# Patient Record
Sex: MALE | Race: WHITE | NOT HISPANIC OR LATINO | URBAN - METROPOLITAN AREA
[De-identification: names, ages, dates, MRNs, and addresses within clinical notes are randomized per-mention and may not be internally consistent; named-entity substitution may affect disease eponyms.]

---

## 2018-01-18 ENCOUNTER — INPATIENT (INPATIENT)
Facility: HOSPITAL | Age: 73
LOS: 39 days | Discharge: HOME | DRG: 3 | End: 2018-02-27
Attending: SURGERY | Admitting: SURGERY
Payer: COMMERCIAL

## 2018-01-18 VITALS
DIASTOLIC BLOOD PRESSURE: 92 MMHG | OXYGEN SATURATION: 94 % | RESPIRATION RATE: 20 BRPM | SYSTOLIC BLOOD PRESSURE: 131 MMHG | HEIGHT: 70 IN | HEART RATE: 70 BPM | TEMPERATURE: 98 F | WEIGHT: 197.09 LBS

## 2018-01-18 LAB
ALBUMIN SERPL ELPH-MCNC: 3.6 G/DL — SIGNIFICANT CHANGE UP (ref 3.3–5)
ALP SERPL-CCNC: 112 U/L — SIGNIFICANT CHANGE UP (ref 40–120)
ALT FLD-CCNC: 1329 U/L — HIGH (ref 10–45)
ANION GAP SERPL CALC-SCNC: 19 MMOL/L — HIGH (ref 5–17)
APPEARANCE UR: CLEAR — SIGNIFICANT CHANGE UP
AST SERPL-CCNC: 441 U/L — HIGH (ref 10–40)
BACTERIA # UR AUTO: PRESENT /HPF
BASOPHILS NFR BLD AUTO: 0 % — SIGNIFICANT CHANGE UP (ref 0–2)
BILIRUB SERPL-MCNC: 2.9 MG/DL — HIGH (ref 0.2–1.2)
BILIRUB UR-MCNC: (no result)
BUN SERPL-MCNC: 85 MG/DL — HIGH (ref 7–23)
CALCIUM SERPL-MCNC: 8.8 MG/DL — SIGNIFICANT CHANGE UP (ref 8.4–10.5)
CHLORIDE SERPL-SCNC: 100 MMOL/L — SIGNIFICANT CHANGE UP (ref 96–108)
CO2 SERPL-SCNC: 17 MMOL/L — LOW (ref 22–31)
COLOR SPEC: YELLOW — SIGNIFICANT CHANGE UP
CREAT SERPL-MCNC: 1.48 MG/DL — HIGH (ref 0.5–1.3)
DIFF PNL FLD: (no result)
EOSINOPHIL NFR BLD AUTO: 0 % — SIGNIFICANT CHANGE UP (ref 0–6)
EPI CELLS # UR: (no result) /HPF (ref 0–5)
GAS PNL BLDV: SIGNIFICANT CHANGE UP
GLUCOSE SERPL-MCNC: 158 MG/DL — HIGH (ref 70–99)
GLUCOSE UR QL: NEGATIVE — SIGNIFICANT CHANGE UP
GRAN CASTS # UR COMP ASSIST: (no result) /LPF
HCT VFR BLD CALC: 39.7 % — SIGNIFICANT CHANGE UP (ref 39–50)
HGB BLD-MCNC: 13.8 G/DL — SIGNIFICANT CHANGE UP (ref 13–17)
HYALINE CASTS # UR AUTO: (no result) /LPF (ref 0–2)
HYPOCHROMIA BLD QL: SLIGHT — SIGNIFICANT CHANGE UP
KETONES UR-MCNC: NEGATIVE — SIGNIFICANT CHANGE UP
LACTATE SERPL-SCNC: 2.9 MMOL/L — HIGH (ref 0.5–2)
LACTATE SERPL-SCNC: 2.9 MMOL/L — HIGH (ref 0.5–2)
LEUKOCYTE ESTERASE UR-ACNC: NEGATIVE — SIGNIFICANT CHANGE UP
LG PLATELETS BLD QL AUTO: PRESENT — SIGNIFICANT CHANGE UP
LIDOCAIN IGE QN: 66 U/L — HIGH (ref 7–60)
LYMPHOCYTES # BLD AUTO: 9 % — LOW (ref 13–44)
MANUAL DIF COMMENT BLD-IMP: SIGNIFICANT CHANGE UP
MANUAL SMEAR VERIFICATION: SIGNIFICANT CHANGE UP
MCHC RBC-ENTMCNC: 31.7 PG — SIGNIFICANT CHANGE UP (ref 27–34)
MCHC RBC-ENTMCNC: 34.8 G/DL — SIGNIFICANT CHANGE UP (ref 32–36)
MCV RBC AUTO: 91.1 FL — SIGNIFICANT CHANGE UP (ref 80–100)
MICROCYTES BLD QL: SLIGHT — SIGNIFICANT CHANGE UP
MONOCYTES NFR BLD AUTO: 3 % — SIGNIFICANT CHANGE UP (ref 2–14)
NEUTROPHILS NFR BLD AUTO: 87 % — HIGH (ref 43–77)
NEUTS BAND # BLD: 1 % — SIGNIFICANT CHANGE UP
NITRITE UR-MCNC: NEGATIVE — SIGNIFICANT CHANGE UP
NRBC # BLD: 4 /100 WBCS — HIGH
PH UR: 6 — SIGNIFICANT CHANGE UP (ref 5–8)
PLAT MORPH BLD: (no result)
PLATELET # BLD AUTO: 99 K/UL — LOW (ref 150–400)
POLYCHROMASIA BLD QL SMEAR: SLIGHT — SIGNIFICANT CHANGE UP
POTASSIUM SERPL-MCNC: 4.2 MMOL/L — SIGNIFICANT CHANGE UP (ref 3.5–5.3)
POTASSIUM SERPL-SCNC: 4.2 MMOL/L — SIGNIFICANT CHANGE UP (ref 3.5–5.3)
PROT SERPL-MCNC: 6.6 G/DL — SIGNIFICANT CHANGE UP (ref 6–8.3)
PROT UR-MCNC: 30 MG/DL
RAPID RVP RESULT: SIGNIFICANT CHANGE UP
RBC # BLD: 4.36 M/UL — SIGNIFICANT CHANGE UP (ref 4.2–5.8)
RBC # FLD: 15.4 % — SIGNIFICANT CHANGE UP (ref 10.3–16.9)
RBC BLD AUTO: (no result)
RBC CASTS # UR COMP ASSIST: (no result) /HPF
SODIUM SERPL-SCNC: 136 MMOL/L — SIGNIFICANT CHANGE UP (ref 135–145)
SP GR SPEC: 1.02 — SIGNIFICANT CHANGE UP (ref 1–1.03)
UROBILINOGEN FLD QL: 4 E.U./DL
WBC # BLD: 17.9 K/UL — HIGH (ref 3.8–10.5)
WBC # FLD AUTO: 17.9 K/UL — HIGH (ref 3.8–10.5)
WBC UR QL: (no result) /HPF

## 2018-01-18 PROCEDURE — 71250 CT THORAX DX C-: CPT | Mod: 26

## 2018-01-18 PROCEDURE — 99291 CRITICAL CARE FIRST HOUR: CPT | Mod: 25

## 2018-01-18 PROCEDURE — 99292 CRITICAL CARE ADDL 30 MIN: CPT | Mod: 25

## 2018-01-18 PROCEDURE — 71045 X-RAY EXAM CHEST 1 VIEW: CPT | Mod: 26

## 2018-01-18 PROCEDURE — 74176 CT ABD & PELVIS W/O CONTRAST: CPT | Mod: 26

## 2018-01-18 PROCEDURE — 76705 ECHO EXAM OF ABDOMEN: CPT | Mod: 26

## 2018-01-18 RX ORDER — VANCOMYCIN HCL 1 G
VIAL (EA) INTRAVENOUS
Qty: 0 | Refills: 0 | Status: DISCONTINUED | OUTPATIENT
Start: 2018-01-18 | End: 2018-01-18

## 2018-01-18 RX ORDER — SODIUM CHLORIDE 9 MG/ML
1000 INJECTION INTRAMUSCULAR; INTRAVENOUS; SUBCUTANEOUS ONCE
Qty: 0 | Refills: 0 | Status: COMPLETED | OUTPATIENT
Start: 2018-01-18 | End: 2018-01-18

## 2018-01-18 RX ORDER — SODIUM CHLORIDE 9 MG/ML
3 INJECTION INTRAMUSCULAR; INTRAVENOUS; SUBCUTANEOUS ONCE
Qty: 0 | Refills: 0 | Status: COMPLETED | OUTPATIENT
Start: 2018-01-18 | End: 2018-01-18

## 2018-01-18 RX ORDER — VANCOMYCIN HCL 1 G
1500 VIAL (EA) INTRAVENOUS ONCE
Qty: 0 | Refills: 0 | Status: COMPLETED | OUTPATIENT
Start: 2018-01-18 | End: 2018-01-18

## 2018-01-18 RX ORDER — ONDANSETRON 8 MG/1
4 TABLET, FILM COATED ORAL EVERY 6 HOURS
Qty: 0 | Refills: 0 | Status: DISCONTINUED | OUTPATIENT
Start: 2018-01-18 | End: 2018-01-20

## 2018-01-18 RX ORDER — SODIUM CHLORIDE 9 MG/ML
1000 INJECTION, SOLUTION INTRAVENOUS
Qty: 0 | Refills: 0 | Status: DISCONTINUED | OUTPATIENT
Start: 2018-01-18 | End: 2018-01-19

## 2018-01-18 RX ORDER — CEFOXITIN 1 G/1
1 INJECTION, POWDER, FOR SOLUTION INTRAVENOUS ONCE
Qty: 0 | Refills: 0 | Status: DISCONTINUED | OUTPATIENT
Start: 2018-01-18 | End: 2018-01-18

## 2018-01-18 RX ORDER — HEPARIN SODIUM 5000 [USP'U]/ML
5000 INJECTION INTRAVENOUS; SUBCUTANEOUS EVERY 8 HOURS
Qty: 0 | Refills: 0 | Status: DISCONTINUED | OUTPATIENT
Start: 2018-01-18 | End: 2018-01-19

## 2018-01-18 RX ORDER — PIPERACILLIN AND TAZOBACTAM 4; .5 G/20ML; G/20ML
3.38 INJECTION, POWDER, LYOPHILIZED, FOR SOLUTION INTRAVENOUS ONCE
Qty: 0 | Refills: 0 | Status: COMPLETED | OUTPATIENT
Start: 2018-01-18 | End: 2018-01-18

## 2018-01-18 RX ORDER — PIPERACILLIN AND TAZOBACTAM 4; .5 G/20ML; G/20ML
3.38 INJECTION, POWDER, LYOPHILIZED, FOR SOLUTION INTRAVENOUS EVERY 6 HOURS
Qty: 0 | Refills: 0 | Status: DISCONTINUED | OUTPATIENT
Start: 2018-01-18 | End: 2018-01-20

## 2018-01-18 RX ADMIN — SODIUM CHLORIDE 2000 MILLILITER(S): 9 INJECTION INTRAMUSCULAR; INTRAVENOUS; SUBCUTANEOUS at 14:15

## 2018-01-18 RX ADMIN — Medication 300 MILLIGRAM(S): at 14:44

## 2018-01-18 RX ADMIN — SODIUM CHLORIDE 2000 MILLILITER(S): 9 INJECTION INTRAMUSCULAR; INTRAVENOUS; SUBCUTANEOUS at 13:11

## 2018-01-18 RX ADMIN — PIPERACILLIN AND TAZOBACTAM 200 GRAM(S): 4; .5 INJECTION, POWDER, LYOPHILIZED, FOR SOLUTION INTRAVENOUS at 13:11

## 2018-01-18 RX ADMIN — SODIUM CHLORIDE 3 MILLILITER(S): 9 INJECTION INTRAMUSCULAR; INTRAVENOUS; SUBCUTANEOUS at 13:11

## 2018-01-18 RX ADMIN — PIPERACILLIN AND TAZOBACTAM 200 GRAM(S): 4; .5 INJECTION, POWDER, LYOPHILIZED, FOR SOLUTION INTRAVENOUS at 20:59

## 2018-01-18 NOTE — H&P ADULT - HISTORY OF PRESENT ILLNESS
73 yo M with h/o severe mitral and aortic valve regurgitation presented to ED with 5 day h/o left lower abdominal pain, nausea and multiple episodes of vomiting as well as PO intolerance. Pt reports that the pain in his abdomen is illicited by movement. Went to [facility] two days ago and underwent CT scan which revealed acute cholecystitis as well as right lower lobe infectious pneumonia. Pt was told he would require surgery and left AMA because he was waiting too long. Denies f/c. Normal BMs and passing flatus. 71 yo M with h/o severe mitral and aortic valve regurgitation presented to ED with 5 day h/o left lower abdominal pain, nausea and multiple episodes of vomiting as well as PO intolerance. Pt reports that the pain in his abdomen is illicited by movement. Went to [facility] two days ago and underwent CT scan which revealed acute cholecystitis as well as right lower lobe infectious pneumonia. Pt was told he would require surgery and left AMA because he was waiting too long. Denies f/c. Last echo 1/4/17 with EF 60-65%. Normal BMs and passing flatus.

## 2018-01-18 NOTE — ED PROVIDER NOTE - OBJECTIVE STATEMENT
72 year old male with PMH of MR presenting with nausea/vomitting and abdominal pain x 5 days. [place] and having stomach pain in his lower left abdomen. He reports vomitting and having diarrhea. He denies blood in the stool or urine. He reports that he has not had an appetite for the last 5 days and he has not eaten anything so the vomitting and diarhea have stopped. He also complains of SOB. He denies fever, chills, chest pain. Yesterday he went to [facility] and was being admitted but they had no beds and he got angry and left AMA.

## 2018-01-18 NOTE — ED ADULT NURSE REASSESSMENT NOTE - NS ED NURSE REASSESS COMMENT FT1
ceddée indwelling catheter placed by ICU resident. tolerated well. PEnding admission/ IR. 17F coudée indwelling catheter placed by ICU resident. tolerated well. PEnding admission/ IR.

## 2018-01-18 NOTE — CONSULT NOTE ADULT - ASSESSMENT
72yr old M with PMHx significant for severe mitral and aortic valve regurgitation who presented to ED for evaluation of LLQ abdominal pain, with recent CT showing acute cholecystitis, cholelithiasis, and RLL pneumonia.   GI consulted for eval of cholangitis.    # Abnormal LFTs -  - given lack of CBD dilation patient probably does not have obstruction at this time and is draining his CBD intermittently  - presence of stones suggests that he probably does have some ongoing choledocholithiasis - but stones are able to pass apparently  - USS negative for cholecystitis - but likely better delineated on CT  - abnormal LFTs suggestive of intermittent choledocholithiasis  - SIRS (leukocytosis, tachypnea) could be multifactorial - RLL pneumonia, and cholecystitis  - continue broad spectrum abx  - will plan for an ERCP tomorrow am to evaluate ducts better  - NPO midnite      Discussed with attending Dr Ej BAR following

## 2018-01-18 NOTE — H&P ADULT - ASSESSMENT
73 yo M with h/o aortic and mitral regurg with acute cholecystitis  -admit to SICU  -zosyn  -IR for percutaneous cholecystostomy tube  -T bili 2.9, elevated LFTs, CBD normal on RUQ US, f/u GI consult  -f/u cardiology consult - new onset rapid a-fib, elevated BNP  -b/l patchy infiltrates on CXR  -seen and discussed with Dr Phillips

## 2018-01-18 NOTE — CONSULT NOTE ADULT - SUBJECTIVE AND OBJECTIVE BOX
HPI:  73 yo M with h/o severe mitral and aortic valve regurgitation presented to ED with 5 day h/o left lower abdominal pain, nausea and multiple episodes of vomiting as well as PO intolerance. Pt reports that the pain in his abdomen is illicited by movement. Went to [facility] two days ago and underwent CT scan which revealed acute cholecystitis as well as right lower lobe infectious pneumonia. Pt was told he would require surgery and left AMA because he was waiting too long. Denies f/c. Last echo 1/4/17 with EF 60-65%. Normal BMs and passing flatus. (18 Jan 2018 14:49)      PAST MEDICAL & SURGICAL HISTORY:  Mitral regurgitation  Aortic regurgitation  No significant past surgical history      REVIEW OF SYSTEMS      General:	    Skin/Breast:  	  Ophthalmologic:  	  ENMT:	    Respiratory and Thorax:  	  Cardiovascular:	    Gastrointestinal:	    Genitourinary:	    Musculoskeletal:	    Neurological:	    Psychiatric:	    Hematology/Lymphatics:	    Endocrine:	    Allergic/Immunologic:	    MEDICATIONS  (STANDING):    MEDICATIONS  (PRN):      Allergies    No Known Allergies    Intolerances        SOCIAL HISTORY:    FAMILY HISTORY:      Vital Signs Last 24 Hrs  T(C): 36.3 (18 Jan 2018 15:47), Max: 36.7 (18 Jan 2018 11:40)  T(F): 97.4 (18 Jan 2018 15:47), Max: 98.1 (18 Jan 2018 11:40)  HR: 112 (18 Jan 2018 15:47) (70 - 119)  BP: 133/97 (18 Jan 2018 15:47) (127/94 - 168/97)  BP(mean): --  RR: 32 (18 Jan 2018 15:47) (20 - 36)  SpO2: 99% (18 Jan 2018 15:47) (94% - 99%)    PHYSICAL EXAM:      Constitutional:    Eyes:    ENMT:    Neck:    Breasts:    Back:    Respiratory:    Cardiovascular:    Gastrointestinal:    Genitourinary:    Rectal:    Extremities:    Vascular:    Neurological:    Skin:    Lymph Nodes:    Musculoskeletal:    Psychiatric:        LABS:                        13.8   17.9  )-----------( 99       ( 18 Jan 2018 12:07 )             39.7     01-18    136  |  100  |  85<H>  ----------------------------<  158<H>  4.2   |  17<L>  |  1.48<H>    Ca    8.8      18 Jan 2018 12:07    TPro  6.6  /  Alb  3.6  /  TBili  2.9<H>  /  DBili  x   /  AST  441<H>  /  ALT  1329<H>  /  AlkPhos  112  01-18          RADIOLOGY & ADDITIONAL STUDIES: HPI:  72yr old M with PMHx significant for severe mitral and aortic valve regurgitation who presented to ED for evaluation of LLQ abdominal pain.  Per patient he has had LLQ abdominal discomfort, ongoing for close to 2weeks described as dull ache, 4/10, nil radiation, aggravated by movement, coughing, twisting of torso, bending, walking, no identified alleviating factors, associated with nausea and an episode of emesis 3days ago (after he drank some apple juice, no blood, no coffee grounds). He denies associated RUQ pain, diarrhea, fever, chills, sick contacts, recent travel, change in the color of his eyes or urine, chest pain, SOB, palpitations, diaphoresis.  He went to [facility] on tuesday for evaluation of the pain and was worked up with a CT which showed cholecystitis, RLL pneumonia - he was advised he will need surgery and left AMA after he felt they were taking too much time.  Patient came to [facility] after his pain was not abating and his partner convinced him to come to the ED.    PAST MEDICAL & SURGICAL HISTORY:  Mitral regurgitation  Aortic regurgitation  No significant past surgical history      REVIEW OF SYSTEMS  Apart from items noted in the HPI a 10point ROS was negative      MEDICATIONS  (STANDING):    MEDICATIONS  (PRN):      Allergies  No Known Allergies    Intolerances      SOCIAL HISTORY:  Denies toxic or illicit habits    FAMILY HISTORY:  Denies FHx of stomach/colon/pancreatic cancer    Vital Signs Last 24 Hrs  T(C): 36.3 (18 Jan 2018 15:47), Max: 36.7 (18 Jan 2018 11:40)  T(F): 97.4 (18 Jan 2018 15:47), Max: 98.1 (18 Jan 2018 11:40)  HR: 112 (18 Jan 2018 15:47) (70 - 119)  BP: 133/97 (18 Jan 2018 15:47) (127/94 - 168/97)  BP(mean): --  RR: 32 (18 Jan 2018 15:47) (20 - 36)  SpO2: 99% (18 Jan 2018 15:47) (94% - 99%)    PHYSICAL EXAM:  GEN - elderly M lying in bed in some mild respiratory distress, on BIPAP, anicteric, afebrile, not pale  Respiratory: good air entry  Cardiovascular: s1 s2 no M RRR  Gastrointestinal: full, soft, BS+, NT, NR, NG, no masses or organs palpated  Extremities: no edema  Neurological: AAOx3 non focal      LABS:                 13.8   17.9  )-----------( 99       ( 18 Jan 2018 12:07 )             39.7     136  |  100  |  85<H>  ----------------------------<  158<H>  4.2   |  17<L>  |  1.48<H>    Ca    8.8      18 Jan 2018 12:07    TPro  6.6  /  Alb  3.6  /  TBili  2.9<H>  /  DBili  x   /  AST  441<H>  /  ALT  1329<H>  /  AlkPhos  112  01-18          RADIOLOGY & ADDITIONAL STUDIES:  US Abdomen Limited (01.18.18 @ 15:35)   IMPRESSION:  Limited portable study. Gallbladder contracted. Likely filled with tiny stones. No evidence of acute cholecystitis. Recommend correlation with  CT. HPI:  72yr old M with PMHx significant for severe mitral and aortic valve regurgitation who presented to ED for evaluation of LLQ abdominal pain.  Per patient he has had LLQ abdominal discomfort, ongoing for close to 2weeks described as dull ache, 4/10, nil radiation, aggravated by movement, coughing, twisting of torso, bending, walking, no identified alleviating factors, associated with nausea and an episode of emesis 3days ago (after he drank some apple juice, no blood, no coffee grounds). He denies associated RUQ pain, diarrhea, fever, chills, sick contacts, recent travel, change in the color of his eyes or urine, chest pain, SOB, palpitations, diaphoresis.  He went to [facility] on tuesday for evaluation of the pain and was worked up with a CT which showed cholecystitis, RLL pneumonia - he was advised he will need surgery and left AMA after he felt they were taking too much time.  Patient came to [facility] after his pain was not abating and his partner convinced him to come to the ED.    PAST MEDICAL & SURGICAL HISTORY:  Mitral regurgitation  Aortic regurgitation  No significant past surgical history      REVIEW OF SYSTEMS  Apart from items noted in the HPI a 10point ROS was negative      MEDICATIONS  (STANDING):    MEDICATIONS  (PRN):      Allergies  No Known Allergies    Intolerances      SOCIAL HISTORY:  Denies toxic or illicit habits    FAMILY HISTORY:  Denies FHx of stomach/colon/pancreatic cancer    Vital Signs Last 24 Hrs  T(C): 36.3 (18 Jan 2018 15:47), Max: 36.7 (18 Jan 2018 11:40)  T(F): 97.4 (18 Jan 2018 15:47), Max: 98.1 (18 Jan 2018 11:40)  HR: 112 (18 Jan 2018 15:47) (70 - 119)  BP: 133/97 (18 Jan 2018 15:47) (127/94 - 168/97)  BP(mean): --  RR: 32 (18 Jan 2018 15:47) (20 - 36)  SpO2: 99% (18 Jan 2018 15:47) (94% - 99%)    PHYSICAL EXAM:  GEN - elderly M lying in bed in some mild respiratory distress, on BIPAP, anicteric, afebrile, not pale  Respiratory: good air entry  Cardiovascular: s1 s2 +M RRR  Gastrointestinal: full, soft, BS+, NT, NR, NG, no masses or organs palpated  Extremities: no edema  Neurological: AAOx3 non focal      LABS:                 13.8   17.9  )-----------( 99       ( 18 Jan 2018 12:07 )             39.7     136  |  100  |  85<H>  ----------------------------<  158<H>  4.2   |  17<L>  |  1.48<H>    Ca    8.8      18 Jan 2018 12:07    TPro  6.6  /  Alb  3.6  /  TBili  2.9<H>  /  DBili  x   /  AST  441<H>  /  ALT  1329<H>  /  AlkPhos  112  01-18          RADIOLOGY & ADDITIONAL STUDIES:  US Abdomen Limited (01.18.18 @ 15:35)   IMPRESSION:  Limited portable study. Gallbladder contracted. Likely filled with tiny stones. No evidence of acute cholecystitis. Recommend correlation with  CT. HPI:  72yr old M with PMHx significant for severe mitral and aortic valve regurgitation who presented to ED for evaluation of LLQ abdominal pain.  Per patient he has had LLQ abdominal discomfort, ongoing for close to 2weeks described as dull ache, 4/10, nil radiation, aggravated by movement, coughing, twisting of torso, bending, walking, no identified alleviating factors, associated with nausea and an episode of emesis 3days ago (after he drank some apple juice, no blood, no coffee grounds). He denies associated RUQ pain, diarrhea, fever, chills, sick contacts, recent travel, change in the color of his eyes or urine, chest pain, SOB, palpitations, diaphoresis.  He went to [facility] on tuesday for evaluation of the pain and was worked up with a CT which showed cholecystitis, RLL pneumonia - he was advised he will need surgery and left AMA after he felt they were taking too much time.  Patient came to [facility] after his pain was not abating and his partner convinced him to come to the ED.    PAST MEDICAL & SURGICAL HISTORY:  Mitral regurgitation  Aortic regurgitation  No significant past surgical history      REVIEW OF SYSTEMS  Apart from items noted in the HPI a 10point ROS was negative      MEDICATIONS  (STANDING):    MEDICATIONS  (PRN):      Allergies  No Known Allergies    Intolerances      SOCIAL HISTORY:  Denies toxic or illicit habits    FAMILY HISTORY:  Denies FHx of stomach/colon/pancreatic cancer    Vital Signs Last 24 Hrs  T(C): 36.3 (18 Jan 2018 15:47), Max: 36.7 (18 Jan 2018 11:40)  T(F): 97.4 (18 Jan 2018 15:47), Max: 98.1 (18 Jan 2018 11:40)  HR: 112 (18 Jan 2018 15:47) (70 - 119)  BP: 133/97 (18 Jan 2018 15:47) (127/94 - 168/97)  BP(mean): --  RR: 32 (18 Jan 2018 15:47) (20 - 36)  SpO2: 99% (18 Jan 2018 15:47) (94% - 99%)    PHYSICAL EXAM:  GEN - elderly M lying in bed in some mild respiratory distress, on BIPAP, anicteric, afebrile, not pale  Respiratory: good air entry  Cardiovascular: s1 s2 +M RRR  Gastrointestinal: full, soft, BS+, NT, NR, NG, no masses or organs palpated  Extremities: + pitting LE edema  Neurological: AAOx3 non focal      LABS:                 13.8   17.9  )-----------( 99       ( 18 Jan 2018 12:07 )             39.7     136  |  100  |  85<H>  ----------------------------<  158<H>  4.2   |  17<L>  |  1.48<H>    Ca    8.8      18 Jan 2018 12:07    TPro  6.6  /  Alb  3.6  /  TBili  2.9<H>  /  DBili  x   /  AST  441<H>  /  ALT  1329<H>  /  AlkPhos  112  01-18          RADIOLOGY & ADDITIONAL STUDIES:  US Abdomen Limited (01.18.18 @ 15:35)   IMPRESSION:  Limited portable study. Gallbladder contracted. Likely filled with tiny stones. No evidence of acute cholecystitis. Recommend correlation with  CT.

## 2018-01-18 NOTE — H&P ADULT - ATTENDING COMMENTS
Patient seen and examined with surgical team.  Abdominal (LLQ) pain started 7 days ago while on cruise. Diagnosed with cholecystitis based on CT scan 2 days ago at outside ED, signed out AMA.  Currently in new onset A-fib with RVR, desaturation.  CXR with bilateral infiltrates, no FA    Luekocytosis  LFTs more consistent with inflammation than cholangitis.  [facility] preliminary report with GB wall thickening to 1cm, stones, normal duct.    A/P: Worsening acute cholecystitis with compromise of cardiac (A-fib) and respiratory (CHF vs Pneumonia) systems, sepsis.  1. IV Vanco, Zosyn  2. Currently on BiPap, may need intubation if worsens.  3. percutaneous cholecystostomy  4. Cardiology evaluation for rate control. No anticoagulation at this time.  5. ICU monitoring.

## 2018-01-18 NOTE — ED PROVIDER NOTE - MEDICAL DECISION MAKING DETAILS
Case d/w Dr. Preston, 72 year old male with PMH of MR presenting with nausea/vomitting and abdominal pain x 5 days. [place] and having stomach pain in his lower left abdomen. Pt seen at [facility] where he was diagnosed with cholecystitis and chf however pt left ama.  Pt now here tachycardic, tachypneic, and in new onset afib.  Comprehensive labs sent, pt started on zosyn, bipap, surgery consutled, pt pending repeat ultrasound, attempted to get ct scan report from [facility] for further collateral information. Case d/w Dr. Preston, 72 year old male with PMH of MR presenting with nausea/vomitting and abdominal pain x 5 days. He [place] and having stomach pain in his lower left abdomen. Pt seen at [facility] where he was diagnosed with cholecystitis and chf however pt left ama.  Pt now here tachycardic, tachypneic, and in new onset afib.  Comprehensive labs sent, pt started on zosyn, bipap, surgery consutled, pt pending repeat ultrasound, attempted to get ct scan report from [facility] for further collateral information. GI consulted for possible cholangitis.

## 2018-01-18 NOTE — CONSULT NOTE ADULT - ASSESSMENT
71 yo M with Hx of aortic regurgitation and mitral regurgitation here with pulmonary edema likely due to congestive heart failure in the setting of severe valvular disease and new onset Afib with RVR,    #Pulmonary Edema 2/2 severe valvular disease and ?CHF exacerbation in the setting of new onset AFib with RVR  Patient with shortness of breath and tachypnea in the setting of severe valvular heart disease   Recommendations:  -Cardiology evaluation.  -Check echocardiogram.    #Dispo  -Patient does not have clinical evidence of PNA at this time. Leukocytosis more consistent with leukemoid reaction in the setting of cardiac disease.  -No indication for medical ICU 71 yo M with Hx of aortic regurgitation and mitral regurgitation here with pulmonary edema likely due to congestive heart failure in the setting of severe valvular disease and new onset Afib with RVR,    #Pulmonary Edema 2/2 severe valvular disease and ?CHF exacerbation in the setting of new onset AFib with RVR  Patient with shortness of breath and tachypnea in the setting of severe valvular heart disease and suspected CHF. Now also in new onset Afib with RVR, likely 2/2 acute  Recommendations:  -Cardiology evaluation.  -Check echocardiogram.    #Dispo  -Patient does not have clinical evidence of PNA at this time. Leukocytosis more consistent with leukemoid reaction in the setting of cardiac disease.  -No indication for medical ICU.

## 2018-01-18 NOTE — ED ADULT NURSE REASSESSMENT NOTE - NS ED NURSE REASSESS COMMENT FT1
Transfer of care from BRUCE Sales. PT noted tachynpic and c/o sob. No cp, no n/v, no abdominal pain. Spoke to JENNYFER MARSH for ?bipap and xray of chest. JENNYFER clay agrees.

## 2018-01-18 NOTE — ED ADULT TRIAGE NOTE - CHIEF COMPLAINT QUOTE
Patient c/o LUQ abdominal pain ,nausea and vomiting for 1 week . Was seen at the other hospital last tuesday and signed AMA .

## 2018-01-18 NOTE — ED ADULT NURSE NOTE - OBJECTIVE STATEMENT
received pt in room 5. A&Ox3, pt states "I think I need my gallbladder out" pt was seen at [facility] left ama yesterday. pt c.o L sided abd pain with n/v and limited po intake. denies fever/chills. denies urinary symptoms. awaiting md yen.

## 2018-01-18 NOTE — H&P ADULT - NSHPPHYSICALEXAM_GEN_ALL_CORE
Gen: AOx3, tachypneic    CV: irregularly irregular, tachycardic    Pulm: on BIPAP, some crackles at R lung base, tachypneic    Abdomen: soft, NTND minimal involuntary guarding in RUQ, no rebound    Ext: WWP, no edema

## 2018-01-18 NOTE — ED ADULT NURSE REASSESSMENT NOTE - NS ED NURSE REASSESS COMMENT FT1
US contacted, Delayed until pt work of breathing improved. MD Ree aware and agrees. Spoke in regards to 2nd L NS with concerns for heart failure with JENNYFER MARSH. States "I still want to fluid resuscitate"

## 2018-01-18 NOTE — CONSULT NOTE ADULT - SUBJECTIVE AND OBJECTIVE BOX
CHIEF COMPLAINT:  Patient is a 72y old  Male who presents with a chief complaint of acute cholecystitis (2018 14:49)    ED Provider Note reviewed in detail.    HPI:  TRISTEN BRADSHAW is a 72y Male with a Hx of aortic regurgitation and mitral regurgitation, who presents with shortness of breath and abdominal pain for 2 weeks.  PAST MEDICAL & SURGICAL HISTORY:  Mitral regurgitation  Aortic regurgitation  No significant past surgical history    FAMILY HISTORY:  no pertinent family history    SOCIAL HISTORY:  non-smoker  denies EtOH    REVIEW OF SYSTEMS:  10 system review of systems negative except as per HPI    Vital Signs Last 24 Hrs  T(C): 36.6 (2018 18:13), Max: 36.7 (2018 11:40)  T(F): 97.9 (2018 18:13), Max: 98.1 (2018 11:40)  HR: 113 (2018 18:13) (70 - 119)  BP: 131/100 (2018 18:13) (127/94 - 168/97)  BP(mean): --  RR: 34 (2018 18:13) (20 - 36)  SpO2: 98% (2018 18:13) (94% - 99%)    Height (cm): 177.8 ( @ 11:40)  Weight (kg): 89.4 ( @ 11:40)  BMI (kg/m2): 28.3 ( @ 11:40)      PHYSICAL EXAM:  Gen:       comfortable appearing, obese  Skin:       warm, dry, no rash  HEENT:  moist mucous membranes, oropharynx clear, no conjunctival pallor  Neck:     supple, no JVD, no thyromegaly or goiter  Cardiac: irregular tachycardia, 3/6 holosystolic murmur greatest in the aortic area  Pulm:     clear to auscultation bilaterally, no wheezes/crackles  Abd:      soft/nontender/nondistended, normoactive bowel sounds  Lymph:  no anterior/posterior/supraclavicular nodes appreciated  Vasc:      warm and well perfused, 2+ dorsalis pedis and radial pulses, no pedal edema  Ext:         normal range of motion, atraumatic  Neuro:   awake, alert and oriented x3, Cranial Nerves II-XII grossly intact, no focal deficits    MEDICATIONS  (STANDING):  heparin  Injectable 5000 Unit(s) SubCutaneous every 8 hours  lactated ringers. 1000 milliLiter(s) (120 mL/Hr) IV Continuous <Continuous>  piperacillin/tazobactam IVPB. 3.375 Gram(s) IV Intermittent every 6 hours    MEDICATIONS  (PRN):  ondansetron Injectable 4 milliGRAM(s) IV Push every 6 hours PRN Nausea      Allergies    No Known Allergies    Intolerances      LABS:                        13.8   17.9  )-----------( 99       ( 2018 12:07 )             39.7    Mean Cell Volume: 91.1 fL ( @ 12:07)        136  |  100  |  85<H>  ----------------------------<  158<H>  4.2   |  17<L>  |  1.48<H>    Ca    8.8      2018 12:07    TPro  6.6  /  Alb  3.6  /  TBili  2.9<H>  /  DBili  x   /  AST  441<H>  /  ALT  1329<H>  /  AlkPhos  112        Urinalysis Basic - ( 2018 18:49 )    Color: Yellow / Appearance: Clear / S.020 / pH: x  Gluc: x / Ketone: NEGATIVE  / Bili: Small / Urobili: 4.0 E.U./dL   Blood: x / Protein: 30 mg/dL / Nitrite: NEGATIVE   Leuk Esterase: NEGATIVE / RBC: 5-10 /HPF / WBC 5-10 /HPF   Sq Epi: x / Non Sq Epi: 5-10 /HPF / Bacteria: Present /HPF        MICROBIOLOGY: REVIEWED      CARDIOLOGY: REVIEWED      RADIOLOGY: REVIEWED CHIEF COMPLAINT:  Patient is a 72y old  Male who presents with a chief complaint of acute cholecystitis (2018 14:49)    ED Provider Note reviewed in detail.    HPI:  TRISTEN BRADSHAW is a 72y Male with a Hx of aortic regurgitation and mitral regurgitation, who presents with shortness of breath and abdominal pain for 2 weeks.    PAST MEDICAL & SURGICAL HISTORY:  Mitral regurgitation  Aortic regurgitation  No significant past surgical history    FAMILY HISTORY:  no pertinent family history    SOCIAL HISTORY:  non-smoker  denies EtOH    REVIEW OF SYSTEMS:  10 system review of systems negative except as per HPI    Vital Signs Last 24 Hrs  T(C): 36.6 (2018 18:13), Max: 36.7 (2018 11:40)  T(F): 97.9 (2018 18:13), Max: 98.1 (2018 11:40)  HR: 113 (2018 18:13) (70 - 119)  BP: 131/100 (2018 18:13) (127/94 - 168/97)  BP(mean): --  RR: 34 (2018 18:13) (20 - 36)  SpO2: 98% (2018 18:13) (94% - 99%)    Height (cm): 177.8 ( @ 11:40)  Weight (kg): 89.4 ( @ 11:40)  BMI (kg/m2): 28.3 ( @ 11:40)      PHYSICAL EXAM:  Gen:       comfortable appearing, obese  Skin:       warm, dry, no rash  HEENT:  moist mucous membranes, oropharynx clear, no conjunctival pallor  Neck:     supple, no JVD, no thyromegaly or goiter  Cardiac: irregular tachycardia, 3/6 holosystolic murmur greatest in the aortic area  Pulm:     clear to auscultation bilaterally, no wheezes/crackles  Abd:      soft/nontender/nondistended, normoactive bowel sounds  Lymph:  no anterior/posterior/supraclavicular nodes appreciated  Vasc:      warm and well perfused, 2+ dorsalis pedis and radial pulses, no pedal edema  Ext:         normal range of motion, atraumatic  Neuro:   awake, alert and oriented x3, Cranial Nerves II-XII grossly intact, no focal deficits    MEDICATIONS  (STANDING):  heparin  Injectable 5000 Unit(s) SubCutaneous every 8 hours  lactated ringers. 1000 milliLiter(s) (120 mL/Hr) IV Continuous <Continuous>  piperacillin/tazobactam IVPB. 3.375 Gram(s) IV Intermittent every 6 hours    MEDICATIONS  (PRN):  ondansetron Injectable 4 milliGRAM(s) IV Push every 6 hours PRN Nausea      Allergies    No Known Allergies    Intolerances      LABS:                        13.8   17.9  )-----------( 99       ( 2018 12:07 )             39.7    Mean Cell Volume: 91.1 fL ( @ 12:07)        136  |  100  |  85<H>  ----------------------------<  158<H>  4.2   |  17<L>  |  1.48<H>    Ca    8.8      2018 12:07    TPro  6.6  /  Alb  3.6  /  TBili  2.9<H>  /  DBili  x   /  AST  441<H>  /  ALT  1329<H>  /  AlkPhos  112        Urinalysis Basic - ( 2018 18:49 )    Color: Yellow / Appearance: Clear / S.020 / pH: x  Gluc: x / Ketone: NEGATIVE  / Bili: Small / Urobili: 4.0 E.U./dL   Blood: x / Protein: 30 mg/dL / Nitrite: NEGATIVE   Leuk Esterase: NEGATIVE / RBC: 5-10 /HPF / WBC 5-10 /HPF   Sq Epi: x / Non Sq Epi: 5-10 /HPF / Bacteria: Present /HPF    Lactate, Blood: 2.9 mmoL/L (18 @ 17:08)  Lactate, Blood: 2.9 mmoL/L (18 @ 13:44)      CARDIOLOGY: REVIEWED  < from: 12 Lead ECG (18 @ 12:57) >  Ventricular Rate 113 BPM    Atrial Rate 129 BPM    QRS Duration 102 ms    Q-T Interval 310 ms    QTC Calculation(Bezet) 425 ms    R Axis 55 degrees    T Axis 91 degrees    Diagnosis Line Atrial fibrillation with rapid ventricular response  Nonspecific ST and T wave abnormality , probably digitalis effect  Abnormal ECG    Confirmed by MD Von, Dea ([id number]) on 2018 2:50:43 PM    < end of copied text >      RADIOLOGY: REVIEWED  xray chest (my read): bilateral pulmonary edema CHIEF COMPLAINT:  Patient is a 72y old  Male who presents with a chief complaint of acute cholecystitis (2018 14:49)    ED Provider Note reviewed in detail.    HPI:  TRISTEN BRADSHAW is a 72y Male with a Hx of aortic regurgitation and mitral regurgitation, who presents with shortness of breath and abdominal pain for two weeks. He was seen at an outside hospital and was diagnosed with acute cholecystitis. It is unclear what treatment he received at that facility, but he was unhappy with his care there so he left AMA and came to [facility]. At present he denies any acute complaints. Abdomen is non-painful and he denies shortness of breath on NIPPV. He denies fever, chills, nausea/vomiting, diarrhea, dysuria, flank pain, suprapubic pain, sore throat, rhinorrhea, cough. He denies pain after eating. He was admitted to the surgical service for acute cholecystitis, but on their further review of the case it did not appear that this diagnosis was correct. MICU is now consulted for tachypnea and new onset Afib.    In the ED, he was given vancomycin, Zosyn and 2L NS.    PAST MEDICAL & SURGICAL HISTORY:  Mitral regurgitation  Aortic regurgitation  No significant past surgical history    FAMILY HISTORY:  no pertinent family history    SOCIAL HISTORY:  non-smoker  denies EtOH    REVIEW OF SYSTEMS:  10 system review of systems negative except as per HPI    Vital Signs Last 24 Hrs  T(C): 36.6 (2018 18:13), Max: 36.7 (2018 11:40)  T(F): 97.9 (2018 18:13), Max: 98.1 (2018 11:40)  HR: 113 (2018 18:13) (70 - 119)  BP: 131/100 (2018 18:13) (127/94 - 168/97)  BP(mean): --  RR: 34 (2018 18:13) (20 - 36)  SpO2: 98% (2018 18:13) (94% - 99%)    Height (cm): 177.8 ( @ 11:40)  Weight (kg): 89.4 ( @ 11:40)  BMI (kg/m2): 28.3 ( @ 11:40)      PHYSICAL EXAM:  Gen:       comfortable appearing, obese  Skin:       warm, dry, no rash  HEENT:  moist mucous membranes, oropharynx clear, no conjunctival pallor  Neck:     supple, no JVD, no thyromegaly or goiter  Cardiac: irregular tachycardia, 3/6 holosystolic murmur greatest in the aortic area  Pulm:     clear to auscultation bilaterally, no wheezes/crackles  Abd:      soft/nontender/nondistended, normoactive bowel sounds  Lymph:  no anterior/posterior/supraclavicular nodes appreciated  Vasc:      warm and well perfused, 2+ dorsalis pedis and radial pulses, no pedal edema  Ext:         normal range of motion, atraumatic  Neuro:   awake, alert and oriented x3, Cranial Nerves II-XII grossly intact, no focal deficits    MEDICATIONS  (STANDING):  heparin  Injectable 5000 Unit(s) SubCutaneous every 8 hours  lactated ringers. 1000 milliLiter(s) (120 mL/Hr) IV Continuous <Continuous>  piperacillin/tazobactam IVPB. 3.375 Gram(s) IV Intermittent every 6 hours    MEDICATIONS  (PRN):  ondansetron Injectable 4 milliGRAM(s) IV Push every 6 hours PRN Nausea      Allergies    No Known Allergies    Intolerances      LABS:                        13.8   17.9  )-----------( 99       ( 2018 12:07 )             39.7    Mean Cell Volume: 91.1 fL ( @ 12:07)        136  |  100  |  85<H>  ----------------------------<  158<H>  4.2   |  17<L>  |  1.48<H>    Ca    8.8      2018 12:07    TPro  6.6  /  Alb  3.6  /  TBili  2.9<H>  /  DBili  x   /  AST  441<H>  /  ALT  1329<H>  /  AlkPhos  112        Urinalysis Basic - ( 2018 18:49 )    Color: Yellow / Appearance: Clear / S.020 / pH: x  Gluc: x / Ketone: NEGATIVE  / Bili: Small / Urobili: 4.0 E.U./dL   Blood: x / Protein: 30 mg/dL / Nitrite: NEGATIVE   Leuk Esterase: NEGATIVE / RBC: 5-10 /HPF / WBC 5-10 /HPF   Sq Epi: x / Non Sq Epi: 5-10 /HPF / Bacteria: Present /HPF    Lactate, Blood: 2.9 mmoL/L (18 @ 17:08)  Lactate, Blood: 2.9 mmoL/L (18 @ 13:44)      CARDIOLOGY: REVIEWED  < from: 12 Lead ECG (18 @ 12:57) >  Ventricular Rate 113 BPM    Atrial Rate 129 BPM    QRS Duration 102 ms    Q-T Interval 310 ms    QTC Calculation(Bezet) 425 ms    R Axis 55 degrees    T Axis 91 degrees    Diagnosis Line Atrial fibrillation with rapid ventricular response  Nonspecific ST and T wave abnormality , probably digitalis effect  Abnormal ECG    Confirmed by MD Von, Dea ([id number]) on 2018 2:50:43 PM    < end of copied text >      RADIOLOGY: REVIEWED  xray chest (my read): bilateral pulmonary edema

## 2018-01-18 NOTE — ED PROVIDER NOTE - ATTENDING CONTRIBUTION TO CARE
Pt is a 71yo m, h/o severe mr, ar, who p/w left sided abd pain, n/v/d x 2 wks. Was evaluated at [facility] and dx'd w/ cholecystitis, left sided pna, and chf. Pt was unable w/ care and s/o ama to come to [facility] for further tx. No cp, palp. Afebrile. Tachycardic, mildly hypoxic on arrival. WNWD m in mild distress, weak appearing. + irreg irreg rhythm, + murmur. Lungs cta b/l. Abd soft, nt/nd, no guarding/ rebound. + small area of ecchymosis to left lower lateral abd. + mild lower ext edema b/l. Labs reviewed w/ findings of leukocytosis to 17, with elevated lactate, pre-renal bun/cr c/w dehydration, elevated lft's. BNP elev to 7k. Pt ordered for zosyn, ivf and surgical consult obtained. [facility] contacted and ct a/p results obtained. Echo results also discussed; pt with normal ef, ? valvular abnormality, ? vegatations on leaflets. Will add additional coverage w/ vanc. Pt also started on bipap with improvement of breathing. Pt to be admitted to [facility] for further w/u and management.

## 2018-01-19 DIAGNOSIS — R10.9 UNSPECIFIED ABDOMINAL PAIN: ICD-10-CM

## 2018-01-19 DIAGNOSIS — A41.9 SEPSIS, UNSPECIFIED ORGANISM: ICD-10-CM

## 2018-01-19 DIAGNOSIS — R74.0 NONSPECIFIC ELEVATION OF LEVELS OF TRANSAMINASE AND LACTIC ACID DEHYDROGENASE [LDH]: ICD-10-CM

## 2018-01-19 DIAGNOSIS — I35.1 NONRHEUMATIC AORTIC (VALVE) INSUFFICIENCY: ICD-10-CM

## 2018-01-19 DIAGNOSIS — N18.3 CHRONIC KIDNEY DISEASE, STAGE 3 (MODERATE): ICD-10-CM

## 2018-01-19 DIAGNOSIS — D69.6 THROMBOCYTOPENIA, UNSPECIFIED: ICD-10-CM

## 2018-01-19 DIAGNOSIS — I48.91 UNSPECIFIED ATRIAL FIBRILLATION: ICD-10-CM

## 2018-01-19 DIAGNOSIS — J15.9 UNSPECIFIED BACTERIAL PNEUMONIA: ICD-10-CM

## 2018-01-19 DIAGNOSIS — I50.9 HEART FAILURE, UNSPECIFIED: ICD-10-CM

## 2018-01-19 DIAGNOSIS — K75.9 INFLAMMATORY LIVER DISEASE, UNSPECIFIED: ICD-10-CM

## 2018-01-19 LAB
ALBUMIN SERPL ELPH-MCNC: 3.2 G/DL — LOW (ref 3.3–5)
ALP SERPL-CCNC: 106 U/L — SIGNIFICANT CHANGE UP (ref 40–120)
ALT FLD-CCNC: 1147 U/L — HIGH (ref 10–45)
ANION GAP SERPL CALC-SCNC: 17 MMOL/L — SIGNIFICANT CHANGE UP (ref 5–17)
APTT BLD: 35.3 SEC — SIGNIFICANT CHANGE UP (ref 27.5–37.4)
AST SERPL-CCNC: 325 U/L — HIGH (ref 10–40)
BILIRUB DIRECT SERPL-MCNC: 1.6 MG/DL — HIGH (ref 0–0.2)
BILIRUB INDIRECT FLD-MCNC: 1.5 MG/DL — HIGH (ref 0.2–1)
BILIRUB SERPL-MCNC: 3.1 MG/DL — HIGH (ref 0.2–1.2)
BUN SERPL-MCNC: 74 MG/DL — HIGH (ref 7–23)
CALCIUM SERPL-MCNC: 8 MG/DL — LOW (ref 8.4–10.5)
CHLORIDE SERPL-SCNC: 104 MMOL/L — SIGNIFICANT CHANGE UP (ref 96–108)
CO2 SERPL-SCNC: 20 MMOL/L — LOW (ref 22–31)
CREAT SERPL-MCNC: 1.39 MG/DL — HIGH (ref 0.5–1.3)
GLUCOSE BLDC GLUCOMTR-MCNC: 126 MG/DL — HIGH (ref 70–99)
GLUCOSE SERPL-MCNC: 158 MG/DL — HIGH (ref 70–99)
HBV SURFACE AG SER-ACNC: SIGNIFICANT CHANGE UP
HCT VFR BLD CALC: 38.1 % — LOW (ref 39–50)
HCV AB S/CO SERPL IA: 0.15 S/CO — SIGNIFICANT CHANGE UP
HCV AB SERPL-IMP: SIGNIFICANT CHANGE UP
HGB BLD-MCNC: 12.8 G/DL — LOW (ref 13–17)
INR BLD: 2.04 — HIGH (ref 0.88–1.16)
INR BLD: 2.1 — HIGH (ref 0.88–1.16)
LACTATE SERPL-SCNC: 2.5 MMOL/L — HIGH (ref 0.5–2)
LEGIONELLA AG UR QL: NEGATIVE — SIGNIFICANT CHANGE UP
LIDOCAIN IGE QN: 63 U/L — HIGH (ref 7–60)
MAGNESIUM SERPL-MCNC: 2.7 MG/DL — HIGH (ref 1.6–2.6)
MCHC RBC-ENTMCNC: 30.6 PG — SIGNIFICANT CHANGE UP (ref 27–34)
MCHC RBC-ENTMCNC: 33.6 G/DL — SIGNIFICANT CHANGE UP (ref 32–36)
MCV RBC AUTO: 91.1 FL — SIGNIFICANT CHANGE UP (ref 80–100)
PHOSPHATE SERPL-MCNC: 3.9 MG/DL — SIGNIFICANT CHANGE UP (ref 2.5–4.5)
PLATELET # BLD AUTO: 71 K/UL — LOW (ref 150–400)
POTASSIUM SERPL-MCNC: 4 MMOL/L — SIGNIFICANT CHANGE UP (ref 3.5–5.3)
POTASSIUM SERPL-SCNC: 4 MMOL/L — SIGNIFICANT CHANGE UP (ref 3.5–5.3)
PROT SERPL-MCNC: 6 G/DL — SIGNIFICANT CHANGE UP (ref 6–8.3)
PROTHROM AB SERPL-ACNC: 23 SEC — HIGH (ref 9.8–12.7)
PROTHROM AB SERPL-ACNC: 23.7 SEC — HIGH (ref 9.8–12.7)
RBC # BLD: 4.18 M/UL — LOW (ref 4.2–5.8)
RBC # FLD: 15.8 % — SIGNIFICANT CHANGE UP (ref 10.3–16.9)
SODIUM SERPL-SCNC: 141 MMOL/L — SIGNIFICANT CHANGE UP (ref 135–145)
WBC # BLD: 16.8 K/UL — HIGH (ref 3.8–10.5)
WBC # FLD AUTO: 16.8 K/UL — HIGH (ref 3.8–10.5)

## 2018-01-19 PROCEDURE — 78226 HEPATOBILIARY SYSTEM IMAGING: CPT | Mod: 26

## 2018-01-19 PROCEDURE — 99233 SBSQ HOSP IP/OBS HIGH 50: CPT

## 2018-01-19 PROCEDURE — 93306 TTE W/DOPPLER COMPLETE: CPT | Mod: 26

## 2018-01-19 RX ORDER — METOPROLOL TARTRATE 50 MG
12.5 TABLET ORAL DAILY
Qty: 0 | Refills: 0 | Status: DISCONTINUED | OUTPATIENT
Start: 2018-01-19 | End: 2018-01-20

## 2018-01-19 RX ORDER — VANCOMYCIN HCL 1 G
1000 VIAL (EA) INTRAVENOUS EVERY 24 HOURS
Qty: 0 | Refills: 0 | Status: DISCONTINUED | OUTPATIENT
Start: 2018-01-19 | End: 2018-01-20

## 2018-01-19 RX ORDER — HEPARIN SODIUM 5000 [USP'U]/ML
INJECTION INTRAVENOUS; SUBCUTANEOUS
Qty: 25000 | Refills: 0 | Status: DISCONTINUED | OUTPATIENT
Start: 2018-01-19 | End: 2018-01-19

## 2018-01-19 RX ORDER — METOPROLOL TARTRATE 50 MG
5 TABLET ORAL EVERY 6 HOURS
Qty: 0 | Refills: 0 | Status: DISCONTINUED | OUTPATIENT
Start: 2018-01-19 | End: 2018-01-19

## 2018-01-19 RX ORDER — FUROSEMIDE 40 MG
40 TABLET ORAL ONCE
Qty: 0 | Refills: 0 | Status: COMPLETED | OUTPATIENT
Start: 2018-01-19 | End: 2018-01-19

## 2018-01-19 RX ORDER — FUROSEMIDE 40 MG
40 TABLET ORAL
Qty: 0 | Refills: 0 | Status: DISCONTINUED | OUTPATIENT
Start: 2018-01-19 | End: 2018-01-20

## 2018-01-19 RX ORDER — INFLUENZA VIRUS VACCINE 15; 15; 15; 15 UG/.5ML; UG/.5ML; UG/.5ML; UG/.5ML
0.5 SUSPENSION INTRAMUSCULAR ONCE
Qty: 0 | Refills: 0 | Status: COMPLETED | OUTPATIENT
Start: 2018-01-19 | End: 2018-01-19

## 2018-01-19 RX ADMIN — Medication 12.5 MILLIGRAM(S): at 17:08

## 2018-01-19 RX ADMIN — Medication 250 MILLIGRAM(S): at 17:04

## 2018-01-19 RX ADMIN — PIPERACILLIN AND TAZOBACTAM 200 GRAM(S): 4; .5 INJECTION, POWDER, LYOPHILIZED, FOR SOLUTION INTRAVENOUS at 22:00

## 2018-01-19 RX ADMIN — Medication 40 MILLIGRAM(S): at 02:01

## 2018-01-19 RX ADMIN — Medication 40 MILLIGRAM(S): at 06:57

## 2018-01-19 RX ADMIN — SODIUM CHLORIDE 120 MILLILITER(S): 9 INJECTION, SOLUTION INTRAVENOUS at 03:07

## 2018-01-19 RX ADMIN — PIPERACILLIN AND TAZOBACTAM 200 GRAM(S): 4; .5 INJECTION, POWDER, LYOPHILIZED, FOR SOLUTION INTRAVENOUS at 17:58

## 2018-01-19 RX ADMIN — HEPARIN SODIUM 1600 UNIT(S)/HR: 5000 INJECTION INTRAVENOUS; SUBCUTANEOUS at 04:04

## 2018-01-19 RX ADMIN — Medication 5 MILLIGRAM(S): at 06:57

## 2018-01-19 RX ADMIN — Medication 40 MILLIGRAM(S): at 17:02

## 2018-01-19 RX ADMIN — PIPERACILLIN AND TAZOBACTAM 200 GRAM(S): 4; .5 INJECTION, POWDER, LYOPHILIZED, FOR SOLUTION INTRAVENOUS at 03:06

## 2018-01-19 NOTE — PROGRESS NOTE ADULT - ASSESSMENT
ASSESSMENT/PLAN 73y/o male pt c RLL pneumonia, RLL atelectases, no obvoious RADHA, elevated R hemidiaphragm, AFIB, possible cholangitis    1. O2 use 2LNC  2. Bronchodilators:  Atrovent/ albuterol q 4 – 6 hours as needed  3. Corticosteroids: off  4. ID/Antibiotics: must continue ABX for RLL pneumonia  5. Cardiac/HTN: Monitor, optimize HR  6. GI: Rx/ prophylaxis c PPI/H2B, close GI and surgery follow up for possible evolving cholangitis/duodenitis. Pt discussed c GI  7. Heme: Rx/VT prophylaxis c SQH/SCD/AC pt is anticoagulated,   8. Aspiration precautions  Discussed with managing team

## 2018-01-19 NOTE — PROGRESS NOTE ADULT - PROBLEM SELECTOR PLAN 3
Not currently rate controlled. Will likely improve when PNA improves and CHF improves after diuresis.   -Continue Toprol XL 12.5 mg PO Daily  -Patient previously on Heparin however discontinued as INR 2. Daily INR and LFTs  -CHADSVASC 2 Not currently rate controlled. Will likely improve when PNA improves and CHF improves after diuresis.   -Continue Toprol XL 12.5 mg PO Daily. Will not increase BB in setting of acute CHF exacerbation.   -Patient previously on Heparin however discontinued as INR 2. Daily INR and LFTs  -CHADSVASC 2

## 2018-01-19 NOTE — PROGRESS NOTE ADULT - ASSESSMENT
72M admitted w/ pulmonary edema likely due to CHF exacerbation, w/ new onset AFib w/ RVR.     -HIDA scan negative for acute or chronic cholecystitis. Previous radiological findings equivocal for cholecystitis may be related to hepatin congestion in setting of decompensated CHF. Regardless, no surgical intervention indicated at this time.  -General Surgery signing off. Please re-engage consult as needed.  -Discussed w/ chief resident & attending on call

## 2018-01-19 NOTE — PROGRESS NOTE ADULT - SUBJECTIVE AND OBJECTIVE BOX
Pt seen and examined at bedside  Feels a bit better  Denies any new c/o  Denies n/v/d, abdominal pain, fever, chills    MEDICATIONS:  MEDICATIONS  (STANDING):  furosemide   Injectable 40 milliGRAM(s) IV Push two times a day  metoprolol    tartrate Injectable 5 milliGRAM(s) IV Push every 6 hours  piperacillin/tazobactam IVPB. 3.375 Gram(s) IV Intermittent every 6 hours    MEDICATIONS  (PRN):  ondansetron Injectable 4 milliGRAM(s) IV Push every 6 hours PRN Nausea      Allergies  No Known Allergies    Intolerances      Vital Signs Last 24 Hrs  T(C): 36 (2018 08:30), Max: 36.8 (2018 23:22)  T(F): 96.8 (2018 08:30), Max: 98.2 (2018 23:22)  HR: 95 (2018 08:30) (70 - 142)  BP: 114/80 (2018 08:30) (114/80 - 168/97)  BP(mean): --  RR: 24 (2018 08:30) (20 - 36)  SpO2: 97% (2018 08:30) (93% - 99%)     @ 07:01  -  - @ 07:00  --------------------------------------------------------  IN: 0 mL / OUT: 1200 mL / NET: -1200 mL      PHYSICAL EXAM:  GEN - elderly M lying in bed in some mild respiratory distress, on NC, anicteric, afebrile, not pale  Gastrointestinal: full, soft, BS+, NT, NR, NG, no masses or organs palpated  Extremities: + pitting LE edema  Neurological: AAOx3 non focal      LABS:  CBC Full  -  ( 2018 05:31 )  WBC Count : 16.8 K/uL  Hemoglobin : 12.8 g/dL  Hematocrit : 38.1 %  Platelet Count - Automated : 71 K/uL  Mean Cell Volume : 91.1 fL  Mean Cell Hemoglobin : 30.6 pg  Mean Cell Hemoglobin Concentration : 33.6 g/dL    141  |  104  |  74<H>  ----------------------------<  158<H>  4.0   |  20<L>  |  1.39<H>    Ca    8.0<L>      2018 05:28  Phos  3.9       Mg     2.7         TPro  6.0  /  Alb  3.2<L>  /  TBili  3.1<H>  /  DBili  1.6<H>  /  AST  325<H>  /  ALT  x   /  AlkPhos  106      PT/INR - ( 2018 05:31 )   PT: 23.7 sec;   INR: 2.10       PTT - ( 2018 02:36 )  PTT:35.3 sec    Urinalysis Basic - ( 2018 18:49 )  Color: Yellow / Appearance: Clear / S.020 / pH: x  Gluc: x / Ketone: NEGATIVE  / Bili: Small / Urobili: 4.0 E.U./dL   Blood: x / Protein: 30 mg/dL / Nitrite: NEGATIVE   Leuk Esterase: NEGATIVE / RBC: 5-10 /HPF / WBC 5-10 /HPF   Sq Epi: x / Non Sq Epi: 5-10 /HPF / Bacteria: Present /HPF        RADIOLOGY & ADDITIONAL STUDIES (The following images were personally reviewed):  CT Abdomen and Pelvis No Cont (18 @ 18:46)   IMPRESSION:  Cholelithiasis. Findings possibly representing acute cholecystitis. Recommend further evaluation with HIDA.  Duodenitis.   4.1 cm aortic root aneurysm, 4.3 cm ascending thoracic aortic aneurysm.  Small right pleural effusion.Right lower lobe atelectasis/infiltrate suspicious for pneumonia.  Pulmonary hypertension.  Cardiomegaly.

## 2018-01-19 NOTE — PROGRESS NOTE ADULT - SUBJECTIVE AND OBJECTIVE BOX
Interventional Cardiology PA Adult Progress Note    Subjective Assessment: Patient seen and examined at bedside. Patient denies C/P, SOB, palpitations, dizziness, diaphoresis, NV.   	  MEDICATIONS:  furosemide   Injectable 40 milliGRAM(s) IV Push two times a day  metoprolol succinate ER 12.5 milliGRAM(s) Oral daily  piperacillin/tazobactam IVPB. 3.375 Gram(s) IV Intermittent every 6 hours  vancomycin  IVPB 1000 milliGRAM(s) IV Intermittent every 24 hours  ondansetron Injectable 4 milliGRAM(s) IV Push every 6 hours PRN    [PHYSICAL EXAM:  TELEMETRY:   T(C): 36.1 (01-19-18 @ 14:14), Max: 36.8 (01-18-18 @ 23:22)  HR: 122 (01-19-18 @ 15:48) (95 - 142)  BP: 129/85 (01-19-18 @ 15:48) (114/80 - 153/89)  RR: 24 (01-19-18 @ 15:48) (24 - 34)  SpO2: 94% (01-19-18 @ 15:48) (93% - 99%) 3-4 L NC   Wt(kg): --  I&O's Summary    18 Jan 2018 07:01  -  19 Jan 2018 07:00  --------------------------------------------------------  IN: 0 mL / OUT: 1200 mL / NET: -1200 mL        Oliveira: Yes  Central/PICC/Mid Line:   No                                      Appearance: Laying in bed. NAD   HEENT:   Normal oral mucosa, PERRL, EOMI	  Neck: Supple. No JVD.   Cardiovascular: + S1 S2. Irregular Irregular. 2-3/6 Systolic Murmur.   Respiratory: Bibasilar crackles. No rhonchi or wheezes.  Gastrointestinal:  BS x 4. Soft NT/ND 	  Skin: No rashes, No ecchymoses, No cyanosis  Extremities: Normal range of motion, No clubbing, cyanosis or edema BLE. No calf tenderness BLE   Neurologic: Non-focal  Psychiatry: A & O x 3, Mood & affect appropriate      	    ECG:  	  RADIOLOGY:   DIAGNOSTIC TESTING:  [ ] Echocardiogram:  [ ]  Catheterization:  [ ] Stress Test:    [ ] AMRIK  OTHER: 	    LABS:	 	  CARDIAC MARKERS:                                  12.8   16.8  )-----------( 71       ( 19 Jan 2018 05:31 )             38.1     01-19    141  |  104  |  74<H>  ----------------------------<  158<H>  4.0   |  20<L>  |  1.39<H>    Ca    8.0<L>      19 Jan 2018 05:28  Phos  3.9     01-19  Mg     2.7     01-19    TPro  6.0  /  Alb  3.2<L>  /  TBili  3.1<H>  /  DBili  1.6<H>  /  AST  325<H>  /  ALT  1147<H>  /  AlkPhos  106  01-19    proBNP:   Lipid Profile:   HgA1c:   TSH:   PT/INR - ( 19 Jan 2018 05:31 )   PT: 23.7 sec;   INR: 2.10          PTT - ( 19 Jan 2018 02:36 )  PTT:35.3 sec    ASSESSMENT/PLAN: 	        DVT ppx:  Dispo:

## 2018-01-19 NOTE — PROGRESS NOTE ADULT - SUBJECTIVE AND OBJECTIVE BOX
GENERAL SURGERY CONSULT PROGRESS NOTE    INTERVAL HPI:   72M w/ severe MVR/AVR who presented w/ presumed dx of acute cholecystitis & RLL PNA from [facility], however RUQ US showed contracted gallbladder filled w/ tiny stones but no evidence of acute cholecystitis. CT A/P findings of heterogeneous gallstone w/ surrounding fat stranding but no GBWT or PCF were equivocal. Admitted to Cardiology w/ pulmonary edema likely due to CHF in setting of severe valvular disease & new onset AFib w/ RVR. HIDA scan showed no evidence of acute or chronic cholecystitis.     SUBJECTIVE:   Pt seen & examined at bedside in AM. Acknowledges abdominal distension but denies any pain, nausea or vomiting. Breathing uncomfortably w/ PPV (possibly contributing to abd distension). Also reports thirst.     MEDICATIONS:  ---NEURO-  ondansetron Injectable 4 milliGRAM(s) IV Push every 6 hours PRN  ---CV-  furosemide   Injectable 40 milliGRAM(s) IV Push two times a day  metoprolol succinate ER 12.5 milliGRAM(s) Oral daily  ---ID-   piperacillin/tazobactam IVPB. 3.375 Gram(s) IV Intermittent every 6 hours    VOLUME STATUS:  I&O's Detail    2018 07:01  -  2018 07:00  --------------------------------------------------------  IN:  Total IN: 0 mL    OUT:    Voided: 1200 mL  Total OUT: 1200 mL    Total NET: -1200 mL    VITALS:  Vital Signs Last 24 Hrs  T(C): 36.1 (2018 14:14), Max: 36.8 (2018 23:22)  T(F): 97 (2018 14:14), Max: 98.2 (2018 23:22)  HR: 95 (2018 08:30) (95 - 142)  BP: 114/80 (2018 08:30) (114/80 - 153/89)  BP(mean): --  RR: 24 (2018 08:30) (24 - 34)  SpO2: 97% (2018 08:30) (93% - 99%)    PHYSICAL EXAM:  NEURO: A&Ox3, NAD, LEIVA.  CV: Rapid AFib.   PULM: Decreased breath sounds bilaterally; mildly increased WOB w/ PPV.   ABD: Softly distended, NT. No rebound or guarding. No Brooks sign.   EXTR: WWP. No peripheral edema.      LABS:                        12.8   16.8  )-----------( 71       ( 2018 05:31 )             38.1         141  |  104  |  74<H>  ----------------------------<  158<H>  4.0   |  20<L>  |  1.39<H>    Ca    8.0<L>      2018 05:28  Phos  3.9       Mg     2.7         TPro  6.0  /  Alb  3.2<L>  /  TBili  3.1<H>  /  DBili  1.6<H>  /  AST  325<H>  /  ALT  1147<H>  /  AlkPhos  106      PT/INR - ( 2018 05:31 )   PT: 23.7 sec;   INR: 2.10          PTT - ( 2018 02:36 )  PTT:35.3 sec  Urinalysis Basic - ( 2018 18:49 )    Color: Yellow / Appearance: Clear / S.020 / pH: x  Gluc: x / Ketone: NEGATIVE  / Bili: Small / Urobili: 4.0 E.U./dL   Blood: x / Protein: 30 mg/dL / Nitrite: NEGATIVE   Leuk Esterase: NEGATIVE / RBC: 5-10 /HPF / WBC 5-10 /HPF   Sq Epi: x / Non Sq Epi: 5-10 /HPF / Bacteria: Present /HPF      NM Hepatobiliary Scan w/wo Gall Bladder (18 @ 13:22) >  Findings: There is prompt uptake of radiopharmaceutical by the liver with   prompt excretion into the biliary tree, small bowel and gallbladder.   Delayed images demonstrate activity within the gallbladder, faint   activity in the liver, and progression of activity further into the small   bowel. Activity within the gallbladder is consistent with a patent cystic   duct.    Impression:  No evidence of acute or chronic cholecystitis.

## 2018-01-19 NOTE — PROGRESS NOTE ADULT - ASSESSMENT
72yr old M with PMHx significant for severe mitral and aortic valve regurgitation who presented to ED for evaluation of LLQ abdominal pain, with recent CT showing acute cholecystitis, cholelithiasis, and RLL pneumonia.   GI consulted for eval of cholangitis.    # Abnormal LFTs -  - ddx - congestive hepatopathy, intermittent choledocholithiasis,   - given lack of CBD dilation patient probably does not have obstruction at this time and is draining his CBD intermittently  - presence of stones suggests that he probably does have some ongoing choledocholithiasis - but stones are able to pass apparently  - USS negative for cholecystitis  - CT also equivocal for cholecystitis  - Surgical team considering HIDA scan (yield likely low in this setting)  - SIRS (leukocytosis, tachypnea) could be multifactorial - RLL pneumonia, cholecystitis, leukemoid reaction  - continue broad spectrum abx  - given ongoing CHF and IV diuresis will hold off on endoscopic interventions and manage conservatively  - trend LFTs, INR daily  - will re-evaluate patient after diuresis  - rest of care per cardiology and surgical team      Discussed with attending Dr Ej BAR following

## 2018-01-19 NOTE — PROGRESS NOTE ADULT - PROBLEM SELECTOR PLAN 1
Tachycardia, Tachypnea and Leukocytosis WBC 17.9 with left shift present on admission. Patient remains afebrile . Lactate 2.9 downtrending down to 2.5.  -Sepsis Possibly secondary to PNA.   -ID-Dr. Flores consulted-Recommended adding Vanco to Zosyn regimen. Pulm Dr. Wylie consulted   -Blood Cx no growth x 1 day. Follow-up official results. U/A Negative for LE and Nitrite. Follow-up Urine Cx.  RVP negative.  -Severe MR on Echo per Dr. Chow- will need AMRIK to R/O Endocarditis. Tachycardia, Tachypnea and Leukocytosis WBC 17.9 with left shift present on admission. Patient remains afebrile . Lactate 2.9 downtrending down to 2.5.  -Sepsis Possibly secondary to PNA.   -ID-Dr. Flores consulted-Recommended adding Vanco to Zosyn regimen. Pulm Dr. Wylie consulted   -Blood Cx no growth x 1 day. Follow-up official results. U/A Negative for LE and Nitrite. Follow-up Urine Cx.  RVP negative.  -Severe MR with flailed leafelts on Echo per Dr. Chow unable to definitively rule out vegetation:  will need AMRIK to R/O Endocarditis. Tachycardia, Tachypnea and Leukocytosis WBC 17.9 with left shift present on admission. Patient remains afebrile . Lactate 2.9 downtrending down to 2.5.  -Sepsis Possibly secondary to PNA.   -ID-Dr. Flores consulted-Recommended adding Vanco to ABX regimen. Pulm Dr. Wlyie consulted   -Blood Cx no growth x 1 day. Follow-up official results. U/A Negative for LE and Nitrite. Follow-up Urine Cx.  RVP negative.  -Severe MR with flailed leafelts on Echo per Dr. Chow unable to definitively rule out vegetation:  will need AMRIK to R/O Endocarditis.

## 2018-01-19 NOTE — PROGRESS NOTE ADULT - ATTENDING COMMENTS
Patient seen and examined this morning before HIDA scan. Looked more comfortable with breathing.  Denied abdominal pain.  Abdomen soft, ND NT    HIDA preliminary reading with prompt filling of gallbladder.    Imp: He does not have cholecystitis.  CT findings likely due to liver congestion from heart failure.  Will sign off.

## 2018-01-19 NOTE — CONSULT NOTE ADULT - SUBJECTIVE AND OBJECTIVE BOX
TRISTEN BRADSHAW      Patient is a 72y old  Male who presents with a chief complaint of acute cholecystitis (2018 14:49)      HPI:  71 yo M with h/o severe mitral and aortic valve regurgitation presented to ED with 5 day h/o left lower abdominal pain, nausea and multiple episodes of vomiting as well as PO intolerance. Pt reports that the pain in his abdomen is illicited by movement. Went to [facility] two days ago and underwent CT scan which revealed acute cholecystitis as well as right lower lobe infectious pneumonia. Pt was told he would require surgery and left AMA because he was waiting too long. Denies f/c. Last echo 17 with EF 60-65%. Normal BMs and passing flatus. (2018 14:49)      Addl  Medical issues:       HEALTH ISSUES - PROBLEM Dx:  Hepatitis: Hepatitis  Pneumonia, bacterial: Pneumonia, bacterial            MEDICATIONS  (STANDING):  furosemide   Injectable 40 milliGRAM(s) IV Push two times a day  metoprolol succinate ER 12.5 milliGRAM(s) Oral daily  piperacillin/tazobactam IVPB. 3.375 Gram(s) IV Intermittent every 6 hours  vancomycin  IVPB 1000 milliGRAM(s) IV Intermittent every 24 hours    MEDICATIONS  (PRN):  ondansetron Injectable 4 milliGRAM(s) IV Push every 6 hours PRN Nausea          PAST MEDICAL & SURGICAL HISTORY:  Mitral regurgitation  Aortic regurgitation  No significant past surgical history      REVIEW OF SYSTEMS:  [x] As per HPI          Reviewed   no change                            Changes noted  CONSTITUTIONAL: weakness  RESPIRATORY: cough  CARDIOVASCULAR: No chest pain, palpitations, dizziness, or leg swelling  GASTROINTESTINAL: abd pain   MUSCULOSKELETAL: No joint pain or swelling; No muscle, back, or extremity pain  Neuro:   Grossly  Negative  Psych        Awake  alert  [x] All others negative	  [ ] Unable to obtain      Vital Signs Last 24 Hrs  T(C): 36.1 (2018 14:14), Max: 36.8 (2018 23:22)  T(F): 97 (2018 14:14), Max: 98.2 (2018 23:22)  HR: 122 (2018 15:48) (95 - 142)  BP: 129/85 (2018 15:48) (114/80 - 153/89)  BP(mean): 103 (2018 15:48) (103 - 103)  RR: 24 (2018 15:48) (24 - 34)  SpO2: 94% (2018 15:48) (93% - 99%)    PHYSICAL EXAM:      Constitutional:awake/alert    Eyes:    ENMT:    Neck:supple    Breasts:    Back:    Respiratory:clear    Cardiovascular:s1s2    Gastrointestinal:soft BS present sl distension    Genitourinary:    Rectal:    Extremities:from    Vascular:    Neurological:neg    Skin:    Lymph Nodes:    Musculoskeletal: neg    Psychiatric:                              12.8   16.8  )-----------( 71       ( 2018 05:31 )             38.1         141  |  104  |  74<H>  ----------------------------<  158<H>  4.0   |  20<L>  |  1.39<H>    Ca    8.0<L>      2018 05:28  Phos  3.9       Mg     2.7         TPro  6.0  /  Alb  3.2<L>  /  TBili  3.1<H>  /  DBili  1.6<H>  /  AST  325<H>  /  ALT  1147<H>  /  AlkPhos  106          PT/INR - ( 2018 05:31 )   PT: 23.7 sec;   INR: 2.10          PTT - ( 2018 02:36 )  PTT:35.3 sec  CAPILLARY BLOOD GLUCOSE      POCT Blood Glucose.: 126 mg/dL (2018 11:06)    Urinalysis Basic - ( 2018 18:49 )    Color: Yellow / Appearance: Clear / S.020 / pH: x  Gluc: x / Ketone: NEGATIVE  / Bili: Small / Urobili: 4.0 E.U./dL   Blood: x / Protein: 30 mg/dL / Nitrite: NEGATIVE   Leuk Esterase: NEGATIVE / RBC: 5-10 /HPF / WBC 5-10 /HPF   Sq Epi: x / Non Sq Epi: 5-10 /HPF / Bacteria: Present /HPF      I&O's Summary    2018 07:01  -  2018 07:00  --------------------------------------------------------  IN: 0 mL / OUT: 1200 mL / NET: -1200 mL      < from: CT Chest No Cont (18 @ 18:46) >    IMPRESSION:  Cholelithiasis. Findings possibly representing acute cholecystitis.   Recommend further evaluation with HIDA.    Duodenitis.     4.1 cm aortic root aneurysm, 4.3 cm ascending thoracic aortic aneurysm.    Small right pleural effusion.Right lower lobe atelectasis/infiltrate   suspicious for pneumonia.    Pulmonary hypertension.    Cardiomegaly.    < end of copied text >    < from: 12 Lead ECG (18 @ 12:57) >    Diagnosis Line Atrial fibrillation with rapid ventricular response  Nonspecific ST and T wave abnormality , probably digitalis effect  Abnormal ECG      < end of copied text >      ASSESSMENT/PLAN/RECOMMENDATIONS

## 2018-01-19 NOTE — CONSULT NOTE ADULT - SUBJECTIVE AND OBJECTIVE BOX
HPI:  71 yo M with h/o severe mitral and aortic valve regurgitation presented to ED with 5 day h/o left lower abdominal pain, nausea and multiple episodes of vomiting as well as PO intolerance. Pt reports that the pain in his abdomen is illicited by movement. Went to [facility] two days ago and underwent CT scan which revealed acute cholecystitis as well as right lower lobe infectious pneumonia. Pt was told he would require surgery and left AMA because he was waiting too long. Denies f/c. Last echo 17 with EF 60-65%. Normal BMs and passing flatus. (2018 14:49)    patient with recent cruise trip to [facility] about tow weeks ago      PAST MEDICAL & SURGICAL HISTORY:  Mitral regurgitation  Aortic regurgitation  No significant past surgical history      REVIEW OF SYSTEMS:    Constitutional: No fever, weight loss or fatigue  Eyes: No eye pain, visual disturbances, or discharge  ENMT:  No difficulty hearing, tinnitus, vertigo; No sinus or throat pain  Neck: No pain or stiffness  Respiratory: No cough, wheezing, chills or hemoptysis  Cardiovascular: No chest pain, palpitations, shortness of breath, dizziness or leg swelling  Gastrointestinal: No abdominal or epigastric pain. No nausea, vomiting or hematemesis; No diarrhea or constipation. No melena or hematochezia.  Genitourinary: No dysuria, frequency, hematuria or incontinence  Neurological: No headaches, memory loss, loss of strength, numbness or tremors  Skin: No itching, burning, rashes or lesions   Lymph Nodes: No enlarged glands  Endocrine: No heat or cold intolerance; No hair loss  Musculoskeletal: No joint pain or swelling; No muscle, back or extremity pain  Heme/Lymph: No easy bruising or bleeding gums  Allergy and Immunologic: No hives or eczema    MEDICATIONS  (STANDING):  furosemide   Injectable 40 milliGRAM(s) IV Push two times a day  metoprolol succinate ER 12.5 milliGRAM(s) Oral daily  piperacillin/tazobactam IVPB. 3.375 Gram(s) IV Intermittent every 6 hours  vancomycin  IVPB 1000 milliGRAM(s) IV Intermittent every 24 hours    MEDICATIONS  (PRN):  ondansetron Injectable 4 milliGRAM(s) IV Push every 6 hours PRN Nausea      piperacillin/tazobactam IVPB. 3.375 Gram(s) IV Intermittent every 6 hours  vancomycin  IVPB 1000 milliGRAM(s) IV Intermittent every 24 hours      Allergies    No Known Allergies    Intolerances        SOCIAL HISTORY:    FAMILY HISTORY:      Vital Signs Last 24 Hrs  T(C): 36.1 (2018 14:14), Max: 36.8 (2018 23:22)  T(F): 97 (2018 14:14), Max: 98.2 (2018 23:22)  HR: 95 (2018 08:30) (95 - 142)  BP: 114/80 (2018 08:30) (114/80 - 153/89)  BP(mean): --  RR: 24 (2018 08:30) (24 - 34)  SpO2: 97% (2018 08:30) (93% - 99%)    PHYSICAL EXAM:    General: Well developed; well nourished; in no acute distress  Eyes: PERRL, EOM intact; conjunctiva and sclera clear  Head: Normocephalic; atraumatic  ENMT: No nasal discharge; airway clear  Neck: Supple; non tender; no masses  Respiratory: No wheezes, rales or rhonchi  Cardiovascular: Regular rate and rhythm. S1 and S2 Normal; No murmurs, gallops or rubs  Gastrointestinal: Soft non-tender non-distended; Normal bowel sounds; No hepatosplenomegaly  Genitourinary: No costovertebral angle tenderness  Extremities: Normal range of motion, No clubbing, cyanosis or edema  Vascular: Peripheral pulses palpable 2+ bilaterally  Neurological: Alert and oriented x3  Skin: Warm and dry. No acute rash  Lymph Nodes: No acute cervical adenopathy  Musculoskeletal: Normal gait, tone, without deformities    LABS:                        12.8   16.8  )-----------( 71       ( 2018 05:31 )             38.1         141  |  104  |  74<H>  ----------------------------<  158<H>  4.0   |  20<L>  |  1.39<H>    Ca    8.0<L>      2018 05:28  Phos  3.9       Mg     2.7         TPro  6.0  /  Alb  3.2<L>  /  TBili  3.1<H>  /  DBili  1.6<H>  /  AST  325<H>  /  ALT  1147<H>  /  AlkPhos  106      PT/INR - ( 2018 05:31 )   PT: 23.7 sec;   INR: 2.10          PTT - ( 2018 02:36 )  PTT:35.3 sec  Urinalysis Basic - ( 2018 18:49 )    Color: Yellow / Appearance: Clear / S.020 / pH: x  Gluc: x / Ketone: NEGATIVE  / Bili: Small / Urobili: 4.0 E.U./dL   Blood: x / Protein: 30 mg/dL / Nitrite: NEGATIVE   Leuk Esterase: NEGATIVE / RBC: 5-10 /HPF / WBC 5-10 /HPF   Sq Epi: x / Non Sq Epi: 5-10 /HPF / Bacteria: Present /HPF      Culture Results:   No growth at 12 hours ( @ 17:47)  Culture Results:   No growth at 12 hours ( @ 15:37)  Culture Results:   No growth at 12 hours ( @ 15:37)    RADIOLOGY & ADDITIONAL STUDIES:

## 2018-01-19 NOTE — PROGRESS NOTE ADULT - ASSESSMENT
73 yo M with PMHx severe MR and AR  recently diagnosed with Acute Cholecystitis as well as RLL PNA by CT scan at [facility] however signed out AMA who presented to [facility] ED 1/18 c/o lower abdominal pain , nausea, vomiting found to have new onset rapid Afib in the setting of sepsis as well as acute CHF exacerbation.

## 2018-01-19 NOTE — PROGRESS NOTE ADULT - PROBLEM SELECTOR PLAN 7
Cr 1.48 on admission now Cr 1.39. Continue to monitor urine output, electrolytes, BP and renal function. Cr 1.48 on admission now Cr 1.39. Continue to monitor urine output, electrolytes, BP and renal function.    DVT Prophylaxis: SCDs  Dispo: Continue diuresis and IV ABX with plan for AMRIK Monday

## 2018-01-19 NOTE — PROGRESS NOTE ADULT - PROBLEM SELECTOR PLAN 5
AST and ALT downtrending. Hepatitis Panel ordered follow-up results.  -May be secondary to Hepatic Congestion . Daily LFTs

## 2018-01-20 DIAGNOSIS — I34.0 NONRHEUMATIC MITRAL (VALVE) INSUFFICIENCY: ICD-10-CM

## 2018-01-20 DIAGNOSIS — D68.9 COAGULATION DEFECT, UNSPECIFIED: ICD-10-CM

## 2018-01-20 LAB
ALBUMIN SERPL ELPH-MCNC: 2.1 G/DL — LOW (ref 3.3–5)
ALBUMIN SERPL ELPH-MCNC: 2.5 G/DL — LOW (ref 3.3–5)
ALBUMIN SERPL ELPH-MCNC: 3.1 G/DL — LOW (ref 3.3–5)
ALBUMIN SERPL ELPH-MCNC: 3.4 G/DL — SIGNIFICANT CHANGE UP (ref 3.3–5)
ALP SERPL-CCNC: 108 U/L — SIGNIFICANT CHANGE UP (ref 40–120)
ALP SERPL-CCNC: 124 U/L — HIGH (ref 40–120)
ALP SERPL-CCNC: 72 U/L — SIGNIFICANT CHANGE UP (ref 40–120)
ALP SERPL-CCNC: 82 U/L — SIGNIFICANT CHANGE UP (ref 40–120)
ALT FLD-CCNC: 601 U/L — HIGH (ref 10–45)
ALT FLD-CCNC: 621 U/L — HIGH (ref 10–45)
ALT FLD-CCNC: 904 U/L — HIGH (ref 10–45)
ALT FLD-CCNC: 957 U/L — HIGH (ref 10–45)
AMYLASE P1 CFR SERPL: 58 U/L — SIGNIFICANT CHANGE UP (ref 25–125)
ANION GAP SERPL CALC-SCNC: 13 MMOL/L — SIGNIFICANT CHANGE UP (ref 5–17)
ANION GAP SERPL CALC-SCNC: 15 MMOL/L — SIGNIFICANT CHANGE UP (ref 5–17)
ANION GAP SERPL CALC-SCNC: 16 MMOL/L — SIGNIFICANT CHANGE UP (ref 5–17)
ANION GAP SERPL CALC-SCNC: 16 MMOL/L — SIGNIFICANT CHANGE UP (ref 5–17)
APTT BLD: 31 SEC — SIGNIFICANT CHANGE UP (ref 27.5–37.4)
APTT BLD: 33.5 SEC — SIGNIFICANT CHANGE UP (ref 27.5–37.4)
APTT BLD: 39.6 SEC — HIGH (ref 27.5–37.4)
AST SERPL-CCNC: 137 U/L — HIGH (ref 10–40)
AST SERPL-CCNC: 144 U/L — HIGH (ref 10–40)
AST SERPL-CCNC: 167 U/L — HIGH (ref 10–40)
AST SERPL-CCNC: 196 U/L — HIGH (ref 10–40)
BASE EXCESS BLDA CALC-SCNC: -3.3 MMOL/L — LOW (ref -2–3)
BASE EXCESS BLDA CALC-SCNC: -3.3 MMOL/L — LOW (ref -2–3)
BASE EXCESS BLDA CALC-SCNC: -5.4 MMOL/L — LOW (ref -2–3)
BASE EXCESS BLDA CALC-SCNC: -6 MMOL/L — LOW (ref -2–3)
BASOPHILS NFR BLD AUTO: 0.2 % — SIGNIFICANT CHANGE UP (ref 0–2)
BILIRUB DIRECT SERPL-MCNC: 1.4 MG/DL — HIGH (ref 0–0.2)
BILIRUB INDIRECT FLD-MCNC: 1.6 MG/DL — HIGH (ref 0.2–1)
BILIRUB SERPL-MCNC: 2.2 MG/DL — HIGH (ref 0.2–1.2)
BILIRUB SERPL-MCNC: 2.5 MG/DL — HIGH (ref 0.2–1.2)
BILIRUB SERPL-MCNC: 3 MG/DL — HIGH (ref 0.2–1.2)
BILIRUB SERPL-MCNC: 3 MG/DL — HIGH (ref 0.2–1.2)
BLD GP AB SCN SERPL QL: NEGATIVE — SIGNIFICANT CHANGE UP
BUN SERPL-MCNC: 54 MG/DL — HIGH (ref 7–23)
BUN SERPL-MCNC: 59 MG/DL — HIGH (ref 7–23)
BUN SERPL-MCNC: 60 MG/DL — HIGH (ref 7–23)
BUN SERPL-MCNC: 61 MG/DL — HIGH (ref 7–23)
CA-I BLDA-SCNC: 0.98 MMOL/L — LOW (ref 1.12–1.3)
CA-I BLDA-SCNC: 1.02 MMOL/L — LOW (ref 1.12–1.3)
CALCIUM SERPL-MCNC: 7.7 MG/DL — LOW (ref 8.4–10.5)
CALCIUM SERPL-MCNC: 7.7 MG/DL — LOW (ref 8.4–10.5)
CALCIUM SERPL-MCNC: 8.1 MG/DL — LOW (ref 8.4–10.5)
CALCIUM SERPL-MCNC: 8.4 MG/DL — SIGNIFICANT CHANGE UP (ref 8.4–10.5)
CHLORIDE SERPL-SCNC: 103 MMOL/L — SIGNIFICANT CHANGE UP (ref 96–108)
CHLORIDE SERPL-SCNC: 104 MMOL/L — SIGNIFICANT CHANGE UP (ref 96–108)
CHLORIDE SERPL-SCNC: 108 MMOL/L — SIGNIFICANT CHANGE UP (ref 96–108)
CHLORIDE SERPL-SCNC: 111 MMOL/L — HIGH (ref 96–108)
CK MB CFR SERPL CALC: 1.7 NG/ML — SIGNIFICANT CHANGE UP (ref 0–6.7)
CK MB CFR SERPL CALC: 2.3 NG/ML — SIGNIFICANT CHANGE UP (ref 0–6.7)
CK SERPL-CCNC: 58 U/L — SIGNIFICANT CHANGE UP (ref 30–200)
CK SERPL-CCNC: 73 U/L — SIGNIFICANT CHANGE UP (ref 30–200)
CO2 SERPL-SCNC: 19 MMOL/L — LOW (ref 22–31)
CO2 SERPL-SCNC: 20 MMOL/L — LOW (ref 22–31)
CO2 SERPL-SCNC: 23 MMOL/L — SIGNIFICANT CHANGE UP (ref 22–31)
CO2 SERPL-SCNC: 25 MMOL/L — SIGNIFICANT CHANGE UP (ref 22–31)
COHGB MFR BLDA: 0.4 % — SIGNIFICANT CHANGE UP
COHGB MFR BLDA: 0.5 % — SIGNIFICANT CHANGE UP
CREAT SERPL-MCNC: 1.31 MG/DL — HIGH (ref 0.5–1.3)
CREAT SERPL-MCNC: 1.34 MG/DL — HIGH (ref 0.5–1.3)
CREAT SERPL-MCNC: 1.42 MG/DL — HIGH (ref 0.5–1.3)
CREAT SERPL-MCNC: 1.97 MG/DL — HIGH (ref 0.5–1.3)
CULTURE RESULTS: NO GROWTH — SIGNIFICANT CHANGE UP
EOSINOPHIL NFR BLD AUTO: 0 % — SIGNIFICANT CHANGE UP (ref 0–6)
GAS PNL BLDA: SIGNIFICANT CHANGE UP
GLUCOSE BLDC GLUCOMTR-MCNC: 120 MG/DL — HIGH (ref 70–99)
GLUCOSE BLDC GLUCOMTR-MCNC: 123 MG/DL — HIGH (ref 70–99)
GLUCOSE BLDC GLUCOMTR-MCNC: 126 MG/DL — HIGH (ref 70–99)
GLUCOSE SERPL-MCNC: 125 MG/DL — HIGH (ref 70–99)
GLUCOSE SERPL-MCNC: 147 MG/DL — HIGH (ref 70–99)
GLUCOSE SERPL-MCNC: 148 MG/DL — HIGH (ref 70–99)
GLUCOSE SERPL-MCNC: 196 MG/DL — HIGH (ref 70–99)
HBV SURFACE AB SER-ACNC: REACTIVE — SIGNIFICANT CHANGE UP
HCO3 BLDA-SCNC: 19 MMOL/L — LOW (ref 21–28)
HCO3 BLDA-SCNC: 20 MMOL/L — LOW (ref 21–28)
HCO3 BLDA-SCNC: 21 MMOL/L — SIGNIFICANT CHANGE UP (ref 21–28)
HCO3 BLDA-SCNC: 21 MMOL/L — SIGNIFICANT CHANGE UP (ref 21–28)
HCT VFR BLD CALC: 41.2 % — SIGNIFICANT CHANGE UP (ref 39–50)
HCT VFR BLD CALC: 41.6 % — SIGNIFICANT CHANGE UP (ref 39–50)
HCT VFR BLD CALC: 43.8 % — SIGNIFICANT CHANGE UP (ref 39–50)
HGB BLD-MCNC: 13.5 G/DL — SIGNIFICANT CHANGE UP (ref 13–17)
HGB BLD-MCNC: 14.2 G/DL — SIGNIFICANT CHANGE UP (ref 13–17)
HGB BLD-MCNC: 14.8 G/DL — SIGNIFICANT CHANGE UP (ref 13–17)
HGB BLDA-MCNC: 13.4 G/DL — SIGNIFICANT CHANGE UP (ref 13–17)
HGB BLDA-MCNC: 13.8 G/DL — SIGNIFICANT CHANGE UP (ref 13–17)
INR BLD: 1.78 — HIGH (ref 0.88–1.16)
INR BLD: 1.84 — HIGH (ref 0.88–1.16)
INR BLD: 1.92 — HIGH (ref 0.88–1.16)
LACTATE SERPL-SCNC: 1.9 MMOL/L — SIGNIFICANT CHANGE UP (ref 0.5–2)
LACTATE SERPL-SCNC: 2.1 MMOL/L — HIGH (ref 0.5–2)
LACTATE SERPL-SCNC: 2.3 MMOL/L — HIGH (ref 0.5–2)
LACTATE SERPL-SCNC: 2.9 MMOL/L — HIGH (ref 0.5–2)
LACTATE SERPL-SCNC: 3.4 MMOL/L — HIGH (ref 0.5–2)
LDH SERPL L TO P-CCNC: 569 U/L — HIGH (ref 50–242)
LIDOCAIN IGE QN: 94 U/L — HIGH (ref 7–60)
LYMPHOCYTES # BLD AUTO: 8.8 % — LOW (ref 13–44)
MAGNESIUM SERPL-MCNC: 2 MG/DL — SIGNIFICANT CHANGE UP (ref 1.6–2.6)
MAGNESIUM SERPL-MCNC: 2.3 MG/DL — SIGNIFICANT CHANGE UP (ref 1.6–2.6)
MCHC RBC-ENTMCNC: 30.5 PG — SIGNIFICANT CHANGE UP (ref 27–34)
MCHC RBC-ENTMCNC: 30.9 PG — SIGNIFICANT CHANGE UP (ref 27–34)
MCHC RBC-ENTMCNC: 31.4 PG — SIGNIFICANT CHANGE UP (ref 27–34)
MCHC RBC-ENTMCNC: 32.8 G/DL — SIGNIFICANT CHANGE UP (ref 32–36)
MCHC RBC-ENTMCNC: 33.8 G/DL — SIGNIFICANT CHANGE UP (ref 32–36)
MCHC RBC-ENTMCNC: 34.1 G/DL — SIGNIFICANT CHANGE UP (ref 32–36)
MCV RBC AUTO: 91.4 FL — SIGNIFICANT CHANGE UP (ref 80–100)
MCV RBC AUTO: 92 FL — SIGNIFICANT CHANGE UP (ref 80–100)
MCV RBC AUTO: 93 FL — SIGNIFICANT CHANGE UP (ref 80–100)
METHGB MFR BLDA: 0.2 % — SIGNIFICANT CHANGE UP
METHGB MFR BLDA: 0.2 % — SIGNIFICANT CHANGE UP
MONOCYTES NFR BLD AUTO: 5.9 % — SIGNIFICANT CHANGE UP (ref 2–14)
NEUTROPHILS NFR BLD AUTO: 85.1 % — HIGH (ref 43–77)
O2 CT VFR BLDA CALC: SIGNIFICANT CHANGE UP (ref 15–23)
O2 CT VFR BLDA CALC: SIGNIFICANT CHANGE UP (ref 15–23)
OXYHGB MFR BLDA: 98 % — SIGNIFICANT CHANGE UP (ref 94–100)
OXYHGB MFR BLDA: 98 % — SIGNIFICANT CHANGE UP (ref 94–100)
PCO2 BLDA: 36 MMHG — SIGNIFICANT CHANGE UP (ref 35–48)
PCO2 BLDA: 37 MMHG — SIGNIFICANT CHANGE UP (ref 35–48)
PCO2 BLDA: 38 MMHG — SIGNIFICANT CHANGE UP (ref 35–48)
PCO2 BLDA: 39 MMHG — SIGNIFICANT CHANGE UP (ref 35–48)
PH BLDA: 7.32 — LOW (ref 7.35–7.45)
PH BLDA: 7.33 — LOW (ref 7.35–7.45)
PH BLDA: 7.38 — SIGNIFICANT CHANGE UP (ref 7.35–7.45)
PH BLDA: 7.39 — SIGNIFICANT CHANGE UP (ref 7.35–7.45)
PHOSPHATE SERPL-MCNC: 3.1 MG/DL — SIGNIFICANT CHANGE UP (ref 2.5–4.5)
PHOSPHATE SERPL-MCNC: 4.9 MG/DL — HIGH (ref 2.5–4.5)
PLATELET # BLD AUTO: 71 K/UL — LOW (ref 150–400)
PLATELET # BLD AUTO: 75 K/UL — LOW (ref 150–400)
PLATELET # BLD AUTO: 76 K/UL — LOW (ref 150–400)
PO2 BLDA: 131 MMHG — HIGH (ref 83–108)
PO2 BLDA: 171 MMHG — HIGH (ref 83–108)
PO2 BLDA: 82 MMHG — LOW (ref 83–108)
PO2 BLDA: 94 MMHG — SIGNIFICANT CHANGE UP (ref 83–108)
POTASSIUM BLDA-SCNC: 3 MMOL/L — LOW (ref 3.5–4.9)
POTASSIUM BLDA-SCNC: 3.5 MMOL/L — SIGNIFICANT CHANGE UP (ref 3.5–4.9)
POTASSIUM SERPL-MCNC: 3.1 MMOL/L — LOW (ref 3.5–5.3)
POTASSIUM SERPL-MCNC: 3.1 MMOL/L — LOW (ref 3.5–5.3)
POTASSIUM SERPL-MCNC: 4 MMOL/L — SIGNIFICANT CHANGE UP (ref 3.5–5.3)
POTASSIUM SERPL-MCNC: 4.5 MMOL/L — SIGNIFICANT CHANGE UP (ref 3.5–5.3)
POTASSIUM SERPL-SCNC: 3.1 MMOL/L — LOW (ref 3.5–5.3)
POTASSIUM SERPL-SCNC: 3.1 MMOL/L — LOW (ref 3.5–5.3)
POTASSIUM SERPL-SCNC: 4 MMOL/L — SIGNIFICANT CHANGE UP (ref 3.5–5.3)
POTASSIUM SERPL-SCNC: 4.5 MMOL/L — SIGNIFICANT CHANGE UP (ref 3.5–5.3)
PROT SERPL-MCNC: 4.6 G/DL — LOW (ref 6–8.3)
PROT SERPL-MCNC: 4.7 G/DL — LOW (ref 6–8.3)
PROT SERPL-MCNC: 6.1 G/DL — SIGNIFICANT CHANGE UP (ref 6–8.3)
PROT SERPL-MCNC: 6.3 G/DL — SIGNIFICANT CHANGE UP (ref 6–8.3)
PROTHROM AB SERPL-ACNC: 20 SEC — HIGH (ref 9.8–12.7)
PROTHROM AB SERPL-ACNC: 20.7 SEC — HIGH (ref 9.8–12.7)
PROTHROM AB SERPL-ACNC: 21.6 SEC — HIGH (ref 9.8–12.7)
RBC # BLD: 4.43 M/UL — SIGNIFICANT CHANGE UP (ref 4.2–5.8)
RBC # BLD: 4.52 M/UL — SIGNIFICANT CHANGE UP (ref 4.2–5.8)
RBC # BLD: 4.79 M/UL — SIGNIFICANT CHANGE UP (ref 4.2–5.8)
RBC # FLD: 16.6 % — SIGNIFICANT CHANGE UP (ref 10.3–16.9)
RBC # FLD: 16.6 % — SIGNIFICANT CHANGE UP (ref 10.3–16.9)
RBC # FLD: 16.8 % — SIGNIFICANT CHANGE UP (ref 10.3–16.9)
RH IG SCN BLD-IMP: POSITIVE — SIGNIFICANT CHANGE UP
SAO2 % BLDA: 96 % — SIGNIFICANT CHANGE UP (ref 95–100)
SAO2 % BLDA: 96 % — SIGNIFICANT CHANGE UP (ref 95–100)
SAO2 % BLDA: 98 % — SIGNIFICANT CHANGE UP (ref 95–100)
SAO2 % BLDA: 99 % — SIGNIFICANT CHANGE UP (ref 95–100)
SODIUM BLDA-SCNC: 143 MMOL/L — SIGNIFICANT CHANGE UP (ref 138–146)
SODIUM BLDA-SCNC: 144 MMOL/L — SIGNIFICANT CHANGE UP (ref 138–146)
SODIUM SERPL-SCNC: 141 MMOL/L — SIGNIFICANT CHANGE UP (ref 135–145)
SODIUM SERPL-SCNC: 142 MMOL/L — SIGNIFICANT CHANGE UP (ref 135–145)
SODIUM SERPL-SCNC: 145 MMOL/L — SIGNIFICANT CHANGE UP (ref 135–145)
SODIUM SERPL-SCNC: 145 MMOL/L — SIGNIFICANT CHANGE UP (ref 135–145)
SPECIMEN SOURCE: SIGNIFICANT CHANGE UP
TROPONIN T SERPL-MCNC: 0.04 NG/ML — HIGH (ref 0–0.01)
TROPONIN T SERPL-MCNC: 0.06 NG/ML — CRITICAL HIGH (ref 0–0.01)
VANCOMYCIN TROUGH SERPL-MCNC: 20 UG/ML — SIGNIFICANT CHANGE UP (ref 10–20)
WBC # BLD: 15.8 K/UL — HIGH (ref 3.8–10.5)
WBC # BLD: 22.1 K/UL — HIGH (ref 3.8–10.5)
WBC # BLD: 24.9 K/UL — HIGH (ref 3.8–10.5)
WBC # FLD AUTO: 15.8 K/UL — HIGH (ref 3.8–10.5)
WBC # FLD AUTO: 22.1 K/UL — HIGH (ref 3.8–10.5)
WBC # FLD AUTO: 24.9 K/UL — HIGH (ref 3.8–10.5)

## 2018-01-20 PROCEDURE — 99222 1ST HOSP IP/OBS MODERATE 55: CPT | Mod: GC

## 2018-01-20 PROCEDURE — 99232 SBSQ HOSP IP/OBS MODERATE 35: CPT | Mod: 57

## 2018-01-20 PROCEDURE — 74018 RADEX ABDOMEN 1 VIEW: CPT | Mod: 26,59

## 2018-01-20 PROCEDURE — 49905 OMENTAL FLAP INTRA-ABDOM: CPT

## 2018-01-20 PROCEDURE — 74174 CTA ABD&PLVS W/CONTRAST: CPT | Mod: 26

## 2018-01-20 PROCEDURE — 93306 TTE W/DOPPLER COMPLETE: CPT | Mod: 26

## 2018-01-20 PROCEDURE — 43840 GSTRRPHY SUTR DUOL/GSTR ULCR: CPT

## 2018-01-20 PROCEDURE — 71045 X-RAY EXAM CHEST 1 VIEW: CPT | Mod: 26

## 2018-01-20 PROCEDURE — 44300 OPEN BOWEL TO SKIN: CPT | Mod: GC

## 2018-01-20 RX ORDER — FENTANYL CITRATE 50 UG/ML
0.56 INJECTION INTRAVENOUS
Qty: 2500 | Refills: 0 | Status: DISCONTINUED | OUTPATIENT
Start: 2018-01-20 | End: 2018-01-20

## 2018-01-20 RX ORDER — SODIUM CHLORIDE 9 MG/ML
1000 INJECTION, SOLUTION INTRAVENOUS
Qty: 0 | Refills: 0 | Status: DISCONTINUED | OUTPATIENT
Start: 2018-01-20 | End: 2018-01-20

## 2018-01-20 RX ORDER — PROPOFOL 10 MG/ML
9.32 INJECTION, EMULSION INTRAVENOUS
Qty: 1000 | Refills: 0 | Status: DISCONTINUED | OUTPATIENT
Start: 2018-01-20 | End: 2018-01-20

## 2018-01-20 RX ORDER — HEPARIN SODIUM 5000 [USP'U]/ML
5000 INJECTION INTRAVENOUS; SUBCUTANEOUS EVERY 8 HOURS
Qty: 0 | Refills: 0 | Status: DISCONTINUED | OUTPATIENT
Start: 2018-01-20 | End: 2018-01-21

## 2018-01-20 RX ORDER — VANCOMYCIN HCL 1 G
1250 VIAL (EA) INTRAVENOUS EVERY 12 HOURS
Qty: 0 | Refills: 0 | Status: DISCONTINUED | OUTPATIENT
Start: 2018-01-20 | End: 2018-01-21

## 2018-01-20 RX ORDER — DEXTROSE 50 % IN WATER 50 %
1 SYRINGE (ML) INTRAVENOUS ONCE
Qty: 0 | Refills: 0 | Status: DISCONTINUED | OUTPATIENT
Start: 2018-01-20 | End: 2018-01-20

## 2018-01-20 RX ORDER — FUROSEMIDE 40 MG
40 TABLET ORAL ONCE
Qty: 0 | Refills: 0 | Status: COMPLETED | OUTPATIENT
Start: 2018-01-20 | End: 2018-01-20

## 2018-01-20 RX ORDER — PANTOPRAZOLE SODIUM 20 MG/1
8 TABLET, DELAYED RELEASE ORAL
Qty: 80 | Refills: 0 | Status: DISCONTINUED | OUTPATIENT
Start: 2018-01-20 | End: 2018-01-20

## 2018-01-20 RX ORDER — SODIUM CHLORIDE 9 MG/ML
1000 INJECTION INTRAMUSCULAR; INTRAVENOUS; SUBCUTANEOUS
Qty: 0 | Refills: 0 | Status: DISCONTINUED | OUTPATIENT
Start: 2018-01-20 | End: 2018-01-20

## 2018-01-20 RX ORDER — ALBUMIN HUMAN 25 %
250 VIAL (ML) INTRAVENOUS ONCE
Qty: 0 | Refills: 0 | Status: COMPLETED | OUTPATIENT
Start: 2018-01-20 | End: 2018-01-20

## 2018-01-20 RX ORDER — DEXTROSE 50 % IN WATER 50 %
12.5 SYRINGE (ML) INTRAVENOUS ONCE
Qty: 0 | Refills: 0 | Status: DISCONTINUED | OUTPATIENT
Start: 2018-01-20 | End: 2018-01-20

## 2018-01-20 RX ORDER — INSULIN LISPRO 100/ML
VIAL (ML) SUBCUTANEOUS
Qty: 0 | Refills: 0 | Status: DISCONTINUED | OUTPATIENT
Start: 2018-01-20 | End: 2018-01-27

## 2018-01-20 RX ORDER — SODIUM CHLORIDE 9 MG/ML
1000 INJECTION, SOLUTION INTRAVENOUS
Qty: 0 | Refills: 0 | Status: DISCONTINUED | OUTPATIENT
Start: 2018-01-20 | End: 2018-01-30

## 2018-01-20 RX ORDER — CHLORHEXIDINE GLUCONATE 213 G/1000ML
15 SOLUTION TOPICAL
Qty: 0 | Refills: 0 | Status: DISCONTINUED | OUTPATIENT
Start: 2018-01-20 | End: 2018-02-27

## 2018-01-20 RX ORDER — PIPERACILLIN AND TAZOBACTAM 4; .5 G/20ML; G/20ML
3.38 INJECTION, POWDER, LYOPHILIZED, FOR SOLUTION INTRAVENOUS EVERY 6 HOURS
Qty: 0 | Refills: 0 | Status: DISCONTINUED | OUTPATIENT
Start: 2018-01-20 | End: 2018-01-21

## 2018-01-20 RX ORDER — PIPERACILLIN AND TAZOBACTAM 4; .5 G/20ML; G/20ML
3.38 INJECTION, POWDER, LYOPHILIZED, FOR SOLUTION INTRAVENOUS ONCE
Qty: 0 | Refills: 0 | Status: DISCONTINUED | OUTPATIENT
Start: 2018-01-20 | End: 2018-01-20

## 2018-01-20 RX ORDER — VANCOMYCIN HCL 1 G
VIAL (EA) INTRAVENOUS
Qty: 0 | Refills: 0 | Status: DISCONTINUED | OUTPATIENT
Start: 2018-01-20 | End: 2018-01-20

## 2018-01-20 RX ORDER — METOPROLOL TARTRATE 50 MG
5 TABLET ORAL EVERY 6 HOURS
Qty: 0 | Refills: 0 | Status: DISCONTINUED | OUTPATIENT
Start: 2018-01-20 | End: 2018-01-20

## 2018-01-20 RX ORDER — FENTANYL CITRATE 50 UG/ML
0.11 INJECTION INTRAVENOUS
Qty: 2500 | Refills: 0 | Status: DISCONTINUED | OUTPATIENT
Start: 2018-01-20 | End: 2018-01-26

## 2018-01-20 RX ORDER — DEXTROSE 50 % IN WATER 50 %
25 SYRINGE (ML) INTRAVENOUS ONCE
Qty: 0 | Refills: 0 | Status: DISCONTINUED | OUTPATIENT
Start: 2018-01-20 | End: 2018-01-20

## 2018-01-20 RX ORDER — SODIUM CHLORIDE 9 MG/ML
1000 INJECTION, SOLUTION INTRAVENOUS
Qty: 0 | Refills: 0 | Status: DISCONTINUED | OUTPATIENT
Start: 2018-01-20 | End: 2018-01-21

## 2018-01-20 RX ORDER — PANTOPRAZOLE SODIUM 20 MG/1
8 TABLET, DELAYED RELEASE ORAL
Qty: 80 | Refills: 0 | Status: DISCONTINUED | OUTPATIENT
Start: 2018-01-20 | End: 2018-01-26

## 2018-01-20 RX ORDER — GLUCAGON INJECTION, SOLUTION 0.5 MG/.1ML
1 INJECTION, SOLUTION SUBCUTANEOUS ONCE
Qty: 0 | Refills: 0 | Status: DISCONTINUED | OUTPATIENT
Start: 2018-01-20 | End: 2018-01-20

## 2018-01-20 RX ORDER — SODIUM CHLORIDE 9 MG/ML
500 INJECTION INTRAMUSCULAR; INTRAVENOUS; SUBCUTANEOUS ONCE
Qty: 0 | Refills: 0 | Status: COMPLETED | OUTPATIENT
Start: 2018-01-20 | End: 2018-01-20

## 2018-01-20 RX ORDER — METOPROLOL TARTRATE 50 MG
2.5 TABLET ORAL EVERY 6 HOURS
Qty: 0 | Refills: 0 | Status: DISCONTINUED | OUTPATIENT
Start: 2018-01-20 | End: 2018-01-21

## 2018-01-20 RX ORDER — POTASSIUM CHLORIDE 20 MEQ
10 PACKET (EA) ORAL
Qty: 0 | Refills: 0 | Status: COMPLETED | OUTPATIENT
Start: 2018-01-20 | End: 2018-01-20

## 2018-01-20 RX ORDER — NOREPINEPHRINE BITARTRATE/D5W 8 MG/250ML
0.01 PLASTIC BAG, INJECTION (ML) INTRAVENOUS
Qty: 8 | Refills: 0 | Status: DISCONTINUED | OUTPATIENT
Start: 2018-01-20 | End: 2018-01-26

## 2018-01-20 RX ORDER — ACETAMINOPHEN 500 MG
1000 TABLET ORAL ONCE
Qty: 0 | Refills: 0 | Status: COMPLETED | OUTPATIENT
Start: 2018-01-20 | End: 2018-01-20

## 2018-01-20 RX ORDER — INSULIN LISPRO 100/ML
VIAL (ML) SUBCUTANEOUS
Qty: 0 | Refills: 0 | Status: DISCONTINUED | OUTPATIENT
Start: 2018-01-20 | End: 2018-01-20

## 2018-01-20 RX ORDER — SODIUM CHLORIDE 9 MG/ML
250 INJECTION INTRAMUSCULAR; INTRAVENOUS; SUBCUTANEOUS ONCE
Qty: 0 | Refills: 0 | Status: COMPLETED | OUTPATIENT
Start: 2018-01-20 | End: 2018-01-20

## 2018-01-20 RX ORDER — PANTOPRAZOLE SODIUM 20 MG/1
40 TABLET, DELAYED RELEASE ORAL EVERY 12 HOURS
Qty: 0 | Refills: 0 | Status: DISCONTINUED | OUTPATIENT
Start: 2018-01-20 | End: 2018-01-20

## 2018-01-20 RX ORDER — CHLORHEXIDINE GLUCONATE 213 G/1000ML
15 SOLUTION TOPICAL
Qty: 0 | Refills: 0 | Status: DISCONTINUED | OUTPATIENT
Start: 2018-01-20 | End: 2018-01-20

## 2018-01-20 RX ORDER — PROPOFOL 10 MG/ML
1.86 INJECTION, EMULSION INTRAVENOUS
Qty: 1000 | Refills: 0 | Status: DISCONTINUED | OUTPATIENT
Start: 2018-01-20 | End: 2018-01-26

## 2018-01-20 RX ORDER — SODIUM CHLORIDE 9 MG/ML
250 INJECTION, SOLUTION INTRAVENOUS ONCE
Qty: 0 | Refills: 0 | Status: COMPLETED | OUTPATIENT
Start: 2018-01-20 | End: 2018-01-20

## 2018-01-20 RX ADMIN — FENTANYL CITRATE 5 MICROGRAM(S)/KG/HR: 50 INJECTION INTRAVENOUS at 03:55

## 2018-01-20 RX ADMIN — HEPARIN SODIUM 5000 UNIT(S): 5000 INJECTION INTRAVENOUS; SUBCUTANEOUS at 13:41

## 2018-01-20 RX ADMIN — SODIUM CHLORIDE 2000 MILLILITER(S): 9 INJECTION INTRAMUSCULAR; INTRAVENOUS; SUBCUTANEOUS at 19:13

## 2018-01-20 RX ADMIN — Medication 166.67 MILLIGRAM(S): at 23:17

## 2018-01-20 RX ADMIN — Medication 100 MILLIEQUIVALENT(S): at 13:41

## 2018-01-20 RX ADMIN — Medication 400 MILLIGRAM(S): at 23:40

## 2018-01-20 RX ADMIN — Medication 166.67 MILLIGRAM(S): at 12:30

## 2018-01-20 RX ADMIN — SODIUM CHLORIDE 1000 MILLILITER(S): 9 INJECTION, SOLUTION INTRAVENOUS at 12:49

## 2018-01-20 RX ADMIN — Medication 40 MILLIGRAM(S): at 00:50

## 2018-01-20 RX ADMIN — Medication 2.5 MILLIGRAM(S): at 19:30

## 2018-01-20 RX ADMIN — HEPARIN SODIUM 5000 UNIT(S): 5000 INJECTION INTRAVENOUS; SUBCUTANEOUS at 23:17

## 2018-01-20 RX ADMIN — CHLORHEXIDINE GLUCONATE 15 MILLILITER(S): 213 SOLUTION TOPICAL at 19:29

## 2018-01-20 RX ADMIN — Medication 125 MILLILITER(S): at 23:24

## 2018-01-20 RX ADMIN — Medication 40 MILLIGRAM(S): at 00:57

## 2018-01-20 RX ADMIN — Medication 100 MILLIEQUIVALENT(S): at 03:53

## 2018-01-20 RX ADMIN — PIPERACILLIN AND TAZOBACTAM 200 GRAM(S): 4; .5 INJECTION, POWDER, LYOPHILIZED, FOR SOLUTION INTRAVENOUS at 17:08

## 2018-01-20 RX ADMIN — SODIUM CHLORIDE 500 MILLILITER(S): 9 INJECTION INTRAMUSCULAR; INTRAVENOUS; SUBCUTANEOUS at 20:41

## 2018-01-20 RX ADMIN — Medication 100 MILLIEQUIVALENT(S): at 03:34

## 2018-01-20 RX ADMIN — Medication 2.5 MILLIGRAM(S): at 23:18

## 2018-01-20 RX ADMIN — PIPERACILLIN AND TAZOBACTAM 200 GRAM(S): 4; .5 INJECTION, POWDER, LYOPHILIZED, FOR SOLUTION INTRAVENOUS at 23:18

## 2018-01-20 RX ADMIN — PROPOFOL 5 MICROGRAM(S)/KG/MIN: 10 INJECTION, EMULSION INTRAVENOUS at 03:53

## 2018-01-20 RX ADMIN — Medication 5 MILLIGRAM(S): at 13:40

## 2018-01-20 NOTE — PROVIDER CONTACT NOTE (CHANGE IN STATUS NOTIFICATION) - RECOMMENDATIONS
JENNYFER HOLLOWAY and JENNYFER Marquez were called immediately for intervention. They came right away.

## 2018-01-20 NOTE — PROGRESS NOTE ADULT - SUBJECTIVE AND OBJECTIVE BOX
Patient seen and examined at bedside. Patient continues to be intubated, on stable doses of pressors, fentanyl, propofol and continues on PPI gtt. NGT in place to LIWS. Feeding J-tube capped. GUCCI drains to bulb suction at all times. Tm 100.9, blood cx obtained.    T(C): 38.3 (01-20-18 @ 22:01), Max: 38.3 (01-20-18 @ 22:01)  HR: 104 (01-20-18 @ 23:00) (92 - 136)  BP: 100/77 (01-20-18 @ 23:00) (84/67 - 178/89)  RR: 16 (01-20-18 @ 23:00) (14 - 39)  SpO2: 97% (01-20-18 @ 23:00) (92% - 100%)  Wt(kg): --      01-19 @ 07:01  -  01-20 @ 07:00  --------------------------------------------------------  IN: 2460 mL / OUT: 3600 mL / NET: -1140 mL    01-20 @ 07:01  -  01-20 @ 23:58  --------------------------------------------------------  IN: 3113 mL / OUT: 770 mL / NET: 2343 mL      PE:  Gen: ETT in place, NGT to LIWS, on levo, prop/fent  Abd: Soft, midline dressing intact with some sanguinous staining. GUCCI #1 putting out 160ml bilious output, GUCCI #2 putting out 230ml serosanguinous output. Feeding J-tube capped.                13.5   24.9  )-----------( 71       ( 20 Jan 2018 08:43 )             41.2       01-20    145  |  111<H>  |  60<H>  ----------------------------<  148<H>  4.5   |  19<L>  |  1.97<H>    Ca    7.7<L>      20 Jan 2018 18:51  Phos  4.9     01-20  Mg     2.0     01-20    TPro  4.7<L>  /  Alb  2.5<L>  /  TBili  2.2<H>  /  DBili  x   /  AST  144<H>  /  ALT  621<H>  /  AlkPhos  72  01-20

## 2018-01-20 NOTE — CHART NOTE - NSCHARTNOTEFT_GEN_A_CORE
See prior transfer note. Patient with acute abdominal pathology now being transferred from the CCU to the surgical service. See prior transfer note. CTA abdomen/pelvis with pneumoperitoneum. Surgery team notified of new imaging findings. Patient with acute abdominal pathology now being transferred from the CCU to the surgical service.

## 2018-01-20 NOTE — PROVIDER CONTACT NOTE (CHANGE IN STATUS NOTIFICATION) - SITUATION
Pt. was hyperventilating with RR=38 BPM labored breathing and moaning saying My belly is soft! I cannot lie on my left side!" repeatedly.

## 2018-01-20 NOTE — PROGRESS NOTE ADULT - ASSESSMENT
ASSESSMENT/PLAN 73y/o male pt c Respiratory failure, perforated duodenal ulcer, POD I post repair, RLL pneumonia, RLL atelectases, no obvious RADHA, elevated R hemidiaphragm, AFIB, possible cholangitis, thrombocytopenia    1. O2 Continue CMV mode today do not attempt weaning at this time  2. Bronchodilators:  Atrovent/ albuterol q 4 – 6 hours as needed  3. Corticosteroids: off  4. ID/Antibiotics: must continue ABX for RLL pneumonia, ID ,Mark feedback  5. Cardiac/HTN: Monitor, optimize HR  6. GI: Rx/ prophylaxis c PPI/H2B, Pt discussed c GI  7. Heme: Rx/VT prophylaxis c SQH/SCD/AC pt is anticoagulated, follow plts closely  8. Aspiration precautions, NGT   Discussed with managing team

## 2018-01-20 NOTE — PROGRESS NOTE ADULT - PROBLEM SELECTOR PLAN 3
Not currently rate controlled. Will likely improve when PNA improves and CHF improves after diuresis.   -Continue Toprol XL 12.5 mg PO Daily. Will not increase BB in setting of acute CHF exacerbation.   -Patient previously on Heparin however discontinued as INR 2. Daily INR and LFTs  -CHADSVASC 2

## 2018-01-20 NOTE — PROGRESS NOTE ADULT - SUBJECTIVE AND OBJECTIVE BOX
Interventional Cardiology Yulia BURGER Transfer to CCU Note    Hospital Course:   71 yo M with h/o severe mitral and aortic valve regurgitation presented to ED with 5 day h/o left lower abdominal pain, nausea and multiple episodes of vomiting as well as PO intolerance. Pt reports that the pain in his abdomen is illicited by movement. Went to [facility] two days ago and underwent CT scan which revealed acute cholecystitis as well as right lower lobe infectious pneumonia. Pt was told he would require surgery and left AMA because he was waiting too long. Denies f/c. Normal BMs and passing flatus.  Pt initially was to be admitted to Gen Surg service but after review of CT scan at [facility] findings less c/w acute cholecystitis and more CHF related. Pt was thus admitted to [facility] on 1/19/18 for new onset rapid AF in the setting of sepsis and CHF exacerbation. WBC on admission noted to be 17.9 w/ left shift downtrended to 16.8 today. CXR c/w PNA and ID saw pt and pt was started on Vanc and Zosyn. Blood cx and UCx remained negative to date. Echo was performed on 1/19/18 revealing severe MR with flailed leaflets unable to definitively r/o endocarditis and pt recommended for AMRIK to better assess which was tentatively planned for Monday 1/22/18 unless emergently needed and Dr. Chow willing to perform in ICU setting on Saturday 1/20/18. Pt also underwent a HIDA scan to better assess for cholecystitis and that was negative and gen surg signed off.    At approx. 0030 called to patient’s bedside per nursing request for c/o tachypnea (RR>30) on 4LNC and pt stating his abdomen is “soft” and he can’t lie on it. Upon arrival O2 sat unable to be obtained respiratory called STAT and high flow placed temporarily until BIPAP placed. O2 sat on high flow noted to be 70% improved to mid 90s on BIPAP, but pt still tachypneic. BP 130s/90s, HR 120s. CCU fellow called to assess pt as pt known to him from admission. Abd exam noted to be diffusely tender and distended. Lasix 40mg IVP x 2 given, rectal temp 100.8F. Gen Surgery team called to assess pt and STAT abd Xray and CXR reviewed by them with no acute abdominal intervention at this time. STAT labs sent and labs revealed Lactate 3.4(from 2.5) with rest of labs still pending. Decision was made to move pt to CCU for closer monitoring.  	  MEDICATIONS:  metoprolol succinate ER 12.5 milliGRAM(s) Oral daily    piperacillin/tazobactam IVPB. 3.375 Gram(s) IV Intermittent every 6 hours  vancomycin  IVPB 1000 milliGRAM(s) IV Intermittent every 24 hours      ondansetron Injectable 4 milliGRAM(s) IV Push every 6 hours PRN        influenza   Vaccine 0.5 milliLiter(s) IntraMuscular once  sodium chloride 0.9%. 1000 milliLiter(s) IV Continuous <Continuous>        Appearance: Tachypneic	  HEENT:   Normal oral mucosa, PERRL, EOMI	  Neck: Supple  Cardiovascular: Irregularly irregular, +S1, +S2  Respiratory: decreased breath sounds with poor inspiratory effort	  Gastrointestinal:  Distended, diffusely tender  Skin: No rashes, No ecchymoses, No cyanosis  Extremities: +edema b/l LE  Vascular: Peripheral pulses palpable 2+ bilaterally  Neurologic: Non-focal  Psychiatry: A & O x 3, Mood & affect appropriate      	    ECG:  	  RADIOLOGY:   DIAGNOSTIC TESTING:  [ ] Echocardiogram:  [ ]  Catheterization:  [ ] Stress Test:    [ ] AMRIK  OTHER: 	    LABS:	 	  CARDIAC MARKERS:                                  12.8   16.8  )-----------( 71       ( 19 Jan 2018 05:31 )             38.1     01-19    141  |  104  |  74<H>  ----------------------------<  158<H>  4.0   |  20<L>  |  1.39<H>    Ca    8.0<L>      19 Jan 2018 05:28  Phos  3.9     01-19  Mg     2.7     01-19    TPro  6.0  /  Alb  3.2<L>  /  TBili  3.1<H>  /  DBili  1.6<H>  /  AST  325<H>  /  ALT  1147<H>  /  AlkPhos  106  01-19    proBNP:   Lipid Profile:   HgA1c:   TSH:   PT/INR - ( 20 Jan 2018 01:21 )   PT: 20.0 sec;   INR: 1.78          PTT - ( 19 Jan 2018 02:36 )  PTT:35.3 sec      Dispo: CCU for closer monitoring Interventional Cardiology Yulia BURGER Transfer to CCU Note    Hospital Course:   71 yo M with h/o severe mitral and aortic valve regurgitation presented to ED with 5 day h/o left lower abdominal pain, nausea and multiple episodes of vomiting as well as PO intolerance. Pt reports that the pain in his abdomen is illicited by movement. Went to [facility] two days ago and underwent CT scan which revealed acute cholecystitis as well as right lower lobe infectious pneumonia. Pt was told he would require surgery and left AMA because he was waiting too long. Denies f/c. Normal BMs and passing flatus.  Pt initially was to be admitted to Gen Surg service but after review of CT scan at [facility] findings less c/w acute cholecystitis and more CHF related. Pt was thus admitted to [facility] on 1/19/18 for new onset rapid AF in the setting of sepsis and CHF exacerbation. WBC on admission noted to be 17.9 w/ left shift downtrended to 16.8 today. CXR c/w PNA and ID saw pt and pt was started on Vanc and Zosyn. Blood cx and UCx remained negative to date. Echo was performed on 1/19/18 revealing severe MR with flailed leaflets unable to definitively r/o endocarditis and pt recommended for AMRIK to better assess which was tentatively planned for Monday 1/22/18 unless emergently needed and Dr. Chow willing to perform in ICU setting on Saturday 1/20/18. Pt also underwent a HIDA scan to better assess for cholecystitis and that was negative and gen surg signed off.    At approx. 0030 called to patient’s bedside per nursing request for c/o tachypnea (RR>30) on 4LNC and pt stating his abdomen is “soft” and he can’t lie on it. Upon arrival O2 sat unable to be obtained respiratory called STAT and high flow placed temporarily until BIPAP placed. O2 sat on high flow noted to be 70% improved to mid 90s on BIPAP, but pt still tachypneic. BP 130s/90s, HR 120s. CCU fellow called to assess pt as pt known to him from admission. Abd exam noted to be diffusely tender and distended. Lasix 40mg IVP x 2 given, rectal temp 100.8F. Gen Surgery team called to assess pt and STAT abd Xray and CXR reviewed by them with no acute abdominal intervention at this time. STAT labs sent and labs revealed Lactate 3.4(from 2.5) with rest of labs still pending. IVF ordered at 100cc/hr as per fellow. Decision was made to move pt to CCU for closer monitoring.  	  MEDICATIONS:  metoprolol succinate ER 12.5 milliGRAM(s) Oral daily    piperacillin/tazobactam IVPB. 3.375 Gram(s) IV Intermittent every 6 hours  vancomycin  IVPB 1000 milliGRAM(s) IV Intermittent every 24 hours      ondansetron Injectable 4 milliGRAM(s) IV Push every 6 hours PRN        influenza   Vaccine 0.5 milliLiter(s) IntraMuscular once  sodium chloride 0.9%. 1000 milliLiter(s) IV Continuous <Continuous>        Appearance: Tachypneic	  HEENT:   Normal oral mucosa, PERRL, EOMI	  Neck: Supple  Cardiovascular: Irregularly irregular, +S1, +S2  Respiratory: decreased breath sounds with poor inspiratory effort	  Gastrointestinal:  Distended, diffusely tender  Skin: No rashes, No ecchymoses, No cyanosis  Extremities: +edema b/l LE  Vascular: Peripheral pulses palpable 2+ bilaterally  Neurologic: Non-focal  Psychiatry: A & O x 3, Mood & affect appropriate      	    ECG:  	  RADIOLOGY:   DIAGNOSTIC TESTING:  [ ] Echocardiogram:  [ ]  Catheterization:  [ ] Stress Test:    [ ] AMRIK  OTHER: 	    LABS:	 	  CARDIAC MARKERS:                                  12.8   16.8  )-----------( 71       ( 19 Jan 2018 05:31 )             38.1     01-19    141  |  104  |  74<H>  ----------------------------<  158<H>  4.0   |  20<L>  |  1.39<H>    Ca    8.0<L>      19 Jan 2018 05:28  Phos  3.9     01-19  Mg     2.7     01-19    TPro  6.0  /  Alb  3.2<L>  /  TBili  3.1<H>  /  DBili  1.6<H>  /  AST  325<H>  /  ALT  1147<H>  /  AlkPhos  106  01-19    proBNP:   Lipid Profile:   HgA1c:   TSH:   PT/INR - ( 20 Jan 2018 01:21 )   PT: 20.0 sec;   INR: 1.78          PTT - ( 19 Jan 2018 02:36 )  PTT:35.3 sec      Dispo: CCU for closer monitoring

## 2018-01-20 NOTE — PROGRESS NOTE ADULT - PROBLEM SELECTOR PLAN 1
Tachycardia, Tachypnea and Leukocytosis WBC 17.9 with left shift present on admission. Patient remains afebrile . Lactate 2.9 downtrending down to 2.5.  -Sepsis Possibly secondary to PNA.   -ID-Dr. Flores consulted-Recommended adding Vanco to ABX regimen. Pulm Dr. Wylie consulted   -Blood Cx no growth x 1 day. Follow-up official results. U/A Negative for LE and Nitrite. Follow-up Urine Cx.  RVP negative.  -Severe MR with flailed leafelts on Echo per Dr. Chow unable to definitively rule out vegetation:  will need AMRIK to R/O Endocarditis.

## 2018-01-20 NOTE — PROGRESS NOTE ADULT - ATTENDING COMMENTS
Patient remains critically ill with multiple organ system issues. Prognosis is guarded. Continue supportive care per SICU.

## 2018-01-20 NOTE — PROGRESS NOTE ADULT - PROBLEM SELECTOR PLAN 7
Cr 1.48 on admission now Cr 1.39. Continue to monitor urine output, electrolytes, BP and renal function.    DVT Prophylaxis: SCDs  Dispo: Continue diuresis and IV ABX with plan for AMRIK Monday

## 2018-01-20 NOTE — DIETITIAN INITIAL EVALUATION ADULT. - NS AS NUTRI INTERV ENTERAL NUTRITION
Concentration/Composition/Once medically appropriate and jejunostomy tube cleared for use; Jevity 1.5 via J-tube at goal rate of 55ml/hr x 24hr + 2x prostat (1320ml TV, 2180kcal, 114g, 1003ml water).

## 2018-01-20 NOTE — PROGRESS NOTE ADULT - ASSESSMENT
71 yo M with Hx of aortic regurgitation and mitral regurgitation here with acute CHF exacerbation, and now with developing acute abdomen concerning for SBO vs ischemic bowel.    #Sepsis vs SIRS 2/2 acute abdominal process  Abdominal pain and distension in the setting of elevated lactic acid is concerning for ischemic bowel or SBO. There is also evidence of diverticulosis on CT Abd/pelvis obtained on admission, and this could have developed into diverticulitis, although ischemia or SBO are more likely in the current clinical setting. Patient has multiple risk factors for ischemic bowel including CHF, Afib and possible endocarditis. Abdominal xray is consistent with bowel obstruction. Chest xray does not demonstrate air below the diaphragm decreasing the likelihood of perforation at this time. Otherwise, leukocytosis, GAETANO, transaminitis and pulmonary edema are improving with diuresis for treatment of CHF. In the setting of an improving chest xray, the current respiratory distress is not consistent with pneumonia. It is likely 2/2 pain and abdominal distension.  Recommendations:  -CT Abd/Pelvis with IV contrast.  -Surgical consultation.  -C/w Abx.  -C/w NIPPV  -Would forgo pain control pending surgical evaluation as this may limit exam findings.    #Dispo  -Continue management in CCU per Cardiology.

## 2018-01-20 NOTE — PROGRESS NOTE ADULT - SUBJECTIVE AND OBJECTIVE BOX
Interventional, Pulmonary, Critical, Chest Special Procedures.    Pt was seen and fully examined by myself.     Time spent with patient in minutes:77    Patient is a 72y old  Male who presents with a chief complaint of acute cholecystitis (18 Jan 2018 14:49)Events of last night reviewed and analyzed c GI. The patient suffered unprovoked duodenal perforation, underwent Lap repair. OR records reviewed. The patient is now intubated sedated, on CMV mode.     HPI:  73 yo M with h/o severe mitral and aortic valve regurgitation presented to ED with 5 day h/o left lower abdominal pain, nausea and multiple episodes of vomiting as well as PO intolerance. Pt reports that the pain in his abdomen is illicited by movement. Went to [facility] two days ago and underwent CT scan which revealed acute cholecystitis as well as right lower lobe infectious pneumonia. Pt was told he would require surgery and left AMA because he was waiting too long. Denies f/c. Last echo 1/4/17 with EF 60-65%. Normal BMs and passing flatus. (18 Jan 2018 14:49)    REVIEW OF SYSTEMS: perforated duodenal ulcer updated  Constitutional: No fever, weight loss, chills or fatigue  Eyes: No eye pain, visual disturbances, or discharge  ENMT:  No difficulty hearing, tinnitus, vertigo; No sinus or throat pain. No epistaxis, dysphagia, dysphonia, hoarseness or odynophagia  Neck: No pain, stiffness or neck swelling.  No masses or deformities  Respiratory: No cough, wheezing, chills or hemoptysis  - COPD  - ILD   - PE   - ASTHMA     - PNEUMONIA  Cardiovascular: No chest pain, dysrhythmia, palpitations, dizziness or edema   - COPD     - CAD   - CHF   - HTN  Gastrointestinal: No abdominal or epigastric pain. No nausea, vomiting or hematemesis; No diarrhea or constipation. No melena or hematochezia. No dysphagia or Icterus.          Genitourinary: No dysuria, frequency, hematuria or incontinence   - CKD/GAETANO      - ESRD  Neurological: No headaches, memory loss, loss of strength, numbness or tremors      -DEMENTIA     - STROKE    - SEIZURE  Skin: No itching, burning, rashes or lesions   Lymph Nodes: No enlarged glands  Endocrine: No heat or cold intolerance; No hair loss       - DM     - THYROID DISORDER  Musculoskeletal: No joint pain or swelling; No muscle, back or extremity pain  Psychiatric: No depression, anxiety, mood swings or difficulty sleeping  Heme/Lymph: No easy bruising or bleeding gums         - ANEMIA      - CANCER   -COAGULOPATHY  Allergy and Immunologic: No hives or eczema    PAST MEDICAL & SURGICAL HISTORY:  Mitral regurgitation  Aortic regurgitation  No significant past surgical history    FAMILY HISTORY:    SOCIAL HISTORY:      - Tobacco     - ETOH    Allergies    No Known Allergies    Intolerances      Vital Signs Last 24 Hrs  T(C): 36.1 (20 Jan 2018 14:29), Max: 38.2 (20 Jan 2018 01:19)  T(F): 97 (20 Jan 2018 14:29), Max: 100.8 (20 Jan 2018 01:19)  HR: 92 (20 Jan 2018 14:00) (92 - 136)  BP: 84/67 (20 Jan 2018 14:00) (84/67 - 178/89)  BP(mean): 75 (20 Jan 2018 14:00) (63 - 124)  RR: 20 (20 Jan 2018 14:00) (14 - 39)  SpO2: 98% (20 Jan 2018 14:00) (94% - 100%)    01-19 @ 07:01 - 01-20 @ 07:00  --------------------------------------------------------  IN: 2460 mL / OUT: 3600 mL / NET: -1140 mL    01-20 @ 07:01 - 01-20 @ 16:35  --------------------------------------------------------  IN: 954 mL / OUT: 490 mL / NET: 464 mL      Mode: AC/ CMV (Assist Control/ Continuous Mandatory Ventilation)  RR (machine): 12  TV (machine): 500  FiO2: 60  PEEP: 5  ITime: 1  MAP: 9.2  PIP: 19    PHYSICAL EXAM:  Comfortable, on CMV sedated  Eyes: PERRL, EOM intact; conjunctiva and sclera clear  Head: Normocephalic;  No Trauma  ENMT: No nasal discharge, hoarseness, cough or hemoptysis.    Neck: Supple; non tender; no masses or deformities.    No JVD  Respiratory: - WHEEZING   + RLL RHONCHI  - RALES  - CRACKLES.  Diminished breath sounds  BILATERAL  RIGHT > LEFT baswe  Cardiovascular: Regular rate and rhythm. S1 and S2 Normal; + murmurs, gallops or rubs     - PPM/AICD  Gastrointestinal: distended; no  bowel sounds; No hepatosplenomegaly.     -PEG    +  GT   +ANGELES  Genitourinary: No costovertebral angle tenderness. No dysuria  Extremities: sedated decreased AROM, No clubbing, cyanosis or edema    Vascular: Peripheral pulses palpable 2+ bilaterally  Neurological: intubated sedated  Skin: Warm and dry. No obvious rash  Lymph Nodes: No acute cervical or supraclavicular adenopathy  Psychiatric: intubated    DEVICES:  - DENTURES   +IV R / L     + ETUBE   -TRACH   -CTUBE  R / L      LABS:  ABG - ( 20 Jan 2018 14:20 )  pH: 7.43  /  pCO2: 30    /  pO2: 71    / HCO3: 20    / Base Excess: -3.5  /  SaO2: 94                                      13.5   24.9  )-----------( 71       ( 20 Jan 2018 08:43 )             41.2     01-20    141  |  108  |  54<H>  ----------------------------<  147<H>  4.0   |  20<L>  |  1.42<H>    Ca    8.4      20 Jan 2018 08:43  Phos  4.9     01-20  Mg     2.0     01-20    TPro  4.6<L>  /  Alb  2.1<L>  /  TBili  2.5<H>  /  DBili  x   /  AST  137<H>  /  ALT  601<H>  /  AlkPhos  82  01-20    PT/INR - ( 20 Jan 2018 08:43 )   PT: 21.6 sec;   INR: 1.92          PTT - ( 20 Jan 2018 08:43 )  PTT:39.6 sec  < from: Xray Chest 1 View AP- PORTABLE-Urgent (01.20.18 @ 09:15) >  EXAM:  XR CHEST PORTABLE URGENT 1V                          PROCEDURE DATE:  01/20/2018                     INTERPRETATION:  Clinical History: SICU    Portable examination the chest demonstrates nasoenteric tube overlying   course of esophagus and left upper abdomen. Endotracheal tube in   satisfactory position. Right internal jugular line noted tip overlying   superior vena cava. No interval change lung pathology in comparison to   prior examination of the chest 1/20/2018.    Impression: No interval change lung pathology    < end of copied text >  RADIOLOGY & ADDITIONAL STUDIES (The following images were personally reviewed):

## 2018-01-20 NOTE — BRIEF OPERATIVE NOTE - PROCEDURE
<<-----Click on this checkbox to enter Procedure Exploratory laparotomy  01/20/2018    Active  MVISMER  Diagnostic laparoscopy  01/20/2018  with repair of perforated duodenal ulcer with kaylee patch, wide washout, placement of feefing J-tube.  Active  MVISMER

## 2018-01-20 NOTE — PROGRESS NOTE ADULT - ASSESSMENT
71 yo M with PMHx severe MR and AR  recently diagnosed with Acute Cholecystitis as well as RLL PNA by CT scan at [facility] however signed out AMA who presented to [facility] ED 1/18 c/o lower abdominal pain , nausea, vomiting found to have new onset rapid Afib in the setting of sepsis as well as acute CHF exacerbation.

## 2018-01-20 NOTE — PROVIDER CONTACT NOTE (CHANGE IN STATUS NOTIFICATION) - BACKGROUND
Dx: Cholecystitis, RLL pneumonia  Hx: MVRegurgitation and Aortic regurgitation., sleep apnea (BIPAP machine is at the bedside) Dx: Cholecystitis, RLL pneumonia  Hx: M Regurgitation and Aortic regurgitation., sleep apnea (BIPAP machine is at the bedside)

## 2018-01-20 NOTE — PROGRESS NOTE ADULT - ASSESSMENT
72yr old M with PMHx significant for severe mitral and aortic valve regurgitation who presented to ED for evaluation of LLQ abdominal pain, with recent CT showing acute cholecystitis, cholelithiasis, and RLL pneumonia.   GI consulted for eval of cholangitis.    # Duodenal perforation -   - from a duodenal ulcer of unclear etiology - no Hx of NSAID use, AC  - sp surgical repair with kaylee patch 1/20/18  - PPI IV BID    # Abnormal LFTs -  - ddx - congestive hepatopathy, intermittent choledocholithiasis  - given lack of CBD dilation patient probably does not have obstruction at this time and is draining his CBD intermittently  - presence of stones suggests that he probably does have some ongoing choledocholithiasis - but stones are able to pass apparently  - USS negative for cholecystitis  - CT also equivocal for cholecystitis  - HIDA negative for cholecystitis  - SIRS (leukocytosis, tachypnea) could be multifactorial - RLL pneumonia, cholecystitis, leukemoid reaction  - continue broad spectrum abx  - given ongoing CHF and IV diuresis will hold off on endoscopic interventions and manage conservatively  - LFTs improving      Discussed with attending Dr Ej BAR following

## 2018-01-20 NOTE — PROGRESS NOTE ADULT - SUBJECTIVE AND OBJECTIVE BOX
CCM RECONSULT NOTE:    CHIEF COMPLAINT:  Patient is a 72y old  Male who presents with a chief complaint of acute cholecystitis (2018 14:49)    Patient known to our service from consult yesterday night. We are reconsulted for sepsis in the setting of abdominal distension.    INTERVAL HX/HPI:  Patient was admitted to Cardiology for acute CHF exacerbation and was responding well to IV diuretics. Tonight he developed fever to 100.8F, abdominal pain/distension and subsequently became increasingly tachycardic and tachypneic. He was placed on NIPPV by the [facility] team. Chest and abdominal plain films were obtained. CCU fellow was called and the patient was moved to CCU for increased monitoring.    Patient was seen and examined at bedside. He is complaining of 10/10 generalized abdominal pain and bloating that is worse in the LLQ and radiates across the lower abdomen. He is unable to describe the character of the pain at this time. The pain is worse with movement although he does continue to move. He denies bowel movements and flatus. He denies nausea/vomiting. He denies shortness of breath and chest pain.    REVIEW OF SYSTEMS:  10 system ROS negative except as noted in HPI       OBJECTIVE:    Vital Signs Last 24 Hrs  T(C): 38.2 (2018 01:19), Max: 38.2 (2018 01:19)  T(F): 100.8 (2018 01:19), Max: 100.8 (2018 01:19)  HR: 128 (2018 01:45) (95 - 136)  BP: 145/106 (2018 01:45) (108/73 - 145/106)  BP(mean): 103 (2018 15:48) (103 - 103)  RR: 24 (2018 01:45) (18 - 39)  SpO2: 94% (2018 01:45) (93% - 99%)    Height (cm): 177.8 ( @ 11:40)  Weight (kg): 89.4 ( @ 11:40)  BMI (kg/m2): 28.3 ( @ 11:40)    CAPILLARY BLOOD GLUCOSE    POCT Blood Glucose.: 126 mg/dL (2018 11:06)      PHYSICAL EXAM:  Gen:       +distress, writhing in pain  Skin:       warm, dry, no rash  HEENT:  moist mucous membranes, oropharynx clear, nares clear, no septal deviation  Neck:     supple, +jugular venous distention, no thyromegaly or goiter  Cardiac:  irregular tachycardia, 3/6 holosystolic murmur  Pulm:     rhonchi throughout, using accessory muscles  Abd:       tense, distended, +rebound, decreased bowel sounds,   Vasc:      warm and well perfused, 2+ dorsalis pedis and radial pulses, no pedal edema  Ext:         normal range of motion, atraumatic  Neuro:   awake, alert and orient x3, Cranial Nerves II-XII grossly intact, no focal deficits    MEDICATIONS  (STANDING):  influenza   Vaccine 0.5 milliLiter(s) IntraMuscular once  metoprolol succinate ER 12.5 milliGRAM(s) Oral daily  piperacillin/tazobactam IVPB. 3.375 Gram(s) IV Intermittent every 6 hours  sodium chloride 0.9%. 1000 milliLiter(s) (100 mL/Hr) IV Continuous <Continuous>  vancomycin  IVPB 1000 milliGRAM(s) IV Intermittent every 24 hours    MEDICATIONS  (PRN):  ondansetron Injectable 4 milliGRAM(s) IV Push every 6 hours PRN Nausea      Allergies  No Known Allergies        LABS:                        14.2   15.8  )-----------( 75       ( 2018 01:21 )             41.6    Mean Cell Volume: 92.0 fL ( @ 01:21)        142  |  103  |  61<H>  ----------------------------<  196<H>  3.1<L>   |  23  |  1.34<H>    Ca    8.1<L>      2018 01:21  Phos  3.9       Mg     2.7         TPro  6.3  /  Alb  3.4  /  TBili  3.0<H>  /  DBili  x   /  AST  196<H>  /  ALT  957<H>  /  AlkPhos  124<H>      PT/INR - ( 2018 01:21 )   PT: 20.0 sec;   INR: 1.78     PTT - ( 2018 01:21 )  PTT:33.5 sec    Lactate, Blood: 3.4 mmoL/L (18 @ 01:19)  Lactate, Blood: 2.5 mmoL/L (18 @ 05:34)      Urinalysis Basic - ( 2018 18:49 )    Color: Yellow / Appearance: Clear / S.020 / pH: x  Gluc: x / Ketone: NEGATIVE  / Bili: Small / Urobili: 4.0 E.U./dL   Blood: x / Protein: 30 mg/dL / Nitrite: NEGATIVE   Leuk Esterase: NEGATIVE / RBC: 5-10 /HPF / WBC 5-10 /HPF   Sq Epi: x / Non Sq Epi: 5-10 /HPF / Bacteria: Present /HPF        MICROBIOLOGY: REVIEWED    CARDIOLOGY: REVIEWED  < from: 12 Lead ECG (18 @ 12:57) >  Atrial fibrillation with rapid ventricular response  Nonspecific ST and T wave abnormality , probably digitalis effect  Abnormal ECG    Confirmed by MD Von, Moccasin ([id number]) on 2018 2:50:43 PM    Telemetry monitor (my read): Afib with RVR    RADIOLOGY: REVIEWED  < from: Xray Abdomen 1 View Portable, IMMEDIATE (18 @ 00:59) >  ******PRELIMINARY REPORT******    ******PRELIMINARY REPORT******            EXAM:  XR ABDOMEN PORTABLE IMMED 1V                          PROCEDURE DATE:  2018               ******PRELIMINARY REPORT******    ******PRELIMINARY REPORT******        INTERPRETATION:  RESIDENT PRELIMINARY REPORT    XR ABDOMEN PORTABLE IMMED 1V DATED 2018 12:59 AM    INDICATION: 72 years-old Male with acute SOB abd pain.    PRIOR STUDIES: None    FINDINGS: A single supine AP image of the abdomen is submitted for   review. Evaluation of the bowel gas pattern demonstrates distended   air-filled loops of small bowel measuring up to 3.5 cm. No   pneumoperitoneum is identified. There is also seen within the colon. No   acute osseous abnormality.    IMPRESSION: Findings suggestive of small bowel obstruction. Recommend   supine and upright plain films of the abdomen as clinically indicated for   better assessment for small bowel obstruction.    The  findings above were discussed with the covering resident physician   on 2018 at 1:52 AM.    "Thank you for the opportunity to participate in the care of this   patient."  ******PRELIMINARY REPORT******    ******PRELIMINARY REPORT******          ASAPH VAUGHAN M.D., RADIOLOGY RESIDENT    < end of copied text >    < from: Xray Chest 1 View AP-PORTABLE IMMEDIATE (18 @ 00:59) >  ******PRELIMINARY REPORT******    ******PRELIMINARY REPORT******            EXAM:  XR CHEST PORTABLE IMMED 1V                          PROCEDURE DATE:  2018               ******PRELIMINARY REPORT******    ******PRELIMINARY REPORT******              INTERPRETATION:  RESIDENT PRELIMINARY REPORT    Portable AP Radiograph dated 2018 12:59 AM    CLINICAL INFORMATION: 72 years, Male, hyperventilation acute SOB    PRIOR STUDIES: Chest x-ray 2018 at 1:50 PM    FINDINGS:  Lines, Catheters and Support Devices: None.    Heart Size, Mediastinum and Hilar Contours: Heart size is stable. Stable   mediastinal and hilar contours.      Lungs: Redemonstration of patchy opacities within the lower lung zones   bilaterally. However, there has been interval improvement in right lower   lung zone opacification. Possible small right pleural effusion.. No   pneumothorax.     Bones/Soft Tissues: No acute abnormalities of the soft tissues and   osseous structures.     IMPRESSION:  1.  Redemonstration of patchy opacities within the lower lung zones   bilaterally. However, there has been interval improvement in right lower   lung zone opacification.  2.  Possible small right pleural effusion.    "Thank you for the opportunity to participate in the care of this   patient."  ******PRELIMINARY REPORT******    ******PRELIMINARY REPORT******          DAVID VAUGHAN M.D., RADIOLOGY RESIDENT    < end of copied text >

## 2018-01-20 NOTE — CHART NOTE - NSCHARTNOTEFT_GEN_A_CORE
INTERVAL HPI/OVERNIGHT EVENTS:  Patient seen and examined at the bedside.   Patient's respiratory status deteriorated and he was started on BiPAP. Complains of increased abd distention. Denies pain.    Tachypnic and tachycardic and in acute respiratory distress.   Abdomen distended and mildly tender, mostly in RUQ.     Repeat labs showed WBC 15. Lactate 3.4. K 3.1.   Xrays chest and abdomen reviewed. Severe pulmonary congestion. distended loops of small bowel. No air-fluid levels.     SUBJECTIVE:   Pain controlled. Denies/N/V      MEDICATIONS  (STANDING):  influenza   Vaccine 0.5 milliLiter(s) IntraMuscular once  metoprolol succinate ER 12.5 milliGRAM(s) Oral daily  piperacillin/tazobactam IVPB. 3.375 Gram(s) IV Intermittent every 6 hours  potassium chloride  10 mEq/100 mL IVPB 10 milliEquivalent(s) IV Intermittent every 1 hour  sodium chloride 0.9%. 1000 milliLiter(s) (100 mL/Hr) IV Continuous <Continuous>  vancomycin  IVPB 1000 milliGRAM(s) IV Intermittent every 24 hours    MEDICATIONS  (PRN):  ondansetron Injectable 4 milliGRAM(s) IV Push every 6 hours PRN Nausea      Vital Signs Last 24 Hrs  T(C): 38.2 (20 Jan 2018 01:19), Max: 38.2 (20 Jan 2018 01:19)  T(F): 100.8 (20 Jan 2018 01:19), Max: 100.8 (20 Jan 2018 01:19)  HR: 128 (20 Jan 2018 01:45) (95 - 136)  BP: 145/106 (20 Jan 2018 01:45) (108/73 - 145/106)  BP(mean): 103 (19 Jan 2018 15:48) (103 - 103)  RR: 24 (20 Jan 2018 01:45) (18 - 39)  SpO2: 94% (20 Jan 2018 01:45) (93% - 99%)    Physical Exam:    General: A&Ox3, in acute distress  CV: irregularly irregular RR,   Pulm: labored breathing on BIPAP  Abd: softly distended, diffusely mildly tender. no guarding, no rebound.         I&O's Detail    18 Jan 2018 07:01  -  19 Jan 2018 07:00  --------------------------------------------------------  IN:  Total IN: 0 mL    OUT:    Voided: 1200 mL  Total OUT: 1200 mL    Total NET: -1200 mL      19 Jan 2018 07:01  -  20 Jan 2018 02:59  --------------------------------------------------------  IN:    IV PiggyBack: 100 mL    Oral Fluid: 360 mL  Total IN: 460 mL    OUT:    Indwelling Catheter - Urethral: 2600 mL  Total OUT: 2600 mL    Total NET: -2140 mL          LABS:                        14.2   15.8  )-----------( 75       ( 20 Jan 2018 01:21 )             41.6     01-20    142  |  103  |  61<H>  ----------------------------<  196<H>  3.1<L>   |  23  |  1.34<H>    Ca    8.1<L>      20 Jan 2018 01:21  Phos  3.9     01-19  Mg     2.7     01-19    TPro  6.3  /  Alb  3.4  /  TBili  3.0<H>  /  DBili  x   /  AST  196<H>  /  ALT  957<H>  /  AlkPhos  124<H>  01-20    ASSESSMENT AND PLAN:   73 yo M with AR/MR, lactic acidosis and abd distention.     - Acute cholecystitis was ruled out by HIDA and US.   - New distention is likely to air ingestion while on BiPAP, however other etiologies can not be conclusively ruled out.   - Elevated lactate likely 2/2 low flow state, however acute mesenteric ischemia and ABIMAEL can not be ruled out.   - Please obtain CTA abd/ pelvis to assess for mesenteric ischemia.   - Discussed with attending on call.

## 2018-01-20 NOTE — CONSULT NOTE ADULT - CONSULT REASON
71 yo M with PMHx severe MR and AR  recently diagnosed with Acute Cholecystitis as well as RLL PNA by CT scan at [facility] however signed out AMA who presented to [facility] ED 1/18 found to have new onset rapid Afib in the setting of sepsis as well as acute CHF exacerbation on time of admission pt's abd pain had subsided and CT scan of abd negative for acute choley. Pt admitted to cardiology and placed on bipap. On HD 1 pt c/o acute abd pain, CT scan showing perforated Duodenum therefore pt taken emergently to OR.

## 2018-01-20 NOTE — PROGRESS NOTE ADULT - ATTENDING COMMENTS
Patient seen and examined on SICU rounds; Post op respiratory failure with pneumonia and hypoxia. Keep ventilated for now. Must keep close watch of coag's . Will folow:

## 2018-01-20 NOTE — PROGRESS NOTE ADULT - ASSESSMENT
73 yo M with PMHx severe MR and AR  recently diagnosed with Acute Cholecystitis as well as RLL PNA by CT scan at [facility] however signed out AMA who presented to [facility] ED 1/18 c/o lower abdominal pain , nausea, vomiting found to have new onset rapid Afib in the setting of sepsis as well as acute CHF exacerbation.

## 2018-01-20 NOTE — CONSULT NOTE ADULT - SUBJECTIVE AND OBJECTIVE BOX
HPI:  73 yo M with h/o severe mitral and aortic valve regurgitation presented to ED with 5 day h/o left lower abdominal pain, nausea and multiple episodes of vomiting as well as PO intolerance. Pt reports that the pain in his abdomen is illicited by movement. Went to [facility] two days ago and underwent CT scan which revealed acute cholecystitis as well as right lower lobe infectious pneumonia. Pt was told he would require surgery and left AMA. Pt then presented to [facility] ed with continued c/o abdominal pain and respiratory distress. Pt placed on Bipap in ED and given that ct and us negative for acute cbholey pt was transferred to Cardiology service. While on cardiology service pt had a rapid response called after HIDA scan and placed back on BIPAP and given lasix. At the time of rapid response it was noted that pt had worsening abd pain therefore surgery consult was recalled. CXR and CT of abd revealed free air therefore pt taken emergent to OR for ex lap.    PAST MEDICAL & SURGICAL HISTORY:  Mitral regurgitation  Aortic regurgitation  No significant past surgical history      ROS: See HPI    MEDICATIONS  (STANDING):  influenza   Vaccine 0.5 milliLiter(s) IntraMuscular once  piperacillin/tazobactam IVPB. 3.375 Gram(s) IV Intermittent once  potassium chloride  10 mEq/100 mL IVPB 10 milliEquivalent(s) IV Intermittent every 1 hour    MEDICATIONS  (PRN):      Allergies    No Known Allergies    Intolerances        SOCIAL HISTORY:  Smoke: Never Smoker  EtOH: occasional    FAMILY HISTORY:      Vital Signs Last 24 Hrs  T(C): 38.2 (2018 01:19), Max: 38.2 (2018 01:19)  T(F): 100.8 (2018 01:19), Max: 100.8 (2018 01:19)  HR: 93 (2018 04:15) (93 - 136)  BP: 160/103 (2018 03:45) (108/73 - 178/89)  BP(mean): 119 (2018 03:45) (103 - 124)  RR: 20 (2018 04:15) (18 - 39)  SpO2: 97% (2018 04:15) (93% - 99%)    PHYSICAL EXAM    Gen: NAD   Neuro: sedated intubated  CV: RRR reg s1s2 no M  Pulm: CTAB no w/r/r  Abd: + Tympanitic +Distended abd with grimacing to palpation throughout the abd  Ext: No c/c/e    LABS:                        14.8   22.1  )-----------( 76       ( 2018 04:14 )             43.8         145  |  104  |  59<H>  ----------------------------<  125<H>  3.1<L>   |  25  |  1.31<H>    Ca    7.7<L>      2018 04:14  Phos  3.1       Mg     2.3         TPro  6.1  /  Alb  3.1<L>  /  TBili  3.0<H>  /  DBili  1.4<H>  /  AST  167<H>  /  ALT  904<H>  /  AlkPhos  108      PT/INR - ( 2018 04:14 )   PT: 20.7 sec;   INR: 1.84          PTT - ( 2018 04:14 )  PTT:31.0 sec  Urinalysis Basic - ( 2018 18:49 )    Color: Yellow / Appearance: Clear / S.020 / pH: x  Gluc: x / Ketone: NEGATIVE  / Bili: Small / Urobili: 4.0 E.U./dL   Blood: x / Protein: 30 mg/dL / Nitrite: NEGATIVE   Leuk Esterase: NEGATIVE / RBC: 5-10 /HPF / WBC 5-10 /HPF   Sq Epi: x / Non Sq Epi: 5-10 /HPF / Bacteria: Present /HPF        RADIOLOGY & ADDITIONAL STUDIES:    Assessment and Plan:  [id number] yo M with PMHx severe MR and AR  recently diagnosed with Acute Cholecystitis as well as RLL PNA by CT scan at [facility] however signed out AMA who presented to [facility] ED  c/o lower abdominal pain, nausea, vomiting found to have new onset rapid Afib in the setting of sepsis as well as acute CHF exacerbation and abdominal pain subsided at time of consult. Pt admitted to cardiology and placed on bipap. On HD 1 pt c/o acute abd pain, CT scan showing perforated Duodenum therefore pt taken emergently to OR.     Neuro: propofol fentanyl  CV: HD stable Afib: Lopressor 5 q6, Echo showing ?vegetation and Severe MR. will need repeat echo in future to r/o vegetation( bld cx's negative)   Pulm: Intubated on cmv 40/590/12/5 chlorhex  GI: ?duodenal perforation :taken to OR for ex lap- NPO.  Protonix gtt. Elevated LFT's - hida negative f/u LFT's daily   : Oliveira  ID: Perforated duodenum?: Vanco ( -) Zosyn ( -) f/u Bld cx's  Endo: ISS  PPx: SCD hold SQH for OR  Lines: ASA Ching( -)   Wounds: None  PT/OT: Not ordered

## 2018-01-20 NOTE — CHART NOTE - NSCHARTNOTEFT_GEN_A_CORE
CCU NIGHT PGY-2 EVENT NOTE    (Please reference prior event note by [facility] cardiology PA)    S:   Patient was seen and examined in both 5uris during his acute status change as well as again in the CCU @ around 0200hrs. Complaining of pain in belly, denies N/V; says he does not want to be here.    O:  VS   T 100.8 rectal  /106  P 128  R 24-30  SpO2 94-95% on BiPAP w/ FiO2 40%    PE  Gen: elderly male in acute distress  ENT: bipap facemask in place; dry oropharnyx  Neck: no appreciable JVD  Cardiac: +S1/S2; tachycardic and irregularly irregular  Respiratory: tachypneic and shallow respirations on BiPAP w/ poor overall inspiratory effort; difficult to appreciate bibasilar BS, clear mid to upper lung fields  Gastrointestinal: diffuse tenderness in all 4 quadrants with guarding; unable to appreciate BS  Genitourinary: abarca in place draining clear yellow urine  Dermatologic: skin cool w/ no rash  Vascular: 2+ radial, 1+ DP/PT pulses; irregular in quality  Neurologic: awake and oriented; following commands    A/P:   71yo M recently dx w/ acute cholecystitis at OSH (but left AMA) presented with lower abd pain found in ED to have new onset rapid Afib suspected to be 2/2 sepsis and/or acute decompensated CHF; acute cholecystitis since ruled out on additional imaging; subsequently transferred to CCU for worsening sepsis, tachypnea and critical care management with concern 2/2 acute abdomen/peritonitis  - per discussion with radiology at time of exam, obtain additional imaging with CTA abd/pelvis mesenteric ischemia protocol to rule out ME, as well as possibility of SBO/perforated viscus  - pending results of additional imaging will reconsult genSX for additional recommendations  - continue abx vanc/zosyn  - IVF NS 100cc/hr  - continue BiPAP for respiratory support, consider intubation should high WOB persist/clinical status worsen  - trend lactate, CMP, CBC CCU NIGHT PGY-2 EVENT NOTE    (Please reference prior event note by [facility] cardiology PA)    S:   Patient was seen and examined in both [facility] during his acute status change as well as again in the CCU @ around 0200hrs. Complaining of pain in belly, denies N/V; says he does not want to be here.    O:  VS   T 100.8 rectal  /106  P 128  R 24-30  SpO2 94-95% on BiPAP w/ FiO2 40%    PE  Gen: elderly male in acute distress  ENT: bipap facemask in place; dry oropharnyx  Neck: no appreciable JVD  Cardiac: +S1/S2; tachycardic and irregularly irregular  Respiratory: tachypneic and shallow respirations on BiPAP w/ poor overall inspiratory effort; difficult to appreciate bibasilar BS, clear mid to upper lung fields  Gastrointestinal: diffuse tenderness in all 4 quadrants with guarding; unable to appreciate BS  Genitourinary: abarca in place draining clear yellow urine  Dermatologic: skin cool w/ no rash  Vascular: 2+ radial, 1+ DP/PT pulses; irregular in quality  Neurologic: awake and oriented; following commands    A/P:   73yo M recently dx w/ acute cholecystitis at OSH (but left AMA) presented with lower abd pain found in ED to have new onset rapid Afib suspected to be 2/2 sepsis and/or acute decompensated CHF and originally admitted to [facility] cardiac [facility]; acute cholecystitis since ruled out on additional imaging; subsequently transferred to CCU earlier this AM for worsening sepsis, tachypnea and critical care management with concern 2/2 acute abdomen/peritonitis  - per discussion with radiology at time of exam, obtain additional imaging with CTA abd/pelvis mesenteric ischemia protocol to rule out ME, as well as possibility of SBO/perforated viscus  - pending results of additional imaging will reconsult genSX for additional recommendations  - continue abx vanc/zosyn  - IVF NS 100cc/hr  - continue BiPAP for respiratory support, consider intubation should high WOB persist/clinical status worsen  - trend lactate, CMP, CBC

## 2018-01-20 NOTE — PROGRESS NOTE ADULT - SUBJECTIVE AND OBJECTIVE BOX
Hospital Course:   73 yo M with h/o severe mitral and aortic valve regurgitation presented to ED with 5 day h/o left lower abdominal pain, nausea and multiple episodes of vomiting as well as PO intolerance. Pt reports that the pain in his abdomen is illicited by movement. Went to [facility] two days ago and underwent CT scan which revealed acute cholecystitis as well as right lower lobe infectious pneumonia. Pt was told he would require surgery and left AMA because he was waiting too long. Denies f/c. Normal BMs and passing flatus.  Pt initially was to be admitted to Gen Surg service but after review of CT scan at [facility] findings less c/w acute cholecystitis and more CHF related. Pt was thus admitted to [facility] on 1/19/18 for new onset rapid AF in the setting of sepsis and CHF exacerbation. WBC on admission noted to be 17.9 w/ left shift downtrended to 16.8 today. CXR c/w PNA and ID saw pt and pt was started on Vanc and Zosyn. Blood cx and UCx remained negative to date. Echo was performed on 1/19/18 revealing severe MR with flailed leaflets unable to definitively r/o endocarditis and pt recommended for AMRIK to better assess which was tentatively planned for Monday 1/22/18 unless emergently needed and Dr. Chow willing to perform in ICU setting on Saturday 1/20/18. Pt also underwent a HIDA scan to better assess for cholecystitis and that was negative and gen surg signed off.    At approx. 0030 called to patient’s bedside per nursing request for c/o tachypnea (RR>30) on 4LNC and pt stating his abdomen is “soft” and he can’t lie on it. Upon arrival O2 sat unable to be obtained respiratory called STAT and high flow placed temporarily until BIPAP placed. O2 sat on high flow noted to be 70% improved to mid 90s on BIPAP, but pt still tachypneic. BP 130s/90s, HR 120s. CCU fellow called to assess pt as pt known to him from admission. Abd exam noted to be diffusely tender and distended. Lasix 40mg IVP x 2 given, rectal temp 100.8F. Gen Surgery team called to assess pt and STAT abd Xray and CXR reviewed by  CT done--to OR  	  MEDICATIONS:  metoprolol succinate ER 12.5 milliGRAM(s) Oral daily    piperacillin/tazobactam IVPB. 3.375 Gram(s) IV Intermittent every 6 hours  vancomycin  IVPB 1000 milliGRAM(s) IV Intermittent every 24 hours      ondansetron Injectable 4 milliGRAM(s) IV Push every 6 hours PRN        influenza   Vaccine 0.5 milliLiter(s) IntraMuscular once  sodium chloride 0.9%. 1000 milliLiter(s) IV Continuous <Continuous>        Appearance: Tachypneic	  HEENT:   Normal oral mucosa, PERRL, EOMI	  Neck: Supple  Cardiovascular: Irregularly irregular, +S1, +S2  Respiratory: decreased breath sounds with poor inspiratory effort	  Gastrointestinal:  Distended, diffusely tender  Skin: No rashes, No ecchymoses, No cyanosis  Extremities: +edema b/l LE  Vascular: Peripheral pulses palpable 2+ bilaterally  Neurologic: Non-focal  Psychiatry: A & O x 3, Mood & affect appropriate      	    ECG:  	  RADIOLOGY:   DIAGNOSTIC TESTING:  [ ] Echocardiogram:  [ ]  Catheterization:  [ ] Stress Test:    [ ] AMRIK  OTHER: 	    LABS:	 	  CARDIAC MARKERS:                                  12.8   16.8  )-----------( 71       ( 19 Jan 2018 05:31 )             38.1     01-19    141  |  104  |  74<H>  ----------------------------<  158<H>  4.0   |  20<L>  |  1.39<H>    Ca    8.0<L>      19 Jan 2018 05:28  Phos  3.9     01-19  Mg     2.7     01-19    TPro  6.0  /  Alb  3.2<L>  /  TBili  3.1<H>  /  DBili  1.6<H>  /  AST  325<H>  /  ALT  1147<H>  /  AlkPhos  106  01-19    proBNP:   Lipid Profile:   HgA1c:   TSH:   PT/INR - ( 20 Jan 2018 01:21 )   PT: 20.0 sec;   INR: 1.78          PTT - ( 19 Jan 2018 02:36 )  PTT:35.3 sec    < from: CT Angio Abdomen and Pelvis w/ IV Cont (01.20.18 @ 03:08) >    1. Large volume free nondependent pneumoperitoneum indicating perforated   viscus (in absence of  history of recent surgery). No focal inflammatory change, bowel wall   thickening, or collection of  fluid/gas to indicate potential site of perforation.  2. Acute cholecystitis. Cholelithiasis.  3. Multiple small cystic lesions of the body and tail of the pancreas,   measuring up to 1.6 cm in size,  indeterminate.  4. Small volume ascites in all 4 quadrants and the pelvis.    Thank you for allowing us to participate in the care of your patient.  Dictated and Authenticated by: Santi Haywood MD  01/20/2018 4:49 AM Eastern Time ([place] & [place])    The above report was submitted by the [facility] attending radiologist and   copied to PowerScribe by resident Dr. Wilson.    RESIDENT ADDENDUM:  1.  Large amount of pneumoperitoneum, centered predominantly within the   upper abdomen. This is likely secondary to perforated viscus involving   the stomach or duodenum.  2.  Marked fat stranding surrounding the duodenum, compatible with   duodenitis.  3.  Cholelithiasis with significant pericholecystic stranding/fluid.   Given a recent negative HIDA scan, this examination and the reactive   secondary to inflammatory process involving the duodenum.  4.  The mesenteric vessels are patent without evidence of embolus.  5.  No bowel pneumatosis is identified.    The  findings above were discussed wi    < end of copied text >

## 2018-01-20 NOTE — PROGRESS NOTE ADULT - ASSESSMENT
72M with multiple medical comorbidities s/p ex-lap, omental kaylee patch for perforated duodenal ulcer, and placement of feeding jejunostomy tube POD 0    Neuro: propofol gtt, fentanyl gtt  CV: HD stable, HR maintained in 90's and low 100's Afib: Lopressor 2.5 q6, Echo showing ?vegetation and Severe MR. will need repeat echo in future to r/o vegetation (bld cx's negative)   Pulm: Intubated on cmv 40/500/12/5 chlorhex  GI: NPO with NGT to LIWS. NGT to remain in place indefinitely; DO NOT REMOVE. Feeding J-tube in place. Protonix gtt. Elevated LFT's - hida negative f/u LFT's daily   : Oliveira, strict I/O's, monitor UO; albumin bolus going in now for low urine output for the past few hours.  ID: Perforated duodenum: Vanco (1/19-) Zosyn (1/19-) f/u Bld cx's  Endo: ISS  PPx: SCD, SQH  Lines: PIV, L brachial McDonald (1/20-), LIJ TLC  Wounds: Midline laparotomy, GUCCI x 2 in right abdomen, Feeding jejunostomy tube

## 2018-01-20 NOTE — PROGRESS NOTE ADULT - SUBJECTIVE AND OBJECTIVE BOX
Pt seen and examined at bedside  Moved to SICU yesterday after he developed spontaneous duodenal perforation sp surgery  Patient is intubated and sedated  No reports of bleeding, emesis, fever, chills    MEDICATIONS:  MEDICATIONS  (STANDING):  chlorhexidine 0.12% Liquid 15 milliLiter(s) Swish and Spit two times a day  dextrose 5%. 1000 milliLiter(s) (50 mL/Hr) IV Continuous <Continuous>  fentaNYL   Infusion 0.112 MICROgram(s)/kG/Hr (1 mL/Hr) IV Continuous <Continuous>  heparin  Injectable 5000 Unit(s) SubCutaneous every 8 hours  influenza   Vaccine 0.5 milliLiter(s) IntraMuscular once  insulin lispro (HumaLOG) corrective regimen sliding scale   SubCutaneous Before meals and at bedtime  lactated ringers. 1000 milliLiter(s) (100 mL/Hr) IV Continuous <Continuous>  metoprolol    tartrate Injectable 2.5 milliGRAM(s) IV Push every 6 hours  norepinephrine Infusion 0.01 MICROgram(s)/kG/Min (1.676 mL/Hr) IV Continuous <Continuous>  pantoprazole Infusion 8 mG/Hr (10 mL/Hr) IV Continuous <Continuous>  piperacillin/tazobactam IVPB. 3.375 Gram(s) IV Intermittent every 6 hours  propofol Infusion 1.864 MICROgram(s)/kG/Min (1 mL/Hr) IV Continuous <Continuous>  sodium chloride 0.9% Bolus 500 milliLiter(s) IV Bolus once  vancomycin  IVPB 1250 milliGRAM(s) IV Intermittent every 12 hours    MEDICATIONS  (PRN):    Allergies  No Known Allergies    Intolerances    Vital Signs Last 24 Hrs  T(C): 36.1 (2018 14:29), Max: 38.2 (2018 01:19)  T(F): 97 (2018 14:29), Max: 100.8 (2018 01:19)  HR: 92 (2018 14:00) (92 - 136)  BP: 84/67 (2018 14:00) (84/67 - 178/89)  BP(mean): 75 (2018 14:00) (63 - 124)  RR: 20 (2018 14:00) (14 - 39)  SpO2: 98% (2018 14:00) (94% - 100%)     @ 07:01  -   @ 07:00  --------------------------------------------------------  IN: 2460 mL / OUT: 3600 mL / NET: -1140 mL     @ 07:01  -   @ 16:28  --------------------------------------------------------  IN: 954 mL / OUT: 490 mL / NET: 464 mL      PHYSICAL EXAM:  General: intubated, sedated, not in any distress  HEENT: MMM, conjunctiva and sclera clear  Gastrointestinal: distended, soft, GUCCI drain R abdomen, J tube +, NT, NR, NG, no masses or organs palpated   Skin: Warm and dry. No obvious rash    LABS:  ABG - ( 2018 14:20 )  pH: 7.43  /  pCO2: 30    /  pO2: 71    / HCO3: 20    / Base Excess: -3.5  /  SaO2: 94        CBC Full  -  ( 2018 08:43 )  WBC Count : 24.9 K/uL  Hemoglobin : 13.5 g/dL  Hematocrit : 41.2 %  Platelet Count - Automated : 71 K/uL  Mean Cell Volume : 93.0 fL  Mean Cell Hemoglobin : 30.5 pg  Mean Cell Hemoglobin Concentration : 32.8 g/dL    141  |  108  |  54<H>  ----------------------------<  147<H>  4.0   |  20<L>  |  1.42<H>    Ca    8.4      2018 08:43  Phos  4.9       Mg     2.0         TPro  4.6<L>  /  Alb  2.1<L>  /  TBili  2.5<H>  /  DBili  x   /  AST  137<H>  /  ALT  601<H>  /  AlkPhos  82      PT/INR - ( 2018 08:43 )   PT: 21.6 sec;   INR: 1.92       PTT - ( 2018 08:43 )  PTT:39.6 sec    Urinalysis Basic - ( 2018 18:49 )  Color: Yellow / Appearance: Clear / S.020 / pH: x  Gluc: x / Ketone: NEGATIVE  / Bili: Small / Urobili: 4.0 E.U./dL   Blood: x / Protein: 30 mg/dL / Nitrite: NEGATIVE   Leuk Esterase: NEGATIVE / RBC: 5-10 /HPF / WBC 5-10 /HPF   Sq Epi: x / Non Sq Epi: 5-10 /HPF / Bacteria: Present /HPF        RADIOLOGY & ADDITIONAL STUDIES (The following images were personally reviewed):  NM Hepatobiliary Scan w/wo Gall Bladder (18 @ 13:22)  Impression:  No evidence of acute cholecystitis or cystic duct obstruction.      CT Angio Abdomen and Pelvis w/ IV Cont (18 @ 03:08)   IMPRESSION:  New pneumoperitoneum and small ascites. Findings are consistent with hollow viscus perforation. Given the duodenal wall thickening, peptic ulcer disease must be considered.  Patent abdominal aorta and mesenteric arteries.  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  Cholelithiasis. Gallbladder is relatively contracted. Pericholecystic fluid is likely a portion of ascites.  Cardiomegaly, small pleural effusions and atelectatic changes in the lung bases.  Nonspecific abdominal nodules.  Atrophic pancreas with probable cystic lesions as above. Selective follow-up with MRI must be considered.

## 2018-01-20 NOTE — PROGRESS NOTE ADULT - SUBJECTIVE AND OBJECTIVE BOX
INTERVAL HPI/OVERNIGHT EVENTS:  Interim reviewed and discussed with surgical house staff: Post op repair of duodenal perforation:  Patient also with Atrial Fib and pneumonia:  ECHO noted      MEDICATIONS  (STANDING):  chlorhexidine 0.12% Liquid 15 milliLiter(s) Swish and Spit two times a day  dextrose 5%. 1000 milliLiter(s) (50 mL/Hr) IV Continuous <Continuous>  fentaNYL   Infusion 0.112 MICROgram(s)/kG/Hr (1 mL/Hr) IV Continuous <Continuous>  heparin  Injectable 5000 Unit(s) SubCutaneous every 8 hours  influenza   Vaccine 0.5 milliLiter(s) IntraMuscular once  insulin lispro (HumaLOG) corrective regimen sliding scale   SubCutaneous Before meals and at bedtime  lactated ringers. 1000 milliLiter(s) (100 mL/Hr) IV Continuous <Continuous>  metoprolol    tartrate Injectable 5 milliGRAM(s) IV Push every 6 hours  norepinephrine Infusion 0.01 MICROgram(s)/kG/Min (1.676 mL/Hr) IV Continuous <Continuous>  pantoprazole Infusion 8 mG/Hr (10 mL/Hr) IV Continuous <Continuous>  piperacillin/tazobactam IVPB. 3.375 Gram(s) IV Intermittent every 6 hours  propofol Infusion 1.864 MICROgram(s)/kG/Min (1 mL/Hr) IV Continuous <Continuous>  sodium chloride 0.9% Bolus 500 milliLiter(s) IV Bolus once  vancomycin  IVPB 1250 milliGRAM(s) IV Intermittent every 12 hours    MEDICATIONS  (PRN):      Allergies    No Known Allergies    Intolerances        Vital Signs Last 24 Hrs  T(C): 36.1 (2018 14:29), Max: 38.2 (2018 01:19)  T(F): 97 (2018 14:29), Max: 100.8 (2018 01:19)  HR: 92 (2018 14:00) (92 - 136)  BP: 84/67 (2018 14:00) (84/67 - 178/89)  BP(mean): 75 (2018 14:00) (63 - 124)  RR: 20 (2018 14:00) (14 - 39)  SpO2: 98% (2018 14:00) (94% - 100%)          Constitutional:    Eyes: JADEN    ENMT: Negative    Neck: Supple    Back:  no tenderness     Respiratory:  decreased breath sounds:    Cardiovascular: S1 S2    Gastrointestinal: Soft sedated    Genitourinary:    Extremities: no edema    Vascular:    Neurological: sedated    Skin:    Lymph Nodes:             @ 07: @ 07:00  --------------------------------------------------------  IN: 2460 mL / OUT: 3600 mL / NET: -1140 mL     @ 07: @ 15:02  --------------------------------------------------------  IN: 954 mL / OUT: 180 mL / NET: 774 mL      LABS:                        13.5   24.9  )-----------( 71       ( 2018 08:43 )             41.2         141  |  108  |  54<H>  ----------------------------<  147<H>  4.0   |  20<L>  |  1.42<H>    Ca    8.4      2018 08:43  Phos  4.9       Mg     2.0         TPro  4.6<L>  /  Alb  2.1<L>  /  TBili  2.5<H>  /  DBili  x   /  AST  137<H>  /  ALT  601<H>  /  AlkPhos  82  20    PT/INR - ( 2018 08:43 )   PT: 21.6 sec;   INR: 1.92          PTT - ( 2018 08:43 )  PTT:39.6 sec  Urinalysis Basic - ( 2018 18:49 )    Color: Yellow / Appearance: Clear / S.020 / pH: x  Gluc: x / Ketone: NEGATIVE  / Bili: Small / Urobili: 4.0 E.U./dL   Blood: x / Protein: 30 mg/dL / Nitrite: NEGATIVE   Leuk Esterase: NEGATIVE / RBC: 5-10 /HPF / WBC 5-10 /HPF   Sq Epi: x / Non Sq Epi: 5-10 /HPF / Bacteria: Present /HPF      Mode: AC/ CMV (Assist Control/ Continuous Mandatory Ventilation), RR (machine): 12, TV (machine): 500, FiO2: 60, PEEP: 5, ITime: 1, MAP: 9.2, PIP: 19  RADIOLOGY & ADDITIONAL TESTS:

## 2018-01-20 NOTE — PROGRESS NOTE ADULT - SUBJECTIVE AND OBJECTIVE BOX
Surgery called to bedside by cardiology fellow with concern for acute abdominal distention and to rule out acute abdominal pathology.    - According to the team at bedside, pt had no complaints of abdominal pain earlier today, and then this evening was complaining that he was unable to lie on his abdomen due to discomfort.  Unclear if abdominal distention came prior to acute respiratory distress or abdominal distention worsened after re-initiating positive pressure ventilation.    - Pt currently reporting diffuse pain and SOB but no nausea.    - On exam, pt is on bipap and in respiratory distress with increased work of breathing taking long breaths with his mouth.  Abdomen is firmly distended with tympany. Pt is tender to palpation overlying all 4 quadrants of the abdomen, but also similarly tender overlying his ribs.  Difficult to assess for any peritonitic signs as pt appears to have nonfocal tenderness beyond the abdomen.    - U/S, CT, and HIDA not convincing for acute gall bladder pathology.  - Consistently elevated lactate since admission, elevated bilirubin and transaminitis in the setting of heart failure less consistent with acute biliary pathology and more concerning for cardiac etiology.  - With diffuse abdominal pain, unreliable exam, persistently elevated lactate, with cardiac risk of embolic event to bowel would follow up CXR/AXR with CTA of A/P to rule out an acute ischemic event.    - Discussed with Chief on Call and Dr. Metcalf.

## 2018-01-20 NOTE — DIETITIAN INITIAL EVALUATION ADULT. - OTHER INFO
71y/o M "found to have duodenal perforation while on Bipap and taken to OR. Pt intubated preop and a line placed. Underwent ex lap, kaylee patch, feeding jej, transferred to SICU intubated." Pt seen intubated and sedated. Propofol at 7ml/hr provides 185kcal from lipids. Once medically appropriate and jejunostomy cleared for use, consider EN initiation; see recs below.

## 2018-01-21 DIAGNOSIS — I48.91 UNSPECIFIED ATRIAL FIBRILLATION: ICD-10-CM

## 2018-01-21 LAB
ALBUMIN SERPL ELPH-MCNC: 2.1 G/DL — LOW (ref 3.3–5)
ALBUMIN SERPL ELPH-MCNC: 2.3 G/DL — LOW (ref 3.3–5)
ALBUMIN SERPL ELPH-MCNC: 2.4 G/DL — LOW (ref 3.3–5)
ALP SERPL-CCNC: 56 U/L — SIGNIFICANT CHANGE UP (ref 40–120)
ALP SERPL-CCNC: 61 U/L — SIGNIFICANT CHANGE UP (ref 40–120)
ALP SERPL-CCNC: 66 U/L — SIGNIFICANT CHANGE UP (ref 40–120)
ALT FLD-CCNC: 400 U/L — HIGH (ref 10–45)
ALT FLD-CCNC: 422 U/L — HIGH (ref 10–45)
ALT FLD-CCNC: 516 U/L — HIGH (ref 10–45)
ANION GAP SERPL CALC-SCNC: 12 MMOL/L — SIGNIFICANT CHANGE UP (ref 5–17)
ANION GAP SERPL CALC-SCNC: 16 MMOL/L — SIGNIFICANT CHANGE UP (ref 5–17)
ANION GAP SERPL CALC-SCNC: 16 MMOL/L — SIGNIFICANT CHANGE UP (ref 5–17)
APTT BLD: 40.6 SEC — HIGH (ref 27.5–37.4)
AST SERPL-CCNC: 121 U/L — HIGH (ref 10–40)
AST SERPL-CCNC: 76 U/L — HIGH (ref 10–40)
AST SERPL-CCNC: 82 U/L — HIGH (ref 10–40)
BASE EXCESS BLDA CALC-SCNC: -5 MMOL/L — LOW (ref -2–3)
BASE EXCESS BLDV CALC-SCNC: -3.8 MMOL/L — SIGNIFICANT CHANGE UP
BILIRUB SERPL-MCNC: 2.2 MG/DL — HIGH (ref 0.2–1.2)
BILIRUB SERPL-MCNC: 2.3 MG/DL — HIGH (ref 0.2–1.2)
BILIRUB SERPL-MCNC: 2.5 MG/DL — HIGH (ref 0.2–1.2)
BUN SERPL-MCNC: 65 MG/DL — HIGH (ref 7–23)
BUN SERPL-MCNC: 66 MG/DL — HIGH (ref 7–23)
BUN SERPL-MCNC: 71 MG/DL — HIGH (ref 7–23)
CALCIUM SERPL-MCNC: 7.3 MG/DL — LOW (ref 8.4–10.5)
CALCIUM SERPL-MCNC: 7.6 MG/DL — LOW (ref 8.4–10.5)
CALCIUM SERPL-MCNC: 7.6 MG/DL — LOW (ref 8.4–10.5)
CHLORIDE SERPL-SCNC: 109 MMOL/L — HIGH (ref 96–108)
CHLORIDE SERPL-SCNC: 110 MMOL/L — HIGH (ref 96–108)
CHLORIDE SERPL-SCNC: 111 MMOL/L — HIGH (ref 96–108)
CO2 SERPL-SCNC: 19 MMOL/L — LOW (ref 22–31)
CO2 SERPL-SCNC: 19 MMOL/L — LOW (ref 22–31)
CO2 SERPL-SCNC: 22 MMOL/L — SIGNIFICANT CHANGE UP (ref 22–31)
CREAT SERPL-MCNC: 2.43 MG/DL — HIGH (ref 0.5–1.3)
CREAT SERPL-MCNC: 2.49 MG/DL — HIGH (ref 0.5–1.3)
CREAT SERPL-MCNC: 2.5 MG/DL — HIGH (ref 0.5–1.3)
FIBRINOGEN PPP-MCNC: 247 MG/DL — LOW (ref 258–438)
GAS PNL BLDA: SIGNIFICANT CHANGE UP
GAS PNL BLDA: SIGNIFICANT CHANGE UP
GAS PNL BLDV: SIGNIFICANT CHANGE UP
GLUCOSE BLDC GLUCOMTR-MCNC: 101 MG/DL — HIGH (ref 70–99)
GLUCOSE BLDC GLUCOMTR-MCNC: 106 MG/DL — HIGH (ref 70–99)
GLUCOSE BLDC GLUCOMTR-MCNC: 110 MG/DL — HIGH (ref 70–99)
GLUCOSE BLDC GLUCOMTR-MCNC: 114 MG/DL — HIGH (ref 70–99)
GLUCOSE SERPL-MCNC: 135 MG/DL — HIGH (ref 70–99)
GLUCOSE SERPL-MCNC: 140 MG/DL — HIGH (ref 70–99)
GLUCOSE SERPL-MCNC: 142 MG/DL — HIGH (ref 70–99)
HAV IGG+IGM SER QL: SIGNIFICANT CHANGE UP
HCO3 BLDA-SCNC: 20 MMOL/L — LOW (ref 21–28)
HCO3 BLDV-SCNC: 23 MMOL/L — SIGNIFICANT CHANGE UP (ref 20–27)
HCT VFR BLD CALC: 37 % — LOW (ref 39–50)
HCT VFR BLD CALC: 38.8 % — LOW (ref 39–50)
HCT VFR BLD CALC: 39.3 % — SIGNIFICANT CHANGE UP (ref 39–50)
HGB BLD-MCNC: 12.2 G/DL — LOW (ref 13–17)
HGB BLD-MCNC: 12.7 G/DL — LOW (ref 13–17)
HGB BLD-MCNC: 12.8 G/DL — LOW (ref 13–17)
INR BLD: 1.65 — HIGH (ref 0.88–1.16)
LACTATE SERPL-SCNC: 1.6 MMOL/L — SIGNIFICANT CHANGE UP (ref 0.5–2)
LACTATE SERPL-SCNC: 1.9 MMOL/L — SIGNIFICANT CHANGE UP (ref 0.5–2)
LYMPHOCYTES # BLD AUTO: 3.8 % — LOW (ref 13–44)
MAGNESIUM SERPL-MCNC: 2 MG/DL — SIGNIFICANT CHANGE UP (ref 1.6–2.6)
MAGNESIUM SERPL-MCNC: 2.1 MG/DL — SIGNIFICANT CHANGE UP (ref 1.6–2.6)
MCHC RBC-ENTMCNC: 30.4 PG — SIGNIFICANT CHANGE UP (ref 27–34)
MCHC RBC-ENTMCNC: 30.8 PG — SIGNIFICANT CHANGE UP (ref 27–34)
MCHC RBC-ENTMCNC: 31 PG — SIGNIFICANT CHANGE UP (ref 27–34)
MCHC RBC-ENTMCNC: 32.3 G/DL — SIGNIFICANT CHANGE UP (ref 32–36)
MCHC RBC-ENTMCNC: 33 G/DL — SIGNIFICANT CHANGE UP (ref 32–36)
MCHC RBC-ENTMCNC: 33 G/DL — SIGNIFICANT CHANGE UP (ref 32–36)
MCV RBC AUTO: 93.5 FL — SIGNIFICANT CHANGE UP (ref 80–100)
MCV RBC AUTO: 93.9 FL — SIGNIFICANT CHANGE UP (ref 80–100)
MCV RBC AUTO: 94 FL — SIGNIFICANT CHANGE UP (ref 80–100)
MONOCYTES NFR BLD AUTO: 4.4 % — SIGNIFICANT CHANGE UP (ref 2–14)
NEUTROPHILS NFR BLD AUTO: 91.8 % — HIGH (ref 43–77)
PCO2 BLDA: 36 MMHG — SIGNIFICANT CHANGE UP (ref 35–48)
PCO2 BLDV: 46 MMHG — SIGNIFICANT CHANGE UP (ref 41–51)
PH BLDA: 7.35 — SIGNIFICANT CHANGE UP (ref 7.35–7.45)
PH BLDV: 7.31 — LOW (ref 7.32–7.43)
PHOSPHATE SERPL-MCNC: 5.2 MG/DL — HIGH (ref 2.5–4.5)
PHOSPHATE SERPL-MCNC: 5.2 MG/DL — HIGH (ref 2.5–4.5)
PLATELET # BLD AUTO: 43 K/UL — LOW (ref 150–400)
PLATELET # BLD AUTO: 48 K/UL — LOW (ref 150–400)
PLATELET # BLD AUTO: 52 K/UL — LOW (ref 150–400)
PO2 BLDA: 113 MMHG — HIGH (ref 83–108)
PO2 BLDV: 41 MMHG — SIGNIFICANT CHANGE UP
POTASSIUM SERPL-MCNC: 4.2 MMOL/L — SIGNIFICANT CHANGE UP (ref 3.5–5.3)
POTASSIUM SERPL-MCNC: 4.3 MMOL/L — SIGNIFICANT CHANGE UP (ref 3.5–5.3)
POTASSIUM SERPL-MCNC: 4.4 MMOL/L — SIGNIFICANT CHANGE UP (ref 3.5–5.3)
POTASSIUM SERPL-SCNC: 4.2 MMOL/L — SIGNIFICANT CHANGE UP (ref 3.5–5.3)
POTASSIUM SERPL-SCNC: 4.3 MMOL/L — SIGNIFICANT CHANGE UP (ref 3.5–5.3)
POTASSIUM SERPL-SCNC: 4.4 MMOL/L — SIGNIFICANT CHANGE UP (ref 3.5–5.3)
PROT SERPL-MCNC: 4.4 G/DL — LOW (ref 6–8.3)
PROT SERPL-MCNC: 4.8 G/DL — LOW (ref 6–8.3)
PROT SERPL-MCNC: 4.8 G/DL — LOW (ref 6–8.3)
PROTHROM AB SERPL-ACNC: 18.5 SEC — HIGH (ref 9.8–12.7)
RBC # BLD: 3.94 M/UL — LOW (ref 4.2–5.8)
RBC # BLD: 4.15 M/UL — LOW (ref 4.2–5.8)
RBC # BLD: 4.18 M/UL — LOW (ref 4.2–5.8)
RBC # FLD: 16.9 % — SIGNIFICANT CHANGE UP (ref 10.3–16.9)
RBC # FLD: 17.1 % — HIGH (ref 10.3–16.9)
RBC # FLD: 17.2 % — HIGH (ref 10.3–16.9)
SAO2 % BLDA: 98 % — SIGNIFICANT CHANGE UP (ref 95–100)
SAO2 % BLDV: 66 % — SIGNIFICANT CHANGE UP
SODIUM SERPL-SCNC: 144 MMOL/L — SIGNIFICANT CHANGE UP (ref 135–145)
SODIUM SERPL-SCNC: 145 MMOL/L — SIGNIFICANT CHANGE UP (ref 135–145)
SODIUM SERPL-SCNC: 145 MMOL/L — SIGNIFICANT CHANGE UP (ref 135–145)
VANCOMYCIN TROUGH SERPL-MCNC: 26 UG/ML — CRITICAL HIGH (ref 10–20)
WBC # BLD: 18.6 K/UL — HIGH (ref 3.8–10.5)
WBC # BLD: 19.6 K/UL — HIGH (ref 3.8–10.5)
WBC # BLD: 23.4 K/UL — HIGH (ref 3.8–10.5)
WBC # FLD AUTO: 18.6 K/UL — HIGH (ref 3.8–10.5)
WBC # FLD AUTO: 19.6 K/UL — HIGH (ref 3.8–10.5)
WBC # FLD AUTO: 23.4 K/UL — HIGH (ref 3.8–10.5)

## 2018-01-21 PROCEDURE — 71045 X-RAY EXAM CHEST 1 VIEW: CPT | Mod: 26

## 2018-01-21 PROCEDURE — 99233 SBSQ HOSP IP/OBS HIGH 50: CPT | Mod: GC

## 2018-01-21 RX ORDER — SODIUM CHLORIDE 9 MG/ML
500 INJECTION, SOLUTION INTRAVENOUS ONCE
Qty: 0 | Refills: 0 | Status: COMPLETED | OUTPATIENT
Start: 2018-01-21 | End: 2018-01-21

## 2018-01-21 RX ORDER — ACETAMINOPHEN 500 MG
1000 TABLET ORAL ONCE
Qty: 0 | Refills: 0 | Status: COMPLETED | OUTPATIENT
Start: 2018-01-21 | End: 2018-01-21

## 2018-01-21 RX ORDER — SODIUM CHLORIDE 9 MG/ML
500 INJECTION INTRAMUSCULAR; INTRAVENOUS; SUBCUTANEOUS ONCE
Qty: 0 | Refills: 0 | Status: COMPLETED | OUTPATIENT
Start: 2018-01-21 | End: 2018-01-21

## 2018-01-21 RX ORDER — PIPERACILLIN AND TAZOBACTAM 4; .5 G/20ML; G/20ML
2.25 INJECTION, POWDER, LYOPHILIZED, FOR SOLUTION INTRAVENOUS EVERY 6 HOURS
Qty: 0 | Refills: 0 | Status: DISCONTINUED | OUTPATIENT
Start: 2018-01-21 | End: 2018-01-22

## 2018-01-21 RX ADMIN — CHLORHEXIDINE GLUCONATE 15 MILLILITER(S): 213 SOLUTION TOPICAL at 05:40

## 2018-01-21 RX ADMIN — PANTOPRAZOLE SODIUM 10 MG/HR: 20 TABLET, DELAYED RELEASE ORAL at 06:41

## 2018-01-21 RX ADMIN — CHLORHEXIDINE GLUCONATE 15 MILLILITER(S): 213 SOLUTION TOPICAL at 17:49

## 2018-01-21 RX ADMIN — PIPERACILLIN AND TAZOBACTAM 200 GRAM(S): 4; .5 INJECTION, POWDER, LYOPHILIZED, FOR SOLUTION INTRAVENOUS at 00:54

## 2018-01-21 RX ADMIN — PIPERACILLIN AND TAZOBACTAM 200 GRAM(S): 4; .5 INJECTION, POWDER, LYOPHILIZED, FOR SOLUTION INTRAVENOUS at 12:57

## 2018-01-21 RX ADMIN — SODIUM CHLORIDE 3000 MILLILITER(S): 9 INJECTION INTRAMUSCULAR; INTRAVENOUS; SUBCUTANEOUS at 14:27

## 2018-01-21 RX ADMIN — PIPERACILLIN AND TAZOBACTAM 200 GRAM(S): 4; .5 INJECTION, POWDER, LYOPHILIZED, FOR SOLUTION INTRAVENOUS at 06:40

## 2018-01-21 RX ADMIN — HEPARIN SODIUM 5000 UNIT(S): 5000 INJECTION INTRAVENOUS; SUBCUTANEOUS at 14:56

## 2018-01-21 RX ADMIN — SODIUM CHLORIDE 500 MILLILITER(S): 9 INJECTION, SOLUTION INTRAVENOUS at 01:00

## 2018-01-21 RX ADMIN — Medication 400 MILLIGRAM(S): at 19:16

## 2018-01-21 RX ADMIN — Medication 2.5 MILLIGRAM(S): at 17:45

## 2018-01-21 RX ADMIN — PIPERACILLIN AND TAZOBACTAM 200 GRAM(S): 4; .5 INJECTION, POWDER, LYOPHILIZED, FOR SOLUTION INTRAVENOUS at 20:00

## 2018-01-21 RX ADMIN — HEPARIN SODIUM 5000 UNIT(S): 5000 INJECTION INTRAVENOUS; SUBCUTANEOUS at 05:40

## 2018-01-21 RX ADMIN — Medication 2.5 MILLIGRAM(S): at 12:54

## 2018-01-21 RX ADMIN — PROPOFOL 1 MICROGRAM(S)/KG/MIN: 10 INJECTION, EMULSION INTRAVENOUS at 20:39

## 2018-01-21 RX ADMIN — Medication 2.5 MILLIGRAM(S): at 05:40

## 2018-01-21 NOTE — PROGRESS NOTE ADULT - ATTENDING COMMENTS
Patient seen and examined with house-staff during bedside rounds.  Resident note read, including vitals, physical findings, laboratory data, and radiological reports.   Revisions included below.  Direct personal management at bed side and extensive interpretation of the data.  Plan was outlined and discussed in details with the housestaff.  Decision making of high complexity  NO wean.  Peak flow 19.  Wean off levo. Doppler of the right lower extremity.  Fluid bolus and follow lung compliance.  No need for vanco.  Discussed with partner

## 2018-01-21 NOTE — PROGRESS NOTE ADULT - SUBJECTIVE AND OBJECTIVE BOX
Critical Care    INTERVAL HPI/OVERNIGHT EVENTS:    Patient s/p perforated ulcer    ANTIBIOTICS    MEDICATIONS  (STANDING):  acetaminophen  IVPB 1000 milliGRAM(s) IV Intermittent once  chlorhexidine 0.12% Liquid 15 milliLiter(s) Swish and Spit two times a day  dextrose 5%. 1000 milliLiter(s) (50 mL/Hr) IV Continuous <Continuous>  fentaNYL   Infusion 0.112 MICROgram(s)/kG/Hr (1 mL/Hr) IV Continuous <Continuous>  heparin  Injectable 5000 Unit(s) SubCutaneous every 8 hours  influenza   Vaccine 0.5 milliLiter(s) IntraMuscular once  insulin lispro (HumaLOG) corrective regimen sliding scale   SubCutaneous Before meals and at bedtime  metoprolol    tartrate Injectable 2.5 milliGRAM(s) IV Push every 6 hours  norepinephrine Infusion 0.01 MICROgram(s)/kG/Min (1.676 mL/Hr) IV Continuous <Continuous>  pantoprazole Infusion 8 mG/Hr (10 mL/Hr) IV Continuous <Continuous>  piperacillin/tazobactam IVPB. 2.25 Gram(s) IV Intermittent every 6 hours  propofol Infusion 1.864 MICROgram(s)/kG/Min (1 mL/Hr) IV Continuous <Continuous>    MEDICATIONS  (PRN):      Allergies    No Known Allergies    Intolerances        REVIEW OF SYSTEMS:    patient intubated  unable to provide a review of systems    Vital Signs Last 24 Hrs  T(C): 38 (21 Jan 2018 09:14), Max: 39.3 (21 Jan 2018 02:00)  T(F): 100.4 (21 Jan 2018 09:14), Max: 102.7 (21 Jan 2018 02:00)  HR: 92 (21 Jan 2018 08:15) (88 - 118)  BP: 106/71 (21 Jan 2018 08:00) (84/67 - 115/72)  BP(mean): 82 (21 Jan 2018 08:00) (72 - 90)  RR: 16 (21 Jan 2018 08:00) (15 - 22)  SpO2: 96% (21 Jan 2018 08:15) (92% - 100%)    PHYSICAL EXAM:    intubated  Eyes: PERRL, EOM intact; conjunctiva and sclera clear  Head: Normocephalic; atraumatic  ENMT: No nasal discharge; airway clear  Neck: Supple; non tender; no masses  Respiratory: No wheezes, rales or rhonchi  Cardiovascular: Regular rate and rhythm. S1 and S2 Normal; No murmurs, gallops or rubs  Gastrointestinal: Soft non-tender non-distended; Normal bowel sounds; No hepatosplenomegaly  LABS:                        12.7   18.6  )-----------( 52       ( 21 Jan 2018 04:01 )             39.3     01-21    144  |  109<H>  |  65<H>  ----------------------------<  135<H>  4.4   |  19<L>  |  2.43<H>    Ca    7.6<L>      21 Jan 2018 04:01  Phos  5.2     01-21  Mg     2.1     01-21    TPro  4.8<L>  /  Alb  2.4<L>  /  TBili  2.5<H>  /  DBili  x   /  AST  121<H>  /  ALT  516<H>  /  AlkPhos  66  01-21    PT/INR - ( 20 Jan 2018 08:43 )   PT: 21.6 sec;   INR: 1.92          PTT - ( 20 Jan 2018 08:43 )  PTT:39.6 sec        MICROBIOLOGY:  Culture Results:   No growth (01-19 @ 08:57)  Culture Results:   No growth at 2 days. (01-18 @ 17:47)  Culture Results:   No growth at 2 days. (01-18 @ 15:37)  Culture Results:   No growth at 2 days. (01-18 @ 15:37)      RADIOLOGY & ADDITIONAL STUDIES:

## 2018-01-21 NOTE — PROGRESS NOTE ADULT - SUBJECTIVE AND OBJECTIVE BOX
Interventional, Pulmonary, Critical, Chest Special Procedures.    Pt was seen and fully examined by myself.     Time spent with patient in minutes:67    Patient is a 72y old  Male who presents with a chief complaint of acute cholecystitis (18 Jan 2018 14:49)The patient intubated, sedated, no response to verbal stimuli,   All circuite integrity verified and preserved. Less FiO2 requirement today    HPI:  73 yo M with h/o severe mitral and aortic valve regurgitation presented to ED with 5 day h/o left lower abdominal pain, nausea and multiple episodes of vomiting as well as PO intolerance. Pt reports that the pain in his abdomen is illicited by movement. Went to [facility] two days ago and underwent CT scan which revealed acute cholecystitis as well as right lower lobe infectious pneumonia. Pt was told he would require surgery and left AMA because he was waiting too long. Denies f/c. Last echo 1/4/17 with EF 60-65%. Normal BMs and passing flatus. (18 Jan 2018 14:49)    REVIEW OF SYSTEMS: updated  Constitutional: No fever, weight loss, chills or fatigue  Eyes: No eye pain, visual disturbances, or discharge  ENMT:  No difficulty hearing, tinnitus, vertigo; No sinus or throat pain. No epistaxis, dysphagia, dysphonia, hoarseness or odynophagia  Neck: No pain, stiffness or neck swelling.  No masses or deformities  Respiratory: No cough, wheezing, chills or hemoptysis  - COPD  - ILD   - PE   - ASTHMA     - PNEUMONIA  Cardiovascular: No chest pain, dysrhythmia, palpitations, dizziness or edema   - COPD     - CAD   - CHF   - HTN  Gastrointestinal: No abdominal or epigastric pain. No nausea, vomiting or hematemesis; No diarrhea or constipation. No melena or hematochezia. No dysphagia or Icterus.          Genitourinary: No dysuria, frequency, hematuria or incontinence   - CKD/GAETANO      - ESRD  Neurological: No headaches, memory loss, loss of strength, numbness or tremors      -DEMENTIA     - STROKE    - SEIZURE  Skin: No itching, burning, rashes or lesions   Lymph Nodes: No enlarged glands  Endocrine: No heat or cold intolerance; No hair loss       - DM     - THYROID DISORDER  Musculoskeletal: No joint pain or swelling; No muscle, back or extremity pain  Psychiatric: No depression, anxiety, mood swings or difficulty sleeping  Heme/Lymph: No easy bruising or bleeding gums         - ANEMIA      - CANCER   -COAGULOPATHY  Allergy and Immunologic: No hives or eczema    PAST MEDICAL & SURGICAL HISTORY:  Mitral regurgitation  Aortic regurgitation  No significant past surgical history    FAMILY HISTORY:    SOCIAL HISTORY:      - Tobacco     - ETOH    Allergies    No Known Allergies    Intolerances      Vital Signs Last 24 Hrs  T(C): 37.7 (21 Jan 2018 14:35), Max: 39.3 (21 Jan 2018 02:00)  T(F): 99.8 (21 Jan 2018 14:35), Max: 102.7 (21 Jan 2018 02:00)  HR: 94 (21 Jan 2018 14:00) (88 - 118)  BP: 98/71 (21 Jan 2018 14:00) (92/64 - 120/70)  BP(mean): 85 (21 Jan 2018 14:00) (72 - 90)  RR: 16 (21 Jan 2018 14:00) (15 - 22)  SpO2: 98% (21 Jan 2018 14:00) (92% - 98%)    01-20 @ 07:01 - 01-21 @ 07:00  --------------------------------------------------------  IN: 5007 mL / OUT: 1160 mL / NET: 3847 mL    01-21 @ 07:01 - 01-21 @ 14:37  --------------------------------------------------------  IN: 414 mL / OUT: 460 mL / NET: -46 mL      Mode: AC/ CMV (Assist Control/ Continuous Mandatory Ventilation)  RR (machine): 12  TV (machine): 500  FiO2: 40  PEEP: 5  ITime: 1  MAP: 7.5  PIP: 18    PHYSICAL EXAM:  Comfortable, on CMV sedated  Eyes: PERRL, EOM intact; conjunctiva and sclera clear  Head: Normocephalic;  No Trauma  ENMT: No nasal discharge, hoarseness, cough or hemoptysis.    Neck: Supple; non tender; no masses or deformities.    No JVD  Respiratory: - WHEEZING   + RLL RHONCHI  - RALES  - CRACKLES.  Diminished breath sounds  BILATERAL  RIGHT > LEFT baswe  Cardiovascular: Regular rate and rhythm. S1 and S2 Normal; + murmurs, gallops or rubs     - PPM/AICD  Gastrointestinal: distended; no  bowel sounds; No hepatosplenomegaly.     -PEG    +  GT   +ANGELES  Genitourinary: No costovertebral angle tenderness. No dysuria  Extremities: sedated decreased AROM, No clubbing, cyanosis or edema    Vascular: Peripheral pulses palpable 2+ bilaterally  Neurological: intubated sedated  Skin: Warm and dry. No obvious rash  Lymph Nodes: No acute cervical or supraclavicular adenopathy  Psychiatric: intubated    DEVICES:  - DENTURES   +IV R / L     - ETUBE   -TRACH   -CTUBE  R / L      LABS:  ABG - ( 21 Jan 2018 06:18 )  pH: 7.39  /  pCO2: 32    /  pO2: 138   / HCO3: 19    / Base Excess: -5.0  /  SaO2: 99                                      12.2   19.6  )-----------( 48       ( 21 Jan 2018 13:55 )             37.0     01-21    144  |  109<H>  |  65<H>  ----------------------------<  135<H>  4.4   |  19<L>  |  2.43<H>    Ca    7.6<L>      21 Jan 2018 04:01  Phos  5.2     01-21  Mg     2.1     01-21    TPro  4.8<L>  /  Alb  2.4<L>  /  TBili  2.5<H>  /  DBili  x   /  AST  121<H>  /  ALT  516<H>  /  AlkPhos  66  01-21    PT/INR - ( 20 Jan 2018 08:43 )   PT: 21.6 sec;   INR: 1.92          PTT - ( 20 Jan 2018 08:43 )  PTT:39.6 sec  < from: Xray Chest 1 View AP -PORTABLE-Routine (01.21.18 @ 02:25) >    EXAM:  XR CHEST PORTABLE  ROUTINE 1V                          PROCEDURE DATE:  01/21/2018                     INTERPRETATION:  Clinical History: Effusion    Portable examination the chest demonstrates cardiomegaly. Congestion   and/or infiltrates. Bilateral effusions. No interval change position   remaining support devices in comparison to prior examination of the chest   1/20/2018.    Impression: Congestion and/or infiltrates. Bilateral effusions    < end of copied text >  RADIOLOGY & ADDITIONAL STUDIES (The following images were personally reviewed):

## 2018-01-21 NOTE — PROGRESS NOTE ADULT - SUBJECTIVE AND OBJECTIVE BOX
INTERVAL HPI/OVERNIGHT EVENTS:    PRESSORS: [ ] YES [ ] NO      MEDICATIONS  (STANDING):  acetaminophen  IVPB 1000 milliGRAM(s) IV Intermittent once  chlorhexidine 0.12% Liquid 15 milliLiter(s) Swish and Spit two times a day  dextrose 5%. 1000 milliLiter(s) (50 mL/Hr) IV Continuous <Continuous>  fentaNYL   Infusion 0.112 MICROgram(s)/kG/Hr (1 mL/Hr) IV Continuous <Continuous>  heparin  Injectable 5000 Unit(s) SubCutaneous every 8 hours  influenza   Vaccine 0.5 milliLiter(s) IntraMuscular once  insulin lispro (HumaLOG) corrective regimen sliding scale   SubCutaneous Before meals and at bedtime  lactated ringers. 1000 milliLiter(s) (100 mL/Hr) IV Continuous <Continuous>  metoprolol    tartrate Injectable 2.5 milliGRAM(s) IV Push every 6 hours  norepinephrine Infusion 0.01 MICROgram(s)/kG/Min (1.676 mL/Hr) IV Continuous <Continuous>  pantoprazole Infusion 8 mG/Hr (10 mL/Hr) IV Continuous <Continuous>  piperacillin/tazobactam IVPB. 3.375 Gram(s) IV Intermittent every 6 hours  propofol Infusion 1.864 MICROgram(s)/kG/Min (1 mL/Hr) IV Continuous <Continuous>  vancomycin  IVPB 1250 milliGRAM(s) IV Intermittent every 12 hours    MEDICATIONS  (PRN):      Drug Dosing Weight  Height (cm): 177.8 (18 Jan 2018 11:40)  Weight (kg): 89.4 (18 Jan 2018 11:40)  BMI (kg/m2): 28.3 (18 Jan 2018 11:40)  BSA (m2): 2.07 (18 Jan 2018 11:40)      PAST MEDICAL & SURGICAL HISTORY:  Mitral regurgitation  Aortic regurgitation  No significant past surgical history      ICU Vital Signs Last 24 Hrs  T(C): 38.6 (21 Jan 2018 06:40), Max: 39.3 (21 Jan 2018 02:00)  T(F): 101.4 (21 Jan 2018 06:40), Max: 102.7 (21 Jan 2018 02:00)  HR: 92 (21 Jan 2018 06:36) (92 - 118)  BP: 102/68 (21 Jan 2018 06:00) (84/67 - 115/72)  BP(mean): 83 (21 Jan 2018 06:00) (72 - 90)  ABP: 108/60 (21 Jan 2018 06:00) (76/54 - 114/74)  ABP(mean): 90 (21 Jan 2018 06:00) (60 - 90)  RR: 16 (21 Jan 2018 06:00) (14 - 22)  SpO2: 97% (21 Jan 2018 06:36) (92% - 100%)      ABG - ( 21 Jan 2018 06:18 )  pH: 7.39  /  pCO2: 32    /  pO2: 138   / HCO3: 19    / Base Excess: -5.0  /  SaO2: 99                    20 Jan 2018 07:01  -  21 Jan 2018 07:00  --------------------------------------------------------  IN:    Albumin 5%  - 250 mL: 250 mL    fentaNYL  Infusion: 119 mL    IV PiggyBack: 900 mL    Lactated Ringers IV Bolus: 750 mL    lactated ringers.: 1750 mL    norepinephrine Infusion: 187 mL    pantoprazole Infusion: 190 mL    propofol Infusion: 138 mL    Sodium Chloride 0.9% IV Bolus: 500 mL  Total IN: 4784 mL    OUT:    Bulb: 350 mL    Bulb: 305 mL    Indwelling Catheter - Urethral: 485 mL  Total OUT: 1140 mL    Total NET: 3644 mL          Mode: AC/ CMV (Assist Control/ Continuous Mandatory Ventilation)  RR (machine): 12  TV (machine): 500  FiO2: 40  PEEP: 5  ITime: 1  MAP: 8  PIP: 17      PHYSICAL EXAM:      Constitutional:    Eyes:    ENMT:    Neck:    Breasts:    Back:    Respiratory:    Cardiovascular:    Gastrointestinal:    Genitourinary:    Rectal:    Extremities:    Vascular:    Neurological:    Skin:    Lymph Nodes:    Musculoskeletal:    Psychiatric:        LABS:  CBC Full  -  ( 21 Jan 2018 04:01 )  WBC Count : 18.6 K/uL  Hemoglobin : 12.7 g/dL  Hematocrit : 39.3 %  Platelet Count - Automated : 52 K/uL  Mean Cell Volume : 94.0 fL  Mean Cell Hemoglobin : 30.4 pg  Mean Cell Hemoglobin Concentration : 32.3 g/dL    01-21    144  |  109<H>  |  65<H>  ----------------------------<  135<H>  4.4   |  19<L>  |  2.43<H>    Ca    7.6<L>      21 Jan 2018 04:01  Phos  5.2     01-21  Mg     2.1     01-21    TPro  4.8<L>  /  Alb  2.4<L>  /  TBili  2.5<H>  /  DBili  x   /  AST  121<H>  /  ALT  516<H>  /  AlkPhos  66  01-21    PT/INR - ( 20 Jan 2018 08:43 )   PT: 21.6 sec;   INR: 1.92          PTT - ( 20 Jan 2018 08:43 )  PTT:39.6 sec INTERVAL HPI/OVERNIGHT EVENTS: O/N: Cr rising, UOP borderline however CVPs elevated. Gave 2 runs of 5% when CVP lower earlier in night and 500 LR at 1am. GUCCI 1 is bilious. NGT is still to LIWS. Lactate very slow to clear. Pressor requirements level. febrile to 102.9. Blood cx sent.   1/20 : Pt found to have duodenal perforation while on Bipap and taken to OR. Pt intubated preop and a line placed.  SICU consult note done and sent to [facility]. Underwent ex lap, kaylee patch, feeding jej, transferred to SICU intubated, in a fib, echo shows poor function, Pulm HTN. Given 1L bolus for SBP80s without response so levo started.     Pt seen and examined on AM rounds. Pt intubated and sedated.      MEDICATIONS  (STANDING):  acetaminophen  IVPB 1000 milliGRAM(s) IV Intermittent once  chlorhexidine 0.12% Liquid 15 milliLiter(s) Swish and Spit two times a day  dextrose 5%. 1000 milliLiter(s) (50 mL/Hr) IV Continuous <Continuous>  fentaNYL   Infusion 0.112 MICROgram(s)/kG/Hr (1 mL/Hr) IV Continuous <Continuous>  heparin  Injectable 5000 Unit(s) SubCutaneous every 8 hours  influenza   Vaccine 0.5 milliLiter(s) IntraMuscular once  insulin lispro (HumaLOG) corrective regimen sliding scale   SubCutaneous Before meals and at bedtime  lactated ringers. 1000 milliLiter(s) (100 mL/Hr) IV Continuous <Continuous>  metoprolol    tartrate Injectable 2.5 milliGRAM(s) IV Push every 6 hours  norepinephrine Infusion 0.01 MICROgram(s)/kG/Min (1.676 mL/Hr) IV Continuous <Continuous>  pantoprazole Infusion 8 mG/Hr (10 mL/Hr) IV Continuous <Continuous>  piperacillin/tazobactam IVPB. 3.375 Gram(s) IV Intermittent every 6 hours  propofol Infusion 1.864 MICROgram(s)/kG/Min (1 mL/Hr) IV Continuous <Continuous>  vancomycin  IVPB 1250 milliGRAM(s) IV Intermittent every 12 hours    MEDICATIONS  (PRN):      Drug Dosing Weight  Height (cm): 177.8 (18 Jan 2018 11:40)  Weight (kg): 89.4 (18 Jan 2018 11:40)  BMI (kg/m2): 28.3 (18 Jan 2018 11:40)  BSA (m2): 2.07 (18 Jan 2018 11:40)      PAST MEDICAL & SURGICAL HISTORY:  Mitral regurgitation  Aortic regurgitation  No significant past surgical history      ICU Vital Signs Last 24 Hrs  T(C): 38.6 (21 Jan 2018 06:40), Max: 39.3 (21 Jan 2018 02:00)  T(F): 101.4 (21 Jan 2018 06:40), Max: 102.7 (21 Jan 2018 02:00)  HR: 92 (21 Jan 2018 06:36) (92 - 118)  BP: 102/68 (21 Jan 2018 06:00) (84/67 - 115/72)  BP(mean): 83 (21 Jan 2018 06:00) (72 - 90)  ABP: 108/60 (21 Jan 2018 06:00) (76/54 - 114/74)  ABP(mean): 90 (21 Jan 2018 06:00) (60 - 90)  RR: 16 (21 Jan 2018 06:00) (14 - 22)  SpO2: 97% (21 Jan 2018 06:36) (92% - 100%)      ABG - ( 21 Jan 2018 06:18 )  pH: 7.39  /  pCO2: 32    /  pO2: 138   / HCO3: 19    / Base Excess: -5.0  /  SaO2: 99                    20 Jan 2018 07:01  -  21 Jan 2018 07:00  --------------------------------------------------------  IN:    Albumin 5%  - 250 mL: 250 mL    fentaNYL  Infusion: 119 mL    IV PiggyBack: 900 mL    Lactated Ringers IV Bolus: 750 mL    lactated ringers.: 1750 mL    norepinephrine Infusion: 187 mL    pantoprazole Infusion: 190 mL    propofol Infusion: 138 mL    Sodium Chloride 0.9% IV Bolus: 500 mL  Total IN: 4784 mL    OUT:    Bulb: 350 mL    Bulb: 305 mL    Indwelling Catheter - Urethral: 485 mL  Total OUT: 1140 mL    Total NET: 3644 mL          Mode: AC/ CMV (Assist Control/ Continuous Mandatory Ventilation)  RR (machine): 12  TV (machine): 500  FiO2: 40  PEEP: 5  ITime: 1  MAP: 8  PIP: 17      PHYSICAL EXAM:  Gen: ETT in place, resting in bed  Neuro: limited 2/2 sedation. LEIVA spontaneously  HEENT: no scleral icterus. MMM. No JVD. NGT draining thin brown fluid.   CV: Irregularly irregular, +holosystolic murmur heard best at apex   Pulm: CTA x 2  Abd: Soft, +grimace on palpation, midline dressing intact with some sanguinous strikethrough. GUCCI #1 with bilious output, GUCCI #2 SS. Feeding J-tube to gravity with bilious output  : abarca in place draining dark urine  Ext: LE cool to touch, +DP b/l. UE WWP      LABS:  CBC Full  -  ( 21 Jan 2018 04:01 )  WBC Count : 18.6 K/uL  Hemoglobin : 12.7 g/dL  Hematocrit : 39.3 %  Platelet Count - Automated : 52 K/uL  Mean Cell Volume : 94.0 fL  Mean Cell Hemoglobin : 30.4 pg  Mean Cell Hemoglobin Concentration : 32.3 g/dL    01-21    144  |  109<H>  |  65<H>  ----------------------------<  135<H>  4.4   |  19<L>  |  2.43<H>    Ca    7.6<L>      21 Jan 2018 04:01  Phos  5.2     01-21  Mg     2.1     01-21    TPro  4.8<L>  /  Alb  2.4<L>  /  TBili  2.5<H>  /  DBili  x   /  AST  121<H>  /  ALT  516<H>  /  AlkPhos  66  01-21    PT/INR - ( 20 Jan 2018 08:43 )   PT: 21.6 sec;   INR: 1.92          PTT - ( 20 Jan 2018 08:43 )  PTT:39.6 sec

## 2018-01-21 NOTE — PROGRESS NOTE ADULT - ASSESSMENT
ASSESSMENT/PLAN 73y/o male pt c Respiratory failure, perforated duodenal ulcer, POD I post repair, RLL pneumonia, RLL atelectases, no obvious RADHA, elevated R hemidiaphragm, AFIB, possible cholangitis, thrombocytopenia, possible endocarditis    1. O2 Continue CMV mode today do not attempt weaning at this time  2. Bronchodilators:  Atrovent/ albuterol q 4 – 6 hours as needed  3. Corticosteroids: off  4. ID/Antibiotics: must continue ABX for RLL pneumonia, ID ,Mark feedback  5. Cardiac/HTN: Monitor, optimize HR, cardiology close follow up  6. GI: Rx/ prophylaxis c PPI/H2B, Pt discussed c GI  7. Heme: Rx/VT prophylaxis c SQH/SCD/AC   worsening thrombosytopenia, send HIT panel, recommend hematology evaluation  8. Aspiration precautions, NGT   Discussed with managing team

## 2018-01-21 NOTE — PROGRESS NOTE ADULT - ASSESSMENT
[id number] yo M with PMHx severe MR and AR  recently diagnosed with Acute Cholecystitis as well as RLL PNA by CT scan at [facility] however signed out AMA who presented to [facility] ED 1/18 c/o lower abdominal pain, nausea, vomiting found to have new onset rapid Afib in the setting of sepsis as well as acute CHF exacerbation and abdominal pain 2/2 perforated duodenal ulcer. Pt now POD#1 s/p Ex-lap, kaylee patch repair and Jtube placement    Neuro: propofol, fentanyl  CV: HD stable Afib: Lopressor 2.5 q6, Echo showing ?vegetation and Severe MR. will need repeat echo in future to r/o vegetation (bld cx's negative)   Pulm: Intubated on cmv 40/500/12/5 chlorhex  GI: NPO with NGT to LIWS. Jtube to gravity. Protonix gtt. Elevated LFT's - will trend   : Oliveira  ID: Perforated duodenum: Vanco (1/19-) Zosyn (1/19-) f/u Bld cx's  Endo: ISS  PPx: SCD, SQH  Lines: PIV, L brachial Rew (1/20-), RIJ TLC (1/20--)  Wounds: Ex lap, JPx2

## 2018-01-21 NOTE — PROGRESS NOTE ADULT - SUBJECTIVE AND OBJECTIVE BOX
INTERVAL HPI/OVERNIGHT EVENTS: O/N: Cr rising, UOP borderline however CVPs elevated. Gave 2 runs of 5% when CVP lower earlier in night and 500 LR at 1am. GUCCI 1 is bilious. NGT is still to LIWS. Lactate very slow to clear. Pressor requirements level. febrile to 102.9. Blood cx sent.   1/20 : Pt found to have duodenal perforation while on Bipap and taken to OR. Pt intubated preop and a line placed.  . Underwent ex lap, kaylee patch, feeding jej, transferred to SICU intubated, in a fib, echo shows poor function, Pulm HTN. Given 1L bolus for SBP80s without response so levo started.     Pt seen and examined on AM rounds. Pt intubated and sedated.      MEDICATIONS  (STANDING):  acetaminophen  IVPB 1000 milliGRAM(s) IV Intermittent once  chlorhexidine 0.12% Liquid 15 milliLiter(s) Swish and Spit two times a day  dextrose 5%. 1000 milliLiter(s) (50 mL/Hr) IV Continuous <Continuous>  fentaNYL   Infusion 0.112 MICROgram(s)/kG/Hr (1 mL/Hr) IV Continuous <Continuous>  heparin  Injectable 5000 Unit(s) SubCutaneous every 8 hours  influenza   Vaccine 0.5 milliLiter(s) IntraMuscular once  insulin lispro (HumaLOG) corrective regimen sliding scale   SubCutaneous Before meals and at bedtime  lactated ringers. 1000 milliLiter(s) (100 mL/Hr) IV Continuous <Continuous>  metoprolol    tartrate Injectable 2.5 milliGRAM(s) IV Push every 6 hours  norepinephrine Infusion 0.01 MICROgram(s)/kG/Min (1.676 mL/Hr) IV Continuous <Continuous>  pantoprazole Infusion 8 mG/Hr (10 mL/Hr) IV Continuous <Continuous>  piperacillin/tazobactam IVPB. 3.375 Gram(s) IV Intermittent every 6 hours  propofol Infusion 1.864 MICROgram(s)/kG/Min (1 mL/Hr) IV Continuous <Continuous>  vancomycin  IVPB 1250 milliGRAM(s) IV Intermittent every 12 hours    MEDICATIONS  (PRN):      Drug Dosing Weight  Height (cm): 177.8 (18 Jan 2018 11:40)  Weight (kg): 89.4 (18 Jan 2018 11:40)  BMI (kg/m2): 28.3 (18 Jan 2018 11:40)  BSA (m2): 2.07 (18 Jan 2018 11:40)      PAST MEDICAL & SURGICAL HISTORY:  Mitral regurgitation  Aortic regurgitation  No significant past surgical history      ICU Vital Signs Last 24 Hrs  T(C): 38.6 (21 Jan 2018 06:40), Max: 39.3 (21 Jan 2018 02:00)  T(F): 101.4 (21 Jan 2018 06:40), Max: 102.7 (21 Jan 2018 02:00)  HR: 92 (21 Jan 2018 06:36) (92 - 118)  BP: 102/68 (21 Jan 2018 06:00) (84/67 - 115/72)  BP(mean): 83 (21 Jan 2018 06:00) (72 - 90)  ABP: 108/60 (21 Jan 2018 06:00) (76/54 - 114/74)  ABP(mean): 90 (21 Jan 2018 06:00) (60 - 90)  RR: 16 (21 Jan 2018 06:00) (14 - 22)  SpO2: 97% (21 Jan 2018 06:36) (92% - 100%)      ABG - ( 21 Jan 2018 06:18 )  pH: 7.39  /  pCO2: 32    /  pO2: 138   / HCO3: 19    / Base Excess: -5.0  /  SaO2: 99                    20 Jan 2018 07:01  -  21 Jan 2018 07:00  --------------------------------------------------------  IN:    Albumin 5%  - 250 mL: 250 mL    fentaNYL  Infusion: 119 mL    IV PiggyBack: 900 mL    Lactated Ringers IV Bolus: 750 mL    lactated ringers.: 1750 mL    norepinephrine Infusion: 187 mL    pantoprazole Infusion: 190 mL    propofol Infusion: 138 mL    Sodium Chloride 0.9% IV Bolus: 500 mL  Total IN: 4784 mL    OUT:    Bulb: 350 mL    Bulb: 305 mL    Indwelling Catheter - Urethral: 485 mL  Total OUT: 1140 mL    Total NET: 3644 mL          Mode: AC/ CMV (Assist Control/ Continuous Mandatory Ventilation)  RR (machine): 12  TV (machine): 500  FiO2: 40  PEEP: 5  ITime: 1  MAP: 8  PIP: 17      PHYSICAL EXAM:  Gen: ETT in place, resting in bed  Neuro: limited 2/2 sedation. LEIVA spontaneously  HEENT: no scleral icterus. MMM. No JVD. NGT draining thin brown fluid.   CV: Irregularly irregular, +holosystolic murmur heard best at apex   Pulm: CTA x 2  Abd: Soft, +grimace on palpation, midline dressing intact with some sanguinous strikethrough. GUCCI #1 with bilious output, GUCCI #2 SS. Feeding J-tube to gravity with bilious output  : abarca in place draining dark urine  Ext: LE cool to touch, +DP b/l. UE WWP      LABS:  CBC Full  -  ( 21 Jan 2018 04:01 )  WBC Count : 18.6 K/uL  Hemoglobin : 12.7 g/dL  Hematocrit : 39.3 %  Platelet Count - Automated : 52 K/uL  Mean Cell Volume : 94.0 fL  Mean Cell Hemoglobin : 30.4 pg  Mean Cell Hemoglobin Concentration : 32.3 g/dL    01-21    144  |  109<H>  |  65<H>  ----------------------------<  135<H>  4.4   |  19<L>  |  2.43<H>    Ca    7.6<L>      21 Jan 2018 04:01  Phos  5.2     01-21  Mg     2.1     01-21    TPro  4.8<L>  /  Alb  2.4<L>  /  TBili  2.5<H>  /  DBili  x   /  AST  121<H>  /  ALT  516<H>  /  AlkPhos  66  01-21    PT/INR - ( 20 Jan 2018 08:43 )   PT: 21.6 sec;   INR: 1.92          PTT - ( 20 Jan 2018 08:43 )  PTT:39.6 sec

## 2018-01-22 ENCOUNTER — RESULT REVIEW (OUTPATIENT)
Age: 73
End: 2018-01-22

## 2018-01-22 DIAGNOSIS — A41.9 SEPSIS, UNSPECIFIED ORGANISM: ICD-10-CM

## 2018-01-22 DIAGNOSIS — E87.2 ACIDOSIS: ICD-10-CM

## 2018-01-22 DIAGNOSIS — N17.9 ACUTE KIDNEY FAILURE, UNSPECIFIED: ICD-10-CM

## 2018-01-22 LAB
ALBUMIN SERPL ELPH-MCNC: 2.4 G/DL — LOW (ref 3.3–5)
ALBUMIN SERPL ELPH-MCNC: 2.5 G/DL — LOW (ref 3.3–5)
ALP SERPL-CCNC: 63 U/L — SIGNIFICANT CHANGE UP (ref 40–120)
ALP SERPL-CCNC: 69 U/L — SIGNIFICANT CHANGE UP (ref 40–120)
ALT FLD-CCNC: 214 U/L — HIGH (ref 10–45)
ALT FLD-CCNC: 363 U/L — HIGH (ref 10–45)
ANION GAP SERPL CALC-SCNC: 13 MMOL/L — SIGNIFICANT CHANGE UP (ref 5–17)
ANION GAP SERPL CALC-SCNC: 13 MMOL/L — SIGNIFICANT CHANGE UP (ref 5–17)
ANION GAP SERPL CALC-SCNC: 17 MMOL/L — SIGNIFICANT CHANGE UP (ref 5–17)
APPEARANCE UR: CLEAR — SIGNIFICANT CHANGE UP
APTT BLD: 103.3 SEC — HIGH (ref 27.5–37.4)
APTT BLD: 119.8 SEC — CRITICAL HIGH (ref 27.5–37.4)
APTT BLD: 41.3 SEC — HIGH (ref 27.5–37.4)
APTT BLD: 66.5 SEC — HIGH (ref 27.5–37.4)
AST SERPL-CCNC: 47 U/L — HIGH (ref 10–40)
AST SERPL-CCNC: 65 U/L — HIGH (ref 10–40)
BASE EXCESS BLDA CALC-SCNC: -3.1 MMOL/L — LOW (ref -2–3)
BASE EXCESS BLDA CALC-SCNC: -4.2 MMOL/L — LOW (ref -2–3)
BASE EXCESS BLDA CALC-SCNC: -4.7 MMOL/L — LOW (ref -2–3)
BASE EXCESS BLDA CALC-SCNC: -8.5 MMOL/L — LOW (ref -2–3)
BASE EXCESS BLDV CALC-SCNC: -3.3 MMOL/L — SIGNIFICANT CHANGE UP
BASE EXCESS BLDV CALC-SCNC: -5.2 MMOL/L — SIGNIFICANT CHANGE UP
BASE EXCESS BLDV CALC-SCNC: -6.3 MMOL/L — SIGNIFICANT CHANGE UP
BASOPHILS NFR BLD AUTO: 0.1 % — SIGNIFICANT CHANGE UP (ref 0–2)
BILIRUB SERPL-MCNC: 2.4 MG/DL — HIGH (ref 0.2–1.2)
BILIRUB SERPL-MCNC: 2.8 MG/DL — HIGH (ref 0.2–1.2)
BILIRUB UR-MCNC: (no result)
BUN SERPL-MCNC: 75 MG/DL — HIGH (ref 7–23)
BUN SERPL-MCNC: 76 MG/DL — HIGH (ref 7–23)
BUN SERPL-MCNC: 78 MG/DL — HIGH (ref 7–23)
CA-I BLDA-SCNC: 0.8 MMOL/L — LOW (ref 1.12–1.3)
CA-I BLDA-SCNC: 0.9 MMOL/L — LOW (ref 1.12–1.3)
CALCIUM SERPL-MCNC: 7.3 MG/DL — LOW (ref 8.4–10.5)
CALCIUM SERPL-MCNC: 7.5 MG/DL — LOW (ref 8.4–10.5)
CALCIUM SERPL-MCNC: 8.8 MG/DL — SIGNIFICANT CHANGE UP (ref 8.4–10.5)
CHLORIDE SERPL-SCNC: 106 MMOL/L — SIGNIFICANT CHANGE UP (ref 96–108)
CHLORIDE SERPL-SCNC: 107 MMOL/L — SIGNIFICANT CHANGE UP (ref 96–108)
CHLORIDE SERPL-SCNC: 107 MMOL/L — SIGNIFICANT CHANGE UP (ref 96–108)
CO2 SERPL-SCNC: 20 MMOL/L — LOW (ref 22–31)
CO2 SERPL-SCNC: 20 MMOL/L — LOW (ref 22–31)
CO2 SERPL-SCNC: 22 MMOL/L — SIGNIFICANT CHANGE UP (ref 22–31)
COHGB MFR BLDA: 0.3 % — SIGNIFICANT CHANGE UP
COHGB MFR BLDA: 0.7 % — SIGNIFICANT CHANGE UP
COLOR SPEC: YELLOW — SIGNIFICANT CHANGE UP
CREAT SERPL-MCNC: 2.56 MG/DL — HIGH (ref 0.5–1.3)
CREAT SERPL-MCNC: 2.69 MG/DL — HIGH (ref 0.5–1.3)
CREAT SERPL-MCNC: 2.7 MG/DL — HIGH (ref 0.5–1.3)
DIFF PNL FLD: NEGATIVE — SIGNIFICANT CHANGE UP
FIBRINOGEN PPP-MCNC: 107 MG/DL — LOW (ref 258–438)
FIBRINOGEN PPP-MCNC: 161 MG/DL — LOW (ref 258–438)
FIBRINOGEN PPP-MCNC: 312 MG/DL — SIGNIFICANT CHANGE UP (ref 258–438)
GAS PNL BLDA: SIGNIFICANT CHANGE UP
GAS PNL BLDV: SIGNIFICANT CHANGE UP
GLUCOSE BLDC GLUCOMTR-MCNC: 106 MG/DL — HIGH (ref 70–99)
GLUCOSE BLDC GLUCOMTR-MCNC: 112 MG/DL — HIGH (ref 70–99)
GLUCOSE BLDC GLUCOMTR-MCNC: 145 MG/DL — HIGH (ref 70–99)
GLUCOSE BLDC GLUCOMTR-MCNC: 165 MG/DL — HIGH (ref 70–99)
GLUCOSE BLDC GLUCOMTR-MCNC: 192 MG/DL — HIGH (ref 70–99)
GLUCOSE SERPL-MCNC: 133 MG/DL — HIGH (ref 70–99)
GLUCOSE SERPL-MCNC: 170 MG/DL — HIGH (ref 70–99)
GLUCOSE SERPL-MCNC: 199 MG/DL — HIGH (ref 70–99)
GLUCOSE UR QL: NEGATIVE — SIGNIFICANT CHANGE UP
GRAM STN FLD: SIGNIFICANT CHANGE UP
HCO3 BLDA-SCNC: 19 MMOL/L — LOW (ref 21–28)
HCO3 BLDA-SCNC: 21 MMOL/L — SIGNIFICANT CHANGE UP (ref 21–28)
HCO3 BLDA-SCNC: 21 MMOL/L — SIGNIFICANT CHANGE UP (ref 21–28)
HCO3 BLDA-SCNC: 22 MMOL/L — SIGNIFICANT CHANGE UP (ref 21–28)
HCO3 BLDV-SCNC: 21 MMOL/L — SIGNIFICANT CHANGE UP (ref 20–27)
HCO3 BLDV-SCNC: 22 MMOL/L — SIGNIFICANT CHANGE UP (ref 20–27)
HCO3 BLDV-SCNC: 24 MMOL/L — SIGNIFICANT CHANGE UP (ref 20–27)
HCT VFR BLD CALC: 35.1 % — LOW (ref 39–50)
HCT VFR BLD CALC: 35.5 % — LOW (ref 39–50)
HCT VFR BLD CALC: 36.7 % — LOW (ref 39–50)
HCT VFR BLD CALC: 39.5 % — SIGNIFICANT CHANGE UP (ref 39–50)
HGB BLD-MCNC: 11.5 G/DL — LOW (ref 13–17)
HGB BLD-MCNC: 11.6 G/DL — LOW (ref 13–17)
HGB BLD-MCNC: 12 G/DL — LOW (ref 13–17)
HGB BLD-MCNC: 12.9 G/DL — LOW (ref 13–17)
HGB BLDA-MCNC: 11 G/DL — LOW (ref 13–17)
HGB BLDA-MCNC: 11.9 G/DL — LOW (ref 13–17)
INR BLD: 1.52 — HIGH (ref 0.88–1.16)
INR BLD: 1.97 — HIGH (ref 0.88–1.16)
INR BLD: 2.95 — HIGH (ref 0.88–1.16)
KETONES UR-MCNC: NEGATIVE — SIGNIFICANT CHANGE UP
LACTATE SERPL-SCNC: 1.8 MMOL/L — SIGNIFICANT CHANGE UP (ref 0.5–2)
LACTATE SERPL-SCNC: 1.8 MMOL/L — SIGNIFICANT CHANGE UP (ref 0.5–2)
LACTATE SERPL-SCNC: 2.6 MMOL/L — HIGH (ref 0.5–2)
LEUKOCYTE ESTERASE UR-ACNC: NEGATIVE — SIGNIFICANT CHANGE UP
LYMPHOCYTES # BLD AUTO: 3.5 % — LOW (ref 13–44)
MAGNESIUM SERPL-MCNC: 2.2 MG/DL — SIGNIFICANT CHANGE UP (ref 1.6–2.6)
MAGNESIUM SERPL-MCNC: 2.3 MG/DL — SIGNIFICANT CHANGE UP (ref 1.6–2.6)
MCHC RBC-ENTMCNC: 30.6 PG — SIGNIFICANT CHANGE UP (ref 27–34)
MCHC RBC-ENTMCNC: 30.7 PG — SIGNIFICANT CHANGE UP (ref 27–34)
MCHC RBC-ENTMCNC: 30.7 PG — SIGNIFICANT CHANGE UP (ref 27–34)
MCHC RBC-ENTMCNC: 30.8 PG — SIGNIFICANT CHANGE UP (ref 27–34)
MCHC RBC-ENTMCNC: 32.7 G/DL — SIGNIFICANT CHANGE UP (ref 32–36)
MCHC RBC-ENTMCNC: 32.8 G/DL — SIGNIFICANT CHANGE UP (ref 32–36)
MCV RBC AUTO: 93.6 FL — SIGNIFICANT CHANGE UP (ref 80–100)
MCV RBC AUTO: 93.7 FL — SIGNIFICANT CHANGE UP (ref 80–100)
MCV RBC AUTO: 93.9 FL — SIGNIFICANT CHANGE UP (ref 80–100)
MCV RBC AUTO: 94.3 FL — SIGNIFICANT CHANGE UP (ref 80–100)
METHGB MFR BLDA: 0.2 % — SIGNIFICANT CHANGE UP
METHGB MFR BLDA: 0.2 % — SIGNIFICANT CHANGE UP
MONOCYTES NFR BLD AUTO: 5 % — SIGNIFICANT CHANGE UP (ref 2–14)
NEUTROPHILS NFR BLD AUTO: 91.4 % — HIGH (ref 43–77)
NITRITE UR-MCNC: NEGATIVE — SIGNIFICANT CHANGE UP
O2 CT VFR BLDA CALC: 16.6 ML/DL — SIGNIFICANT CHANGE UP (ref 15–23)
O2 CT VFR BLDA CALC: SIGNIFICANT CHANGE UP (ref 15–23)
OXYHGB MFR BLDA: 98 % — SIGNIFICANT CHANGE UP (ref 94–100)
OXYHGB MFR BLDA: 99 % — SIGNIFICANT CHANGE UP (ref 94–100)
PCO2 BLDA: 40 MMHG — SIGNIFICANT CHANGE UP (ref 35–48)
PCO2 BLDA: 41 MMHG — SIGNIFICANT CHANGE UP (ref 35–48)
PCO2 BLDA: 42 MMHG — SIGNIFICANT CHANGE UP (ref 35–48)
PCO2 BLDA: 45 MMHG — SIGNIFICANT CHANGE UP (ref 35–48)
PCO2 BLDV: 47 MMHG — SIGNIFICANT CHANGE UP (ref 41–51)
PCO2 BLDV: 48 MMHG — SIGNIFICANT CHANGE UP (ref 41–51)
PCO2 BLDV: 52 MMHG — HIGH (ref 41–51)
PF4 HEPARIN CMPLX AB SER-ACNC: SIGNIFICANT CHANGE UP
PF4 HEPARIN CMPLX AB SERPL QL IA: POSITIVE
PH BLDA: 7.24 — LOW (ref 7.35–7.45)
PH BLDA: 7.33 — LOW (ref 7.35–7.45)
PH BLDA: 7.34 — LOW (ref 7.35–7.45)
PH BLDA: 7.34 — LOW (ref 7.35–7.45)
PH BLDV: 7.26 — LOW (ref 7.32–7.43)
PH BLDV: 7.28 — LOW (ref 7.32–7.43)
PH BLDV: 7.28 — LOW (ref 7.32–7.43)
PH UR: 5 — SIGNIFICANT CHANGE UP (ref 5–8)
PHOSPHATE SERPL-MCNC: 6.3 MG/DL — HIGH (ref 2.5–4.5)
PHOSPHATE SERPL-MCNC: 7.1 MG/DL — HIGH (ref 2.5–4.5)
PLATELET # BLD AUTO: 43 K/UL — LOW (ref 150–400)
PLATELET # BLD AUTO: 48 K/UL — LOW (ref 150–400)
PLATELET # BLD AUTO: 53 K/UL — LOW (ref 150–400)
PLATELET # BLD AUTO: 77 K/UL — LOW (ref 150–400)
PO2 BLDA: 126 MMHG — HIGH (ref 83–108)
PO2 BLDA: 135 MMHG — HIGH (ref 83–108)
PO2 BLDA: 295 MMHG — HIGH (ref 83–108)
PO2 BLDA: 92 MMHG — SIGNIFICANT CHANGE UP (ref 83–108)
PO2 BLDV: 39 MMHG — SIGNIFICANT CHANGE UP
PO2 BLDV: 42 MMHG — SIGNIFICANT CHANGE UP
PO2 BLDV: 46 MMHG — SIGNIFICANT CHANGE UP
POTASSIUM BLDA-SCNC: 4.1 MMOL/L — SIGNIFICANT CHANGE UP (ref 3.5–4.9)
POTASSIUM BLDA-SCNC: 4.2 MMOL/L — SIGNIFICANT CHANGE UP (ref 3.5–4.9)
POTASSIUM SERPL-MCNC: 4.4 MMOL/L — SIGNIFICANT CHANGE UP (ref 3.5–5.3)
POTASSIUM SERPL-MCNC: 4.4 MMOL/L — SIGNIFICANT CHANGE UP (ref 3.5–5.3)
POTASSIUM SERPL-MCNC: 4.5 MMOL/L — SIGNIFICANT CHANGE UP (ref 3.5–5.3)
POTASSIUM SERPL-SCNC: 4.4 MMOL/L — SIGNIFICANT CHANGE UP (ref 3.5–5.3)
POTASSIUM SERPL-SCNC: 4.4 MMOL/L — SIGNIFICANT CHANGE UP (ref 3.5–5.3)
POTASSIUM SERPL-SCNC: 4.5 MMOL/L — SIGNIFICANT CHANGE UP (ref 3.5–5.3)
PROT SERPL-MCNC: 5.1 G/DL — LOW (ref 6–8.3)
PROT SERPL-MCNC: 5.1 G/DL — LOW (ref 6–8.3)
PROT UR-MCNC: (no result) MG/DL
PROTHROM AB SERPL-ACNC: 17 SEC — HIGH (ref 9.8–12.7)
PROTHROM AB SERPL-ACNC: 22.2 SEC — HIGH (ref 9.8–12.7)
PROTHROM AB SERPL-ACNC: 33.5 SEC — HIGH (ref 9.8–12.7)
RBC # BLD: 3.75 M/UL — LOW (ref 4.2–5.8)
RBC # BLD: 3.79 M/UL — LOW (ref 4.2–5.8)
RBC # BLD: 3.91 M/UL — LOW (ref 4.2–5.8)
RBC # BLD: 4.19 M/UL — LOW (ref 4.2–5.8)
RBC # FLD: 16.8 % — SIGNIFICANT CHANGE UP (ref 10.3–16.9)
RBC # FLD: 16.9 % — SIGNIFICANT CHANGE UP (ref 10.3–16.9)
RBC # FLD: 17.1 % — HIGH (ref 10.3–16.9)
RBC # FLD: 17.2 % — HIGH (ref 10.3–16.9)
SAO2 % BLDA: 100 % — SIGNIFICANT CHANGE UP (ref 95–100)
SAO2 % BLDA: 96 % — SIGNIFICANT CHANGE UP (ref 95–100)
SAO2 % BLDA: 98 % — SIGNIFICANT CHANGE UP (ref 95–100)
SAO2 % BLDA: 98 % — SIGNIFICANT CHANGE UP (ref 95–100)
SAO2 % BLDV: 66 % — SIGNIFICANT CHANGE UP
SAO2 % BLDV: 67 % — SIGNIFICANT CHANGE UP
SAO2 % BLDV: 73 % — SIGNIFICANT CHANGE UP
SODIUM BLDA-SCNC: 140 MMOL/L — SIGNIFICANT CHANGE UP (ref 138–146)
SODIUM BLDA-SCNC: 141 MMOL/L — SIGNIFICANT CHANGE UP (ref 138–146)
SODIUM SERPL-SCNC: 139 MMOL/L — SIGNIFICANT CHANGE UP (ref 135–145)
SODIUM SERPL-SCNC: 142 MMOL/L — SIGNIFICANT CHANGE UP (ref 135–145)
SODIUM SERPL-SCNC: 144 MMOL/L — SIGNIFICANT CHANGE UP (ref 135–145)
SP GR SPEC: 1.02 — SIGNIFICANT CHANGE UP (ref 1–1.03)
SPECIMEN SOURCE: SIGNIFICANT CHANGE UP
UROBILINOGEN FLD QL: 1 E.U./DL — SIGNIFICANT CHANGE UP
VANCOMYCIN TROUGH SERPL-MCNC: 21 UG/ML — HIGH (ref 10–20)
WBC # BLD: 19.6 K/UL — HIGH (ref 3.8–10.5)
WBC # BLD: 20 K/UL — HIGH (ref 3.8–10.5)
WBC # BLD: 22.6 K/UL — HIGH (ref 3.8–10.5)
WBC # BLD: 26.2 K/UL — HIGH (ref 3.8–10.5)
WBC # FLD AUTO: 19.6 K/UL — HIGH (ref 3.8–10.5)
WBC # FLD AUTO: 20 K/UL — HIGH (ref 3.8–10.5)
WBC # FLD AUTO: 22.6 K/UL — HIGH (ref 3.8–10.5)
WBC # FLD AUTO: 26.2 K/UL — HIGH (ref 3.8–10.5)

## 2018-01-22 PROCEDURE — 43840 GSTRRPHY SUTR DUOL/GSTR ULCR: CPT | Mod: 82,GC

## 2018-01-22 PROCEDURE — 43840 GSTRRPHY SUTR DUOL/GSTR ULCR: CPT | Mod: 78

## 2018-01-22 PROCEDURE — 99232 SBSQ HOSP IP/OBS MODERATE 35: CPT

## 2018-01-22 PROCEDURE — 49002 REOPENING OF ABDOMEN: CPT | Mod: 78

## 2018-01-22 PROCEDURE — 44300 OPEN BOWEL TO SKIN: CPT | Mod: 78,59

## 2018-01-22 PROCEDURE — 99291 CRITICAL CARE FIRST HOUR: CPT

## 2018-01-22 PROCEDURE — 71045 X-RAY EXAM CHEST 1 VIEW: CPT | Mod: 26,77

## 2018-01-22 PROCEDURE — 44300 OPEN BOWEL TO SKIN: CPT | Mod: 82,GC

## 2018-01-22 PROCEDURE — 49905 OMENTAL FLAP INTRA-ABDOM: CPT | Mod: 78

## 2018-01-22 PROCEDURE — 71045 X-RAY EXAM CHEST 1 VIEW: CPT | Mod: 26

## 2018-01-22 PROCEDURE — 49905 OMENTAL FLAP INTRA-ABDOM: CPT | Mod: 82,GC

## 2018-01-22 PROCEDURE — 36800 INSERTION OF CANNULA: CPT | Mod: GC

## 2018-01-22 PROCEDURE — 99223 1ST HOSP IP/OBS HIGH 75: CPT | Mod: GC

## 2018-01-22 PROCEDURE — 93970 EXTREMITY STUDY: CPT | Mod: 26

## 2018-01-22 PROCEDURE — 49002 REOPENING OF ABDOMEN: CPT | Mod: 82,59,GC

## 2018-01-22 PROCEDURE — 37191 INS ENDOVAS VENA CAVA FILTR: CPT | Mod: GC

## 2018-01-22 RX ORDER — AZITHROMYCIN 500 MG/1
TABLET, FILM COATED ORAL
Qty: 0 | Refills: 0 | Status: DISCONTINUED | OUTPATIENT
Start: 2018-01-22 | End: 2018-01-22

## 2018-01-22 RX ORDER — SODIUM CHLORIDE 9 MG/ML
1000 INJECTION INTRAMUSCULAR; INTRAVENOUS; SUBCUTANEOUS ONCE
Qty: 0 | Refills: 0 | Status: COMPLETED | OUTPATIENT
Start: 2018-01-22 | End: 2018-01-22

## 2018-01-22 RX ORDER — AZITHROMYCIN 500 MG/1
500 TABLET, FILM COATED ORAL ONCE
Qty: 0 | Refills: 0 | Status: COMPLETED | OUTPATIENT
Start: 2018-01-22 | End: 2018-01-22

## 2018-01-22 RX ORDER — ALBUMIN HUMAN 25 %
250 VIAL (ML) INTRAVENOUS EVERY 4 HOURS
Qty: 0 | Refills: 0 | Status: COMPLETED | OUTPATIENT
Start: 2018-01-22 | End: 2018-01-22

## 2018-01-22 RX ORDER — METOPROLOL TARTRATE 50 MG
5 TABLET ORAL ONCE
Qty: 0 | Refills: 0 | Status: COMPLETED | OUTPATIENT
Start: 2018-01-22 | End: 2018-01-22

## 2018-01-22 RX ORDER — DIGOXIN 250 MCG
0.5 TABLET ORAL ONCE
Qty: 0 | Refills: 0 | Status: DISCONTINUED | OUTPATIENT
Start: 2018-01-22 | End: 2018-01-22

## 2018-01-22 RX ORDER — LINEZOLID 600 MG/300ML
600 INJECTION, SOLUTION INTRAVENOUS EVERY 12 HOURS
Qty: 0 | Refills: 0 | Status: DISCONTINUED | OUTPATIENT
Start: 2018-01-22 | End: 2018-01-23

## 2018-01-22 RX ORDER — ACETAMINOPHEN 500 MG
1000 TABLET ORAL ONCE
Qty: 0 | Refills: 0 | Status: COMPLETED | OUTPATIENT
Start: 2018-01-22 | End: 2018-01-22

## 2018-01-22 RX ORDER — ARGATROBAN 50 MG/50ML
1 INJECTION, SOLUTION INTRAVENOUS
Qty: 50 | Refills: 0 | Status: DISCONTINUED | OUTPATIENT
Start: 2018-01-22 | End: 2018-01-22

## 2018-01-22 RX ORDER — FLUCONAZOLE 150 MG/1
TABLET ORAL
Qty: 0 | Refills: 0 | Status: DISCONTINUED | OUTPATIENT
Start: 2018-01-22 | End: 2018-01-22

## 2018-01-22 RX ORDER — METHYLENE BLUE 65 MG
10 TABLET ORAL ONCE
Qty: 0 | Refills: 0 | Status: DISCONTINUED | OUTPATIENT
Start: 2018-01-22 | End: 2018-01-22

## 2018-01-22 RX ORDER — FLUCONAZOLE 150 MG/1
400 TABLET ORAL ONCE
Qty: 0 | Refills: 0 | Status: COMPLETED | OUTPATIENT
Start: 2018-01-22 | End: 2018-01-22

## 2018-01-22 RX ORDER — SODIUM CHLORIDE 9 MG/ML
1000 INJECTION INTRAMUSCULAR; INTRAVENOUS; SUBCUTANEOUS
Qty: 0 | Refills: 0 | Status: DISCONTINUED | OUTPATIENT
Start: 2018-01-22 | End: 2018-01-24

## 2018-01-22 RX ORDER — MICAFUNGIN SODIUM 100 MG/1
INJECTION, POWDER, LYOPHILIZED, FOR SOLUTION INTRAVENOUS
Qty: 0 | Refills: 0 | Status: DISCONTINUED | OUTPATIENT
Start: 2018-01-22 | End: 2018-02-01

## 2018-01-22 RX ORDER — MICAFUNGIN SODIUM 100 MG/1
100 INJECTION, POWDER, LYOPHILIZED, FOR SOLUTION INTRAVENOUS ONCE
Qty: 0 | Refills: 0 | Status: COMPLETED | OUTPATIENT
Start: 2018-01-22 | End: 2018-01-22

## 2018-01-22 RX ORDER — MEROPENEM 1 G/30ML
500 INJECTION INTRAVENOUS EVERY 12 HOURS
Qty: 0 | Refills: 0 | Status: DISCONTINUED | OUTPATIENT
Start: 2018-01-22 | End: 2018-01-25

## 2018-01-22 RX ORDER — MICAFUNGIN SODIUM 100 MG/1
100 INJECTION, POWDER, LYOPHILIZED, FOR SOLUTION INTRAVENOUS EVERY 24 HOURS
Qty: 0 | Refills: 0 | Status: DISCONTINUED | OUTPATIENT
Start: 2018-01-23 | End: 2018-02-01

## 2018-01-22 RX ADMIN — MEROPENEM 100 MILLIGRAM(S): 1 INJECTION INTRAVENOUS at 16:38

## 2018-01-22 RX ADMIN — LINEZOLID 300 MILLIGRAM(S): 600 INJECTION, SOLUTION INTRAVENOUS at 14:50

## 2018-01-22 RX ADMIN — PIPERACILLIN AND TAZOBACTAM 200 GRAM(S): 4; .5 INJECTION, POWDER, LYOPHILIZED, FOR SOLUTION INTRAVENOUS at 08:00

## 2018-01-22 RX ADMIN — Medication 125 MILLILITER(S): at 04:13

## 2018-01-22 RX ADMIN — Medication 125 MILLILITER(S): at 08:00

## 2018-01-22 RX ADMIN — FLUCONAZOLE 100 MILLIGRAM(S): 150 TABLET ORAL at 09:52

## 2018-01-22 RX ADMIN — SODIUM CHLORIDE 2000 MILLILITER(S): 9 INJECTION INTRAMUSCULAR; INTRAVENOUS; SUBCUTANEOUS at 15:59

## 2018-01-22 RX ADMIN — PANTOPRAZOLE SODIUM 10 MG/HR: 20 TABLET, DELAYED RELEASE ORAL at 02:24

## 2018-01-22 RX ADMIN — Medication 5 MILLIGRAM(S): at 02:50

## 2018-01-22 RX ADMIN — Medication 400 MILLIGRAM(S): at 04:13

## 2018-01-22 RX ADMIN — MICAFUNGIN SODIUM 105 MILLIGRAM(S): 100 INJECTION, POWDER, LYOPHILIZED, FOR SOLUTION INTRAVENOUS at 14:48

## 2018-01-22 RX ADMIN — FENTANYL CITRATE 1 MICROGRAM(S)/KG/HR: 50 INJECTION INTRAVENOUS at 01:14

## 2018-01-22 RX ADMIN — CHLORHEXIDINE GLUCONATE 15 MILLILITER(S): 213 SOLUTION TOPICAL at 05:16

## 2018-01-22 RX ADMIN — PIPERACILLIN AND TAZOBACTAM 200 GRAM(S): 4; .5 INJECTION, POWDER, LYOPHILIZED, FOR SOLUTION INTRAVENOUS at 02:12

## 2018-01-22 RX ADMIN — AZITHROMYCIN 255 MILLIGRAM(S): 500 TABLET, FILM COATED ORAL at 11:47

## 2018-01-22 NOTE — PROGRESS NOTE ADULT - ATTENDING COMMENTS
Called by primary team to evaluate for IVC filter.    The patient is s/p patch for perf duodenal ulcer. 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 Persistent leak from ulcer, returning to OR for ex-lap. Duplex performed shows acute DVT in right popliteal vein.    When examined, patient intubated. Bilateral swollen legs. Has palpable DP bilateral.    Duplex reviewed - shows DVT.    Given acute DVT and risks for VTE, agree that placement of an IVC filter is appropriate. Family are agreeable.    Will proceed with IVC filter.    Discussed with Dr Metcalf, and we will perform this immediately prior to Gen Surg proceeding with Ex-Lap.

## 2018-01-22 NOTE — PROGRESS NOTE ADULT - SUBJECTIVE AND OBJECTIVE BOX
Chief Complaint/Reason for Consult: shock  INTERVAL HPI: weekend events noted, fellow eval overnight note reviewed, d/w SICU at bedside this AM  	  MEDICATIONS:  norepinephrine Infusion 0.01 MICROgram(s)/kG/Min IV Continuous <Continuous>    piperacillin/tazobactam IVPB. 2.25 Gram(s) IV Intermittent every 6 hours      fentaNYL   Infusion 0.112 MICROgram(s)/kG/Hr IV Continuous <Continuous>  propofol Infusion 1.864 MICROgram(s)/kG/Min IV Continuous <Continuous>    pantoprazole Infusion 8 mG/Hr IV Continuous <Continuous>    insulin lispro (HumaLOG) corrective regimen sliding scale   SubCutaneous Before meals and at bedtime    chlorhexidine 0.12% Liquid 15 milliLiter(s) Swish and Spit two times a day  dextrose 5%. 1000 milliLiter(s) IV Continuous <Continuous>  influenza   Vaccine 0.5 milliLiter(s) IntraMuscular once      REVIEW OF SYSTEMS:  [x] As per HPI  CONSTITUTIONAL: No fever, weight loss, or fatigue  RESPIRATORY: No cough, wheezing, chills or hemoptysis; No Shortness of Breath  CARDIOVASCULAR: No chest pain, palpitations, dizziness, or leg swelling  GASTROINTESTINAL: No abdominal or epigastric pain. No nausea, vomiting, or hematemesis; No diarrhea or constipation. No melena or hematochezia.  MUSCULOSKELETAL: No joint pain or swelling; No muscle, back, or extremity pain  [x] All others negative	  [ ] Unable to obtain    PHYSICAL EXAM:  T(C): 37.4 (01-22-18 @ 08:00), Max: 38.3 (01-21-18 @ 17:01)  HR: 117 (01-22-18 @ 08:00) (94 - 124)  BP: 87/68 (01-22-18 @ 08:00) (85/60 - 120/70)  RR: 14 (01-22-18 @ 08:00) (12 - 19)  SpO2: 99% (01-22-18 @ 08:00) (95% - 99%)  Wt(kg): --  I&O's Summary    21 Jan 2018 07:01  -  22 Jan 2018 07:00  --------------------------------------------------------  IN: 1110 mL / OUT: 1320 mL / NET: -210 mL    22 Jan 2018 07:01  -  22 Jan 2018 08:48  --------------------------------------------------------  IN: 20 mL / OUT: 10 mL / NET: 10 mL          Appearance: Normal	  HEENT:   Normal oral mucosa  Cardiovascular: Normal S1 S2, No JVD, No murmurs, No edema  Respiratory: Lungs clear to auscultation	  Gastrointestinal:  Soft, Non-tender, + BS	  Extremities: Normal range of motion, No clubbing, cyanosis or edema  Vascular: Peripheral pulses palpable 2+ bilaterally    TELEMETRY: 	    ECG:   	  RADIOLOGY:   CXR:  CT:  US:    CARDIAC TESTING:  Echocardiogram:  Catheterization:  Stress Test:      LABS:	 	    CARDIAC MARKERS:                                  12.9   26.2  )-----------( 48       ( 22 Jan 2018 04:50 )             39.5     01-22    139  |  106  |  75<H>  ----------------------------<  133<H>  4.5   |  20<L>  |  2.56<H>    Ca    7.5<L>      22 Jan 2018 04:50  Phos  6.3     01-22  Mg     2.3     01-22    TPro  5.1<L>  /  Alb  2.4<L>  /  TBili  2.4<H>  /  DBili  x   /  AST  65<H>  /  ALT  363<H>  /  AlkPhos  63  01-22    proBNP:   Lipid Profile:   HgA1c:   TSH:     ASSESSMENT/PLAN: 	    1. Shock - MVO2 <70 (improved) EF persevered MR severe, afib rvr, etiology septic not cardiogenic in nature.    2. Severe MR - recommend AMRIK, digoxin load (CrCl >30) and AC hep gtt.    3. Congestion - recommend Lasix IV PRN as tolerated off pressors and MAPs 70s.    4. Afib - hep gtt and dig load ep consult    5. Eventually would benefit from afterload reduction via hydralazine or nitro.

## 2018-01-22 NOTE — BRIEF OPERATIVE NOTE - PROCEDURE
<<-----Click on this checkbox to enter Procedure Exploratory laparotomy  01/22/2018  washout, primary closure of duodenal ulcer with overlying omental patch, retrograde duodenostomy, Jon Gastrostomy, revision of feeding jejunostomy, retention suture placement.  Active  [id number]

## 2018-01-22 NOTE — PROGRESS NOTE ADULT - ATTENDING COMMENTS
Patient seen and examined; Events well known to me and discussed at length with surgical staff and proxy; Back in OR and will require HD post op; more than likely.  Clot noted in Right Pop. To get IVC filter;  Will follow

## 2018-01-22 NOTE — PROGRESS NOTE ADULT - ASSESSMENT
ASSESSMENT/PLAN 71y/o male pt c Respiratory failure, perforated duodenal ulcer, POD I post repair, RLL pneumonia, RLL atelectases, no obvious RADHA, elevated R hemidiaphragm, AFIB, possible cholangitis, thrombocytopenia, possible endocarditis, new RLL mass like infiltrate vs external shadow. HIT    1. O2 Continue CMV mode today do not attempt weaning at this time  2. Bronchodilators:  Atrovent/ albuterol q 4 – 6 hours as needed  3. Corticosteroids: off  4. ID/Antibiotics: must continue ABX for RLL pneumonia, ID ,Mark feedback  5. Cardiac/HTN: Monitor, optimize HR, cardiology close follow up  6. GI: Rx/ prophylaxis c PPI/H2B, Pt discussed c GI  7. Heme: Rx/VT prophylaxis c SQH/SCD/AC    8. Aspiration precautions, NGT   9. Surgery mngmnt priority  10,Repeat CXR now  Discussed with managing team ASSESSMENT/PLAN 73y/o male pt c Respiratory failure, perforated duodenal ulcer, POD I post repair, RLL pneumonia, RLL atelectases, no obvious RADHA, elevated R hemidiaphragm, AFIB, possible cholangitis, thrombocytopenia, possible endocarditis, new RLL mass like infiltrate vs external shadow. 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 O2 Continue CMV mode today do not attempt weaning at this time  2. Bronchodilators:  Atrovent/ albuterol q 4 – 6 hours as needed  3. Corticosteroids: off  4. ID/Antibiotics: must continue ABX for RLL pneumonia, sepsis \, ID Mark Wolfe feedback pt discussed c him  5. Cardiac/HTN: Monitor, optimize HR, cardiology close follow up  6. GI: Rx/ prophylaxis c PPI/H2B, Pt discussed c GI  7. Heme: Rx/VT prophylaxis c SQH/SCD/AC    8. Aspiration precautions, NGT   9. Surgery mngmnt priority, pt planned for OR today  10,Repeat CXR now  Discussed with managing team, SICU, surgery, ID

## 2018-01-22 NOTE — CONSULT NOTE ADULT - SUBJECTIVE AND OBJECTIVE BOX
This is 72 year old male with PMH for severe mitral and aortic valve regurgitation, who presented in the ED with nausea, vomiting, diarrhea. During the hospital stay found to be in shock, Afib and on  - perforation of the duodenum - been in shock since than, intubated since than. Since  - decreasing of the urinary output with worsening of the renal function. For that reason renal service is called. Early in the afternoon the patient still intubated, on ventilaotr, sedated,   in shock on pressor support, minimal urine output according to the nurse 10 to 15 ml per hour according to the Nurse. During the hospital got CI angio and was on Vanco with intermittent   supra therapeutic levels. Nort known baselin renal function - looks to have CKD on the baseline     Patient is a 72y Male admitted for     PAST MEDICAL & SURGICAL HISTORY:  Mitral regurgitation  Aortic regurgitation  No significant past surgical history      MEDICATIONS  (STANDING):  chlorhexidine 0.12% Liquid 15 milliLiter(s) Swish and Spit two times a day  dextrose 5%. 1000 milliLiter(s) (50 mL/Hr) IV Continuous <Continuous>  digoxin  Injectable 0.5 milliGRAM(s) IV Push once  fentaNYL   Infusion 0.112 MICROgram(s)/kG/Hr (1 mL/Hr) IV Continuous <Continuous>  insulin lispro (HumaLOG) corrective regimen sliding scale   SubCutaneous Before meals and at bedtime  linezolid  IVPB 600 milliGRAM(s) IV Intermittent every 12 hours  meropenem IVPB 500 milliGRAM(s) IV Intermittent every 12 hours  methylene blue 0.5% Injectable 10 milliLiter(s) IV Push once  micafungin IVPB      norepinephrine Infusion 0.01 MICROgram(s)/kG/Min (1.676 mL/Hr) IV Continuous <Continuous>  pantoprazole Infusion 8 mG/Hr (10 mL/Hr) IV Continuous <Continuous>  propofol Infusion 1.864 MICROgram(s)/kG/Min (1 mL/Hr) IV Continuous <Continuous>  sodium chloride 0.9% Bolus 1000 milliLiter(s) IV Bolus once    MEDICATIONS  (PRN):      Allergies    No Known Allergies    Intolerances        SOCIAL HISTORY:    FAMILY HISTORY:      T(C): , Max: 38.3 (18 @ 17:01)  T(F): , Max: 100.9 (18 @ 17:01)  HR: 116 (18 @ 14:00)  BP: 116/79 (18 @ 14:00)  BP(mean): 101 (18 @ 14:00)  RR: 16 (18 @ 14:00)  SpO2: 97% (18 @ 14:00)  Wt(kg): --     @ 07: @ 07:00  --------------------------------------------------------  IN: 1110 mL / OUT: 1460 mL / NET: -350 mL     @ 07: @ 15:52  --------------------------------------------------------  IN: 442.9 mL / OUT: 130 mL / NET: 312.9 mL      Physical exam:   Intubated and sedated   On Ventilator   Normal air entry into the lungs, no wheezing, decreased breath sounds on the right base   tachycardic - normal s1/s2, no murmurs, rubs or gallops   Abdomen - soft, slightly distended, s/p laparotomy   Extremities: 3+ peripheral edema       LABS:                        12.0   22.6  )-----------( 53       ( 2018 13:32 )             36.7         139  |  106  |  75<H>  ----------------------------<  133<H>  4.5   |  20<L>  |  2.56<H>    Ca    7.5<L>      2018 04:50  Phos  6.3       Mg     2.3         TPro  5.1<L>  /  Alb  2.4<L>  /  TBili  2.4<H>  /  DBili  x   /  AST  65<H>  /  ALT  363<H>  /  AlkPhos  63        PT/INR - ( 2018 04:50 )   PT: 17.0 sec;   INR: 1.52          PTT - ( 2018 13:10 )  PTT:119.8 sec  Urinalysis Basic - ( 2018 06:55 )    Color: Yellow / Appearance: Clear / S.025 / pH: x  Gluc: x / Ketone: NEGATIVE  / Bili: Small / Urobili: 1.0 E.U./dL   Blood: x / Protein: Trace mg/dL / Nitrite: NEGATIVE   Leuk Esterase: NEGATIVE / RBC: < 5 /HPF / WBC 5-10 /HPF   Sq Epi: x / Non Sq Epi: x / Bacteria: Many /HPF            RADIOLOGY & ADDITIONAL STUDIES:      < from: Xray Chest 1 View AP- PORTABLE-Urgent (18 @ 08:44) >  COMPARISON: Chest radiograph 2018    FINDINGS:  The radiograph is degraded, as the patient was imaged lying on a board    The endotracheal and enteric feeding tubes are unchanged in position. The   right IJ central venous catheter tip also appears unchanged in position.   There is a right-sided chest tube drainage catheter projecting over the   lower right hemithorax.    There is a roughly 10 x 7 cm masslike density in the right lower   hemithorax, which is newly developed. A persistent left basilar density   obscures the left hemidiaphragm. Evaluation for pneumothorax is limited.   Cardiomediastinal silhouette is enlarged.      IMPRESSION:   1. Newly developed 10 x 7 cm masslike density in the RIGHT lower   hemithorax.    2. Persistent left basilar density.    3. Supporttubes and lines as described.    < end of copied text >

## 2018-01-22 NOTE — PROGRESS NOTE ADULT - SUBJECTIVE AND OBJECTIVE BOX
Patient is a 72y old  Male who presents with a chief complaint of acute cholecystitis (2018 14:49)      HPI:  71 yo M with h/o severe mitral and aortic valve regurgitation presented to ED with 5 day h/o left lower abdominal pain, nausea and multiple episodes of vomiting as well as PO intolerance. Pt reports that the pain in his abdomen is illicited by movement. Went to [facility] two days ago and underwent CT scan which revealed acute cholecystitis as well as right lower lobe infectious pneumonia. Patient s/p Expl lap with patch to perforation.      INTERVAL HPI/OVERNIGHT EVENTS:::Currently abd exam as per surgery--prob leak, not a surgical candidate.  Intubated on levophed.    HEALTH ISSUES - PROBLEM Dx:  Sepsis, due to unspecified organism: Sepsis, due to unspecified organism  Atrial fibrillation, unspecified type: Atrial fibrillation, unspecified type  Coagulopathy: Coagulopathy  Mitral regurgitation: Mitral regurgitation  Chronic kidney insufficiency, stage 3 (moderate): Chronic kidney insufficiency, stage 3 (moderate)  Thrombocytopenia: Thrombocytopenia  Transaminitis: Transaminitis  Acute CHF: Acute CHF  Afib: Afib  Sepsis: Sepsis  Abdominal pain, unspecified abdominal location: Abdominal pain, unspecified abdominal location  Aortic valve insufficiency, etiology of cardiac valve disease unspecified: Aortic valve insufficiency, etiology of cardiac valve disease unspecified  Hepatitis: Hepatitis  Pneumonia, bacterial: Pneumonia, bacterial          PAST MEDICAL & SURGICAL HISTORY:  Mitral regurgitation  Aortic regurgitation  No significant past surgical history          Consultant NOTE  REVIEWED  (_++   )    REVIEW OF SYSTEMS:  [x] As per HPI              Vital Signs Last 24 Hrs  T(C): 37.7 (2018 14:42), Max: 38.3 (2018 17:01)  T(F): 99.9 (2018 14:42), Max: 100.9 (2018 17:01)  HR: 116 (2018 14:00) (101 - 124)  BP: 116/79 (2018 14:00) (72/62 - 119/71)  BP(mean): 101 (2018 14:00) (43 - 103)  RR: 16 (2018 14:00) (12 - 19)  SpO2: 97% (2018 14:00) (95% - 100%)    Mode: AC/ CMV (Assist Control/ Continuous Mandatory Ventilation)  RR (machine): 12  TV (machine): 500  FiO2: 40  PEEP: 5  ITime: 0.9  MAP: 8  PIP: 19       @ 07: @ 07:00  --------------------------------------------------------  IN: 1110 mL / OUT: 1460 mL / NET: -350 mL     @ 07: @ 16:10  --------------------------------------------------------  IN: 442.9 mL / OUT: 130 mL / NET: 312.9 mL      PHYSICAL EXAMINATION:                                    (    )  NO CHANGE  Appearance: Normal	  HEENT:   Intubated , sedated	  Neck: Supple, + JVD/ - JVD; Carotid Bruit   Cardiovascular: Normal S1 S2, No JVD, tachy  Respiratory: bilat rhonchi	  Gastrointestinal:  Soft, Non-tender, + BS	  Skin: No rashes, No ecchymoses, No cyanosis  Extremities: unchanged  Vascular: Peripheral pulses palpable diminished  Neurologic:  Psychiatry:    chlorhexidine 0.12% Liquid 15 milliLiter(s) Swish and Spit two times a day  dextrose 5%. 1000 milliLiter(s) IV Continuous <Continuous>  digoxin  Injectable 0.5 milliGRAM(s) IV Push once  fentaNYL   Infusion 0.112 MICROgram(s)/kG/Hr IV Continuous <Continuous>  insulin lispro (HumaLOG) corrective regimen sliding scale   SubCutaneous Before meals and at bedtime  linezolid  IVPB 600 milliGRAM(s) IV Intermittent every 12 hours  meropenem IVPB 500 milliGRAM(s) IV Intermittent every 12 hours  methylene blue 0.5% Injectable 10 milliLiter(s) IV Push once  micafungin IVPB      norepinephrine Infusion 0.01 MICROgram(s)/kG/Min IV Continuous <Continuous>  pantoprazole Infusion 8 mG/Hr IV Continuous <Continuous>  propofol Infusion 1.864 MICROgram(s)/kG/Min IV Continuous <Continuous>  sodium chloride 0.9% Bolus 1000 milliLiter(s) IV Bolus once        PT/INR - ( 2018 04:50 )   PT: 17.0 sec;   INR: 1.52          PTT - ( 2018 13:10 )  PTT:119.8 sec    CARDIAC MARKERS ( 2018 23:05 )  x     / 0.06 ng/mL / x     / x     / x      CARDIAC MARKERS ( 2018 18:51 )  x     / 0.06 ng/mL / 58 U/L / x     / 1.7 ng/mL                            12.0   22.6  )-----------( 53       ( 2018 13:32 )             36.7         139  |  106  |  75<H>  ----------------------------<  133<H>  4.5   |  20<L>  |  2.56<H>    Ca    7.5<L>      2018 04:50  Phos  6.3       Mg     2.3         TPro  5.1<L>  /  Alb  2.4<L>  /  TBili  2.4<H>  /  DBili  x   /  AST  65<H>  /  ALT  363<H>  /  AlkPhos  63        CAPILLARY BLOOD GLUCOSE      POCT Blood Glucose.: 106 mg/dL (2018 12:15)  POCT Blood Glucose.: 112 mg/dL (2018 05:13)  POCT Blood Glucose.: 106 mg/dL (2018 21:59)  POCT Blood Glucose.: 101 mg/dL (2018 17:37)    Urinalysis Basic - ( 2018 06:55 )    Color: Yellow / Appearance: Clear / S.025 / pH: x  Gluc: x / Ketone: NEGATIVE  / Bili: Small / Urobili: 1.0 E.U./dL   Blood: x / Protein: Trace mg/dL / Nitrite: NEGATIVE   Leuk Esterase: NEGATIVE / RBC: < 5 /HPF / WBC 5-10 /HPF   Sq Epi: x / Non Sq Epi: x / Bacteria: Many /HPF

## 2018-01-22 NOTE — CONSULT NOTE ADULT - PROBLEM SELECTOR RECOMMENDATION 3
- also has respiratory one   - please consider Linezolid can make it worse   - check the Lactate level

## 2018-01-22 NOTE — PROGRESS NOTE ADULT - SUBJECTIVE AND OBJECTIVE BOX
Patient seen and examined at bedside. Levophed currently paused as per nursing. /64.  Bilateral LE cool to touch with bilateral biphasic DP/PT signals.   No significant changes in clinical status of LE overnight.     linezolid  IVPB 600  meropenem IVPB 500  micafungin IVPB   linezolid  IVPB 600  meropenem IVPB 500  micafungin IVPB   norepinephrine Infusion 0.01        Vital Signs Last 24 Hrs  T(C): 37.7 (22 Jan 2018 14:42), Max: 38.3 (21 Jan 2018 17:01)  T(F): 99.9 (22 Jan 2018 14:42), Max: 100.9 (21 Jan 2018 17:01)  HR: 118 (22 Jan 2018 16:00) (101 - 124)  BP: 92/69 (22 Jan 2018 16:00) (72/62 - 119/71)  BP(mean): 79 (22 Jan 2018 16:00) (43 - 103)  RR: 16 (22 Jan 2018 16:00) (12 - 19)  SpO2: 95% (22 Jan 2018 16:00) (95% - 100%)  I&O's Detail    21 Jan 2018 07:01  -  22 Jan 2018 07:00  --------------------------------------------------------  IN:    Albumin 5%  - 250 mL: 250 mL    fentaNYL  Infusion: 120 mL    IV PiggyBack: 200 mL    lactated ringers.: 100 mL    norepinephrine Infusion: 80 mL    pantoprazole Infusion: 240 mL    propofol Infusion: 120 mL  Total IN: 1110 mL    OUT:    Bulb: 160 mL    Bulb: 200 mL    Indwelling Catheter - Urethral: 760 mL    Nasoenteral Tube: 340 mL  Total OUT: 1460 mL    Total NET: -350 mL      22 Jan 2018 07:01  -  22 Jan 2018 16:27  --------------------------------------------------------  IN:    argatroban Infusion: 15.9 mL    fentaNYL  Infusion: 35 mL    IV PiggyBack: 200 mL    norepinephrine Infusion: 67 mL    pantoprazole Infusion: 70 mL    propofol Infusion: 55 mL  Total IN: 442.9 mL    OUT:    Indwelling Catheter - Urethral: 170 mL  Total OUT: 170 mL    Total NET: 272.9 mL          Physical Exam:  General: NAD, intubated and sedated.   Pulmonary: Nonlabored breathing, no respiratory distress  Cardiovascular: tachycardic, irregularly irregular   Abdominal: soft, NT/ND  B/l LE swelling worse on RLE, B/l cool feet with cyanotic toes b/l worse on right  Vascular: Biphasic- Triphasic DP/PT doppler signal b/l, triphasic pop b/l, palpable fem b/l        LABS:                        12.0   22.6  )-----------( 53       ( 22 Jan 2018 13:32 )             36.7     01-22    139  |  106  |  75<H>  ----------------------------<  133<H>  4.5   |  20<L>  |  2.56<H>    Ca    7.5<L>      22 Jan 2018 04:50  Phos  6.3     01-22  Mg     2.3     01-22    TPro  5.1<L>  /  Alb  2.4<L>  /  TBili  2.4<H>  /  DBili  x   /  AST  65<H>  /  ALT  363<H>  /  AlkPhos  63  01-22    PT/INR - ( 22 Jan 2018 04:50 )   PT: 17.0 sec;   INR: 1.52          PTT - ( 22 Jan 2018 13:10 )  PTT:119.8 sec    Radiology and Additional Studies:    Assessment and Plan:   73 y/o M with h/o severe mitral and aortic valve regurgitation now with septic shock from perforated duodenal ulcer s/p Ex-lap and omental kaylee patch, on levophed, new A. fib, pneumonia and CHF noted to have acute b/l limb ischemia, possibly from use of pressor or emboli    No Vascular surgery intervention at this time.  Please keep feet warm and continue to monitor.  Will follow

## 2018-01-22 NOTE — PROGRESS NOTE ADULT - SUBJECTIVE AND OBJECTIVE BOX
Procedure: IVC filter placement  Surgeon: Dr. Pierre    S: Pt seen at bedside, intubated and sedated.    O:  T(C): --  T(F): --  HR: --  BP: --  RR: --  SpO2: --  Wt(kg): --                        11.5   20.0  )-----------( 43       ( 22 Jan 2018 16:15 )             35.1     01-22    142  |  107  |  78<H>  ----------------------------<  199<H>  4.4   |  22  |  2.69<H>    Ca    7.3<L>      22 Jan 2018 16:15  Phos  6.3     01-22  Mg     2.3     01-22    TPro  5.1<L>  /  Alb  2.4<L>  /  TBili  2.4<H>  /  DBili  x   /  AST  65<H>  /  ALT  363<H>  /  AlkPhos  63  01-22      Gen: NAD, resting comfortably in bed  C/V: NSR  Pulm: Nonlabored breathing, no respiratory distress  Extrem: Right groin soft, dressing CDI, no signs of bleeding or hematoma, Foot cold b/l, improved b/l LE toes duskiness, triphasic DP/PT doppler signal b/l      A/P: 72yMale s/p above procedure  Continue to monitor right groin for any signs of bleeding or hematoma  Keep foot warm b/l  Rest of plan as per primary team

## 2018-01-22 NOTE — PROGRESS NOTE ADULT - SUBJECTIVE AND OBJECTIVE BOX
Surgery attending:    Events of the afternoon evening. Patient continued to deteriorate physiologically through the afternoon. In an effort to delineate patch repair integrity we placed methylene blue dye via his nasogastric tube. Within a few minutes blue dye could be seen in the abdominal drains confirming loss of integrity of the patch. Given his worsening state, after much discussion with SICU staff and patients health care proxy decision was made to return to operating room for exploration and damage control. We discussed risks in detail and potential outcomes. His health care proxy who was available wished to proceed. We answered all questions. Patients was brought the operating room and necrosis of the omentum was encountered, potentially secondary to pressor support and coagulopathy. Details of operation will be in the brief operative note.

## 2018-01-22 NOTE — PROGRESS NOTE ADULT - ASSESSMENT
[id number] yo M with PMHx severe MR and AR  recently diagnosed with Acute Cholecystitis as well as RLL PNA by CT scan at [facility] however signed out AMA who presented to [facility] ED 1/18 c/o lower abdominal pain, nausea, vomiting found to have new onset rapid Afib in the setting of sepsis as well as acute CHF exacerbation and abdominal pain subsided at time of consult. Pt admitted to cardiology and placed on bipap. On HD 1 pt c/o acute abd pain, CT scan showing perforated Duodenum therefore pt taken emergently to OR.     Neuro: propofol, fentanyl  CV: Pressors required to maintain MAP > 70. Afib: Echo EF 50%, ?vegetation and Severe MR. will need AMRIK when HD stable  Pulm: Intubated on cmv 40/500/12/5 chlorhex  GI: NPO with NGT to [facility]IR[facility]. Sanjayube. Protonix gtt.   : Oliveira, GAETANO,   ID: Perforated duodenum/PNA: linezolid (1/22--), micafungin (1/22--), meropenem (1/22--) //// discontinued Vanco (1/19-1/20) Zosyn (1/19-1/22), f/u Bld cx's  Endo: ISS  HEME: Heparin antibody positive, serotonin release assay pending. Argatroban gtt  PPx: SCD  Lines: PIV, L brachial [place] (1/20-), RIJ TLC  Wounds: Ex lap, JPx2, J-tube  PT/OT: Not ordered      *Methylene blue test positive, patient taken emergently to OR for washout.  * DVT in R. popliteal will also undergo filter placement during abdominal exploration

## 2018-01-22 NOTE — CONSULT NOTE ADULT - PROBLEM SELECTOR RECOMMENDATION 9
- most likely related to the ATN due to shock (other perticipating factors are IV contrast, Vanco), on pressors   - no urgent need of HD today   - oligo anuric   - volume status - could have third spacing - continue with Albumin infusion for now every 8 hours   - if the urine output is not improved - consider placement of line   - consider checking intra abdominal pressure - s/p surgery   - in and outs   - daily weight   - renal diet

## 2018-01-22 NOTE — PROGRESS NOTE ADULT - SUBJECTIVE AND OBJECTIVE BOX
Interventional, Pulmonary, Critical, Chest Special Procedures.    Pt was seen and fully examined by myself.     Time spent with patient in minutes:67    Patient is a 72y old  Male who presents with a chief complaint of acute cholecystitis (18 Jan 2018 14:49)Events of last 24 hrs reviewed. The patient discussed c ID    HPI:  71 yo M with h/o severe mitral and aortic valve regurgitation presented to ED with 5 day h/o left lower abdominal pain, nausea and multiple episodes of vomiting as well as PO intolerance. Pt reports that the pain in his abdomen is illicited by movement. Went to [facility] two days ago and underwent CT scan which revealed acute cholecystitis as well as right lower lobe infectious pneumonia. Pt was told he would require surgery and left AMA because he was waiting too long. Denies f/c. Last echo 1/4/17 with EF 60-65%. Normal BMs and passing flatus. (18 Jan 2018 14:49)    REVIEW OF SYSTEMS:  updated    PAST MEDICAL & SURGICAL HISTORY:  Mitral regurgitation  Aortic regurgitation  No significant past surgical history    FAMILY HISTORY:    SOCIAL HISTORY:      - Tobacco     - ETOH    Allergies    No Known Allergies    Intolerances      Vital Signs Last 24 Hrs  T(C): 37.7 (22 Jan 2018 14:42), Max: 38.3 (21 Jan 2018 17:01)  T(F): 99.9 (22 Jan 2018 14:42), Max: 100.9 (21 Jan 2018 17:01)  HR: 116 (22 Jan 2018 14:00) (101 - 124)  BP: 116/79 (22 Jan 2018 14:00) (72/62 - 119/71)  BP(mean): 101 (22 Jan 2018 14:00) (43 - 103)  RR: 16 (22 Jan 2018 14:00) (12 - 19)  SpO2: 97% (22 Jan 2018 14:00) (95% - 100%)    01-21 @ 07:01  -  01-22 @ 07:00  --------------------------------------------------------  IN: 1110 mL / OUT: 1460 mL / NET: -350 mL    01-22 @ 07:01 - 01-22 @ 15:15  --------------------------------------------------------  IN: 442.9 mL / OUT: 130 mL / NET: 312.9 mL      Mode: AC/ CMV (Assist Control/ Continuous Mandatory Ventilation)  RR (machine): 12  TV (machine): 500  FiO2: 40  PEEP: 5  ITime: 0.9  MAP: 8  PIP: 19    PHYSICAL EXAM:  Comfortable, on CMV sedated  Eyes: PERRL, EOM intact; conjunctiva and sclera clear  Head: Normocephalic;  No Trauma  ENMT: No nasal discharge, hoarseness, cough or hemoptysis.    Neck: Supple; non tender; no masses or deformities.    No JVD  Respiratory: - WHEEZING   + RLL RHONCHI  - RALES  - CRACKLES.  Diminished breath sounds  BILATERAL  RIGHT > LEFT baswe  Cardiovascular: Regular rate and rhythm. S1 and S2 Normal; + murmurs, gallops or rubs     - PPM/AICD  Gastrointestinal: distended; no  bowel sounds; No hepatosplenomegaly.     -PEG    +  GT   +ANGELES  Genitourinary: No costovertebral angle tenderness. No dysuria  Extremities: sedated decreased AROM, No clubbing, cyanosis or edema    Vascular: Peripheral pulses palpable 2+ bilaterally  Neurological: intubated sedated  Skin: Warm and dry. No obvious rash  Lymph Nodes: No acute cervical or supraclavicular adenopathy  Psychiatric: intubated    DEVICES:  - DENTURES   +IV R / L     - ETUBE   -TRACH   -CTUBE  R / L      LABS:  ABG - ( 22 Jan 2018 04:42 )  pH: 7.34  /  pCO2: 40    /  pO2: 92    / HCO3: 21    / Base Excess: -4.7  /  SaO2: 96                                      12.0   22.6  )-----------( 53       ( 22 Jan 2018 13:32 )             36.7     01-22    139  |  106  |  75<H>  ----------------------------<  133<H>  4.5   |  20<L>  |  2.56<H>    Ca    7.5<L>      22 Jan 2018 04:50  Phos  6.3     01-22  Mg     2.3     01-22    TPro  5.1<L>  /  Alb  2.4<L>  /  TBili  2.4<H>  /  DBili  x   /  AST  65<H>  /  ALT  363<H>  /  AlkPhos  63  01-22    PT/INR - ( 22 Jan 2018 04:50 )   PT: 17.0 sec;   INR: 1.52          PTT - ( 22 Jan 2018 13:10 )  PTT:119.8 sec  < from: Xray Chest 1 View AP- PORTABLE-Urgent (01.22.18 @ 08:44) >    EXAM:  XR CHEST PORTABLE URGENT 1V                          PROCEDURE DATE:  01/22/2018                     INTERPRETATION:  INDICATION: SICU     TECHNIQUE: Chest, single portable AP view on  1/22/2018 8:44 AM     COMPARISON: Chest radiograph 1/21/2018    FINDINGS:  The radiograph is degraded, as the patient was imaged lying on a board    The endotracheal and enteric feeding tubes are unchanged in position. The   right IJ central venous catheter tip also appears unchanged in position.   There is a right-sided chest tube drainage catheter projecting over the   lower right hemithorax.    There is a roughly 10 x 7 cm masslike density in the right lower   hemithorax, which is newly developed. A persistent left basilar density   obscures the left hemidiaphragm. Evaluation for pneumothorax is limited.   Cardiomediastinal silhouette is enlarged.      IMPRESSION:   1. Newly developed 10 x 7 cm masslike density in the RIGHT lower   hemithorax.    2. Persistent left basilar density.    3. Supporttubes and lines as described.    < end of copied text >  RADIOLOGY & ADDITIONAL STUDIES (The following images were personally reviewed): Interventional, Pulmonary, Critical, Chest Special Procedures.    Pt was seen and fully examined by myself.     Time spent with patient in minutes:97    Patient is a 72y old  Male who presents with a chief complaint of acute cholecystitis (18 Jan 2018 14:49)Events of last 24 hrs reviewed. The patient discussed c ID, surgery, SICU team. The plan is to take pt to OR. Methylene Blue confirmed leak.  All circuits integrity verified and preserved.       HPI:  71 yo M with h/o severe mitral and aortic valve regurgitation presented to ED with 5 day h/o left lower abdominal pain, nausea and multiple episodes of vomiting as well as PO intolerance. Pt reports that the pain in his abdomen is illicited by movement. Went to [facility] two days ago and underwent CT scan which revealed acute cholecystitis as well as right lower lobe infectious pneumonia. Pt was told he would require surgery and left AMA because he was waiting too long. Denies f/c. Last echo 1/4/17 with EF 60-65%. Normal BMs and passing flatus. (18 Jan 2018 14:49)The patient had acute duodenal perforation, surgical intervention done.     REVIEW OF SYSTEMS:  updated    PAST MEDICAL & SURGICAL HISTORY:  Mitral regurgitation  Aortic regurgitation  No significant past surgical history    FAMILY HISTORY:    SOCIAL HISTORY:      - Tobacco     - ETOH    Allergies    No Known Allergies    Intolerances      Vital Signs Last 24 Hrs  T(C): 37.7 (22 Jan 2018 14:42), Max: 38.3 (21 Jan 2018 17:01)  T(F): 99.9 (22 Jan 2018 14:42), Max: 100.9 (21 Jan 2018 17:01)  HR: 116 (22 Jan 2018 14:00) (101 - 124)  BP: 116/79 (22 Jan 2018 14:00) (72/62 - 119/71)  BP(mean): 101 (22 Jan 2018 14:00) (43 - 103)  RR: 16 (22 Jan 2018 14:00) (12 - 19)  SpO2: 97% (22 Jan 2018 14:00) (95% - 100%)    01-21 @ 07:01  -  01-22 @ 07:00  --------------------------------------------------------  IN: 1110 mL / OUT: 1460 mL / NET: -350 mL    01-22 @ 07:01  -  01-22 @ 15:15  --------------------------------------------------------  IN: 442.9 mL / OUT: 130 mL / NET: 312.9 mL      Mode: AC/ CMV (Assist Control/ Continuous Mandatory Ventilation)  RR (machine): 12  TV (machine): 500  FiO2: 40  PEEP: 5  ITime: 0.9  MAP: 8  PIP: 19    PHYSICAL EXAM:  Comfortable, on CMV sedated  Eyes: PERRL, EOM intact; conjunctiva and sclera clear  Head: Normocephalic;  No Trauma  ENMT: No nasal discharge, hoarseness, cough or hemoptysis.    Neck: Supple; non tender; no masses or deformities.    No JVD  Respiratory: - WHEEZING   + RLL RHONCHI  + RLL  RALES  + CRACKLES.  Diminished breath sounds  BILATERAL  RIGHT > LEFT baswe  Cardiovascular: Regular rate and rhythm. S1 and S2 Normal; + murmurs, gallops or rubs     - PPM/AICD  Gastrointestinal: distended; no  bowel sounds; No hepatosplenomegaly.     -PEG    +  GT   +ANGELES, abdominal drains in place  Genitourinary: No costovertebral angle tenderness. No dysuria  Extremities: sedated decreased AROM, No clubbing, cyanosis or edema    Vascular: Peripheral pulses palpable 2+ bilaterally  Neurological: intubated sedated  Skin: Warm and dry. No obvious rash  Lymph Nodes: No acute cervical or supraclavicular adenopathy  Psychiatric: intubated    DEVICES:  - DENTURES   +IV R / L     - ETUBE   -TRACH   -CTUBE  R / L      LABS:  ABG - ( 22 Jan 2018 04:42 )  pH: 7.34  /  pCO2: 40    /  pO2: 92    / HCO3: 21    / Base Excess: -4.7  /  SaO2: 96                                      12.0   22.6  )-----------( 53       ( 22 Jan 2018 13:32 )             36.7     01-22    139  |  106  |  75<H>  ----------------------------<  133<H>  4.5   |  20<L>  |  2.56<H>    Ca    7.5<L>      22 Jan 2018 04:50  Phos  6.3     01-22  Mg     2.3     01-22    TPro  5.1<L>  /  Alb  2.4<L>  /  TBili  2.4<H>  /  DBili  x   /  AST  65<H>  /  ALT  363<H>  /  AlkPhos  63  01-22    PT/INR - ( 22 Jan 2018 04:50 )   PT: 17.0 sec;   INR: 1.52          PTT - ( 22 Jan 2018 13:10 )  PTT:119.8 sec  < from: Xray Chest 1 View AP- PORTABLE-Urgent (01.22.18 @ 08:44) >    EXAM:  XR CHEST PORTABLE URGENT 1V                          PROCEDURE DATE:  01/22/2018                     INTERPRETATION:  INDICATION: SICU     TECHNIQUE: Chest, single portable AP view on  1/22/2018 8:44 AM     COMPARISON: Chest radiograph 1/21/2018    FINDINGS:  The radiograph is degraded, as the patient was imaged lying on a board    The endotracheal and enteric feeding tubes are unchanged in position. The   right IJ central venous catheter tip also appears unchanged in position.   There is a right-sided chest tube drainage catheter projecting over the   lower right hemithorax.    There is a roughly 10 x 7 cm masslike density in the right lower   hemithorax, which is newly developed. A persistent left basilar density   obscures the left hemidiaphragm. Evaluation for pneumothorax is limited.   Cardiomediastinal silhouette is enlarged.      IMPRESSION:   1. Newly developed 10 x 7 cm masslike density in the RIGHT lower   hemithorax.    2. Persistent left basilar density.    3. Supporttubes and lines as described.    < end of copied text >  RADIOLOGY & ADDITIONAL STUDIES (The following images were personally reviewed):

## 2018-01-22 NOTE — PROGRESS NOTE ADULT - PROBLEM SELECTOR PLAN 1
Stop Zosyn    Start Meropnem  Start linezolid  start Micafungin  Sputum cultures  Cultures from abdominal drains

## 2018-01-22 NOTE — PROGRESS NOTE ADULT - SUBJECTIVE AND OBJECTIVE BOX
Critical Care    INTERVAL HPI/OVERNIGHT EVENTS:    paatient with sepsis    ANTIBIOTICS    MEDICATIONS  (STANDING):  chlorhexidine 0.12% Liquid 15 milliLiter(s) Swish and Spit two times a day  dextrose 5%. 1000 milliLiter(s) (50 mL/Hr) IV Continuous <Continuous>  fentaNYL   Infusion 0.112 MICROgram(s)/kG/Hr (1 mL/Hr) IV Continuous <Continuous>  insulin lispro (HumaLOG) corrective regimen sliding scale   SubCutaneous Before meals and at bedtime  linezolid  IVPB 600 milliGRAM(s) IV Intermittent every 12 hours  meropenem IVPB 500 milliGRAM(s) IV Intermittent every 12 hours  methylene blue 0.5% Injectable 10 milliLiter(s) IV Push once  micafungin IVPB      norepinephrine Infusion 0.01 MICROgram(s)/kG/Min (1.676 mL/Hr) IV Continuous <Continuous>  pantoprazole Infusion 8 mG/Hr (10 mL/Hr) IV Continuous <Continuous>  propofol Infusion 1.864 MICROgram(s)/kG/Min (1 mL/Hr) IV Continuous <Continuous>  sodium chloride 0.9% Bolus 1000 milliLiter(s) IV Bolus once    MEDICATIONS  (PRN):      Allergies    No Known Allergies    Intolerances        REVIEW OF SYSTEMS:    patient unable to provide a review of systems    Vital Signs Last 24 Hrs  T(C): 37.7 (2018 14:42), Max: 38.3 (2018 17:01)  T(F): 99.9 (2018 14:42), Max: 100.9 (2018 17:01)  HR: 116 (2018 14:00) (101 - 124)  BP: 116/79 (2018 14:00) (72/62 - 119/71)  BP(mean): 101 (2018 14:00) (43 - 103)  RR: 16 (2018 14:00) (12 - 19)  SpO2: 97% (2018 14:00) (95% - 100%)    PHYSICAL EXAM:    General: intubated  Eyes: PERRL, EOM intact; conjunctiva and sclera clear  Head: Normocephalic; atraumatic  ENMT: No nasal discharge; airway clear  Neck: Supple; non tender; no masses  Respiratory: No wheezes, rales or rhonchi  Cardiovascular: Regular rate and rhythm. S1 and S2 Normal; No murmurs, gallops or rubs    Genitourinary: No costovertebral angle tenderness  Extremities: foot and toes cool and slightly purple    LABS:                        12.0   22.6  )-----------( 53       ( 2018 13:32 )             36.7         139  |  106  |  75<H>  ----------------------------<  133<H>  4.5   |  20<L>  |  2.56<H>    Ca    7.5<L>      2018 04:50  Phos  6.3       Mg     2.3         TPro  5.1<L>  /  Alb  2.4<L>  /  TBili  2.4<H>  /  DBili  x   /  AST  65<H>  /  ALT  363<H>  /  AlkPhos  63      PT/INR - ( 2018 04:50 )   PT: 17.0 sec;   INR: 1.52          PTT - ( 2018 13:10 )  PTT:119.8 sec  Urinalysis Basic - ( 2018 06:55 )    Color: Yellow / Appearance: Clear / S.025 / pH: x  Gluc: x / Ketone: NEGATIVE  / Bili: Small / Urobili: 1.0 E.U./dL   Blood: x / Protein: Trace mg/dL / Nitrite: NEGATIVE   Leuk Esterase: NEGATIVE / RBC: < 5 /HPF / WBC 5-10 /HPF   Sq Epi: x / Non Sq Epi: x / Bacteria: Many /HPF          MICROBIOLOGY:  Culture Results:   No growth at 1 day. ( @ 08:11)  Culture Results:   No growth at 1 day. ( @ 08:11)  Culture Results:   No growth ( @ 08:57)  Culture Results:   No growth at 3 days. ( @ 17:47)  Culture Results:   No growth at 3 days. ( @ 15:37)  Culture Results:   No growth at 3 days. ( @ 15:37)      RADIOLOGY & ADDITIONAL STUDIES:

## 2018-01-22 NOTE — CHART NOTE - NSCHARTNOTEFT_GEN_A_CORE
Cardiology Follow up   Called by Surgical ICU [facility] for evaluation of "Blue Toes" +/- cardiac etiology of shock  Pt seen and examined.  Vitals: Tmax 102 in last 24, Tcurrent 98.9 -110 BP /60s-70s RR 12-14 on vent  Currently on 2mcg of levo  Gen:  Sedated, intubated  C:  S1, S2, + systolic murmur, tachy  P: decreased bs b/l bases  Ab:  s/p xlap, soft, surgical sites covered  Extrem:  Cool b/l LE, pulses by doppler w/ vascular PA at bedside, bluish discoloration plantar aspect of R foot into toes> Left, but similar appearance    Bedside POC echo without significant pericardial effusion, EF grossly unchanged 50%, still evidence of severe MR.    Venous sat = 67%     Estimation of CO/CI with Reinaldo = Approx 4.76/2.27  SVR Cardiology Follow up   [facility]  Pt seen and examined.  Vitals: Tmax 102 in last 24, Tcurrent 98.9 -110 BP /60s-70s RR 12-14 on vent  Currently on 2mcg of levo  Gen:  Sedated, intubated  C:  S1, S2, + systolic murmur, tachy  P: decreased bs b/l bases  Ab:  s/p xlap, soft, surgical sites covered  Extrem:  Cool b/l LE, pulses by doppler w/ vascular PA at bedside, bluish discoloration plantar aspect of R foot into toes> Left, but similar appearance bilaterally    Bedside POC echo without significant pericardial effusion, EF grossly unchanged 50%, still evidence of severe MR.    Venous sat from TLC= 67%     Estimation of CO with Reinaldo without true mixed venous sat = Approx 4.76-5L/min  SVR =1160 on Levophed    Would recommend giving Lasix as pt with full body overload, Significant BLE edema and Pulmonary edema.  Pt also approx 3-4 Liters positive over last 48 hrs.  Recommend 40 IV lasix now and increasing if no response as pt with worsening renal function and might require higher doses.    CO within normal limits, CI mildly low based on estimation of CO with venous sat of TLC and Reinaldo.    Would repeat ABG, VBG, Lactate Q6-8 hrs, consider RHC at bedside if hemodynamics do not improve.  Continue tx of Sepsis of abdominal source.  Bedside poc echo without significant reduced EF or change from prior.  Less likely Cardiogenic shock without reduced EF and venous sat 67%  Would recommend repeat TTE and get AMRIK when hemodynamically stable for evaluation of Severe MR and thickened floppy Mitral Leaflet.  Follow up vascular surgery consult in setting of possible embolic event for etiology of LE discoloration in setting of Mitral valve disease and Afib.  Differential also to consider HIT in setting of drop in plts.    Recommend continue holding any Beta Blockers or Calcium Channel Blockers at this time.      Case discussed with Dr. Khan.  Dr. Khan to see patient tomorrow am. Cardiology Follow up   Called by Surgical ICU [facility] for evaluation of discoloration of B/L LE  Pt seen and examined.  Vitals: Tmax 102 in last 24, Tcurrent 98.9 -110 BP /60s-70s RR 12-14 on vent  Currently on 2mcg of levo  Gen:  Sedated, intubated  C:  S1, S2, + systolic murmur, tachy  P: decreased bs b/l bases  Ab:  s/p xlap, soft, surgical sites covered  Extrem:  Cool b/l LE, pulses by doppler w/ vascular PA at bedside, bluish discoloration plantar aspect of R foot into toes> Left, but similar appearance bilaterally    Bedside POC echo without significant pericardial effusion, EF grossly unchanged 50%, still evidence of severe MR.    Venous sat from TLC= 67%     Estimation of CO with Reinaldo without true mixed venous sat = Approx 4.76-5L/min  SVR =1160 on Levophed    Would recommend giving Lasix as pt with full body overload, Significant BLE edema and Pulmonary edema.  Pt also approx 3-4 Liters positive over last 48 hrs.  Recommend 40 IV lasix now and increasing if no response as pt with worsening renal function and might require higher doses.    CO within normal limits, CI mildly low based on estimation of CO with venous sat of TLC and Reinaldo.    Would repeat ABG, VBG, Lactate Q6-8 hrs, consider RHC at bedside if hemodynamics do not improve.  Continue tx of Sepsis of abdominal source.  Bedside poc echo without significant reduced EF or change from prior.  Less likely Cardiogenic shock without reduced EF and venous sat 67%  Would recommend repeat TTE and get AMRIK when hemodynamically stable for evaluation of Severe MR and thickened floppy Mitral Leaflet.  Follow up vascular surgery consult in setting of possible embolic event for etiology of LE discoloration in setting of Mitral valve disease and Afib.  Differential also to consider HIT in setting of drop in plts.    Anticoagulation per primary team.  Recommend continue holding any Beta Blockers or Calcium Channel Blockers at this time.      Case discussed with Dr. Khan.  Dr. Khan to see patient tomorrow am.

## 2018-01-22 NOTE — CONSULT NOTE ADULT - PROBLEM SELECTOR RECOMMENDATION 2
- s/p perforation of duodenum   - possible pneumonia on the right side   - continue with antibiotics - try to avoid nephrotoxic ones - Vanco (check trough)

## 2018-01-22 NOTE — PROGRESS NOTE ADULT - ASSESSMENT
patient appears septic.  Possibly from an intra abdominal source such as an leak. This is unlikely to respond to antibiotics alone without surgical intervention  Given deterioration will expand antibiotics coverage

## 2018-01-22 NOTE — PROGRESS NOTE ADULT - SUBJECTIVE AND OBJECTIVE BOX
Surgery attending:   Patient seen and examined with staff through weekend and this AM. Remains critically ill. POD #2 s/p Laparotomy in extremis for perforated duodenal ulcer with sepsis, gross contamination of abdominal contents and peritonitis, s/p abdominal washout, omental flap patch repair of anterior duodenal ulcer, and wide drainage. Thrombocytopenia suspicious for heparin induced issue and requested heparin stopped. Remains critically ill with multiorgan failure, pneumonia, severe valve disease, requiring intermittent pressor support. Abdominal drains draining serous fluid with drain 1 with bilious tinge. Potentially represents patch failure (secondary to omental thrombosis) vs staining of intra-abdominal content and fluid/residual fluid from massive leak found at laparotomy. Given patient condition revisional surgery involving resection, potential anastomosis in setting of clinical condition may be futile, and is confounded by ongoing pneumonia and cardiogenic contributors to his shock. Have discussed case in detail with critical care attendings and defer supportive measure to them. Discussed status in detail with his available health care proxy Gayle Mason whom has been helpful with his management. Prognosis is poor but will continue measures of treatment at this time. Proxy forms were obtained by his HCP indicating patient wishes for Do not resucitate should he have an arrest, SICU will attempt to honor this request.

## 2018-01-22 NOTE — CONSULT NOTE ADULT - PROBLEM SELECTOR PROBLEM 2
Aortic valve insufficiency, etiology of cardiac valve disease unspecified
Hepatitis
Sepsis, due to unspecified organism

## 2018-01-22 NOTE — PROGRESS NOTE ADULT - SUBJECTIVE AND OBJECTIVE BOX
24 hr events: : heparin antibody positive, argatroban started. Oliguric this morning with casts in UA consistent with ATN. ABx broadened to sera, linezolid, ann. leak confirmed with methylene blue. Transfused 2FFP, 1 plt and taken to OR for ex-lap and washout.  O/N: levophed down to 1mcg, L toes started turnin g blue as well. has AT and PT signals, vascular officially consulted. thrombocytopenic, stopped SQH, ordered HIT w/ AM labs (only available M-F), repeat labs LA normal, but WBC up. Cardiology did bedside echo. ordered albumin 5% x 2 (received first one at 4am)   : Cr 2.43, vanc discontinued and 500cc LR bolus given. Sputum Cx sent per ID recs. GUCCI#1 continues to drain bilious output, GUCCI #2 SS. Levo downtrending. Right foot cold with mottling but with triphasic DP signals.     SUBJECTIVE: Patient intubated. Sedation turned off to assess arousability         MEDICATIONS  (STANDING):  chlorhexidine 0.12% Liquid 15 milliLiter(s) Swish and Spit two times a day  dextrose 5%. 1000 milliLiter(s) (50 mL/Hr) IV Continuous <Continuous>  fentaNYL   Infusion 0.112 MICROgram(s)/kG/Hr (1 mL/Hr) IV Continuous <Continuous>  insulin lispro (HumaLOG) corrective regimen sliding scale   SubCutaneous Before meals and at bedtime  linezolid  IVPB 600 milliGRAM(s) IV Intermittent every 12 hours  meropenem IVPB 500 milliGRAM(s) IV Intermittent every 12 hours  micafungin IVPB      norepinephrine Infusion 0.01 MICROgram(s)/kG/Min (1.676 mL/Hr) IV Continuous <Continuous>  pantoprazole Infusion 8 mG/Hr (10 mL/Hr) IV Continuous <Continuous>  propofol Infusion 1.864 MICROgram(s)/kG/Min (1 mL/Hr) IV Continuous <Continuous>  sodium chloride 0.9% Bolus 1000 milliLiter(s) IV Bolus once    MEDICATIONS  (PRN):      Oliveira:	  [ ] None	[x] Daily Oliveira Order Placed	   Indication:	  [x] Strict I and O's    [ ] Obstruction     [ ] Incontinence + Stage 3 or 4 Decubitus  Central Line:  [ ] None	   [x]  Medication / TPN Administration     [ ] No Peripheral IV     ICU Vital Signs Last 24 Hrs  T(C): 37.7 (2018 14:42), Max: 38.1 (2018 04:00)  T(F): 99.9 (2018 14:42), Max: 100.6 (2018 04:00)  HR: 108 (2018 17:00) (101 - 124)  BP: 112/68 (2018 17:00) (72/62 - 116/79)  BP(mean): 86 (2018 17:00) (43 - 101)  ABP: 102/70 (2018 17:00) (74/52 - 118/66)  ABP(mean): 78 (2018 17:00) (58 - 82)  RR: 16 (2018 17:00) (12 - 19)  SpO2: 96% (2018 17:00) (95% - 100%)      Physical Exam:  General: NAD  HEENT: NC/AT, EOMI, PERRLA, neck supple w/o LAD  Pulmonary: Intubated, high FiO2 requirement. Overbreathing vent, no respiratory distress, bilateral basillar decreased lung sound.  Cardiovascular: Irregular irregular, with significant holosystolic murmur  Abdominal: soft, Tender to palpation. Ex lap incision, 2 JPs draining bilious vs. sanguinous.  Extremities: B/l edema, mottled and blue appearance of distal lower extremities.   Neuro: Sedated  Pulses: Biphasic DP    Lines/tubes/drains:  2 JPs in R abdomen.   J-tube  Ex-lap incision  Oliveira  Central line in R. neck  Vent settings:  Mode: AC/ CMV (Assist Control/ Continuous Mandatory Ventilation), RR (machine): 12, TV (machine): 500, FiO2: 40, PEEP: 5, ITime: 0.9, MAP: 8, PIP: 19    I&O's Summary    2018 07:01  -  2018 07:00  --------------------------------------------------------  IN: 1110 mL / OUT: 1460 mL / NET: -350 mL    2018 07:01  -  2018 17:50  --------------------------------------------------------  IN: 2492.9 mL / OUT: 705 mL / NET: 1787.9 mL        LABS:                        11.5   20.0  )-----------( 43       ( 2018 16:15 )             35.1         142  |  107  |  78<H>  ----------------------------<  199<H>  4.4   |  22  |  2.69<H>    Ca    7.3<L>      2018 16:15  Phos  6.3       Mg     2.3         TPro  5.1<L>  /  Alb  2.4<L>  /  TBili  2.4<H>  /  DBili  x   /  AST  65<H>  /  ALT  363<H>  /  AlkPhos  63      PT/INR - ( 2018 13:10 )   PT: 33.5 sec;   INR: 2.95          PTT - ( 2018 16:15 )  PTT:103.3 sec  Urinalysis Basic - ( 2018 06:55 )    Color: Yellow / Appearance: Clear / S.025 / pH: x  Gluc: x / Ketone: NEGATIVE  / Bili: Small / Urobili: 1.0 E.U./dL   Blood: x / Protein: Trace mg/dL / Nitrite: NEGATIVE   Leuk Esterase: NEGATIVE / RBC: < 5 /HPF / WBC 5-10 /HPF   Sq Epi: x / Non Sq Epi: x / Bacteria: Many /HPF      CAPILLARY BLOOD GLUCOSE      POCT Blood Glucose.: 106 mg/dL (2018 12:15)  POCT Blood Glucose.: 112 mg/dL (2018 05:13)  POCT Blood Glucose.: 106 mg/dL (2018 21:59)    LIVER FUNCTIONS - ( 2018 04:50 )  Alb: 2.4 g/dL / Pro: 5.1 g/dL / ALK PHOS: 63 U/L / ALT: 363 U/L / AST: 65 U/L / GGT: x             Cultures:Culture Results:   No growth at 1 day. ( @ 08:11)  Culture Results:   No growth at 1 day. ( @ 08:11)      Drips:    RADIOLOGY & ADDITIONAL STUDIES:

## 2018-01-22 NOTE — CONSULT NOTE ADULT - ASSESSMENT
This is 72 year old male with PMH stated above, now in shock - most likely septic and oligo anuric in these setting, also s/p CT angio, s/p treatment with Vanco - supra therapeutic levels,  s/p laparotomy for perforation of the duodenum. Making minimal urine, on pressors, on vantilator - still in sepsis (also with possible right sided pneumonia). Volume status looks on wet side, possible depletion in the intravascular space. We will recommended continue with Albumin for now and monitor the urine output - if the urine output is not improving - consider line placement and starting on 1/23 CVVHD if still unstable

## 2018-01-22 NOTE — CONSULT NOTE ADULT - SUBJECTIVE AND OBJECTIVE BOX
Vascular Attending:     HPI:  71 yo M with h/o severe mitral and aortic valve regurgitation presented to ED on 1/18/18 with 5 day h/o left lower abdominal pain, nausea and multiple episodes of vomiting as well as PO intolerance and found to have perforated duodenal ulcer on 1/20/18 after repeat CT scan and now s/p Ex-lap and omental kaylee patch now with concern for b/l lower extremity ischemia.  It was noted during the day yesterday that right foot was cold and the his right toes were dusky, later in the evening the toes on the left foot also started to look dusky and cold and so vascular surgery was consulted for further evaluation.   Pt has been diagnosed with new onset A. fib on this admission and is also being treated for pneumonia and CHF. He is currently intubated and sedated.    PAST MEDICAL & SURGICAL HISTORY:  Mitral regurgitation  Aortic regurgitation  No significant past surgical history    MEDICATIONS  (STANDING):  chlorhexidine 0.12% Liquid 15 milliLiter(s) Swish and Spit two times a day  dextrose 5%. 1000 milliLiter(s) (50 mL/Hr) IV Continuous <Continuous>  fentaNYL   Infusion 0.112 MICROgram(s)/kG/Hr (1 mL/Hr) IV Continuous <Continuous>  influenza   Vaccine 0.5 milliLiter(s) IntraMuscular once  insulin lispro (HumaLOG) corrective regimen sliding scale   SubCutaneous Before meals and at bedtime  norepinephrine Infusion 0.01 MICROgram(s)/kG/Min (1.676 mL/Hr) IV Continuous <Continuous>  pantoprazole Infusion 8 mG/Hr (10 mL/Hr) IV Continuous <Continuous>  piperacillin/tazobactam IVPB. 2.25 Gram(s) IV Intermittent every 6 hours  propofol Infusion 1.864 MICROgram(s)/kG/Min (1 mL/Hr) IV Continuous <Continuous>    MEDICATIONS  (PRN):  Allergies    No Known Allergies    Intolerances    SOCIAL HISTORY:    FAMILY HISTORY:    Vital Signs Last 24 Hrs  T(C): 37.2 (21 Jan 2018 23:00), Max: 39.3 (21 Jan 2018 02:00)  T(F): 98.9 (21 Jan 2018 23:00), Max: 102.7 (21 Jan 2018 02:00)  HR: 106 (21 Jan 2018 23:00) (88 - 118)  BP: 86/66 (21 Jan 2018 23:00) (85/60 - 120/70)  BP(mean): 73 (21 Jan 2018 23:00) (67 - 103)  RR: 12 (21 Jan 2018 23:00) (12 - 18)  SpO2: 98% (21 Jan 2018 23:00) (93% - 99%)    PHYSICAL EXAM:  Constitutional: Sleeping and intubated  Respiratory: No respiratory distress, unlabored breathing  Cardiovascular: tachycardia, irregular, irregular rate  Extremities: B/l LE swelling worse on RLE, B/l cold foot and mild mottling foot, dusky looking toes b/l worse on right  Vascular: Biphasic- Triphasic DP/PT doppler signal b/l, triphasic pop b/l, palpable fem b/l    LABS                        12.8   23.4  )-----------( 43       ( 21 Jan 2018 21:25 )             38.8     01-21    145  |  110<H>  |  71<H>  ----------------------------<  140<H>  4.3   |  19<L>  |  2.50<H>    Ca    7.6<L>      21 Jan 2018 21:26  Phos  5.2     01-21  Mg     2.0     01-21    TPro  4.8<L>  /  Alb  2.3<L>  /  TBili  2.3<H>  /  DBili  x   /  AST  76<H>  /  ALT  400<H>  /  AlkPhos  61  01-21    PT/INR - ( 21 Jan 2018 21:25 )   PT: 18.5 sec;   INR: 1.65     PTT - ( 21 Jan 2018 21:25 )  PTT:40.6 sec      RADIOLOGY & ADDITIONAL STUDIES    Assessment and Plan:  71 yo M with h/o severe mitral and aortic valve regurgitation now with septic shock from perforated duodenal ulcer s/p Ex-lap and omental kaylee patch, on levophed, new A. fib, pneumonia and CHF noted to have acute b/l limb ischemia Vascular Attending:     HPI:  73 yo M with h/o severe mitral and aortic valve regurgitation presented to ED on 1/18/18 with 5 day h/o left lower abdominal pain, nausea and multiple episodes of vomiting as well as PO intolerance and found to have perforated duodenal ulcer on 1/20/18 s/p Ex-lap and omental kaylee patch now with concern for b/l lower extremity ischemia.  It was noted during the day yesterday that right foot was cold with dusky toes; later in the evening the toes on the left foot also started to look dusky and cold and so vascular surgery was consulted for further evaluation.  Pt has been diagnosed with new onset A. fib on this admission and is also being treated for pneumonia and CHF. He is currently intubated and sedated.    PAST MEDICAL & SURGICAL HISTORY:  Mitral regurgitation  Aortic regurgitation  No significant past surgical history    MEDICATIONS  (STANDING):  chlorhexidine 0.12% Liquid 15 milliLiter(s) Swish and Spit two times a day  dextrose 5%. 1000 milliLiter(s) (50 mL/Hr) IV Continuous <Continuous>  fentaNYL   Infusion 0.112 MICROgram(s)/kG/Hr (1 mL/Hr) IV Continuous <Continuous>  influenza   Vaccine 0.5 milliLiter(s) IntraMuscular once  insulin lispro (HumaLOG) corrective regimen sliding scale   SubCutaneous Before meals and at bedtime  norepinephrine Infusion 0.01 MICROgram(s)/kG/Min (1.676 mL/Hr) IV Continuous <Continuous>  pantoprazole Infusion 8 mG/Hr (10 mL/Hr) IV Continuous <Continuous>  piperacillin/tazobactam IVPB. 2.25 Gram(s) IV Intermittent every 6 hours  propofol Infusion 1.864 MICROgram(s)/kG/Min (1 mL/Hr) IV Continuous <Continuous>    MEDICATIONS  (PRN):  Allergies    No Known Allergies    Intolerances    SOCIAL HISTORY:    FAMILY HISTORY:    Vital Signs Last 24 Hrs  T(C): 37.2 (21 Jan 2018 23:00), Max: 39.3 (21 Jan 2018 02:00)  T(F): 98.9 (21 Jan 2018 23:00), Max: 102.7 (21 Jan 2018 02:00)  HR: 106 (21 Jan 2018 23:00) (88 - 118)  BP: 86/66 (21 Jan 2018 23:00) (85/60 - 120/70)  BP(mean): 73 (21 Jan 2018 23:00) (67 - 103)  RR: 12 (21 Jan 2018 23:00) (12 - 18)  SpO2: 98% (21 Jan 2018 23:00) (93% - 99%)    PHYSICAL EXAM:  Constitutional: Sleeping and intubated  Respiratory: No respiratory distress, unlabored breathing  Cardiovascular: tachycardia, irregular, irregular rate  Extremities: B/l LE swelling worse on RLE, B/l cold foot and mild mottling foot, dusky looking toes b/l worse on right  Vascular: Biphasic- Triphasic DP/PT doppler signal b/l, triphasic pop b/l, palpable fem b/l    LABS                        12.8   23.4  )-----------( 43       ( 21 Jan 2018 21:25 )             38.8     01-21    145  |  110<H>  |  71<H>  ----------------------------<  140<H>  4.3   |  19<L>  |  2.50<H>    Ca    7.6<L>      21 Jan 2018 21:26  Phos  5.2     01-21  Mg     2.0     01-21    TPro  4.8<L>  /  Alb  2.3<L>  /  TBili  2.3<H>  /  DBili  x   /  AST  76<H>  /  ALT  400<H>  /  AlkPhos  61  01-21    PT/INR - ( 21 Jan 2018 21:25 )   PT: 18.5 sec;   INR: 1.65     PTT - ( 21 Jan 2018 21:25 )  PTT:40.6 sec      RADIOLOGY & ADDITIONAL STUDIES    Assessment and Plan:  73 y/o M with h/o severe mitral and aortic valve regurgitation now with septic shock from perforated duodenal ulcer s/p Ex-lap and omental kaylee patch, on levophed, new A. fib, pneumonia and CHF noted to have acute b/l limb ischemia, possibly from use of pressor or emboli    No Vascular surgery intervention needed at this time as Pt has good distal signals and femoral pulse  Please keep foot warm and continue to monitor  Obtain venous duplex US to r/o DVT  Will follow  Plan discussed with vascular surgery chief on-call Vascular Attending: Dr. Ryan    HPI:  73 yo M with h/o severe mitral and aortic valve regurgitation presented to ED on 1/18/18 with 5 day h/o left lower abdominal pain, nausea and multiple episodes of vomiting as well as PO intolerance and found to have perforated duodenal ulcer on 1/20/18 s/p Ex-lap and omental kaylee patch now with concern for b/l lower extremity ischemia.  It was noted during the day yesterday that right foot was cold with dusky toes; later in the evening the toes on the left foot also started to look dusky and cold and so vascular surgery was consulted for further evaluation.  Pt has been diagnosed with new onset A. fib on this admission and is also being treated for pneumonia and CHF. He is currently intubated and sedated.    PAST MEDICAL & SURGICAL HISTORY:  Mitral regurgitation  Aortic regurgitation  No significant past surgical history    MEDICATIONS  (STANDING):  chlorhexidine 0.12% Liquid 15 milliLiter(s) Swish and Spit two times a day  dextrose 5%. 1000 milliLiter(s) (50 mL/Hr) IV Continuous <Continuous>  fentaNYL   Infusion 0.112 MICROgram(s)/kG/Hr (1 mL/Hr) IV Continuous <Continuous>  influenza   Vaccine 0.5 milliLiter(s) IntraMuscular once  insulin lispro (HumaLOG) corrective regimen sliding scale   SubCutaneous Before meals and at bedtime  norepinephrine Infusion 0.01 MICROgram(s)/kG/Min (1.676 mL/Hr) IV Continuous <Continuous>  pantoprazole Infusion 8 mG/Hr (10 mL/Hr) IV Continuous <Continuous>  piperacillin/tazobactam IVPB. 2.25 Gram(s) IV Intermittent every 6 hours  propofol Infusion 1.864 MICROgram(s)/kG/Min (1 mL/Hr) IV Continuous <Continuous>    MEDICATIONS  (PRN):  Allergies    No Known Allergies    Intolerances    SOCIAL HISTORY:    FAMILY HISTORY:    Vital Signs Last 24 Hrs  T(C): 37.2 (21 Jan 2018 23:00), Max: 39.3 (21 Jan 2018 02:00)  T(F): 98.9 (21 Jan 2018 23:00), Max: 102.7 (21 Jan 2018 02:00)  HR: 106 (21 Jan 2018 23:00) (88 - 118)  BP: 86/66 (21 Jan 2018 23:00) (85/60 - 120/70)  BP(mean): 73 (21 Jan 2018 23:00) (67 - 103)  RR: 12 (21 Jan 2018 23:00) (12 - 18)  SpO2: 98% (21 Jan 2018 23:00) (93% - 99%)    PHYSICAL EXAM:  Constitutional: Sleeping and intubated  Respiratory: No respiratory distress, unlabored breathing  Cardiovascular: tachycardia, irregular, irregular rate  Extremities: B/l LE swelling worse on RLE, B/l cold foot and mild mottling foot, dusky looking toes b/l worse on right  Vascular: Biphasic- Triphasic DP/PT doppler signal b/l, triphasic pop b/l, palpable fem b/l    LABS                        12.8   23.4  )-----------( 43       ( 21 Jan 2018 21:25 )             38.8     01-21    145  |  110<H>  |  71<H>  ----------------------------<  140<H>  4.3   |  19<L>  |  2.50<H>    Ca    7.6<L>      21 Jan 2018 21:26  Phos  5.2     01-21  Mg     2.0     01-21    TPro  4.8<L>  /  Alb  2.3<L>  /  TBili  2.3<H>  /  DBili  x   /  AST  76<H>  /  ALT  400<H>  /  AlkPhos  61  01-21    PT/INR - ( 21 Jan 2018 21:25 )   PT: 18.5 sec;   INR: 1.65     PTT - ( 21 Jan 2018 21:25 )  PTT:40.6 sec      RADIOLOGY & ADDITIONAL STUDIES    Assessment and Plan:  73 y/o M with h/o severe mitral and aortic valve regurgitation now with septic shock from perforated duodenal ulcer s/p Ex-lap and omental kaylee patch, on levophed, new A. fib, pneumonia and CHF noted to have acute b/l limb ischemia, possibly from use of pressor or emboli    No Vascular surgery intervention needed at this time as Pt has good distal signals and femoral pulse  Please keep foot warm and continue to monitor  Obtain venous duplex US to r/o DVT  Will follow  Plan discussed with vascular surgery chief on-call

## 2018-01-23 DIAGNOSIS — B37.7 CANDIDAL SEPSIS: ICD-10-CM

## 2018-01-23 LAB
ALBUMIN SERPL ELPH-MCNC: 2.2 G/DL — LOW (ref 3.3–5)
ALP SERPL-CCNC: 62 U/L — SIGNIFICANT CHANGE UP (ref 40–120)
ALT FLD-CCNC: 206 U/L — HIGH (ref 10–45)
ANION GAP SERPL CALC-SCNC: 12 MMOL/L — SIGNIFICANT CHANGE UP (ref 5–17)
ANION GAP SERPL CALC-SCNC: 14 MMOL/L — SIGNIFICANT CHANGE UP (ref 5–17)
APTT BLD: 58.6 SEC — HIGH (ref 27.5–37.4)
AST SERPL-CCNC: 62 U/L — HIGH (ref 10–40)
BASE EXCESS BLDA CALC-SCNC: -2.3 MMOL/L — LOW (ref -2–3)
BASE EXCESS BLDV CALC-SCNC: -1.9 MMOL/L — SIGNIFICANT CHANGE UP
BILIRUB SERPL-MCNC: 2.1 MG/DL — HIGH (ref 0.2–1.2)
BLD GP AB SCN SERPL QL: NEGATIVE — SIGNIFICANT CHANGE UP
BUN SERPL-MCNC: 80 MG/DL — HIGH (ref 7–23)
BUN SERPL-MCNC: 82 MG/DL — HIGH (ref 7–23)
CALCIUM SERPL-MCNC: 7.4 MG/DL — LOW (ref 8.4–10.5)
CALCIUM SERPL-MCNC: 8 MG/DL — LOW (ref 8.4–10.5)
CHLORIDE SERPL-SCNC: 107 MMOL/L — SIGNIFICANT CHANGE UP (ref 96–108)
CHLORIDE SERPL-SCNC: 111 MMOL/L — HIGH (ref 96–108)
CO2 SERPL-SCNC: 21 MMOL/L — LOW (ref 22–31)
CO2 SERPL-SCNC: 23 MMOL/L — SIGNIFICANT CHANGE UP (ref 22–31)
CREAT SERPL-MCNC: 2.83 MG/DL — HIGH (ref 0.5–1.3)
CREAT SERPL-MCNC: 2.89 MG/DL — HIGH (ref 0.5–1.3)
CULTURE RESULTS: SIGNIFICANT CHANGE UP
FIBRINOGEN PPP-MCNC: 217 MG/DL — LOW (ref 258–438)
GAS PNL BLDA: SIGNIFICANT CHANGE UP
GAS PNL BLDV: SIGNIFICANT CHANGE UP
GLUCOSE BLDC GLUCOMTR-MCNC: 104 MG/DL — HIGH (ref 70–99)
GLUCOSE BLDC GLUCOMTR-MCNC: 122 MG/DL — HIGH (ref 70–99)
GLUCOSE BLDC GLUCOMTR-MCNC: 124 MG/DL — HIGH (ref 70–99)
GLUCOSE BLDC GLUCOMTR-MCNC: 155 MG/DL — HIGH (ref 70–99)
GLUCOSE SERPL-MCNC: 118 MG/DL — HIGH (ref 70–99)
GLUCOSE SERPL-MCNC: 180 MG/DL — HIGH (ref 70–99)
GRAM STN FLD: SIGNIFICANT CHANGE UP
HCO3 BLDA-SCNC: 23 MMOL/L — SIGNIFICANT CHANGE UP (ref 21–28)
HCO3 BLDV-SCNC: 25 MMOL/L — SIGNIFICANT CHANGE UP (ref 20–27)
HCT VFR BLD CALC: 33.2 % — LOW (ref 39–50)
HGB BLD-MCNC: 10.7 G/DL — LOW (ref 13–17)
INR BLD: 1.77 — HIGH (ref 0.88–1.16)
LACTATE SERPL-SCNC: 2.1 MMOL/L — HIGH (ref 0.5–2)
MAGNESIUM SERPL-MCNC: 2.2 MG/DL — SIGNIFICANT CHANGE UP (ref 1.6–2.6)
MCHC RBC-ENTMCNC: 30.1 PG — SIGNIFICANT CHANGE UP (ref 27–34)
MCHC RBC-ENTMCNC: 32.2 G/DL — SIGNIFICANT CHANGE UP (ref 32–36)
MCV RBC AUTO: 93.5 FL — SIGNIFICANT CHANGE UP (ref 80–100)
PCO2 BLDA: 42 MMHG — SIGNIFICANT CHANGE UP (ref 35–48)
PCO2 BLDV: 51 MMHG — SIGNIFICANT CHANGE UP (ref 41–51)
PH BLDA: 7.36 — SIGNIFICANT CHANGE UP (ref 7.35–7.45)
PH BLDV: 7.31 — LOW (ref 7.32–7.43)
PHOSPHATE SERPL-MCNC: 6.2 MG/DL — HIGH (ref 2.5–4.5)
PLATELET # BLD AUTO: 67 K/UL — LOW (ref 150–400)
PO2 BLDA: 112 MMHG — HIGH (ref 83–108)
PO2 BLDV: 44 MMHG — SIGNIFICANT CHANGE UP
POTASSIUM SERPL-MCNC: 4.4 MMOL/L — SIGNIFICANT CHANGE UP (ref 3.5–5.3)
POTASSIUM SERPL-MCNC: 4.6 MMOL/L — SIGNIFICANT CHANGE UP (ref 3.5–5.3)
POTASSIUM SERPL-SCNC: 4.4 MMOL/L — SIGNIFICANT CHANGE UP (ref 3.5–5.3)
POTASSIUM SERPL-SCNC: 4.6 MMOL/L — SIGNIFICANT CHANGE UP (ref 3.5–5.3)
PROT SERPL-MCNC: 4.7 G/DL — LOW (ref 6–8.3)
PROTHROM AB SERPL-ACNC: 19.9 SEC — HIGH (ref 9.8–12.7)
RBC # BLD: 3.55 M/UL — LOW (ref 4.2–5.8)
RBC # FLD: 17 % — HIGH (ref 10.3–16.9)
RH IG SCN BLD-IMP: POSITIVE — SIGNIFICANT CHANGE UP
SAO2 % BLDA: 98 % — SIGNIFICANT CHANGE UP (ref 95–100)
SAO2 % BLDV: 75 % — SIGNIFICANT CHANGE UP
SODIUM SERPL-SCNC: 142 MMOL/L — SIGNIFICANT CHANGE UP (ref 135–145)
SODIUM SERPL-SCNC: 146 MMOL/L — HIGH (ref 135–145)
SPECIMEN SOURCE: SIGNIFICANT CHANGE UP
SURGICAL PATHOLOGY STUDY: SIGNIFICANT CHANGE UP
WBC # BLD: 17.9 K/UL — HIGH (ref 3.8–10.5)
WBC # FLD AUTO: 17.9 K/UL — HIGH (ref 3.8–10.5)

## 2018-01-23 PROCEDURE — 99291 CRITICAL CARE FIRST HOUR: CPT

## 2018-01-23 PROCEDURE — 99233 SBSQ HOSP IP/OBS HIGH 50: CPT | Mod: GC

## 2018-01-23 PROCEDURE — 71045 X-RAY EXAM CHEST 1 VIEW: CPT | Mod: 26

## 2018-01-23 RX ORDER — FUROSEMIDE 40 MG
5 TABLET ORAL
Qty: 500 | Refills: 0 | Status: DISCONTINUED | OUTPATIENT
Start: 2018-01-23 | End: 2018-01-23

## 2018-01-23 RX ORDER — FUROSEMIDE 40 MG
100 TABLET ORAL ONCE
Qty: 0 | Refills: 0 | Status: DISCONTINUED | OUTPATIENT
Start: 2018-01-23 | End: 2018-01-23

## 2018-01-23 RX ORDER — ACETAMINOPHEN 500 MG
1000 TABLET ORAL ONCE
Qty: 0 | Refills: 0 | Status: COMPLETED | OUTPATIENT
Start: 2018-01-23 | End: 2018-01-23

## 2018-01-23 RX ADMIN — Medication 1.68 MICROGRAM(S)/KG/MIN: at 00:04

## 2018-01-23 RX ADMIN — CHLORHEXIDINE GLUCONATE 15 MILLILITER(S): 213 SOLUTION TOPICAL at 18:57

## 2018-01-23 RX ADMIN — LINEZOLID 300 MILLIGRAM(S): 600 INJECTION, SOLUTION INTRAVENOUS at 02:16

## 2018-01-23 RX ADMIN — SODIUM CHLORIDE 100 MILLILITER(S): 9 INJECTION INTRAMUSCULAR; INTRAVENOUS; SUBCUTANEOUS at 00:06

## 2018-01-23 RX ADMIN — MICAFUNGIN SODIUM 105 MILLIGRAM(S): 100 INJECTION, POWDER, LYOPHILIZED, FOR SOLUTION INTRAVENOUS at 14:39

## 2018-01-23 RX ADMIN — PANTOPRAZOLE SODIUM 10 MG/HR: 20 TABLET, DELAYED RELEASE ORAL at 00:03

## 2018-01-23 RX ADMIN — PANTOPRAZOLE SODIUM 10 MG/HR: 20 TABLET, DELAYED RELEASE ORAL at 06:35

## 2018-01-23 RX ADMIN — CHLORHEXIDINE GLUCONATE 15 MILLILITER(S): 213 SOLUTION TOPICAL at 06:33

## 2018-01-23 RX ADMIN — MEROPENEM 100 MILLIGRAM(S): 1 INJECTION INTRAVENOUS at 06:41

## 2018-01-23 RX ADMIN — Medication 400 MILLIGRAM(S): at 06:00

## 2018-01-23 RX ADMIN — Medication 2: at 06:32

## 2018-01-23 RX ADMIN — MEROPENEM 100 MILLIGRAM(S): 1 INJECTION INTRAVENOUS at 19:04

## 2018-01-23 RX ADMIN — PROPOFOL 1 MICROGRAM(S)/KG/MIN: 10 INJECTION, EMULSION INTRAVENOUS at 19:07

## 2018-01-23 RX ADMIN — SODIUM CHLORIDE 100 MILLILITER(S): 9 INJECTION INTRAMUSCULAR; INTRAVENOUS; SUBCUTANEOUS at 19:07

## 2018-01-23 RX ADMIN — PROPOFOL 1 MICROGRAM(S)/KG/MIN: 10 INJECTION, EMULSION INTRAVENOUS at 00:01

## 2018-01-23 NOTE — PROGRESS NOTE ADULT - SUBJECTIVE AND OBJECTIVE BOX
24 hr events: ON: Returned from OR after exlap, washout. Found to have bile and succus in abdomen, breakdown of necrotic omental patch. Duodenal injury repaired primarily, covered with new kaylee patch. Retrograde duodenostomy placed. Venting G tube placed. Feeding J tube revised. Abdominal JPs left in place. Retention sutures placed. L SC HD catheter placed. IVCF placed. Receievd 1FFP, 1PLT, R pop DVT s/p IVC filter.   : heparin antibody positive, argatroban started. Oliguric this morning with casts in UA consistent with ATN. ABx broadened to sera, linezolid, ann. leak confirmed with methylene blue. Transfused 2FFP, 1 plt. Occlusive DVT in R. Popliteal. Taken to OR for ex-lap and washout, and IVC filter placement.    SUBJECTIVE: Patient intubated and sedated.      MEDICATIONS  (STANDING):  chlorhexidine 0.12% Liquid 15 milliLiter(s) Swish and Spit two times a day  dextrose 5%. 1000 milliLiter(s) (50 mL/Hr) IV Continuous <Continuous>  fentaNYL   Infusion 0.112 MICROgram(s)/kG/Hr (1 mL/Hr) IV Continuous <Continuous>  insulin lispro (HumaLOG) corrective regimen sliding scale   SubCutaneous Before meals and at bedtime  linezolid  IVPB 600 milliGRAM(s) IV Intermittent every 12 hours  meropenem IVPB 500 milliGRAM(s) IV Intermittent every 12 hours  micafungin IVPB 100 milliGRAM(s) IV Intermittent every 24 hours  micafungin IVPB      norepinephrine Infusion 0.01 MICROgram(s)/kG/Min (1.676 mL/Hr) IV Continuous <Continuous>  pantoprazole Infusion 8 mG/Hr (10 mL/Hr) IV Continuous <Continuous>  propofol Infusion 1.864 MICROgram(s)/kG/Min (1 mL/Hr) IV Continuous <Continuous>  Pureflow. 5000 milliLiter(s) (2250 mL/Hr) CRRT <Continuous>  sodium chloride 0.9%. 1000 milliLiter(s) (100 mL/Hr) IV Continuous <Continuous>    MEDICATIONS  (PRN):      Oliveira:	  [ ] None	[x] Daily Oliveira Order Placed	   Indication:	  [x] Strict I and O's    [ ] Obstruction     [ ] Incontinence + Stage 3 or 4 Decubitus  Central Line:  [ ] None	   [x]  Medication / TPN Administration     [ ] No Peripheral IV     ICU Vital Signs Last 24 Hrs  T(C): 38.3 (2018 09:27), Max: 38.3 (2018 07:00)  T(F): 101 (2018 09:27), Max: 101 (2018 07:00)  HR: 118 (2018 09:15) (108 - 134)  BP: 93/63 (2018 06:00) (92/69 - 142/71)  BP(mean): 74 (2018 05:00) (72 - 101)  ABP: 92/62 (2018 09:00) (90/58 - 126/84)  ABP(mean): 72 (2018 09:00) (68 - 96)  RR: 16 (2018 09:00) (16 - 20)  SpO2: 99% (2018 09:15) (94% - 100%)      Physical Exam:  General: NAD  HEENT: NC/AT, EOMI, PERRLA, no oral lesions, neck supple w/o LAD  Pulmonary: Nonlabored breathing on AC-VC, not overbreathing vent, no respiratory distress, CTA-B  Cardiovascular: Irregular irregular. Holosystolic murmur  Abdominal: soft, Retrograde duodenostomy draining bile. Venting G tube in place Feeding J tube . Abdominal JPs draining serosanguinous. Retention sutures in place  Extremities: , Edematous, warm to touch  Neuro: Sedated  Pulses: biphasic distal pulses    Lines/tubes/drains:  R IJ  Left Subclavian  Oliveira  PIV    Vent settings:  Mode: AC/ CMV (Assist Control/ Continuous Mandatory Ventilation), RR (machine): 16, TV (machine): 500, FiO2: 40, PEEP: 7, ITime: 0.8, MAP: 10, PIP: 21    I&O's Summary    2018 07:01  -  2018 07:00  --------------------------------------------------------  IN: 3948.9 mL / OUT: 1860 mL / NET: 2088.9 mL    2018 07:01  -  2018 13:07  --------------------------------------------------------  IN: 264 mL / OUT: 45 mL / NET: 219 mL        LABS:                        10.7   17.9  )-----------( 67       ( 2018 05:12 )             33.2         142  |  107  |  80<H>  ----------------------------<  180<H>  4.6   |  21<L>  |  2.83<H>    Ca    8.0<L>      2018 05:12  Phos  6.2       Mg     2.2         TPro  4.7<L>  /  Alb  2.2<L>  /  TBili  2.1<H>  /  DBili  x   /  AST  62<H>  /  ALT  206<H>  /  AlkPhos  62      PT/INR - ( 2018 05:12 )   PT: 19.9 sec;   INR: 1.77          PTT - ( 2018 05:12 )  PTT:58.6 sec  Urinalysis Basic - ( 2018 06:55 )    Color: Yellow / Appearance: Clear / S.025 / pH: x  Gluc: x / Ketone: NEGATIVE  / Bili: Small / Urobili: 1.0 E.U./dL   Blood: x / Protein: Trace mg/dL / Nitrite: NEGATIVE   Leuk Esterase: NEGATIVE / RBC: < 5 /HPF / WBC 5-10 /HPF   Sq Epi: x / Non Sq Epi: x / Bacteria: Many /HPF      CAPILLARY BLOOD GLUCOSE      POCT Blood Glucose.: 122 mg/dL (2018 10:51)  POCT Blood Glucose.: 155 mg/dL (2018 06:00)  POCT Blood Glucose.: 145 mg/dL (2018 22:17)  POCT Blood Glucose.: 192 mg/dL (2018 18:16)  POCT Blood Glucose.: 165 mg/dL (2018 17:57)    LIVER FUNCTIONS - ( 2018 05:12 )  Alb: 2.2 g/dL / Pro: 4.7 g/dL / ALK PHOS: 62 U/L / ALT: 206 U/L / AST: 62 U/L / GGT: x             Cultures:Culture Results:   Few Yeast  Culture in progress ( @ 21:43)  Culture Results:   Few Yeast  Culture in progress ( @ 21:42)  Culture Results:   Moderate Yeast  Culture in progress ( @ 16:43)  Culture Results:   Moderate Yeast Pending further identification at [facility]  Laboratory  Antibiotic susceptibility testing is performed at request only by calling   at [phone]  Culture in progress ( @ 16:24)      Drips:    RADIOLOGY & ADDITIONAL STUDIES:

## 2018-01-23 NOTE — PROGRESS NOTE ADULT - ATTENDING COMMENTS
Agree with above.    Groin soft. No hematoma.    Palpable DP bilateral.    Continue management as per primary team. Would recommend removing IVC filter in the coming months when patient over acute episode and back on anticoagulation. He should follow with me in the office ([phone]) to discuss when appropriate to do so.

## 2018-01-23 NOTE — PROGRESS NOTE ADULT - ASSESSMENT
This is 72 year old male with PMH stated above, now in shock - most likely septic and oligo anuric in these setting, also s/p CT angio, s/p treatment with Vanco - supra therapeutic levels,  s/p laparotomy for perforation of the duodenum., revision done yesterday, still oliguric, we will start CVVHD today

## 2018-01-23 NOTE — PROGRESS NOTE ADULT - SUBJECTIVE AND OBJECTIVE BOX
24 hr events:   - Pt thrombocytopenic, heparin antibody positive - ? HIT, stopped SQH, started argatroban. Pt started bleeding BRB into GUCCI drains, argatroban stopped.   - GUCCI #1 continuous bilious drainage. Methylene blue test performed, suggested leak at duodenal shreyas patch site.   - RLE U/S showed R popliteal DVT.   - Pt went to OR for second exploratory laporotomy and washout d/t concern for shreyas patch failure, perforated viscus, pneumoperitoneum. IVC filter placement by vascular.  - Found to have bile and succus in abdomen, necrosis and breakdown of primary shreyas patch  - Primary closure of duodenal ulcer w/ overlying omental patch, retrograde duodenostomy, Jon gastrotomy, revision of feeding jejunostomy, retention suture placement     SUBJECTIVE:  72yM with PMH severe MR/AR presented after recent dx acute cholecystitis and RLL PNA at [facility] with lower abdominal pain, new onset rapid Afib, sepsis, acute CHF exacerbation, initially admitted to cardiology on bipap, then transferred to SICU with perforated duodenum s/p Shreyas patch and jejunostomy () c/b breakdown s/p RTOR, washout, new Shreyas patch, gastrostomy, duodenostomy, IVCF ()    MEDICATIONS  (STANDING):  chlorhexidine 0.12% Liquid 15 milliLiter(s) Swish and Spit two times a day  dextrose 5%. 1000 milliLiter(s) (50 mL/Hr) IV Continuous <Continuous>  fentaNYL   Infusion 0.112 MICROgram(s)/kG/Hr (1 mL/Hr) IV Continuous <Continuous>  insulin lispro (HumaLOG) corrective regimen sliding scale   SubCutaneous Before meals and at bedtime  linezolid  IVPB 600 milliGRAM(s) IV Intermittent every 12 hours  meropenem IVPB 500 milliGRAM(s) IV Intermittent every 12 hours  micafungin IVPB 100 milliGRAM(s) IV Intermittent every 24 hours  micafungin IVPB      norepinephrine Infusion 0.01 MICROgram(s)/kG/Min (1.676 mL/Hr) IV Continuous <Continuous>  pantoprazole Infusion 8 mG/Hr (10 mL/Hr) IV Continuous <Continuous>  propofol Infusion 1.864 MICROgram(s)/kG/Min (1 mL/Hr) IV Continuous <Continuous>  Pureflow. 5000 milliLiter(s) (2250 mL/Hr) CRRT <Continuous>  sodium chloride 0.9%. 1000 milliLiter(s) (100 mL/Hr) IV Continuous <Continuous>    MEDICATIONS  (PRN): None      Oliveira: in place  Central Line:  [ ] None	   [ ]  Medication / TPN Administration     [ ] No Peripheral IV     ICU Vital Signs Last 24 Hrs  T(C): 38.3 (2018 09:27), Max: 38.3 (2018 07:00)  T(F): 101 (2018 09:27), Max: 101 (2018 07:00)  HR: 118 (2018 09:15) (108 - 134)  BP: 93/63 (2018 06:00) (81/28 - 142/71)  BP(mean): 74 (2018 05:00) (43 - 101)  ABP: 92/62 (2018 09:00) (74/52 - 126/84)  ABP(mean): 72 (2018 09:00) (58 - 96)  RR: 16 (2018 09:00) (16 - 20)  SpO2: 99% (2018 09:15) (94% - 100%)      Physical Exam:  Neuro: sedated  General: NAD  HEENT: NC/AT, PERRLA, neck supple w/o LAD  Pulmonary: intubtaed, CTA-B  Cardiovascular: irregularly irregular, tachycardic, +S1, S2, 4/6 holosystolic murmur heard best at apex, 2/4 diastolic murmur heard best at left sternal border  Abdominal: soft, NT/ND, +BS, no organomegaly, mild ascites, + fluid wave test. Duodenostomy with bilious drainage, GUCCI drains with serosanguinous drainage. Gastrotomy and J tube in place.   Extremities: right groin soft, dressing in place, no erythema, hematoma, b/l feet cool to touch, toes dusky but improved from yesterday. 2+ DP pulses bilaterally     Lines/tubes/drains:   L subclavian dialysis catheter (_  L brachial Jeane (-),  RIJ TLC  PIV  Jon Gastrotomy tube  Jejunostomy feeding tube  Retrograde duodenostomy  GUCCI x 2  Oliveira catheter      Vent settings:  Mode: AC/ CMV (Assist Control/ Continuous Mandatory Ventilation), RR (machine): 16, TV (machine): 500, FiO2: 40, PEEP: 7, ITime: 0.8, MAP: 10, PIP: 21    I&O's Summary    2018 07:  -  2018 07:00  --------------------------------------------------------  IN: 3948.9 mL / OUT: 1860 mL / NET: 2088.9 mL    2018 07:  -  2018 12:32  --------------------------------------------------------  IN: 264 mL / OUT: 45 mL / NET: 219 mL        LABS:                        10.7   17.9  )-----------( 67       ( 2018 05:12 )             33.2         142  |  107  |  80<H>  ----------------------------<  180<H>  4.6   |  21<L>  |  2.83<H>    Ca    8.0<L>      2018 05:12  Phos  6.2       Mg     2.2         TPro  4.7<L>  /  Alb  2.2<L>  /  TBili  2.1<H>  /  DBili  x   /  AST  62<H>  /  ALT  206<H>  /  AlkPhos  62      PT/INR - ( 2018 05:12 )   PT: 19.9 sec;   INR: 1.77          PTT - ( 2018 05:12 )  PTT:58.6 sec  Urinalysis Basic - ( 2018 06:55 )    Color: Yellow / Appearance: Clear / S.025 / pH: x  Gluc: x / Ketone: NEGATIVE  / Bili: Small / Urobili: 1.0 E.U./dL   Blood: x / Protein: Trace mg/dL / Nitrite: NEGATIVE   Leuk Esterase: NEGATIVE / RBC: < 5 /HPF / WBC 5-10 /HPF   Sq Epi: x / Non Sq Epi: x / Bacteria: Many /HPF      CAPILLARY BLOOD GLUCOSE      POCT Blood Glucose.: 122 mg/dL (2018 10:51)  POCT Blood Glucose.: 155 mg/dL (2018 06:00)  POCT Blood Glucose.: 145 mg/dL (2018 22:17)  POCT Blood Glucose.: 192 mg/dL (2018 18:16)  POCT Blood Glucose.: 165 mg/dL (2018 17:57)    LIVER FUNCTIONS - ( 2018 05:12 )  Alb: 2.2 g/dL / Pro: 4.7 g/dL / ALK PHOS: 62 U/L / ALT: 206 U/L / AST: 62 U/L / GGT: x             Cultures:Culture Results:   Few Yeast  Culture in progress ( @ 21:43)  Culture Results:   Few Yeast  Culture in progress ( @ 21:42)  Culture Results:   Moderate Yeast  Culture in progress ( @ 16:43)  Culture Results:   Moderate Yeast Pending further identification at [facility]  Laboratory  Antibiotic susceptibility testing is performed at request only by calling   at [phone]  Culture in progress ( @ 16:24)      Drips:  Fentanyl 0.11 mcg/kg/hr  Norepinephrine 0.1 mcg/kg/hr  Pantoprazole   Propafol       RADIOLOGY & ADDITIONAL STUDIES: 24 hr events:   - Pt thrombocytopenic, heparin antibody positive - ? HIT, stopped SQH, started argatroban. Pt started bleeding BRB into GUCCI drains, argatroban stopped.   - GUCCI #1 continuous bilious drainage. Methylene blue test performed, suggested leak at duodenal shreyas patch site.   - RLE U/S showed R popliteal DVT.   - Pt went to OR for second exploratory laporotomy and washout d/t concern for shreyas patch failure, perforated viscus, pneumoperitoneum. IVC filter placement by vascular.  - Found to have bile and succus in abdomen, necrosis and breakdown of primary shreyas patch  - Primary closure of duodenal ulcer w/ overlying omental patch, retrograde duodenostomy, Jon gastrotomy, revision of feeding jejunostomy, retention suture placement     SUBJECTIVE:  72yM with PMH severe MR/AR presented after recent dx acute cholecystitis and RLL PNA at [facility] with lower abdominal pain, new onset rapid Afib, sepsis, acute CHF exacerbation, initially admitted to cardiology on bipap, then transferred to SICU with perforated duodenum s/p Shreyas patch and jejunostomy () c/b breakdown s/p RTOR, washout, new Shreyas patch, gastrostomy, duodenostomy, IVCF ()    MEDICATIONS  (STANDING):  chlorhexidine 0.12% Liquid 15 milliLiter(s) Swish and Spit two times a day  dextrose 5%. 1000 milliLiter(s) (50 mL/Hr) IV Continuous <Continuous>  fentaNYL   Infusion 0.112 MICROgram(s)/kG/Hr (1 mL/Hr) IV Continuous <Continuous>  insulin lispro (HumaLOG) corrective regimen sliding scale   SubCutaneous Before meals and at bedtime  linezolid  IVPB 600 milliGRAM(s) IV Intermittent every 12 hours  meropenem IVPB 500 milliGRAM(s) IV Intermittent every 12 hours  micafungin IVPB 100 milliGRAM(s) IV Intermittent every 24 hours  micafungin IVPB      norepinephrine Infusion 0.01 MICROgram(s)/kG/Min (1.676 mL/Hr) IV Continuous <Continuous>  pantoprazole Infusion 8 mG/Hr (10 mL/Hr) IV Continuous <Continuous>  propofol Infusion 1.864 MICROgram(s)/kG/Min (1 mL/Hr) IV Continuous <Continuous>  Pureflow. 5000 milliLiter(s) (2250 mL/Hr) CRRT <Continuous>  sodium chloride 0.9%. 1000 milliLiter(s) (100 mL/Hr) IV Continuous <Continuous>    MEDICATIONS  (PRN): None      ICU Vital Signs Last 24 Hrs  T(C): 38.3 (2018 09:27), Max: 38.3 (2018 07:00)  T(F): 101 (2018 09:27), Max: 101 (2018 07:00)  HR: 118 (2018 09:15) (108 - 134)  BP: 93/63 (2018 06:00) (81/28 - 142/71)  BP(mean): 74 (2018 05:00) (43 - 101)  ABP: 92/62 (2018 09:00) (74/52 - 126/84)  ABP(mean): 72 (2018 09:00) (58 - 96)  RR: 16 (2018 09:00) (16 - 20)  SpO2: 99% (2018 09:15) (94% - 100%)      Physical Exam:  Neuro: sedated. No withdrawal from painful stimuli.  General: NAD  HEENT: NC/AT, pupils 1mm, equal and reactive to light, neck supple w/o JVD  Pulmonary: intubated, CTA-B  Cardiovascular: irregularly irregular, tachycardic, +S1, S2, 4/6 holosystolic murmur heard best at apex, 2/4 diastolic murmur heard best at left sternal border  Abdominal: soft, NT/ND, +BS, no organomegaly, mild ascites, + fluid wave test. Duodenostomy with bilious drainage, GUCCI drains with serosanguinous drainage. Gastrotomy and J tube in place.   Extremities: right groin soft, dressing in place, no erythema, hematoma, b/l feet cool to touch, toes dusky but improved from yesterday. 2+ DP pulses bilaterally     Lines/tubes/drains:   L subclavian dialysis catheter (_  L brachial Jeane (-),  RIJ TLC  PIV  Jon Gastrotomy tube  Jejunostomy feeding tube  Retrograde duodenostomy  GUCCI x 2  Oliveira catheter      Vent settings:  Mode: AC/ CMV (Assist Control/ Continuous Mandatory Ventilation), RR (machine): 16, TV (machine): 500, FiO2: 40, PEEP: 7, ITime: 0.8, MAP: 10, PIP: 21    I&O's Summary    2018 07:  -  2018 07:00  --------------------------------------------------------  IN: 3948.9 mL / OUT: 1860 mL / NET: 2088.9 mL    2018 07:  -  2018 12:32  --------------------------------------------------------  IN: 264 mL / OUT: 45 mL / NET: 219 mL        LABS:                        10.7   17.9  )-----------( 67       ( 2018 05:12 )             33.2         142  |  107  |  80<H>  ----------------------------<  180<H>  4.6   |  21<L>  |  2.83<H>    Ca    8.0<L>      2018 05:12  Phos  6.2       Mg     2.2         TPro  4.7<L>  /  Alb  2.2<L>  /  TBili  2.1<H>  /  DBili  x   /  AST  62<H>  /  ALT  206<H>  /  AlkPhos  62      PT/INR - ( 2018 05:12 )   PT: 19.9 sec;   INR: 1.77          PTT - ( 2018 05:12 )  PTT:58.6 sec  Urinalysis Basic - ( 2018 06:55 )    Color: Yellow / Appearance: Clear / S.025 / pH: x  Gluc: x / Ketone: NEGATIVE  / Bili: Small / Urobili: 1.0 E.U./dL   Blood: x / Protein: Trace mg/dL / Nitrite: NEGATIVE   Leuk Esterase: NEGATIVE / RBC: < 5 /HPF / WBC 5-10 /HPF   Sq Epi: x / Non Sq Epi: x / Bacteria: Many /HPF      CAPILLARY BLOOD GLUCOSE      POCT Blood Glucose.: 122 mg/dL (2018 10:51)  POCT Blood Glucose.: 155 mg/dL (2018 06:00)  POCT Blood Glucose.: 145 mg/dL (2018 22:17)  POCT Blood Glucose.: 192 mg/dL (2018 18:16)  POCT Blood Glucose.: 165 mg/dL (2018 17:57)    LIVER FUNCTIONS - ( 2018 05:12 )  Alb: 2.2 g/dL / Pro: 4.7 g/dL / ALK PHOS: 62 U/L / ALT: 206 U/L / AST: 62 U/L / GGT: x             Cultures:Culture Results:   Few Yeast  Culture in progress ( @ 21:43)  Culture Results:   Few Yeast  Culture in progress ( @ 21:42)  Culture Results:   Moderate Yeast  Culture in progress ( @ 16:43)  Culture Results:   Moderate Yeast Pending further identification at [facility]  Laboratory  Antibiotic susceptibility testing is performed at request only by calling   at [phone]  Culture in progress ( @ 16:24)      Drips:  Fentanyl 0.11 mcg/kg/hr  Norepinephrine 0.1 mcg/kg/hr  Pantoprazole   Propafol       RADIOLOGY & ADDITIONAL STUDIES: None 24 hr events:   - Pt thrombocytopenic, heparin antibody positive - ? HIT, stopped SQH, started argatroban. Pt started bleeding BRB into GUCCI drains, argatroban stopped.   - GUCCI #1 continuous bilious drainage. Methylene blue test performed, suggested leak at duodenal shreyas patch site.   - RLE U/S showed R popliteal DVT.   - Pt went to OR for second exploratory laporotomy and washout d/t concern for shreyas patch failure, perforated viscus, pneumoperitoneum. IVC filter placement by vascular.  - Found to have bile and succus in abdomen, necrosis and breakdown of primary shreyas patch  - Primary closure of duodenal ulcer w/ overlying omental patch, retrograde duodenostomy, Jon gastrotomy, revision of feeding jejunostomy, retention suture placement     SUBJECTIVE:  72yM with PMH severe MR/AR presented after recent dx acute cholecystitis and RLL PNA at [facility] with lower abdominal pain, new onset rapid Afib, sepsis, acute CHF exacerbation, initially admitted to cardiology on bipap, then transferred to SICU with perforated duodenum s/p Shreyas patch and jejunostomy () c/b breakdown s/p RTOR, washout, new Shreyas patch, gastrostomy, duodenostomy, RLE U/S demonstrated R popliteal DVT, IVCF ()    MEDICATIONS  (STANDING):  chlorhexidine 0.12% Liquid 15 milliLiter(s) Swish and Spit two times a day  dextrose 5%. 1000 milliLiter(s) (50 mL/Hr) IV Continuous <Continuous>  fentaNYL   Infusion 0.112 MICROgram(s)/kG/Hr (1 mL/Hr) IV Continuous <Continuous>  insulin lispro (HumaLOG) corrective regimen sliding scale   SubCutaneous Before meals and at bedtime  linezolid  IVPB 600 milliGRAM(s) IV Intermittent every 12 hours  meropenem IVPB 500 milliGRAM(s) IV Intermittent every 12 hours  micafungin IVPB 100 milliGRAM(s) IV Intermittent every 24 hours  micafungin IVPB      norepinephrine Infusion 0.01 MICROgram(s)/kG/Min (1.676 mL/Hr) IV Continuous <Continuous>  pantoprazole Infusion 8 mG/Hr (10 mL/Hr) IV Continuous <Continuous>  propofol Infusion 1.864 MICROgram(s)/kG/Min (1 mL/Hr) IV Continuous <Continuous>  Pureflow. 5000 milliLiter(s) (2250 mL/Hr) CRRT <Continuous>  sodium chloride 0.9%. 1000 milliLiter(s) (100 mL/Hr) IV Continuous <Continuous>    MEDICATIONS  (PRN): None      ICU Vital Signs Last 24 Hrs  T(C): 38.3 (2018 09:27), Max: 38.3 (2018 07:00)  T(F): 101 (2018 09:27), Max: 101 (2018 07:00)  HR: 118 (2018 09:15) (108 - 134)  BP: 93/63 (2018 06:00) (81/28 - 142/71)  BP(mean): 74 (2018 05:00) (43 - 101)  ABP: 92/62 (2018 09:00) (74/52 - 126/84)  ABP(mean): 72 (2018 09:00) (58 - 96)  RR: 16 (2018 09:00) (16 - 20)  SpO2: 99% (2018 09:15) (94% - 100%)      Physical Exam:  Neuro: sedated. No withdrawal from painful stimuli.  General: NAD  HEENT: NC/AT, pupils 1mm, equal and reactive to light, NGT in place, neck supple w/o JVD  Pulmonary: intubated, CTA-B  Cardiovascular: irregularly irregular, tachycardic, +S1, S2, 4/6 holosystolic murmur heard best at apex, 2/4 diastolic murmur heard best at left sternal border  Abdominal: soft, mildly distended, tympanic, hypoactive BS, no organomegaly. Duodenostomy with bilious drainage, GUCCI drains with serosanguinous drainage. Gastrotomy and J tube in place.   Extremities: right groin soft, dressing in place, no erythema, no hematoma, b/l feet cool to touch, toes and dorsal surfaces of L/R foot dusky but improved from yesterday. 2+ DP pulses bilaterally     Lines/tubes/drains:   L subclavian dialysis catheter (_  L brachial Jeane (-),  RIJ TLC  PIV  Jon Gastrotomy tube  Jejunostomy feeding tube  Retrograde duodenostomy  GUCCI x 2  Oliveira catheter      Vent settings:  Mode: AC/ CMV (Assist Control/ Continuous Mandatory Ventilation), RR (machine): 16, TV (machine): 500, FiO2: 40, PEEP: 7, ITime: 0.8, MAP: 10, PIP: 21    I&O's Summary    2018 07:  -  2018 07:00  --------------------------------------------------------  IN: 3948.9 mL / OUT: 1860 mL / NET: 2088.9 mL    2018 07:  -  2018 12:32  --------------------------------------------------------  IN: 264 mL / OUT: 45 mL / NET: 219 mL        LABS:                        10.7   17.9  )-----------( 67       ( 2018 05:12 )             33.2         142  |  107  |  80<H>  ----------------------------<  180<H>  4.6   |  21<L>  |  2.83<H>    Ca    8.0<L>      2018 05:12  Phos  6.2       Mg     2.2         TPro  4.7<L>  /  Alb  2.2<L>  /  TBili  2.1<H>  /  DBili  x   /  AST  62<H>  /  ALT  206<H>  /  AlkPhos  62      PT/INR - ( 2018 05:12 )   PT: 19.9 sec;   INR: 1.77          PTT - ( 2018 05:12 )  PTT:58.6 sec  Urinalysis Basic - ( 2018 06:55 )    Color: Yellow / Appearance: Clear / S.025 / pH: x  Gluc: x / Ketone: NEGATIVE  / Bili: Small / Urobili: 1.0 E.U./dL   Blood: x / Protein: Trace mg/dL / Nitrite: NEGATIVE   Leuk Esterase: NEGATIVE / RBC: < 5 /HPF / WBC 5-10 /HPF   Sq Epi: x / Non Sq Epi: x / Bacteria: Many /HPF      CAPILLARY BLOOD GLUCOSE      POCT Blood Glucose.: 122 mg/dL (2018 10:51)  POCT Blood Glucose.: 155 mg/dL (2018 06:00)  POCT Blood Glucose.: 145 mg/dL (2018 22:17)  POCT Blood Glucose.: 192 mg/dL (2018 18:16)  POCT Blood Glucose.: 165 mg/dL (2018 17:57)    LIVER FUNCTIONS - ( 2018 05:12 )  Alb: 2.2 g/dL / Pro: 4.7 g/dL / ALK PHOS: 62 U/L / ALT: 206 U/L / AST: 62 U/L / GGT: x             Cultures:Culture Results:   Few Yeast  Culture in progress ( @ 21:43)  Culture Results:   Few Yeast  Culture in progress ( @ 21:42)  Culture Results:   Moderate Yeast  Culture in progress ( @ 16:43)  Culture Results:   Moderate Yeast Pending further identification at [facility]  Laboratory  Antibiotic susceptibility testing is performed at request only by calling   at [phone]  Culture in progress ( @ 16:24)      Drips:  Fentanyl 0.11 mcg/kg/hr  Norepinephrine 0.1 mcg/kg/hr  Pantoprazole   Propafol       RADIOLOGY & ADDITIONAL STUDIES: None

## 2018-01-23 NOTE — PROGRESS NOTE ADULT - ASSESSMENT
72y Male s/p ex lap/omental patch for duodenal perf, c/b GISSELL with Right pop DVT and bleeding s/p take back and placement of IVC filter placement    May remove groin dressing  Keep foot warm b/l  Rest of plan as per primary team  Will sign off, please call for questions or concerns

## 2018-01-23 NOTE — PROGRESS NOTE ADULT - PROBLEM SELECTOR PLAN 1
- most likely related to the ATN due to shock (other perticipating factors are IV contrast, Vanco), on pressors   - still oliguric - we will start CVVHD today   - electrolytes acceptable   - volume status - hypervolemic   - in and outs   - daily weight   - renal diet.  - please check the abdominal pressure

## 2018-01-23 NOTE — PROGRESS NOTE ADULT - SUBJECTIVE AND OBJECTIVE BOX
24 hr events: ON: Returned from OR after exlap, washout. Found to have bile and succus in abdomen, breakdown of necrotic omental patch. Duodenal injury repaired primarily, covered with new kaylee patch. Retrograde duodenostomy placed. Venting G tube placed. Feeding J tube revised. Abdominal JPs left in place. Retention sutures placed. L SC HD catheter placed. IVCF placed. Receievd 1FFP, 1PLT, R pop DVT s/p IVC filter.   : heparin antibody positive, argatroban started. Oliguric this morning with casts in UA consistent with ATN. ABx broadened to sera, linezolid, ann. leak confirmed with methylene blue. Transfused 2FFP, 1 plt. Occlusive DVT in R. Popliteal. Taken to OR for ex-lap and washout, and IVC filter placement.    SUBJECTIVE: Patient intubated and sedated.      MEDICATIONS  (STANDING):  chlorhexidine 0.12% Liquid 15 milliLiter(s) Swish and Spit two times a day  dextrose 5%. 1000 milliLiter(s) (50 mL/Hr) IV Continuous <Continuous>  fentaNYL   Infusion 0.112 MICROgram(s)/kG/Hr (1 mL/Hr) IV Continuous <Continuous>  insulin lispro (HumaLOG) corrective regimen sliding scale   SubCutaneous Before meals and at bedtime  linezolid  IVPB 600 milliGRAM(s) IV Intermittent every 12 hours  meropenem IVPB 500 milliGRAM(s) IV Intermittent every 12 hours  micafungin IVPB 100 milliGRAM(s) IV Intermittent every 24 hours  micafungin IVPB      norepinephrine Infusion 0.01 MICROgram(s)/kG/Min (1.676 mL/Hr) IV Continuous <Continuous>  pantoprazole Infusion 8 mG/Hr (10 mL/Hr) IV Continuous <Continuous>  propofol Infusion 1.864 MICROgram(s)/kG/Min (1 mL/Hr) IV Continuous <Continuous>  Pureflow. 5000 milliLiter(s) (2250 mL/Hr) CRRT <Continuous>  sodium chloride 0.9%. 1000 milliLiter(s) (100 mL/Hr) IV Continuous <Continuous>    MEDICATIONS  (PRN):      Oliveira:	  [ ] None	[x] Daily Oliveira Order Placed	   Indication:	  [x] Strict I and O's    [ ] Obstruction     [ ] Incontinence + Stage 3 or 4 Decubitus  Central Line:  [ ] None	   [x]  Medication / TPN Administration     [ ] No Peripheral IV     ICU Vital Signs Last 24 Hrs  T(C): 38.3 (2018 09:27), Max: 38.3 (2018 07:00)  T(F): 101 (2018 09:27), Max: 101 (2018 07:00)  HR: 118 (2018 09:15) (108 - 134)  BP: 93/63 (2018 06:00) (92/69 - 142/71)  BP(mean): 74 (2018 05:00) (72 - 101)  ABP: 92/62 (2018 09:00) (90/58 - 126/84)  ABP(mean): 72 (2018 09:00) (68 - 96)  RR: 16 (2018 09:00) (16 - 20)  SpO2: 99% (2018 09:15) (94% - 100%)      Physical Exam:  General: NAD  HEENT: NC/AT, EOMI, PERRLA, no oral lesions, neck supple w/o LAD  Pulmonary: Nonlabored breathing on AC-VC, not overbreathing vent, no respiratory distress, CTA-B  Cardiovascular: Irregular irregular. Holosystolic murmur  Abdominal: soft, Retrograde duodenostomy draining bile. Venting G tube in place Feeding J tube . Abdominal JPs draining serosanguinous. Retention sutures in place  Extremities: , Edematous, warm to touch  Neuro: Sedated  Pulses: biphasic distal pulses    Lines/tubes/drains:  R IJ  Left Subclavian  Oliveira  PIV    Vent settings:  Mode: AC/ CMV (Assist Control/ Continuous Mandatory Ventilation), RR (machine): 16, TV (machine): 500, FiO2: 40, PEEP: 7, ITime: 0.8, MAP: 10, PIP: 21    I&O's Summary    2018 07:01  -  2018 07:00  --------------------------------------------------------  IN: 3948.9 mL / OUT: 1860 mL / NET: 2088.9 mL    2018 07:01  -  2018 13:07  --------------------------------------------------------  IN: 264 mL / OUT: 45 mL / NET: 219 mL        LABS:                        10.7   17.9  )-----------( 67       ( 2018 05:12 )             33.2         142  |  107  |  80<H>  ----------------------------<  180<H>  4.6   |  21<L>  |  2.83<H>    Ca    8.0<L>      2018 05:12  Phos  6.2       Mg     2.2         TPro  4.7<L>  /  Alb  2.2<L>  /  TBili  2.1<H>  /  DBili  x   /  AST  62<H>  /  ALT  206<H>  /  AlkPhos  62      PT/INR - ( 2018 05:12 )   PT: 19.9 sec;   INR: 1.77          PTT - ( 2018 05:12 )  PTT:58.6 sec  Urinalysis Basic - ( 2018 06:55 )    Color: Yellow / Appearance: Clear / S.025 / pH: x  Gluc: x / Ketone: NEGATIVE  / Bili: Small / Urobili: 1.0 E.U./dL   Blood: x / Protein: Trace mg/dL / Nitrite: NEGATIVE   Leuk Esterase: NEGATIVE / RBC: < 5 /HPF / WBC 5-10 /HPF   Sq Epi: x / Non Sq Epi: x / Bacteria: Many /HPF      CAPILLARY BLOOD GLUCOSE      POCT Blood Glucose.: 122 mg/dL (2018 10:51)  POCT Blood Glucose.: 155 mg/dL (2018 06:00)  POCT Blood Glucose.: 145 mg/dL (2018 22:17)  POCT Blood Glucose.: 192 mg/dL (2018 18:16)  POCT Blood Glucose.: 165 mg/dL (2018 17:57)    LIVER FUNCTIONS - ( 2018 05:12 )  Alb: 2.2 g/dL / Pro: 4.7 g/dL / ALK PHOS: 62 U/L / ALT: 206 U/L / AST: 62 U/L / GGT: x             Cultures:Culture Results:   Few Yeast  Culture in progress ( @ 21:43)  Culture Results:   Few Yeast  Culture in progress ( @ 21:42)  Culture Results:   Moderate Yeast  Culture in progress ( @ 16:43)  Culture Results:   Moderate Yeast Pending further identification at [facility]  Laboratory  Antibiotic susceptibility testing is performed at request only by calling   at [phone]  Culture in progress ( @ 16:24)      Drips:    RADIOLOGY & ADDITIONAL STUDIES:

## 2018-01-23 NOTE — PROGRESS NOTE ADULT - ASSESSMENT
72yM with PMH severe MR/AR presented after recent dx acute cholecystitis and RLL PNA at [facility] with lower abdominal pain, new onset rapid Afib, sepsis, acute CHF exacerbation, initially admitted to cardiology on bipap, then transferred to SICU with perforated duodenum s/p Shreyas patch and jejunostomy (1/20) c/b breakdown s/p RTOR, washout, new Shreyas patch, gastrostomy, duodenostomy, IVCF (1/22)    Neuro: propofol, fentanyl  CV: Levophed. Afib: Echo EF 50%, ?vegetation and Severe MR. will need AMRIK when HD stable  Pulm: Intubated on cmv 40/500/12/5 chlorhex  GI: NPO with NGT to LIWS. Jtube to gravity. retrograde D-tube to LIWS, G-tube to LIWS Protonix gtt.   : Oliveira, GAETANO. HD to start 1/23  ID: Perforated duodenum/PNA: linezolid (1/22--), micafungin (1/22--), meropenem (1/22--) //// discontinued Vanco (1/19-1/20) Zosyn (1/19-1/22), f/u Bld cx's  Endo: ISS  HEME: Heparin antibody positive, serotonin release assay pending. IVCF (1/22) //1FFP, 1PLT (1/22)  PPx: SCD, R pop DVT, s/p IVC filter 1/22  Lines: PIV, L brachial [place] (1/20-), RIJ TLC, LSC HD cath (1/22-)  Wounds: Ex lap, JPx2 to bulb suction, J-tube to gravity, D-tube to LIWS, G-tube to LIWS 72yM with PMH severe MR/AR presented after recent dx acute cholecystitis and RLL PNA at [facility] with lower abdominal pain, new onset rapid Afib, sepsis, acute CHF exacerbation, initially admitted to cardiology on bipap, then transferred to SICU with perforated duodenum s/p Shreyas patch and jejunostomy (1/20) c/b breakdown s/p RTOR, washout, new Shreyas patch, gastrostomy, duodenostomy, IVCF (1/22)    Neuro: propofol, fentanyl  CV: Levophed. Afib: Echo EF 50%, ?vegetation and Severe MR. will need AMRIK when HD stable  Pulm: Intubated on cmv 40/500/12/5   GI: NPO with NGT to LIWS. Jtube to gravity. retrograde D-tube to LIWS, G-tube to LIWS Protonix gtt.   : Oliveira, GAETANO. HD to start 1/23  ID: Perforated duodenum/PNA: linezolid (1/22--), micafungin (1/22--), meropenem (1/22--) //// discontinued Vanco (1/19-1/20) Zosyn (1/19-1/22), f/u Bld cx's  Endo: ISS  HEME: Heparin antibody positive, serotonin release assay pending. IVCF (1/22) //1FFP, 1PLT (1/22)  PPx: SCD, R pop DVT, s/p IVC filter 1/22  Lines: PIV, L brachial [place] (1/20-), RIJ TLC, LSC HD cath (1/22-)  Wounds: Ex lap, JPx2 to bulb suction, J-tube to gravity, D-tube to LIWS, G-tube to LIWS 72yM with PMH severe MR/AR presented after recent dx acute cholecystitis and RLL PNA at [facility] with lower abdominal pain, new onset rapid Afib, sepsis, acute CHF exacerbation, initially admitted to cardiology on bipap, then transferred to SICU with perforated duodenum s/p Shreyas patch and jejunostomy (1/20) c/b breakdown s/p RTOR, washout, new Shreyas patch, gastrostomy, duodenostomy, RLE U/S demonstrated R popliteal DVT, IVCF (1/22)    Neuro: propofol, fentanyl  CV: Levophed. Afib: Echo EF 50%, ?vegetation and Severe MR. will need AMRIK when HD stable  Pulm: Intubated on cmv 40/500/12/5   GI: NPO with NGT to LIWS. Jtube to gravity. retrograde D-tube to LIWS, G-tube to LIWS Protonix gtt.   : Tee, GAETANO. HD to start 1/23  ID: Perforated duodenum/PNA: linezolid (1/22--), micafungin (1/22--), meropenem (1/22--) //// discontinued Vanco (1/19-1/20) Zosyn (1/19-1/22), f/u Bld cx's  Endo: ISS  HEME: Heparin antibody positive, serotonin release assay pending. IVCF (1/22) //1FFP, 1PLT (1/22)  PPx: SCD, R pop DVT, s/p IVC filter 1/22  Lines: PIV, L brachial [place] (1/20-), RIJ TLC, LSC HD cath (1/22-)  Wounds: Ex lap, JPx2 to bulb suction, J-tube to gravity, D-tube to LIWS, G-tube to LIWS

## 2018-01-23 NOTE — PROGRESS NOTE ADULT - SUBJECTIVE AND OBJECTIVE BOX
Critical Care    INTERVAL HPI/OVERNIGHT EVENTS:    ANTIBIOTICS    MEDICATIONS  (STANDING):  chlorhexidine 0.12% Liquid 15 milliLiter(s) Swish and Spit two times a day  dextrose 5%. 1000 milliLiter(s) (50 mL/Hr) IV Continuous <Continuous>  fentaNYL   Infusion 0.112 MICROgram(s)/kG/Hr (1 mL/Hr) IV Continuous <Continuous>  insulin lispro (HumaLOG) corrective regimen sliding scale   SubCutaneous Before meals and at bedtime  meropenem IVPB 500 milliGRAM(s) IV Intermittent every 12 hours  micafungin IVPB 100 milliGRAM(s) IV Intermittent every 24 hours  micafungin IVPB      norepinephrine Infusion 0.01 MICROgram(s)/kG/Min (1.676 mL/Hr) IV Continuous <Continuous>  pantoprazole Infusion 8 mG/Hr (10 mL/Hr) IV Continuous <Continuous>  propofol Infusion 1.864 MICROgram(s)/kG/Min (1 mL/Hr) IV Continuous <Continuous>  Pureflow. 5000 milliLiter(s) (2250 mL/Hr) CRRT <Continuous>  sodium chloride 0.9%. 1000 milliLiter(s) (100 mL/Hr) IV Continuous <Continuous>    MEDICATIONS  (PRN):      Allergies    No Known Allergies    Intolerances        REVIEW OF SYSTEMS:    patient unable to give a review of systems    Vital Signs Last 24 Hrs  T(C): 37.7 (2018 14:22), Max: 38.3 (2018 07:00)  T(F): 99.8 (2018 14:22), Max: 101 (2018 07:00)  HR: 105 (2018 15:00) (102 - 134)  BP: 93/63 (2018 06:00) (92/69 - 142/71)  BP(mean): 74 (2018 05:00) (72 - 95)  RR: 16 (2018 09:00) (16 - 20)  SpO2: 98% (2018 15:00) (94% - 100%)    PHYSICAL EXAM:      Head: Normocephalic; atraumatic  ENMT: No nasal discharge; airway clear  Neck: Supple; non tender; no masses  Respiratory: No wheezes, rales or rhonchi  Cardiovascular: Regular rate and rhythm. S1 and S2 Normal; No murmurs, gallops or rubs  Gastrointestinal: Soft non-tender non-distended; Normal bowel sounds; No hepatosplenomegaly  Genitourinary: No costovertebral angle tenderness  Extremities: Normal range of motion, No clubbing, cyanosis or edema    LABS:                        10.7   17.9  )-----------( 67       ( 2018 05:12 )             33.2         146<H>  |  111<H>  |  82<H>  ----------------------------<  118<H>  4.4   |  23  |  2.89<H>    Ca    7.4<L>      2018 13:33  Phos  6.2       Mg     2.2         TPro  4.7<L>  /  Alb  2.2<L>  /  TBili  2.1<H>  /  DBili  x   /  AST  62<H>  /  ALT  206<H>  /  AlkPhos  62      PT/INR - ( 2018 05:12 )   PT: 19.9 sec;   INR: 1.77          PTT - ( 2018 05:12 )  PTT:58.6 sec  Urinalysis Basic - ( 2018 06:55 )    Color: Yellow / Appearance: Clear / S.025 / pH: x  Gluc: x / Ketone: NEGATIVE  / Bili: Small / Urobili: 1.0 E.U./dL   Blood: x / Protein: Trace mg/dL / Nitrite: NEGATIVE   Leuk Esterase: NEGATIVE / RBC: < 5 /HPF / WBC 5-10 /HPF   Sq Epi: x / Non Sq Epi: x / Bacteria: Many /HPF          MICROBIOLOGY:  Culture Results:   Testing in progress ( @ 00:44)  Culture Results:   Testing in progress ( @ 00:44)  Culture Results:   Few Yeast  Culture in progress ( @ 21:43)  Culture Results:   Few Yeast  Culture in progress ( @ 21:42)  Culture Results:   Moderate Yeast  Culture in progress ( @ 16:43)  Culture Results:   Moderate Yeast Pending further identification at [facility]  Laboratory  Antibiotic susceptibility testing is performed at request only by calling   at [phone]  Culture in progress ( @ 16:24)  Culture Results:   No growth at 2 days. ( @ 08:11)  Culture Results:   No growth at 2 days. ( @ 08:11)  Culture Results:   No growth ( @ 08:57)  Culture Results:   No growth at 4 days. ( @ 17:47)  Culture Results:   No growth at 4 days. ( @ 15:37)  Culture Results:   No growth at 4 days. ( @ 15:37)      RADIOLOGY & ADDITIONAL STUDIES:

## 2018-01-23 NOTE — PROGRESS NOTE ADULT - SUBJECTIVE AND OBJECTIVE BOX
Subjective and objective   The patient still intubated, had revision of the surgery yesterday, still oliguric, HD catheter palced overnight. we will start CVVHD     Renal service for oliguric Frieda - due to ATN, we will start him on CVVHD           Patient seen and examined at bedside.     chlorhexidine 0.12% Liquid 15 milliLiter(s) two times a day  dextrose 5%. 1000 milliLiter(s) <Continuous>  fentaNYL   Infusion 0.112 MICROgram(s)/kG/Hr <Continuous>  insulin lispro (HumaLOG) corrective regimen sliding scale   Before meals and at bedtime  linezolid  IVPB 600 milliGRAM(s) every 12 hours  meropenem IVPB 500 milliGRAM(s) every 12 hours  micafungin IVPB 100 milliGRAM(s) every 24 hours  micafungin IVPB     norepinephrine Infusion 0.01 MICROgram(s)/kG/Min <Continuous>  pantoprazole Infusion 8 mG/Hr <Continuous>  propofol Infusion 1.864 MICROgram(s)/kG/Min <Continuous>  Pureflow. 5000 milliLiter(s) <Continuous>  sodium chloride 0.9%. 1000 milliLiter(s) <Continuous>      Allergies    No Known Allergies    Intolerances        T(C): , Max: 38.3 (18 @ 07:00)  T(F): , Max: 101 (18 @ 07:00)  HR: 110 (18 @ 13:00)  BP: 93/63 (18 @ 06:00)  BP(mean): 74 (18 @ 05:00)  RR: 16 (18 @ 09:00)  SpO2: 98% (18 @ 13:00)  Wt(kg): --     @ 07:01  -   @ 07:00  --------------------------------------------------------  IN:    argatroban Infusion: 15.9 mL    fentaNYL  Infusion: 147 mL    FFP (Fresh Frozen Plasma): 221 mL    IV PiggyBack: 700 mL    norepinephrine Infusion: 177 mL    pantoprazole Infusion: 210 mL    Platelets - Single Donor: 340 mL    propofol Infusion: 138 mL    Sodium Chloride 0.9% IV Bolus: 1000 mL    sodium chloride 0.9%.: 1000 mL  Total IN: 3948.9 mL    OUT:    Bulb: 765 mL    Bulb: 380 mL    Drain: 250 mL    Indwelling Catheter - Urethral: 445 mL    Nasoenteral Tube: 20 mL  Total OUT: 1860 mL    Total NET: 2088.9 mL      Physical exam:   Intubated and sedated   On Ventilator   Normal air entry into the lungs, no wheezing, decreased breath sounds on the right base   tachycardic - normal s1/s2, no murmurs, rubs or gallops   Abdomen - soft, slightly distended, s/p laparotomy   Extremities: 3+ peripheral edema      @ 07:01  -   @ 13:36  --------------------------------------------------------  IN:    fentaNYL  Infusion: 16 mL    norepinephrine Infusion: 18 mL    pantoprazole Infusion: 20 mL    propofol Infusion: 10 mL    sodium chloride 0.9%.: 200 mL  Total IN: 264 mL    OUT:    Indwelling Catheter - Urethral: 45 mL  Total OUT: 45 mL    Total NET: 219 mL              LABS:                        10.7   17.9  )-----------( 67       ( 2018 05:12 )             33.2         142  |  107  |  80<H>  ----------------------------<  180<H>  4.6   |  21<L>  |  2.83<H>    Ca    8.0<L>      2018 05:12  Phos  6.2       Mg     2.2         TPro  4.7<L>  /  Alb  2.2<L>  /  TBili  2.1<H>  /  DBili  x   /  AST  62<H>  /  ALT  206<H>  /  AlkPhos  62        PT/INR - ( 2018 05:12 )   PT: 19.9 sec;   INR: 1.77          PTT - ( 2018 05:12 )  PTT:58.6 sec  Urinalysis Basic - ( 2018 06:55 )    Color: Yellow / Appearance: Clear / S.025 / pH: x  Gluc: x / Ketone: NEGATIVE  / Bili: Small / Urobili: 1.0 E.U./dL   Blood: x / Protein: Trace mg/dL / Nitrite: NEGATIVE   Leuk Esterase: NEGATIVE / RBC: < 5 /HPF / WBC 5-10 /HPF   Sq Epi: x / Non Sq Epi: x / Bacteria: Many /HPF            RADIOLOGY & ADDITIONAL STUDIES:

## 2018-01-23 NOTE — PROGRESS NOTE ADULT - ASSESSMENT
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     1. O2 Continue CMV mode today do not attempt weaning at this time  2. Bronchodilators:  Atrovent/ albuterol q 4 – 6 hours as needed  3. Corticosteroids: off  4. ID/Antibiotics: must continue ABX for RLL pneumonia, sepsis \, ID ,Mark feedback pt discussed c him  5. Cardiac/HTN: Monitor, optimize HR, cardiology close follow up  6. GI: Rx/ prophylaxis c PPI/H2B, Pt discussed c GI  7. Heme: Rx/VT prophylaxis c SQH/SCD/AC    8. Aspiration precautions, NGT   9. Surgery mngmnt priority,  10.Renal feedback    Discussed with managing team, SICU, surgery, ID

## 2018-01-23 NOTE — PROGRESS NOTE ADULT - SUBJECTIVE AND OBJECTIVE BOX
Attending Surgery:    Patient seen and examined. Now POD #1 s/p reopening of laparotomy, abdominal washout, triple drainage of duodenum, resection of infarcted omental flap, ulcer closure, omental butress via flap, gastrostomy, retrograde duodenostomy via jejunostomy, and distal feeding jejunostomy. IVC filter also placed by vascular surgery. Patient condition improved through night. Recommend continue aggressive supportive care per ICU team.    From surgical standpoint:  OR cultures pending for ID (Dr. Flores)  Gastrostomy (Green tube) - To Low suction  Retrograde duodenostomy via jejunum  -To Low Suction  Feeding jejunostomy - Gravity - would not start feeds yet given contamination and bowel dilation/edema in OR.  Bulb suction drains x 2 - monitor output for quality of effluent.  Rentention sutures and Penrose drain at midline wound.   Please be CAUTIOUS with drains and lines, inadvertent removal, tugging, pulling by patient and or dislodgement during hygiene or transport might be detrimental.

## 2018-01-23 NOTE — PROGRESS NOTE ADULT - SUBJECTIVE AND OBJECTIVE BOX
Seen and examined at bedside. Overnight events noted. Intubated, sedated, critically ill.      Vital Signs Last 24 Hrs  T(C): 38.3 (2018 07:00), Max: 38.3 (2018 07:00)  T(F): 101 (2018 07:00), Max: 101 (2018 07:00)  HR: 129 (2018 08:00) (108 - 134)  BP: 93/63 (2018 06:00) (72/62 - 142/71)  BP(mean): 74 (2018 05:00) (43 - 101)  RR: 17 (2018 08:00) (16 - 20)  SpO2: 99% (2018 08:00) (94% - 100%)    Gen: Ill appearing, intubated/sedated.  C/V: Ireg/ireg  Pulm: Nonlabored breathing, no respiratory distress  Extrem: Right groin soft, dressing CDI, no signs of bleeding or hematoma, Foot cold b/l, improved b/l LE toes duskiness, triphasic DP/PT doppler signal b/l      CAPILLARY BLOOD GLUCOSE  POCT Blood Glucose.: 155 mg/dL (2018 06:00)  POCT Blood Glucose.: 145 mg/dL (2018 22:17)  POCT Blood Glucose.: 192 mg/dL (2018 18:16)  POCT Blood Glucose.: 165 mg/dL (2018 17:57)  POCT Blood Glucose.: 106 mg/dL (2018 12:15)       @ : @ 07:00  --------------------------------------------------------  IN: 3948.9 mL / OUT: 1860 mL / NET: 2088.9 mL     @ 07:  -   @ 09:18  --------------------------------------------------------  IN: 132 mL / OUT: 20 mL / NET: 112 mL      Mode: AC/ CMV (Assist Control/ Continuous Mandatory Ventilation)  RR (machine): 16  TV (machine): 500  FiO2: 40  PEEP: 7  ITime: 0.8  MAP: 10  PIP: 21    chlorhexidine 0.12% Liquid 15 milliLiter(s) Swish and Spit two times a day  dextrose 5%. 1000 milliLiter(s) IV Continuous <Continuous>  fentaNYL   Infusion 0.112 MICROgram(s)/kG/Hr IV Continuous <Continuous>  insulin lispro (HumaLOG) corrective regimen sliding scale   SubCutaneous Before meals and at bedtime  linezolid  IVPB 600 milliGRAM(s) IV Intermittent every 12 hours  meropenem IVPB 500 milliGRAM(s) IV Intermittent every 12 hours  micafungin IVPB 100 milliGRAM(s) IV Intermittent every 24 hours  micafungin IVPB      norepinephrine Infusion 0.01 MICROgram(s)/kG/Min IV Continuous <Continuous>  pantoprazole Infusion 8 mG/Hr IV Continuous <Continuous>  propofol Infusion 1.864 MICROgram(s)/kG/Min IV Continuous <Continuous>  sodium chloride 0.9%. 1000 milliLiter(s) IV Continuous <Continuous>    LABS:  ABG - ( 2018 05:04 )  pH: 7.36  /  pCO2: 42    /  pO2: 112   / HCO3: 23    / Base Excess: -2.3  /  SaO2: 98                  CBC Full  -  ( 2018 05:12 )  WBC Count : 17.9 K/uL  Hemoglobin : 10.7 g/dL  Hematocrit : 33.2 %  Platelet Count - Automated : 67 K/uL  Mean Cell Volume : 93.5 fL  Mean Cell Hemoglobin : 30.1 pg  Mean Cell Hemoglobin Concentration : 32.2 g/dL  Auto Neutrophil # : x  Auto Lymphocyte # : x  Auto Monocyte # : x  Auto Eosinophil # : x  Auto Basophil # : x  Auto Neutrophil % : x  Auto Lymphocyte % : x  Auto Monocyte % : x  Auto Eosinophil % : x  Auto Basophil % : x        142  |  107  |  80<H>  ----------------------------<  180<H>  4.6   |  21<L>  |  2.83<H>    Ca    8.0<L>      2018 05:12  Phos  6.2       Mg     2.2         TPro  4.7<L>  /  Alb  2.2<L>  /  TBili  2.1<H>  /  DBili  x   /  AST  62<H>  /  ALT  206<H>  /  AlkPhos  62      PT/INR - ( 2018 05:12 )   PT: 19.9 sec;   INR: 1.77          PTT - ( 2018 05:12 )  PTT:58.6 sec  Urinalysis Basic - ( 2018 06:55 )    Color: Yellow / Appearance: Clear / S.025 / pH: x  Gluc: x / Ketone: NEGATIVE  / Bili: Small / Urobili: 1.0 E.U./dL   Blood: x / Protein: Trace mg/dL / Nitrite: NEGATIVE   Leuk Esterase: NEGATIVE / RBC: < 5 /HPF / WBC 5-10 /HPF   Sq Epi: x / Non Sq Epi: x / Bacteria: Many /HPF

## 2018-01-23 NOTE — PROGRESS NOTE ADULT - SUBJECTIVE AND OBJECTIVE BOX
Interventional, Pulmonary, Critical, Chest Special Procedures.    Pt was seen and fully examined by myself.     Time spent with patient in minutes:77    Patient is a 72y old  Male who presents with a chief complaint of acute cholecystitis (18 Jan 2018 14:49)Events of last 24hrs reviewed, surgical intervention and periop events reviewed c SICU team., The patient is intubated, sedated, appears comfortable and in synchrony c CMV mode.  All circuits integrity verified and preserved.    HPI:  73 yo M with h/o severe mitral and aortic valve regurgitation presented to ED with 5 day h/o left lower abdominal pain, nausea and multiple episodes of vomiting as well as PO intolerance. Pt reports that the pain in his abdomen is illicited by movement. Went to [facility] two days ago and underwent CT scan which revealed acute cholecystitis as well as right lower lobe infectious pneumonia. Pt was told he would require surgery and left AMA because he was waiting too long. Denies f/c. Last echo 1/4/17 with EF 60-65%. Normal BMs and passing flatus. (18 Jan 2018 14:49)    REVIEW OF SYSTEMS:  as above , new IVCF  PAST MEDICAL & SURGICAL HISTORY:  Mitral regurgitation  Aortic regurgitation  No significant past surgical history    FAMILY HISTORY:    SOCIAL HISTORY:      - Tobacco     - ETOH    Allergies    No Known Allergies    Intolerances      Vital Signs Last 24 Hrs  T(C): 37.7 (23 Jan 2018 14:22), Max: 38.3 (23 Jan 2018 07:00)  T(F): 99.8 (23 Jan 2018 14:22), Max: 101 (23 Jan 2018 07:00)  HR: 96 (23 Jan 2018 16:00) (96 - 134)  BP: 93/63 (23 Jan 2018 06:00) (93/63 - 142/71)  BP(mean): 74 (23 Jan 2018 05:00) (72 - 95)  RR: 16 (23 Jan 2018 09:00) (16 - 20)  SpO2: 100% (23 Jan 2018 16:00) (94% - 100%)    01-22 @ 07:01  -  01-23 @ 07:00  --------------------------------------------------------  IN: 3948.9 mL / OUT: 1860 mL / NET: 2088.9 mL    01-23 @ 07:01  -  01-23 @ 16:28  --------------------------------------------------------  IN: 1216 mL / OUT: 698 mL / NET: 518 mL      Mode: AC/ CMV (Assist Control/ Continuous Mandatory Ventilation)  RR (machine): 16  TV (machine): 500  FiO2: 40  PEEP: 7  ITime: 0.8  MAP: 10  PIP: 22    PHYSICAL EXAM:  Comfortable, on CMV sedated  Eyes: PERRL, EOM intact; conjunctiva and sclera clear  Head: Normocephalic;  No Trauma  ENMT: No nasal discharge, hoarseness, cough or hemoptysis.    Neck: Supple; non tender; no masses or deformities.    No JVD  Respiratory: - WHEEZING   + RLL RHONCHI  + RLL  RALES  + CRACKLES.  Diminished breath sounds  BILATERAL  RIGHT > LEFT baswe  Cardiovascular: Regular rate and rhythm. S1 and S2 Normal; + murmurs, gallops or rubs     - PPM/AICD  Gastrointestinal: distended; no  bowel sounds; No hepatosplenomegaly.     -PEG    +  NGT   +ANGELES, abdominal drains in place  Genitourinary: No costovertebral angle tenderness. No dysuria  Extremities: sedated decreased AROM, No clubbing, cyanosis or edema    Vascular: Peripheral pulses palpable 2+ bilaterally  Neurological: intubated sedated  DEVICES:  - DENTURES   +IV R / L     7.5 ETUBE   -TRACH   -CTUBE  R / L      LABS:  ABG - ( 23 Jan 2018 05:04 )  pH: 7.36  /  pCO2: 42    /  pO2: 112   / HCO3: 23    / Base Excess: -2.3  /  SaO2: 98                                      10.7   17.9  )-----------( 67       ( 23 Jan 2018 05:12 )             33.2     01-23    146<H>  |  111<H>  |  82<H>  ----------------------------<  118<H>  4.4   |  23  |  2.89<H>    Ca    7.4<L>      23 Jan 2018 13:33  Phos  6.2     01-23  Mg     2.2     01-23    TPro  4.7<L>  /  Alb  2.2<L>  /  TBili  2.1<H>  /  DBili  x   /  AST  62<H>  /  ALT  206<H>  /  AlkPhos  62  01-23    PT/INR - ( 23 Jan 2018 05:12 )   PT: 19.9 sec;   INR: 1.77          PTT - ( 23 Jan 2018 05:12 )  PTT:58.6 sec  < from: Xray Chest 1 View AP -PORTABLE-Routine (01.23.18 @ 06:46) >    EXAM:  XR CHEST PORTABLE  ROUTINE 1V                          PROCEDURE DATE:  01/23/2018                     INTERPRETATION:  Portable Chest X-Ray dated 1/23/2018 6:46 AM    Indication: SICU    An AP portable view of the chest is compared to 1/22/2018. Tip of   endotracheal tube is 5 cm above the caprice. No other significant interval   changes. Bibasilar infiltrates, small left pleural effusion, right   inferobasal pleural chest tube, NG tube with tip below the diaphragm but   not included on this film, right central venous catheter with tip in SVC.   Mild pulmonary venous congestion.    IMPRESSION:  No significant interval change.    < end of copied text >  RADIOLOGY & ADDITIONAL STUDIES (The following images were personally reviewed):

## 2018-01-23 NOTE — PROGRESS NOTE ADULT - ASSESSMENT
72yM with PMH severe MR/AR presented after recent dx acute cholecystitis and RLL PNA at [facility] with lower abdominal pain, new onset rapid Afib, sepsis, acute CHF exacerbation, initially admitted to cardiology on bipap, then transferred to SICU with perforated duodenum s/p Shreyas patch and jejunostomy (1/20) c/b breakdown s/p RTOR, washout, new Shreyas patch, gastrostomy, duodenostomy, IVCF (1/22)    Neuro: propofol, fentanyl  CV: Levophed. Afib: Echo EF 50%, ?vegetation and Severe MR. will need AMRIK when HD stable  Pulm: Intubated on cmv 40/500/12/5 chlorhex  GI: NPO with NGT to LIWS. Jtube to gravity. retrograde D-tube to LIWS, G-tube to LIWS Protonix gtt.   : Oliveira, GAETANO. HD to start 1/23  ID: Perforated duodenum/PNA: linezolid (1/22--), micafungin (1/22--), meropenem (1/22--) //// discontinued Vanco (1/19-1/20) Zosyn (1/19-1/22), f/u Bld cx's  Endo: ISS  HEME: Heparin antibody positive, serotonin release assay pending. IVCF (1/22) //2FFP, 1PLT (1/22)  PPx: SCD, R pop DVT, s/p IVC filter 1/22  Lines: PIV, L brachial [place] (1/20-), RIJ TLC, LSC HD cath (1/22-)  Wounds: Ex lap, JPx2 to bulb suction, J-tube to gravity, D-tube to LIWS, G-tube to LIWS  PT/OT: Not ordered

## 2018-01-23 NOTE — PROGRESS NOTE ADULT - ASSESSMENT
72yM with PMH severe MR/AR presented after recent dx acute cholecystitis and RLL PNA at [facility] with lower abdominal pain, new onset rapid Afib, sepsis, acute CHF exacerbation, initially admitted to cardiology on bipap, then transferred to SICU with perforated duodenum s/p Shreyas patch and jejunostomy (1/20) c/b breakdown s/p RTOR, washout, new Shreyas patch, gastrostomy, duodenostomy, IVCF (1/22)    Neuro: propofol, fentanyl  CV: Levophed. Afib: Echo EF 50%, ?vegetation and Severe MR. will need AMRIK when HD stable  Pulm: Intubated on cmv 40/500/12/5 chlorhex  GI: NPO with NGT to LIWS. Jtube to gravity. retrograde D-tube to LIWS, G-tube to LIWS Protonix gtt.   : Oliveira, GAETANO. HD to start 1/23  ID: Perforated duodenum/PNA: linezolid (1/22--), micafungin (1/22--), meropenem (1/22--) //// discontinued Vanco (1/19-1/20) Zosyn (1/19-1/22), f/u Bld cx's  Endo: ISS  HEME: Heparin antibody positive, serotonin release assay pending. IVCF (1/22) //2FFP, 1PLT (1/22)  PPx: SCD, R pop DVT, s/p IVC filter 1/22  Lines: PIV, L brachial [place] (1/20-), RIJ TLC, LSC HD cath (1/22-)  Wounds: Ex lap, JPx2 to bulb suction, J-tube to gravity, D-tube to LIWS, G-tube to LIWS  PT/OT: Not ordered

## 2018-01-24 DIAGNOSIS — B37.7 CANDIDAL SEPSIS: ICD-10-CM

## 2018-01-24 LAB
ANION GAP SERPL CALC-SCNC: 12 MMOL/L — SIGNIFICANT CHANGE UP (ref 5–17)
ANION GAP SERPL CALC-SCNC: 12 MMOL/L — SIGNIFICANT CHANGE UP (ref 5–17)
APTT BLD: 47.9 SEC — HIGH (ref 27.5–37.4)
APTT BLD: 65.8 SEC — HIGH (ref 27.5–37.4)
APTT BLD: 72.8 SEC — HIGH (ref 27.5–37.4)
APTT BLD: 82.9 SEC — HIGH (ref 27.5–37.4)
APTT BLD: 83.6 SEC — HIGH (ref 27.5–37.4)
BASE EXCESS BLDA CALC-SCNC: -2.9 MMOL/L — LOW (ref -2–3)
BASE EXCESS BLDV CALC-SCNC: -2.8 MMOL/L — SIGNIFICANT CHANGE UP
BUN SERPL-MCNC: 72 MG/DL — HIGH (ref 7–23)
BUN SERPL-MCNC: 75 MG/DL — HIGH (ref 7–23)
CALCIUM SERPL-MCNC: 7.6 MG/DL — LOW (ref 8.4–10.5)
CALCIUM SERPL-MCNC: 7.7 MG/DL — LOW (ref 8.4–10.5)
CHLORIDE SERPL-SCNC: 108 MMOL/L — SIGNIFICANT CHANGE UP (ref 96–108)
CHLORIDE SERPL-SCNC: 111 MMOL/L — HIGH (ref 96–108)
CO2 SERPL-SCNC: 22 MMOL/L — SIGNIFICANT CHANGE UP (ref 22–31)
CO2 SERPL-SCNC: 22 MMOL/L — SIGNIFICANT CHANGE UP (ref 22–31)
CREAT SERPL-MCNC: 2.16 MG/DL — HIGH (ref 0.5–1.3)
CREAT SERPL-MCNC: 2.21 MG/DL — HIGH (ref 0.5–1.3)
CULTURE RESULTS: SIGNIFICANT CHANGE UP
FIBRINOGEN PPP-MCNC: 275 MG/DL — SIGNIFICANT CHANGE UP (ref 258–438)
GAS PNL BLDA: SIGNIFICANT CHANGE UP
GAS PNL BLDV: SIGNIFICANT CHANGE UP
GLUCOSE BLDC GLUCOMTR-MCNC: 127 MG/DL — HIGH (ref 70–99)
GLUCOSE BLDC GLUCOMTR-MCNC: 135 MG/DL — HIGH (ref 70–99)
GLUCOSE BLDC GLUCOMTR-MCNC: 93 MG/DL — SIGNIFICANT CHANGE UP (ref 70–99)
GLUCOSE BLDC GLUCOMTR-MCNC: 95 MG/DL — SIGNIFICANT CHANGE UP (ref 70–99)
GLUCOSE BLDC GLUCOMTR-MCNC: 99 MG/DL — SIGNIFICANT CHANGE UP (ref 70–99)
GLUCOSE SERPL-MCNC: 114 MG/DL — HIGH (ref 70–99)
GLUCOSE SERPL-MCNC: 122 MG/DL — HIGH (ref 70–99)
HCO3 BLDA-SCNC: 22 MMOL/L — SIGNIFICANT CHANGE UP (ref 21–28)
HCO3 BLDV-SCNC: 23 MMOL/L — SIGNIFICANT CHANGE UP (ref 20–27)
HCT VFR BLD CALC: 31.4 % — LOW (ref 39–50)
HCT VFR BLD CALC: 33.3 % — LOW (ref 39–50)
HGB BLD-MCNC: 10.2 G/DL — LOW (ref 13–17)
HGB BLD-MCNC: 10.8 G/DL — LOW (ref 13–17)
INR BLD: 1.43 — HIGH (ref 0.88–1.16)
INR BLD: 1.78 — HIGH (ref 0.88–1.16)
LACTATE SERPL-SCNC: 1.6 MMOL/L — SIGNIFICANT CHANGE UP (ref 0.5–2)
MAGNESIUM SERPL-MCNC: 2.3 MG/DL — SIGNIFICANT CHANGE UP (ref 1.6–2.6)
MCHC RBC-ENTMCNC: 30.2 PG — SIGNIFICANT CHANGE UP (ref 27–34)
MCHC RBC-ENTMCNC: 30.4 PG — SIGNIFICANT CHANGE UP (ref 27–34)
MCHC RBC-ENTMCNC: 32.4 G/DL — SIGNIFICANT CHANGE UP (ref 32–36)
MCHC RBC-ENTMCNC: 32.5 G/DL — SIGNIFICANT CHANGE UP (ref 32–36)
MCV RBC AUTO: 93 FL — SIGNIFICANT CHANGE UP (ref 80–100)
MCV RBC AUTO: 93.7 FL — SIGNIFICANT CHANGE UP (ref 80–100)
PCO2 BLDA: 38 MMHG — SIGNIFICANT CHANGE UP (ref 35–48)
PCO2 BLDV: 43 MMHG — SIGNIFICANT CHANGE UP (ref 41–51)
PH BLDA: 7.38 — SIGNIFICANT CHANGE UP (ref 7.35–7.45)
PH BLDV: 7.34 — SIGNIFICANT CHANGE UP (ref 7.32–7.43)
PHOSPHATE SERPL-MCNC: 4.3 MG/DL — SIGNIFICANT CHANGE UP (ref 2.5–4.5)
PLATELET # BLD AUTO: 42 K/UL — LOW (ref 150–400)
PLATELET # BLD AUTO: 47 K/UL — LOW (ref 150–400)
PO2 BLDA: 127 MMHG — HIGH (ref 83–108)
PO2 BLDV: 48 MMHG — SIGNIFICANT CHANGE UP
POTASSIUM SERPL-MCNC: 4 MMOL/L — SIGNIFICANT CHANGE UP (ref 3.5–5.3)
POTASSIUM SERPL-MCNC: 4 MMOL/L — SIGNIFICANT CHANGE UP (ref 3.5–5.3)
POTASSIUM SERPL-SCNC: 4 MMOL/L — SIGNIFICANT CHANGE UP (ref 3.5–5.3)
POTASSIUM SERPL-SCNC: 4 MMOL/L — SIGNIFICANT CHANGE UP (ref 3.5–5.3)
PROTHROM AB SERPL-ACNC: 16 SEC — HIGH (ref 9.8–12.7)
PROTHROM AB SERPL-ACNC: 20 SEC — HIGH (ref 9.8–12.7)
RBC # BLD: 3.35 M/UL — LOW (ref 4.2–5.8)
RBC # BLD: 3.58 M/UL — LOW (ref 4.2–5.8)
RBC # FLD: 17 % — HIGH (ref 10.3–16.9)
RBC # FLD: 17.2 % — HIGH (ref 10.3–16.9)
SAO2 % BLDA: 98 % — SIGNIFICANT CHANGE UP (ref 95–100)
SAO2 % BLDV: 82 % — SIGNIFICANT CHANGE UP
SODIUM SERPL-SCNC: 142 MMOL/L — SIGNIFICANT CHANGE UP (ref 135–145)
SODIUM SERPL-SCNC: 145 MMOL/L — SIGNIFICANT CHANGE UP (ref 135–145)
SPECIMEN SOURCE: SIGNIFICANT CHANGE UP
SRA INTERP SER-IMP: SIGNIFICANT CHANGE UP
WBC # BLD: 16.8 K/UL — HIGH (ref 3.8–10.5)
WBC # BLD: 16.8 K/UL — HIGH (ref 3.8–10.5)
WBC # FLD AUTO: 16.8 K/UL — HIGH (ref 3.8–10.5)
WBC # FLD AUTO: 16.8 K/UL — HIGH (ref 3.8–10.5)

## 2018-01-24 PROCEDURE — 99233 SBSQ HOSP IP/OBS HIGH 50: CPT | Mod: GC

## 2018-01-24 PROCEDURE — 99291 CRITICAL CARE FIRST HOUR: CPT

## 2018-01-24 PROCEDURE — 71045 X-RAY EXAM CHEST 1 VIEW: CPT | Mod: 26

## 2018-01-24 RX ORDER — FUROSEMIDE 40 MG
40 TABLET ORAL ONCE
Qty: 0 | Refills: 0 | Status: COMPLETED | OUTPATIENT
Start: 2018-01-24 | End: 2018-01-24

## 2018-01-24 RX ORDER — ARGATROBAN 50 MG/50ML
0.2 INJECTION, SOLUTION INTRAVENOUS
Qty: 50 | Refills: 0 | Status: DISCONTINUED | OUTPATIENT
Start: 2018-01-24 | End: 2018-01-25

## 2018-01-24 RX ADMIN — PANTOPRAZOLE SODIUM 10 MG/HR: 20 TABLET, DELAYED RELEASE ORAL at 16:56

## 2018-01-24 RX ADMIN — ARGATROBAN 2.68 MICROGRAM(S)/KG/MIN: 50 INJECTION, SOLUTION INTRAVENOUS at 10:24

## 2018-01-24 RX ADMIN — CHLORHEXIDINE GLUCONATE 15 MILLILITER(S): 213 SOLUTION TOPICAL at 17:02

## 2018-01-24 RX ADMIN — Medication 40 MILLIGRAM(S): at 09:44

## 2018-01-24 RX ADMIN — MEROPENEM 100 MILLIGRAM(S): 1 INJECTION INTRAVENOUS at 07:10

## 2018-01-24 RX ADMIN — MICAFUNGIN SODIUM 105 MILLIGRAM(S): 100 INJECTION, POWDER, LYOPHILIZED, FOR SOLUTION INTRAVENOUS at 14:59

## 2018-01-24 RX ADMIN — ARGATROBAN 1.61 MICROGRAM(S)/KG/MIN: 50 INJECTION, SOLUTION INTRAVENOUS at 16:57

## 2018-01-24 RX ADMIN — CHLORHEXIDINE GLUCONATE 15 MILLILITER(S): 213 SOLUTION TOPICAL at 07:12

## 2018-01-24 RX ADMIN — PROPOFOL 1 MICROGRAM(S)/KG/MIN: 10 INJECTION, EMULSION INTRAVENOUS at 16:56

## 2018-01-24 RX ADMIN — MEROPENEM 100 MILLIGRAM(S): 1 INJECTION INTRAVENOUS at 16:57

## 2018-01-24 NOTE — PROGRESS NOTE ADULT - SUBJECTIVE AND OBJECTIVE BOX
Subjective and objective   The patient still intubated,still oliguric, HD catheter palced overnight. On 1/23 was started on CVVHd - overnight clotted and stopped. More urine output today     Renal service for oliguric Frieda - due to ATN, we will start him on CVVHD           Patient seen and examined at bedside.     argatroban Infusion 0.5 MICROgram(s)/kG/Min <Continuous>  chlorhexidine 0.12% Liquid 15 milliLiter(s) two times a day  dextrose 5%. 1000 milliLiter(s) <Continuous>  fentaNYL   Infusion 0.112 MICROgram(s)/kG/Hr <Continuous>  insulin lispro (HumaLOG) corrective regimen sliding scale   Before meals and at bedtime  meropenem IVPB 500 milliGRAM(s) every 12 hours  micafungin IVPB 100 milliGRAM(s) every 24 hours  micafungin IVPB     norepinephrine Infusion 0.01 MICROgram(s)/kG/Min <Continuous>  pantoprazole Infusion 8 mG/Hr <Continuous>  propofol Infusion 1.864 MICROgram(s)/kG/Min <Continuous>  Pureflow. 5000 milliLiter(s) <Continuous>      Allergies    No Known Allergies    Intolerances        T(C): , Max: 37.7 (01-23-18 @ 14:22)  T(F): , Max: 99.8 (01-23-18 @ 14:22)  HR: 110 (01-24-18 @ 10:00)  BP: 83/67 (01-24-18 @ 05:00)  BP(mean): 72 (01-24-18 @ 05:00)  RR: 18 (01-24-18 @ 10:00)  SpO2: 99% (01-24-18 @ 10:00)  Wt(kg): --    01-23 @ 07:01  -  01-24 @ 07:00  --------------------------------------------------------  IN:    fentaNYL  Infusion: 164 mL    IV PiggyBack: 100 mL    norepinephrine Infusion: 198 mL    pantoprazole Infusion: 240 mL    propofol Infusion: 120 mL    sodium chloride 0.9%: 2100 mL  Total IN: 2922 mL    OUT:    Bulb: 535 mL    Bulb: 260 mL    Drain: 350 mL    Indwelling Catheter - Urethral: 780 mL    Other: 1 mL  Total OUT: 1926 mL    Total NET: 996 mL      01-24 @ 07:01  -  01-24 @ 11:16  --------------------------------------------------------  IN:    fentaNYL  Infusion: 6 mL    norepinephrine Infusion: 12 mL    pantoprazole Infusion: 20 mL    propofol Infusion: 10 mL  Total IN: 48 mL    OUT:    Indwelling Catheter - Urethral: 85 mL  Total OUT: 85 mL    Total NET: -37 mL      Physical exam:   Intubated and sedated   On Ventilator   Normal air entry into the lungs, no wheezing, decreased breath sounds on the right base   tachycardic - normal s1/s2, no murmurs, rubs or gallops   Abdomen - soft, slightly distended, s/p laparotomy   Extremities: 3+ peripheral edema   HAs a catheter on the left of his chest             LABS:                        10.2   16.8  )-----------( 42       ( 24 Jan 2018 06:13 )             31.4     01-24    142  |  108  |  72<H>  ----------------------------<  114<H>  4.0   |  22  |  2.21<H>    Ca    7.6<L>      24 Jan 2018 06:13  Phos  4.3     01-24  Mg     2.3     01-24    TPro  4.7<L>  /  Alb  2.2<L>  /  TBili  2.1<H>  /  DBili  x   /  AST  62<H>  /  ALT  206<H>  /  AlkPhos  62  01-23      PT/INR - ( 24 Jan 2018 09:34 )   PT: 16.0 sec;   INR: 1.43          PTT - ( 24 Jan 2018 09:34 )  PTT:47.9 sec          RADIOLOGY & ADDITIONAL STUDIES:

## 2018-01-24 NOTE — PROGRESS NOTE ADULT - ASSESSMENT
72yM with PMH severe MR/AR presented after recent dx acute cholecystitis and RLL PNA at [facility] with lower abdominal pain, new onset rapid Afib, sepsis, acute CHF exacerbation, initially admitted to cardiology on bipap, then transferred to SICU with perforated duodenum s/p Shreyas patch and jejunostomy (1/20) c/b breakdown s/p RTOR, washout, new Shreyas patch, gastrostomy, duodenostomy, IVCF (1/22)    Neuro: propofol, fentanyl  CV: Levophed. Afib: Echo EF 50%, ?vegetation and Severe MR. will need AMRIK when HD stable  Pulm: Intubated on cmv 40/500/12/5 chlorhex  GI: NPO with NGT to LIWS. Jtube TF @ 10 cc. retrograde D-tube to LIWS, G-tube to LIWS Protonix gtt.   : GAETANO Oliveira. 40 of Lasix this AM will evaluate UOP throughout day  ID: Perforated duodenum/PNA surgical swab with Candida: micafungin (1/22--), meropenem (1/22--) //// discontinued Vanco (1/19-1/20) Zosyn (1/19-1/22), f/u Bld cx's, linezolid (1/22-23)  Endo: ISS  HEME: Heparin antibody positive, serotonin release assay pending. IVCF (1/22) //1FFP, 1PLT (1/22). Argatroban @ 0.5  PPx: SCD, R pop DVT, s/p IVC filter 1/22  Lines: PIV, L brachial Jeane (1/20-), RIJ TLC, LSC HD cath (1/22-)  Wounds: Ex lap, JPx2 to bulb suction, J-tube to gravity, D-tube to LIWS, G-tube to LIWS  PT/OT: Not ordered

## 2018-01-24 NOTE — PROGRESS NOTE ADULT - SUBJECTIVE AND OBJECTIVE BOX
Critical Care    INTERVAL HPI/OVERNIGHT EVENTS:    ANTIBIOTICS    MEDICATIONS  (STANDING):  argatroban Infusion 0.5 MICROgram(s)/kG/Min (2.682 mL/Hr) IV Continuous <Continuous>  chlorhexidine 0.12% Liquid 15 milliLiter(s) Swish and Spit two times a day  dextrose 5%. 1000 milliLiter(s) (50 mL/Hr) IV Continuous <Continuous>  fentaNYL   Infusion 0.112 MICROgram(s)/kG/Hr (1 mL/Hr) IV Continuous <Continuous>  insulin lispro (HumaLOG) corrective regimen sliding scale   SubCutaneous Before meals and at bedtime  meropenem IVPB 500 milliGRAM(s) IV Intermittent every 12 hours  micafungin IVPB 100 milliGRAM(s) IV Intermittent every 24 hours  micafungin IVPB      norepinephrine Infusion 0.01 MICROgram(s)/kG/Min (1.676 mL/Hr) IV Continuous <Continuous>  pantoprazole Infusion 8 mG/Hr (10 mL/Hr) IV Continuous <Continuous>  propofol Infusion 1.864 MICROgram(s)/kG/Min (1 mL/Hr) IV Continuous <Continuous>  Pureflow. 5000 milliLiter(s) (2250 mL/Hr) CRRT <Continuous>    MEDICATIONS  (PRN):      Allergies    No Known Allergies    Intolerances        REVIEW OF SYSTEMS:    patient  unable to communicate  he is currently intubated    Vital Signs Last 24 Hrs  T(C): 37.2 (24 Jan 2018 09:09), Max: 37.7 (23 Jan 2018 14:22)  T(F): 99 (24 Jan 2018 09:09), Max: 99.8 (23 Jan 2018 14:22)  HR: 112 (24 Jan 2018 12:00) (88 - 116)  BP: 83/67 (24 Jan 2018 05:00) (83/67 - 83/67)  BP(mean): 72 (24 Jan 2018 05:00) (72 - 72)  RR: 18 (24 Jan 2018 12:00) (14 - 18)  SpO2: 99% (24 Jan 2018 12:22) (94% - 100%)    PHYSICAL EXAM:    General: in no acute distress  Eyes: PERRL, EOM intact; conjunctiva and sclera clear  Head: Normocephalic; atraumatic  ENMT: No nasal discharge; airway clear  Neck: Supple; non tender; no masses  Respiratory: No wheezes, rales or rhonchi  Cardiovascular: Regular rate and rhythm. S1 and S2 Normal; No murmurs, gallops or rubs  Gastrointestinal: Soft non-tender non-distended; Normal bowel sounds; No hepatosplenomegaly  Genitourinary: No costovertebral angle tenderness      LABS:                        10.8   16.8  )-----------( 47       ( 24 Jan 2018 12:10 )             33.3     01-24    145  |  111<H>  |  75<H>  ----------------------------<  122<H>  4.0   |  22  |  2.16<H>    Ca    7.7<L>      24 Jan 2018 12:10  Phos  4.3     01-24  Mg     2.3     01-24    TPro  4.7<L>  /  Alb  2.2<L>  /  TBili  2.1<H>  /  DBili  x   /  AST  62<H>  /  ALT  206<H>  /  AlkPhos  62  01-23    PT/INR - ( 24 Jan 2018 12:10 )   PT: 20.0 sec;   INR: 1.78          PTT - ( 24 Jan 2018 12:10 )  PTT:65.8 sec        MICROBIOLOGY:  Culture Results:   Candida albicans (01-23 @ 21:26)  Culture Results:   Testing in progress (01-23 @ 00:44)  Culture Results:   Testing in progress (01-23 @ 00:44)  Culture Results:   Few Yeast  Culture in progress (01-22 @ 21:43)  Culture Results:   Few Yeast  Culture in progress (01-22 @ 21:42)  Culture Results:   Moderate Yeast See previous culture for id and sensitivity (01-22 @ 16:43)  Culture Results:   Moderate Yeast Pending further identification and antibiotic  susceptibility testing at  [facility] (01-22 @ 16:24)  Culture Results:   No growth at 3 days. (01-21 @ 08:11)  Culture Results:   No growth at 3 days. (01-21 @ 08:11)  Culture Results:   No growth (01-19 @ 08:57)  Culture Results:   No growth at 5 days. (01-18 @ 17:47)  Culture Results:   No growth at 5 days. (01-18 @ 15:37)  Culture Results:   No growth at 5 days. (01-18 @ 15:37)      RADIOLOGY & ADDITIONAL STUDIES:

## 2018-01-24 NOTE — PROGRESS NOTE ADULT - ATTENDING COMMENTS
acute oliguric renal failure- started on CVVHD last night but system clotted after several hours - and then RRT held.  This AM- BP a bit better and urine output about 40 ml/hr/  Is going o be restarted on argatroban today  will follow trend in urine putput B/Cr and electrolytes- restart CVVHD later today if needs clearance or not able to generate negative fluid balance  also consider aquapheresis to assist fluid removal

## 2018-01-24 NOTE — PROGRESS NOTE ADULT - SUBJECTIVE AND OBJECTIVE BOX
Interventional, Pulmonary, Critical, Chest Special Procedures.    Pt was seen and fully examined by myself.     Time spent with patient in minutes:    Patient is a 72y old  Male who presents with a chief complaint of acute cholecystitis (18 Jan 2018 14:49)Events of last 24hrs reviewed and discussed c SICU   The patient is intubated, sedated, appears comfortable and in synchrony c CMV mode.  All circuits integrity verified and preserved.  HPI:  73 yo M with h/o severe mitral and aortic valve regurgitation presented to ED with 5 day h/o left lower abdominal pain, nausea and multiple episodes of vomiting as well as PO intolerance. Pt reports that the pain in his abdomen is illicited by movement. Went to [facility] days ago and underwent CT scan which revealed acute cholecystitis as well as right lower lobe infectious pneumonia. Pt was told he would require surgery and left AMA because he was waiting too long. Denies f/c. Last echo 1/4/17 with EF 60-65%. Normal BMs and passing flatus. (18 Jan 2018 14:49)    REVIEW OF SYSTEMS: updated  Constitutional: No fever, weight loss, chills or fatigue  Eyes: No eye pain, visual disturbances, or discharge  ENMT:  No difficulty hearing, tinnitus, vertigo; No sinus or throat pain. No epistaxis, dysphagia, dysphonia, hoarseness or odynophagia  Neck: No pain, stiffness or neck swelling.  No masses or deformities  Respiratory: No cough, wheezing, chills or hemoptysis  - COPD  - ILD   - PE   - ASTHMA     - PNEUMONIA  Cardiovascular: No chest pain, dysrhythmia, palpitations, dizziness or edema   - COPD     - CAD   - CHF   - HTN  Gastrointestinal: No abdominal or epigastric pain. No nausea, vomiting or hematemesis; No diarrhea or constipation. No melena or hematochezia. No dysphagia or Icterus.          Genitourinary: No dysuria, frequency, hematuria or incontinence   - CKD/GAETANO      - ESRD  Neurological: No headaches, memory loss, loss of strength, numbness or tremors      -DEMENTIA     - STROKE    - SEIZURE  Skin: No itching, burning, rashes or lesions   Lymph Nodes: No enlarged glands  Endocrine: No heat or cold intolerance; No hair loss       - DM     - THYROID DISORDER  Musculoskeletal: No joint pain or swelling; No muscle, back or extremity pain  Psychiatric: No depression, anxiety, mood swings or difficulty sleeping  Heme/Lymph: No easy bruising or bleeding gums         - ANEMIA      - CANCER   -COAGULOPATHY  Allergy and Immunologic: No hives or eczema    PAST MEDICAL & SURGICAL HISTORY:  Mitral regurgitation  Aortic regurgitation  No significant past surgical history    FAMILY HISTORY:    SOCIAL HISTORY:      - Tobacco     - ETOH    Allergies    No Known Allergies    Intolerances      Vital Signs Last 24 Hrs  T(C): 37.2 (24 Jan 2018 09:09), Max: 37.6 (24 Jan 2018 02:06)  T(F): 99 (24 Jan 2018 09:09), Max: 99.6 (24 Jan 2018 02:06)  HR: 112 (24 Jan 2018 14:00) (88 - 116)  BP: 83/67 (24 Jan 2018 05:00) (83/67 - 83/67)  BP(mean): 72 (24 Jan 2018 05:00) (72 - 72)  RR: 18 (24 Jan 2018 12:00) (14 - 18)  SpO2: 100% (24 Jan 2018 14:00) (94% - 100%)    01-23 @ 07:01 - 01-24 @ 07:00  --------------------------------------------------------  IN: 2922 mL / OUT: 1926 mL / NET: 996 mL    01-24 @ 07:01 - 01-24 @ 14:40  --------------------------------------------------------  IN: 196.1 mL / OUT: 1035 mL / NET: -838.9 mL      Mode: AC/ CMV (Assist Control/ Continuous Mandatory Ventilation)  RR (machine): 16  TV (machine): 500  FiO2: 40  PEEP: 7  ITime: 0.8  MAP: 11  PIP: 24    PHYSICAL EXAM:  Comfortable, on CMV sedated  Eyes: PERRL, EOM intact; conjunctiva and sclera clear  Head: Normocephalic;  No Trauma  ENMT: No nasal discharge, hoarseness, cough or hemoptysis.    Neck: Supple; non tender; no masses or deformities.    No JVD  Respiratory: - WHEEZING   + RLL RHONCHI  + RLL  RALES  + CRACKLES.  Diminished breath sounds  BILATERAL  RIGHT > LEFT baswe  Cardiovascular: Regular rate and rhythm. S1 and S2 Normal; + murmurs, gallops or rubs     - PPM/AICD  Gastrointestinal: distended; no  bowel sounds; No hepatosplenomegaly.     -PEG    +  NGT   +ANGELES, abdominal drains in place  Genitourinary: No costovertebral angle tenderness. No dysuria  Extremities: sedated decreased AROM, No clubbing, cyanosis or edema    Vascular: Peripheral pulses palpable 2+ bilaterally  Neurological: intubated sedated  DEVICES:  - DENTURES   +IV R / L     7.5 ETUBE   -TRACH   -CTUBE  R / L      LABS:  ABG - ( 24 Jan 2018 06:17 )  pH: 7.38  /  pCO2: 38    /  pO2: 127   / HCO3: 22    / Base Excess: -2.9  /  SaO2: 98                                      10.8   16.8  )-----------( 47       ( 24 Jan 2018 12:10 )             33.3     01-24    145  |  111<H>  |  75<H>  ----------------------------<  122<H>  4.0   |  22  |  2.16<H>    Ca    7.7<L>      24 Jan 2018 12:10  Phos  4.3     01-24  Mg     2.3     01-24    TPro  4.7<L>  /  Alb  2.2<L>  /  TBili  2.1<H>  /  DBili  x   /  AST  62<H>  /  ALT  206<H>  /  AlkPhos  62  01-23    PT/INR - ( 24 Jan 2018 12:10 )   PT: 20.0 sec;   INR: 1.78          PTT - ( 24 Jan 2018 14:03 )  PTT:83.6 sec  < from: Xray Chest 1 View AP- PORTABLE-Urgent (01.24.18 @ 08:59) >    EXAM:  XR CHEST PORTABLE URGENT 1V                          PROCEDURE DATE:  01/24/2018                     INTERPRETATION:  Portable Chest X-Ray dated 1/24/2018 8:59 AM    Indication: AM SICU Intubated    An AP portable view of the chest is compared to 1/23/2018. Moderate   pulmonary venous congestion, slightly increased. Small-to-moderate right   pleural effusion, increased. Left lower lobe atelectasis/consolidation   and small left pleural effusion unchanged. Support devices appear   unchanged. Tip of endotracheal tube 6.1 cm above the caprice and it may be   pushed in by 1 to 2 cm. Right basal chest tube unchanged.    IMPRESSION:  Moderate pulmonary venous congestion, slightly increased.   Small-to-moderate right pleural effusion, increased.    < end of copied text >  RADIOLOGY & ADDITIONAL STUDIES (The following images were personally reviewed):

## 2018-01-24 NOTE — PROGRESS NOTE ADULT - PROBLEM SELECTOR PLAN 1
- most likely related to the ATN due to shock (other perticipating factors are IV contrast, Vanco), on pressors   -  urine output slightly better 700 ml   - we will continue with CVVHD today  - electrolytes acceptable   - volume status - hypervolemic   - in and outs   - daily weight   - renal diet.  - please check the abdominal pressure - most likely related to the ATN due to shock (other precipitating factors are IV contrast, Vanco), on pressors   -  urine output slightly better 700 ml   - we will continue with CVVHD today  - electrolytes acceptable   - volume status - hypervolemic   - in and outs   - daily weight   - renal diet.  - please check the abdominal pressure

## 2018-01-24 NOTE — PROGRESS NOTE ADULT - ASSESSMENT
patient growing Candida albicans from OR cultures  I have asked the lab to do Sensitivity testing    High level of decision making   Complex medical problems

## 2018-01-24 NOTE — PROGRESS NOTE ADULT - SUBJECTIVE AND OBJECTIVE BOX
OVERNIGHT EVENTS:  - UO 40-70/ hr  Multiorgan failure on CAVH  sedated/on pressors        MEDICATIONS  (STANDING):  argatroban Infusion 0.5 MICROgram(s)/kG/Min (2.682 mL/Hr) IV Continuous <Continuous>  chlorhexidine 0.12% Liquid 15 milliLiter(s) Swish and Spit two times a day  dextrose 5%. 1000 milliLiter(s) (50 mL/Hr) IV Continuous <Continuous>  fentaNYL   Infusion 0.112 MICROgram(s)/kG/Hr (1 mL/Hr) IV Continuous <Continuous>  insulin lispro (HumaLOG) corrective regimen sliding scale   SubCutaneous Before meals and at bedtime  meropenem IVPB 500 milliGRAM(s) IV Intermittent every 12 hours  micafungin IVPB 100 milliGRAM(s) IV Intermittent every 24 hours  micafungin IVPB      norepinephrine Infusion 0.01 MICROgram(s)/kG/Min (1.676 mL/Hr) IV Continuous <Continuous>  pantoprazole Infusion 8 mG/Hr (10 mL/Hr) IV Continuous <Continuous>  propofol Infusion 1.864 MICROgram(s)/kG/Min (1 mL/Hr) IV Continuous <Continuous>  Pureflow. 5000 milliLiter(s) (2250 mL/Hr) CRRT <Continuous>      Drug Dosing Weight  Height (cm): 177.8 (18 Jan 2018 11:40)  Weight (kg): 89.4 (18 Jan 2018 11:40)  BMI (kg/m2): 28.3 (18 Jan 2018 11:40)  BSA (m2): 2.07 (18 Jan 2018 11:40)        PAST MEDICAL & SURGICAL HISTORY:  Mitral regurgitation  Aortic regurgitation  No significant past surgical history  	    ICU Vital Signs Last 24 Hrs  T(C): 37.2 (24 Jan 2018 09:09), Max: 37.7 (23 Jan 2018 14:22)  T(F): 99 (24 Jan 2018 09:09), Max: 99.8 (23 Jan 2018 14:22)  HR: 112 (24 Jan 2018 11:00) (88 - 116)  BP: 83/67 (24 Jan 2018 05:00) (83/67 - 83/67)  BP(mean): 72 (24 Jan 2018 05:00) (72 - 72)  ABP: 124/66 (24 Jan 2018 11:00) (98/62 - 124/66)  ABP(mean): 84 (24 Jan 2018 11:00) (70 - 108)  RR: 17 (24 Jan 2018 11:00) (14 - 18)  SpO2: 99% (24 Jan 2018 11:00) (94% - 100%)      ABG - ( 24 Jan 2018 06:17 )  pH: 7.38  /  pCO2: 38    /  pO2: 127   / HCO3: 22    / Base Excess: -2.9  /  SaO2: 98                  I&O's Detail    23 Jan 2018 07:01  -  24 Jan 2018 07:00  --------------------------------------------------------  IN:    fentaNYL  Infusion: 164 mL    IV PiggyBack: 100 mL    norepinephrine Infusion: 198 mL    pantoprazole Infusion: 240 mL    propofol Infusion: 120 mL    sodium chloride 0.9%: 2100 mL  Total IN: 2922 mL    OUT:    Bulb: 535 mL    Bulb: 260 mL    Drain: 350 mL    Indwelling Catheter - Urethral: 780 mL    Other: 1 mL  Total OUT: 1926 mL    Total NET: 996 mL      24 Jan 2018 07:01  -  24 Jan 2018 12:08  --------------------------------------------------------  IN:    fentaNYL  Infusion: 22 mL    norepinephrine Infusion: 24 mL    pantoprazole Infusion: 40 mL    propofol Infusion: 20 mL    Suplena: 20 mL  Total IN: 126 mL    OUT:    Bulb: 75 mL    Bulb: 40 mL    Indwelling Catheter - Urethral: 460 mL  Total OUT: 575 mL    Total NET: -449 mL          Mode: AC/ CMV (Assist Control/ Continuous Mandatory Ventilation)  RR (machine): 16  TV (machine): 500  FiO2: 40  PEEP: 7  ITime: 0.8  MAP: 10  PIP: 21    Physical Exam:  Neuro: sedated, opens eyes to voice  General: NAD  HEENT: NC/AT, pupils 1mm, equal and reactive to light, NGT in place draining to LIWS, neck supple w/o JVD  Pulmonary: intubated, CTA-B good air movement to bases b/l  Cardiovascular: irregularly irregular, tachycardic, 4/6 holosystolic murmur heard best at apex, 2/4 diastolic murmur heard best at left sternal border  Abdominal: soft, mildly distended, tympanic, hypoactive BS, no organomegaly. Duodenostomy with bilious drainage, GUCCI drains with serosanguinous drainage. Gastrotomy and J tube in place.   Extremities: anasarca, right groin soft, dressing in place, no erythema, no hematoma, b/l feet warm to touch, toes and dorsal surfaces of L/R foot dusky but improved from yesterday. 2+ DP pulses bilaterally     LABS:  CBC Full  -  ( 24 Jan 2018 06:13 )  WBC Count : 16.8 K/uL  Hemoglobin : 10.2 g/dL  Hematocrit : 31.4 %  Platelet Count - Automated : 42 K/uL  Mean Cell Volume : 93.7 fL  Mean Cell Hemoglobin : 30.4 pg  Mean Cell Hemoglobin Concentration : 32.5 g/dL    01-24    142  |  108  |  72<H>  ----------------------------<  114<H>  4.0   |  22  |  2.21<H>    Ca    7.6<L>      24 Jan 2018 06:13  Phos  4.3     01-24  Mg     2.3     01-24    TPro  4.7<L>  /  Alb  2.2<L>  /  TBili  2.1<H>  /  DBili  x   /  AST  62<H>  /  ALT  206<H>  /  AlkPhos  62  01-23    PT/INR - ( 24 Jan 2018 09:34 )   PT: 16.0 sec;   INR: 1.43          PTT - ( 24 Jan 2018 09:34 )  PTT:47.9 sec

## 2018-01-24 NOTE — PROGRESS NOTE ADULT - ASSESSMENT
This is 72 year old male with PMH stated above, now in shock - most likely septic and oligo anuric in these setting, also s/p CT angio, s/p treatment with Vanco - supra therapeutic levels,  s/p laparotomy for perforation of the duodenum., revision done on 1/22. Today to procede with CVVHD - is supposed to be started on anticoagulation with Agatraban, more urine output - 700 ml in 24 hours

## 2018-01-24 NOTE — PROGRESS NOTE ADULT - SUBJECTIVE AND OBJECTIVE BOX
OVERNIGHT EVENTS:  - UO 40-70/ hr  - CVVH machine clotting, called renal and stopped  - thrombocytopenic from 67 to 42 @ 6AM   - hypothermic to 35.8 @ 19:00, resolved with heating blanket  - persistently tachycardic (114)      MEDICATIONS  (STANDING):  argatroban Infusion 0.5 MICROgram(s)/kG/Min (2.682 mL/Hr) IV Continuous <Continuous>  chlorhexidine 0.12% Liquid 15 milliLiter(s) Swish and Spit two times a day  dextrose 5%. 1000 milliLiter(s) (50 mL/Hr) IV Continuous <Continuous>  fentaNYL   Infusion 0.112 MICROgram(s)/kG/Hr (1 mL/Hr) IV Continuous <Continuous>  insulin lispro (HumaLOG) corrective regimen sliding scale   SubCutaneous Before meals and at bedtime  meropenem IVPB 500 milliGRAM(s) IV Intermittent every 12 hours  micafungin IVPB 100 milliGRAM(s) IV Intermittent every 24 hours  micafungin IVPB      norepinephrine Infusion 0.01 MICROgram(s)/kG/Min (1.676 mL/Hr) IV Continuous <Continuous>  pantoprazole Infusion 8 mG/Hr (10 mL/Hr) IV Continuous <Continuous>  propofol Infusion 1.864 MICROgram(s)/kG/Min (1 mL/Hr) IV Continuous <Continuous>  Pureflow. 5000 milliLiter(s) (2250 mL/Hr) CRRT <Continuous>      Drug Dosing Weight  Height (cm): 177.8 (18 Jan 2018 11:40)  Weight (kg): 89.4 (18 Jan 2018 11:40)  BMI (kg/m2): 28.3 (18 Jan 2018 11:40)  BSA (m2): 2.07 (18 Jan 2018 11:40)        PAST MEDICAL & SURGICAL HISTORY:  Mitral regurgitation  Aortic regurgitation  No significant past surgical history  	    ICU Vital Signs Last 24 Hrs  T(C): 37.2 (24 Jan 2018 09:09), Max: 37.7 (23 Jan 2018 14:22)  T(F): 99 (24 Jan 2018 09:09), Max: 99.8 (23 Jan 2018 14:22)  HR: 112 (24 Jan 2018 11:00) (88 - 116)  BP: 83/67 (24 Jan 2018 05:00) (83/67 - 83/67)  BP(mean): 72 (24 Jan 2018 05:00) (72 - 72)  ABP: 124/66 (24 Jan 2018 11:00) (98/62 - 124/66)  ABP(mean): 84 (24 Jan 2018 11:00) (70 - 108)  RR: 17 (24 Jan 2018 11:00) (14 - 18)  SpO2: 99% (24 Jan 2018 11:00) (94% - 100%)      ABG - ( 24 Jan 2018 06:17 )  pH: 7.38  /  pCO2: 38    /  pO2: 127   / HCO3: 22    / Base Excess: -2.9  /  SaO2: 98                  I&O's Detail    23 Jan 2018 07:01  -  24 Jan 2018 07:00  --------------------------------------------------------  IN:    fentaNYL  Infusion: 164 mL    IV PiggyBack: 100 mL    norepinephrine Infusion: 198 mL    pantoprazole Infusion: 240 mL    propofol Infusion: 120 mL    sodium chloride 0.9%: 2100 mL  Total IN: 2922 mL    OUT:    Bulb: 535 mL    Bulb: 260 mL    Drain: 350 mL    Indwelling Catheter - Urethral: 780 mL    Other: 1 mL  Total OUT: 1926 mL    Total NET: 996 mL      24 Jan 2018 07:01  -  24 Jan 2018 12:08  --------------------------------------------------------  IN:    fentaNYL  Infusion: 22 mL    norepinephrine Infusion: 24 mL    pantoprazole Infusion: 40 mL    propofol Infusion: 20 mL    Suplena: 20 mL  Total IN: 126 mL    OUT:    Bulb: 75 mL    Bulb: 40 mL    Indwelling Catheter - Urethral: 460 mL  Total OUT: 575 mL    Total NET: -449 mL          Mode: AC/ CMV (Assist Control/ Continuous Mandatory Ventilation)  RR (machine): 16  TV (machine): 500  FiO2: 40  PEEP: 7  ITime: 0.8  MAP: 10  PIP: 21    Physical Exam:  Neuro: sedated, opens eyes to voice  General: NAD  HEENT: NC/AT, pupils 1mm, equal and reactive to light, NGT in place draining to LIWS, neck supple w/o JVD  Pulmonary: intubated, CTA-B good air movement to bases b/l  Cardiovascular: irregularly irregular, tachycardic, 4/6 holosystolic murmur heard best at apex, 2/4 diastolic murmur heard best at left sternal border  Abdominal: soft, mildly distended, tympanic, hypoactive BS, no organomegaly. Duodenostomy with bilious drainage, GUCCI drains with serosanguinous drainage. Gastrotomy and J tube in place.   Extremities: anasarca, right groin soft, dressing in place, no erythema, no hematoma, b/l feet warm to touch, toes and dorsal surfaces of L/R foot dusky but improved from yesterday. 2+ DP pulses bilaterally     LABS:  CBC Full  -  ( 24 Jan 2018 06:13 )  WBC Count : 16.8 K/uL  Hemoglobin : 10.2 g/dL  Hematocrit : 31.4 %  Platelet Count - Automated : 42 K/uL  Mean Cell Volume : 93.7 fL  Mean Cell Hemoglobin : 30.4 pg  Mean Cell Hemoglobin Concentration : 32.5 g/dL    01-24    142  |  108  |  72<H>  ----------------------------<  114<H>  4.0   |  22  |  2.21<H>    Ca    7.6<L>      24 Jan 2018 06:13  Phos  4.3     01-24  Mg     2.3     01-24    TPro  4.7<L>  /  Alb  2.2<L>  /  TBili  2.1<H>  /  DBili  x   /  AST  62<H>  /  ALT  206<H>  /  AlkPhos  62  01-23    PT/INR - ( 24 Jan 2018 09:34 )   PT: 16.0 sec;   INR: 1.43          PTT - ( 24 Jan 2018 09:34 )  PTT:47.9 sec

## 2018-01-24 NOTE — PROGRESS NOTE ADULT - ATTENDING COMMENTS
Patient seen and examined with house-staff during bedside rounds  Resident note read, including vitals, physical findings, laboratory data, and radiological reports.   Revisions included below.  Case discussed with House staff  Direct personal management at bedside  and extensive interpretation of data. Decision making of high complexity.    Responded very well to Lasix. If urine drops down tonight more Lasix. Possible discontinue sedation in the morning

## 2018-01-24 NOTE — PROGRESS NOTE ADULT - SUBJECTIVE AND OBJECTIVE BOX
Surgery Attending:     Patient seen and examined at bedside. Has made physiologic improvement overnight although remains critically ill. Requires pressor support. Intubated. CVVH fluid management was not completed secondary to blood clotting the line per SICU staff. That said, patient is making more urine now on his own.     Abdomen is softly distended. Dressings removed. Midline wound is clean and intact. The penrose drain is in place under the staples. The retention sutures are in place, however there is some pressure on the buttress material and skin -- no breakdown yet but potentially inevitable trade off for protection. Mepilex was placed under the pressure areas. Tube examined. Right sided intraperitoneal drains are intact. The gastrostomy, duodenostomy and feeding jejunostomy sites are clean.    Impression:    Patient remains critically ill with multiorgan system failure.  Respiratory failure - remains intubated  Acute Kidney Injury - urine output improving. Defer to Dr. Kahn and SICU regarding CVVH.   Intrabdominal catastrophe secondary to perforated duodenal ulcer - now with wide, multifocal surgical drainage and diversion. Continue tube management as stated prior.                                                                                                      -continue antibiotics per ID/SICU                                                                                                      - continue PPI therapy.                                                                                                       - begin trophic tube feeding via feeding jejunostomy  Defer supportive care to SICU

## 2018-01-24 NOTE — PROGRESS NOTE ADULT - ASSESSMENT
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     1. O2 Continue CMV mode today do not attempt weaning at this time, waking trials  2. Bronchodilators:  Atrovent/ albuterol q 4 – 6 hours as needed  3. Corticosteroids: off  4. ID/Antibiotics: must continue ABX for RLL pneumonia, sepsis \, ID Mark Wolfe feedback pt discussed c him  5. Cardiac/HTN: Monitor, optimize HR, cardiology close follow up  6. GI: Rx/ prophylaxis c PPI/H2B, Pt discussed c GI  7. Heme: Rx/VT prophylaxis c SQH/SCD/AC    8. Aspiration precautions, NGT   9. Surgery mngmnt priority,  10.Renal feedback    Discussed with managing team, SICU, surgery, ID

## 2018-01-25 LAB
ANION GAP SERPL CALC-SCNC: 11 MMOL/L — SIGNIFICANT CHANGE UP (ref 5–17)
APTT BLD: 132.9 SEC — CRITICAL HIGH (ref 27.5–37.4)
APTT BLD: 70.6 SEC — HIGH (ref 27.5–37.4)
APTT BLD: 74.9 SEC — HIGH (ref 27.5–37.4)
APTT BLD: 80.2 SEC — HIGH (ref 27.5–37.4)
APTT BLD: >200 SEC — CRITICAL HIGH (ref 27.5–37.4)
BASE EXCESS BLDA CALC-SCNC: -1.8 MMOL/L — SIGNIFICANT CHANGE UP (ref -2–3)
BUN SERPL-MCNC: 79 MG/DL — HIGH (ref 7–23)
CALCIUM SERPL-MCNC: 7.4 MG/DL — LOW (ref 8.4–10.5)
CHLORIDE SERPL-SCNC: 110 MMOL/L — HIGH (ref 96–108)
CO2 SERPL-SCNC: 24 MMOL/L — SIGNIFICANT CHANGE UP (ref 22–31)
CREAT SERPL-MCNC: 1.98 MG/DL — HIGH (ref 0.5–1.3)
GAS PNL BLDA: SIGNIFICANT CHANGE UP
GLUCOSE BLDC GLUCOMTR-MCNC: 129 MG/DL — HIGH (ref 70–99)
GLUCOSE BLDC GLUCOMTR-MCNC: 138 MG/DL — HIGH (ref 70–99)
GLUCOSE BLDC GLUCOMTR-MCNC: 144 MG/DL — HIGH (ref 70–99)
GLUCOSE BLDC GLUCOMTR-MCNC: 154 MG/DL — HIGH (ref 70–99)
GLUCOSE SERPL-MCNC: 147 MG/DL — HIGH (ref 70–99)
HCO3 BLDA-SCNC: 23 MMOL/L — SIGNIFICANT CHANGE UP (ref 21–28)
HCT VFR BLD CALC: 30.6 % — LOW (ref 39–50)
HCT VFR BLD CALC: 30.6 % — LOW (ref 39–50)
HCT VFR BLD CALC: 32.3 % — LOW (ref 39–50)
HGB BLD-MCNC: 10.6 G/DL — LOW (ref 13–17)
HGB BLD-MCNC: 9.9 G/DL — LOW (ref 13–17)
HGB BLD-MCNC: 9.9 G/DL — LOW (ref 13–17)
LACTATE SERPL-SCNC: 1.5 MMOL/L — SIGNIFICANT CHANGE UP (ref 0.5–2)
MAGNESIUM SERPL-MCNC: 2.5 MG/DL — SIGNIFICANT CHANGE UP (ref 1.6–2.6)
MCHC RBC-ENTMCNC: 30.3 PG — SIGNIFICANT CHANGE UP (ref 27–34)
MCHC RBC-ENTMCNC: 30.3 PG — SIGNIFICANT CHANGE UP (ref 27–34)
MCHC RBC-ENTMCNC: 30.5 PG — SIGNIFICANT CHANGE UP (ref 27–34)
MCHC RBC-ENTMCNC: 32.4 G/DL — SIGNIFICANT CHANGE UP (ref 32–36)
MCHC RBC-ENTMCNC: 32.4 G/DL — SIGNIFICANT CHANGE UP (ref 32–36)
MCHC RBC-ENTMCNC: 32.8 G/DL — SIGNIFICANT CHANGE UP (ref 32–36)
MCV RBC AUTO: 93.1 FL — SIGNIFICANT CHANGE UP (ref 80–100)
MCV RBC AUTO: 93.6 FL — SIGNIFICANT CHANGE UP (ref 80–100)
MCV RBC AUTO: 93.6 FL — SIGNIFICANT CHANGE UP (ref 80–100)
PCO2 BLDA: 38 MMHG — SIGNIFICANT CHANGE UP (ref 35–48)
PH BLDA: 7.4 — SIGNIFICANT CHANGE UP (ref 7.35–7.45)
PHOSPHATE SERPL-MCNC: 3.7 MG/DL — SIGNIFICANT CHANGE UP (ref 2.5–4.5)
PLATELET # BLD AUTO: 51 K/UL — LOW (ref 150–400)
PLATELET # BLD AUTO: 55 K/UL — LOW (ref 150–400)
PLATELET # BLD AUTO: 57 K/UL — LOW (ref 150–400)
PO2 BLDA: 140 MMHG — HIGH (ref 83–108)
POTASSIUM SERPL-MCNC: 4.1 MMOL/L — SIGNIFICANT CHANGE UP (ref 3.5–5.3)
POTASSIUM SERPL-SCNC: 4.1 MMOL/L — SIGNIFICANT CHANGE UP (ref 3.5–5.3)
RBC # BLD: 3.27 M/UL — LOW (ref 4.2–5.8)
RBC # BLD: 3.27 M/UL — LOW (ref 4.2–5.8)
RBC # BLD: 3.47 M/UL — LOW (ref 4.2–5.8)
RBC # FLD: 16.8 % — SIGNIFICANT CHANGE UP (ref 10.3–16.9)
RBC # FLD: 17.1 % — HIGH (ref 10.3–16.9)
RBC # FLD: 17.2 % — HIGH (ref 10.3–16.9)
SAO2 % BLDA: 99 % — SIGNIFICANT CHANGE UP (ref 95–100)
SODIUM SERPL-SCNC: 145 MMOL/L — SIGNIFICANT CHANGE UP (ref 135–145)
WBC # BLD: 13.6 K/UL — HIGH (ref 3.8–10.5)
WBC # BLD: 14.4 K/UL — HIGH (ref 3.8–10.5)
WBC # BLD: 15.5 K/UL — HIGH (ref 3.8–10.5)
WBC # FLD AUTO: 13.6 K/UL — HIGH (ref 3.8–10.5)
WBC # FLD AUTO: 14.4 K/UL — HIGH (ref 3.8–10.5)
WBC # FLD AUTO: 15.5 K/UL — HIGH (ref 3.8–10.5)

## 2018-01-25 PROCEDURE — 99291 CRITICAL CARE FIRST HOUR: CPT

## 2018-01-25 PROCEDURE — 99232 SBSQ HOSP IP/OBS MODERATE 35: CPT | Mod: GC

## 2018-01-25 PROCEDURE — 99222 1ST HOSP IP/OBS MODERATE 55: CPT

## 2018-01-25 PROCEDURE — 99232 SBSQ HOSP IP/OBS MODERATE 35: CPT

## 2018-01-25 PROCEDURE — 71045 X-RAY EXAM CHEST 1 VIEW: CPT | Mod: 26

## 2018-01-25 RX ORDER — HEPARIN SODIUM 5000 [USP'U]/ML
900 INJECTION INTRAVENOUS; SUBCUTANEOUS
Qty: 25000 | Refills: 0 | Status: DISCONTINUED | OUTPATIENT
Start: 2018-01-25 | End: 2018-01-26

## 2018-01-25 RX ORDER — DIGOXIN 250 MCG
0.25 TABLET ORAL EVERY 6 HOURS
Qty: 0 | Refills: 0 | Status: COMPLETED | OUTPATIENT
Start: 2018-01-25 | End: 2018-01-25

## 2018-01-25 RX ORDER — MEROPENEM 1 G/30ML
500 INJECTION INTRAVENOUS ONCE
Qty: 0 | Refills: 0 | Status: COMPLETED | OUTPATIENT
Start: 2018-01-25 | End: 2018-01-25

## 2018-01-25 RX ORDER — DIGOXIN 250 MCG
0.12 TABLET ORAL DAILY
Qty: 0 | Refills: 0 | Status: DISCONTINUED | OUTPATIENT
Start: 2018-01-26 | End: 2018-01-29

## 2018-01-25 RX ORDER — DIGOXIN 250 MCG
0.5 TABLET ORAL ONCE
Qty: 0 | Refills: 0 | Status: COMPLETED | OUTPATIENT
Start: 2018-01-25 | End: 2018-01-25

## 2018-01-25 RX ORDER — FUROSEMIDE 40 MG
40 TABLET ORAL ONCE
Qty: 0 | Refills: 0 | Status: COMPLETED | OUTPATIENT
Start: 2018-01-25 | End: 2018-01-25

## 2018-01-25 RX ORDER — MEROPENEM 1 G/30ML
500 INJECTION INTRAVENOUS EVERY 12 HOURS
Qty: 0 | Refills: 0 | Status: DISCONTINUED | OUTPATIENT
Start: 2018-01-25 | End: 2018-01-25

## 2018-01-25 RX ORDER — HEPARIN SODIUM 5000 [USP'U]/ML
1100 INJECTION INTRAVENOUS; SUBCUTANEOUS
Qty: 25000 | Refills: 0 | Status: DISCONTINUED | OUTPATIENT
Start: 2018-01-25 | End: 2018-01-25

## 2018-01-25 RX ORDER — HEPARIN SODIUM 5000 [USP'U]/ML
1400 INJECTION INTRAVENOUS; SUBCUTANEOUS
Qty: 25000 | Refills: 0 | Status: DISCONTINUED | OUTPATIENT
Start: 2018-01-25 | End: 2018-01-25

## 2018-01-25 RX ORDER — MEROPENEM 1 G/30ML
1000 INJECTION INTRAVENOUS EVERY 12 HOURS
Qty: 0 | Refills: 0 | Status: DISCONTINUED | OUTPATIENT
Start: 2018-01-25 | End: 2018-01-26

## 2018-01-25 RX ADMIN — MEROPENEM 500 MILLIGRAM(S): 1 INJECTION INTRAVENOUS at 12:58

## 2018-01-25 RX ADMIN — PANTOPRAZOLE SODIUM 10 MG/HR: 20 TABLET, DELAYED RELEASE ORAL at 09:22

## 2018-01-25 RX ADMIN — HEPARIN SODIUM 1100 UNIT(S)/HR: 5000 INJECTION INTRAVENOUS; SUBCUTANEOUS at 18:09

## 2018-01-25 RX ADMIN — CHLORHEXIDINE GLUCONATE 15 MILLILITER(S): 213 SOLUTION TOPICAL at 19:08

## 2018-01-25 RX ADMIN — Medication 0.5 MILLIGRAM(S): at 09:21

## 2018-01-25 RX ADMIN — Medication 40 MILLIGRAM(S): at 09:22

## 2018-01-25 RX ADMIN — Medication 0.25 MILLIGRAM(S): at 21:05

## 2018-01-25 RX ADMIN — Medication 0.25 MILLIGRAM(S): at 14:58

## 2018-01-25 RX ADMIN — PANTOPRAZOLE SODIUM 10 MG/HR: 20 TABLET, DELAYED RELEASE ORAL at 21:06

## 2018-01-25 RX ADMIN — MEROPENEM 100 MILLIGRAM(S): 1 INJECTION INTRAVENOUS at 06:35

## 2018-01-25 RX ADMIN — HEPARIN SODIUM 14 UNIT(S)/HR: 5000 INJECTION INTRAVENOUS; SUBCUTANEOUS at 11:26

## 2018-01-25 RX ADMIN — CHLORHEXIDINE GLUCONATE 15 MILLILITER(S): 213 SOLUTION TOPICAL at 06:35

## 2018-01-25 RX ADMIN — MICAFUNGIN SODIUM 105 MILLIGRAM(S): 100 INJECTION, POWDER, LYOPHILIZED, FOR SOLUTION INTRAVENOUS at 14:58

## 2018-01-25 RX ADMIN — MEROPENEM 100 MILLIGRAM(S): 1 INJECTION INTRAVENOUS at 21:06

## 2018-01-25 NOTE — PROGRESS NOTE ADULT - ASSESSMENT
72yM with PMH severe MR/AR presented after recent dx acute cholecystitis and RLL PNA at [facility] with lower abdominal pain, new onset rapid Afib, sepsis, acute CHF exacerbation, initially admitted to cardiology on bipap, then transferred to SICU with perforated duodenum s/p Shreyas patch and jejunostomy (1/20) c/b breakdown s/p RTOR, washout, new Shreyas patch, gastrostomy, duodenostomy, IVCF (1/22)    Neuro: fentanyl  CV:  Afib: Echo EF 50%, ?vegetation and Severe MR. will need AMRIK when HD stable  Pulm: Intubated on cmv 40/500/12/5 chlorhex  GI: NPO with NGT to LIWS. Jtube TF @ 20 cc. retrograde D-tube to LIWS, G-tube to LIWS Protonix gtt.   : Oliveira, GAETANO  ID: Perforated duodenum/PNA, surgical swab grew Candida: micafungin (1/22--), meropenem (1/22--) //// discontinued Vanco (1/19-1/20) Zosyn (1/19-1/22), f/u Bld cx's, linezolid (1/22-23)  Endo: ISS  HEME:DVT: Heparin-Ab serotonin release assay NEGATIVE. IVCF (1/22) //1FFP, 1PLT (1/22). Heparin gtt until therapeutic  PPx: SCD, R pop DVT, s/p IVC filter 1/22  Lines: PIV, L brachial Wiley (1/20-), RIJ TLC, LSC HD cath (1/22-)  Wounds: Ex lap, JPx2 to bulb suction, J-tube to gravity, D-tube to LIWS, G-tube to LIWS  PT/OT: Not ordered

## 2018-01-25 NOTE — PROGRESS NOTE ADULT - SUBJECTIVE AND OBJECTIVE BOX
Chief Complaint/Reason for Consult: periop cv mgmt  INTERVAL HPI: off pressors on cpap tele with afib rvr clinically fluid overloaded  	  MEDICATIONS:  digoxin  Injectable 0.5 milliGRAM(s) IV Push once  digoxin  Injectable 0.25 milliGRAM(s) IV Push every 6 hours  norepinephrine Infusion 0.01 MICROgram(s)/kG/Min IV Continuous <Continuous>    meropenem IVPB 500 milliGRAM(s) IV Intermittent every 12 hours  micafungin IVPB 100 milliGRAM(s) IV Intermittent every 24 hours  micafungin IVPB          fentaNYL   Infusion 0.112 MICROgram(s)/kG/Hr IV Continuous <Continuous>  propofol Infusion 1.864 MICROgram(s)/kG/Min IV Continuous <Continuous>    pantoprazole Infusion 8 mG/Hr IV Continuous <Continuous>    insulin lispro (HumaLOG) corrective regimen sliding scale   SubCutaneous Before meals and at bedtime    argatroban Infusion 0.2 MICROgram(s)/kG/Min IV Continuous <Continuous>  chlorhexidine 0.12% Liquid 15 milliLiter(s) Swish and Spit two times a day  dextrose 5%. 1000 milliLiter(s) IV Continuous <Continuous>      REVIEW OF SYSTEMS:  [x] As per HPI  CONSTITUTIONAL: No fever, weight loss, or fatigue  RESPIRATORY: No cough, wheezing, chills or hemoptysis; No Shortness of Breath  CARDIOVASCULAR: No chest pain, palpitations, dizziness, or leg swelling  GASTROINTESTINAL: No abdominal or epigastric pain. No nausea, vomiting, or hematemesis; No diarrhea or constipation. No melena or hematochezia.  MUSCULOSKELETAL: No joint pain or swelling; No muscle, back, or extremity pain  [x] All others negative	  [ ] Unable to obtain    PHYSICAL EXAM:  T(C): 37.8 (01-25-18 @ 05:21), Max: 37.8 (01-25-18 @ 05:21)  HR: 122 (01-25-18 @ 07:00) (104 - 122)  BP: 82/59 (01-24-18 @ 19:00) (82/59 - 82/59)  RR: 18 (01-25-18 @ 07:00) (15 - 18)  SpO2: 99% (01-25-18 @ 07:00) (97% - 100%)  Wt(kg): --  I&O's Summary    24 Jan 2018 07:01  -  25 Jan 2018 07:00  --------------------------------------------------------  IN: 643 mL / OUT: 2720 mL / NET: -2077 mL    25 Jan 2018 07:01  -  25 Jan 2018 08:52  --------------------------------------------------------  IN: 0 mL / OUT: 45 mL / NET: -45 mL          Appearance: Normal	  HEENT:   Normal oral mucosa  Cardiovascular: Normal S1 S2, No JVD, No murmurs, No edema  Respiratory: Lungs clear to auscultation	  Gastrointestinal:  Soft, Non-tender, + BS	  Extremities: Normal range of motion, No clubbing, cyanosis or edema  Vascular: Peripheral pulses palpable 2+ bilaterally    TELEMETRY: 	    ECG:   	  RADIOLOGY:   CXR:  CT:  US:    CARDIAC TESTING:  Echocardiogram:  Catheterization:  Stress Test:      LABS:	 	    CARDIAC MARKERS:                                  9.9    14.4  )-----------( 51       ( 25 Jan 2018 06:00 )             30.6     01-25    145  |  110<H>  |  79<H>  ----------------------------<  147<H>  4.1   |  24  |  1.98<H>    Ca    7.4<L>      25 Jan 2018 06:00  Phos  3.7     01-25  Mg     2.5     01-25      proBNP:   Lipid Profile:   HgA1c:   TSH:     ASSESSMENT/PLAN: 	    # MR - preserved EF, off pressors, would do AMRIK prior to extubation to 1. r/o endocarditis 2. define MR 3. evaluate PAMELA thrombus for need for possiblevardioversion chemical/dccv  overloaded on exam would continue IV Lasix diuresis as toelrated, responded well yesturday to lasix, ge net negatvie prior to extubation    #Afib - RVR this am while on cpap - please dig load given CrCl and consult EP.  if HD signoficant will need DCCV  continue argatroban for AC - heme consult needed    d/w sicu team

## 2018-01-25 NOTE — PROGRESS NOTE ADULT - ASSESSMENT
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 PODII    1. O2 Continue CMV mode today do not attempt weaning at this time, waking trials  2. Bronchodilators:  Atrovent/ albuterol q 4 – 6 hours as needed  3. Corticosteroids: off  4. ID/Antibiotics: must continue ABX for RLL pneumonia, sepsis \, ID Mark Wolfe feedback pt discussed c him  5. Cardiac/HTN: Monitor, optimize HR, cardiology close follow up, AMRIK planned, digoxin started  6. GI: Rx/ prophylaxis c PPI/H2B, Pt discussed c GI  7. Heme: Rx/VT prophylaxis c SQH/SCD/AC IV heparin   8. Aspiration precautions, NGT   9. Surgery mngmnt priority,  10.Renal feedback    Discussed with managing team, SICU, surgery, ID

## 2018-01-25 NOTE — CONSULT NOTE ADULT - SUBJECTIVE AND OBJECTIVE BOX
HPI:  73 yo M with h/o severe mitral and aortic valve regurgitation presented to ED with 5 day h/o left lower abdominal pain, nausea and multiple episodes of vomiting as well as PO intolerance. Pt reports that the pain in his abdomen is illicited by movement. Went to [facility] two days ago and underwent CT scan which revealed acute cholecystitis as well as right lower lobe infectious pneumonia. Pt was told he would require surgery and left AMA because he was waiting too long.     On 1/20/18 s/p Ex-lap and omental kaylee patch now with concern for b/l lower extremity ischemia. It was noted during the day yesterday that right foot was cold with dusky toes; later in the evening the toes on the left foot also started to look dusky and cold and so vascular surgery was consulted for further evaluation. Pt has been diagnosed with new onset Afib on this admission and is also being treated for pneumonia and CHF. He is currently intubated and sedated.  On 1/23 was started on CVVHd and on 1/24 had a good response to diuretic stimulation.       Last echo 1/4/17 with EF 60-65%.   Repeat Echo on      EP consulted for new onset Afib with RVR, SICU is requesting DCCV on Friday. Bedside AMRIK has already been ordered for tomorrow. Unable to discuss history and ROS with patient is critically ill; currently intubated, sedated, and on pressors.        PAST MEDICAL & SURGICAL HISTORY:  Mitral regurgitation  Aortic regurgitation  No significant past surgical history          Social History:  Smoking  Drugs  ETOH    pertinent home medications:    Inpatient Medications:   chlorhexidine 0.12% Liquid 15 milliLiter(s) Swish and Spit two times a day  dextrose 5%. 1000 milliLiter(s) IV Continuous <Continuous>  digoxin  Injectable 0.25 milliGRAM(s) IV Push every 6 hours  fentaNYL   Infusion 0.112 MICROgram(s)/kG/Hr IV Continuous <Continuous>  heparin  Infusion 1400 Unit(s)/Hr IV Continuous <Continuous>  insulin lispro (HumaLOG) corrective regimen sliding scale   SubCutaneous Before meals and at bedtime  meropenem  IVPB 1000 milliGRAM(s) IV Intermittent every 12 hours  micafungin IVPB 100 milliGRAM(s) IV Intermittent every 24 hours  micafungin IVPB      norepinephrine Infusion 0.01 MICROgram(s)/kG/Min IV Continuous <Continuous>  pantoprazole Infusion 8 mG/Hr IV Continuous <Continuous>  propofol Infusion 1.864 MICROgram(s)/kG/Min IV Continuous <Continuous>      No Known Allergies      ROS: Unable to complete as patient is intubated and sedated.     PHYSICAL:  T(C): 38.2 (01-25-18 @ 10:47), Max: 38.2 (01-25-18 @ 10:47)  HR: 100 (01-25-18 @ 13:00) (100 - 122)  BP: 82/59 (01-24-18 @ 19:00) (82/59 - 82/59)  RR: 16 (01-25-18 @ 12:00) (15 - 18)  SpO2: 99% (01-25-18 @ 13:00) (98% - 100%)      Appearance: NAD  Cardiovascular: Tachy, Irregular S1 S2, No JVD, No murmurs  Respiratory: Vented, Lungs clear to auscultation bilaterally.  No wheeze, rhonchi, rales  Gastrointestinal:  Soft, NT/ ND, +BS	  Neurologic: A&O x3, No deficit noted  Extremities: No edema, pulses intact      LABS:                        10.6   15.5  )-----------( 57       ( 25 Jan 2018 10:57 )             32.3     01-25    145  |  110<H>  |  79<H>  ----------------------------<  147<H>  4.1   |  24  |  1.98<H>    Ca    7.4<L>      25 Jan 2018 06:00  Phos  3.7     01-25  Mg     2.5     01-25      PT/INR - ( 24 Jan 2018 12:10 )   PT: 20.0 sec;   INR: 1.78     PTT - ( 25 Jan 2018 10:57 )  PTT:70.6 sec    EKG: None in electronic medical record    Telemetry: Afib with RVR, -120bpm     ECHO:      Prior EP procedures: None         Assessment/ Plan: HPI: 71 yo M with h/o severe mitral and aortic valve regurgitation presented to ED with 5 day h/o left lower abdominal pain, nausea and multiple episodes of vomiting as well as PO intolerance. Pt reports that the pain in his abdomen is illicited by movement. Went to [facility] two days ago and underwent CT scan which revealed acute cholecystitis as well as right lower lobe infectious pneumonia. Pt was told he would require surgery and left AMA because he was waiting too long.     On 1/20/18 s/p Ex-lap and omental kaylee patch now with concern for b/l lower extremity ischemia. It was noted during the day yesterday that right foot was cold with dusky toes; later in the evening the toes on the left foot also started to look dusky and cold and so vascular surgery was consulted for further evaluation. Pt has been diagnosed with new onset Afib on this admission and is also being treated for pneumonia and CHF. He is currently intubated and sedated. On 1/23 was started on CVVHd and on 1/24 had a good response to diuretic stimulation. Weaned off Levophed, maintaining pressors.       Echocardiogram 1/4/17 with EF 60-65%.   Echocardiogram 1/19/18 with EF 55-60%.  Echocardiogram 1/20/18 with EF 50%.       EP consulted for new onset Afib with RVR, SICU is requesting DCCV on Friday. Bedside AMRIK has already been ordered for tomorrow. Unable to discuss history and ROS with patient is critically ill; currently intubated, sedated, and on pressors.        PAST MEDICAL & SURGICAL HISTORY:  Mitral regurgitation  Aortic regurgitation  No significant past surgical history      Social History:  Smoking  Drugs  ETOH    pertinent home medications:    Inpatient Medications:   chlorhexidine 0.12% Liquid 15 milliLiter(s) Swish and Spit two times a day  dextrose 5%. 1000 milliLiter(s) IV Continuous <Continuous>  digoxin  Injectable 0.25 milliGRAM(s) IV Push every 6 hours  fentaNYL   Infusion 0.112 MICROgram(s)/kG/Hr IV Continuous <Continuous>  heparin  Infusion 1400 Unit(s)/Hr IV Continuous <Continuous>  insulin lispro (HumaLOG) corrective regimen sliding scale   SubCutaneous Before meals and at bedtime  meropenem  IVPB 1000 milliGRAM(s) IV Intermittent every 12 hours  micafungin IVPB 100 milliGRAM(s) IV Intermittent every 24 hours  micafungin IVPB      norepinephrine Infusion 0.01 MICROgram(s)/kG/Min IV Continuous <Continuous>  pantoprazole Infusion 8 mG/Hr IV Continuous <Continuous>  propofol Infusion 1.864 MICROgram(s)/kG/Min IV Continuous <Continuous>      No Known Allergies      ROS: Unable to complete as patient is intubated and sedated.     PHYSICAL:  T(C): 38.2 (01-25-18 @ 10:47), Max: 38.2 (01-25-18 @ 10:47)  HR: 100 (01-25-18 @ 13:00) (100 - 122)  BP: 82/59 (01-24-18 @ 19:00) (82/59 - 82/59)  RR: 16 (01-25-18 @ 12:00) (15 - 18)  SpO2: 99% (01-25-18 @ 13:00) (98% - 100%)      Appearance: NAD  Cardiovascular: Tachy, Irregular S1 S2, No JVD, No murmurs  Respiratory: Vented, dimished breath sounds, +rales  No wheezing   Gastrointestinal:  Soft, NT/ ND, No bowel sounds 	  Neurologic: unable to test   Extremities: No edema, pulses intact      LABS:                        10.6   15.5  )-----------( 57       ( 25 Jan 2018 10:57 )             32.3     01-25    145  |  110<H>  |  79<H>  ----------------------------<  147<H>  4.1   |  24  |  1.98<H>    Ca    7.4<L>      25 Jan 2018 06:00  Phos  3.7     01-25  Mg     2.5     01-25      PT/INR - ( 24 Jan 2018 12:10 )   PT: 20.0 sec;   INR: 1.78     PTT - ( 25 Jan 2018 10:57 )  PTT:70.6 sec    EKG: None in electronic medical record    Telemetry: Afib with RVR, -120bpm     ECHO:    Patient was tachycardic during the study.   - There is moderate concentric left ventricular hypertrophy. The left ventricular ejection fraction is borderline normal, 50%.   - The right ventricle is normal in size. The right ventricular systolic function is moderately reduced.  - The tricuspid annular plane systolic excursion (TAPSE) is 13 mm (normal 17mm or higher).    - The left atrium is severely dilated.   - Left atrial volume index is 60 cc/m2 (normal <34cc/m2)  - Right atrial size is normal.  - There is mild aortic valve thickening. There is mild to moderate aortic regurgitation.   - There appears to be bileaflet valve prolapse and the anterior leaflet of the mitral valve is severely thickened and likely flail. However, cannot conclusively rule   out concomitant vegetation. Suggest AMRIK to better visualize the mitral valve leaflets, if clinically indicated.    - There is severe mitral regurgitation.  - Structurally normal tricuspid valve. There is moderate tricuspid regurgitation.  - There is severe pulmonary hypertension. The pulmonary artery systolic pressure is estimated to be 63 mmHg.   - Structurally normal pulmonic valve. There is trace pulmonic regurgitation.   - Mild aortic root dilatation. The aortic root measures 4.3 cm at sinuses (normal less than 4 cm for men, less than 3.6 cm for women).   - The IVC is dilated (>2.1 cm) with an abnormal inspiratory collapse (<50%) consistent with elevated right atrial pressure.  - There is no pericardial effusion. Compared to the echo performed on 1/19/2018, LVH is better appreciated on today's echo.  -  The RV function appears moderately reduced.   .      Prior EP procedures: None       Assessment/ Plan: HPI: 71 yo M with h/o severe mitral and aortic valve regurgitation presented to ED with 5 day h/o left lower abdominal pain, nausea and multiple episodes of vomiting as well as PO intolerance. Pt reports that the pain in his abdomen is illicited by movement. Went to [facility] two days ago and underwent CT scan which revealed acute cholecystitis as well as right lower lobe infectious pneumonia. Pt was told he would require surgery and left AMA because he was waiting too long.     On 1/20/18 s/p Ex-lap and omental kaylee patch now with concern for b/l lower extremity ischemia. It was noted during the day yesterday that right foot was cold with dusky toes; later in the evening the toes on the left foot also started to look dusky and cold and so vascular surgery was consulted for further evaluation. Pt has been diagnosed with new onset Afib on this admission and is also being treated for pneumonia and CHF. He is currently intubated and sedated. On 1/23 was started on CVVHd and on 1/24 had a good response to diuretic stimulation.     Echocardiogram 1/4/17 with EF 60-65%.   Echocardiogram 1/19/18 with EF 55-60%.  Echocardiogram 1/20/18 with EF 50%.     EP consulted for new onset Afib with RVR, SICU is requesting DCCV on Friday. Bedside AMRIK has already been ordered for tomorrow. Unable to discuss history and ROS with patient is critically ill; currently intubated, sedated.      PAST MEDICAL & SURGICAL HISTORY:  Mitral regurgitation  Aortic regurgitation  No significant past surgical history      Social History: Patient is   Smoking  Drugs  ETOH    pertinent home medications:    Inpatient Medications:   chlorhexidine 0.12% Liquid 15 milliLiter(s) Swish and Spit two times a day  dextrose 5%. 1000 milliLiter(s) IV Continuous <Continuous>  digoxin  Injectable 0.25 milliGRAM(s) IV Push every 6 hours  fentaNYL   Infusion 0.112 MICROgram(s)/kG/Hr IV Continuous <Continuous>  heparin  Infusion 1400 Unit(s)/Hr IV Continuous <Continuous>  insulin lispro (HumaLOG) corrective regimen sliding scale   SubCutaneous Before meals and at bedtime  meropenem  IVPB 1000 milliGRAM(s) IV Intermittent every 12 hours  micafungin IVPB 100 milliGRAM(s) IV Intermittent every 24 hours  micafungin IVPB      norepinephrine Infusion 0.01 MICROgram(s)/kG/Min IV Continuous <Continuous>  pantoprazole Infusion 8 mG/Hr IV Continuous <Continuous>  propofol Infusion 1.864 MICROgram(s)/kG/Min IV Continuous <Continuous>      No Known Allergies      ROS: Unable to complete as patient is intubated and sedated.     PHYSICAL:  T(C): 38.2 (01-25-18 @ 10:47), Max: 38.2 (01-25-18 @ 10:47)  HR: 100 (01-25-18 @ 13:00) (100 - 122)  BP: 82/59 (01-24-18 @ 19:00) (82/59 - 82/59)  RR: 16 (01-25-18 @ 12:00) (15 - 18)  SpO2: 99% (01-25-18 @ 13:00) (98% - 100%)      Appearance: NAD  Cardiovascular: Tachy, Irregular S1 S2, No JVD, No murmurs  Respiratory: Vented, diminished breath sounds, +rales  No wheezing   Gastrointestinal:  Soft, NT/ ND, No bowel sounds 	  Neurologic: unable to test   Extremities: No edema, pulses intact      LABS:                        10.6   15.5  )-----------( 57       ( 25 Jan 2018 10:57 )             32.3     01-25    145  |  110<H>  |  79<H>  ----------------------------<  147<H>  4.1   |  24  |  1.98<H>    Ca    7.4<L>      25 Jan 2018 06:00  Phos  3.7     01-25  Mg     2.5     01-25      PT/INR - ( 24 Jan 2018 12:10 )   PT: 20.0 sec;   INR: 1.78     PTT - ( 25 Jan 2018 10:57 )  PTT:70.6 sec    EKG: None in electronic medical record    Telemetry: Afib with RVR, -120bpm     ECHO:    Patient was tachycardic during the study.   - There is moderate concentric left ventricular hypertrophy. The left ventricular ejection fraction is borderline normal, 50%.   - The right ventricle is normal in size. The right ventricular systolic function is moderately reduced.  - The tricuspid annular plane systolic excursion (TAPSE) is 13 mm (normal 17mm or higher).    - The left atrium is severely dilated.   - Left atrial volume index is 60 cc/m2 (normal <34cc/m2)  - Right atrial size is normal.  - There is mild aortic valve thickening. There is mild to moderate aortic regurgitation.   - There appears to be bileaflet valve prolapse and the anterior leaflet of the mitral valve is severely thickened and likely flail. However, cannot conclusively rule   out concomitant vegetation. Suggest AMRIK to better visualize the mitral valve leaflets, if clinically indicated.    - There is severe mitral regurgitation.  - Structurally normal tricuspid valve. There is moderate tricuspid regurgitation.  - There is severe pulmonary hypertension. The pulmonary artery systolic pressure is estimated to be 63 mmHg.   - Structurally normal pulmonic valve. There is trace pulmonic regurgitation.   - Mild aortic root dilatation. The aortic root measures 4.3 cm at sinuses (normal less than 4 cm for men, less than 3.6 cm for women).   - The IVC is dilated (>2.1 cm) with an abnormal inspiratory collapse (<50%) consistent with elevated right atrial pressure.  - There is no pericardial effusion. Compared to the echo performed on 1/19/2018, LVH is better appreciated on today's echo.  -  The RV function appears moderately reduced.   .      Prior EP procedures: None       Assessment/ Plan: HPI: 71yo M with PMH severe MR and AR  recently diagnosed with Acute Cholecystitis as well as RLL PNA by CT scan at [facility] however signed out AMA who presented to [facility] ED on 1/18 found to have new onset rapid Afib in the setting of sepsis as well as acute CHF exacerbation on time of admission patient's abd pain had subsided and CT scan of abd negative for acute choley. Pt admitted to cardiology and placed on Bipap. On HD. Patient had c/o acute abd pain, CT scan showing perforated Duodenum therefore pt taken emergently to OR.On 1/20/18 s/p Ex-lap and omental kaylee patch with subsequent concern for b/l lower extremity ischemia. It was noted during the day yesterday that right foot was cold with dusky toes; later in the evening the toes on the left foot also started to look dusky and cold and so vascular surgery was consulted for further evaluation. Pt has been diagnosed with new onset Afib on this admission and is also being treated for pneumonia and CHF. He is currently intubated and sedated. On 1/23 was started on CVVHd and on 1/24 had a good response to diuretic stimulation.     Echocardiogram 1/4/17 with EF 60-65%  Echocardiogram 1/19/18 with EF 55-60%  Echocardiogram 1/20/18 with EF 50%     EP consulted for new onset Afib with RVR, SICU is requesting DCCV on Friday. Bedside AMRIK has already been ordered for tomorrow. Unable to discuss history and ROS with patient is critically ill; currently intubated, sedated.      PAST MEDICAL & SURGICAL HISTORY:  Mitral regurgitation  Aortic regurgitation  No significant past surgical history      Social History: Patient is   Smoking  Drugs  ETOH    pertinent home medications:    Inpatient Medications:   chlorhexidine 0.12% Liquid 15 milliLiter(s) Swish and Spit two times a day  dextrose 5%. 1000 milliLiter(s) IV Continuous <Continuous>  digoxin  Injectable 0.25 milliGRAM(s) IV Push every 6 hours  fentaNYL   Infusion 0.112 MICROgram(s)/kG/Hr IV Continuous <Continuous>  heparin  Infusion 1400 Unit(s)/Hr IV Continuous <Continuous>  insulin lispro (HumaLOG) corrective regimen sliding scale   SubCutaneous Before meals and at bedtime  meropenem  IVPB 1000 milliGRAM(s) IV Intermittent every 12 hours  micafungin IVPB 100 milliGRAM(s) IV Intermittent every 24 hours  micafungin IVPB      norepinephrine Infusion 0.01 MICROgram(s)/kG/Min IV Continuous <Continuous>  pantoprazole Infusion 8 mG/Hr IV Continuous <Continuous>  propofol Infusion 1.864 MICROgram(s)/kG/Min IV Continuous <Continuous>      No Known Allergies      ROS: Unable to complete as patient is intubated and sedated.     PHYSICAL:  T(C): 38.2 (01-25-18 @ 10:47), Max: 38.2 (01-25-18 @ 10:47)  HR: 100 (01-25-18 @ 13:00) (100 - 122)  BP: 82/59 (01-24-18 @ 19:00) (82/59 - 82/59)  RR: 16 (01-25-18 @ 12:00) (15 - 18)  SpO2: 99% (01-25-18 @ 13:00) (98% - 100%)      Appearance: NAD  Cardiovascular: Tachy, Irregular S1 S2, No JVD, No murmurs  Respiratory: Vented, diminished breath sounds, +rales  No wheezing   Gastrointestinal:  Soft, NT/ ND, No bowel sounds 	  Neurologic: unable to test   Extremities: No edema, pulses intact      LABS:                        10.6   15.5  )-----------( 57       ( 25 Jan 2018 10:57 )             32.3     01-25    145  |  110<H>  |  79<H>  ----------------------------<  147<H>  4.1   |  24  |  1.98<H>    Ca    7.4<L>      25 Jan 2018 06:00  Phos  3.7     01-25  Mg     2.5     01-25      PT/INR - ( 24 Jan 2018 12:10 )   PT: 20.0 sec;   INR: 1.78     PTT - ( 25 Jan 2018 10:57 )  PTT:70.6 sec    EKG: None in electronic medical record    Telemetry: Afib with RVR, -120bpm     ECHO:    Patient was tachycardic during the study.   - There is moderate concentric left ventricular hypertrophy. The left ventricular ejection fraction is borderline normal, 50%.   - The right ventricle is normal in size. The right ventricular systolic function is moderately reduced.  - The tricuspid annular plane systolic excursion (TAPSE) is 13 mm (normal 17mm or higher).    - The left atrium is severely dilated.   - Left atrial volume index is 60 cc/m2 (normal <34cc/m2)  - Right atrial size is normal.  - There is mild aortic valve thickening. There is mild to moderate aortic regurgitation.   - There appears to be bileaflet valve prolapse and the anterior leaflet of the mitral valve is severely thickened and likely flail. However, cannot conclusively rule   out concomitant vegetation. Suggest AMRIK to better visualize the mitral valve leaflets, if clinically indicated.    - There is severe mitral regurgitation.  - Structurally normal tricuspid valve. There is moderate tricuspid regurgitation.  - There is severe pulmonary hypertension. The pulmonary artery systolic pressure is estimated to be 63 mmHg.   - Structurally normal pulmonic valve. There is trace pulmonic regurgitation.   - Mild aortic root dilatation. The aortic root measures 4.3 cm at sinuses (normal less than 4 cm for men, less than 3.6 cm for women).   - The IVC is dilated (>2.1 cm) with an abnormal inspiratory collapse (<50%) consistent with elevated right atrial pressure.  - There is no pericardial effusion. Compared to the echo performed on 1/19/2018, LVH is better appreciated on today's echo.  -  The RV function appears moderately reduced.   .      Prior EP procedures: None       Assessment/ Plan: HPI: 73yo M with PMH severe MR and AR  recently diagnosed with Acute Cholecystitis as well as RLL PNA by CT scan at [facility] however signed out AMA who presented to [facility] ED on 1/18 found to have new onset rapid Afib in the setting of sepsis as well as acute CHF exacerbation on time of admission patient's abd pain had subsided and CT scan of abd negative for acute choley. Pt admitted to cardiology and placed on Bipap. On HD. Patient had c/o acute abd pain, CT scan showing perforated Duodenum therefore pt taken emergently to OR.On 1/20/18 s/p Ex-lap and omental kaylee patch with subsequent concern for b/l lower extremity ischemia. It was noted during the day yesterday that right foot was cold with dusky toes; later in the evening the toes on the left foot also started to look dusky and cold and so vascular surgery was consulted for further evaluation. Pt has been diagnosed with new onset Afib on this admission and is also being treated for pneumonia and CHF. He is currently intubated and sedated. On 1/23 was started on CVVHd and on 1/24 had a good response to diuretic stimulation.     Echocardiogram 1/4/17 with EF 60-65%  Echocardiogram 1/19/18 with EF 55-60%  Echocardiogram 1/20/18 with EF 50%     EP consulted for new onset Afib with RVR, SICU is requesting DCCV on Friday. Bedside AMRIK has already been ordered for tomorrow. Unable to discuss history and ROS with patient is critically ill; currently intubated, sedated.      PAST MEDICAL & SURGICAL HISTORY:  Mitral regurgitation  Aortic regurgitation  No significant past surgical history      Social History: Patient is   Smoking  Drugs  ETOH    pertinent home medications:    Inpatient Medications:   chlorhexidine 0.12% Liquid 15 milliLiter(s) Swish and Spit two times a day  dextrose 5%. 1000 milliLiter(s) IV Continuous <Continuous>  digoxin  Injectable 0.25 milliGRAM(s) IV Push every 6 hours  fentaNYL   Infusion 0.112 MICROgram(s)/kG/Hr IV Continuous <Continuous>  heparin  Infusion 1400 Unit(s)/Hr IV Continuous <Continuous>  insulin lispro (HumaLOG) corrective regimen sliding scale   SubCutaneous Before meals and at bedtime  meropenem  IVPB 1000 milliGRAM(s) IV Intermittent every 12 hours  micafungin IVPB 100 milliGRAM(s) IV Intermittent every 24 hours  micafungin IVPB      norepinephrine Infusion 0.01 MICROgram(s)/kG/Min IV Continuous <Continuous>  pantoprazole Infusion 8 mG/Hr IV Continuous <Continuous>  propofol Infusion 1.864 MICROgram(s)/kG/Min IV Continuous <Continuous>      No Known Allergies      ROS: Unable to complete as patient is intubated and sedated.     PHYSICAL:  T(C): 38.2 (01-25-18 @ 10:47), Max: 38.2 (01-25-18 @ 10:47)  HR: 100 (01-25-18 @ 13:00) (100 - 122)  BP: 82/59 (01-24-18 @ 19:00) (82/59 - 82/59)  RR: 16 (01-25-18 @ 12:00) (15 - 18)  SpO2: 99% (01-25-18 @ 13:00) (98% - 100%)      Appearance: NAD  Cardiovascular: Tachy, Irregular S1 S2, No JVD, No murmurs  Respiratory: Vented, diminished breath sounds, +rales  No wheezing   Gastrointestinal:  Soft, NT/ ND, No bowel sounds 	  Neurologic: intubated sedated   Extremities: No edema, pulses intact      LABS:                        10.6   15.5  )-----------( 57       ( 25 Jan 2018 10:57 )             32.3     01-25    145  |  110<H>  |  79<H>  ----------------------------<  147<H>  4.1   |  24  |  1.98<H>    Ca    7.4<L>      25 Jan 2018 06:00  Phos  3.7     01-25  Mg     2.5     01-25      PT/INR - ( 24 Jan 2018 12:10 )   PT: 20.0 sec;   INR: 1.78     PTT - ( 25 Jan 2018 10:57 )  PTT:70.6 sec    EKG: None in electronic medical record    Telemetry: Afib with RVR, -120bpm     ECHO:    Patient was tachycardic during the study.   - There is moderate concentric left ventricular hypertrophy. The left ventricular ejection fraction is borderline normal, 50%.   - The right ventricle is normal in size. The right ventricular systolic function is moderately reduced.  - The tricuspid annular plane systolic excursion (TAPSE) is 13 mm (normal 17mm or higher).    - The left atrium is severely dilated.   - Left atrial volume index is 60 cc/m2 (normal <34cc/m2)  - Right atrial size is normal.  - There is mild aortic valve thickening. There is mild to moderate aortic regurgitation.   - There appears to be bileaflet valve prolapse and the anterior leaflet of the mitral valve is severely thickened and likely flail. However, cannot conclusively rule   out concomitant vegetation. Suggest AMRIK to better visualize the mitral valve leaflets, if clinically indicated.    - There is severe mitral regurgitation.  - Structurally normal tricuspid valve. There is moderate tricuspid regurgitation.  - There is severe pulmonary hypertension. The pulmonary artery systolic pressure is estimated to be 63 mmHg.   - Structurally normal pulmonic valve. There is trace pulmonic regurgitation.   - Mild aortic root dilatation. The aortic root measures 4.3 cm at sinuses (normal less than 4 cm for men, less than 3.6 cm for women).   - The IVC is dilated (>2.1 cm) with an abnormal inspiratory collapse (<50%) consistent with elevated right atrial pressure.  - There is no pericardial effusion. Compared to the echo performed on 1/19/2018, LVH is better appreciated on today's echo.  -  The RV function appears moderately reduced.   .      Prior EP procedures: None       Assessment/ Plan: HPI: 71yo M with PMH severe MR and AR  recently diagnosed with Acute Cholecystitis as well as RLL PNA by CT scan at [facility] however signed out AMA who presented to [facility] ED on 1/18 found to have new onset rapid Afib in the setting of sepsis as well as acute CHF exacerbation on time of admission patient's abd pain had subsided and CT scan of abd negative for acute choley. Pt admitted to cardiology and placed on Bipap. On HD. Patient had c/o acute abd pain, CT scan showing perforated Duodenum therefore pt taken emergently to OR.On 1/20/18 s/p Ex-lap and omental kaylee patch with subsequent concern for b/l lower extremity ischemia. It was noted during the day yesterday that right foot was cold with dusky toes; later in the evening the toes on the left foot also started to look dusky and cold and so vascular surgery was consulted for further evaluation. Pt has been diagnosed with new onset Afib on this admission and is also being treated for pneumonia and CHF. He is currently intubated and sedated. On 1/23 was started on CVVHd and on 1/24 had a good response to diuretic stimulation.     Echocardiogram 1/4/17 with EF 60-65%  Echocardiogram 1/19/18 with EF 55-60%  Echocardiogram 1/20/18 with EF 50%     EP consulted for new onset Afib with RVR, SICU is requesting DCCV on Friday. Bedside AMRIK has already been ordered for tomorrow (to investigate possibel vegetation on echo, and r/o thrombus prior to DCCV). Unable to discuss history and ROS with patient is critically ill; currently intubated, sedated.      PAST MEDICAL & SURGICAL HISTORY:  Mitral regurgitation  Aortic regurgitation  No significant past surgical history      Social History: Patient is   Smoking  Drugs  ETOH    pertinent home medications:    Inpatient Medications:   chlorhexidine 0.12% Liquid 15 milliLiter(s) Swish and Spit two times a day  dextrose 5%. 1000 milliLiter(s) IV Continuous <Continuous>  digoxin  Injectable 0.25 milliGRAM(s) IV Push every 6 hours  fentaNYL   Infusion 0.112 MICROgram(s)/kG/Hr IV Continuous <Continuous>  heparin  Infusion 1400 Unit(s)/Hr IV Continuous <Continuous>  insulin lispro (HumaLOG) corrective regimen sliding scale   SubCutaneous Before meals and at bedtime  meropenem  IVPB 1000 milliGRAM(s) IV Intermittent every 12 hours  micafungin IVPB 100 milliGRAM(s) IV Intermittent every 24 hours  micafungin IVPB      norepinephrine Infusion 0.01 MICROgram(s)/kG/Min IV Continuous <Continuous>  pantoprazole Infusion 8 mG/Hr IV Continuous <Continuous>  propofol Infusion 1.864 MICROgram(s)/kG/Min IV Continuous <Continuous>      No Known Allergies      ROS: Unable to complete as patient is intubated and sedated.     PHYSICAL:  T(C): 38.2 (01-25-18 @ 10:47), Max: 38.2 (01-25-18 @ 10:47)  HR: 100 (01-25-18 @ 13:00) (100 - 122)  BP: 82/59 (01-24-18 @ 19:00) (82/59 - 82/59)  RR: 16 (01-25-18 @ 12:00) (15 - 18)  SpO2: 99% (01-25-18 @ 13:00) (98% - 100%)      Appearance: NAD  Cardiovascular: Tachy, Irregular S1 S2, No JVD, No murmurs  Respiratory: Vented, diminished breath sounds, +rales  No wheezing   Gastrointestinal:  Soft, NT/ ND, No bowel sounds 	  Neurologic: intubated sedated   Extremities: No edema, pulses intact      LABS:                        10.6   15.5  )-----------( 57       ( 25 Jan 2018 10:57 )             32.3     01-25    145  |  110<H>  |  79<H>  ----------------------------<  147<H>  4.1   |  24  |  1.98<H>    Ca    7.4<L>      25 Jan 2018 06:00  Phos  3.7     01-25  Mg     2.5     01-25      PT/INR - ( 24 Jan 2018 12:10 )   PT: 20.0 sec;   INR: 1.78     PTT - ( 25 Jan 2018 10:57 )  PTT:70.6 sec    EKG: None in electronic medical record    Telemetry: Afib with RVR, -120bpm     ECHO:    Patient was tachycardic during the study.   - There is moderate concentric left ventricular hypertrophy. The left ventricular ejection fraction is borderline normal, 50%.   - The right ventricle is normal in size. The right ventricular systolic function is moderately reduced.  - The tricuspid annular plane systolic excursion (TAPSE) is 13 mm (normal 17mm or higher).    - The left atrium is severely dilated.   - Left atrial volume index is 60 cc/m2 (normal <34cc/m2)  - Right atrial size is normal.  - There is mild aortic valve thickening. There is mild to moderate aortic regurgitation.   - There appears to be bileaflet valve prolapse and the anterior leaflet of the mitral valve is severely thickened and likely flail. However, cannot conclusively rule   out concomitant vegetation. Suggest AMRIK to better visualize the mitral valve leaflets, if clinically indicated.    - There is severe mitral regurgitation.  - Structurally normal tricuspid valve. There is moderate tricuspid regurgitation.  - There is severe pulmonary hypertension. The pulmonary artery systolic pressure is estimated to be 63 mmHg.   - Structurally normal pulmonic valve. There is trace pulmonic regurgitation.   - Mild aortic root dilatation. The aortic root measures 4.3 cm at sinuses (normal less than 4 cm for men, less than 3.6 cm for women).   - The IVC is dilated (>2.1 cm) with an abnormal inspiratory collapse (<50%) consistent with elevated right atrial pressure.  - There is no pericardial effusion. Compared to the echo performed on 1/19/2018, LVH is better appreciated on today's echo.  -  The RV function appears moderately reduced.   .      Prior EP procedures: None       Assessment/ Plan:

## 2018-01-25 NOTE — PROGRESS NOTE ADULT - ATTENDING COMMENTS
ATN now non oliguric with good recovery of urine volume  creat acceptable to 1.98  electrolytes Okay  no indication for RRT tody  needs additional lasix  Discussed with Crs. Isidro and Dixon

## 2018-01-25 NOTE — PROGRESS NOTE ADULT - SUBJECTIVE AND OBJECTIVE BOX
Subjective and objective   The patient still intubated,still oliguric, HD catheter palced overnight. On 1/23 was started on CVVHd and on 1/24 had a good response to diuretic stimulation.      Renal service for oliguric Frieda - due to ATN, now with good response to the diuretic therapy           Patient seen and examined at bedside.     chlorhexidine 0.12% Liquid 15 milliLiter(s) two times a day  dextrose 5%. 1000 milliLiter(s) <Continuous>  digoxin  Injectable 0.25 milliGRAM(s) every 6 hours  fentaNYL   Infusion 0.112 MICROgram(s)/kG/Hr <Continuous>  heparin  Infusion 1400 Unit(s)/Hr <Continuous>  insulin lispro (HumaLOG) corrective regimen sliding scale   Before meals and at bedtime  meropenem Injectable 500 milliGRAM(s) every 12 hours  micafungin IVPB 100 milliGRAM(s) every 24 hours  micafungin IVPB     norepinephrine Infusion 0.01 MICROgram(s)/kG/Min <Continuous>  pantoprazole Infusion 8 mG/Hr <Continuous>  propofol Infusion 1.864 MICROgram(s)/kG/Min <Continuous>      Allergies    No Known Allergies    Intolerances        T(C): , Max: 38.1 (01-25-18 @ 09:00)  T(F): , Max: 100.6 (01-25-18 @ 09:00)  HR: 118 (01-25-18 @ 10:00)  BP: 82/59 (01-24-18 @ 19:00)  BP(mean): 72 (01-24-18 @ 19:00)  RR: 18 (01-25-18 @ 07:00)  SpO2: 99% (01-25-18 @ 10:00)  Wt(kg): --    01-24 @ 07:01  -  01-25 @ 07:00  --------------------------------------------------------  IN:    argatroban Infusion: 10.1 mL    argatroban Infusion: 9.6 mL    argatroban Infusion: 14.3 mL    fentaNYL  Infusion: 137 mL    IV PiggyBack: 50 mL    norepinephrine Infusion: 42 mL    pantoprazole Infusion: 240 mL    propofol Infusion: 110 mL    Suplena: 30 mL  Total IN: 643 mL    OUT:    Bulb: 160 mL    Bulb: 260 mL    Drain: 450 mL    Indwelling Catheter - Urethral: 1850 mL  Total OUT: 2720 mL    Total NET: -2077 mL      01-25 @ 07:01  -  01-25 @ 10:20  --------------------------------------------------------  IN:    pantoprazole Infusion: 30 mL  Total IN: 30 mL    OUT:    Indwelling Catheter - Urethral: 225 mL  Total OUT: 225 mL    Total NET: -195 mL        Physical exam:   Intubated and sedated   On Ventilator   Normal air entry into the lungs, no wheezing, decreased breath sounds on the right base   tachycardic - normal s1/s2, no murmurs, rubs or gallops   Abdomen - soft, slightly distended, s/p laparotomy   Extremities: 3+ peripheral edema   HAs a catheter on the left of his chest           LABS:                        9.9    14.4  )-----------( 51       ( 25 Jan 2018 06:00 )             30.6     01-25    145  |  110<H>  |  79<H>  ----------------------------<  147<H>  4.1   |  24  |  1.98<H>    Ca    7.4<L>      25 Jan 2018 06:00  Phos  3.7     01-25  Mg     2.5     01-25        PT/INR - ( 24 Jan 2018 12:10 )   PT: 20.0 sec;   INR: 1.78          PTT - ( 25 Jan 2018 02:54 )  PTT:74.9 sec          RADIOLOGY & ADDITIONAL STUDIES:

## 2018-01-25 NOTE — CHART NOTE - NSCHARTNOTEFT_GEN_A_CORE
Admitting Diagnosis:   72yM with PMH severe MR/AR presented after recent dx acute cholecystitis and RLL PNA at [facility] with lower abdominal pain, new onset rapid Afib, sepsis, acute CHF exacerbation, initially admitted to cardiology on bipap, then transferred to SICU with perforated duodenum s/p Shreyas patch and jejunostomy (1/20) c/b breakdown s/p RTOR, washout, new Shreyas patch, gastrostomy, duodenostomy, IVCF (1/22)        PAST MEDICAL & SURGICAL HISTORY:  Mitral regurgitation  Aortic regurgitation  No significant past surgical history      Current Nutrition Order: -npo after midnight   -NPO with Tube Feed: Suplena with Carb Steady (SUPLENA) (Jejunostomy) @20ml/hr x 24hrs: 480ml TV         PO Intake: Good (%) [   ]  Fair (50-75%) [   ] Poor (<25%) [   ]-N/A    GI Issues: soft, NT/ND, +BS exam    Pain: unable to assess-vented    Skin Integrity: intact    Labs:   01-25    145  |  110<H>  |  79<H>  ----------------------------<  147<H>  4.1   |  24  |  1.98<H>    Ca    7.4<L>      25 Jan 2018 06:00  Phos  3.7     01-25  Mg     2.5     01-25      CAPILLARY BLOOD GLUCOSE      POCT Blood Glucose.: 138 mg/dL (25 Jan 2018 16:02)  POCT Blood Glucose.: 129 mg/dL (25 Jan 2018 11:46)  POCT Blood Glucose.: 144 mg/dL (25 Jan 2018 06:18)  POCT Blood Glucose.: 135 mg/dL (24 Jan 2018 21:30)  POCT Blood Glucose.: 127 mg/dL (24 Jan 2018 16:58)      Medications:  MEDICATIONS  (STANDING):  chlorhexidine 0.12% Liquid 15 milliLiter(s) Swish and Spit two times a day  dextrose 5%. 1000 milliLiter(s) (50 mL/Hr) IV Continuous <Continuous>  digoxin  Injectable 0.25 milliGRAM(s) IV Push every 6 hours  fentaNYL   Infusion 0.112 MICROgram(s)/kG/Hr (1 mL/Hr) IV Continuous <Continuous>  heparin  Infusion 1400 Unit(s)/Hr (14 mL/Hr) IV Continuous <Continuous>  insulin lispro (HumaLOG) corrective regimen sliding scale   SubCutaneous Before meals and at bedtime  meropenem  IVPB 1000 milliGRAM(s) IV Intermittent every 12 hours  micafungin IVPB 100 milliGRAM(s) IV Intermittent every 24 hours  micafungin IVPB      norepinephrine Infusion 0.01 MICROgram(s)/kG/Min (1.676 mL/Hr) IV Continuous <Continuous>  pantoprazole Infusion 8 mG/Hr (10 mL/Hr) IV Continuous <Continuous>  propofol Infusion 1.864 MICROgram(s)/kG/Min (1 mL/Hr) IV Continuous <Continuous>    MEDICATIONS  (PRN):      Weight: 1/18: 89.4kg  Daily     Daily     Weight Change: no update on wt to assess     Estimated energy needs: IBW: 75.5kg, 118% of IBW;  Utilized IBW to calculate needs 2/2 vent. Fluids per team 2/2 CHF  Kcal: 25-30kcal/IBW: 4367-1517  Protein: 1.4-1.6g/IBW: 105.7-120.8    Subjective: duodenal perforation while on Bipap and taken to OR. Pt intubated preop and a line placed. Underwent ex lap, shreyas patch, feeding VIA Jejunum. Pt on suplena @20ml/he x 24hrs: 480ml TV provides 864kcal (46%est. kcal needs) and 21.6g protein (20% est. protein needs) Renal consulted per GAETANO; CVVHD Received on 1/23, 1/24; Crea and gfr improved; no planned CVVDH at this time; recommend Jevity 1.5 via J-tube at goal rate of 55ml/hr x 24hr + 2x prostat 1320ml TV, 2180kcal (28.9kcal/IBW) 114g (1.5g/IBW), 1003ml water. Skin w/ surgical incision w/ anasarca.     Previous Nutrition Diagnosis: Inadequate energy intake RT current EN order  AEB pt meeting 46%est. kcal needs 20% est. protein needs    Active [  X ]  Resolved [   ]    If resolved, new PES:     Goal: pt to meet % EER from EN    Recommendations: -Jevity 1.5 via J-tube at goal rate of 55ml/hr x 24hr + 2x prostat (1320ml TV, 2180kcal (28.9kcal/IBW) 114g (1.5g/IBW), 1003ml water  - monitor s&s of toletrance  -replete lytes and fluid prn  -update wt weekly    Education: n/a-pt intubated    Risk Level: High [  X ] Moderate [   ] Low [   ]

## 2018-01-25 NOTE — PROGRESS NOTE ADULT - SUBJECTIVE AND OBJECTIVE BOX
Critical Care    INTERVAL HPI/OVERNIGHT EVENTS:    ANTIBIOTICS    MEDICATIONS  (STANDING):  chlorhexidine 0.12% Liquid 15 milliLiter(s) Swish and Spit two times a day  dextrose 5%. 1000 milliLiter(s) (50 mL/Hr) IV Continuous <Continuous>  digoxin  Injectable 0.25 milliGRAM(s) IV Push every 6 hours  fentaNYL   Infusion 0.112 MICROgram(s)/kG/Hr (1 mL/Hr) IV Continuous <Continuous>  heparin  Infusion 1400 Unit(s)/Hr (14 mL/Hr) IV Continuous <Continuous>  insulin lispro (HumaLOG) corrective regimen sliding scale   SubCutaneous Before meals and at bedtime  meropenem  IVPB 1000 milliGRAM(s) IV Intermittent every 12 hours  micafungin IVPB 100 milliGRAM(s) IV Intermittent every 24 hours  micafungin IVPB      norepinephrine Infusion 0.01 MICROgram(s)/kG/Min (1.676 mL/Hr) IV Continuous <Continuous>  pantoprazole Infusion 8 mG/Hr (10 mL/Hr) IV Continuous <Continuous>  propofol Infusion 1.864 MICROgram(s)/kG/Min (1 mL/Hr) IV Continuous <Continuous>    MEDICATIONS  (PRN):      Allergies    No Known Allergies    Intolerances        REVIEW OF SYSTEMS:    patient unable to provide a ROS  Vital Signs Last 24 Hrs  T(C): 36.8 (25 Jan 2018 14:52), Max: 38.2 (25 Jan 2018 10:47)  T(F): 98.2 (25 Jan 2018 14:52), Max: 100.7 (25 Jan 2018 10:47)  HR: 90 (25 Jan 2018 16:00) (90 - 122)  BP: 82/59 (24 Jan 2018 19:00) (82/59 - 82/59)  BP(mean): 72 (24 Jan 2018 19:00) (72 - 72)  RR: 18 (25 Jan 2018 16:00) (15 - 18)  SpO2: 100% (25 Jan 2018 16:00) (98% - 100%)    PHYSICAL EXAM:      Eyes: PERRL, EOM intact; conjunctiva and sclera clear  Head: Normocephalic; atraumatic  ENMT: No nasal discharge; airway clear  Neck: Supple; non tender; no masses  Respiratory: No wheezes, rales or rhonchi  Cardiovascular: Regular rate and rhythm. S1 and S2 Normal; No murmurs, gallops or rubs  Gastrointestinal: Soft non-tender non-distended; Normal bowel sounds; No hepatosplenomegaly  Genitourinary: No costovertebral angle tenderness  LABS:                        10.6   15.5  )-----------( 57       ( 25 Jan 2018 10:57 )             32.3     01-25    145  |  110<H>  |  79<H>  ----------------------------<  147<H>  4.1   |  24  |  1.98<H>    Ca    7.4<L>      25 Jan 2018 06:00  Phos  3.7     01-25  Mg     2.5     01-25      PT/INR - ( 24 Jan 2018 12:10 )   PT: 20.0 sec;   INR: 1.78          PTT - ( 25 Jan 2018 10:57 )  PTT:70.6 sec        MICROBIOLOGY:  Culture Results:   Candida albicans (01-23 @ 21:26)  Culture Results:   Few Candida albicans (01-23 @ 00:44)  Culture Results:   Few Candida albicans (01-23 @ 00:44)  Culture Results:   Few Yeast See previous culture for sensitivity (01-22 @ 21:43)  Culture Results:   Few Yeast See previous culture for sensitivity (01-22 @ 21:42)  Culture Results:   Moderate Yeast See previous culture for id and sensitivity (01-22 @ 16:43)  Culture Results:   Moderate Yeast Pending further identification and antibiotic  susceptibility testing at  [facility] (01-22 @ 16:24)  Culture Results:   No growth at 4 days. (01-21 @ 08:11)  Culture Results:   No growth at 4 days. (01-21 @ 08:11)  Culture Results:   No growth (01-19 @ 08:57)  Culture Results:   No growth at 5 days. (01-18 @ 17:47)      RADIOLOGY & ADDITIONAL STUDIES:

## 2018-01-25 NOTE — PROGRESS NOTE ADULT - SUBJECTIVE AND OBJECTIVE BOX
24 hr events: 1/25: off sedation somnolent, on CPAP tachy into 130s, Transitioned Argatroban -> Heparin gtt. Dig loaded, AMRIK ordered. L flank hematoma found on changing sheets, repeat cbc/ptt ordered, stable. Monitor  ON: PTT 82- Decreased gtt to 0.2/hr repeat ptt@11:30-80 repeat again @2am:74- no changes f/u am lab. Off levo @7pm. D/c protonix drip in am ? UO dec to 35 however bp dec to 98/50- therefore not given lasix pt was 1/5 L negative at [facility].  1/24: Starting TF @ 10 cc through J tube. Starting Argatroban gtt @ 0.5 mcg/kg/min. Lasix 40 x 1 with appropriate urine response. Decreased argatroban after PTT > 80     SUBJECTIVE: Patient somnolent without Propofol, put back on AC-VC      MEDICATIONS  (STANDING):  chlorhexidine 0.12% Liquid 15 milliLiter(s) Swish and Spit two times a day  dextrose 5%. 1000 milliLiter(s) (50 mL/Hr) IV Continuous <Continuous>  digoxin  Injectable 0.25 milliGRAM(s) IV Push every 6 hours  fentaNYL   Infusion 0.112 MICROgram(s)/kG/Hr (1 mL/Hr) IV Continuous <Continuous>  heparin  Infusion 1400 Unit(s)/Hr (14 mL/Hr) IV Continuous <Continuous>  insulin lispro (HumaLOG) corrective regimen sliding scale   SubCutaneous Before meals and at bedtime  meropenem  IVPB 1000 milliGRAM(s) IV Intermittent every 12 hours  micafungin IVPB 100 milliGRAM(s) IV Intermittent every 24 hours  micafungin IVPB      norepinephrine Infusion 0.01 MICROgram(s)/kG/Min (1.676 mL/Hr) IV Continuous <Continuous>  pantoprazole Infusion 8 mG/Hr (10 mL/Hr) IV Continuous <Continuous>  propofol Infusion 1.864 MICROgram(s)/kG/Min (1 mL/Hr) IV Continuous <Continuous>    MEDICATIONS  (PRN):      Oliveira:	  [ ] None	[x] Daily Oliveira Order Placed	   Indication:	  [x] Strict I and O's    [ ] Obstruction     [ ] Incontinence + Stage 3 or 4 Decubitus  Central Line:  [ ] None	   [x]  Medication / TPN Administration     [ ] No Peripheral IV     ICU Vital Signs Last 24 Hrs  T(C): 36.8 (25 Jan 2018 14:52), Max: 38.2 (25 Jan 2018 10:47)  T(F): 98.2 (25 Jan 2018 14:52), Max: 100.7 (25 Jan 2018 10:47)  HR: 100 (25 Jan 2018 15:00) (100 - 122)  BP: 82/59 (24 Jan 2018 19:00) (82/59 - 82/59)  BP(mean): 72 (24 Jan 2018 19:00) (72 - 72)  ABP: 126/64 (25 Jan 2018 15:00) (92/58 - 142/72)  ABP(mean): 82 (25 Jan 2018 15:00) (68 - 94)  RR: 17 (25 Jan 2018 15:00) (15 - 18)  SpO2: 99% (25 Jan 2018 15:00) (98% - 100%)      Physical Exam:  General: NAD  HEENT: NC/AT, EOMI, PERRLA, normal hearing, no oral lesions, neck supple w/o LAD  Pulmonary: Uncomfortable with CPAP trial.   Cardiovascular: Afib, with holosystolic murmur  Abdominal: soft, NT/ND, +BS, no organomegaly. Drains with adequate output.   Extremities: Significant Edema, bluish discoloration on right toes.   Neuro: A/O x3, CNs II-XII grossly intact, normal motor/sensation, no focal deficits  Pulses: palpable distal pulses    Vent settings:  Mode: AC/ CMV (Assist Control/ Continuous Mandatory Ventilation), RR (machine): 16, TV (machine): 500, FiO2: 40, PEEP: 7, ITime: 0.8, MAP: 11, PIP: 24    I&O's Summary    24 Jan 2018 07:01  -  25 Jan 2018 07:00  --------------------------------------------------------  IN: 643 mL / OUT: 2720 mL / NET: -2077 mL    25 Jan 2018 07:01  -  25 Jan 2018 15:38  --------------------------------------------------------  IN: 150 mL / OUT: 1790 mL / NET: -1640 mL        LABS:                        10.6   15.5  )-----------( 57       ( 25 Jan 2018 10:57 )             32.3     01-25    145  |  110<H>  |  79<H>  ----------------------------<  147<H>  4.1   |  24  |  1.98<H>    Ca    7.4<L>      25 Jan 2018 06:00  Phos  3.7     01-25  Mg     2.5     01-25      PT/INR - ( 24 Jan 2018 12:10 )   PT: 20.0 sec;   INR: 1.78          PTT - ( 25 Jan 2018 10:57 )  PTT:70.6 sec    CAPILLARY BLOOD GLUCOSE      POCT Blood Glucose.: 129 mg/dL (25 Jan 2018 11:46)  POCT Blood Glucose.: 144 mg/dL (25 Jan 2018 06:18)  POCT Blood Glucose.: 135 mg/dL (24 Jan 2018 21:30)  POCT Blood Glucose.: 127 mg/dL (24 Jan 2018 16:58)        Cultures:Culture Results:   Candida albicans (01-23 @ 21:26)      Drips:    RADIOLOGY & ADDITIONAL STUDIES:

## 2018-01-25 NOTE — CONSULT NOTE ADULT - ASSESSMENT
71yo Male with PMH with septic shocks and oligo/ aneuric, s/p treatment with Vanco - supra therapeutic levels, s/p laparotomy for perforation of the duodenum, revision done on 1/22. On 1/23 was on a short period of time on CVVHD and was stopped because of clotting issues, on 1/24 good response to diuretics. Remains intubated and sedated. Off pressors with good blood pressures. EP consulted for Afib with RVR requesting DCCV.     - Plan is to stop tube feeds a midnight, AMRIK in SICU on Friday (while intubated). DCCV thereafter. Plan is to extubated patient post AMRIK/ DCCV. AMIO not started as concern for conversion to NSR prior to AMRIK. Patient had two doses of Digoxin 0.25mg so far. Will consider Amio post AMRIK/ DCCV given ongoing GAETANO (renal following) patient currently getting CVVH. Patient is currently hemodynamically stable, no longer requiring pressors, HR is currently 100-120's. 73yo Male with PMH with septic shocks and oligo/ aneuric, s/p treatment with Vanco - supra therapeutic levels, s/p laparotomy for perforation of the duodenum, revision done on 1/22. On 1/23 was on a short period of time on CVVHD and was stopped because of clotting issues, on 1/24 good response to diuretics. Remains intubated and sedated. Off pressors with good blood pressures. EP consulted for Afib with RVR requesting DCCV.     - Plan is to stop tube feeds a midnight, AMRIK in SICU on Friday 1/26 (while intubated). DCCV thereafter. Plan is to extubated patient post AMRIK/ DCCV. AMIO not started as concern for conversion to NSR prior to AMRIK. Patient had two doses of Digoxin 0.25mg so far. Will consider Amio post AMRIK/ DCCV given ongoing GAETANO (renal following) patient currently getting CVVH. Patient is currently hemodynamically stable, no longer requiring pressors, HR is currently 100-120's.   - Patient was initially started on Heparin, temporarily d/c'd and started on Argatroban due to concern for GISSELL, however, GISSELL panel was negative Heparin now restarted.   - ** Note that patient will need to be on uninterrupted anticoagulation for at least 30 days post Cardioversion. Suggest continuing Heparin, or once able to tolerate oral anticoagulation pills, would recommend a DOAC such as Eliquis 5mg BID (reduced Eliquis dosing is not indicated for this patient despite elevated Cr in the setting of GAETANO) or Xarelto 20mg daily (must be taken with food) when able to tolerate meds orally. 71yo Male with PMH with septic shocks and oligo/ aneuric, s/p treatment with Vanco - supra therapeutic levels, s/p laparotomy for perforation of the duodenum, revision done on 1/22. On 1/23 was on a short period of time on CVVHD and was stopped because of clotting issues, on 1/24 good response to diuretics. Remains intubated and sedated. Off pressors with good blood pressures. EP consulted for Afib with RVR for AMRIK/ DCCV.    - Plan is to stop tube feeds a midnight, AMRIK in SICU on Friday 1/26 (while intubated). DCCV thereafter. Plan is to extubated patient post AMRIK/ DCCV. AMIO not started as concern for conversion to NSR prior to AMRIK. Patient had two doses of Digoxin 0.25mg so far. Will consider Amio post AMRIK/ DCCV given ongoing GAETANO (renal following) patient currently getting CVVH. Patient is currently hemodynamically stable, no longer requiring pressors, HR is currently 100-120's.   - Patient was initially started on Heparin, temporarily d/c'd and started on Argatroban due to concern for GISSELL, however, GISSELL panel was negative and now Heparin restarted.   - ** Note that patient will need to be on uninterrupted anticoagulation for at least 30 days post Cardioversion. Suggest continuing Heparin, or once able to tolerate oral anticoagulation pills, would recommend a DOAC such as Eliquis 5mg BID (reduced Eliquis dosing is not indicated for this patient despite elevated Cr in the setting of GAETANO) or Xarelto 20mg daily (must be taken with food) when able to tolerate meds orally.     - Discussed AMRIK and DCCV procedure with patient's  (healthcare proxy). We discussed the purpose, risk, benefits, and alternative to cardioversion. The patient's  verbalized understanding. He asked appropriate questions, all questions were answered. Informed consent was obtained via the healthcare proxy. Signed consent was placed in the patient's chart. 73yo Male with PMH with septic shocks and oligo/ aneuric, s/p treatment with Vanco - supra therapeutic levels, s/p laparotomy for perforation of the duodenum, revision done on 1/22. On 1/23 was on a short period of time on CVVHD and was stopped because of clotting issues, on 1/24 good response to diuretics. Remains intubated and sedated. Off pressors with good blood pressures. EP consulted for Afib with RVR for AMRIK/ DCCV.    - Plan is to stop tube feeds a midnight, AMRIK in SICU on Friday 1/26 (while intubated). DCCV thereafter. Plan is to extubated patient post AMRIK/ DCCV. AMIO not started as concern for conversion to NSR prior to AMRIK. Patient had two doses of Digoxin 0.25mg so far. Will consider Amio post AMRIK/ DCCV given ongoing GAETANO (renal following) patient currently getting CVVH. Patient is currently hemodynamically stable, no longer requiring pressors, HR is currently 100-120's.   - Patient was initially started on Heparin, temporarily d/c'd and started on Argatroban due to concern for GISSELL, however, GISSELL panel was negative and now Heparin restarted.   - ** Note that patient will need to be on uninterrupted anticoagulation for at least 30 days post Cardioversion. Suggest continuing Heparin, or once able to tolerate oral anticoagulation pills, would recommend a DOAC such as Eliquis 5mg BID (reduced Eliquis dosing is not indicated for this patient despite elevated Cr in the setting of GAETANO) or Xarelto 20mg daily (must be taken with food) when able to tolerate meds orally.     - Discussed AMRIK and DCCV procedure with patient's  (healthcare proxy). We discussed the purpose, risks, benefits, and alternatives to cardioversion. The patient's  verbalized understanding. He asked appropriate questions, all questions were answered. He took notes on our discussion. Informed consent for the cardioversion was obtained via the healthcare proxy. Signed consent was placed in the patient's chart.

## 2018-01-25 NOTE — PROGRESS NOTE ADULT - SUBJECTIVE AND OBJECTIVE BOX
Interventional, Pulmonary, Critical, Chest Special Procedures.    Pt was seen and fully examined by myself.     Time spent with patient in minutes:67      Patient is a 72y old  Male who presents with a chief complaint of acute cholecystitis (18 Jan 2018 14:49)Events of last 24hrs reviewed and discussed c SICU   The patient is intubated, sedated, appears comfortable and in synchrony c CMV mode.  All circuits integrity verified and preserved.    HPI:  73 yo M with h/o severe mitral and aortic valve regurgitation presented to ED with 5 day h/o left lower abdominal pain, nausea and multiple episodes of vomiting as well as PO intolerance. Pt reports that the pain in his abdomen is illicited by movement. Went to [facility] two days ago and underwent CT scan which revealed acute cholecystitis as well as right lower lobe infectious pneumonia. Pt was told he would require surgery and left AMA because he was waiting too long. Denies f/c. Last echo 1/4/17 with EF 60-65%. Normal BMs and passing flatus. (18 Jan 2018 14:49)    REVIEW OF SYSTEMS:  as above  PAST MEDICAL & SURGICAL HISTORY:  Mitral regurgitation  Aortic regurgitation  No significant past surgical history    FAMILY HISTORY:    SOCIAL HISTORY:      - Tobacco     - ETOH    Allergies    No Known Allergies    Intolerances      Vital Signs Last 24 Hrs  T(C): 38.2 (25 Jan 2018 10:47), Max: 38.2 (25 Jan 2018 10:47)  T(F): 100.7 (25 Jan 2018 10:47), Max: 100.7 (25 Jan 2018 10:47)  HR: 118 (25 Jan 2018 14:09) (100 - 122)  BP: 82/59 (24 Jan 2018 19:00) (82/59 - 82/59)  BP(mean): 72 (24 Jan 2018 19:00) (72 - 72)  RR: 18 (25 Jan 2018 14:00) (15 - 18)  SpO2: 99% (25 Jan 2018 14:09) (98% - 100%)    01-24 @ 07:01  -  01-25 @ 07:00  --------------------------------------------------------  IN: 643 mL / OUT: 2720 mL / NET: -2077 mL    01-25 @ 07:01  -  01-25 @ 14:33  --------------------------------------------------------  IN: 126 mL / OUT: 1615 mL / NET: -1489 mL      Mode: AC/ CMV (Assist Control/ Continuous Mandatory Ventilation)  RR (machine): 16  TV (machine): 500  FiO2: 40  PEEP: 7  ITime: 0.8  MAP: 11  PIP: 24    PHYSICAL EXAM:  Comfortable, on CMV sedated  Eyes: PERRL, EOM intact; conjunctiva and sclera clear  Head: Normocephalic;  No Trauma  ENMT: No nasal discharge, hoarseness, cough or hemoptysis.    Neck: Supple; non tender; no masses or deformities.    No JVD  Respiratory: - WHEEZING   + RLL RHONCHI  + RLL  RALES  + CRACKLES.  Diminished breath sounds  BILATERAL  RIGHT > LEFT baswe  Cardiovascular: Regular rate and rhythm. S1 and S2 Normal; + murmurs, gallops or rubs     - PPM/AICD  Gastrointestinal: distended; no  bowel sounds; No hepatosplenomegaly.     -PEG    +  NGT   +ANGELES, abdominal drains in place  Genitourinary: No costovertebral angle tenderness. No dysuria  Extremities: sedated decreased AROM, No clubbing, less cyanosis or edema, better toes perfusion today   Vascular: Peripheral pulses palpable 2+ bilaterally  Neurological: intubated sedated    DEVICES:  - DENTURES   +IV R / L     - ETUBE   -TRACH   -CTUBE  R / L      LABS:  ABG - ( 25 Jan 2018 05:58 )  pH: 7.40  /  pCO2: 38    /  pO2: 140   / HCO3: 23    / Base Excess: -1.8  /  SaO2: 99                                      10.6   15.5  )-----------( 57       ( 25 Jan 2018 10:57 )             32.3     01-25    145  |  110<H>  |  79<H>  ----------------------------<  147<H>  4.1   |  24  |  1.98<H>    Ca    7.4<L>      25 Jan 2018 06:00  Phos  3.7     01-25  Mg     2.5     01-25      PT/INR - ( 24 Jan 2018 12:10 )   PT: 20.0 sec;   INR: 1.78          PTT - ( 25 Jan 2018 10:57 )  PTT:70.6 sec  < from: Xray Chest 1 View AP -PORTABLE-Routine (01.25.18 @ 01:56) >  EXAM:  XR CHEST PORTABLE ROUTINE 1V                          PROCEDURE DATE:  01/25/2018                     INTERPRETATION:  Portable Chest X-Ray dated 1/25/2018 1:56 AM    Indication: AM SICU Intubated    An AP portable view of the chest is compared to 1/24/2018 at 8:53 AM. No   significant interval change in the pulmonary pathology and the supportive   devices as previously reported.    IMPRESSION:  No significant interval change.    < end of copied text >  RADIOLOGY & ADDITIONAL STUDIES (The following images were personally reviewed):

## 2018-01-25 NOTE — PROGRESS NOTE ADULT - PROBLEM SELECTOR PLAN 1
- most likely related to the ATN due to shock (other precipitating factors are IV contrast, Vanco), on pressors   -  urine output - 1.8 liters good reponse to the diuertics  - no need for CVVHD for today   - continue with diuretics 40 mg ivp given in the morning, if the BP is stable cab receive more lasix in the afternoon   - electrolytes acceptable   - volume status - hypervolemic - continue with diuretics   - in and outs   - daily weight   - renal diet.  - please check the abdominal pressure

## 2018-01-25 NOTE — PROGRESS NOTE ADULT - SUBJECTIVE AND OBJECTIVE BOX
Surgery Attending:  The patient is now postoperative day 2 status post abdominal washout and placement of internal duodenal drains as well as resection and revision of omental patch. Fortunately the patient's heparin induced antibiotic serotonin assay is negative. It is likely the flap failed secondary to pressor support and disseminated intravascular coagulation caused by overwhelming septic shock. Today we discussed restarting heparin for anticoagulation rather than argatroban. This seems to be reasonable    The patient seen and examined continues to improve although he does remain critically ill with multiorgan system failure. Fortunately he no longer requires Levophed for his blood pressure.     From a surgical standpoint he remained stable. The right-sided periduodenal and hepatic drains remaining serous sanguinous in quality. The duodenostomy drain continues to drain bilious material as expected. The nasogastric tube output has been minimal. The feeding jejunostomy tube appears to be working and the patient is tolerating trophic tube feeds. Appreciate consultant notes. Defer supportive intensive care to surgical intensive care unit Dr. Felix.    IMPRESSION:  The patient remains critically ill with multiorgan system failure however is progressing clinically. I recommend continuing trophic feeds at this point in time and not increasing the volume until the patient has reliable bowel function. The reason for this is that it would be in his best interest to avoid intestinal distention and or reflux of tube feed contents into the duodenum near the tenuous ulceration and surgical process. Recommend continuing Proton Pump Inhibitor Therapy. Recommend continued meticulous care of drains and caution when awaking the patient from sedation so as to not inadvertently removed drains during sedation holidays and/or hygiene. Defer antibiotic therapy to Dr. Flores and infectious disease. Defer supportive care to surgical intensive care unit.

## 2018-01-25 NOTE — PROGRESS NOTE ADULT - ASSESSMENT
This is 72 year old male with PMH stated above, now in shock - most likely septic and oligo anuric in these setting, also s/p CT angio, s/p treatment with Vanco - supra therapeutic levels,  s/p laparotomy for perforation of the duodenum., revision done on 1/22. On 1/23 was ofr a short period of time on CVVHD and was stopped because of clotting issues, on 1/24 good response to diuretics. TOday we will continue with diuretic therapy

## 2018-01-26 LAB
ANION GAP SERPL CALC-SCNC: 9 MMOL/L — SIGNIFICANT CHANGE UP (ref 5–17)
APTT BLD: 117.4 SEC — HIGH (ref 27.5–37.4)
APTT BLD: 56.2 SEC — HIGH (ref 27.5–37.4)
APTT BLD: 60.8 SEC — HIGH (ref 27.5–37.4)
APTT BLD: 72.5 SEC — HIGH (ref 27.5–37.4)
BASE EXCESS BLDA CALC-SCNC: 3 MMOL/L — SIGNIFICANT CHANGE UP (ref -2–3)
BUN SERPL-MCNC: 62 MG/DL — HIGH (ref 7–23)
C PNEUM IGM TITR SER: SIGNIFICANT CHANGE UP TITER
CALCIUM SERPL-MCNC: 7.6 MG/DL — LOW (ref 8.4–10.5)
CHLAMYDIA IGG SER-ACNC: (no result) TITER
CHLAMYDIA PNEUMONIAE IGA: (no result) TITER
CHLORIDE SERPL-SCNC: 114 MMOL/L — HIGH (ref 96–108)
CO2 SERPL-SCNC: 26 MMOL/L — SIGNIFICANT CHANGE UP (ref 22–31)
CREAT SERPL-MCNC: 1.35 MG/DL — HIGH (ref 0.5–1.3)
CULTURE RESULTS: SIGNIFICANT CHANGE UP
GAS PNL BLDA: SIGNIFICANT CHANGE UP
GLUCOSE BLDC GLUCOMTR-MCNC: 123 MG/DL — HIGH (ref 70–99)
GLUCOSE BLDC GLUCOMTR-MCNC: 123 MG/DL — HIGH (ref 70–99)
GLUCOSE BLDC GLUCOMTR-MCNC: 128 MG/DL — HIGH (ref 70–99)
GLUCOSE BLDC GLUCOMTR-MCNC: 152 MG/DL — HIGH (ref 70–99)
GLUCOSE SERPL-MCNC: 146 MG/DL — HIGH (ref 70–99)
HCO3 BLDA-SCNC: 27 MMOL/L — SIGNIFICANT CHANGE UP (ref 21–28)
HCT VFR BLD CALC: 30.2 % — LOW (ref 39–50)
HCT VFR BLD CALC: 30.9 % — LOW (ref 39–50)
HGB BLD-MCNC: 10 G/DL — LOW (ref 13–17)
HGB BLD-MCNC: 9.7 G/DL — LOW (ref 13–17)
LACTATE SERPL-SCNC: 1.2 MMOL/L — SIGNIFICANT CHANGE UP (ref 0.5–2)
MAGNESIUM SERPL-MCNC: 2.6 MG/DL — SIGNIFICANT CHANGE UP (ref 1.6–2.6)
MCHC RBC-ENTMCNC: 29.9 PG — SIGNIFICANT CHANGE UP (ref 27–34)
MCHC RBC-ENTMCNC: 30.4 PG — SIGNIFICANT CHANGE UP (ref 27–34)
MCHC RBC-ENTMCNC: 32.1 G/DL — SIGNIFICANT CHANGE UP (ref 32–36)
MCHC RBC-ENTMCNC: 32.4 G/DL — SIGNIFICANT CHANGE UP (ref 32–36)
MCV RBC AUTO: 93.2 FL — SIGNIFICANT CHANGE UP (ref 80–100)
MCV RBC AUTO: 93.9 FL — SIGNIFICANT CHANGE UP (ref 80–100)
PCO2 BLDA: 40 MMHG — SIGNIFICANT CHANGE UP (ref 35–48)
PH BLDA: 7.45 — SIGNIFICANT CHANGE UP (ref 7.35–7.45)
PHOSPHATE SERPL-MCNC: 2.4 MG/DL — LOW (ref 2.5–4.5)
PLATELET # BLD AUTO: 56 K/UL — LOW (ref 150–400)
PLATELET # BLD AUTO: 62 K/UL — LOW (ref 150–400)
PO2 BLDA: 109 MMHG — HIGH (ref 83–108)
POTASSIUM SERPL-MCNC: 3.8 MMOL/L — SIGNIFICANT CHANGE UP (ref 3.5–5.3)
POTASSIUM SERPL-SCNC: 3.8 MMOL/L — SIGNIFICANT CHANGE UP (ref 3.5–5.3)
RBC # BLD: 3.24 M/UL — LOW (ref 4.2–5.8)
RBC # BLD: 3.29 M/UL — LOW (ref 4.2–5.8)
RBC # FLD: 16.8 % — SIGNIFICANT CHANGE UP (ref 10.3–16.9)
RBC # FLD: 17.2 % — HIGH (ref 10.3–16.9)
SAO2 % BLDA: 98 % — SIGNIFICANT CHANGE UP (ref 95–100)
SODIUM SERPL-SCNC: 149 MMOL/L — HIGH (ref 135–145)
SPECIMEN SOURCE: SIGNIFICANT CHANGE UP
SPECIMEN SOURCE: SIGNIFICANT CHANGE UP
WBC # BLD: 13.5 K/UL — HIGH (ref 3.8–10.5)
WBC # BLD: 13.8 K/UL — HIGH (ref 3.8–10.5)
WBC # FLD AUTO: 13.5 K/UL — HIGH (ref 3.8–10.5)
WBC # FLD AUTO: 13.8 K/UL — HIGH (ref 3.8–10.5)

## 2018-01-26 PROCEDURE — 71045 X-RAY EXAM CHEST 1 VIEW: CPT | Mod: 26

## 2018-01-26 PROCEDURE — 99291 CRITICAL CARE FIRST HOUR: CPT

## 2018-01-26 PROCEDURE — 99232 SBSQ HOSP IP/OBS MODERATE 35: CPT | Mod: GC

## 2018-01-26 RX ORDER — PANTOPRAZOLE SODIUM 20 MG/1
40 TABLET, DELAYED RELEASE ORAL EVERY 12 HOURS
Qty: 0 | Refills: 0 | Status: DISCONTINUED | OUTPATIENT
Start: 2018-01-26 | End: 2018-02-27

## 2018-01-26 RX ORDER — FENTANYL CITRATE 50 UG/ML
0.11 INJECTION INTRAVENOUS
Qty: 2500 | Refills: 0 | Status: DISCONTINUED | OUTPATIENT
Start: 2018-01-26 | End: 2018-01-27

## 2018-01-26 RX ORDER — FUROSEMIDE 40 MG
40 TABLET ORAL ONCE
Qty: 0 | Refills: 0 | Status: COMPLETED | OUTPATIENT
Start: 2018-01-26 | End: 2018-01-26

## 2018-01-26 RX ORDER — POTASSIUM CHLORIDE 20 MEQ
40 PACKET (EA) ORAL ONCE
Qty: 0 | Refills: 0 | Status: COMPLETED | OUTPATIENT
Start: 2018-01-26 | End: 2018-01-26

## 2018-01-26 RX ORDER — POTASSIUM PHOSPHATE, MONOBASIC POTASSIUM PHOSPHATE, DIBASIC 236; 224 MG/ML; MG/ML
15 INJECTION, SOLUTION INTRAVENOUS ONCE
Qty: 0 | Refills: 0 | Status: COMPLETED | OUTPATIENT
Start: 2018-01-26 | End: 2018-01-26

## 2018-01-26 RX ORDER — PIPERACILLIN AND TAZOBACTAM 4; .5 G/20ML; G/20ML
3.38 INJECTION, POWDER, LYOPHILIZED, FOR SOLUTION INTRAVENOUS EVERY 6 HOURS
Qty: 0 | Refills: 0 | Status: DISCONTINUED | OUTPATIENT
Start: 2018-01-26 | End: 2018-01-27

## 2018-01-26 RX ORDER — IPRATROPIUM/ALBUTEROL SULFATE 18-103MCG
3 AEROSOL WITH ADAPTER (GRAM) INHALATION EVERY 6 HOURS
Qty: 0 | Refills: 0 | Status: DISCONTINUED | OUTPATIENT
Start: 2018-01-26 | End: 2018-02-27

## 2018-01-26 RX ORDER — HEPARIN SODIUM 5000 [USP'U]/ML
800 INJECTION INTRAVENOUS; SUBCUTANEOUS
Qty: 25000 | Refills: 0 | Status: DISCONTINUED | OUTPATIENT
Start: 2018-01-26 | End: 2018-02-01

## 2018-01-26 RX ADMIN — Medication 40 MILLIEQUIVALENT(S): at 09:44

## 2018-01-26 RX ADMIN — CHLORHEXIDINE GLUCONATE 15 MILLILITER(S): 213 SOLUTION TOPICAL at 06:00

## 2018-01-26 RX ADMIN — Medication 40 MILLIEQUIVALENT(S): at 16:10

## 2018-01-26 RX ADMIN — CHLORHEXIDINE GLUCONATE 15 MILLILITER(S): 213 SOLUTION TOPICAL at 17:10

## 2018-01-26 RX ADMIN — Medication 40 MILLIGRAM(S): at 09:44

## 2018-01-26 RX ADMIN — HEPARIN SODIUM 7 UNIT(S)/HR: 5000 INJECTION INTRAVENOUS; SUBCUTANEOUS at 10:00

## 2018-01-26 RX ADMIN — Medication 0.12 MILLIGRAM(S): at 12:00

## 2018-01-26 RX ADMIN — Medication 2: at 22:53

## 2018-01-26 RX ADMIN — PANTOPRAZOLE SODIUM 10 MG/HR: 20 TABLET, DELAYED RELEASE ORAL at 09:02

## 2018-01-26 RX ADMIN — MICAFUNGIN SODIUM 105 MILLIGRAM(S): 100 INJECTION, POWDER, LYOPHILIZED, FOR SOLUTION INTRAVENOUS at 15:09

## 2018-01-26 RX ADMIN — POTASSIUM PHOSPHATE, MONOBASIC POTASSIUM PHOSPHATE, DIBASIC 62.5 MILLIMOLE(S): 236; 224 INJECTION, SOLUTION INTRAVENOUS at 08:49

## 2018-01-26 RX ADMIN — Medication 3 MILLILITER(S): at 21:10

## 2018-01-26 RX ADMIN — Medication 3 MILLILITER(S): at 16:10

## 2018-01-26 RX ADMIN — PANTOPRAZOLE SODIUM 40 MILLIGRAM(S): 20 TABLET, DELAYED RELEASE ORAL at 17:10

## 2018-01-26 RX ADMIN — MEROPENEM 100 MILLIGRAM(S): 1 INJECTION INTRAVENOUS at 10:00

## 2018-01-26 RX ADMIN — PIPERACILLIN AND TAZOBACTAM 200 GRAM(S): 4; .5 INJECTION, POWDER, LYOPHILIZED, FOR SOLUTION INTRAVENOUS at 22:53

## 2018-01-26 RX ADMIN — Medication 3 MILLILITER(S): at 10:39

## 2018-01-26 NOTE — PROGRESS NOTE ADULT - SUBJECTIVE AND OBJECTIVE BOX
24 hr events: 1/26: lasix 40, metolazone 5, switched to bid protonix, AMRIK  ON: HGB 9.9 ( Stable). ptt 132- dec hep gtt to 9 and repeat @2am: 117 decreased hep gtt to 7 repeat @ 8am   1/25: off sedation somnolent, on CPAP tachy into 130s, Transitioned Argatroban -> Heparin gtt. TF to 20cc. Dig loaded, AMRIK ordered. L flank hematoma, repeat cbc/ptt ordered, stable. PTT >200 on 1400u/hour heparin, drip held 1 hour and decreased to 1100 units/hour       SUBJECTIVE: Patient arousable, can follow simple commands for exam. Off sedation. Tried on CPAP -- able to inspire adequate Tidal volumes, however exhaustion with effort.     MEDICATIONS  (STANDING):  ALBUTerol/ipratropium for Nebulization 3 milliLiter(s) Nebulizer every 6 hours  chlorhexidine 0.12% Liquid 15 milliLiter(s) Swish and Spit two times a day  dextrose 5%. 1000 milliLiter(s) (50 mL/Hr) IV Continuous <Continuous>  digoxin  Injectable 0.125 milliGRAM(s) IV Push daily  fentaNYL   Infusion 0.112 MICROgram(s)/kG/Hr (1 mL/Hr) IV Continuous <Continuous>  heparin  Infusion 900 Unit(s)/Hr (7 mL/Hr) IV Continuous <Continuous>  insulin lispro (HumaLOG) corrective regimen sliding scale   SubCutaneous Before meals and at bedtime  meropenem  IVPB 1000 milliGRAM(s) IV Intermittent every 12 hours  micafungin IVPB 100 milliGRAM(s) IV Intermittent every 24 hours  micafungin IVPB      pantoprazole  Injectable 40 milliGRAM(s) IV Push every 12 hours    MEDICATIONS  (PRN):      Oliveira:	  [ ] None	[x] Daily Oliveira Order Placed	   Indication:	  [x] Strict I and O's    [ ] Obstruction     [ ] Incontinence + Stage 3 or 4 Decubitus  Central Line:  [ ] None	   [x]  Medication / TPN Administration     [ ] No Peripheral IV     ICU Vital Signs Last 24 Hrs  T(C): 37.6 (26 Jan 2018 14:00), Max: 37.6 (26 Jan 2018 14:00)  T(F): 99.6 (26 Jan 2018 14:00), Max: 99.6 (26 Jan 2018 14:00)  HR: 106 (26 Jan 2018 14:00) (78 - 106)  BP: --  BP(mean): --  ABP: 120/58 (26 Jan 2018 14:00) (108/50 - 140/64)  ABP(mean): 78 (26 Jan 2018 14:00) (64 - 84)  RR: 20 (26 Jan 2018 14:00) (14 - 25)  SpO2: 97% (26 Jan 2018 14:00) (95% - 100%)      Physical Exam:  General: NAD  HEENT: NC/AT, EOMI, PERRLA, normal hearing, no oral lesions, neck supple w/o LAD  Pulmonary: On CPAP in AM. AC-VC for afternoon  Cardiovascular: Afib, normal rate. Holosystolic murmur  Abdominal: soft, NT/ND, +BS, no organomegaly. staple line with serosanguinous drainage. All drains with adequate drainage. J-tube clamped, no feeds.  Extremities: WWP, 5/5 strength x 4, Edematous.   Neuro: A/O x3, CNs II-XII grossly intact, normal motor/sensation, no focal deficits  Pulses: palpable distal pulses    Vent settings:  Mode: CPAP with PS, FiO2: 40, PEEP: 5, PS: 5, MAP: 6.3, PIP: 11    I&O's Summary    25 Jan 2018 07:01  -  26 Jan 2018 07:00  --------------------------------------------------------  IN: 899 mL / OUT: 4100 mL / NET: -3201 mL    26 Jan 2018 07:01  -  26 Jan 2018 14:35  --------------------------------------------------------  IN: 455 mL / OUT: 1140 mL / NET: -685 mL        LABS:                        10.0   13.8  )-----------( 56       ( 26 Jan 2018 05:58 )             30.9     01-26    149<H>  |  114<H>  |  62<H>  ----------------------------<  146<H>  3.8   |  26  |  1.35<H>    Ca    7.6<L>      26 Jan 2018 05:58  Phos  2.4     01-26  Mg     2.6     01-26      PTT - ( 26 Jan 2018 07:13 )  PTT:72.5 sec    CAPILLARY BLOOD GLUCOSE      POCT Blood Glucose.: 123 mg/dL (26 Jan 2018 10:45)  POCT Blood Glucose.: 128 mg/dL (26 Jan 2018 06:32)  POCT Blood Glucose.: 154 mg/dL (25 Jan 2018 21:47)  POCT Blood Glucose.: 138 mg/dL (25 Jan 2018 16:02)        Cultures:Culture Results:   Candida albicans isolated (01-24 @ 16:33)      RADIOLOGY & ADDITIONAL STUDIES:

## 2018-01-26 NOTE — PROGRESS NOTE ADULT - ASSESSMENT
This is 72 year old male with PMH stated above, now in shock - most likely septic and oligo anuric in these setting, also s/p CT angio, s/p treatment with Vanco - supra therapeutic levels,  s/p laparotomy for perforation of the duodenum., revision done on 1/22. On 1/23 was ofr a short period of time on CVVHD and was stopped because of clotting issues. Now with improvment of the renal function - with good urine output 3 liters out

## 2018-01-26 NOTE — PROGRESS NOTE ADULT - ASSESSMENT
72yM with PMH severe MR/AR presented after recent dx acute cholecystitis and RLL PNA at [facility] with lower abdominal pain, new onset rapid Afib, sepsis, acute CHF exacerbation, initially admitted to cardiology on bipap, then transferred to SICU with perforated duodenum s/p Shreyas patch and jejunostomy (1/20) c/b breakdown s/p RTOR, washout, new Shreyas patch, gastrostomy, duodenostomy, IVCF (1/22)    Neuro: fentanyl gtts,   CV: Afib: Echo EF 50%, ?vegetation and Severe MR.   Pulm: Intubated on cmv 40/500/12/5 chlorhex  GI: NPO with NGT to LIWS. Jtube TF jevity @ 20 cc. retrograde D-tube to LIWS, G-tube to LIWS Protonix bid.   : Oliveira, GAETANO  ID: Perforated duodenum/PNA, surgical swab grew Candida: micafungin (1/22--), meropenem (1/22--) //// discontinued Vanco (1/19-1/20) Zosyn (1/19-1/22), f/u Bld cx's, linezolid (1/22-23)  Endo: ISS  HEME:DVT: Heparin gtt PTT goal 60-80. HIT +, serotonin release assay NEGATIVE. IVCF (1/22) //1FFP, 1PLT (1/22).   PPx: SCD, R pop DVT, s/p IVC filter 1/22  Lines: PIV, L brachial [place] (1/20-), RIJ TLC, LSC HD cath (1/22-)  Wounds: Ex lap, JPx2 to bulb suction, J-tube to gravity, D-tube to LIWS, G-tube to LIWS  PT/OT: Not ordered

## 2018-01-26 NOTE — PROGRESS NOTE ADULT - ATTENDING COMMENTS
non oliguric ATN  do not anticipate that he will need any more renal replacement therapy  Discussed case in detail with patient's partner  Keep negative balance

## 2018-01-26 NOTE — PROGRESS NOTE ADULT - SUBJECTIVE AND OBJECTIVE BOX
Interventional, Pulmonary, Critical, Chest Special Procedures.    Pt was seen and fully examined by myself.     Time spent with patient in minutes:67    Patient is a 72y old  Male who presents with a chief complaint of acute cholecystitis (18 Jan 2018 14:49)Events of last 24hrs reviewed. The patient appears comfortable and in synchrony c CMV mode.   All circuits integrity verified and preserved. PIP is  17,     HPI:  71 yo M with h/o severe mitral and aortic valve regurgitation presented to ED with 5 day h/o left lower abdominal pain, nausea and multiple episodes of vomiting as well as PO intolerance. Pt reports that the pain in his abdomen is illicited by movement. Went to [facility] two days ago and underwent CT scan which revealed acute cholecystitis as well as right lower lobe infectious pneumonia. Pt was told he would require surgery and left AMA because he was waiting too long. Denies f/c. Last echo 1/4/17 with EF 60-65%. Normal BMs and passing flatus. (18 Jan 2018 14:49)    REVIEW OF SYSTEMS: updated  Constitutional: No fever, weight loss, chills or fatigue  Eyes: No eye pain, visual disturbances, or discharge  ENMT:  No difficulty hearing, tinnitus, vertigo; No sinus or throat pain. No epistaxis, dysphagia, dysphonia, hoarseness or odynophagia  Neck: No pain, stiffness or neck swelling.  No masses or deformities  Respiratory: No cough, wheezing, chills or hemoptysis  - COPD  - ILD   - PE   - ASTHMA     - PNEUMONIA  Cardiovascular: No chest pain, dysrhythmia, palpitations, dizziness or edema   - COPD     - CAD   - CHF   - HTN  Gastrointestinal: No abdominal or epigastric pain. No nausea, vomiting or hematemesis; No diarrhea or constipation. No melena or hematochezia. No dysphagia or Icterus.          Genitourinary: No dysuria, frequency, hematuria or incontinence   - CKD/GAETANO      - ESRD  Neurological: No headaches, memory loss, loss of strength, numbness or tremors      -DEMENTIA     - STROKE    - SEIZURE  Skin: No itching, burning, rashes or lesions   Lymph Nodes: No enlarged glands  Endocrine: No heat or cold intolerance; No hair loss       - DM     - THYROID DISORDER  Musculoskeletal: No joint pain or swelling; No muscle, back or extremity pain  Psychiatric: No depression, anxiety, mood swings or difficulty sleeping  Heme/Lymph: No easy bruising or bleeding gums         - ANEMIA      - CANCER   -COAGULOPATHY  Allergy and Immunologic: No hives or eczema    PAST MEDICAL & SURGICAL HISTORY:  Mitral regurgitation  Aortic regurgitation  No significant past surgical history    FAMILY HISTORY:    SOCIAL HISTORY:      - Tobacco     - ETOH    Allergies    No Known Allergies    Intolerances      Vital Signs Last 24 Hrs  T(C): 37.6 (26 Jan 2018 14:00), Max: 37.6 (26 Jan 2018 14:00)  T(F): 99.6 (26 Jan 2018 14:00), Max: 99.6 (26 Jan 2018 14:00)  HR: 94 (26 Jan 2018 15:00) (78 - 106)  BP: --  BP(mean): --  RR: 18 (26 Jan 2018 15:00) (14 - 25)  SpO2: 97% (26 Jan 2018 15:00) (95% - 100%)    01-25 @ 07:01  -  01-26 @ 07:00  --------------------------------------------------------  IN: 899 mL / OUT: 4100 mL / NET: -3201 mL    01-26 @ 07:01 - 01-26 @ 15:24  --------------------------------------------------------  IN: 565 mL / OUT: 1365 mL / NET: -800 mL      Mode: CPAP with PS  FiO2: 40  PEEP: 5  PS: 5  MAP: 6.3  PIP: 11    PHYSICAL EXAM:  Comfortable, on CMV sedated  Eyes: PERRL, EOM intact; conjunctiva and sclera clear  Head: Normocephalic;  No Trauma  ENMT: No nasal discharge, hoarseness, cough or hemoptysis.    Neck: Supple; non tender; no masses or deformities.    No JVD  Respiratory: - WHEEZING   + RLL RHONCHI  + RLL  RALES  + CRACKLES.  Diminished breath sounds  BILATERAL  RIGHT > LEFT baswe  Cardiovascular: Regular rate and rhythm. S1 and S2 Normal; + murmurs, gallops or rubs     - PPM/AICD  Gastrointestinal: distended; no  bowel sounds; No hepatosplenomegaly.     -PEG    +  NGT   +ANGELES, abdominal drains in place  Genitourinary: No costovertebral angle tenderness. No dysuria  Extremities: sedated decreased AROM, No clubbing, less cyanosis or edema, better toes perfusion today   Vascular: Peripheral pulses palpable 2+ bilaterally  Neurological: intubated sedated    DEVICES:  - DENTURES   +IV R / L     7.5 ETUBE   -TRACH   -CTUBE  R / L      LABS:  ABG - ( 26 Jan 2018 06:00 )  pH: 7.45  /  pCO2: 40    /  pO2: 109   / HCO3: 27    / Base Excess: 3.0   /  SaO2: 98                                      10.0   13.8  )-----------( 56       ( 26 Jan 2018 05:58 )             30.9     01-26    149<H>  |  114<H>  |  62<H>  ----------------------------<  146<H>  3.8   |  26  |  1.35<H>    Ca    7.6<L>      26 Jan 2018 05:58  Phos  2.4     01-26  Mg     2.6     01-26      PTT - ( 26 Jan 2018 07:13 )  PTT:72.5 sec  < from: Xray Chest 1 View AP -PORTABLE-Routine (01.26.18 @ 05:38) >    EXAM:  XR CHEST PORTABLE ROUTINE 1V                          PROCEDURE DATE:  01/26/2018                     INTERPRETATION:  Portable Chest X-Ray dated 1/26/2018 5:38 AM    Indication: AM SICU Intubated    An AP portable view of the chest is compared to 1 3518. Tip of   endotracheal tube is 6.2 cm above the caprice and it should be pushed in x   2 cm. Right perihilar infiltrates. Left lower lobe   consolidation/atelectasis. Small bilateral pleural effusions. Right basal   chest tube. NG tube with its tip in the stomach. Mild pulmonary venous   congestion. Prominent left hilum. Left central venous catheter with the   tip in the left brachycephalic vein.    IMPRESSION:  No significant interval change    < end of copied text >  RADIOLOGY & ADDITIONAL STUDIES (The following images were personally reviewed):

## 2018-01-26 NOTE — PROGRESS NOTE ADULT - ASSESSMENT
72yM with PMH severe MR/AR presented after recent dx acute cholecystitis and RLL PNA at [facility] with lower abdominal pain, new onset rapid Afib, sepsis, acute CHF exacerbation, initially admitted to cardiology on bipap, then transferred to SICU with perforated duodenum s/p Shreyas patch and jejunostomy (1/20) c/b breakdown s/p RTOR, washout, new Shreyas patch, gastrostomy, duodenostomy, IVCF (1/22)    Neuro: fentanyl gtts,   CV: Afib: Echo EF 50%, ?vegetation and Severe MR.   Pulm: Intubated on cmv 40/500/12/5 chlorhex  GI: NPO with NGT to LIWS. Jtube TF jevity @ 20 cc. retrograde D-tube to LIWS, G-tube to LIWS Protonix bid.   : Oliveira, GAETANO  ID: Perforated duodenum/PNA, surgical swab grew Candida: micafungin (1/22--), meropenem (1/22--) //// discontinued Vanco (1/19-1/20) Zosyn (1/19-1/22), f/u Bld cx's, linezolid (1/22-23)  Endo: ISS  HEME:DVT: Heparin gtt PTT goal 60-80. HIT +, serotonin release assay NEGATIVE. IVCF (1/22) //1FFP, 1PLT (1/22).   PPx: SCD, R pop DVT, s/p IVC filter 1/22  Lines: PIV, L brachial [place] (1/20-), RIJ TLC, LSC HD cath (1/22-)  Wounds: Ex lap, JPx2 to bulb suction, J-tube to gravity, D-tube to LIWS, G-tube to LIWS  PT/OT: Not ordered

## 2018-01-26 NOTE — PROGRESS NOTE ADULT - ASSESSMENT
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    1. O2 Continue CMV mode today do not attempt weaning at this time, waking trials  2. Bronchodilators:  Atrovent/ albuterol q 4 – 6 hours as needed  3. Corticosteroids: off  4. ID/Antibiotics: must continue ABX for RLL pneumonia, sepsis \, ID Mark Wolfe feedback pt discussed c him  5. Cardiac/HTN: Monitor, optimize HR, cardiology close follow up, AMRIK planned for today, digoxin started  6. GI: Rx/ prophylaxis c PPI/H2B, Pt discussed c GI  7. Heme: Rx/VT prophylaxis c SQH/SCD/AC IV heparin   8. Aspiration precautions, NGT   9. Surgery mngmnt priority,  10.Renal feedback    Discussed with managing team, SICU, surgery, ID

## 2018-01-26 NOTE — PROGRESS NOTE ADULT - SUBJECTIVE AND OBJECTIVE BOX
24 hr events: 1/26: lasix 40, metolazone 5, switched to bid protonix, AMRIK  ON: HGB 9.9 ( Stable). ptt 132- dec hep gtt to 9 and repeat @2am: 117 decreased hep gtt to 7 repeat @ 8am   1/25: off sedation somnolent, on CPAP tachy into 130s, Transitioned Argatroban -> Heparin gtt. TF to 20cc. Dig loaded, AMRIK ordered. L flank hematoma found on changing sheets, repeat cbc/ptt ordered, stable. PTT >200 on 1400u/hour heparin, drip held 1 hour and decreased to 1100 units/hour       SUBJECTIVE: Patient arousable, can follow simple commands for exam. Off sedation. Tried on CPAP -- able to inspire adequate Tidal volumes, however exhaustion with effort.     MEDICATIONS  (STANDING):  ALBUTerol/ipratropium for Nebulization 3 milliLiter(s) Nebulizer every 6 hours  chlorhexidine 0.12% Liquid 15 milliLiter(s) Swish and Spit two times a day  dextrose 5%. 1000 milliLiter(s) (50 mL/Hr) IV Continuous <Continuous>  digoxin  Injectable 0.125 milliGRAM(s) IV Push daily  fentaNYL   Infusion 0.112 MICROgram(s)/kG/Hr (1 mL/Hr) IV Continuous <Continuous>  heparin  Infusion 900 Unit(s)/Hr (7 mL/Hr) IV Continuous <Continuous>  insulin lispro (HumaLOG) corrective regimen sliding scale   SubCutaneous Before meals and at bedtime  meropenem  IVPB 1000 milliGRAM(s) IV Intermittent every 12 hours  micafungin IVPB 100 milliGRAM(s) IV Intermittent every 24 hours  micafungin IVPB      pantoprazole  Injectable 40 milliGRAM(s) IV Push every 12 hours    MEDICATIONS  (PRN):      Oliveira:	  [ ] None	[x] Daily Oliveira Order Placed	   Indication:	  [x] Strict I and O's    [ ] Obstruction     [ ] Incontinence + Stage 3 or 4 Decubitus  Central Line:  [ ] None	   [x]  Medication / TPN Administration     [ ] No Peripheral IV     ICU Vital Signs Last 24 Hrs  T(C): 37.6 (26 Jan 2018 14:00), Max: 37.6 (26 Jan 2018 14:00)  T(F): 99.6 (26 Jan 2018 14:00), Max: 99.6 (26 Jan 2018 14:00)  HR: 106 (26 Jan 2018 14:00) (78 - 106)  BP: --  BP(mean): --  ABP: 120/58 (26 Jan 2018 14:00) (108/50 - 140/64)  ABP(mean): 78 (26 Jan 2018 14:00) (64 - 84)  RR: 20 (26 Jan 2018 14:00) (14 - 25)  SpO2: 97% (26 Jan 2018 14:00) (95% - 100%)      Physical Exam:  General: NAD  HEENT: NC/AT, EOMI, PERRLA, normal hearing, no oral lesions, neck supple w/o LAD  Pulmonary: On CPAP in AM. AC-VC for afternoon  Cardiovascular: Afib, normal rate. Holosystolic murmur  Abdominal: soft, NT/ND, +BS, no organomegaly. staple line with serosanguinous drainage. All drains with adequate drainage. J-tube clamped, no feeds.  Extremities: WWP, 5/5 strength x 4, Edematous.   Neuro: A/O x3, CNs II-XII grossly intact, normal motor/sensation, no focal deficits  Pulses: palpable distal pulses    Vent settings:  Mode: CPAP with PS, FiO2: 40, PEEP: 5, PS: 5, MAP: 6.3, PIP: 11    I&O's Summary    25 Jan 2018 07:01  -  26 Jan 2018 07:00  --------------------------------------------------------  IN: 899 mL / OUT: 4100 mL / NET: -3201 mL    26 Jan 2018 07:01  -  26 Jan 2018 14:35  --------------------------------------------------------  IN: 455 mL / OUT: 1140 mL / NET: -685 mL        LABS:                        10.0   13.8  )-----------( 56       ( 26 Jan 2018 05:58 )             30.9     01-26    149<H>  |  114<H>  |  62<H>  ----------------------------<  146<H>  3.8   |  26  |  1.35<H>    Ca    7.6<L>      26 Jan 2018 05:58  Phos  2.4     01-26  Mg     2.6     01-26      PTT - ( 26 Jan 2018 07:13 )  PTT:72.5 sec    CAPILLARY BLOOD GLUCOSE      POCT Blood Glucose.: 123 mg/dL (26 Jan 2018 10:45)  POCT Blood Glucose.: 128 mg/dL (26 Jan 2018 06:32)  POCT Blood Glucose.: 154 mg/dL (25 Jan 2018 21:47)  POCT Blood Glucose.: 138 mg/dL (25 Jan 2018 16:02)        Cultures:Culture Results:   Candida albicans isolated (01-24 @ 16:33)      RADIOLOGY & ADDITIONAL STUDIES:

## 2018-01-26 NOTE — PROGRESS NOTE ADULT - SUBJECTIVE AND OBJECTIVE BOX
Subjective and objective   The patient still intubated, good urinary output after stimulation of the diuresis     Renal service for oliguric Frieda - due to ATN, now with good response to the diuretic therapy  and significant improvement of the renal function                   Patient seen and examined at bedside.     ALBUTerol/ipratropium for Nebulization 3 milliLiter(s) every 6 hours  chlorhexidine 0.12% Liquid 15 milliLiter(s) two times a day  dextrose 5%. 1000 milliLiter(s) <Continuous>  digoxin  Injectable 0.125 milliGRAM(s) daily  fentaNYL   Infusion 0.112 MICROgram(s)/kG/Hr <Continuous>  heparin  Infusion 900 Unit(s)/Hr <Continuous>  insulin lispro (HumaLOG) corrective regimen sliding scale   Before meals and at bedtime  meropenem  IVPB 1000 milliGRAM(s) every 12 hours  micafungin IVPB 100 milliGRAM(s) every 24 hours  micafungin IVPB     pantoprazole  Injectable 40 milliGRAM(s) every 12 hours      Allergies    No Known Allergies    Intolerances        T(C): , Max: 37.4 (01-26-18 @ 06:07)  T(F): , Max: 99.3 (01-26-18 @ 06:07)  HR: 98 (01-26-18 @ 13:00)  BP: --  BP(mean): --  RR: 17 (01-26-18 @ 13:00)  SpO2: 98% (01-26-18 @ 13:00)  Wt(kg): --    01-25 @ 07:01 - 01-26 @ 07:00  --------------------------------------------------------  IN:    fentaNYL  Infusion: 72 mL    heparin  Infusion.: 42 mL    heparin Infusion: 91 mL    heparin Infusion: 84 mL    IV PiggyBack: 50 mL    pantoprazole Infusion: 240 mL    Suplena: 320 mL  Total IN: 899 mL    OUT:    Bulb: 185 mL    Bulb: 200 mL    Drain: 450 mL    Indwelling Catheter - Urethral: 3065 mL    Nasoenteral Tube: 200 mL  Total OUT: 4100 mL    Total NET: -3201 mL      01-26 @ 07:01 - 01-26 @ 13:31  --------------------------------------------------------  IN:    fentaNYL  Infusion: 18 mL    heparin Infusion: 42 mL    IV PiggyBack: 50 mL    pantoprazole Infusion: 20 mL    Solution: 252 mL  Total IN: 382 mL    OUT:    Indwelling Catheter - Urethral: 940 mL  Total OUT: 940 mL    Total NET: -558 mL        Physical exam:   Intubated and sedated   On Ventilator   Normal air entry into the lungs, no wheezing, decreased breath sounds on the right base   tachycardic - normal s1/s2, no murmurs, rubs or gallops   Abdomen - soft, slightly distended, s/p laparotomy   Extremities: 3+ peripheral edema   HAs a catheter on the left of his chest           LABS:                        10.0   13.8  )-----------( 56       ( 26 Jan 2018 05:58 )             30.9     01-26    149<H>  |  114<H>  |  62<H>  ----------------------------<  146<H>  3.8   |  26  |  1.35<H>    Ca    7.6<L>      26 Jan 2018 05:58  Phos  2.4     01-26  Mg     2.6     01-26        PTT - ( 26 Jan 2018 07:13 )  PTT:72.5 sec          RADIOLOGY & ADDITIONAL STUDIES: Subjective and objective   The patient still intubated, good urinary output after stimulation of the diuresis     Renal service for oliguric Frieda - due to ATN, now with good response to the diuretic therapy  and significant improvement of the renal function                   Patient seen and examined at bedside.     ALBUTerol/ipratropium for Nebulization 3 milliLiter(s) every 6 hours  chlorhexidine 0.12% Liquid 15 milliLiter(s) two times a day  dextrose 5%. 1000 milliLiter(s) <Continuous>  digoxin  Injectable 0.125 milliGRAM(s) daily  fentaNYL   Infusion 0.112 MICROgram(s)/kG/Hr <Continuous>  heparin  Infusion 900 Unit(s)/Hr <Continuous>  insulin lispro (HumaLOG) corrective regimen sliding scale   Before meals and at bedtime  meropenem  IVPB 1000 milliGRAM(s) every 12 hours  micafungin IVPB 100 milliGRAM(s) every 24 hours  micafungin IVPB     pantoprazole  Injectable 40 milliGRAM(s) every 12 hours      Allergies    No Known Allergies    Intolerances        T(C): , Max: 37.4 (01-26-18 @ 06:07)  T(F): , Max: 99.3 (01-26-18 @ 06:07)  HR: 98 (01-26-18 @ 13:00)  BP: --  BP(mean): --  RR: 17 (01-26-18 @ 13:00)  SpO2: 98% (01-26-18 @ 13:00)  Wt(kg): --    01-25 @ 07:01 - 01-26 @ 07:00  --------------------------------------------------------  IN:    fentaNYL  Infusion: 72 mL    heparin  Infusion.: 42 mL    heparin Infusion: 91 mL    heparin Infusion: 84 mL    IV PiggyBack: 50 mL    pantoprazole Infusion: 240 mL    Suplena: 320 mL  Total IN: 899 mL    OUT:    Bulb: 185 mL    Bulb: 200 mL    Drain: 450 mL    Indwelling Catheter - Urethral: 3065 mL    Nasoenteral Tube: 200 mL  Total OUT: 4100 mL    Total NET: -3201 mL      01-26 @ 07:01 - 01-26 @ 13:31  --------------------------------------------------------  IN:    fentaNYL  Infusion: 18 mL    heparin Infusion: 42 mL    IV PiggyBack: 50 mL    pantoprazole Infusion: 20 mL    Solution: 252 mL  Total IN: 382 mL    OUT:    Indwelling Catheter - Urethral: 940 mL  Total OUT: 940 mL    Total NET: -558 mL        Physical exam:   Intubated and sedated   On Ventilator   Normal air entry into the lungs, no wheezing, decreased breath sounds on the right base   tachycardic - normal s1/s2, no murmurs, rubs or gallops   Abdomen - soft, slightly distended, s/p laparotomy   Extremities: 3+ peripheral edema   HAs a catheter on the left of his chest           LABS:                        10.0   13.8  )-----------( 56       ( 26 Jan 2018 05:58 )             30.9     01-26    149<H>  |  114<H>  |  62<H>  ----------------------------<  146<H>  3.8   |  26  |  1.35<H>    Ca    7.6<L>      26 Jan 2018 05:58  Phos  2.4     01-26  Mg     2.6     01-26        PTT - ( 26 Jan 2018 07:13 )  PTT:72.5 sec

## 2018-01-26 NOTE — PROGRESS NOTE ADULT - PROBLEM SELECTOR PLAN 1
- most likely related to the ATN due to shock (other precipitating factors are IV contrast, Vanco), on pressors   -  urine output - 3 liters after stimulation with diuretics   - renal function significantly better   - continue with diuretics 40 mg ivp   - electrolytes acceptable   - volume status - hypervolemic - continue with diuretics   - in and outs   - daily weight   - renal diet.

## 2018-01-27 LAB
ALBUMIN SERPL ELPH-MCNC: 2 G/DL — LOW (ref 3.3–5)
ALP SERPL-CCNC: 81 U/L — SIGNIFICANT CHANGE UP (ref 40–120)
ALT FLD-CCNC: 82 U/L — HIGH (ref 10–45)
ANION GAP SERPL CALC-SCNC: 7 MMOL/L — SIGNIFICANT CHANGE UP (ref 5–17)
ANION GAP SERPL CALC-SCNC: 9 MMOL/L — SIGNIFICANT CHANGE UP (ref 5–17)
APPEARANCE UR: CLEAR — SIGNIFICANT CHANGE UP
APTT BLD: 53.9 SEC — HIGH (ref 27.5–37.4)
APTT BLD: 58.7 SEC — HIGH (ref 27.5–37.4)
AST SERPL-CCNC: 67 U/L — HIGH (ref 10–40)
BILIRUB DIRECT SERPL-MCNC: 7.1 MG/DL — HIGH (ref 0–0.2)
BILIRUB INDIRECT FLD-MCNC: 1.9 MG/DL — HIGH (ref 0.2–1)
BILIRUB SERPL-MCNC: 9 MG/DL — HIGH (ref 0.2–1.2)
BILIRUB UR-MCNC: (no result)
BUN SERPL-MCNC: 52 MG/DL — HIGH (ref 7–23)
BUN SERPL-MCNC: 52 MG/DL — HIGH (ref 7–23)
CALCIUM SERPL-MCNC: 7.5 MG/DL — LOW (ref 8.4–10.5)
CALCIUM SERPL-MCNC: 7.8 MG/DL — LOW (ref 8.4–10.5)
CHLORIDE SERPL-SCNC: 116 MMOL/L — HIGH (ref 96–108)
CHLORIDE SERPL-SCNC: 117 MMOL/L — HIGH (ref 96–108)
CO2 SERPL-SCNC: 28 MMOL/L — SIGNIFICANT CHANGE UP (ref 22–31)
CO2 SERPL-SCNC: 31 MMOL/L — SIGNIFICANT CHANGE UP (ref 22–31)
COLOR SPEC: YELLOW — SIGNIFICANT CHANGE UP
CREAT SERPL-MCNC: 1.35 MG/DL — HIGH (ref 0.5–1.3)
CREAT SERPL-MCNC: 1.39 MG/DL — HIGH (ref 0.5–1.3)
DIFF PNL FLD: (no result)
DIGOXIN SERPL-MCNC: 1 NG/ML — SIGNIFICANT CHANGE UP (ref 0.8–2)
EXTRA BLUE TOP TUBE: SIGNIFICANT CHANGE UP
GLUCOSE BLDC GLUCOMTR-MCNC: 146 MG/DL — HIGH (ref 70–99)
GLUCOSE BLDC GLUCOMTR-MCNC: 148 MG/DL — HIGH (ref 70–99)
GLUCOSE BLDC GLUCOMTR-MCNC: 150 MG/DL — HIGH (ref 70–99)
GLUCOSE BLDC GLUCOMTR-MCNC: 168 MG/DL — HIGH (ref 70–99)
GLUCOSE SERPL-MCNC: 174 MG/DL — HIGH (ref 70–99)
GLUCOSE SERPL-MCNC: 179 MG/DL — HIGH (ref 70–99)
GLUCOSE UR QL: NEGATIVE — SIGNIFICANT CHANGE UP
HCT VFR BLD CALC: 32.5 % — LOW (ref 39–50)
HGB BLD-MCNC: 10.3 G/DL — LOW (ref 13–17)
INR BLD: 1.4 — HIGH (ref 0.88–1.16)
KETONES UR-MCNC: NEGATIVE — SIGNIFICANT CHANGE UP
LEUKOCYTE ESTERASE UR-ACNC: NEGATIVE — SIGNIFICANT CHANGE UP
MAGNESIUM SERPL-MCNC: 2.7 MG/DL — HIGH (ref 1.6–2.6)
MCHC RBC-ENTMCNC: 29.9 PG — SIGNIFICANT CHANGE UP (ref 27–34)
MCHC RBC-ENTMCNC: 31.7 G/DL — LOW (ref 32–36)
MCV RBC AUTO: 94.5 FL — SIGNIFICANT CHANGE UP (ref 80–100)
NITRITE UR-MCNC: NEGATIVE — SIGNIFICANT CHANGE UP
PH UR: 5 — SIGNIFICANT CHANGE UP (ref 5–8)
PHOSPHATE SERPL-MCNC: 2.6 MG/DL — SIGNIFICANT CHANGE UP (ref 2.5–4.5)
PLATELET # BLD AUTO: 68 K/UL — LOW (ref 150–400)
POTASSIUM SERPL-MCNC: 3.6 MMOL/L — SIGNIFICANT CHANGE UP (ref 3.5–5.3)
POTASSIUM SERPL-MCNC: 3.7 MMOL/L — SIGNIFICANT CHANGE UP (ref 3.5–5.3)
POTASSIUM SERPL-SCNC: 3.6 MMOL/L — SIGNIFICANT CHANGE UP (ref 3.5–5.3)
POTASSIUM SERPL-SCNC: 3.7 MMOL/L — SIGNIFICANT CHANGE UP (ref 3.5–5.3)
PROT SERPL-MCNC: 4.9 G/DL — LOW (ref 6–8.3)
PROT UR-MCNC: (no result) MG/DL
PROTHROM AB SERPL-ACNC: 15.6 SEC — HIGH (ref 9.8–12.7)
RBC # BLD: 3.44 M/UL — LOW (ref 4.2–5.8)
RBC # FLD: 18.3 % — HIGH (ref 10.3–16.9)
SODIUM SERPL-SCNC: 153 MMOL/L — HIGH (ref 135–145)
SODIUM SERPL-SCNC: 155 MMOL/L — HIGH (ref 135–145)
SP GR SPEC: 1.02 — SIGNIFICANT CHANGE UP (ref 1–1.03)
UROBILINOGEN FLD QL: 0.2 E.U./DL — SIGNIFICANT CHANGE UP
WBC # BLD: 16 K/UL — HIGH (ref 3.8–10.5)
WBC # FLD AUTO: 16 K/UL — HIGH (ref 3.8–10.5)

## 2018-01-27 PROCEDURE — 71045 X-RAY EXAM CHEST 1 VIEW: CPT | Mod: 26

## 2018-01-27 PROCEDURE — 99291 CRITICAL CARE FIRST HOUR: CPT

## 2018-01-27 PROCEDURE — 99233 SBSQ HOSP IP/OBS HIGH 50: CPT

## 2018-01-27 PROCEDURE — 99232 SBSQ HOSP IP/OBS MODERATE 35: CPT

## 2018-01-27 RX ORDER — HYDROMORPHONE HYDROCHLORIDE 2 MG/ML
0.5 INJECTION INTRAMUSCULAR; INTRAVENOUS; SUBCUTANEOUS
Qty: 0 | Refills: 0 | Status: DISCONTINUED | OUTPATIENT
Start: 2018-01-27 | End: 2018-01-31

## 2018-01-27 RX ORDER — POTASSIUM CHLORIDE 20 MEQ
20 PACKET (EA) ORAL ONCE
Qty: 0 | Refills: 0 | Status: COMPLETED | OUTPATIENT
Start: 2018-01-27 | End: 2018-01-27

## 2018-01-27 RX ORDER — ACETAMINOPHEN 500 MG
650 TABLET ORAL EVERY 6 HOURS
Qty: 0 | Refills: 0 | Status: DISCONTINUED | OUTPATIENT
Start: 2018-01-27 | End: 2018-02-27

## 2018-01-27 RX ORDER — SODIUM CHLORIDE 9 MG/ML
1000 INJECTION INTRAMUSCULAR; INTRAVENOUS; SUBCUTANEOUS
Qty: 0 | Refills: 0 | Status: DISCONTINUED | OUTPATIENT
Start: 2018-01-27 | End: 2018-01-28

## 2018-01-27 RX ORDER — PIPERACILLIN AND TAZOBACTAM 4; .5 G/20ML; G/20ML
3.38 INJECTION, POWDER, LYOPHILIZED, FOR SOLUTION INTRAVENOUS EVERY 6 HOURS
Qty: 0 | Refills: 0 | Status: DISCONTINUED | OUTPATIENT
Start: 2018-01-28 | End: 2018-01-29

## 2018-01-27 RX ORDER — ALBUMIN HUMAN 25 %
50 VIAL (ML) INTRAVENOUS EVERY 8 HOURS
Qty: 0 | Refills: 0 | Status: DISCONTINUED | OUTPATIENT
Start: 2018-01-27 | End: 2018-02-02

## 2018-01-27 RX ORDER — INSULIN LISPRO 100/ML
VIAL (ML) SUBCUTANEOUS EVERY 6 HOURS
Qty: 0 | Refills: 0 | Status: DISCONTINUED | OUTPATIENT
Start: 2018-01-27 | End: 2018-02-27

## 2018-01-27 RX ORDER — PIPERACILLIN AND TAZOBACTAM 4; .5 G/20ML; G/20ML
3.38 INJECTION, POWDER, LYOPHILIZED, FOR SOLUTION INTRAVENOUS EVERY 6 HOURS
Qty: 0 | Refills: 0 | Status: DISCONTINUED | OUTPATIENT
Start: 2018-01-27 | End: 2018-01-27

## 2018-01-27 RX ADMIN — Medication 0.12 MILLIGRAM(S): at 12:00

## 2018-01-27 RX ADMIN — CHLORHEXIDINE GLUCONATE 15 MILLILITER(S): 213 SOLUTION TOPICAL at 17:03

## 2018-01-27 RX ADMIN — HEPARIN SODIUM 8 UNIT(S)/HR: 5000 INJECTION INTRAVENOUS; SUBCUTANEOUS at 22:23

## 2018-01-27 RX ADMIN — PANTOPRAZOLE SODIUM 40 MILLIGRAM(S): 20 TABLET, DELAYED RELEASE ORAL at 17:02

## 2018-01-27 RX ADMIN — PIPERACILLIN AND TAZOBACTAM 200 GRAM(S): 4; .5 INJECTION, POWDER, LYOPHILIZED, FOR SOLUTION INTRAVENOUS at 10:01

## 2018-01-27 RX ADMIN — SODIUM CHLORIDE 50 MILLILITER(S): 9 INJECTION INTRAMUSCULAR; INTRAVENOUS; SUBCUTANEOUS at 22:54

## 2018-01-27 RX ADMIN — Medication 650 MILLIGRAM(S): at 12:24

## 2018-01-27 RX ADMIN — CHLORHEXIDINE GLUCONATE 15 MILLILITER(S): 213 SOLUTION TOPICAL at 05:46

## 2018-01-27 RX ADMIN — Medication 3 MILLILITER(S): at 05:58

## 2018-01-27 RX ADMIN — Medication 50 MILLILITER(S): at 14:35

## 2018-01-27 RX ADMIN — PIPERACILLIN AND TAZOBACTAM 200 GRAM(S): 4; .5 INJECTION, POWDER, LYOPHILIZED, FOR SOLUTION INTRAVENOUS at 05:46

## 2018-01-27 RX ADMIN — Medication 50 MILLILITER(S): at 22:14

## 2018-01-27 RX ADMIN — Medication 3 MILLILITER(S): at 10:54

## 2018-01-27 RX ADMIN — Medication 50 MILLILITER(S): at 10:00

## 2018-01-27 RX ADMIN — PANTOPRAZOLE SODIUM 40 MILLIGRAM(S): 20 TABLET, DELAYED RELEASE ORAL at 05:46

## 2018-01-27 RX ADMIN — Medication 3 MILLILITER(S): at 16:43

## 2018-01-27 RX ADMIN — Medication 20 MILLIEQUIVALENT(S): at 09:18

## 2018-01-27 RX ADMIN — PIPERACILLIN AND TAZOBACTAM 200 GRAM(S): 4; .5 INJECTION, POWDER, LYOPHILIZED, FOR SOLUTION INTRAVENOUS at 16:12

## 2018-01-27 RX ADMIN — HYDROMORPHONE HYDROCHLORIDE 0.5 MILLIGRAM(S): 2 INJECTION INTRAMUSCULAR; INTRAVENOUS; SUBCUTANEOUS at 23:30

## 2018-01-27 RX ADMIN — Medication 3 MILLILITER(S): at 22:23

## 2018-01-27 RX ADMIN — Medication 650 MILLIGRAM(S): at 18:30

## 2018-01-27 RX ADMIN — HYDROMORPHONE HYDROCHLORIDE 0.5 MILLIGRAM(S): 2 INJECTION INTRAMUSCULAR; INTRAVENOUS; SUBCUTANEOUS at 22:54

## 2018-01-27 RX ADMIN — Medication 2: at 12:00

## 2018-01-27 RX ADMIN — PIPERACILLIN AND TAZOBACTAM 200 GRAM(S): 4; .5 INJECTION, POWDER, LYOPHILIZED, FOR SOLUTION INTRAVENOUS at 22:14

## 2018-01-27 RX ADMIN — MICAFUNGIN SODIUM 105 MILLIGRAM(S): 100 INJECTION, POWDER, LYOPHILIZED, FOR SOLUTION INTRAVENOUS at 14:45

## 2018-01-27 NOTE — PROGRESS NOTE ADULT - ATTENDING COMMENTS
Patient examined, fellow's hx and PE reviewed and confirmed. I find GAETANO improved, acidosis. A/P reviewed and confirmed. Follow SCr. Follow bicarb. See full note.

## 2018-01-27 NOTE — PROGRESS NOTE ADULT - SUBJECTIVE AND OBJECTIVE BOX
Covering for Dr. Khan.    INTERVAL HISTORY: Pt with fevers noted overnight. Antibiotics broadened by SICU team.   	  MEDICATIONS:  digoxin  Injectable 0.125 milliGRAM(s) IV Push daily  heparin gtt  metolazone 5 milliGRAM(s) Oral two times a day    micafungin IVPB 100 milliGRAM(s) IV Intermittent every 24 hours  micafungin IVPB      piperacillin/tazobactam IVPB. 3.375 Gram(s) IV Intermittent every 6 hours    ALBUTerol/ipratropium for Nebulization 3 milliLiter(s) Nebulizer every 6 hours    HYDROmorphone  Injectable 0.5 milliGRAM(s) IV Push every 3 hours PRN    pantoprazole  Injectable 40 milliGRAM(s) IV Push every 12 hours    insulin lispro (HumaLOG) corrective regimen sliding scale   SubCutaneous every 6 hours    albumin human 25% IVPB 50 milliLiter(s) IV Intermittent every 8 hours  chlorhexidine 0.12% Liquid 15 milliLiter(s) Swish and Spit two times a day  dextrose 5%. 1000 milliLiter(s) IV Continuous <Continuous>      PHYSICAL EXAM:  T(C): 38.1 (01-27-18 @ 09:55), Max: 38.1 (01-27-18 @ 05:28)  HR: 88 (01-27-18 @ 12:00) (81 - 107)  BP: --  RR: 24 (01-27-18 @ 12:00) (17 - 24)  SpO2: 97% (01-27-18 @ 12:00) (95% - 99%)  Wt(kg): --  I&O's Summary    26 Jan 2018 07:01  -  27 Jan 2018 07:00  --------------------------------------------------------  IN: 1319 mL / OUT: 3560 mL / NET: -2241 mL    27 Jan 2018 07:01  -  27 Jan 2018 12:10  --------------------------------------------------------  IN: 393 mL / OUT: 490 mL / NET: -97 mL      Appearance: NAD  Cardiovascular: Tachy, Irregular S1 S2, No JVD, No murmurs  Respiratory: Vented, diminished breath sounds, +rales  No wheezing   Gastrointestinal:  Soft, NT/ ND, No bowel sounds 	  Neurologic: intubated sedated   Extremities: No edema, pulses intact     TELEMETRY: AF, hr  bpm, average 80-90, occassional PVCs.	    LABS:	 	                 10.3   16.0  )-----------( 68       ( 27 Jan 2018 06:42 )             32.5     01-27    153<H>  |  116<H>  |  52<H>  ----------------------------<  179<H>  3.7   |  28  |  1.35<H>    Ca    7.8<L>      27 Jan 2018 06:42  Phos  2.6     01-27  Mg     2.7     01-27    TPro  4.9<L>  /  Alb  2.0<L>  /  TBili  9.0<H>  /  DBili  7.1<H>  /  AST  67<H>  /  ALT  82<H>  /  AlkPhos  81  01-27    ASSESSMENT/PLAN: 	  71yo Male with PMH with septic shocks and oligo/ aneuric, s/p treatment with Vanco - supra therapeutic levels, s/p laparotomy for perforation of the duodenum, revision done on 1/22. On 1/23 was on a short period of time on CVVHD and was stopped because of clotting issues, on 1/24 good response to diuretics. Remains intubated and sedated. Off pressors with good blood pressures with course c/b Afib with RVR, volume overload and most recently now with recurrent fevers.    #AFIB  - His episodes of AFIB are likely driven by patients current septic state and structural valve disease with TTE notable for torrential mitral regurgitation and a severe enlarged left atrium.  All these factors make it unlikely for him to sustain SR; however, EP will like to attempt DCCV post AMRIK once PAMELA clot is ruled out.  Would also consider antiarrhythmic post AMRIK. Patient will need to be maintained on uninterrupted anticoagulation for at least one month post DCCV. At this time, patient's heart rates are relatively controlled with use of digoxin. Level therapeutic at 1, would continue at current dose. Cont heparin gtt given elevated CHADSVASc Score. Patient will require NGT removal and to be NPO past midnight prior to AMRIK.  Depending on clinical status, can consider Monday vs. Tuesday.     #severe MR  - TTE demonstrating bileaflet mitral valve prolapse with e/o severe MR.  Cannot fully exclude vegetation. In light of fevers, f/u BCx but await AMRIK to r/o associated vegetation. Consider ID input. Would consider w/u for mitral clip after he recovers from his acute illness and infectious issues.   - Cont diuresis to maintain a net negative balance of 1 L/24 hours. He is tolerating current dose of Metolazone at this time.     - D/w patient's partner. Covering for Dr. Khan.    INTERVAL HISTORY: Pt with fevers noted overnight. Antibiotics broadened by SICU team.   	  MEDICATIONS:  digoxin  Injectable 0.125 milliGRAM(s) IV Push daily  heparin gtt  metolazone 5 milliGRAM(s) Oral two times a day    micafungin IVPB 100 milliGRAM(s) IV Intermittent every 24 hours  micafungin IVPB      piperacillin/tazobactam IVPB. 3.375 Gram(s) IV Intermittent every 6 hours    ALBUTerol/ipratropium for Nebulization 3 milliLiter(s) Nebulizer every 6 hours    HYDROmorphone  Injectable 0.5 milliGRAM(s) IV Push every 3 hours PRN    pantoprazole  Injectable 40 milliGRAM(s) IV Push every 12 hours    insulin lispro (HumaLOG) corrective regimen sliding scale   SubCutaneous every 6 hours    albumin human 25% IVPB 50 milliLiter(s) IV Intermittent every 8 hours  chlorhexidine 0.12% Liquid 15 milliLiter(s) Swish and Spit two times a day  dextrose 5%. 1000 milliLiter(s) IV Continuous <Continuous>      PHYSICAL EXAM:  T(C): 38.1 (01-27-18 @ 09:55), Max: 38.1 (01-27-18 @ 05:28)  HR: 88 (01-27-18 @ 12:00) (81 - 107)  BP: --  RR: 24 (01-27-18 @ 12:00) (17 - 24)  SpO2: 97% (01-27-18 @ 12:00) (95% - 99%)  Wt(kg): --  I&O's Summary    26 Jan 2018 07:01  -  27 Jan 2018 07:00  --------------------------------------------------------  IN: 1319 mL / OUT: 3560 mL / NET: -2241 mL    27 Jan 2018 07:01  -  27 Jan 2018 12:10  --------------------------------------------------------  IN: 393 mL / OUT: 490 mL / NET: -97 mL      Appearance: NAD  Cardiovascular: Tachy, Irregular S1 S2, No JVD, No murmurs  Respiratory: Vented, diminished breath sounds, +rales  No wheezing   Gastrointestinal:  Soft, NT/ ND, No bowel sounds 	  Neurologic: intubated sedated   Extremities: No edema, pulses intact     TELEMETRY: AF, hr  bpm, average 80-90, occassional PVCs.	    LABS:	 	                 10.3   16.0  )-----------( 68       ( 27 Jan 2018 06:42 )             32.5     01-27    153<H>  |  116<H>  |  52<H>  ----------------------------<  179<H>  3.7   |  28  |  1.35<H>    Ca    7.8<L>      27 Jan 2018 06:42  Phos  2.6     01-27  Mg     2.7     01-27    TPro  4.9<L>  /  Alb  2.0<L>  /  TBili  9.0<H>  /  DBili  7.1<H>  /  AST  67<H>  /  ALT  82<H>  /  AlkPhos  81  01-27    ASSESSMENT/PLAN: 	  73yo Male with PMH with septic shocks and oligo/ aneuric, s/p treatment with Vanco - supra therapeutic levels, s/p laparotomy for perforation of the duodenum, revision done on 1/22. On 1/23 was on a short period of time on CVVHD and was stopped because of clotting issues, on 1/24 good response to diuretics. Remains intubated and sedated. Off pressors with good blood pressures with course c/b Afib with RVR, volume overload and most recently now with recurrent fevers.    #AFIB  - His episodes of AFIB are likely driven by patients current septic state and structural valve disease with TTE notable for torrential mitral regurgitation and a severe enlarged left atrium.  All these factors make it unlikely for him to sustain SR; however, EP will like to attempt DCCV post AMRIK once PAMELA clot is ruled out.  Would also consider antiarrhythmic post AMRIK. Patient will need to be maintained on uninterrupted anticoagulation for at least one month post DCCV. At this time, patient's heart rates are relatively controlled with use of digoxin. Level therapeutic at 1, would continue at current dose. Cont heparin gtt given elevated CHADSVASc Score. Patient will require NGT removal and to be NPO past midnight prior to AMRIK.  Depending on clinical status, can consider Monday vs. Tuesday.     #severe MR  - TTE demonstrating bileaflet mitral valve prolapse with e/o severe MR.  Cannot fully exclude vegetation. In light of fevers, f/u BCx but await AMRIK to r/o associated vegetation. Consider ID input. Would consider w/u for mitral clip after he recovers from his acute illness and infectious issues.   - Cont diuresis to maintain a net negative balance of 1 L/24 hours. He is tolerating current dose of Metolazone at this time.     -D/w patient's partner at bedside.

## 2018-01-27 NOTE — PROGRESS NOTE ADULT - SUBJECTIVE AND OBJECTIVE BOX
[facility] for Dr Wylie    Pt was seen and fully examined by myself.   All diagnostic data reviewed  Mx: reviewed; no dilaudid    Time spent with patient in minutes: 45    Patient is a 72y old  Male who presents with a chief complaint of acute cholecystitis (18 Jan 2018 14:49)Events of last 24hrs reviewed. The patient is deeply sedated and comfortable and in synchrony c CMV mode.   All circuits integrity verified and preserved.     HPI:  71 yo M with h/o severe mitral and aortic valve regurgitation presented to ED with 5 day h/o left lower abdominal pain, nausea and multiple episodes of vomiting as well as PO intolerance. Pt reports that the pain in his abdomen is illicited by movement. Went to [facility] two days ago and underwent CT scan which revealed acute cholecystitis as well as right lower lobe infectious pneumonia. Pt was told he would require surgery and left AMA because he was waiting too long. Denies f/c. Last echo 1/4/17 with EF 60-65%. Normal BMs and passing flatus. (18 Jan 2018 14:49)    REVIEW OF SYSTEMS:   intubated    PAST MEDICAL & SURGICAL HISTORY:  Mitral regurgitation  Aortic regurgitation  No significant past surgical history    FAMILY HISTORY:    SOCIAL HISTORY:      - Tobacco     - ETOH    Allergies    No Known Allergies    Intolerances    Vital Signs Last 24 Hrs  T(C): 37.6 (26 Jan 2018 14:00), Max: 37.6 (26 Jan 2018 14:00)  T(F): 99.6 (26 Jan 2018 14:00), Max: 99.6 (26 Jan 2018 14:00)  HR: 94 (26 Jan 2018 15:00) (78 - 106)  BP: --  BP(mean): --  RR: 18 (26 Jan 2018 15:00) (14 - 25)  SpO2: 97% (26 Jan 2018 15:00) (95% - 100%)    01-25 @ 07:01 - 01-26 @ 07:00  --------------------------------------------------------  IN: 899 mL / OUT: 4100 mL / NET: -3201 mL    01-26 @ 07:01 - 01-26 @ 15:24  --------------------------------------------------------  IN: 565 mL / OUT: 1365 mL / NET: -800 mL    Mode: CPAP with PS  FiO2: 40  PEEP: 5  PS: 5  MAP: 6.3  PIP: 11    PS started at 18, down titrated to 12    PHYSICAL EXAM:  Sedated, arousable  Eyes: PERRL, CR +; conjunctiva and sclera clear  Head: Normocephalic;  No Trauma  ENMT: No nasal discharge, hoarseness, cough or hemoptysis.   ALLY 24 cm  Neck: Supple; non tender; no masses or deformities.    No JVD  Respiratory: - WHEEZING   + RLL RHONCHI  + RLL  RALES  + CRACKLES.  Diminished breath sounds  BILATERAL  RIGHT > LEFT base  Cardiovascular: Regular rate and rhythm. S1 and S2 Normal; + murmurs, gallops or rubs     - PPM/AICD  Gastrointestinal: distended; no  bowel sounds; No hepatosplenomegaly.     -PEG    +  NGT   +ANGELES,  D tube 4 abdominal drains in place  Genitourinary: No costovertebral angle tenderness. No dysuria ANGELES  Extremities: sedated decreased AROM, No clubbing, less cyanosis or edema, better toes perfusion today   Vascular: Peripheral pulses palpable 2+ bilaterally  Neurological: intubated sedated    DEVICES:  - DENTURES   +IV R / L     7.5 ETUBE   -TRACH   -CTUBE  R / L; TLC    LABS:  ABG - ( 26 Jan 2018 06:00 )  pH: 7.45  /  pCO2: 40    /  pO2: 109   / HCO3: 27    / Base Excess: 3.0   /  SaO2: 98                            10.0   13.8  )-----------( 56       ( 26 Jan 2018 05:58 )             30.9     01-26    149<H>  |  114<H>  |  62<H>  ----------------------------<  146<H>  3.8   |  26  |  1.35<H>    Ca    7.6<L>      26 Jan 2018 05:58  Phos  2.4     01-26  Mg     2.6     01-26      PTT - ( 26 Jan 2018 07:13 )  PTT:72.5 sec    CXR today: no change   < from: Xray Chest 1 View AP -PORTABLE-Routine (01.26.18 @ 05:38) >    EXAM:  XR CHEST PORTABLE ROUTINE 1V                          PROCEDURE DATE:  01/26/2018       INTERPRETATION:  Portable Chest X-Ray dated 1/26/2018 5:38 AM    Indication: AM SICU Intubated    An AP portable view of the chest is compared to 1 3518. Tip of   endotracheal tube is 6.2 cm above the caprice and it should be pushed in x   2 cm. Right perihilar infiltrates. Left lower lobe   consolidation/atelectasis. Small bilateral pleural effusions. Right basal   chest tube. NG tube with its tip in the stomach. Mild pulmonary venous   congestion. Prominent left hilum. Left central venous catheter with the   tip in the left brachycephalic vein.    IMPRESSION:  No significant interval change    < end of copied text >  RADIOLOGY & ADDITIONAL STUDIES (The following images were personally reviewed):

## 2018-01-27 NOTE — PROGRESS NOTE ADULT - ASSESSMENT
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    1. O2: PS titration STARTED MYSELF  2. Bronchodilators:  Atrovent/ albuterol q 4 – 6 hours as needed  3. Corticosteroids: off  4. ID/Antibiotics: must continue ABX for RLL pneumonia, sepsis, ID ,Mark feedback  5. Cardiac/HTN: Monitor, optimize HR, cardiology close follow up  6. GI: Rx/ prophylaxis c PPI/H2B, Pt discussed c GI  7. Heme: Rx/VT prophylaxis c SQH/SCD/AC IV heparin   8. Aspiration precautions, NGT   9. Surgery mngmnt priority  10.Renal feedback    Discussed with managing team, CCRN, SICU, surgery, ID

## 2018-01-27 NOTE — PROGRESS NOTE ADULT - SUBJECTIVE AND OBJECTIVE BOX
INTERVAL HPI/OVERNIGHT EVENTS:   SURGERY ATTENDING FOR DR. TENORIO        SUBJECTIVE:  Flatus: [ ] YES [X ] NO             Bowel Movement: [ ] YES [X ] NO  Pain (0-10):      0      Pain Control Adequate: [X ] YES [ ] NO  Nausea: [ ] YES [X ] NO            Vomiting: [ ] YES [X ] NO  Diarrhea: [ ] YES [X ] NO         Constipation: [ ] YES [X ] NO     Chest Pain: [ ] YES [X ] NO    SOB:  [ ] YES [X ] NO    MEDICATIONS  (STANDING):  albumin human 25% IVPB 50 milliLiter(s) IV Intermittent every 8 hours  ALBUTerol/ipratropium for Nebulization 3 milliLiter(s) Nebulizer every 6 hours  chlorhexidine 0.12% Liquid 15 milliLiter(s) Swish and Spit two times a day  dextrose 5%. 1000 milliLiter(s) (50 mL/Hr) IV Continuous <Continuous>  digoxin  Injectable 0.125 milliGRAM(s) IV Push daily  heparin  Infusion 800 Unit(s)/Hr (8 mL/Hr) IV Continuous <Continuous>  insulin lispro (HumaLOG) corrective regimen sliding scale   SubCutaneous every 6 hours  metolazone 5 milliGRAM(s) Oral two times a day  micafungin IVPB 100 milliGRAM(s) IV Intermittent every 24 hours  micafungin IVPB      pantoprazole  Injectable 40 milliGRAM(s) IV Push every 12 hours  piperacillin/tazobactam IVPB. 3.375 Gram(s) IV Intermittent every 6 hours    MEDICATIONS  (PRN):  acetaminophen    Suspension 650 milliGRAM(s) Enteral Tube every 6 hours PRN For Temp greater than 38 C (100.4 F)  HYDROmorphone  Injectable 0.5 milliGRAM(s) IV Push every 3 hours PRN Severe Pain (7 - 10)      Vital Signs Last 24 Hrs  T(C): 38.1 (2018 09:55), Max: 38.1 (2018 05:28)  T(F): 100.6 (2018 09:55), Max: 100.6 (2018 05:28)  HR: 90 (2018 14:00) (81 - 107)  BP: --  BP(mean): --  RR: 22 (2018 14:00) (17 - 24)  SpO2: 96% (2018 14:00) (95% - 99%)    PHYSICAL EXAM:      Constitutional:    Eyes:    ENMT:    Neck:    Breasts:    Back:    Respiratory:    Cardiovascular:    Gastrointestinal:    Genitourinary:    Rectal:    Extremities:    Vascular:    Neurological:    Skin:    Lymph Nodes:    Musculoskeletal:    Psychiatric:        I&O's Detail    2018 07:01  -  2018 07:00  --------------------------------------------------------  IN:    Enteral Tube Flush: 100 mL    fentaNYL  Infusion: 21 mL    fentaNYL  Infusion: 39 mL    heparin Infusion: 98 mL    heparin Infusion: 56 mL    IV PiggyBack: 350 mL    Jevity: 320 mL    pantoprazole Infusion: 20 mL    Solution: 315 mL  Total IN: 1319 mL    OUT:    Bulb: 95 mL    Bulb: 65 mL    Drain: 125 mL    Drain: 300 mL    Indwelling Catheter - Urethral: 2975 mL  Total OUT: 3560 mL    Total NET: -2241 mL      2018 07:  -  2018 14:40  --------------------------------------------------------  IN:    Albumin 5%  - 250 mL: 100 mL    Enteral Tube Flush: 300 mL    fentaNYL  Infusion: 3 mL    heparin Infusion: 56 mL    IV PiggyBack: 100 mL    Jevity: 140 mL  Total IN: 699 mL    OUT:    Indwelling Catheter - Urethral: 640 mL  Total OUT: 640 mL    Total NET: 59 mL          LABS:                        10.3   16.0  )-----------( 68       ( 2018 06:42 )             32.5         153<H>  |  116<H>  |  52<H>  ----------------------------<  179<H>  3.7   |  28  |  1.35<H>    Ca    7.8<L>      2018 06:42  Phos  2.6       Mg     2.7         TPro  4.9<L>  /  Alb  2.0<L>  /  TBili  9.0<H>  /  DBili  7.1<H>  /  AST  67<H>  /  ALT  82<H>  /  AlkPhos  81      PT/INR - ( 2018 06:42 )   PT: 15.6 sec;   INR: 1.40          PTT - ( 2018 11:57 )  PTT:58.7 sec  Urinalysis Basic - ( 2018 09:59 )    Color: Yellow / Appearance: Clear / S.020 / pH: x  Gluc: x / Ketone: NEGATIVE  / Bili: Moderate / Urobili: 0.2 E.U./dL   Blood: x / Protein: Trace mg/dL / Nitrite: NEGATIVE   Leuk Esterase: NEGATIVE / RBC: 5-10 /HPF / WBC < 5 /HPF   Sq Epi: x / Non Sq Epi: 5-10 /HPF / Bacteria: Many /HPF        RADIOLOGY & ADDITIONAL STUDIES:

## 2018-01-27 NOTE — PROGRESS NOTE ADULT - PROBLEM SELECTOR PLAN 1
- most likely related to the ATN due to shock (other precipitating factors are IV contrast, Vanco), on pressors   -  urine output - 3.5 liters  - renal function better   - electrolytes acceptable - sodium on the higher side - most likely intravascular depleted   - consider albumin with the diuretic today - albumin 5% - 50 ml three times a day   - volume status - hypervolemic - most likely third spacing  - in and outs   - daily weight   - renal diet.  - removed the catheter of his left chest

## 2018-01-27 NOTE — PROGRESS NOTE ADULT - ASSESSMENT
This is 72 year old male with PMH stated above, now in shock - most likely septic and oligo anuric in these setting, also s/p CT angio, s/p treatment with Vanco - supra therapeutic levels,  s/p laparotomy for perforation of the duodenum., revision done on 1/22. On 1/23 was ofr a short period of time on CVVHD and was stopped because of clotting issues. Now with improvment of the renal function - 2.5 liters negative with 3.5 liters diuresis

## 2018-01-27 NOTE — PROGRESS NOTE ADULT - SUBJECTIVE AND OBJECTIVE BOX
Overnight Events: Patient seen and examined at bedside.  Hemodynamically stable, intubated on AC mode, Febrile to 100.6, blood cultures sent. Will continue Zosyn and Micafungin. plan to remove HD catheter today, will remove central line if continues to spike.  Continue on heparin gtt for Afib. Off sedation, fentanyl switched to dilaudid prn.  Am sodium 153, started on free water repletions via Jtube. Lasix dc'ed, will continue metolazone BID. Will continue TF@20/hr via Jtube.    Allergies    No Known Allergies    Intolerances      Vital Signs Last 24 Hrs  T(C): 38.1 (2018 14:44), Max: 38.1 (2018 05:28)  T(F): 100.5 (2018 14:44), Max: 100.6 (2018 05:28)  HR: 86 (2018 15:00) (81 - 107)  BP: --  BP(mean): --  RR: 21 (2018 15:00) (17 - 24)  SpO2: 98% (2018 15:00) (95% - 99%)     @ 07: @ 07:00  --------------------------------------------------------  IN: 1319 mL / OUT: 3560 mL / NET: -2241 mL     @ 07: @ 15:42  --------------------------------------------------------  IN: 819 mL / OUT: 765 mL / NET: 54 mL       @ 07: @ 07:00  --------------------------------------------------------  IN: 1319 mL / OUT: 3560 mL / NET: -2241 mL     @ 07: @ 15:42  --------------------------------------------------------  IN: 819 mL / OUT: 765 mL / NET: 54 mL          LABS:  ABG - ( 2018 06:00 )  pH: 7.45  /  pCO2: 40    /  pO2: 109   / HCO3: 27    / Base Excess: 3.0   /  SaO2: 98                  CBC Full  -  ( 2018 06:42 )  WBC Count : 16.0 K/uL  Hemoglobin : 10.3 g/dL  Hematocrit : 32.5 %  Platelet Count - Automated : 68 K/uL  Mean Cell Volume : 94.5 fL  Mean Cell Hemoglobin : 29.9 pg  Mean Cell Hemoglobin Concentration : 31.7 g/dL  Auto Neutrophil # : x  Auto Lymphocyte # : x  Auto Monocyte # : x  Auto Eosinophil # : x  Auto Basophil # : x  Auto Neutrophil % : x  Auto Lymphocyte % : x  Auto Monocyte % : x  Auto Eosinophil % : x  Auto Basophil % : x        153<H>  |  116<H>  |  52<H>  ----------------------------<  179<H>  3.7   |  28  |  1.35<H>    Ca    7.8<L>      2018 06:42  Phos  2.6       Mg     2.7         TPro  4.9<L>  /  Alb  2.0<L>  /  TBili  9.0<H>  /  DBili  7.1<H>  /  AST  67<H>  /  ALT  82<H>  /  AlkPhos  81      PT/INR - ( 2018 06:42 )   PT: 15.6 sec;   INR: 1.40          PTT - ( 2018 11:57 )  PTT:58.7 sec      Urinalysis Basic - ( 2018 09:59 )    Color: Yellow / Appearance: Clear / S.020 / pH: x  Gluc: x / Ketone: NEGATIVE  / Bili: Moderate / Urobili: 0.2 E.U./dL   Blood: x / Protein: Trace mg/dL / Nitrite: NEGATIVE   Leuk Esterase: NEGATIVE / RBC: 5-10 /HPF / WBC < 5 /HPF   Sq Epi: x / Non Sq Epi: 5-10 /HPF / Bacteria: Many /HPF      RADIOLOGY & ADDITIONAL STUDIES (The following images were personally reviewed):      Physical Exam:   Intubated, not responding to commands, off sedation   CV: Afib, normotensive  Pulm: intubated on AC mode  Abd: Soft, mildly distended, no rebound or guarding, incision c/d/i, G-D-and - Jtube in place, 2 x GUCCI drain with serosang output  Ext: WWP    A/p:   72yM with PMH severe MR/AR presented after recent dx acute cholecystitis and RLL PNA at [facility] with lower abdominal pain, new onset rapid Afib, sepsis, acute CHF exacerbation, initially admitted to cardiology on bipap, then transferred to SICU with perforated duodenum s/p Shreyas patch and jejunostomy () c/b breakdown s/p RTOR, washout, new Shreyas patch, gastrostomy, duodenostomy, IVCF ()    Neuro: dilaudid PRN  CV: Afib: Echo EF 50%, ?vegetation and Severe MR. metolazone 5bid,   Pulm: Intubated on cmv 40/500/12/5 chlorhex  GI: NPO with NGT to LIWS. Jtube TF jevity @ 20 cc. retrograde D-tube to LIWS, G-tube to LIWS Protonix bid. FW 200q4h,   : Oliveira, GAETANO  ID: Perforated duodenum/PNA, surgical swab grew Candida: micafungin (--), meropenem (--) //// discontinued Vanco (-) Zosyn (-), f/u Bld cx's, linezolid (-)  Endo: ISS  HEME:DVT: Heparin gtt PTT, thrombocytopenic, HIT +, serotonin release assay NEGATIVE. IVCF () //1FFP, 1PLT ().   PPx: SCD, R pop DVT, s/p IVC filter   Lines: PIV, L brachial Jeane (-), MICHAEL TLC, [facility] HD cath (-)  Wounds: Ex lap, JPx2 to bulb suction, J-tube to gravity, D-tube to LIWS, G-tube to LIWS  PT/OT: Not ordered

## 2018-01-27 NOTE — PROGRESS NOTE ADULT - SUBJECTIVE AND OBJECTIVE BOX
Subjective and objective   The patient still intubated, good urinary output after stimulation of the diuresis     Renal service for oliguric Frieda - due to ATN, now with good response to the diuretic therapy  and significant improvement of the renal function             Patient seen and examined at bedside.     acetaminophen    Suspension 650 milliGRAM(s) every 6 hours PRN  albumin human 25% IVPB 50 milliLiter(s) every 8 hours  ALBUTerol/ipratropium for Nebulization 3 milliLiter(s) every 6 hours  chlorhexidine 0.12% Liquid 15 milliLiter(s) two times a day  dextrose 5%. 1000 milliLiter(s) <Continuous>  digoxin  Injectable 0.125 milliGRAM(s) daily  heparin  Infusion 800 Unit(s)/Hr <Continuous>  HYDROmorphone  Injectable 0.5 milliGRAM(s) every 3 hours PRN  insulin lispro (HumaLOG) corrective regimen sliding scale   every 6 hours  metolazone 5 milliGRAM(s) two times a day  micafungin IVPB 100 milliGRAM(s) every 24 hours  micafungin IVPB     pantoprazole  Injectable 40 milliGRAM(s) every 12 hours  piperacillin/tazobactam IVPB. 3.375 Gram(s) every 6 hours      Allergies    No Known Allergies    Intolerances  Physical exam:   Intubated and sedated   On Ventilator   Normal air entry into the lungs, no wheezing, decreased breath sounds on the right base   tachycardic - normal s1/s2, no murmurs, rubs or gallops   Abdomen - soft, slightly distended, s/p laparotomy   Extremities: 3+ peripheral edema   HAs a catheter on the left of his chest       T(C): , Max: 38.1 (18 @ 05:28)  T(F): , Max: 100.6 (18 @ 05:28)  HR: 90 (18 @ 14:00)  BP: --  BP(mean): --  RR: 22 (18 @ 14:00)  SpO2: 96% (18 @ 14:00)  Wt(kg): --     @ 07:01  -   @ 07:00  --------------------------------------------------------  IN:    Enteral Tube Flush: 100 mL    fentaNYL  Infusion: 21 mL    fentaNYL  Infusion: 39 mL    heparin Infusion: 98 mL    heparin Infusion: 56 mL    IV PiggyBack: 350 mL    Jevity: 320 mL    pantoprazole Infusion: 20 mL    Solution: 315 mL  Total IN: 1319 mL    OUT:    Bulb: 95 mL    Bulb: 65 mL    Drain: 125 mL    Drain: 300 mL    Indwelling Catheter - Urethral: 2975 mL  Total OUT: 3560 mL    Total NET: -2241 mL       @ 07: @ 14:42  --------------------------------------------------------  IN:    Albumin 5%  - 250 mL: 100 mL    Enteral Tube Flush: 300 mL    fentaNYL  Infusion: 3 mL    heparin Infusion: 56 mL    IV PiggyBack: 100 mL    Jevity: 140 mL  Total IN: 699 mL    OUT:    Indwelling Catheter - Urethral: 640 mL  Total OUT: 640 mL    Total NET: 59 mL              LABS:                        10.3   16.0  )-----------( 68       ( 2018 06:42 )             32.5         153<H>  |  116<H>  |  52<H>  ----------------------------<  179<H>  3.7   |  28  |  1.35<H>    Ca    7.8<L>      2018 06:42  Phos  2.6       Mg     2.7         TPro  4.9<L>  /  Alb  2.0<L>  /  TBili  9.0<H>  /  DBili  7.1<H>  /  AST  67<H>  /  ALT  82<H>  /  AlkPhos  81        PT/INR - ( 2018 06:42 )   PT: 15.6 sec;   INR: 1.40          PTT - ( 2018 11:57 )  PTT:58.7 sec  Urinalysis Basic - ( 2018 09:59 )    Color: Yellow / Appearance: Clear / S.020 / pH: x  Gluc: x / Ketone: NEGATIVE  / Bili: Moderate / Urobili: 0.2 E.U./dL   Blood: x / Protein: Trace mg/dL / Nitrite: NEGATIVE   Leuk Esterase: NEGATIVE / RBC: 5-10 /HPF / WBC < 5 /HPF   Sq Epi: x / Non Sq Epi: 5-10 /HPF / Bacteria: Many /HPF            RADIOLOGY & ADDITIONAL STUDIES:

## 2018-01-27 NOTE — PROGRESS NOTE ADULT - ATTENDING COMMENTS
In SICU ,intubated, sedated, hemodynamically stable ,off pressors ,abdomen soft, drains in place, tolerating tube feeds via jejunostomy at 20 cc/h, WBC up to 16 000, no fever, central line needs to be change, cultures, continue tube feeds at 20 cc /h , continue IV ABX, management as per SICU team

## 2018-01-27 NOTE — PROGRESS NOTE ADULT - ATTENDING COMMENTS
Pt remains edematous and requiring free water replacement. continue metolazone and follow Na. continue abx and will repeat blood cx with temp 100.6. D/C triple lumen catheter and if required then replace if temp increases. no need for Hd and catheter to be removed. CPAP trial. Continue abx per ID.

## 2018-01-27 NOTE — PROGRESS NOTE ADULT - SUBJECTIVE AND OBJECTIVE BOX
Overnight Events: Patient seen and examined at bedside.  Hemodynamically stable, intubated on AC mode, Febrile to 100.6, blood cultures sent. Will continue Zosyn and Micafungin. plan to remove HD catheter today, will remove central line if continues to spike.  Continue on heparin gtt for Afib. Off sedation, fentanyl switched to dilaudid prn.  Am sodium 153, started on free water repletions via Jtube. Lasix dc'ed, will continue metolazone BID. Will continue TF@20/hr via Jtube.    Allergies    No Known Allergies    Intolerances      Vital Signs Last 24 Hrs  T(C): 38.1 (2018 14:44), Max: 38.1 (2018 05:28)  T(F): 100.5 (2018 14:44), Max: 100.6 (2018 05:28)  HR: 86 (2018 15:00) (81 - 107)  BP: --  BP(mean): --  RR: 21 (2018 15:00) (17 - 24)  SpO2: 98% (2018 15:00) (95% - 99%)     @ 07: @ 07:00  --------------------------------------------------------  IN: 1319 mL / OUT: 3560 mL / NET: -2241 mL     @ 07: @ 15:42  --------------------------------------------------------  IN: 819 mL / OUT: 765 mL / NET: 54 mL       @ 07: @ 07:00  --------------------------------------------------------  IN: 1319 mL / OUT: 3560 mL / NET: -2241 mL     @ 07: @ 15:42  --------------------------------------------------------  IN: 819 mL / OUT: 765 mL / NET: 54 mL          LABS:  ABG - ( 2018 06:00 )  pH: 7.45  /  pCO2: 40    /  pO2: 109   / HCO3: 27    / Base Excess: 3.0   /  SaO2: 98                  CBC Full  -  ( 2018 06:42 )  WBC Count : 16.0 K/uL  Hemoglobin : 10.3 g/dL  Hematocrit : 32.5 %  Platelet Count - Automated : 68 K/uL  Mean Cell Volume : 94.5 fL  Mean Cell Hemoglobin : 29.9 pg  Mean Cell Hemoglobin Concentration : 31.7 g/dL  Auto Neutrophil # : x  Auto Lymphocyte # : x  Auto Monocyte # : x  Auto Eosinophil # : x  Auto Basophil # : x  Auto Neutrophil % : x  Auto Lymphocyte % : x  Auto Monocyte % : x  Auto Eosinophil % : x  Auto Basophil % : x        153<H>  |  116<H>  |  52<H>  ----------------------------<  179<H>  3.7   |  28  |  1.35<H>    Ca    7.8<L>      2018 06:42  Phos  2.6       Mg     2.7         TPro  4.9<L>  /  Alb  2.0<L>  /  TBili  9.0<H>  /  DBili  7.1<H>  /  AST  67<H>  /  ALT  82<H>  /  AlkPhos  81      PT/INR - ( 2018 06:42 )   PT: 15.6 sec;   INR: 1.40          PTT - ( 2018 11:57 )  PTT:58.7 sec      Urinalysis Basic - ( 2018 09:59 )    Color: Yellow / Appearance: Clear / S.020 / pH: x  Gluc: x / Ketone: NEGATIVE  / Bili: Moderate / Urobili: 0.2 E.U./dL   Blood: x / Protein: Trace mg/dL / Nitrite: NEGATIVE   Leuk Esterase: NEGATIVE / RBC: 5-10 /HPF / WBC < 5 /HPF   Sq Epi: x / Non Sq Epi: 5-10 /HPF / Bacteria: Many /HPF      RADIOLOGY & ADDITIONAL STUDIES (The following images were personally reviewed):      Physical Exam:   Intubated, not responding to commands, off sedation   CV: Afib, normotensive  Pulm: intubated on AC mode  Abd: Soft, mildly distended, no rebound or guarding, incision c/d/i, G-D-and - Jtube in place, 2 x GUCCI drain with serosang output  Ext: WWP    A/p:   72yM with PMH severe MR/AR presented after recent dx acute cholecystitis and RLL PNA at [facility] with lower abdominal pain, new onset rapid Afib, sepsis, acute CHF exacerbation, initially admitted to cardiology on bipap, then transferred to SICU with perforated duodenum s/p Shreyas patch and jejunostomy () c/b breakdown s/p RTOR, washout, new Shreyas patch, gastrostomy, duodenostomy, IVCF ()    Neuro: dilaudid PRN  CV: Afib: Echo EF 50%, ?vegetation and Severe MR. metolazone 5bid,   Pulm: Intubated on cmv 40/500/12/5 chlorhex  GI: NPO with NGT to LIWS. Jtube TF jevity @ 20 cc. retrograde D-tube to LIWS, G-tube to LIWS Protonix bid. FW 200q4h,   : Oliveira, GAETANO  ID: Perforated duodenum/PNA, surgical swab grew Candida: micafungin (--), meropenem (--) //// discontinued Vanco (-) Zosyn (-), f/u Bld cx's, linezolid (-)  Endo: ISS  HEME:DVT: Heparin gtt PTT, thrombocytopenic, HIT +, serotonin release assay NEGATIVE. IVCF () //1FFP, 1PLT ().   PPx: SCD, R pop DVT, s/p IVC filter

## 2018-01-28 LAB
ANION GAP SERPL CALC-SCNC: 8 MMOL/L — SIGNIFICANT CHANGE UP (ref 5–17)
APTT BLD: 73.5 SEC — HIGH (ref 27.5–37.4)
BUN SERPL-MCNC: 39 MG/DL — HIGH (ref 7–23)
BUN SERPL-MCNC: 45 MG/DL — HIGH (ref 7–23)
BUN SERPL-MCNC: 46 MG/DL — HIGH (ref 7–23)
CALCIUM SERPL-MCNC: 7.4 MG/DL — LOW (ref 8.4–10.5)
CALCIUM SERPL-MCNC: 7.5 MG/DL — LOW (ref 8.4–10.5)
CALCIUM SERPL-MCNC: 7.5 MG/DL — LOW (ref 8.4–10.5)
CHLORIDE SERPL-SCNC: 114 MMOL/L — HIGH (ref 96–108)
CHLORIDE SERPL-SCNC: 114 MMOL/L — HIGH (ref 96–108)
CHLORIDE SERPL-SCNC: 118 MMOL/L — HIGH (ref 96–108)
CO2 SERPL-SCNC: 29 MMOL/L — SIGNIFICANT CHANGE UP (ref 22–31)
CO2 SERPL-SCNC: 31 MMOL/L — SIGNIFICANT CHANGE UP (ref 22–31)
CO2 SERPL-SCNC: 31 MMOL/L — SIGNIFICANT CHANGE UP (ref 22–31)
CREAT SERPL-MCNC: 1.17 MG/DL — SIGNIFICANT CHANGE UP (ref 0.5–1.3)
CREAT SERPL-MCNC: 1.28 MG/DL — SIGNIFICANT CHANGE UP (ref 0.5–1.3)
CREAT SERPL-MCNC: 1.34 MG/DL — HIGH (ref 0.5–1.3)
GLUCOSE BLDC GLUCOMTR-MCNC: 154 MG/DL — HIGH (ref 70–99)
GLUCOSE BLDC GLUCOMTR-MCNC: 175 MG/DL — HIGH (ref 70–99)
GLUCOSE BLDC GLUCOMTR-MCNC: 182 MG/DL — HIGH (ref 70–99)
GLUCOSE BLDC GLUCOMTR-MCNC: 200 MG/DL — HIGH (ref 70–99)
GLUCOSE SERPL-MCNC: 175 MG/DL — HIGH (ref 70–99)
GLUCOSE SERPL-MCNC: 194 MG/DL — HIGH (ref 70–99)
GLUCOSE SERPL-MCNC: 199 MG/DL — HIGH (ref 70–99)
GRAM STN FLD: SIGNIFICANT CHANGE UP
HCT VFR BLD CALC: 29.9 % — LOW (ref 39–50)
HGB BLD-MCNC: 9.4 G/DL — LOW (ref 13–17)
INR BLD: 1.4 — HIGH (ref 0.88–1.16)
MAGNESIUM SERPL-MCNC: 2.8 MG/DL — HIGH (ref 1.6–2.6)
MCHC RBC-ENTMCNC: 30 PG — SIGNIFICANT CHANGE UP (ref 27–34)
MCHC RBC-ENTMCNC: 31.4 G/DL — LOW (ref 32–36)
MCV RBC AUTO: 95.5 FL — SIGNIFICANT CHANGE UP (ref 80–100)
PHOSPHATE SERPL-MCNC: 2.5 MG/DL — SIGNIFICANT CHANGE UP (ref 2.5–4.5)
PLATELET # BLD AUTO: 56 K/UL — LOW (ref 150–400)
POTASSIUM SERPL-MCNC: 3.2 MMOL/L — LOW (ref 3.5–5.3)
POTASSIUM SERPL-MCNC: 3.4 MMOL/L — LOW (ref 3.5–5.3)
POTASSIUM SERPL-MCNC: 4 MMOL/L — SIGNIFICANT CHANGE UP (ref 3.5–5.3)
POTASSIUM SERPL-SCNC: 3.2 MMOL/L — LOW (ref 3.5–5.3)
POTASSIUM SERPL-SCNC: 3.4 MMOL/L — LOW (ref 3.5–5.3)
POTASSIUM SERPL-SCNC: 4 MMOL/L — SIGNIFICANT CHANGE UP (ref 3.5–5.3)
PROTHROM AB SERPL-ACNC: 15.6 SEC — HIGH (ref 9.8–12.7)
RBC # BLD: 3.13 M/UL — LOW (ref 4.2–5.8)
RBC # FLD: 19.4 % — HIGH (ref 10.3–16.9)
SODIUM SERPL-SCNC: 151 MMOL/L — HIGH (ref 135–145)
SODIUM SERPL-SCNC: 153 MMOL/L — HIGH (ref 135–145)
SODIUM SERPL-SCNC: 157 MMOL/L — HIGH (ref 135–145)
SPECIMEN SOURCE: SIGNIFICANT CHANGE UP
WBC # BLD: 13.2 K/UL — HIGH (ref 3.8–10.5)
WBC # FLD AUTO: 13.2 K/UL — HIGH (ref 3.8–10.5)

## 2018-01-28 PROCEDURE — 99232 SBSQ HOSP IP/OBS MODERATE 35: CPT

## 2018-01-28 PROCEDURE — 71045 X-RAY EXAM CHEST 1 VIEW: CPT | Mod: 26

## 2018-01-28 PROCEDURE — 99233 SBSQ HOSP IP/OBS HIGH 50: CPT

## 2018-01-28 PROCEDURE — 99291 CRITICAL CARE FIRST HOUR: CPT

## 2018-01-28 RX ORDER — SODIUM CHLORIDE 9 MG/ML
1000 INJECTION, SOLUTION INTRAVENOUS
Qty: 0 | Refills: 0 | Status: DISCONTINUED | OUTPATIENT
Start: 2018-01-28 | End: 2018-01-29

## 2018-01-28 RX ORDER — POTASSIUM CHLORIDE 20 MEQ
40 PACKET (EA) ORAL EVERY 4 HOURS
Qty: 0 | Refills: 0 | Status: COMPLETED | OUTPATIENT
Start: 2018-01-28 | End: 2018-01-28

## 2018-01-28 RX ADMIN — Medication 2: at 17:38

## 2018-01-28 RX ADMIN — Medication 3 MILLILITER(S): at 21:14

## 2018-01-28 RX ADMIN — Medication 2: at 06:48

## 2018-01-28 RX ADMIN — HYDROMORPHONE HYDROCHLORIDE 0.5 MILLIGRAM(S): 2 INJECTION INTRAMUSCULAR; INTRAVENOUS; SUBCUTANEOUS at 11:18

## 2018-01-28 RX ADMIN — HYDROMORPHONE HYDROCHLORIDE 0.5 MILLIGRAM(S): 2 INJECTION INTRAMUSCULAR; INTRAVENOUS; SUBCUTANEOUS at 12:13

## 2018-01-28 RX ADMIN — Medication 0.12 MILLIGRAM(S): at 11:18

## 2018-01-28 RX ADMIN — Medication 50 MILLILITER(S): at 22:50

## 2018-01-28 RX ADMIN — Medication 3 MILLILITER(S): at 03:44

## 2018-01-28 RX ADMIN — PIPERACILLIN AND TAZOBACTAM 25 GRAM(S): 4; .5 INJECTION, POWDER, LYOPHILIZED, FOR SOLUTION INTRAVENOUS at 05:00

## 2018-01-28 RX ADMIN — PIPERACILLIN AND TAZOBACTAM 200 GRAM(S): 4; .5 INJECTION, POWDER, LYOPHILIZED, FOR SOLUTION INTRAVENOUS at 22:09

## 2018-01-28 RX ADMIN — Medication 10 MILLIGRAM(S): at 11:18

## 2018-01-28 RX ADMIN — CHLORHEXIDINE GLUCONATE 15 MILLILITER(S): 213 SOLUTION TOPICAL at 18:12

## 2018-01-28 RX ADMIN — CHLORHEXIDINE GLUCONATE 15 MILLILITER(S): 213 SOLUTION TOPICAL at 06:48

## 2018-01-28 RX ADMIN — Medication 3 MILLILITER(S): at 17:36

## 2018-01-28 RX ADMIN — HYDROMORPHONE HYDROCHLORIDE 0.5 MILLIGRAM(S): 2 INJECTION INTRAMUSCULAR; INTRAVENOUS; SUBCUTANEOUS at 21:30

## 2018-01-28 RX ADMIN — PIPERACILLIN AND TAZOBACTAM 200 GRAM(S): 4; .5 INJECTION, POWDER, LYOPHILIZED, FOR SOLUTION INTRAVENOUS at 09:22

## 2018-01-28 RX ADMIN — PANTOPRAZOLE SODIUM 40 MILLIGRAM(S): 20 TABLET, DELAYED RELEASE ORAL at 18:12

## 2018-01-28 RX ADMIN — HYDROMORPHONE HYDROCHLORIDE 0.5 MILLIGRAM(S): 2 INJECTION INTRAMUSCULAR; INTRAVENOUS; SUBCUTANEOUS at 20:53

## 2018-01-28 RX ADMIN — Medication 40 MILLIEQUIVALENT(S): at 13:00

## 2018-01-28 RX ADMIN — Medication 40 MILLIEQUIVALENT(S): at 09:18

## 2018-01-28 RX ADMIN — Medication 2: at 23:24

## 2018-01-28 RX ADMIN — PANTOPRAZOLE SODIUM 40 MILLIGRAM(S): 20 TABLET, DELAYED RELEASE ORAL at 06:48

## 2018-01-28 RX ADMIN — Medication 2: at 12:42

## 2018-01-28 RX ADMIN — PIPERACILLIN AND TAZOBACTAM 200 GRAM(S): 4; .5 INJECTION, POWDER, LYOPHILIZED, FOR SOLUTION INTRAVENOUS at 16:23

## 2018-01-28 RX ADMIN — Medication 3 MILLILITER(S): at 10:16

## 2018-01-28 RX ADMIN — Medication 50 MILLILITER(S): at 14:28

## 2018-01-28 RX ADMIN — MICAFUNGIN SODIUM 105 MILLIGRAM(S): 100 INJECTION, POWDER, LYOPHILIZED, FOR SOLUTION INTRAVENOUS at 14:03

## 2018-01-28 RX ADMIN — Medication 50 MILLILITER(S): at 06:47

## 2018-01-28 NOTE — PROGRESS NOTE ADULT - SUBJECTIVE AND OBJECTIVE BOX
[facility] for Dr Wylie    Pt was seen and fully examined by myself.   All new diagnostic data reviewed  platelets decreasing  leucocytosis improving    Mx: reviewed; no dilaudid    Time spent with patient in minutes: > 35    Patient is a 72y old  Male who presents with a chief complaint of acute cholecystitis (18 Jan 2018 14:49)Events of last 24hrs reviewed. The patient is less deeply sedated and comfortable and in synchrony c PS mode.   All circuits integrity verified and preserved.     HPI:  71 yo M with h/o severe mitral and aortic valve regurgitation presented to ED with 5 day h/o left lower abdominal pain, nausea and multiple episodes of vomiting as well as PO intolerance. Pt reports that the pain in his abdomen is illicited by movement. Went to [facility] two days ago and underwent CT scan which revealed acute cholecystitis as well as right lower lobe infectious pneumonia. Pt was told he would require surgery and left AMA because he was waiting too long. Denies f/c. Last echo 1/4/17 with EF 60-65%. Normal BMs and passing flatus. (18 Jan 2018 14:49)    REVIEW OF SYSTEMS:   intubated    PAST MEDICAL & SURGICAL HISTORY:  Mitral regurgitation  Aortic regurgitation  No significant past surgical history    FAMILY HISTORY:    SOCIAL HISTORY:      - Tobacco     - ETOH    Allergies    No Known Allergies    Intolerances    Vital Signs Last 24 Hrs  T(C): 37.6 (26 Jan 2018 14:00), Max: 37.6 (26 Jan 2018 14:00)  T(F): 99.6 (26 Jan 2018 14:00), Max: 99.6 (26 Jan 2018 14:00)  HR: 94 (26 Jan 2018 15:00) (78 - 106)  BP: --  BP(mean): --  RR: 18 (26 Jan 2018 15:00) (14 - 25)  SpO2: 97% (26 Jan 2018 15:00) (95% - 100%)    01-25 @ 07:01 - 01-26 @ 07:00  --------------------------------------------------------  IN: 899 mL / OUT: 4100 mL / NET: -3201 mL    01-26 @ 07:01  -  01-26 @ 15:24  --------------------------------------------------------  IN: 565 mL / OUT: 1365 mL / NET: -800 mL    Mode: CPAP with PS  FiO2: 40  PEEP: 5  PS: 5  MAP: 6.3    PS continue at 10 with tidal 0.5 and RR 19-24    PHYSICAL EXAM:  Awakening, not able to lift his arms but trying to  Eyes: PERRL, CR +; conjunctiva and sclera clear  Head: Normocephalic;  No Trauma  TEMPORAL MUSCLE ATROPHTY  ENMT: No nasal discharge, hoarseness, cough or hemoptysis.   ALLY 24 cm  Neck: Supple; non tender; no masses or deformities.    No JVD  Respiratory: - WHEEZING   + RLL RHONCHI  + RLL  RALES  + CRACKLES.  Diminished breath sounds  BILATERAL  RIGHT > LEFT base  Cardiovascular: irregular rate and rhythm. S1 and S2 Normal; + murmurs, gallops or rubs     - PPM/AICD  Gastrointestinal: distended; no  bowel sounds; No hepatosplenomegaly.     -PEG    +  NGT   +ANGELES,  D tube 4 abdominal drains in place  Genitourinary: No costovertebral angle tenderness. No dysuria ANGELES  Extremities: sedated decreased AROM, No clubbing; cyanosis and edema ALL 4 EXTREMITIES , better toe perfusion today, BLISTERS   Vascular: Peripheral pulses palpable 2+ bilaterally  Neurological: intubated; sedation on hold, slowly awakening    DEVICES:  - DENTURES   +IV R / L     7.5 ETUBE   -TRACH   -CTUBE  R / L; TLC    KANGAROO AT 20 mL/h    LABS: all new ones reviewed  ABG - ( 26 Jan 2018 06:00 )  pH: 7.45  /  pCO2: 40    /  pO2: 109   / HCO3: 27    / Base Excess: 3.0   /  SaO2: 98                            10.0   13.8  )-----------( 56       ( 26 Jan 2018 05:58 )             30.9     01-26    149<H>  |  114<H>  |  62<H>  ----------------------------<  146<H>  3.8   |  26  |  1.35<H>    Ca    7.6<L>      26 Jan 2018 05:58  Phos  2.4     01-26  Mg     2.6     01-26      PTT - ( 26 Jan 2018 07:13 )  PTT:72.5 sec     CXR today: no change; right perihilar infiltrate    INTERPRETATION:  Clinical History: Evaluate lungs    Portable examination the chest demonstrates no interval change lung   pathology in comparison to prior examination of the chest 1/27/2018. No   interval change position remaining support devices.    Impression: No interval change lung pathology    RADIOLOGY & ADDITIONAL STUDIES (The following images were personally reviewed):    Ventricular Rate 113 BPM    Atrial Rate 129 BPM    QRS Duration 102 ms    Q-T Interval 310 ms    QTC Calculation(Bezet) 425 ms    R Axis 55 degrees    T Axis 91 degrees    Diagnosis Line Atrial fibrillation with rapid ventricular response  Nonspecific ST and T wave abnormality , probably digitalis effect  Abnormal ECG

## 2018-01-28 NOTE — PROGRESS NOTE ADULT - SUBJECTIVE AND OBJECTIVE BOX
S: No new issues/events overnight, attempted CPAP  this morning, but became slightly more tachypneic, comfortable on CPAP 10/5.   awake alert, no resp distress    O: ICU Vital Signs Last 24 Hrs  T(F): 99.6 (18 @ 09:35), Max: 100.9 (18 @ 17:00)  HR: 76 (18 @ 12:00) (70 - 90)  BP: 122/57 (18 @ 12:00) (103/53 - 135/63)  BP(mean): 78 (18 @ 12:00) (65 - 86)  ABP: 104/50 (18 @ 19:00)  RR: 24 (18 @ 12:00) (14 - 25)  SpO2: 99% (18 @ 12:00) (96% - 100%)    PHYSICAL EXAM:     Neurological: awake alert, moves all extremety(ies),   Cardiovascular: RRR  Respiratory: CTA  on vent  Gastrointestinal: soft, midline incision with dsg c/d/i, GUCCI right sided x 2 serous drainage. G-tube and retrograde D-tube to LIWS, J-tube getting TF@20/hr.   Extremities: warm, has dependent edema.   Vascular: right 3rd, 4th toes cyanotic for past wk. not getting worse. otherwise BLE warm with palp DP. various blisters on toes b/l     LABS:        153<H>  |  114<H>  |  45<H>  ----------------------------<  194<H>  3.2<L>   |  31  |  1.28    Ca    7.5<L>      2018 05:50  Phos  2.5       Mg     2.8         TPro  4.9<L>  /  Alb  2.0<L>  /  TBili  9.0<H>  /  DBili  7.1<H>  /  AST  67<H>  /  ALT  82<H>  /  AlkPhos  81    LIVER FUNCTIONS - ( 2018 06:42 )  Alb: 2.0 g/dL / Pro: 4.9 g/dL / ALK PHOS: 81 U/L / ALT: 82 U/L / AST: 67 U/L / GGT: x                               9.4    13.2  )-----------( 56       ( 2018 05:50 )             29.9   PT/INR - ( 2018 05:50 )   PT: 15.6 sec;   INR: 1.40          PTT - ( 2018 05:50 )  PTT:73.5 sec  Urinalysis Basic - ( 2018 09:59 )    Color: Yellow / Appearance: Clear / S.020 / pH: x  Gluc: x / Ketone: NEGATIVE  / Bili: Moderate / Urobili: 0.2 E.U./dL   Blood: x / Protein: Trace mg/dL / Nitrite: NEGATIVE   Leuk Esterase: NEGATIVE / RBC: 5-10 /HPF / WBC < 5 /HPF   Sq Epi: x / Non Sq Epi: 5-10 /HPF / Bacteria: Many /HPF    CAPILLARY BLOOD GLUCOSE      POCT Blood Glucose.: 200 mg/dL (2018 10:25)  POCT Blood Glucose.: 182 mg/dL (2018 06:18)  POCT Blood Glucose.: 146 mg/dL (2018 22:01)  POCT Blood Glucose.: 150 mg/dL (2018 16:04)    MEDICATIONS  (STANDING):  albumin human 25% IVPB 50 milliLiter(s) IV Intermittent every 8 hours  ALBUTerol/ipratropium for Nebulization 3 milliLiter(s) Nebulizer every 6 hours  chlorhexidine 0.12% Liquid 15 milliLiter(s) Swish and Spit two times a day  digoxin  Injectable 0.125 milliGRAM(s) IV Push daily  heparin  Infusion 800 Unit(s)/Hr (8 mL/Hr) IV Continuous <Continuous>  insulin lispro (HumaLOG) corrective regimen sliding scale   SubCutaneous every 6 hours  metolazone 10 milliGRAM(s) Oral two times a day  micafungin IVPB 100 milliGRAM(s) IV Intermittent every 24 hours  micafungin IVPB      pantoprazole  Injectable 40 milliGRAM(s) IV Push every 12 hours  piperacillin/tazobactam IVPB. 3.375 Gram(s) IV Intermittent every 6 hours  potassium chloride   Powder 40 milliEquivalent(s) Enteral Tube every 4 hours    MEDICATIONS  (PRN):  acetaminophen    Suspension 650 milliGRAM(s) Enteral Tube every 6 hours PRN For Temp greater than 38 C (100.4 F)  HYDROmorphone  Injectable 0.5 milliGRAM(s) IV Push every 3 hours PRN Severe Pain (7 - 10)      Oliveira:	  [ ] None	[x ] Daily Oliveira Order Placed	   Indication:	  [ x] Strict I and O's    [ ] Obstruction     [ ] Incontinence + Stage 3 or 4 Decubitus  Central Line:  [ x] None	   [ ]  Medication / TPN Administration     [ ] No Peripheral IV

## 2018-01-28 NOTE — PROGRESS NOTE ADULT - ATTENDING COMMENTS
JENNYFER Benavidez has acted as my scribe. Pt remains stable and tolerating gentle diuresis with Metolazone. Failed CPAP trial this AM and to continue diuresis. Lines out and temps down. cont abx per ID. Continue to advance feeds per surgery and replace free water. continue anticoag . Platelet stable and no bleeding.

## 2018-01-28 NOTE — PROGRESS NOTE ADULT - PROBLEM SELECTOR PLAN 1
- most likely related to the ATN due to shock (other precipitating factors are IV contrast, Vanco), on pressors   -  urine output - 2.5   - renal function better   - electrolytes acceptable - sodium on the higher side - most likely intravascular depleted - consider albumin 5% - 50 ml three times a day   - volume status - hypervolemic - most likely third spacing  - in and outs   - daily weight   - renal diet.  - removed the catheter of his left chest  - hypernatremia - primary team is giving Metolazone

## 2018-01-28 NOTE — PROGRESS NOTE ADULT - SUBJECTIVE AND OBJECTIVE BOX
Critical Care    INTERVAL HPI/OVERNIGHT EVENTS:    ANTIBIOTICS    MEDICATIONS  (STANDING):  albumin human 25% IVPB 50 milliLiter(s) IV Intermittent every 8 hours  ALBUTerol/ipratropium for Nebulization 3 milliLiter(s) Nebulizer every 6 hours  bisacodyl Suppository 10 milliGRAM(s) Rectal once  chlorhexidine 0.12% Liquid 15 milliLiter(s) Swish and Spit two times a day  dextrose 5%. 1000 milliLiter(s) (50 mL/Hr) IV Continuous <Continuous>  dextrose 5%. 1000 milliLiter(s) (100 mL/Hr) IV Continuous <Continuous>  digoxin  Injectable 0.125 milliGRAM(s) IV Push daily  heparin  Infusion 800 Unit(s)/Hr (8 mL/Hr) IV Continuous <Continuous>  insulin lispro (HumaLOG) corrective regimen sliding scale   SubCutaneous every 6 hours  metolazone 10 milliGRAM(s) Oral two times a day  micafungin IVPB 100 milliGRAM(s) IV Intermittent every 24 hours  micafungin IVPB      pantoprazole  Injectable 40 milliGRAM(s) IV Push every 12 hours  piperacillin/tazobactam IVPB. 3.375 Gram(s) IV Intermittent every 6 hours  potassium chloride   Powder 40 milliEquivalent(s) Enteral Tube every 4 hours    MEDICATIONS  (PRN):  acetaminophen    Suspension 650 milliGRAM(s) Enteral Tube every 6 hours PRN For Temp greater than 38 C (100.4 F)  HYDROmorphone  Injectable 0.5 milliGRAM(s) IV Push every 3 hours PRN Severe Pain (7 - 10)      Allergies    No Known Allergies    Intolerances        REVIEW OF SYSTEMS:    patient unable to provide a review of systems  he is intubatedd    Vital Signs Last 24 Hrs  T(C): 37.6 (2018 09:35), Max: 38.3 (2018 17:00)  T(F): 99.6 (2018 09:35), Max: 100.9 (2018 17:00)  HR: 80 (2018 10:18) (70 - 99)  BP: 117/59 (2018 10:00) (103/53 - 135/63)  BP(mean): 74 (2018 10:00) (65 - 86)  RR: 21 (2018 10:18) (14 - 24)  SpO2: 99% (2018 10:18) (96% - 100%)    PHYSICAL EXAM:      Eyes: PERRL, EOM intact; conjunctiva and sclera clear  Head: Normocephalic; atraumatic  ENMT: No nasal discharge; airway clear  Neck: Supple; non tender; no masses  Respiratory: No wheezes, rales or rhonchi  Cardiovascular: Regular rate and rhythm. S1 and S2 Normal; No murmurs, gallops or rubs  Gastrointestinal: Soft non-tender non-distended; Normal bowel sounds; No hepatosplenomegaly  Genitourinary: No costovertebral angle tenderness  Extremities: gangrene of toes of right foot    LABS:                        9.4    13.2  )-----------( 56       ( 2018 05:50 )             29.9         153<H>  |  114<H>  |  45<H>  ----------------------------<  194<H>  3.2<L>   |  31  |  1.28    Ca    7.5<L>      2018 05:50  Phos  2.5       Mg     2.8         TPro  4.9<L>  /  Alb  2.0<L>  /  TBili  9.0<H>  /  DBili  7.1<H>  /  AST  67<H>  /  ALT  82<H>  /  AlkPhos  81      PT/INR - ( 2018 05:50 )   PT: 15.6 sec;   INR: 1.40          PTT - ( 2018 05:50 )  PTT:73.5 sec  Urinalysis Basic - ( 2018 09:59 )    Color: Yellow / Appearance: Clear / S.020 / pH: x  Gluc: x / Ketone: NEGATIVE  / Bili: Moderate / Urobili: 0.2 E.U./dL   Blood: x / Protein: Trace mg/dL / Nitrite: NEGATIVE   Leuk Esterase: NEGATIVE / RBC: 5-10 /HPF / WBC < 5 /HPF   Sq Epi: x / Non Sq Epi: 5-10 /HPF / Bacteria: Many /HPF          MICROBIOLOGY:  Culture Results:   No growth at 12 hours ( @ 13:39)  Culture Results:   No growth at 12 hours ( @ 13:38)  Culture Results:   Numerous Gram Negative Rods  Identification and susceptibility to follow. ( @ 10:09)  Culture Results:   Candida albicans isolated ( @ 16:33)  Culture Results:   Candida albicans  Susceptibility to follow. ( @ 21:26)  Culture Results:   Few Candida albicans ( @ 00:44)  Culture Results:   Few Candida albicans ( @ 00:44)  Culture Results:   Few Yeast See previous culture for sensitivity ( @ 21:43)  Culture Results:   Few Yeast See previous culture for sensitivity ( @ 21:42)  Culture Results:   Moderate Yeast See previous culture for id and sensitivity ( @ 16:43)  Culture Results:   Moderate Yeast Pending further identification and antibiotic  susceptibility testing at  [facility] ( @ 16:24)      RADIOLOGY & ADDITIONAL STUDIES:

## 2018-01-28 NOTE — PROGRESS NOTE ADULT - ASSESSMENT
ASSESSMENT/PLAN 71y/o male pt c Respiratory failure, perforated duodenal ulcer, POD post repair, RLL pneumonia, RLL atelectases, no obvious RADHA, elevated R hemidiaphragm, AFIB, worsening thrombocytopenia, possible endocarditis, s/p reop.    1. O2: PS titration, today at 10/5, continue at PS 10 as he is not able to protect his airways yet.  2. Bronchodilators:  Atrovent/ albuterol q 4 – 6 hours as needed  3. Corticosteroids: off  4. ID/Antibiotics: must continue ABX (P/T) for right perihilar pneumonia with gram negative bacteria, sepsis; sera for peritoneal candida  5. Cardiac/HTN: Monitor, optimize HR; on heparin drip for AFIB  6. GI: Rx/ prophylaxis c PPI/H2B  7. Heme: Rx/VT prophylaxis IV heparin; platelets noted   8. Aspiration precautions, NGT   9. Surgery mngmnt priority  10. Edema: diuretic titration    Discussed with managing team, CCRN, SICU, surgery, ID

## 2018-01-28 NOTE — PROGRESS NOTE ADULT - SUBJECTIVE AND OBJECTIVE BOX
EPS Progress Note    S:  Patient remains intubated     T(C): 37.6 (01-28-18 @ 09:35), Max: 38.3 (01-27-18 @ 17:00)  HR: 78 (01-28-18 @ 11:00) (70 - 99)  BP: 131/60 (01-28-18 @ 11:00) (103/53 - 135/63)  RR: 25 (01-28-18 @ 11:00) (14 - 25)  SpO2: 99% (01-28-18 @ 11:00) (96% - 100%)  Wt(kg): --     Telemetry:  Afib with controlled VR          General:  No acute distress      Chest:  Chest is clear to auscultation bilaterally without wheezes, crackles, or rhonchi  Cardiac:  Irregular rate and rhythm.  No murmur, rubs, or gallops heard.  Abdomen:  Soft without rebound or guarding.  Bowel sounds are presnt in all 4 quadrants.  No hepatosplenomegaly  Extremities:  No lower extremity edema is present.  No cyanosis or clubbing       MEDICATIONS  (STANDING):  albumin human 25% IVPB 50 milliLiter(s) IV Intermittent every 8 hours  ALBUTerol/ipratropium for Nebulization 3 milliLiter(s) Nebulizer every 6 hours  chlorhexidine 0.12% Liquid 15 milliLiter(s) Swish and Spit two times a day  dextrose 5%. 1000 milliLiter(s) (50 mL/Hr) IV Continuous <Continuous>  dextrose 5%. 1000 milliLiter(s) (100 mL/Hr) IV Continuous <Continuous>  digoxin  Injectable 0.125 milliGRAM(s) IV Push daily  heparin  Infusion 800 Unit(s)/Hr (8 mL/Hr) IV Continuous <Continuous>  insulin lispro (HumaLOG) corrective regimen sliding scale   SubCutaneous every 6 hours  metolazone 10 milliGRAM(s) Oral two times a day  micafungin IVPB 100 milliGRAM(s) IV Intermittent every 24 hours  micafungin IVPB      pantoprazole  Injectable 40 milliGRAM(s) IV Push every 12 hours  piperacillin/tazobactam IVPB. 3.375 Gram(s) IV Intermittent every 6 hours  potassium chloride   Powder 40 milliEquivalent(s) Enteral Tube every 4 hours    MEDICATIONS  (PRN):  acetaminophen    Suspension 650 milliGRAM(s) Enteral Tube every 6 hours PRN For Temp greater than 38 C (100.4 F)  HYDROmorphone  Injectable 0.5 milliGRAM(s) IV Push every 3 hours PRN Severe Pain (7 - 10)                                                         9.4    13.2  )-----------( 56       ( 28 Jan 2018 05:50 )             29.9     01-28    153<H>  |  114<H>  |  45<H>  ----------------------------<  194<H>  3.2<L>   |  31  |  1.28    Ca    7.5<L>      28 Jan 2018 05:50  Phos  2.5     01-28  Mg     2.8     01-28    TPro  4.9<L>  /  Alb  2.0<L>  /  TBili  9.0<H>  /  DBili  7.1<H>  /  AST  67<H>  /  ALT  82<H>  /  AlkPhos  81  01-27    PT/INR - ( 28 Jan 2018 05:50 )   PT: 15.6 sec;   INR: 1.40          PTT - ( 28 Jan 2018 05:50 )  PTT:73.5 sec    Assessment/Plan:    73yo Male with PMH with septic shocks and oligo/ aneuric, s/p treatment with Vanco - supra therapeutic levels, s/p laparotomy for perforation of the duodenum, revision done on 1/22. On 1/23 was on a short period of time on CVVHD and was stopped because of clotting issues, on 1/24 good response to diuretics. Remains intubated and sedated. Off pressors with good blood pressures. EP consulted for Afib with RVR for AMRIK/ DCCV.  Cardioversion postponed due to inability to perform AMRIK.  Will attempt again this week.

## 2018-01-28 NOTE — PROGRESS NOTE ADULT - ATTENDING COMMENTS
In SICU, intubated, sedated, hemodynamically stable, no pressors, tolerating tube feeds at 20 cc/h, intraabdominal drains serosanguinous, duodenostomy tube bilious 200cc in 24 h, gastrostomy tube no drainage, central line removed, wounds clean, dry, intact.  Continue SICU care with IV ABX, DVT profilaxis, Tube feeds at 20 cc/h.

## 2018-01-28 NOTE — PROGRESS NOTE ADULT - ASSESSMENT
This is 72 year old male with PMH stated above, now in shock - most likely septic and oligo anuric in these setting, also s/p CT angio, s/p treatment with Vanco - supra therapeutic levels,  s/p laparotomy for perforation of the duodenum., revision done on 1/22. On 1/23 was ofr a short period of time on CVVHD and was stopped because of clotting issues. Now with improvment of the renal function - 2.5 liters off, catheter removed from his chest, renal function better

## 2018-01-28 NOTE — PROGRESS NOTE ADULT - ATTENDING COMMENTS
Patient examined, fellow's hx and PE reviewed and confirmed. I find GAETANO, acidosis. A/P reviewed and confirmed. Follow SCr. Follow bicarb. See full note.

## 2018-01-28 NOTE — PROGRESS NOTE ADULT - ATTENDING COMMENTS
Supportive rx  Multi organ failure  Vent dependent  on anti fungal--rosas  pain controlled  prog is guarded

## 2018-01-28 NOTE — PROGRESS NOTE ADULT - SUBJECTIVE AND OBJECTIVE BOX
INTERVAL HPI/OVERNIGHT EVENTS:   SURGERY ATTENDING FOR DR. TENORIO        SUBJECTIVE:  Flatus: [ ] YES [X ] NO             Bowel Movement: [ ] YES [X ] NO  Pain (0-10):      0      Pain Control Adequate: [X ] YES [ ] NO  Nausea: [ ] YES [X ] NO            Vomiting: [ ] YES [X ] NO  Diarrhea: [ ] YES [X ] NO         Constipation: [ ] YES [ X] NO     Chest Pain: [ ] YES [X ] NO    SOB:  [ ] YES [X ] NO    MEDICATIONS  (STANDING):  albumin human 25% IVPB 50 milliLiter(s) IV Intermittent every 8 hours  ALBUTerol/ipratropium for Nebulization 3 milliLiter(s) Nebulizer every 6 hours  chlorhexidine 0.12% Liquid 15 milliLiter(s) Swish and Spit two times a day  dextrose 5%. 1000 milliLiter(s) (50 mL/Hr) IV Continuous <Continuous>  dextrose 5%. 1000 milliLiter(s) (100 mL/Hr) IV Continuous <Continuous>  digoxin  Injectable 0.125 milliGRAM(s) IV Push daily  heparin  Infusion 800 Unit(s)/Hr (8 mL/Hr) IV Continuous <Continuous>  insulin lispro (HumaLOG) corrective regimen sliding scale   SubCutaneous every 6 hours  metolazone 10 milliGRAM(s) Oral two times a day  micafungin IVPB 100 milliGRAM(s) IV Intermittent every 24 hours  micafungin IVPB      pantoprazole  Injectable 40 milliGRAM(s) IV Push every 12 hours  piperacillin/tazobactam IVPB. 3.375 Gram(s) IV Intermittent every 6 hours    MEDICATIONS  (PRN):  acetaminophen    Suspension 650 milliGRAM(s) Enteral Tube every 6 hours PRN For Temp greater than 38 C (100.4 F)  HYDROmorphone  Injectable 0.5 milliGRAM(s) IV Push every 3 hours PRN Severe Pain (7 - 10)      Vital Signs Last 24 Hrs  T(C): 37.3 (2018 14:57), Max: 38.3 (2018 17:00)  T(F): 99.1 (2018 14:57), Max: 100.9 (2018 17:00)  HR: 74 (2018 15:00) (70 - 90)  BP: 132/62 (2018 15:00) (103/53 - 137/59)  BP(mean): 79 (2018 15:00) (65 - 88)  RR: 19 (2018 15:00) (14 - 25)  SpO2: 98% (2018 15:00) (97% - 100%)    PHYSICAL EXAM:      Constitutional:    Eyes:    ENMT:    Neck:    Breasts:    Back:    Respiratory:    Cardiovascular:    Gastrointestinal:    Genitourinary:    Rectal:    Extremities:    Vascular:    Neurological:    Skin:    Lymph Nodes:    Musculoskeletal:    Psychiatric:        I&O's Detail    2018 07:01  -  2018 07:00  --------------------------------------------------------  IN:    Albumin 5%  - 250 mL: 100 mL    dextrose 5%.: 200 mL    Enteral Tube Flush: 900 mL    fentaNYL  Infusion: 3 mL    heparin Infusion: 144 mL    IV PiggyBack: 600 mL    Jevity: 480 mL    sodium chloride 0.225%: 350 mL  Total IN: 2777 mL    OUT:    Bulb: 160 mL    Bulb: 165 mL    Drain: 150 mL    Indwelling Catheter - Urethral: 2070 mL  Total OUT: 2545 mL    Total NET: 232 mL      2018 07:01  -  2018 15:05  --------------------------------------------------------  IN:    dextrose 5%.: 600 mL    heparin Infusion: 40 mL    Jevity: 140 mL  Total IN: 780 mL    OUT:    Bulb: 25 mL    Bulb: 35 mL    Indwelling Catheter - Urethral: 500 mL  Total OUT: 560 mL    Total NET: 220 mL          LABS:                        9.4    13.2  )-----------( 56       ( 2018 05:50 )             29.9         153<H>  |  114<H>  |  45<H>  ----------------------------<  194<H>  3.2<L>   |  31  |  1.28    Ca    7.5<L>      2018 05:50  Phos  2.5       Mg     2.8         TPro  4.9<L>  /  Alb  2.0<L>  /  TBili  9.0<H>  /  DBili  7.1<H>  /  AST  67<H>  /  ALT  82<H>  /  AlkPhos  81      PT/INR - ( 2018 05:50 )   PT: 15.6 sec;   INR: 1.40          PTT - ( 2018 05:50 )  PTT:73.5 sec  Urinalysis Basic - ( 2018 09:59 )    Color: Yellow / Appearance: Clear / S.020 / pH: x  Gluc: x / Ketone: NEGATIVE  / Bili: Moderate / Urobili: 0.2 E.U./dL   Blood: x / Protein: Trace mg/dL / Nitrite: NEGATIVE   Leuk Esterase: NEGATIVE / RBC: 5-10 /HPF / WBC < 5 /HPF   Sq Epi: x / Non Sq Epi: 5-10 /HPF / Bacteria: Many /HPF        RADIOLOGY & ADDITIONAL STUDIES:

## 2018-01-28 NOTE — PROGRESS NOTE ADULT - ASSESSMENT
72yM with PMH severe MR/AR presented after recent dx acute cholecystitis and RLL PNA at [facility] with lower abdominal pain, new onset rapid Afib, sepsis, acute CHF exacerbation, initially admitted to cardiology on bipap, then transferred to SICU with perforated duodenum s/p Shreyas patch and jejunostomy (1/20) c/b breakdown of shreyas patch s/p RTOR, washout, new Shreyas patch, gastrostomy, duodenostomy, IVCF (1/22)    Neuro: off fent gtts, no distress, awake alert, on dilaudid PRN  CV: Afib: Echo EF 50%, ?vegetation and Severe MR, bld cx had been negative - pending AMRIK to further eval and for possible cardioversion, continue digoxin, - Dig level was ok yesterday, will repeat levels in a few days. still overloaded s/p lasix diuresis, but currently hypernatremic/FW depleted, started metolazone yesterday, will repeat Na this afternoon and possible lasix if Na improves. currently on D5W for hypernatremia - might need to change to 1/4NS if fingerstick high.     Pulm: Intubated, comfortable on CPAP 10/5, but slightly more tachypneic on CPAP 7/5. chlorhex  GI/FEN: NPO with NGT to LIWS. [employer] TF jevity @ 20 cc. retrograde D-tube to LIWS, G-tube to LIWS Protonix bid. currently on D5W for hypernatremia - might need to change to 1/4NS if fingerstick high. started on FW 200q4h yesterday - will continue.    : Oliveira for Strict I&O while being diuresed, GAETANO improved.   ID: Perforated duodenum/PNA, surgical swab grew Candida: micafungin (1/22--) - f/u final Yeast identification before consider changing to diflucan. cont meropenem (1/22--), T100.9 yesterday, sputum cx/bld cx sent. ID appreciated.   Endo: ISS  HEME:DVT Heparin gtt PTT, thrombocytopenic, HIT +, serotonin release assay NEGATIVE. IVCF (1/22) //1FFP, 1PLT (1/22).   PPx: SCD, R pop DVT, s/p IVC filter 1/22  Lines: PIV, L brachial Jeane (1/20-), RIJ TLC, LSC HD cath (1/22-)  Wounds: Ex lap, JPx2 to bulb suction, J-tube to gravity, D-tube to LIWS, G-tube to LIWS  PT/OT: Not ordered 72yM with PMH severe MR/AR presented after recent dx acute cholecystitis and RLL PNA at [facility] with lower abdominal pain, new onset rapid Afib, sepsis, acute CHF exacerbation, initially admitted to cardiology on bipap, then transferred to SICU with perforated duodenum s/p Shreyas patch and jejunostomy (1/20) c/b breakdown of shreyas patch s/p RTOR, washout, new Shreyas patch, gastrostomy, duodenostomy, IVCF (1/22)    Neuro: off fent gtts, no distress, awake alert, on dilaudid PRN  CV: Afib: Echo EF 50%, ?vegetation and Severe MR, bld cx had been negative - pending AMRIK to further eval and for possible cardioversion, continue digoxin, - Dig level was ok yesterday, will repeat levels in a few days. still overloaded s/p lasix diuresis, but currently hypernatremic/FW depleted, started metolazone yesterday, will repeat Na this afternoon and possible lasix if Na improves. currently on D5W for hypernatremia - might need to change to 1/4NS if fingerstick high.     Pulm: Intubated, comfortable on CPAP 10/5, but slightly more tachypneic on CPAP 7/5. chlorhex  GI/FEN: NPO with NGT to LIWS. [employer] TF jevity @ 20 cc. retrograde D-tube to LIWS, G-tube to LIWS Protonix bid. currently on D5W for hypernatremia - might need to change to 1/4NS if fingerstick high. started on FW 200q4h yesterday - will continue.    : Oliveira for Strict I&O while being diuresed, GAETANO improved.   ID: Perforated duodenum/PNA, surgical swab grew Candida: micafungin (1/22--) - f/u final Yeast identification before consider changing to diflucan. cont meropenem (1/22--), T100.9 yesterday, UA negative. sputum cx/bld cx sent. ID appreciated.   Endo: ISS  HEME: thrombocytopenic, HIT +, serotonin release assay NEGATIVE. R pop DVT, s/p IVC filter. on hep gtts for afib, ptt therapeutic.   PPx: SCD, on hep gtts (afib),   Lines: PIV, L brachial Dexter (1/20-1/27IJ TLC, LSC HD cath (1/22-1/27)  Wounds: Ex lap, JPx2 to bulb suction, J-tube to gravity, D-tube to LIWS, G-tube to LIWS  PT consult requested for early mobilization. 72yM with PMH severe MR/AR presented after recent dx acute cholecystitis and RLL PNA at [facility] with lower abdominal pain, new onset rapid Afib, sepsis, acute CHF exacerbation, initially admitted to cardiology on bipap, then transferred to SICU with perforated duodenum s/p Shreyas patch and jejunostomy (1/20) c/b breakdown of shreyas patch s/p RTOR, washout, new Shreyas patch, gastrostomy, duodenostomy, IVCF (1/22)    Neuro: off fent gtts, no distress, awake alert, on dilaudid PRN  CV: Afib: Echo EF 50%, ?vegetation and Severe MR, bld cx had been negative - pending AMRIK to further eval and for possible cardioversion, continue digoxin, - Dig level was ok yesterday, will repeat levels in a few days. still overloaded s/p lasix diuresis, but currently hypernatremic/FW depleted, started metolazone yesterday, will repeat Na this afternoon and possible lasix if Na improves. currently on D5W for hypernatremia - might need to change to 1/4NS if fingerstick high.     Pulm: Intubated, comfortable on CPAP 10/5, but slightly more tachypneic on CPAP 7/5. chlorhex  GI/FEN: NPO with NGT to LIWS. [employer] TF jevity @ 20 cc. retrograde D-tube to LIWS, G-tube to LIWS Protonix bid. currently on D5W for hypernatremia - might need to change to 1/4NS if fingerstick high. started on FW 200q4h yesterday - will continue.    : Oliveira for Strict I&O while being diuresed, GAETANO improved.   ID: Perforated duodenum/PNA, surgical swab grew Candida: micafungin (1/22--) - f/u final Yeast identification before consider changing to diflucan. cont meropenem (1/22--), T100.9 yesterday, UA negative. sputum cx/bld cx sent. ID appreciated.   Endo: ISS  HEME: thrombocytopenia of unclear etiology, (?from resolving sepsis??), HIT +, but serotonin release assay NEGATIVE. R pop DVT, s/p IVC filter. on hep gtts for afib, ptt therapeutic.   PPx: SCD, on hep gtts (afib),   Lines: PIV, L brachial Jeane (1/20-1/27IJ TLC, LSC HD cath (1/22-1/27)  Wounds: Ex lap, JPx2 to bulb suction, J-tube to gravity, D-tube to LIWS, G-tube to LIWS  PT consult requested for early mobilization.

## 2018-01-28 NOTE — PROGRESS NOTE ADULT - SUBJECTIVE AND OBJECTIVE BOX
Subjective and objective   The patient still intubated, good urinary output after stimulation of the diuresis. Yesterday - primary team gave metolazone 5 mg     Renal service for oliguric Frieda - due to ATN, now with good response to the diuretic therapy  and significant improvement of the renal function             Patient seen and examined at bedside.     acetaminophen    Suspension 650 milliGRAM(s) every 6 hours PRN  albumin human 25% IVPB 50 milliLiter(s) every 8 hours  ALBUTerol/ipratropium for Nebulization 3 milliLiter(s) every 6 hours  chlorhexidine 0.12% Liquid 15 milliLiter(s) two times a day  dextrose 5%. 1000 milliLiter(s) <Continuous>  dextrose 5%. 1000 milliLiter(s) <Continuous>  digoxin  Injectable 0.125 milliGRAM(s) daily  heparin  Infusion 800 Unit(s)/Hr <Continuous>  HYDROmorphone  Injectable 0.5 milliGRAM(s) every 3 hours PRN  insulin lispro (HumaLOG) corrective regimen sliding scale   every 6 hours  metolazone 10 milliGRAM(s) two times a day  micafungin IVPB 100 milliGRAM(s) every 24 hours  micafungin IVPB     pantoprazole  Injectable 40 milliGRAM(s) every 12 hours  piperacillin/tazobactam IVPB. 3.375 Gram(s) every 6 hours  potassium chloride   Powder 40 milliEquivalent(s) every 4 hours      Allergies    No Known Allergies    Intolerances        T(C): , Max: 38.3 (18 @ 17:00)  T(F): , Max: 100.9 (18 @ 17:00)  HR: 78 (18 @ 11:00)  BP: 131/60 (18 @ 11:00)  BP(mean): 80 (18 @ 11:00)  RR: 25 (18 @ 11:00)  SpO2: 99% (18 @ 11:00)  Wt(kg): --     @ 07:  -   07:00  --------------------------------------------------------  IN:    Albumin 5%  - 250 mL: 100 mL    dextrose 5%.: 200 mL    Enteral Tube Flush: 900 mL    fentaNYL  Infusion: 3 mL    heparin Infusion: 144 mL    IV PiggyBack: 600 mL    Jevity: 480 mL    sodium chloride 0.225%: 350 mL  Total IN: 2777 mL    OUT:    Bulb: 160 mL    Bulb: 165 mL    Drain: 150 mL    Indwelling Catheter - Urethral: 2070 mL  Total OUT: 2545 mL    Total NET: 232 mL       @ 07:  -   @ 12:10  --------------------------------------------------------  IN:    dextrose 5%.: 400 mL    heparin Infusion: 32 mL    Jevity: 80 mL  Total IN: 512 mL    OUT:    Bulb: 25 mL    Bulb: 35 mL    Indwelling Catheter - Urethral: 200 mL  Total OUT: 260 mL    Total NET: 252 mL        Physical exam:   Intubated and sedated   On Ventilator   Normal air entry into the lungs, no wheezing, decreased breath sounds on the right base   tachycardic - normal s1/s2, no murmurs, rubs or gallops   Abdomen - soft, slightly distended, s/p laparotomy   Extremities: 3+ peripheral edema   Perm cath removed         LABS:                        9.4    13.2  )-----------( 56       ( 2018 05:50 )             29.9         153<H>  |  114<H>  |  45<H>  ----------------------------<  194<H>  3.2<L>   |  31  |  1.28    Ca    7.5<L>      2018 05:50  Phos  2.5       Mg     2.8         TPro  4.9<L>  /  Alb  2.0<L>  /  TBili  9.0<H>  /  DBili  7.1<H>  /  AST  67<H>  /  ALT  82<H>  /  AlkPhos  81        PT/INR - ( 2018 05:50 )   PT: 15.6 sec;   INR: 1.40          PTT - ( 2018 05:50 )  PTT:73.5 sec  Urinalysis Basic - ( 2018 09:59 )    Color: Yellow / Appearance: Clear / S.020 / pH: x  Gluc: x / Ketone: NEGATIVE  / Bili: Moderate / Urobili: 0.2 E.U./dL   Blood: x / Protein: Trace mg/dL / Nitrite: NEGATIVE   Leuk Esterase: NEGATIVE / RBC: 5-10 /HPF / WBC < 5 /HPF   Sq Epi: x / Non Sq Epi: 5-10 /HPF / Bacteria: Many /HPF            RADIOLOGY & ADDITIONAL STUDIES:

## 2018-01-28 NOTE — PROGRESS NOTE ADULT - ASSESSMENT
Low platelets possibly medication related.  Awaits results of sputum cultures to clarify if antibiotics need to be changed

## 2018-01-28 NOTE — PROGRESS NOTE ADULT - SUBJECTIVE AND OBJECTIVE BOX
Covering for Dr. Khan.    INTERVAL HISTORY: Pt remains intubated in SICU. Fever curve improving. Rates controlled.   	  MEDICATIONS:  digoxin  Injectable 0.125 milliGRAM(s) IV Push daily  heparin  Infusion 800 Unit(s)/Hr IV Continuous <Continuous>  metolazone 10 milliGRAM(s) Oral two times a day    micafungin IVPB 100 milliGRAM(s) IV Intermittent every 24 hours  micafungin IVPB      piperacillin/tazobactam IVPB. 3.375 Gram(s) IV Intermittent every 6 hours    ALBUTerol/ipratropium for Nebulization 3 milliLiter(s) Nebulizer every 6 hours    acetaminophen    Suspension 650 milliGRAM(s) Enteral Tube every 6 hours PRN  HYDROmorphone  Injectable 0.5 milliGRAM(s) IV Push every 3 hours PRN    bisacodyl Suppository 10 milliGRAM(s) Rectal once  pantoprazole  Injectable 40 milliGRAM(s) IV Push every 12 hours    insulin lispro (HumaLOG) corrective regimen sliding scale   SubCutaneous every 6 hours    albumin human 25% IVPB 50 milliLiter(s) IV Intermittent every 8 hours  chlorhexidine 0.12% Liquid 15 milliLiter(s) Swish and Spit two times a day  dextrose 5%. 1000 milliLiter(s) IV Continuous <Continuous>  dextrose 5%. 1000 milliLiter(s) IV Continuous <Continuous>  potassium chloride   Powder 40 milliEquivalent(s) Enteral Tube every 4 hours      PHYSICAL EXAM:  T(C): 37.6 (01-28-18 @ 09:35), Max: 38.3 (01-27-18 @ 17:00)  HR: 76 (01-28-18 @ 09:00) (70 - 99)  BP: 116/59 (01-28-18 @ 09:00) (103/53 - 135/63)  RR: 22 (01-28-18 @ 09:00) (14 - 24)  SpO2: 97% (01-28-18 @ 09:00) (96% - 99%)  Wt(kg): --  I&O's Summary    27 Jan 2018 07:01  -  28 Jan 2018 07:00  --------------------------------------------------------  IN: 2777 mL / OUT: 2545 mL / NET: 232 mL    28 Jan 2018 07:01  -  28 Jan 2018 09:53  --------------------------------------------------------  IN: 236 mL / OUT: 260 mL / NET: -24 mL      Appearance: NAD, intubated  Cardiovascular: Irregular S1 S2, No JVD, No murmurs  Respiratory: Vented, diminished breath sounds, otherwise clear anteriorly, No wheezing   Gastrointestinal:  Soft, NT/ ND, No bowel sounds 	  Neurologic: intubated sedated   Extremities: No edema, pulses intact    TELEMETRY: AF with controlled heart rates 60-80 bpm, occassional PVCs.   	    LABS:	 	               9.4    13.2  )-----------( 56       ( 28 Jan 2018 05:50 )             29.9     01-28    153<H>  |  114<H>  |  45<H>  ----------------------------<  194<H>  3.2<L>   |  31  |  1.28    Ca    7.5<L>      28 Jan 2018 05:50  Phos  2.5     01-28  Mg     2.8     01-28    TPro  4.9<L>  /  Alb  2.0<L>  /  TBili  9.0<H>  /  DBili  7.1<H>  /  AST  67<H>  /  ALT  82<H>  /  AlkPhos  81  01-27    ASSESSMENT/PLAN: 	  73yo Male with PMH with septic shocks and oligo/ aneuric, s/p treatment with Vanco - supra therapeutic levels, s/p laparotomy for perforation of the duodenum, revision done on 1/22 c/b Afib with RVR, volume overload and most recently now with recurrent fevers.    #AFIB  - The patient's evidence of persistent AFIB is likely driven by patients current septic state and structural valve disease with TTE notable for torrential mitral regurgitation and a severe enlarged left atrium.  All these factors make it unlikely for him to sustain SR; however, EP will like to attempt DCCV post AMRIK once PAMELA clot is ruled out.  Would also consider antiarrhythmic post AMRIK. Patient will need to be maintained on uninterrupted anticoagulation for at least one month post DCCV. At this time, patient's heart rates are well controlled with use of digoxin. Level therapeutic at 1, would continue at current dose. Cont heparin gtt given elevated CHADSVASc Score. Patient will require NGT removal and to be NPO past midnight prior to AMRIK.  Depending on clinical status, can consider Monday vs. Tuesday.     #severe MR  - TTE demonstrating bileaflet mitral valve prolapse with e/o severe MR.  Cannot fully exclude vegetation. In light of continued fevers (although curve improving), f/u BCx data. Await AMRIK to r/o associated vegetation. Consider ID input on this matter. In future, would consider w/u for mitral clip after he recovers from his acute illness and infectious issues.   - Cont diuresis to maintain a net negative balance of 1 L/24 hours. He is tolerating current dose of Metolazone at this time. Ensure K>4, Mg>2, PO4 wnl. Covering for Dr. Khan.    INTERVAL HISTORY: Pt remains intubated in SICU. Fever curve improving. Rates controlled.   	  MEDICATIONS:  digoxin  Injectable 0.125 milliGRAM(s) IV Push daily  heparin  Infusion 800 Unit(s)/Hr IV Continuous <Continuous>  metolazone 10 milliGRAM(s) Oral two times a day    micafungin IVPB 100 milliGRAM(s) IV Intermittent every 24 hours  micafungin IVPB      piperacillin/tazobactam IVPB. 3.375 Gram(s) IV Intermittent every 6 hours    ALBUTerol/ipratropium for Nebulization 3 milliLiter(s) Nebulizer every 6 hours    acetaminophen    Suspension 650 milliGRAM(s) Enteral Tube every 6 hours PRN  HYDROmorphone  Injectable 0.5 milliGRAM(s) IV Push every 3 hours PRN    bisacodyl Suppository 10 milliGRAM(s) Rectal once  pantoprazole  Injectable 40 milliGRAM(s) IV Push every 12 hours    insulin lispro (HumaLOG) corrective regimen sliding scale   SubCutaneous every 6 hours    albumin human 25% IVPB 50 milliLiter(s) IV Intermittent every 8 hours  chlorhexidine 0.12% Liquid 15 milliLiter(s) Swish and Spit two times a day  dextrose 5%. 1000 milliLiter(s) IV Continuous <Continuous>  dextrose 5%. 1000 milliLiter(s) IV Continuous <Continuous>  potassium chloride   Powder 40 milliEquivalent(s) Enteral Tube every 4 hours      PHYSICAL EXAM:  T(C): 37.6 (01-28-18 @ 09:35), Max: 38.3 (01-27-18 @ 17:00)  HR: 76 (01-28-18 @ 09:00) (70 - 99)  BP: 116/59 (01-28-18 @ 09:00) (103/53 - 135/63)  RR: 22 (01-28-18 @ 09:00) (14 - 24)  SpO2: 97% (01-28-18 @ 09:00) (96% - 99%)  Wt(kg): --  I&O's Summary    27 Jan 2018 07:01  -  28 Jan 2018 07:00  --------------------------------------------------------  IN: 2777 mL / OUT: 2545 mL / NET: 232 mL    28 Jan 2018 07:01  -  28 Jan 2018 09:53  --------------------------------------------------------  IN: 236 mL / OUT: 260 mL / NET: -24 mL      Appearance: NAD, intubated  Cardiovascular: Irregular S1 S2, No JVD, No murmurs  Respiratory: Vented, diminished breath sounds, otherwise clear anteriorly, No wheezing   Gastrointestinal:  Soft, NT/ ND, No bowel sounds 	  Neurologic: intubated sedated   Extremities: No edema, pulses intact    TELEMETRY: AF with controlled heart rates 60-80 bpm, occassional PVCs.   	    LABS:	 	               9.4    13.2  )-----------( 56       ( 28 Jan 2018 05:50 )             29.9     01-28    153<H>  |  114<H>  |  45<H>  ----------------------------<  194<H>  3.2<L>   |  31  |  1.28    Ca    7.5<L>      28 Jan 2018 05:50  Phos  2.5     01-28  Mg     2.8     01-28    TPro  4.9<L>  /  Alb  2.0<L>  /  TBili  9.0<H>  /  DBili  7.1<H>  /  AST  67<H>  /  ALT  82<H>  /  AlkPhos  81  01-27    ASSESSMENT/PLAN: 	  73yo Male with PMH with septic shocks and oligo/ aneuric, s/p treatment with Vanco - supra therapeutic levels, s/p laparotomy for perforation of the duodenum, revision done on 1/22 c/b Afib with RVR, volume overload and most recently now with recurrent fevers.    #AFIB  - The patient's evidence of persistent AFIB is likely driven by patients current septic state and structural valve disease with TTE notable for torrential mitral regurgitation and a severe enlarged left atrium.  All these factors make it unlikely for him to sustain SR; however, EP will like to attempt DCCV post AMRIK once PAMELA clot is ruled out.  Would also consider antiarrhythmic post AMRIK. Patient will need to be maintained on uninterrupted anticoagulation for at least one month post DCCV. At this time, patient's heart rates are well controlled with use of digoxin. Level therapeutic at 1, would continue at current dose. Cont heparin gtt given elevated CHADSVASc Score. Patient will require NGT removal and to be NPO past midnight prior to AMRIK.  Depending on clinical status, can consider Monday vs. Tuesday.     #severe MR  - TTE demonstrating bileaflet mitral valve prolapse with e/o severe MR.  Cannot fully exclude vegetation. In light of continued fevers (although curve improving), f/u BCx data. Await AMRIK to r/o associated vegetation. Consider ID input on this matter. In future, would consider w/u for mitral clip after he recovers from his acute illness and infectious issues.   - Cont diuresis to maintain a net negative balance of 1 L/24 hours. He is tolerating current dose of Metolazone at this time. Ensure K>4, Mg>2, PO4 wnl.      D/w Sic[facility] team.

## 2018-01-29 DIAGNOSIS — R60.1 GENERALIZED EDEMA: ICD-10-CM

## 2018-01-29 LAB
-  CEFTAZIDIME: SIGNIFICANT CHANGE UP
-  LEVOFLOXACIN: SIGNIFICANT CHANGE UP
-  TRIMETHOPRIM/SULFAMETHOXAZOLE: SIGNIFICANT CHANGE UP
ALBUMIN SERPL ELPH-MCNC: 2 G/DL — LOW (ref 3.3–5)
ALP SERPL-CCNC: 109 U/L — SIGNIFICANT CHANGE UP (ref 40–120)
ALT FLD-CCNC: 70 U/L — HIGH (ref 10–45)
ANION GAP SERPL CALC-SCNC: 9 MMOL/L — SIGNIFICANT CHANGE UP (ref 5–17)
APTT BLD: 45.3 SEC — HIGH (ref 27.5–37.4)
APTT BLD: 50.3 SEC — HIGH (ref 27.5–37.4)
APTT BLD: 61.9 SEC — HIGH (ref 27.5–37.4)
AST SERPL-CCNC: 60 U/L — HIGH (ref 10–40)
BILIRUB DIRECT SERPL-MCNC: 8 MG/DL — HIGH (ref 0–0.2)
BILIRUB INDIRECT FLD-MCNC: 2.7 MG/DL — HIGH (ref 0.2–1)
BILIRUB SERPL-MCNC: 10.7 MG/DL — HIGH (ref 0.2–1.2)
BUN SERPL-MCNC: 35 MG/DL — HIGH (ref 7–23)
CALCIUM SERPL-MCNC: 7.4 MG/DL — LOW (ref 8.4–10.5)
CHLORIDE SERPL-SCNC: 108 MMOL/L — SIGNIFICANT CHANGE UP (ref 96–108)
CO2 SERPL-SCNC: 29 MMOL/L — SIGNIFICANT CHANGE UP (ref 22–31)
CREAT SERPL-MCNC: 1.11 MG/DL — SIGNIFICANT CHANGE UP (ref 0.5–1.3)
CULTURE RESULTS: SIGNIFICANT CHANGE UP
GLUCOSE BLDC GLUCOMTR-MCNC: 112 MG/DL — HIGH (ref 70–99)
GLUCOSE BLDC GLUCOMTR-MCNC: 142 MG/DL — HIGH (ref 70–99)
GLUCOSE BLDC GLUCOMTR-MCNC: 203 MG/DL — HIGH (ref 70–99)
GLUCOSE BLDC GLUCOMTR-MCNC: 92 MG/DL — SIGNIFICANT CHANGE UP (ref 70–99)
GLUCOSE SERPL-MCNC: 212 MG/DL — HIGH (ref 70–99)
HCT VFR BLD CALC: 30.7 % — LOW (ref 39–50)
HGB BLD-MCNC: 10.1 G/DL — LOW (ref 13–17)
INR BLD: 1.3 — HIGH (ref 0.88–1.16)
MAGNESIUM SERPL-MCNC: 2.6 MG/DL — SIGNIFICANT CHANGE UP (ref 1.6–2.6)
MCHC RBC-ENTMCNC: 30.6 PG — SIGNIFICANT CHANGE UP (ref 27–34)
MCHC RBC-ENTMCNC: 32.9 G/DL — SIGNIFICANT CHANGE UP (ref 32–36)
MCV RBC AUTO: 93 FL — SIGNIFICANT CHANGE UP (ref 80–100)
METHOD TYPE: SIGNIFICANT CHANGE UP
ORGANISM # SPEC MICROSCOPIC CNT: SIGNIFICANT CHANGE UP
ORGANISM # SPEC MICROSCOPIC CNT: SIGNIFICANT CHANGE UP
PHOSPHATE SERPL-MCNC: 2.2 MG/DL — LOW (ref 2.5–4.5)
PLATELET # BLD AUTO: 57 K/UL — LOW (ref 150–400)
POTASSIUM SERPL-MCNC: 3.2 MMOL/L — LOW (ref 3.5–5.3)
POTASSIUM SERPL-SCNC: 3.2 MMOL/L — LOW (ref 3.5–5.3)
PROT SERPL-MCNC: 4.6 G/DL — LOW (ref 6–8.3)
PROTHROM AB SERPL-ACNC: 14.5 SEC — HIGH (ref 9.8–12.7)
RBC # BLD: 3.3 M/UL — LOW (ref 4.2–5.8)
RBC # FLD: 19.7 % — HIGH (ref 10.3–16.9)
SODIUM SERPL-SCNC: 146 MMOL/L — HIGH (ref 135–145)
SPECIMEN SOURCE: SIGNIFICANT CHANGE UP
SURGICAL PATHOLOGY STUDY: SIGNIFICANT CHANGE UP
WBC # BLD: 13.7 K/UL — HIGH (ref 3.8–10.5)
WBC # FLD AUTO: 13.7 K/UL — HIGH (ref 3.8–10.5)

## 2018-01-29 PROCEDURE — 76937 US GUIDE VASCULAR ACCESS: CPT | Mod: 26

## 2018-01-29 PROCEDURE — 93312 ECHO TRANSESOPHAGEAL: CPT | Mod: 26

## 2018-01-29 PROCEDURE — 71045 X-RAY EXAM CHEST 1 VIEW: CPT | Mod: 26

## 2018-01-29 PROCEDURE — 99232 SBSQ HOSP IP/OBS MODERATE 35: CPT | Mod: GC

## 2018-01-29 PROCEDURE — 99232 SBSQ HOSP IP/OBS MODERATE 35: CPT

## 2018-01-29 PROCEDURE — 36569 INSJ PICC 5 YR+ W/O IMAGING: CPT

## 2018-01-29 PROCEDURE — 99291 CRITICAL CARE FIRST HOUR: CPT

## 2018-01-29 PROCEDURE — 71045 X-RAY EXAM CHEST 1 VIEW: CPT | Mod: 26,77

## 2018-01-29 PROCEDURE — 93320 DOPPLER ECHO COMPLETE: CPT | Mod: 26

## 2018-01-29 PROCEDURE — 93325 DOPPLER ECHO COLOR FLOW MAPG: CPT | Mod: 26

## 2018-01-29 RX ORDER — POTASSIUM CHLORIDE 20 MEQ
40 PACKET (EA) ORAL
Qty: 0 | Refills: 0 | Status: COMPLETED | OUTPATIENT
Start: 2018-01-29 | End: 2018-01-29

## 2018-01-29 RX ORDER — FENTANYL CITRATE 50 UG/ML
25 INJECTION INTRAVENOUS ONCE
Qty: 0 | Refills: 0 | Status: DISCONTINUED | OUTPATIENT
Start: 2018-01-29 | End: 2018-01-29

## 2018-01-29 RX ORDER — CEFEPIME 1 G/1
2000 INJECTION, POWDER, FOR SOLUTION INTRAMUSCULAR; INTRAVENOUS EVERY 8 HOURS
Qty: 0 | Refills: 0 | Status: DISCONTINUED | OUTPATIENT
Start: 2018-01-29 | End: 2018-01-29

## 2018-01-29 RX ORDER — POTASSIUM PHOSPHATE, MONOBASIC POTASSIUM PHOSPHATE, DIBASIC 236; 224 MG/ML; MG/ML
15 INJECTION, SOLUTION INTRAVENOUS ONCE
Qty: 0 | Refills: 0 | Status: DISCONTINUED | OUTPATIENT
Start: 2018-01-29 | End: 2018-01-29

## 2018-01-29 RX ORDER — MIDAZOLAM HYDROCHLORIDE 1 MG/ML
2 INJECTION, SOLUTION INTRAMUSCULAR; INTRAVENOUS ONCE
Qty: 0 | Refills: 0 | Status: DISCONTINUED | OUTPATIENT
Start: 2018-01-29 | End: 2018-01-29

## 2018-01-29 RX ORDER — MIDAZOLAM HYDROCHLORIDE 1 MG/ML
1 INJECTION, SOLUTION INTRAMUSCULAR; INTRAVENOUS ONCE
Qty: 0 | Refills: 0 | Status: DISCONTINUED | OUTPATIENT
Start: 2018-01-29 | End: 2018-01-29

## 2018-01-29 RX ORDER — SODIUM CHLORIDE 9 MG/ML
20 INJECTION INTRAMUSCULAR; INTRAVENOUS; SUBCUTANEOUS ONCE
Qty: 0 | Refills: 0 | Status: DISCONTINUED | OUTPATIENT
Start: 2018-01-29 | End: 2018-02-27

## 2018-01-29 RX ORDER — DIGOXIN 250 MCG
0.12 TABLET ORAL DAILY
Qty: 0 | Refills: 0 | Status: DISCONTINUED | OUTPATIENT
Start: 2018-01-30 | End: 2018-02-07

## 2018-01-29 RX ORDER — POTASSIUM PHOSPHATE, MONOBASIC POTASSIUM PHOSPHATE, DIBASIC 236; 224 MG/ML; MG/ML
15 INJECTION, SOLUTION INTRAVENOUS ONCE
Qty: 0 | Refills: 0 | Status: COMPLETED | OUTPATIENT
Start: 2018-01-29 | End: 2018-01-29

## 2018-01-29 RX ORDER — CEFTAZIDIME 6 G/30ML
2 INJECTION, POWDER, FOR SOLUTION INTRAVENOUS EVERY 8 HOURS
Qty: 0 | Refills: 0 | Status: DISCONTINUED | OUTPATIENT
Start: 2018-01-29 | End: 2018-02-05

## 2018-01-29 RX ORDER — SODIUM CHLORIDE 9 MG/ML
10 INJECTION INTRAMUSCULAR; INTRAVENOUS; SUBCUTANEOUS
Qty: 0 | Refills: 0 | Status: DISCONTINUED | OUTPATIENT
Start: 2018-01-29 | End: 2018-02-27

## 2018-01-29 RX ADMIN — Medication 40 MILLIEQUIVALENT(S): at 16:22

## 2018-01-29 RX ADMIN — HYDROMORPHONE HYDROCHLORIDE 0.5 MILLIGRAM(S): 2 INJECTION INTRAMUSCULAR; INTRAVENOUS; SUBCUTANEOUS at 05:09

## 2018-01-29 RX ADMIN — Medication 3 MILLILITER(S): at 12:28

## 2018-01-29 RX ADMIN — Medication 3 MILLILITER(S): at 23:18

## 2018-01-29 RX ADMIN — MIDAZOLAM HYDROCHLORIDE 2 MILLIGRAM(S): 1 INJECTION, SOLUTION INTRAMUSCULAR; INTRAVENOUS at 10:45

## 2018-01-29 RX ADMIN — HYDROMORPHONE HYDROCHLORIDE 0.5 MILLIGRAM(S): 2 INJECTION INTRAMUSCULAR; INTRAVENOUS; SUBCUTANEOUS at 01:09

## 2018-01-29 RX ADMIN — Medication 50 MILLILITER(S): at 21:49

## 2018-01-29 RX ADMIN — Medication 650 MILLIGRAM(S): at 21:50

## 2018-01-29 RX ADMIN — PIPERACILLIN AND TAZOBACTAM 200 GRAM(S): 4; .5 INJECTION, POWDER, LYOPHILIZED, FOR SOLUTION INTRAVENOUS at 10:45

## 2018-01-29 RX ADMIN — MICAFUNGIN SODIUM 105 MILLIGRAM(S): 100 INJECTION, POWDER, LYOPHILIZED, FOR SOLUTION INTRAVENOUS at 13:53

## 2018-01-29 RX ADMIN — POTASSIUM PHOSPHATE, MONOBASIC POTASSIUM PHOSPHATE, DIBASIC 62.5 MILLIMOLE(S): 236; 224 INJECTION, SOLUTION INTRAVENOUS at 13:55

## 2018-01-29 RX ADMIN — HYDROMORPHONE HYDROCHLORIDE 0.5 MILLIGRAM(S): 2 INJECTION INTRAMUSCULAR; INTRAVENOUS; SUBCUTANEOUS at 01:30

## 2018-01-29 RX ADMIN — Medication 4: at 05:38

## 2018-01-29 RX ADMIN — HYDROMORPHONE HYDROCHLORIDE 0.5 MILLIGRAM(S): 2 INJECTION INTRAMUSCULAR; INTRAVENOUS; SUBCUTANEOUS at 05:30

## 2018-01-29 RX ADMIN — FENTANYL CITRATE 25 MICROGRAM(S): 50 INJECTION INTRAVENOUS at 10:45

## 2018-01-29 RX ADMIN — Medication 3 MILLILITER(S): at 18:01

## 2018-01-29 RX ADMIN — PANTOPRAZOLE SODIUM 40 MILLIGRAM(S): 20 TABLET, DELAYED RELEASE ORAL at 18:39

## 2018-01-29 RX ADMIN — Medication 3 MILLILITER(S): at 05:41

## 2018-01-29 RX ADMIN — CEFTAZIDIME 100 GRAM(S): 6 INJECTION, POWDER, FOR SOLUTION INTRAVENOUS at 21:49

## 2018-01-29 RX ADMIN — Medication 40 MILLIEQUIVALENT(S): at 13:26

## 2018-01-29 RX ADMIN — FENTANYL CITRATE 25 MICROGRAM(S): 50 INJECTION INTRAVENOUS at 13:51

## 2018-01-29 RX ADMIN — Medication 50 MILLILITER(S): at 05:38

## 2018-01-29 RX ADMIN — PIPERACILLIN AND TAZOBACTAM 200 GRAM(S): 4; .5 INJECTION, POWDER, LYOPHILIZED, FOR SOLUTION INTRAVENOUS at 05:09

## 2018-01-29 RX ADMIN — PANTOPRAZOLE SODIUM 40 MILLIGRAM(S): 20 TABLET, DELAYED RELEASE ORAL at 05:09

## 2018-01-29 RX ADMIN — CHLORHEXIDINE GLUCONATE 15 MILLILITER(S): 213 SOLUTION TOPICAL at 18:40

## 2018-01-29 RX ADMIN — HYDROMORPHONE HYDROCHLORIDE 0.5 MILLIGRAM(S): 2 INJECTION INTRAMUSCULAR; INTRAVENOUS; SUBCUTANEOUS at 20:39

## 2018-01-29 RX ADMIN — Medication 50 MILLILITER(S): at 13:54

## 2018-01-29 RX ADMIN — HYDROMORPHONE HYDROCHLORIDE 0.5 MILLIGRAM(S): 2 INJECTION INTRAMUSCULAR; INTRAVENOUS; SUBCUTANEOUS at 21:00

## 2018-01-29 RX ADMIN — CHLORHEXIDINE GLUCONATE 15 MILLILITER(S): 213 SOLUTION TOPICAL at 05:09

## 2018-01-29 NOTE — PROGRESS NOTE ADULT - SUBJECTIVE AND OBJECTIVE BOX
24 hr events: 1/29: CPAP trial (7/5),. held Dig dose today per Bhusri (HR was 40's overnight), AMRIK severe MR w/ likely torn chordae, severe pulm htn. PICC placed (poor venouis access)   o/n: CMV overnight  1/28: CPAP 10/5, PT consult for early mob, given dulcolax    SUBJECTIVE: Off sedation prior to echo. Tolerating CPAP well    MEDICATIONS  (STANDING):  albumin human 25% IVPB 50 milliLiter(s) IV Intermittent every 8 hours  ALBUTerol/ipratropium for Nebulization 3 milliLiter(s) Nebulizer every 6 hours  chlorhexidine 0.12% Liquid 15 milliLiter(s) Swish and Spit two times a day  dextrose 5%. 1000 milliLiter(s) (50 mL/Hr) IV Continuous <Continuous>  heparin  Infusion 800 Unit(s)/Hr (8 mL/Hr) IV Continuous <Continuous>  insulin lispro (HumaLOG) corrective regimen sliding scale   SubCutaneous every 6 hours  metolazone 10 milliGRAM(s) Oral two times a day  micafungin IVPB 100 milliGRAM(s) IV Intermittent every 24 hours  micafungin IVPB      pantoprazole  Injectable 40 milliGRAM(s) IV Push every 12 hours  piperacillin/tazobactam IVPB. 3.375 Gram(s) IV Intermittent every 6 hours  potassium chloride   Powder 40 milliEquivalent(s) Oral every 2 hours  sodium chloride 0.9% lock flush 20 milliLiter(s) IV Push once    MEDICATIONS  (PRN):  acetaminophen    Suspension 650 milliGRAM(s) Enteral Tube every 6 hours PRN For Temp greater than 38 C (100.4 F)  HYDROmorphone  Injectable 0.5 milliGRAM(s) IV Push every 3 hours PRN Severe Pain (7 - 10)  sodium chloride 0.9% lock flush 10 milliLiter(s) IV Push every 1 hour PRN After each medication administration      Oliveira:	  [ ] None	[x] Daily Oliveira Order Placed	   Indication:	  [x] Strict I and O's    [ ] Obstruction     [ ] Incontinence + Stage 3 or 4 Decubitus  Central Line:  [ ] None	   [x]  Medication / TPN Administration     [ ] No Peripheral IV     ICU Vital Signs Last 24 Hrs  T(C): 37.7 (29 Jan 2018 09:48), Max: 37.8 (28 Jan 2018 18:01)  T(F): 99.8 (29 Jan 2018 09:48), Max: 100 (28 Jan 2018 18:01)  HR: 80 (29 Jan 2018 14:00) (62 - 81)  BP: 122/56 (29 Jan 2018 14:00) (96/56 - 152/68)  BP(mean): 68 (29 Jan 2018 14:00) (68 - 113)  ABP: --  ABP(mean): --  RR: 23 (29 Jan 2018 14:00) (16 - 23)  SpO2: 97% (29 Jan 2018 14:00) (92% - 100%)      Physical Exam:  General: NAD  HEENT: NC/AT, EOMI, PERRLA, normal hearing, no oral lesions, neck supple w/o LAD  Pulmonary: Nonlabored breathing, no respiratory distress, B/l basilar diminished breath sounds  Cardiovascular: Irregular Normal rate. Pronounced holosystolic murmur  Abdominal: soft, NT. Drains in place. J-tube holding feeds  Extremities: Edematous throughout. Distal extremeties. Right middle toe no change.   Neuro: A/O x3, CNs II-XII grossly intact, normal motor/sensation, no focal deficits  Pulses: palpable distal pulses    Vent settings:  Mode: AC/ CMV (Assist Control/ Continuous Mandatory Ventilation), RR (machine): 16, TV (machine): 500, FiO2: 40, PEEP: 5, ITime: 0.8, MAP: 7, PIP: 19    I&O's Summary    28 Jan 2018 07:01  -  29 Jan 2018 07:00  --------------------------------------------------------  IN: 4132 mL / OUT: 2535 mL / NET: 1597 mL    29 Jan 2018 07:01  -  29 Jan 2018 14:14  --------------------------------------------------------  IN: 432 mL / OUT: 525 mL / NET: -93 mL        LABS:                        10.1   13.7  )-----------( 57       ( 29 Jan 2018 08:19 )             30.7     01-29    146<H>  |  108  |  35<H>  ----------------------------<  212<H>  3.2<L>   |  29  |  1.11    Ca    7.4<L>      29 Jan 2018 06:20  Phos  2.2     01-29  Mg     2.6     01-29    TPro  4.6<L>  /  Alb  2.0<L>  /  TBili  10.7<H>  /  DBili  8.0<H>  /  AST  60<H>  /  ALT  70<H>  /  AlkPhos  109  01-29    PT/INR - ( 29 Jan 2018 08:19 )   PT: 14.5 sec;   INR: 1.30          PTT - ( 29 Jan 2018 08:19 )  PTT:50.3 sec    CAPILLARY BLOOD GLUCOSE      POCT Blood Glucose.: 142 mg/dL (29 Jan 2018 11:44)  POCT Blood Glucose.: 203 mg/dL (29 Jan 2018 05:28)  POCT Blood Glucose.: 175 mg/dL (28 Jan 2018 23:09)  POCT Blood Glucose.: 154 mg/dL (28 Jan 2018 17:21)    LIVER FUNCTIONS - ( 29 Jan 2018 06:20 )  Alb: 2.0 g/dL / Pro: 4.6 g/dL / ALK PHOS: 109 U/L / ALT: 70 U/L / AST: 60 U/L / GGT: x             Cultures:    Drips:    RADIOLOGY & ADDITIONAL STUDIES:

## 2018-01-29 NOTE — PROGRESS NOTE ADULT - ASSESSMENT
72yM with PMH severe MR/AR presented after recent dx acute cholecystitis and RLL PNA at [facility] with lower abdominal pain, new onset rapid Afib, sepsis, acute CHF exacerbation, initially admitted to cardiology on BiPaP, then transferred to SICU with perforated duodenum s/p Shreyas patch and jejunostomy (1/20) c/b breakdown s/p RTOR, washout, new Shreyas patch, gastrostomy, duodenostomy, IVCF (1/22). s/p PICC line (1/29).    - Patient with continued Afib now with slow VR (HR 40s) s/p Digoxin. Consider holding Digoxin dose for bradycardia.  - Patient had TTE this morning. Normal EF, severely dilated LA, No clot  - Continue patient on AC (either Heparin or oral AC) for thromboembolic prophylaxis  - EP to follow along.

## 2018-01-29 NOTE — PROGRESS NOTE ADULT - SUBJECTIVE AND OBJECTIVE BOX
Chief Complaint/Reason for Consult: periop cv mgmt  INTERVAL HPI: rate controlled, sinus caity on tele no rvr  	  MEDICATIONS:  digoxin  Injectable 0.125 milliGRAM(s) IV Push daily  metolazone 10 milliGRAM(s) Oral two times a day    micafungin IVPB 100 milliGRAM(s) IV Intermittent every 24 hours  micafungin IVPB      piperacillin/tazobactam IVPB. 3.375 Gram(s) IV Intermittent every 6 hours    ALBUTerol/ipratropium for Nebulization 3 milliLiter(s) Nebulizer every 6 hours    acetaminophen    Suspension 650 milliGRAM(s) Enteral Tube every 6 hours PRN  HYDROmorphone  Injectable 0.5 milliGRAM(s) IV Push every 3 hours PRN    pantoprazole  Injectable 40 milliGRAM(s) IV Push every 12 hours    insulin lispro (HumaLOG) corrective regimen sliding scale   SubCutaneous every 6 hours    albumin human 25% IVPB 50 milliLiter(s) IV Intermittent every 8 hours  chlorhexidine 0.12% Liquid 15 milliLiter(s) Swish and Spit two times a day  dextrose 5%. 1000 milliLiter(s) IV Continuous <Continuous>  heparin  Infusion 800 Unit(s)/Hr IV Continuous <Continuous>      REVIEW OF SYSTEMS:  [x] As per HPI  CONSTITUTIONAL: No fever, weight loss, or fatigue  RESPIRATORY: No cough, wheezing, chills or hemoptysis; No Shortness of Breath  CARDIOVASCULAR: No chest pain, palpitations, dizziness, or leg swelling  GASTROINTESTINAL: No abdominal or epigastric pain. No nausea, vomiting, or hematemesis; No diarrhea or constipation. No melena or hematochezia.  MUSCULOSKELETAL: No joint pain or swelling; No muscle, back, or extremity pain  [x] All others negative	  [ ] Unable to obtain    PHYSICAL EXAM:  T(C): 37.4 (01-29-18 @ 04:39), Max: 37.8 (01-28-18 @ 18:01)  HR: 76 (01-29-18 @ 08:00) (62 - 80)  BP: 143/62 (01-29-18 @ 07:00) (114/61 - 152/68)  RR: 22 (01-29-18 @ 08:00) (16 - 25)  SpO2: 99% (01-29-18 @ 08:00) (92% - 100%)  Wt(kg): --  I&O's Summary    28 Jan 2018 07:01  -  29 Jan 2018 07:00  --------------------------------------------------------  IN: 4132 mL / OUT: 2535 mL / NET: 1597 mL    29 Jan 2018 07:01  -  29 Jan 2018 08:17  --------------------------------------------------------  IN: 18 mL / OUT: 45 mL / NET: -27 mL          Appearance: Normal	  HEENT:   Normal oral mucosa  Cardiovascular: Normal S1 S2, No JVD, No murmurs, No edema  Respiratory: Lungs clear to auscultation	  Gastrointestinal:  Soft, Non-tender, + BS	  Extremities: Normal range of motion, No clubbing, cyanosis or edema  Vascular: Peripheral pulses palpable 2+ bilaterally    TELEMETRY: 	    ECG:   	  RADIOLOGY:   CXR:  CT:  US:    CARDIAC TESTING:  Echocardiogram:  Catheterization:  Stress Test:      LABS:	 	    CARDIAC MARKERS:                                  9.4    13.2  )-----------( 56       ( 28 Jan 2018 05:50 )             29.9     01-29    146<H>  |  108  |  35<H>  ----------------------------<  212<H>  3.2<L>   |  29  |  1.11    Ca    7.4<L>      29 Jan 2018 06:20  Phos  2.2     01-29  Mg     2.6     01-29      proBNP:   Lipid Profile:   HgA1c:   TSH:     ASSESSMENT/PLAN: 	    # Afib - on digoxin, slow VR afib scooped st waves, recommend hold dig dose and check level and 12 lead ekg,   continue AC   f/u EP recs re: AMRIK/DCCV    #MR  - TTE demonstrating bileaflet mitral valve prolapse with e/o severe MR.  Cannot fully exclude vegetation. In light of continued fevers (although curve improving), f/u BCx data. Await AMRIK to r/o associated vegetation.   Consider ID consult  - Cont diuresis to maintain a net negative balance of 1 L/24 hours.   He is tolerating current dose of Metolazone at this time. Ensure K>4, Mg>2, PO4 wnl.

## 2018-01-29 NOTE — PROGRESS NOTE ADULT - ASSESSMENT
72yM with PMH severe MR/AR presented after recent dx acute cholecystitis and RLL PNA at [facility] with lower abdominal pain, new onset rapid Afib, sepsis, acute CHF exacerbation, initially admitted to cardiology on bipap, then transferred to SICU with perforated duodenum s/p Shreyas patch and jejunostomy (1/20) c/b breakdown s/p RTOR, washout, new Shreyas patch, gastrostomy, duodenostomy, IVCF (1/22)    Neuro: dilaudid PRN  CV: Afib: Echo EF 50%, ?vegetation and Severe MRTrevin maizone 10bid, digoxin,   Pulm: Intubated on cmv 40/500/12/5 chlorhex  GI: NPO with NGT to LIWS. Jtube TF jevity @ 20 cc. retrograde D-tube to LIWS, G-tube to LIWS Protonix bid. FW 200q4h,   : Oliveira, GAETANO  ID: Perforated duodenum/PNA, surgical swab grew Candida: micafungin (1/22--), zosyn (1/27--)  //// discontinued Vanco (1/19-1/20) Zosyn (1/19-1/22), f/u Bld cx's, linezolid (1/22-23), meropenem (1/22-1/27)  Endo: ISS  HEME:DVT: Heparin gtt PTT, thrombocytopenic, HIT +, serotonin release assay NEGATIVE. IVCF (1/22) //1FFP, 1PLT (1/22).   PPx: SCD, R pop DVT, s/p IVC filter 1/22  Lines: PIV, PICC (1/29--), L brachial Jeane (1/20-1/27),  LSC HD cath (1/22-1/27)  Wounds: Ex lap, JPx2 to bulb suction, J-tube to gravity, D-tube to LIWS, G-tube to LIWS  PT/OT: early mobilization.

## 2018-01-29 NOTE — PROGRESS NOTE ADULT - SUBJECTIVE AND OBJECTIVE BOX
EPS Progress Note    Interval Events: Tolerating CPAP today. Had AMRIK today showing severe MR w/ likely torn chordae, severe pulm htn. PICC placed (poor venous access)   o/n: CMV overnight.    - AMRIK 1/29/18 Normal EF. No clot. Severely dilated LA. Severe bileaflet mitral valve prolapse, more pronounced in the posteromedial commissure and the entire posterior leaflet. The A3 scallop is flail with likely torn chordae tendinae attached to it.     - Echocardiogram 1/20/18 with EF 50%  - Echocardiogram 1/19/18 with EF 55-60%  - Echocardiogram 1/4/17 with EF 60-65%        O: T(C): 37 (01-29-18 @ 14:48), Max: 37.8 (01-28-18 @ 18:01)  HR: 80 (01-29-18 @ 14:00) (62 - 81)  BP: 122/56 (01-29-18 @ 14:00) (96/56 - 152/68)  RR: 23 (01-29-18 @ 14:00) (16 - 23)  SpO2: 97% (01-29-18 @ 14:00) (92% - 100%)    TELE: Afib with slow VR, occasional pauses up to 2.5 seconds noted.     PHYSICAL  General:  NAD        Chest:  CTA B/L, no w/r/r  Cardiac:  IRR, bradycardia + s1/s2 , no m/g/r  Abdomen: Drains in place. J-tube holding feeds  Extremities: WWP, No edema    LABS:                        10.1   13.7  )-----------( 57       ( 29 Jan 2018 08:19 )             30.7     01-29    146<H>  |  108  |  35<H>  ----------------------------<  212<H>  3.2<L>   |  29  |  1.11    Ca    7.4<L>      29 Jan 2018 06:20  Phos  2.2     01-29  Mg     2.6     01-29    TPro  4.6<L>  /  Alb  2.0<L>  /  TBili  10.7<H>  /  DBili  8.0<H>  /  AST  60<H>  /  ALT  70<H>  /  AlkPhos  109  01-29    PT/INR - ( 29 Jan 2018 08:19 )   PT: 14.5 sec;   INR: 1.30     PTT - ( 29 Jan 2018 08:19 )  PTT:50.3 sec      MEDICATIONS:  acetaminophen    Suspension 650 milliGRAM(s) Enteral Tube every 6 hours PRN  albumin human 25% IVPB 50 milliLiter(s) IV Intermittent every 8 hours  ALBUTerol/ipratropium for Nebulization 3 milliLiter(s) Nebulizer every 6 hours  chlorhexidine 0.12% Liquid 15 milliLiter(s) Swish and Spit two times a day  dextrose 5%. 1000 milliLiter(s) IV Continuous <Continuous>  heparin  Infusion 800 Unit(s)/Hr IV Continuous <Continuous>  HYDROmorphone  Injectable 0.5 milliGRAM(s) IV Push every 3 hours PRN  insulin lispro (HumaLOG) corrective regimen sliding scale   SubCutaneous every 6 hours  metolazone 10 milliGRAM(s) Oral two times a day  micafungin IVPB 100 milliGRAM(s) IV Intermittent every 24 hours  micafungin IVPB      pantoprazole  Injectable 40 milliGRAM(s) IV Push every 12 hours  piperacillin/tazobactam IVPB. 3.375 Gram(s) IV Intermittent every 6 hours  potassium chloride   Powder 40 milliEquivalent(s) Oral every 2 hours  sodium chloride 0.9% lock flush 10 milliLiter(s) IV Push every 1 hour PRN  sodium chloride 0.9% lock flush 20 milliLiter(s) IV Push once      ASSESSMENT/PLAN

## 2018-01-29 NOTE — PROGRESS NOTE ADULT - SUBJECTIVE AND OBJECTIVE BOX
INTERVAL HPI/OVERNIGHT EVENTS:    ANTIBIOTICS    MEDICATIONS  (STANDING):  albumin human 25% IVPB 50 milliLiter(s) IV Intermittent every 8 hours  ALBUTerol/ipratropium for Nebulization 3 milliLiter(s) Nebulizer every 6 hours  cefepime  IVPB 2000 milliGRAM(s) IV Intermittent every 8 hours  chlorhexidine 0.12% Liquid 15 milliLiter(s) Swish and Spit two times a day  dextrose 5%. 1000 milliLiter(s) (50 mL/Hr) IV Continuous <Continuous>  heparin  Infusion 800 Unit(s)/Hr (8 mL/Hr) IV Continuous <Continuous>  insulin lispro (HumaLOG) corrective regimen sliding scale   SubCutaneous every 6 hours  metolazone 10 milliGRAM(s) Oral two times a day  micafungin IVPB 100 milliGRAM(s) IV Intermittent every 24 hours  micafungin IVPB      pantoprazole  Injectable 40 milliGRAM(s) IV Push every 12 hours  potassium chloride   Powder 40 milliEquivalent(s) Oral every 2 hours  sodium chloride 0.9% lock flush 20 milliLiter(s) IV Push once    MEDICATIONS  (PRN):  acetaminophen    Suspension 650 milliGRAM(s) Enteral Tube every 6 hours PRN For Temp greater than 38 C (100.4 F)  HYDROmorphone  Injectable 0.5 milliGRAM(s) IV Push every 3 hours PRN Severe Pain (7 - 10)  sodium chloride 0.9% lock flush 10 milliLiter(s) IV Push every 1 hour PRN After each medication administration      Allergies    No Known Allergies    Intolerances        REVIEW OF SYSTEMS:  patient unable to provide a ROS    Vital Signs Last 24 Hrs  T(C): 37 (29 Jan 2018 14:48), Max: 37.8 (28 Jan 2018 18:01)  T(F): 98.6 (29 Jan 2018 14:48), Max: 100 (28 Jan 2018 18:01)  HR: 86 (29 Jan 2018 15:00) (62 - 86)  BP: 133/62 (29 Jan 2018 15:00) (96/56 - 152/68)  BP(mean): 92 (29 Jan 2018 15:00) (68 - 113)  RR: 22 (29 Jan 2018 15:00) (16 - 23)  SpO2: 100% (29 Jan 2018 15:00) (92% - 100%)    PHYSICAL EXAM:    General: intubated in no acute distress  Eyes: PERRL, EOM intact; conjunctiva and sclera clear  Head: Normocephalic; atraumatic  ENMT: No nasal discharge; airway clear  Neck: Supple; non tender; no masses  Respiratory: No wheezes, rales or rhonchi  Cardiovascular: Regular rate and rhythm. S1 and S2 Normal; No murmurs, gallops or rubs  Gastrointestinal: Soft non-tender non-distended; Normal bowel sounds; No hepatosplenomegaly  Genitourinary: No costovertebral angle tenderness    LABS:                        10.1   13.7  )-----------( 57       ( 29 Jan 2018 08:19 )             30.7     01-29    146<H>  |  108  |  35<H>  ----------------------------<  212<H>  3.2<L>   |  29  |  1.11    Ca    7.4<L>      29 Jan 2018 06:20  Phos  2.2     01-29  Mg     2.6     01-29    TPro  4.6<L>  /  Alb  2.0<L>  /  TBili  10.7<H>  /  DBili  8.0<H>  /  AST  60<H>  /  ALT  70<H>  /  AlkPhos  109  01-29    PT/INR - ( 29 Jan 2018 08:19 )   PT: 14.5 sec;   INR: 1.30          PTT - ( 29 Jan 2018 08:19 )  PTT:50.3 sec        MICROBIOLOGY:  Culture Results:   No growth at 2 days. (01-27 @ 13:39)  Culture Results:   No growth at 2 days. (01-27 @ 13:38)  Culture Results:   Numerous Stenotrophomonas maltophilia  No routine respiratory geeta Isolated (01-27 @ 10:09)  Culture Results:   Candida albicans isolated (01-24 @ 16:33)  Culture Results:   Candida albicans  Susceptibility to follow. (01-23 @ 21:26)  Culture Results:   Few Candida albicans (01-23 @ 00:44)  Culture Results:   Few Candida albicans (01-23 @ 00:44)  Culture Results:   Few Yeast See previous culture for sensitivity (01-22 @ 21:43)  Culture Results:   Few Yeast See previous culture for sensitivity (01-22 @ 21:42)  Culture Results:   Moderate Yeast See previous culture for id and sensitivity (01-22 @ 16:43)  Culture Results:   Moderate Yeast Pending further identification and antibiotic  susceptibility testing at  [facility] (01-22 @ 16:24)      RADIOLOGY & ADDITIONAL STUDIES:

## 2018-01-29 NOTE — PROGRESS NOTE ADULT - SUBJECTIVE AND OBJECTIVE BOX
24 hr events: 1/29: CPAP trial (7/5),. held Dig dose today per Bhusri (HR was 40's overnight), AMRIK severe MR w/ likely torn chordae, severe pulm htn. PICC placed (poor venouis access)   o/n: CMV overnight  1/28: CPAP 10/5, PT consult for early mob, given dulcolax    SUBJECTIVE: Off sedation prior to echo. Tolerating CPAP     MEDICATIONS  (STANDING):  albumin human 25% IVPB 50 milliLiter(s) IV Intermittent every 8 hours  ALBUTerol/ipratropium for Nebulization 3 milliLiter(s) Nebulizer every 6 hours  chlorhexidine 0.12% Liquid 15 milliLiter(s) Swish and Spit two times a day  dextrose 5%. 1000 milliLiter(s) (50 mL/Hr) IV Continuous <Continuous>  heparin  Infusion 800 Unit(s)/Hr (8 mL/Hr) IV Continuous <Continuous>  insulin lispro (HumaLOG) corrective regimen sliding scale   SubCutaneous every 6 hours  metolazone 10 milliGRAM(s) Oral two times a day  micafungin IVPB 100 milliGRAM(s) IV Intermittent every 24 hours  micafungin IVPB      pantoprazole  Injectable 40 milliGRAM(s) IV Push every 12 hours  piperacillin/tazobactam IVPB. 3.375 Gram(s) IV Intermittent every 6 hours  potassium chloride   Powder 40 milliEquivalent(s) Oral every 2 hours  sodium chloride 0.9% lock flush 20 milliLiter(s) IV Push once    MEDICATIONS  (PRN):  acetaminophen    Suspension 650 milliGRAM(s) Enteral Tube every 6 hours PRN For Temp greater than 38 C (100.4 F)  HYDROmorphone  Injectable 0.5 milliGRAM(s) IV Push every 3 hours PRN Severe Pain (7 - 10)  sodium chloride 0.9% lock flush 10 milliLiter(s) IV Push every 1 hour PRN After each medication administration      Oliveira:	  [ ] None	[x] Daily Oliveira Order Placed	   Indication:	  [x] Strict I and O's    [ ] Obstruction     [ ] Incontinence + Stage 3 or 4 Decubitus  Central Line:  [ ] None	   [x]  Medication / TPN Administration     [ ] No Peripheral IV     ICU Vital Signs Last 24 Hrs  T(C): 37.7 (29 Jan 2018 09:48), Max: 37.8 (28 Jan 2018 18:01)  T(F): 99.8 (29 Jan 2018 09:48), Max: 100 (28 Jan 2018 18:01)  HR: 80 (29 Jan 2018 14:00) (62 - 81)  BP: 122/56 (29 Jan 2018 14:00) (96/56 - 152/68)  BP(mean): 68 (29 Jan 2018 14:00) (68 - 113)  ABP: --  ABP(mean): --  RR: 23 (29 Jan 2018 14:00) (16 - 23)  SpO2: 97% (29 Jan 2018 14:00) (92% - 100%)      Physical Exam:  General: NAD  HEENT: NC/AT, EOMI, PERRLA, normal hearing, no oral lesions, neck supple w/o LAD  Pulmonary: Nonlabored breathing, no respiratory distress, B/l basilar diminished breath sounds  Cardiovascular: Irregular Normal rate. Pronounced holosystolic murmur  Abdominal: soft, NT. Drains in place. J-tube holding feeds  Extremities: Edematous throughout. Distal extremeties. Right middle toe no change.   Neuro: A/O x3, CNs II-XII grossly intact, normal motor/sensation, no focal deficits  Pulses: palpable distal pulses    Vent settings:  Mode: AC/ CMV (Assist Control/ Continuous Mandatory Ventilation), RR (machine): 16, TV (machine): 500, FiO2: 40, PEEP: 5, ITime: 0.8, MAP: 7, PIP: 19    I&O's Summary    28 Jan 2018 07:01  -  29 Jan 2018 07:00  --------------------------------------------------------  IN: 4132 mL / OUT: 2535 mL / NET: 1597 mL    29 Jan 2018 07:01  -  29 Jan 2018 14:14  --------------------------------------------------------  IN: 432 mL / OUT: 525 mL / NET: -93 mL        LABS:                        10.1   13.7  )-----------( 57       ( 29 Jan 2018 08:19 )             30.7     01-29    146<H>  |  108  |  35<H>  ----------------------------<  212<H>  3.2<L>   |  29  |  1.11    Ca    7.4<L>      29 Jan 2018 06:20  Phos  2.2     01-29  Mg     2.6     01-29    TPro  4.6<L>  /  Alb  2.0<L>  /  TBili  10.7<H>  /  DBili  8.0<H>  /  AST  60<H>  /  ALT  70<H>  /  AlkPhos  109  01-29    PT/INR - ( 29 Jan 2018 08:19 )   PT: 14.5 sec;   INR: 1.30          PTT - ( 29 Jan 2018 08:19 )  PTT:50.3 sec    CAPILLARY BLOOD GLUCOSE      POCT Blood Glucose.: 142 mg/dL (29 Jan 2018 11:44)  POCT Blood Glucose.: 203 mg/dL (29 Jan 2018 05:28)  POCT Blood Glucose.: 175 mg/dL (28 Jan 2018 23:09)  POCT Blood Glucose.: 154 mg/dL (28 Jan 2018 17:21)    LIVER FUNCTIONS - ( 29 Jan 2018 06:20 )  Alb: 2.0 g/dL / Pro: 4.6 g/dL / ALK PHOS: 109 U/L / ALT: 70 U/L / AST: 60 U/L / GGT: x             Cultures:    Drips:    RADIOLOGY & ADDITIONAL STUDIES:

## 2018-01-29 NOTE — PROGRESS NOTE ADULT - ASSESSMENT
ASSESSMENT/PLAN 73y/o male pt c Respiratory failure, perforated duodenal ulcer, POD post repair, RLL pneumonia, RLL atelectases, no obvious RADHA, elevated R hemidiaphragm, AFIB, worsening thrombocytopenia, possible endocarditis, s/p reop.    1. O2: PS titration, today at 14/5, continue at PS 14 as he is not able to protect his airways yet.  2. Bronchodilators:  Atrovent/ albuterol q 4 – 6 hours as needed  3. Corticosteroids: off  4. ID/Antibiotics: must continue ABX (P/T) for right perihilar pneumonia with gram negative bacteria, sepsis; srea for peritoneal candida  5. Cardiac/HTN: Monitor, optimize HR; on heparin drip for AFIB  6. GI: Rx/ prophylaxis c PPI/H2B  7. Heme: Rx/VT prophylaxis IV heparin; platelets noted   8. Aspiration precautions, NGT   9. Surgery mngmnt priority  10. Edema: diuretic titration  11. phosphate repletion  12. K repletion    Discussed with managing team, CCRN, SICU, surgery

## 2018-01-29 NOTE — CONSULT NOTE ADULT - SUBJECTIVE AND OBJECTIVE BOX
Vascular Access Service Consult Note    [facility] ISSUES - PROBLEM Dx:  Anasarca: Anasarca  Sepsis due to Candida: Sepsis due to Candida  Candidiasis, disseminated: Candidiasis, disseminated  Metabolic acidosis: Metabolic acidosis  Acute kidney injury: Acute kidney injury  Sepsis, due to unspecified organism: Sepsis, due to unspecified organism  Atrial fibrillation, unspecified type: Atrial fibrillation, unspecified type  Coagulopathy: Coagulopathy  Mitral regurgitation: Mitral regurgitation  Chronic kidney insufficiency, stage 3 (moderate): Chronic kidney insufficiency, stage 3 (moderate)  Thrombocytopenia: Thrombocytopenia  Transaminitis: Transaminitis  Acute CHF: Acute CHF  Afib: Afib  Sepsis: Sepsis  Abdominal pain, unspecified abdominal location: Abdominal pain, unspecified abdominal location  Aortic valve insufficiency, etiology of cardiac valve disease unspecified: Aortic valve insufficiency, etiology of cardiac valve disease unspecified  Hepatitis: Hepatitis  Pneumonia, bacterial: Pneumonia, bacterial             Diagnosis:     Indications for Vascular Access (Check all that apply)  [x  ]  Antibiotic Therapy       Antibiotic Prescribed:                                                                             Expected Duration of Therapy:               [  ]  IV Hydration  [  ]  Total Parenteral Nutrition  [  ]  Chemotherapy  x]  Difficult Venous Access  [  ]  CVP monitoring  [  ]  Medications with high potential for tissue necrosis on extravasation  [  ]  Other    Screening (Check all that apply)  Previous Radiation to chest  [  ] Yes      [  x]  No  Breast Cancer                          [  ] Left     [  ]  Right    [ x ]  No  Lymph Node Dissection         [  ] Left     [  ]  Right    [ x ]  No  Pacemaker or ICD                   [  ] Left     [  ]  Right    [ x ]  No  Upper Extremity DVT             [  ] Left     [  ]  Right    [ x ]  No  Chronic Kidney Disease         [  ]  Yes     [  x]  No  Hemodialysis                           [  ]  Yes     [ x ]  No  AV Fistula/ Graft                     [  ]  Left    [  ]  Right    [  x]  No  Temp>101F in past 24 H       [  ]  Yes     [ x ]  No  H/O PICC/Midline                   [  ]  Yes     [ x ]  No    Lab data:                        10.1   13.7  )-----------( 57       ( 29 Jan 2018 08:19 )             30.7     01-29    146<H>  |  108  |  35<H>  ----------------------------<  212<H>  3.2<L>   |  29  |  1.11    Ca    7.4<L>      29 Jan 2018 06:20  Phos  2.2     01-29  Mg     2.6     01-29    TPro  4.6<L>  /  Alb  2.0<L>  /  TBili  10.7<H>  /  DBili  8.0<H>  /  AST  60<H>  /  ALT  70<H>  /  AlkPhos  109  01-29    PT/INR - ( 29 Jan 2018 08:19 )   PT: 14.5 sec;   INR: 1.30          PTT - ( 29 Jan 2018 08:19 )  PTT:50.3 sec          I have reviewed the chart, interviewed and examined the patient and determined that this patient:  [x  ] Is a candidate for a PICC line  [  ] Is a candidate for a Midline  [  ] Is not a candidate for vascular access device (reason)    Lumens:    [  ] Single  [ x ] Double

## 2018-01-29 NOTE — PROGRESS NOTE ADULT - SUBJECTIVE AND OBJECTIVE BOX
Patient seen and examined at bedside.   interim, weekend events noted and reviewed  remains intubated   urine output good    T(C): , Max: 37.8 (18 @ 18:01)  T(F): , Max: 100 (18 @ 18:01)  HR: 74 (18 @ 09:00)  BP: 119/67 (18 @ 09:00)  BP(mean): 77 (18 @ 09:00)  RR: 20 (18 @ 09:00)  SpO2: 100% (18 @ 09:00)  Wt(kg): --     @ 07:  -   @ 07:00  --------------------------------------------------------  IN:    Albumin 25%: 100 mL    dextrose 5%.: 2300 mL    Enteral Tube Flush: 1000 mL    heparin Infusion: 192 mL    IV PiggyBack: 100 mL    Jevity: 440 mL  Total IN: 4132 mL    OUT:    Bulb: 175 mL    Bulb: 145 mL    Drain: 390 mL    Indwelling Catheter - Urethral: 1825 mL  Total OUT: 2535 mL    Total NET: 1597 mL       @ 07:01  -   @ 09:35  --------------------------------------------------------  IN:    heparin Infusion: 8 mL    Jevity: 10 mL  Total IN: 18 mL    OUT:    Indwelling Catheter - Urethral: 45 mL  Total OUT: 45 mL    Total NET: -27 mL            albumin human 25% IVPB 50 every 8 hours  ALBUTerol/ipratropium for Nebulization 3 every 6 hours  chlorhexidine 0.12% Liquid 15 two times a day  dextrose 5%. 1000 <Continuous>  digoxin  Injectable 0.125 daily  heparin  Infusion 800 <Continuous>  insulin lispro (HumaLOG) corrective regimen sliding scale  every 6 hours  metolazone 10 two times a day  micafungin IVPB 100 every 24 hours  micafungin IVPB    pantoprazole  Injectable 40 every 12 hours  piperacillin/tazobactam IVPB. 3.375 every 6 hours    Allergies    No Known Allergies    Intolerances      PHYSICAL EXAM:  Constitutional:  No acute distress  Respiratory: bilat rhonchi  Cardiovascular: S1, S2.  Regular rate and rhythm.    Gastrointestinal: soft, non-tender, non-distended  Vasc/Extremities: + edema anasarca    Skin: Warm. Dry.        LABS:                        10.1   13.7  )-----------( 57       ( 2018 08:19 )             30.7         146<H>  |  108  |  35<H>  ----------------------------<  212<H>  3.2<L>   |  29  |  1.11    Ca    7.4<L>      2018 06:20  Phos  2.2       Mg     2.6             PT/INR - ( 2018 08:19 )   PT: 14.5 sec;   INR: 1.30          PTT - ( 2018 08:19 )  PTT:50.3 sec  Urinalysis Basic - ( 2018 09:59 )    Color: Yellow / Appearance: Clear / S.020 / pH: x  Gluc: x / Ketone: NEGATIVE  / Bili: Moderate / Urobili: 0.2 E.U./dL   Blood: x / Protein: Trace mg/dL / Nitrite: NEGATIVE   Leuk Esterase: NEGATIVE / RBC: 5-10 /HPF / WBC < 5 /HPF   Sq Epi: x / Non Sq Epi: 5-10 /HPF / Bacteria: Many /HPF            RADIOLOGY & ADDITIONAL STUDIES:

## 2018-01-29 NOTE — PROGRESS NOTE ADULT - ASSESSMENT
72 year old male recovering from oliguric ATN- in setting of shock also s/p CT angio, s/p treatment with Vanco - supra therapeutic levels,  s/p laparotomy for perforation of the duodenum., revision done on 1/22.   On 1/23 was placed on CVVHD- discontinued after only 3-4 hours (clotted) but hten urine output began to . No RRT needed since that initial attempt

## 2018-01-29 NOTE — PROGRESS NOTE ADULT - SUBJECTIVE AND OBJECTIVE BOX
PUD FOR DR LIN    INTERVAL HPI/OVERNIGHT EVENTS:    had AMRIK and cuff was deflated  personally inflated  On A/C, titrated to PS12  to avoid muscle atrophy    PAST MEDICAL & SURGICAL HISTORY:  Mitral regurgitation  Aortic regurgitation  No significant past surgical history    FAMILY HISTORY:    SOCIAL HISTORY:    REVIEW OF SYSTEMS:  + pain, both arms  - CP  + cough, improved    Vital Signs Last 24 Hrs  T(C): 37 (29 Jan 2018 14:48), Max: 37.8 (28 Jan 2018 18:01)  T(F): 98.6 (29 Jan 2018 14:48), Max: 100 (28 Jan 2018 18:01)  HR: 83 (29 Jan 2018 16:43) (62 - 86)  BP: 116/52 (29 Jan 2018 16:00) (96/56 - 152/68)  BP(mean): 71 (29 Jan 2018 16:00) (68 - 113)  RR: 19 (29 Jan 2018 16:00) (16 - 23)  SpO2: 99% (29 Jan 2018 16:43) (92% - 100%)        Mode: CPAP with PS  FiO2: 40  PEEP: 5  PS: 12  MAP: 8  PIP: 18      I&O's Detail    28 Jan 2018 07:01  -  29 Jan 2018 07:00  --------------------------------------------------------  IN:    Albumin 25%: 100 mL    dextrose 5%.: 2300 mL    Enteral Tube Flush: 1000 mL    heparin Infusion: 192 mL    IV PiggyBack: 100 mL    Jevity: 440 mL  Total IN: 4132 mL    OUT:    Bulb: 175 mL    Bulb: 145 mL    Drain: 390 mL    Indwelling Catheter - Urethral: 1825 mL  Total OUT: 2535 mL    Total NET: 1597 mL      29 Jan 2018 07:01  -  29 Jan 2018 16:50  --------------------------------------------------------  IN:    Albumin 25%: 50 mL    heparin Infusion: 48 mL    IV PiggyBack: 200 mL    Jevity: 10 mL    Solution: 124 mL  Total IN: 432 mL    OUT:    Bulb: 20 mL    Bulb: 30 mL    Indwelling Catheter - Urethral: 535 mL  Total OUT: 585 mL    Total NET: -153 mL    PHYSICAL EXAM:  more awake  malnourished, muscle atrophy, uncomfortable, - acute distress; vital signs are monitored continuously  Eyes: PERRLA, EOMI, -conjunctivitis, -scleritis   Head: no focal deficit, normocephalic,  no trauma  ENMT: DRY tongue, no thrush, -nasal discharge, -hoarseness, normal hearing, -cough, -hemoptysis, trachea midline  Neck: supple, - lymphadenopathy,  -masses, -JVD  Respiratory: bilateral diminished breath sounds, -wheezing, -rhonchi, -rales, -crackles  Chest: -accessory muscle use, -paradoxical breathing  Cardiovascular: IRregular rate, S1 S2 normal, -S3, -S4, -murmur, -gallop, -rub  Gastrointestinal: soft, nontender, distended, decreased bowel sounds, no hepatosplenomegaly  4 DRAINS, 1 J TUBE; NGT removed  Genitourinary: -flank pain, -dysuria  Extremities: -clubbing, -cyanosis, ++ edema; new PICC    Vascular: peripheral pulses palpable 2+ bilaterally  Neurological: alert, oriented x 3, no focal deficit, -tremor   Skin: warm, dry, -erythema, iv sites intact  Lymph nodes; no cervical, supraclavicular or axillary adenopathy  Psychiatric: cooperative, appropriate mood    MEDICATIONS  (STANDING):  albumin human 25% IVPB 50 milliLiter(s) IV Intermittent every 8 hours  ALBUTerol/ipratropium for Nebulization 3 milliLiter(s) Nebulizer every 6 hours  cefepime Injectable. 2000 milliGRAM(s) IV Push every 8 hours  chlorhexidine 0.12% Liquid 15 milliLiter(s) Swish and Spit two times a day  dextrose 5%. 1000 milliLiter(s) (50 mL/Hr) IV Continuous <Continuous>  heparin  Infusion 800 Unit(s)/Hr (8 mL/Hr) IV Continuous <Continuous>  insulin lispro (HumaLOG) corrective regimen sliding scale   SubCutaneous every 6 hours  metolazone 10 milliGRAM(s) Oral two times a day  micafungin IVPB 100 milliGRAM(s) IV Intermittent every 24 hours  micafungin IVPB      pantoprazole  Injectable 40 milliGRAM(s) IV Push every 12 hours  sodium chloride 0.9% lock flush 20 milliLiter(s) IV Push once    MEDICATIONS  (PRN):  acetaminophen    Suspension 650 milliGRAM(s) Enteral Tube every 6 hours PRN For Temp greater than 38 C (100.4 F)  HYDROmorphone  Injectable 0.5 milliGRAM(s) IV Push every 3 hours PRN Severe Pain (7 - 10)  sodium chloride 0.9% lock flush 10 milliLiter(s) IV Push every 1 hour PRN After each medication administration    Allergies    No Known Allergies    Intolerances    LABS:                        10.1   13.7  )-----------( 57       ( 29 Jan 2018 08:19 )             30.7     01-29    146<H>  |  108  |  35<H>  ----------------------------<  212<H>  3.2<L>   |  29  |  1.11    Ca    7.4<L>      29 Jan 2018 06:20  Phos  2.2     01-29  Mg     2.6     01-29    TPro  4.6<L>  /  Alb  2.0<L>  /  TBili  10.7<H>  /  DBili  8.0<H>  /  AST  60<H>  /  ALT  70<H>  /  AlkPhos  109  01-29    PT/INR - ( 29 Jan 2018 08:19 )   PT: 14.5 sec;   INR: 1.30          PTT - ( 29 Jan 2018 16:14 )  PTT:45.3 sec    +DVT prophylaxis  +Sleep  +Nutrition goals  -Pain  -Decubital ulcer  +GI prophylaxis (PPV, coagulopathy, Hx)  +Aspiration prophylaxis (45 degrees)    Ventilator synchronized  Tracheal cuff pressure    +Sedation/analgesia stopped once  +ID (phos, CH, mupi, SB)  -Delirium  +Cardiac Beta/ACEI-ARB/ASA/statin  +Prevention  +Education  +Medication reviewed (drug-drug interactions, PDA)  DRAINS, J, PICC  edical devices  Discussed with SICU, PGY, PA, CCRN, proxy    RADIOLOGY & ADDITIONAL STUDIES:    left side is probably rotational    INTERPRETATION:  Portable Chest X-Ray dated 1/29/2018 7:04 AM    Indication: eval lungs    An AP portable view of the chest is compared to 1/28/2018. Limited study   as the patient is rotated to the right. Left perihilar opacity, new since   the prior study. Improving right lower lobe infiltrate/atelectasis. Small   left pleural effusion and left lower lobe atelectasis. Mild pulmonary   venous congestion, unchanged. Tip of endotracheal tube approximately 4.5   cm above the caprice. Right basal chest tube. NG tube has its tip below   the diaphragm but not included on this film. No pneumothorax is noted.    IMPRESSION:  New left perihilar opacity which could represent an infiltrate or may be   an artifact due to the fact that the patient is rotated to the right   side. No other significant interval changes.

## 2018-01-30 LAB
ALBUMIN SERPL ELPH-MCNC: 2.3 G/DL — LOW (ref 3.3–5)
ALP SERPL-CCNC: 105 U/L — SIGNIFICANT CHANGE UP (ref 40–120)
ALT FLD-CCNC: 72 U/L — HIGH (ref 10–45)
AMYLASE FLD-CCNC: 11 U/L — SIGNIFICANT CHANGE UP
ANION GAP SERPL CALC-SCNC: 7 MMOL/L — SIGNIFICANT CHANGE UP (ref 5–17)
APTT BLD: 59.6 SEC — HIGH (ref 27.5–37.4)
APTT BLD: 60.9 SEC — HIGH (ref 27.5–37.4)
AST SERPL-CCNC: 64 U/L — HIGH (ref 10–40)
BILIRUB SERPL-MCNC: 12.4 MG/DL — HIGH (ref 0.2–1.2)
BUN SERPL-MCNC: 33 MG/DL — HIGH (ref 7–23)
CALCIUM SERPL-MCNC: 7.5 MG/DL — LOW (ref 8.4–10.5)
CHLORIDE SERPL-SCNC: 108 MMOL/L — SIGNIFICANT CHANGE UP (ref 96–108)
CK MB CFR SERPL CALC: 2.3 NG/ML — SIGNIFICANT CHANGE UP (ref 0–6.7)
CK MB CFR SERPL CALC: 2.4 NG/ML — SIGNIFICANT CHANGE UP (ref 0–6.7)
CK SERPL-CCNC: 63 U/L — SIGNIFICANT CHANGE UP (ref 30–200)
CK SERPL-CCNC: 78 U/L — SIGNIFICANT CHANGE UP (ref 30–200)
CO2 SERPL-SCNC: 32 MMOL/L — HIGH (ref 22–31)
CREAT SERPL-MCNC: 1.07 MG/DL — SIGNIFICANT CHANGE UP (ref 0.5–1.3)
DIGOXIN SERPL-MCNC: 1.2 NG/ML — SIGNIFICANT CHANGE UP (ref 0.8–2)
GLUCOSE BLDC GLUCOMTR-MCNC: 111 MG/DL — HIGH (ref 70–99)
GLUCOSE BLDC GLUCOMTR-MCNC: 111 MG/DL — HIGH (ref 70–99)
GLUCOSE BLDC GLUCOMTR-MCNC: 112 MG/DL — HIGH (ref 70–99)
GLUCOSE BLDC GLUCOMTR-MCNC: 132 MG/DL — HIGH (ref 70–99)
GLUCOSE SERPL-MCNC: 143 MG/DL — HIGH (ref 70–99)
HCT VFR BLD CALC: 27.9 % — LOW (ref 39–50)
HGB BLD-MCNC: 9.3 G/DL — LOW (ref 13–17)
INR BLD: 1.38 — HIGH (ref 0.88–1.16)
MAGNESIUM SERPL-MCNC: 2.6 MG/DL — SIGNIFICANT CHANGE UP (ref 1.6–2.6)
MCHC RBC-ENTMCNC: 30.2 PG — SIGNIFICANT CHANGE UP (ref 27–34)
MCHC RBC-ENTMCNC: 33.3 G/DL — SIGNIFICANT CHANGE UP (ref 32–36)
MCV RBC AUTO: 90.6 FL — SIGNIFICANT CHANGE UP (ref 80–100)
PHOSPHATE SERPL-MCNC: 2.9 MG/DL — SIGNIFICANT CHANGE UP (ref 2.5–4.5)
PLATELET # BLD AUTO: 59 K/UL — LOW (ref 150–400)
POTASSIUM SERPL-MCNC: 3.5 MMOL/L — SIGNIFICANT CHANGE UP (ref 3.5–5.3)
POTASSIUM SERPL-SCNC: 3.5 MMOL/L — SIGNIFICANT CHANGE UP (ref 3.5–5.3)
PROT SERPL-MCNC: 4.9 G/DL — LOW (ref 6–8.3)
PROTHROM AB SERPL-ACNC: 15.4 SEC — HIGH (ref 9.8–12.7)
RBC # BLD: 3.08 M/UL — LOW (ref 4.2–5.8)
RBC # FLD: 19.7 % — HIGH (ref 10.3–16.9)
SODIUM SERPL-SCNC: 147 MMOL/L — HIGH (ref 135–145)
TROPONIN T SERPL-MCNC: 0.04 NG/ML — HIGH (ref 0–0.01)
TROPONIN T SERPL-MCNC: 0.04 NG/ML — HIGH (ref 0–0.01)
WBC # BLD: 13 K/UL — HIGH (ref 3.8–10.5)
WBC # FLD AUTO: 13 K/UL — HIGH (ref 3.8–10.5)

## 2018-01-30 PROCEDURE — 71045 X-RAY EXAM CHEST 1 VIEW: CPT | Mod: 26

## 2018-01-30 PROCEDURE — 76705 ECHO EXAM OF ABDOMEN: CPT | Mod: 26

## 2018-01-30 PROCEDURE — 99232 SBSQ HOSP IP/OBS MODERATE 35: CPT

## 2018-01-30 PROCEDURE — 99223 1ST HOSP IP/OBS HIGH 75: CPT

## 2018-01-30 PROCEDURE — 99291 CRITICAL CARE FIRST HOUR: CPT

## 2018-01-30 PROCEDURE — 99232 SBSQ HOSP IP/OBS MODERATE 35: CPT | Mod: GC

## 2018-01-30 RX ORDER — AMANTADINE HCL 100 MG
100 CAPSULE ORAL DAILY
Qty: 0 | Refills: 0 | Status: DISCONTINUED | OUTPATIENT
Start: 2018-01-30 | End: 2018-01-30

## 2018-01-30 RX ORDER — POTASSIUM CHLORIDE 20 MEQ
40 PACKET (EA) ORAL ONCE
Qty: 0 | Refills: 0 | Status: COMPLETED | OUTPATIENT
Start: 2018-01-30 | End: 2018-01-30

## 2018-01-30 RX ORDER — MIDAZOLAM HYDROCHLORIDE 1 MG/ML
2 INJECTION, SOLUTION INTRAMUSCULAR; INTRAVENOUS ONCE
Qty: 0 | Refills: 0 | Status: DISCONTINUED | OUTPATIENT
Start: 2018-01-30 | End: 2018-01-30

## 2018-01-30 RX ORDER — POTASSIUM CHLORIDE 20 MEQ
20 PACKET (EA) ORAL ONCE
Qty: 0 | Refills: 0 | Status: COMPLETED | OUTPATIENT
Start: 2018-01-30 | End: 2018-01-30

## 2018-01-30 RX ORDER — FENTANYL CITRATE 50 UG/ML
0.56 INJECTION INTRAVENOUS
Qty: 2500 | Refills: 0 | Status: DISCONTINUED | OUTPATIENT
Start: 2018-01-30 | End: 2018-01-31

## 2018-01-30 RX ORDER — CISATRACURIUM BESYLATE 2 MG/ML
5 INJECTION INTRAVENOUS ONCE
Qty: 0 | Refills: 0 | Status: COMPLETED | OUTPATIENT
Start: 2018-01-30 | End: 2018-01-30

## 2018-01-30 RX ORDER — NOREPINEPHRINE BITARTRATE/D5W 8 MG/250ML
0.03 PLASTIC BAG, INJECTION (ML) INTRAVENOUS
Qty: 8 | Refills: 0 | Status: DISCONTINUED | OUTPATIENT
Start: 2018-01-30 | End: 2018-01-30

## 2018-01-30 RX ORDER — PROPOFOL 10 MG/ML
9.32 INJECTION, EMULSION INTRAVENOUS
Qty: 1000 | Refills: 0 | Status: DISCONTINUED | OUTPATIENT
Start: 2018-01-30 | End: 2018-01-31

## 2018-01-30 RX ADMIN — Medication 5 MICROGRAM(S)/KG/MIN: at 01:58

## 2018-01-30 RX ADMIN — MIDAZOLAM HYDROCHLORIDE 2 MILLIGRAM(S): 1 INJECTION, SOLUTION INTRAMUSCULAR; INTRAVENOUS at 00:55

## 2018-01-30 RX ADMIN — Medication 3 MILLILITER(S): at 05:46

## 2018-01-30 RX ADMIN — PANTOPRAZOLE SODIUM 40 MILLIGRAM(S): 20 TABLET, DELAYED RELEASE ORAL at 05:50

## 2018-01-30 RX ADMIN — CHLORHEXIDINE GLUCONATE 15 MILLILITER(S): 213 SOLUTION TOPICAL at 05:50

## 2018-01-30 RX ADMIN — Medication 20 MILLIEQUIVALENT(S): at 07:34

## 2018-01-30 RX ADMIN — CISATRACURIUM BESYLATE 5 MILLIGRAM(S): 2 INJECTION INTRAVENOUS at 01:00

## 2018-01-30 RX ADMIN — CISATRACURIUM BESYLATE 5 MILLIGRAM(S): 2 INJECTION INTRAVENOUS at 01:05

## 2018-01-30 RX ADMIN — Medication 50 MILLILITER(S): at 05:52

## 2018-01-30 RX ADMIN — MICAFUNGIN SODIUM 105 MILLIGRAM(S): 100 INJECTION, POWDER, LYOPHILIZED, FOR SOLUTION INTRAVENOUS at 15:16

## 2018-01-30 RX ADMIN — CEFTAZIDIME 100 GRAM(S): 6 INJECTION, POWDER, FOR SOLUTION INTRAVENOUS at 05:51

## 2018-01-30 RX ADMIN — Medication 40 MILLIEQUIVALENT(S): at 05:51

## 2018-01-30 RX ADMIN — Medication 0.12 MILLIGRAM(S): at 12:54

## 2018-01-30 RX ADMIN — CEFTAZIDIME 100 GRAM(S): 6 INJECTION, POWDER, FOR SOLUTION INTRAVENOUS at 21:52

## 2018-01-30 RX ADMIN — Medication 50 MILLILITER(S): at 15:15

## 2018-01-30 RX ADMIN — PROPOFOL 5 MICROGRAM(S)/KG/MIN: 10 INJECTION, EMULSION INTRAVENOUS at 01:58

## 2018-01-30 RX ADMIN — Medication 3 MILLILITER(S): at 15:54

## 2018-01-30 RX ADMIN — CEFTAZIDIME 100 GRAM(S): 6 INJECTION, POWDER, FOR SOLUTION INTRAVENOUS at 13:22

## 2018-01-30 RX ADMIN — PANTOPRAZOLE SODIUM 40 MILLIGRAM(S): 20 TABLET, DELAYED RELEASE ORAL at 17:16

## 2018-01-30 RX ADMIN — Medication 50 MILLILITER(S): at 21:52

## 2018-01-30 RX ADMIN — Medication 3 MILLILITER(S): at 23:41

## 2018-01-30 RX ADMIN — Medication 3 MILLILITER(S): at 09:20

## 2018-01-30 RX ADMIN — CHLORHEXIDINE GLUCONATE 15 MILLILITER(S): 213 SOLUTION TOPICAL at 17:16

## 2018-01-30 NOTE — PROCEDURE NOTE - NSPROCDETAILS_GEN_ALL_CORE
connected to ventilator/patient pre-oxygenated, tube inserted, placement confirmed
sterile dressing applied/sterile technique, catheter placed/location identified, draped/prepped, sterile technique used
sterile technique, catheter placed/lumen(s) aspirated and flushed/guidewire recovered/sterile dressing applied/ultrasound guidance

## 2018-01-30 NOTE — PROGRESS NOTE ADULT - SUBJECTIVE AND OBJECTIVE BOX
EPS Progress Note    Interval Events: Patient self-extubated o/n, reintubated and placed on sedation. Dig level therapeutic 1.2. Afib with slow VR/ Bradycardia now resolving. Discussed case with SICU team.     - AMRIK 1/29/18 Normal EF. No clot. Severely dilated LA. Severe bileaflet mitral valve prolapse, more pronounced in the posteromedial commissure and the entire posterior leaflet. The A3 scallop is flail with likely torn chordae tendinae attached to it.     - Echocardiogram 1/20/18 with EF 50%  - Echocardiogram 1/19/18 with EF 55-60%  - Echocardiogram 1/4/17 with EF 60-65%    O: T(C): 37.2 (01-30-18 @ 09:23), Max: 38.7 (01-29-18 @ 19:00)  HR: 64 (01-30-18 @ 10:00) (58 - 116)  BP: 110/58 (01-30-18 @ 10:00) (93/51 - 149/75)  RR: 17 (01-30-18 @ 10:00) (15 - 34)  SpO2: 100% (01-30-18 @ 10:00) (93% - 100%)      TELE: Afib (rate controlled), bradycardia resolving today compared to yesterday (Hr 60-90's). HR elevated during stress. Several pauses noted 2-3sec.     PHYSICAL  General:  NAD        Chest:  CTA B/L, no w/r/r  Cardiac:  IRR, + s1/s2   Abdomen:    Extremities: WWP, No edema    LABS:                        9.3    13.0  )-----------( 59       ( 30 Jan 2018 04:02 )             27.9     01-30    147<H>  |  108  |  33<H>  ----------------------------<  143<H>  3.5   |  32<H>  |  1.07    Ca    7.5<L>      30 Jan 2018 04:02  Phos  2.9     01-30  Mg     2.6     01-30    TPro  4.9<L>  /  Alb  2.3<L>  /  TBili  12.4<H>  /  DBili  x   /  AST  64<H>  /  ALT  72<H>  /  AlkPhos  105  01-30    PT/INR - ( 30 Jan 2018 04:02 )   PT: 15.4 sec;   INR: 1.38     PTT - ( 30 Jan 2018 04:02 )  PTT:60.9 sec      MEDICATIONS:  acetaminophen    Suspension 650 milliGRAM(s) Enteral Tube every 6 hours PRN  albumin human 25% IVPB 50 milliLiter(s) IV Intermittent every 8 hours  ALBUTerol/ipratropium for Nebulization 3 milliLiter(s) Nebulizer every 6 hours  cefTAZidime  IVPB 2 Gram(s) IV Intermittent every 8 hours  chlorhexidine 0.12% Liquid 15 milliLiter(s) Swish and Spit two times a day  dextrose 5%. 1000 milliLiter(s) IV Continuous <Continuous>  digoxin  Injectable 0.125 milliGRAM(s) IV Push daily  fentaNYL   Infusion 0.559 MICROgram(s)/kG/Hr IV Continuous <Continuous>  heparin  Infusion 800 Unit(s)/Hr IV Continuous <Continuous>  HYDROmorphone  Injectable 0.5 milliGRAM(s) IV Push every 3 hours PRN  insulin lispro (HumaLOG) corrective regimen sliding scale   SubCutaneous every 6 hours  metolazone 10 milliGRAM(s) Oral two times a day  micafungin IVPB 100 milliGRAM(s) IV Intermittent every 24 hours  micafungin IVPB      norepinephrine Infusion 0.03 MICROgram(s)/kG/Min IV Continuous <Continuous>  pantoprazole  Injectable 40 milliGRAM(s) IV Push every 12 hours  propofol Infusion 9.321 MICROgram(s)/kG/Min IV Continuous <Continuous>  sodium chloride 0.9% lock flush 10 milliLiter(s) IV Push every 1 hour PRN  sodium chloride 0.9% lock flush 20 milliLiter(s) IV Push once      ASSESSMENT/PLAN

## 2018-01-30 NOTE — CONSULT NOTE ADULT - PROBLEM SELECTOR RECOMMENDATION 9
Discussed case with Dr. Lin.  We will follow along while he is here and watch his progress.  Should he become more stable and clinically appropriate we will re-evaluate for surgical intervention on his valve.  Will follow with you for now.  Thank you. Discussed case with Dr. Lin.  We will follow along while he is here and watch his progress.  Should he become more stable and clinically appropriate we will re-evaluate for possible surgical intervention on his mitral valve.  Will follow with you for now.  Case discussed between Dr. Lin and Dr. Metcalf.  Thank you. Discussed case with Dr. Lin.  We will follow along while he is here and watch his progress.  Should he become more stable and clinically appropriate we will re-evaluate for possible surgical intervention on his mitral valve.  I discussed briefly with his HCP Bola who showed me the patient's living will from 1994.  I would recommend reevaluating this document with the HCP, if it seems that the patient is improving.  Will follow along with you for now.  Case discussed between Dr. Lin and Dr. Metcalf.  Thank you.

## 2018-01-30 NOTE — PROCEDURE NOTE - NSTRACHINTUBMED_RESP_A_CORE
propofol infusion/midazolam injectable/propofol injectable propofol injectable/midazolam injectable/cisatracurium/propofol infusion

## 2018-01-30 NOTE — PROGRESS NOTE ADULT - ASSESSMENT
I spoke to the micro at[address] [place] [address]  The fungal sensitivities should be available tomorrow

## 2018-01-30 NOTE — PROCEDURE NOTE - NSINFORMCONSENT_GEN_A_CORE
This was an emergent procedure.
Benefits, risks, and possible complications of procedure explained to patient/caregiver who verbalized understanding and gave written consent.
Benefits, risks, and possible complications of procedure explained to patient/caregiver who verbalized understanding and gave written consent./HCP

## 2018-01-30 NOTE — PROCEDURE NOTE - NSTRACHPOSTINTU_RESP_A_CORE
Positive end tidal Co2 noted/Breath sounds equal/Chest X-Ray/Appropriate capnography/Breath sounds bilateral/Chest excursion noted

## 2018-01-30 NOTE — PROGRESS NOTE ADULT - SUBJECTIVE AND OBJECTIVE BOX
24 hr events: ON: PTT 61.3 repeat 60 - f/u repeat @10am . Pt self extubated- reintubated. placed on sedation overnight. had 0.6 second pause on strip@3am x1 propofol stopped and started fentanyl gtt. EKG: v3-v4 st depression Trops: 0.04- repeat @10  F/u dig level 1.2. Am Labs repleted  1/29: CPAP trial (7/5),. held Dig dose today per Bhusri (HR was 40's overnight), AMRIK severe MR w/ likely torn chordae, severe pulm htn. PICC placed (poor venouis access), switch from zosyn to ceftaz per Mark    SUBJECTIVE: Wakes to stimuli.       MEDICATIONS  (STANDING):  albumin human 25% IVPB 50 milliLiter(s) IV Intermittent every 8 hours  ALBUTerol/ipratropium for Nebulization 3 milliLiter(s) Nebulizer every 6 hours  cefTAZidime  IVPB 2 Gram(s) IV Intermittent every 8 hours  chlorhexidine 0.12% Liquid 15 milliLiter(s) Swish and Spit two times a day  dextrose 5%. 1000 milliLiter(s) (50 mL/Hr) IV Continuous <Continuous>  digoxin  Injectable 0.125 milliGRAM(s) IV Push daily  fentaNYL   Infusion 0.559 MICROgram(s)/kG/Hr (5 mL/Hr) IV Continuous <Continuous>  heparin  Infusion 800 Unit(s)/Hr (8.5 mL/Hr) IV Continuous <Continuous>  insulin lispro (HumaLOG) corrective regimen sliding scale   SubCutaneous every 6 hours  metolazone 10 milliGRAM(s) Oral two times a day  micafungin IVPB 100 milliGRAM(s) IV Intermittent every 24 hours  micafungin IVPB      pantoprazole  Injectable 40 milliGRAM(s) IV Push every 12 hours  propofol Infusion 9.321 MICROgram(s)/kG/Min (5 mL/Hr) IV Continuous <Continuous>  sodium chloride 0.9% lock flush 20 milliLiter(s) IV Push once    MEDICATIONS  (PRN):  acetaminophen    Suspension 650 milliGRAM(s) Enteral Tube every 6 hours PRN For Temp greater than 38 C (100.4 F)  HYDROmorphone  Injectable 0.5 milliGRAM(s) IV Push every 3 hours PRN Severe Pain (7 - 10)  sodium chloride 0.9% lock flush 10 milliLiter(s) IV Push every 1 hour PRN After each medication administration      Oliveira:	  [ ] None	[x] Daily Oliveira Order Placed	   Indication:	  [x] Strict I and O's    [ ] Obstruction     [ ] Incontinence + Stage 3 or 4 Decubitus  Central Line:  [ ] None	   [x]  Medication / TPN Administration     [ ] No Peripheral IV     ICU Vital Signs Last 24 Hrs  T(C): 37.2 (30 Jan 2018 09:23), Max: 38.7 (29 Jan 2018 19:00)  T(F): 98.9 (30 Jan 2018 09:23), Max: 101.6 (29 Jan 2018 19:00)  HR: 60 (30 Jan 2018 14:00) (58 - 116)  BP: 99/62 (30 Jan 2018 14:00) (93/51 - 149/75)  BP(mean): 75 (30 Jan 2018 14:00) (66 - 109)  ABP: --  ABP(mean): --  RR: 16 (30 Jan 2018 14:00) (15 - 34)  SpO2: 100% (30 Jan 2018 14:00) (95% - 100%)      Physical Exam:  General: NAD  HEENT: NC/AT, EOMI, PERRLA, normal hearing, no oral lesions, neck supple w/o LAD  Pulmonary: Nonlabored breathing, no respiratory distress, CTA-B  Cardiovascular: Irregular, normal rhythm. Holosystolic murmur  Abdominal: soft, NT, Tubes in place.   Extremities: Edematous. R. distal toe no change  Neuro: A/O x3, CNs II-XII grossly intact, normal motor/sensation, no focal deficits  Pulses: palpable distal pulses      Vent settings:  Mode: AC/ CMV (Assist Control/ Continuous Mandatory Ventilation), RR (machine): 16, TV (machine): 500, FiO2: 40, PEEP: 5, ITime: 0.8, MAP: 7.5, PIP: 18    I&O's Summary    29 Jan 2018 07:01  -  30 Jan 2018 07:00  --------------------------------------------------------  IN: 1136.3 mL / OUT: 2381 mL / NET: -1244.7 mL    30 Jan 2018 07:01  -  30 Jan 2018 14:08  --------------------------------------------------------  IN: 89.4 mL / OUT: 530 mL / NET: -440.6 mL        LABS:                        9.3    13.0  )-----------( 59       ( 30 Jan 2018 04:02 )             27.9     01-30    147<H>  |  108  |  33<H>  ----------------------------<  143<H>  3.5   |  32<H>  |  1.07    Ca    7.5<L>      30 Jan 2018 04:02  Phos  2.9     01-30  Mg     2.6     01-30    TPro  4.9<L>  /  Alb  2.3<L>  /  TBili  12.4<H>  /  DBili  x   /  AST  64<H>  /  ALT  72<H>  /  AlkPhos  105  01-30    PT/INR - ( 30 Jan 2018 04:02 )   PT: 15.4 sec;   INR: 1.38          PTT - ( 30 Jan 2018 11:08 )  PTT:59.6 sec    CAPILLARY BLOOD GLUCOSE      POCT Blood Glucose.: 132 mg/dL (30 Jan 2018 12:32)  POCT Blood Glucose.: 111 mg/dL (30 Jan 2018 05:48)  POCT Blood Glucose.: 112 mg/dL (29 Jan 2018 23:31)  POCT Blood Glucose.: 92 mg/dL (29 Jan 2018 19:47)    LIVER FUNCTIONS - ( 30 Jan 2018 04:02 )  Alb: 2.3 g/dL / Pro: 4.9 g/dL / ALK PHOS: 105 U/L / ALT: 72 U/L / AST: 64 U/L / GGT: x             RADIOLOGY & ADDITIONAL STUDIES:

## 2018-01-30 NOTE — PROGRESS NOTE ADULT - SUBJECTIVE AND OBJECTIVE BOX
Critical Care    INTERVAL HPI/OVERNIGHT EVENTS:    ANTIBIOTICS    MEDICATIONS  (STANDING):  albumin human 25% IVPB 50 milliLiter(s) IV Intermittent every 8 hours  ALBUTerol/ipratropium for Nebulization 3 milliLiter(s) Nebulizer every 6 hours  cefTAZidime  IVPB 2 Gram(s) IV Intermittent every 8 hours  chlorhexidine 0.12% Liquid 15 milliLiter(s) Swish and Spit two times a day  dextrose 5%. 1000 milliLiter(s) (50 mL/Hr) IV Continuous <Continuous>  digoxin  Injectable 0.125 milliGRAM(s) IV Push daily  fentaNYL   Infusion 0.559 MICROgram(s)/kG/Hr (5 mL/Hr) IV Continuous <Continuous>  heparin  Infusion 800 Unit(s)/Hr (8.5 mL/Hr) IV Continuous <Continuous>  insulin lispro (HumaLOG) corrective regimen sliding scale   SubCutaneous every 6 hours  metolazone 10 milliGRAM(s) Oral two times a day  micafungin IVPB 100 milliGRAM(s) IV Intermittent every 24 hours  micafungin IVPB      norepinephrine Infusion 0.03 MICROgram(s)/kG/Min (5 mL/Hr) IV Continuous <Continuous>  pantoprazole  Injectable 40 milliGRAM(s) IV Push every 12 hours  propofol Infusion 9.321 MICROgram(s)/kG/Min (5 mL/Hr) IV Continuous <Continuous>  sodium chloride 0.9% lock flush 20 milliLiter(s) IV Push once    MEDICATIONS  (PRN):  acetaminophen    Suspension 650 milliGRAM(s) Enteral Tube every 6 hours PRN For Temp greater than 38 C (100.4 F)  HYDROmorphone  Injectable 0.5 milliGRAM(s) IV Push every 3 hours PRN Severe Pain (7 - 10)  sodium chloride 0.9% lock flush 10 milliLiter(s) IV Push every 1 hour PRN After each medication administration      Allergies    No Known Allergies    Intolerances        REVIEW OF SYSTEMS:    patient intubated  unable to communicate    Vital Signs Last 24 Hrs  T(C): 37.2 (30 Jan 2018 09:23), Max: 38.7 (29 Jan 2018 19:00)  T(F): 98.9 (30 Jan 2018 09:23), Max: 101.6 (29 Jan 2018 19:00)  HR: 64 (30 Jan 2018 10:00) (58 - 116)  BP: 110/58 (30 Jan 2018 10:00) (93/51 - 149/75)  BP(mean): 72 (30 Jan 2018 10:00) (66 - 109)  RR: 17 (30 Jan 2018 10:00) (15 - 34)  SpO2: 100% (30 Jan 2018 10:00) (95% - 100%)    PHYSICAL EXAM:    Eyes: PERRL, EOM intact; conjunctiva and sclera clear  Head: Normocephalic; atraumatic  ENMT: No nasal discharge; airway clear  Neck: Supple; non tender; no masses  Respiratory: No wheezes, rales or rhonchi  Cardiovascular: Regular rate and rhythm. S1 and S2 Normal; No murmurs, gallops or rubs  Gastrointestinal: Soft non-tender non-distended; Normal bowel sounds; No hepatosplenomegaly  Genitourinary: No costovertebral angle tenderness  LABS:                        9.3    13.0  )-----------( 59       ( 30 Jan 2018 04:02 )             27.9     01-30    147<H>  |  108  |  33<H>  ----------------------------<  143<H>  3.5   |  32<H>  |  1.07    Ca    7.5<L>      30 Jan 2018 04:02  Phos  2.9     01-30  Mg     2.6     01-30    TPro  4.9<L>  /  Alb  2.3<L>  /  TBili  12.4<H>  /  DBili  x   /  AST  64<H>  /  ALT  72<H>  /  AlkPhos  105  01-30    PT/INR - ( 30 Jan 2018 04:02 )   PT: 15.4 sec;   INR: 1.38          PTT - ( 30 Jan 2018 11:08 )  PTT:59.6 sec        MICROBIOLOGY:  Culture Results:   No growth at 2 days. (01-27 @ 13:39)  Culture Results:   No growth at 2 days. (01-27 @ 13:38)  Culture Results:   Numerous Stenotrophomonas maltophilia  No routine respiratory geeta Isolated (01-27 @ 10:09)  Culture Results:   Candida albicans isolated (01-24 @ 16:33)  Culture Results:   Candida albicans  Susceptibility to follow. (01-23 @ 21:26)      RADIOLOGY & ADDITIONAL STUDIES:

## 2018-01-30 NOTE — PROGRESS NOTE ADULT - ASSESSMENT
72yM with PMH severe MR/AR presented after recent dx acute cholecystitis and RLL PNA at [facility] with lower abdominal pain, new onset rapid Afib, sepsis, acute CHF exacerbation, initially admitted to cardiology on bipap, then transferred to SICU with perforated duodenum s/p Shreyas patch and jejunostomy (1/20) c/b breakdown s/p RTOR, washout, new Shreyas patch, gastrostomy, duodenostomy, IVCF (1/22)    Neuro: Fentanyl gtt  CV: Afib: Echo EF 50%, ?vegetation and Severe MR. metolazone 10bid, digoxin,   Pulm: Intubated on cmv 40/500/12/5 chlorhex  GI: NPO with NGT to LIWS. Jtube TF jevity @ 20 cc. retrograde D-tube to LIWS, G-tube to LIWS Protonix bid. FW 200q4h,   : Oliveira, GAETANO  ID: Perforated duodenum/PNA, surgical swab grew Candida: micafungin (1/22--), ceftaz(1/29--) //// discontinued Vanco (1/19-1/20) Zosyn (1/19-1/22), f/u Bld cx's, linezolid (1/22-23), meropenem (1/22-1/27), zosyn (1/27-1/29)    Endo: ISS  HEME:DVT: Heparin gtt PTT, thrombocytopenic, HIT +, serotonin release assay NEGATIVE. IVCF (1/22) //1FFP, 1PLT (1/22).   PPx: SCD, R pop DVT, s/p IVC filter 1/22  Lines: PIV, PICC (1/29--), L brachial Jeane (1/20-1/27),  LSC HD cath (1/22-1/27)  Wounds: Ex lap, JPx2 to bulb suction, J-tube to gravity, D-tube to LIWS, G-tube to LIWS  PT/OT: early mobilization.

## 2018-01-30 NOTE — PROGRESS NOTE ADULT - SUBJECTIVE AND OBJECTIVE BOX
Interventional, Pulmonary, Critical, Chest Special Procedures.    Pt was seen and fully examined by myself.     Time spent with patient in minutes:67    Patient is a 72y old  Male who presents with a chief complaint of acute cholecystitis (18 Jan 2018 14:49)Events of last 24hrs reviewed. The patient discussed c . The patient now sedated on CMV mode with good airway dynamics PIP 18/DcC277%.  Vent circuit integrity was revised and preserved    HPI:  73 yo M with h/o severe mitral and aortic valve regurgitation presented to ED with 5 day h/o left lower abdominal pain, nausea and multiple episodes of vomiting as well as PO intolerance. Pt reports that the pain in his abdomen is illicited by movement. Went to [facility] two days ago and underwent CT scan which revealed acute cholecystitis as well as right lower lobe infectious pneumonia. Pt was told he would require surgery and left AMA because he was waiting too long. Denies f/c. Last echo 1/4/17 with EF 60-65%. Normal BMs and passing flatus. (18 Jan 2018 14:49)    REVIEW OF SYSTEMS: updated  Constitutional: No fever, weight loss, chills or fatigue  Eyes: No eye pain, visual disturbances, or discharge  ENMT:  No difficulty hearing, tinnitus, vertigo; No sinus or throat pain. No epistaxis, dysphagia, dysphonia, hoarseness or odynophagia  Neck: No pain, stiffness or neck swelling.  No masses or deformities  Respiratory: No cough, wheezing, chills or hemoptysis  - COPD  - ILD   - PE   - ASTHMA     - PNEUMONIA  Cardiovascular: No chest pain, dysrhythmia, palpitations, dizziness or edema   - COPD     - CAD   - CHF   - HTN  Gastrointestinal: No abdominal or epigastric pain. No nausea, vomiting or hematemesis; No diarrhea or constipation. No melena or hematochezia. No dysphagia or Icterus.          Genitourinary: No dysuria, frequency, hematuria or incontinence   - CKD/GAETANO      - ESRD  Neurological: No headaches, memory loss, loss of strength, numbness or tremors      -DEMENTIA     - STROKE    - SEIZURE  Skin: No itching, burning, rashes or lesions   Lymph Nodes: No enlarged glands  Endocrine: No heat or cold intolerance; No hair loss       - DM     - THYROID DISORDER  Musculoskeletal: No joint pain or swelling; No muscle, back or extremity pain  Psychiatric: No depression, anxiety, mood swings or difficulty sleeping  Heme/Lymph: No easy bruising or bleeding gums         - ANEMIA      - CANCER   -COAGULOPATHY  Allergy and Immunologic: No hives or eczema    PAST MEDICAL & SURGICAL HISTORY:  Mitral regurgitation  Aortic regurgitation  No significant past surgical history    FAMILY HISTORY:    SOCIAL HISTORY:      - Tobacco     - ETOH    Allergies    No Known Allergies    Intolerances      Vital Signs Last 24 Hrs  T(C): 37.1 (30 Jan 2018 14:22), Max: 38.7 (29 Jan 2018 19:00)  T(F): 98.8 (30 Jan 2018 14:22), Max: 101.6 (29 Jan 2018 19:00)  HR: 60 (30 Jan 2018 14:00) (58 - 116)  BP: 99/62 (30 Jan 2018 14:00) (93/51 - 149/75)  BP(mean): 75 (30 Jan 2018 14:00) (66 - 109)  RR: 16 (30 Jan 2018 14:00) (15 - 34)  SpO2: 100% (30 Jan 2018 14:00) (95% - 100%)    01-29 @ 07:01 - 01-30 @ 07:00  --------------------------------------------------------  IN: 1136.3 mL / OUT: 2381 mL / NET: -1244.7 mL    01-30 @ 07:01 - 01-30 @ 15:02  --------------------------------------------------------  IN: 89.4 mL / OUT: 530 mL / NET: -440.6 mL      Mode: AC/ CMV (Assist Control/ Continuous Mandatory Ventilation)  RR (machine): 16  TV (machine): 500  FiO2: 40  PEEP: 5  ITime: 0.8  MAP: 7.5  PIP: 18    PHYSICAL EXAM:  Comfortable, no distress  Eyes: PERRL, EOM intact; conjunctiva and sclera clear  Head: Normocephalic;  No Trauma  ENMT: No nasal discharge, hoarseness, cough or hemoptysis.  Airway clear  Neck: Supple; non tender; no masses or deformities.    No JVD  Respiratory:  - WHEEZING   few R  RHONCHI  - RALES  = CRACKLES.  Diminished breath sounds  BILATERAL  RIGHT  LEFT bases   Cardiovascular: Regular rate and rhythm. S1 and S2 Normal; No murmurs, gallops or rubs     - PPM/AICD  Gastrointestinal: Soft ,distended; Normal bowel sounds; No hepatosplenomegaly.     +PEG    - multiple surgical drains   + ANGELES  Genitourinary: No costovertebral angle tenderness. No dysuria  Extremities: Decreased AROM, + clubbing, cyanosis + edema    Vascular: Peripheral pulses palpable 2+ bilaterally  Neurological: intubated and sedated  Skin: Warm and dry. No obvious rash  Lymph Nodes: No acute cervical or supraclavicular adenopathy  Psychiatric:sedated    DEVICES:  - DENTURES   +IV R / L     7.5 ETUBE   -TRACH   -CTUBE  R / L      LABS:                          9.3    13.0  )-----------( 59       ( 30 Jan 2018 04:02 )             27.9     01-30    147<H>  |  108  |  33<H>  ----------------------------<  143<H>  3.5   |  32<H>  |  1.07    Ca    7.5<L>      30 Jan 2018 04:02  Phos  2.9     01-30  Mg     2.6     01-30    TPro  4.9<L>  /  Alb  2.3<L>  /  TBili  12.4<H>  /  DBili  x   /  AST  64<H>  /  ALT  72<H>  /  AlkPhos  105  01-30    PT/INR - ( 30 Jan 2018 04:02 )   PT: 15.4 sec;   INR: 1.38          PTT - ( 30 Jan 2018 11:08 )  PTT:59.6 sec  < from: US Abdomen Limited (01.30.18 @ 13:46) >    EXAM:  US ABDOMEN LIMITED                          PROCEDURE DATE:  01/30/2018                     INTERPRETATION:  RIGHT UPPER QUADRANT ULTRASOUND dated 1/30/2018 1:46 PM    INDICATION: RUQ US  for cholecystitis    PRIOR STUDIES: 1/18/2018.    FINDINGS: Ultrasound evaluation of the right upper quadrant demonstrates   no focal hepatic abnormalities. The liver is not enlarged.  No dilated   intrahepatic bile ducts are seen.   The common bile duct is normal in   diameter measuring 0.2 cm.  The gallbladder is contracted. Posterior   acoustic shadowing emanating from gallbladder suggests that the   gallbladder is filled with small calculi or alternatively with 1 large   calculus.. .   Limited visualization of the pancreas.    The visualized   portions of the abdominal aorta and inferior vena cava are normal in   appearance.   The right kidney is 11.9     cm in length. A few cortical   cysts noted including 4.2 cm upper pole, 3.3 and 1.8 cm mid region. .       IMPRESSION: No evidence of acute cholecystitis.    < end of copied text >  RADIOLOGY & ADDITIONAL STUDIES (The following images were personally reviewed): CXR series

## 2018-01-30 NOTE — PROGRESS NOTE ADULT - SUBJECTIVE AND OBJECTIVE BOX
Patient seen and examined at bedside.   urine output much better  creatinine also improving  remains intubated  minimally responsive      T(C): , Max: 38.7 (01-29-18 @ 19:00)  T(F): , Max: 101.6 (01-29-18 @ 19:00)  HR: 58 (01-30-18 @ 17:00)  BP: 118/60 (01-30-18 @ 17:00)  BP(mean): 84 (01-30-18 @ 17:00)  RR: 18 (01-30-18 @ 17:00)  SpO2: 100% (01-30-18 @ 17:00)  Wt(kg): --    01-29 @ 07:01 - 01-30 @ 07:00  --------------------------------------------------------  IN:    Albumin 25%: 100 mL    fentaNYL  Infusion: 26.7 mL    heparin Infusion: 183.5 mL    IV PiggyBack: 200 mL    Jevity: 170 mL    norepinephrine Infusion: 12.1 mL    propofol Infusion: 10 mL    Solution: 434 mL  Total IN: 1136.3 mL    OUT:    Bulb: 170 mL    Bulb: 130 mL    Drain: 400 mL    Indwelling Catheter - Urethral: 1681 mL  Total OUT: 2381 mL    Total NET: -1244.7 mL      01-30 @ 07:01 - 01-30 @ 17:46  --------------------------------------------------------  IN:    Albumin 25%: 50 mL    Enteral Tube Flush: 600 mL    fentaNYL  Infusion: 53.9 mL    heparin Infusion: 85 mL    IV PiggyBack: 200 mL    Jevity: 180 mL    propofol Infusion: 43 mL  Total IN: 1211.9 mL    OUT:    Bulb: 55 mL    Bulb: 30 mL    Indwelling Catheter - Urethral: 600 mL  Total OUT: 685 mL    Total NET: 526.9 mL            albumin human 25% IVPB 50 every 8 hours  ALBUTerol/ipratropium for Nebulization 3 every 6 hours  cefTAZidime  IVPB 2 every 8 hours  chlorhexidine 0.12% Liquid 15 two times a day  digoxin  Injectable 0.125 daily  fentaNYL   Infusion 0.559 <Continuous>  heparin  Infusion 800 <Continuous>  insulin lispro (HumaLOG) corrective regimen sliding scale  every 6 hours  metolazone 10 two times a day  micafungin IVPB 100 every 24 hours  micafungin IVPB    pantoprazole  Injectable 40 every 12 hours  propofol Infusion 9.321 <Continuous>  sodium chloride 0.9% lock flush 20 once    Allergies    No Known Allergies    Intolerances      PHYSICAL EXAM:  Constitutional:  intubated  Respiratory: Clrhonchi and reduced BS bases  Cardiovascular: S1, S2.  Regular rate and rhythm.    Gastrointestinal: soft  Vasc/Extremities:  + lower extremity edema- but improving   Skin: Warm. Dry.          LABS:                        9.3    13.0  )-----------( 59       ( 30 Jan 2018 04:02 )             27.9     01-30    147<H>  |  108  |  33<H>  ----------------------------<  143<H>  3.5   |  32<H>  |  1.07    Ca    7.5<L>      30 Jan 2018 04:02  Phos  2.9     01-30  Mg     2.6     01-30    TPro  4.9<L>  /  Alb  2.3<L>  /  TBili  12.4<H>  /  DBili  x   /  AST  64<H>  /  ALT  72<H>  /  AlkPhos  105  01-30      PT/INR - ( 30 Jan 2018 04:02 )   PT: 15.4 sec;   INR: 1.38          PTT - ( 30 Jan 2018 11:08 )  PTT:59.6 sec          RADIOLOGY & ADDITIONAL STUDIES:

## 2018-01-30 NOTE — PROGRESS NOTE ADULT - ASSESSMENT
72 year old male recovering from oliguric ATN- remains non oliguric  + shock also s/p CT angio, s/p treatment with Vanco - supra therapeutic levels,  s/p laparotomy for perforation of the duodenum., revision done on 1/22.   On 1/23 was placed on CVVHD- discontinued after only 3-4 hours (clotted) but hten urine output began to .   No RRT needed since that initial attempt  needs ongoing net negative balance

## 2018-01-30 NOTE — PROGRESS NOTE ADULT - ASSESSMENT
72yM with PMH severe MR/AR presented after recent dx acute cholecystitis and RLL PNA at [facility] with lower abdominal pain, new onset rapid Afib, sepsis, acute CHF exacerbation, initially admitted to cardiology on BiPaP, then transferred to SICU with perforated duodenum s/p Shreyas patch and jejunostomy (1/20) c/b breakdown s/p RTOR, washout, new Shreyas patch, gastrostomy, duodenostomy, IVCF (1/22). s/p PICC line (1/29).    - Patient with continued Afib now with controlled VR (HR 60s) s/p Digoxin. Dose was held yesterday for slow VR, now resolving today. Digoxin level noted to be 1.2 therapeutic. Continue Digoxin. Hold for HR < 60bpm. Cr is stable currently 1.07.   - Patient with unknown chronicity of Afib. No prior diagnosis accordingly to patient's , however AMRIK demonstrated severely dilated LA suggestive of h/o Afib. Patient also with history of strong snoring and periods of apnea during sleep (reported by ) concerning for RADHA.   - Could consider DCCV since patient has never had a cardioversion before, however unlikely to be successful long-term given severely dilated LA.   - Continue patient on AC (either Heparin or oral AC) for thromboembolic prophylaxis. If patient undergoes cardioversion, will require uninterrupted AC (either Heparin or oral AC) for at least 4-6 weeks post DCCV.

## 2018-01-30 NOTE — PROGRESS NOTE ADULT - ATTENDING COMMENTS
Pt self extubated and required reintubation. Planning on trach if proxy agrees as per discussion with Dr. Metcalf. Pt to be seen by structural Cardiology with severe MR and PAH. Continue gentle diuresis and no wean. Continue feeds and anticoagulation as ordered. Platelets stable.

## 2018-01-30 NOTE — CONSULT NOTE ADULT - ASSESSMENT
71 y/o male with PMHx severe MR/AI who presented after being diagnosed with acute cholecystitis and a RLL PNA at [facility].  He was complaining of lower abdominal pain and had new onset afib with RVR, sepsis and acute CHF exacerbation.  Currently intubated in the SICU.  We are being asked to consult for his valve pathology. 73 y/o male with PMHx severe MR/AI who presented after being diagnosed with acute cholecystitis and a RLL PNA at [facility].  He was complaining of lower abdominal pain and had new onset afib with RVR, sepsis and acute CHF exacerbation.  Recovering from recent general surgery for a perforated duodenal ulcer.  Currently intubated in the SICU, gentle sedation.  Not on pressors/inotropes currently.  We are being asked to consult for his valve pathology.

## 2018-01-30 NOTE — PROGRESS NOTE ADULT - ASSESSMENT
ASSESSMENT/PLAN 71y/o male pt c Respiratory failure, perforated duodenal ulcer, POD post repair, RLL pneumonia, RLL atelectases, no obvious RADHA, elevated R hemidiaphragm, AFIB, worsening thrombocytopenia, possible endocarditis, s/p reop.    1. O2: Continue CMV mode, consider Precedex, waking trials  2. Bronchodilators:  Atrovent/ albuterol q 4 – 6 hours as needed  3. Corticosteroids: off  4. ID/Antibiotics: must continue ABX (P/T) for right perihilar pneumonia with gram negative bacteria, sepsis; micafungin for peritoneal kylie, discussed c   5. Cardiac/HTN: Monitor, optimize HR; on heparin drip for AFIB, structural cardiology  feedback  6. GI: Rx/ prophylaxis c PPI/Protonix  7. Heme: Rx/VT prophylaxis IV heparin; platelets to be followed closely  8. Aspiration precautions, feeding PEG  9. Surgery mngmnt priority  10. Edema: diuretic titration  11. Abdominal ultrasound today  12. K, P repletion IV    Discussed with managing team, CCRN, SICU, surgery

## 2018-01-30 NOTE — CHART NOTE - NSCHARTNOTEFT_GEN_A_CORE
Admitting Diagnosis:   72yM with PMH severe MR/AR presented after recent dx acute cholecystitis and RLL PNA at [facility] with lower abdominal pain, new onset rapid Afib, sepsis, acute CHF exacerbation, initially admitted to cardiology on bipap, then transferred to SICU with perforated duodenum s/p Shreyas patch and jejunostomy (1/20) c/b breakdown s/p RTOR, washout, new Shreyas patch, gastrostomy, duodenostomy, IVCF (1/22)    PAST MEDICAL & SURGICAL HISTORY:  Mitral regurgitation  Aortic regurgitation  No significant past surgical history    Current Nutrition Order: Jevity 1.2 at 20 mL x 24 hr via J-tube providing 480 mL TV, 576 kcal, 27 g protein, & 388 mL fluid. Held at time of visit.     GI Issues: WDL    Pain: HONEY 2/2 vent    Skin Integrity: Surgical incision    Labs:   01-30    147<H>  |  108  |  33<H>  ----------------------------<  143<H>  3.5   |  32<H>  |  1.07    Ca    7.5<L>      30 Jan 2018 04:02  Phos  2.9     01-30  Mg     2.6     01-30    TPro  4.9<L>  /  Alb  2.3<L>  /  TBili  12.4<H>  /  DBili  x   /  AST  64<H>  /  ALT  72<H>  /  AlkPhos  105  01-30    POCT Blood Glucose.: 132 mg/dL (30 Jan 2018 12:32)  POCT Blood Glucose.: 111 mg/dL (30 Jan 2018 05:48)  POCT Blood Glucose.: 112 mg/dL (29 Jan 2018 23:31)  POCT Blood Glucose.: 92 mg/dL (29 Jan 2018 19:47)    Medications:  MEDICATIONS  (STANDING):  albumin human 25% IVPB 50 milliLiter(s) IV Intermittent every 8 hours  ALBUTerol/ipratropium for Nebulization 3 milliLiter(s) Nebulizer every 6 hours  cefTAZidime  IVPB 2 Gram(s) IV Intermittent every 8 hours  chlorhexidine 0.12% Liquid 15 milliLiter(s) Swish and Spit two times a day  dextrose 5%. 1000 milliLiter(s) (50 mL/Hr) IV Continuous <Continuous>  digoxin  Injectable 0.125 milliGRAM(s) IV Push daily  fentaNYL   Infusion 0.559 MICROgram(s)/kG/Hr (5 mL/Hr) IV Continuous <Continuous>  heparin  Infusion 800 Unit(s)/Hr (8.5 mL/Hr) IV Continuous <Continuous>  insulin lispro (HumaLOG) corrective regimen sliding scale   SubCutaneous every 6 hours  metolazone 10 milliGRAM(s) Oral two times a day  micafungin IVPB 100 milliGRAM(s) IV Intermittent every 24 hours  micafungin IVPB      norepinephrine Infusion 0.03 MICROgram(s)/kG/Min (5 mL/Hr) IV Continuous <Continuous>  pantoprazole  Injectable 40 milliGRAM(s) IV Push every 12 hours  propofol Infusion 9.321 MICROgram(s)/kG/Min (5 mL/Hr) IV Continuous <Continuous>  sodium chloride 0.9% lock flush 20 milliLiter(s) IV Push once    MEDICATIONS  (PRN):  acetaminophen    Suspension 650 milliGRAM(s) Enteral Tube every 6 hours PRN For Temp greater than 38 C (100.4 F)  HYDROmorphone  Injectable 0.5 milliGRAM(s) IV Push every 3 hours PRN Severe Pain (7 - 10)  sodium chloride 0.9% lock flush 10 milliLiter(s) IV Push every 1 hour PRN After each medication administration    Weight:  No new weights to assess    Estimated energy needs using 75 kg IBW:  25-30 kcal/kg (3555-6645 kcal).   1.4-1.6 g/kg (105-120 g protein).   30-35 mL/kg (1605-5063 mL fluid).      Subjective: Remains intubated with EN held this morning. Per round discussion, EN to be restarted today and slowly titrated to goal rate.     Previous Nutrition Diagnosis: Inadequate energy intake RT current EN order  AEB pt meeting 46%est. kcal needs 20% est. protein needs    Active [ X  ]  Resolved [   ]    If resolved, new PES: Inadequate EN infusion RT current order AEB pt meeting 31% of kcal needs & 26% of protein needs.     Goal: Meet % of nutrition needs via tolerated route.     Recommendations:   1. Consider Jevity 1.2 at 75 mL x 24 hr via J-tube + prostat qday to provide 1800 mL TV, 2260 kcal, 115 g protein, & 1453 mL fluid. Monitor for signs of intolerance. If concentrated feeds desired 2/2 CHF: Jevity 1.5 at 60 mL x 24 hr via J-tube + prostat qday to provide 1440 mL TV, 2260 kcal, 107 g protein, & 1094 mL fluid.   2. Trend daily weights    Education: N/A-vent    Risk Level: High [ X  ] Moderate [   ] Low [   ]

## 2018-01-30 NOTE — PROGRESS NOTE ADULT - PROBLEM SELECTOR PLAN 4
needs ongoing negative fluid balance-  okay to c/w lasix/metolazone  can try torsemide instead of lasix for longer duration of action  10-20 mg IV  also albumin as needed
BNP 7408 on admission.  -Lasix 40 mg IV BID. Indwelling abarca to monitor Strict Is and Os.   -Daily Weights  -Negative 1200 cc since admission  -Patient transitioned from Bipap now on O2 sat 4 L NC
needs ongoing negative fluid balance-  okay to c/w lasix/metolazone  can try torsemide instead of lasix for longer duration of action  10-20 mg IV  also albumin as needed

## 2018-01-31 LAB
-  FLUCONAZOLE: SIGNIFICANT CHANGE UP
ALBUMIN SERPL ELPH-MCNC: 2.5 G/DL — LOW (ref 3.3–5)
ALP SERPL-CCNC: 96 U/L — SIGNIFICANT CHANGE UP (ref 40–120)
ALT FLD-CCNC: 67 U/L — HIGH (ref 10–45)
ANION GAP SERPL CALC-SCNC: 6 MMOL/L — SIGNIFICANT CHANGE UP (ref 5–17)
APTT BLD: 56.6 SEC — HIGH (ref 27.5–37.4)
AST SERPL-CCNC: 68 U/L — HIGH (ref 10–40)
BILIRUB SERPL-MCNC: 10.9 MG/DL — HIGH (ref 0.2–1.2)
BUN SERPL-MCNC: 35 MG/DL — HIGH (ref 7–23)
CALCIUM SERPL-MCNC: 7.7 MG/DL — LOW (ref 8.4–10.5)
CHLORIDE SERPL-SCNC: 105 MMOL/L — SIGNIFICANT CHANGE UP (ref 96–108)
CO2 SERPL-SCNC: 31 MMOL/L — SIGNIFICANT CHANGE UP (ref 22–31)
CREAT SERPL-MCNC: 0.93 MG/DL — SIGNIFICANT CHANGE UP (ref 0.5–1.3)
CULTURE RESULTS: SIGNIFICANT CHANGE UP
GLUCOSE BLDC GLUCOMTR-MCNC: 116 MG/DL — HIGH (ref 70–99)
GLUCOSE BLDC GLUCOMTR-MCNC: 123 MG/DL — HIGH (ref 70–99)
GLUCOSE BLDC GLUCOMTR-MCNC: 135 MG/DL — HIGH (ref 70–99)
GLUCOSE BLDC GLUCOMTR-MCNC: 159 MG/DL — HIGH (ref 70–99)
GLUCOSE SERPL-MCNC: 163 MG/DL — HIGH (ref 70–99)
HCT VFR BLD CALC: 25.5 % — LOW (ref 39–50)
HGB BLD-MCNC: 8.2 G/DL — LOW (ref 13–17)
MAGNESIUM SERPL-MCNC: 2.6 MG/DL — SIGNIFICANT CHANGE UP (ref 1.6–2.6)
MCHC RBC-ENTMCNC: 30.7 PG — SIGNIFICANT CHANGE UP (ref 27–34)
MCHC RBC-ENTMCNC: 32.2 G/DL — SIGNIFICANT CHANGE UP (ref 32–36)
MCV RBC AUTO: 95.5 FL — SIGNIFICANT CHANGE UP (ref 80–100)
METHOD TYPE: SIGNIFICANT CHANGE UP
ORGANISM # SPEC MICROSCOPIC CNT: SIGNIFICANT CHANGE UP
ORGANISM # SPEC MICROSCOPIC CNT: SIGNIFICANT CHANGE UP
PHOSPHATE SERPL-MCNC: 2.3 MG/DL — LOW (ref 2.5–4.5)
PLATELET # BLD AUTO: 61 K/UL — LOW (ref 150–400)
POTASSIUM SERPL-MCNC: 3.5 MMOL/L — SIGNIFICANT CHANGE UP (ref 3.5–5.3)
POTASSIUM SERPL-SCNC: 3.5 MMOL/L — SIGNIFICANT CHANGE UP (ref 3.5–5.3)
PROT SERPL-MCNC: 4.5 G/DL — LOW (ref 6–8.3)
RBC # BLD: 2.67 M/UL — LOW (ref 4.2–5.8)
RBC # FLD: 20.2 % — HIGH (ref 10.3–16.9)
SODIUM SERPL-SCNC: 142 MMOL/L — SIGNIFICANT CHANGE UP (ref 135–145)
WBC # BLD: 9.7 K/UL — SIGNIFICANT CHANGE UP (ref 3.8–10.5)
WBC # FLD AUTO: 9.7 K/UL — SIGNIFICANT CHANGE UP (ref 3.8–10.5)

## 2018-01-31 PROCEDURE — 99231 SBSQ HOSP IP/OBS SF/LOW 25: CPT | Mod: GC

## 2018-01-31 PROCEDURE — 93975 VASCULAR STUDY: CPT | Mod: 26

## 2018-01-31 PROCEDURE — 71045 X-RAY EXAM CHEST 1 VIEW: CPT | Mod: 26

## 2018-01-31 PROCEDURE — 99291 CRITICAL CARE FIRST HOUR: CPT

## 2018-01-31 RX ORDER — POTASSIUM CHLORIDE 20 MEQ
10 PACKET (EA) ORAL
Qty: 0 | Refills: 0 | Status: COMPLETED | OUTPATIENT
Start: 2018-01-31 | End: 2018-01-31

## 2018-01-31 RX ORDER — ACETAMINOPHEN 500 MG
1000 TABLET ORAL ONCE
Qty: 0 | Refills: 0 | Status: COMPLETED | OUTPATIENT
Start: 2018-01-31 | End: 2018-01-31

## 2018-01-31 RX ORDER — POTASSIUM PHOSPHATE, MONOBASIC POTASSIUM PHOSPHATE, DIBASIC 236; 224 MG/ML; MG/ML
15 INJECTION, SOLUTION INTRAVENOUS ONCE
Qty: 0 | Refills: 0 | Status: COMPLETED | OUTPATIENT
Start: 2018-01-31 | End: 2018-01-31

## 2018-01-31 RX ADMIN — HEPARIN SODIUM 8.5 UNIT(S)/HR: 5000 INJECTION INTRAVENOUS; SUBCUTANEOUS at 06:05

## 2018-01-31 RX ADMIN — CHLORHEXIDINE GLUCONATE 15 MILLILITER(S): 213 SOLUTION TOPICAL at 06:05

## 2018-01-31 RX ADMIN — CEFTAZIDIME 100 GRAM(S): 6 INJECTION, POWDER, FOR SOLUTION INTRAVENOUS at 06:04

## 2018-01-31 RX ADMIN — CEFTAZIDIME 100 GRAM(S): 6 INJECTION, POWDER, FOR SOLUTION INTRAVENOUS at 14:00

## 2018-01-31 RX ADMIN — Medication 650 MILLIGRAM(S): at 21:51

## 2018-01-31 RX ADMIN — POTASSIUM PHOSPHATE, MONOBASIC POTASSIUM PHOSPHATE, DIBASIC 62.5 MILLIMOLE(S): 236; 224 INJECTION, SOLUTION INTRAVENOUS at 09:00

## 2018-01-31 RX ADMIN — Medication 50 MILLILITER(S): at 21:51

## 2018-01-31 RX ADMIN — Medication 3 MILLILITER(S): at 06:00

## 2018-01-31 RX ADMIN — MICAFUNGIN SODIUM 105 MILLIGRAM(S): 100 INJECTION, POWDER, LYOPHILIZED, FOR SOLUTION INTRAVENOUS at 14:00

## 2018-01-31 RX ADMIN — PANTOPRAZOLE SODIUM 40 MILLIGRAM(S): 20 TABLET, DELAYED RELEASE ORAL at 06:05

## 2018-01-31 RX ADMIN — Medication 0.12 MILLIGRAM(S): at 12:00

## 2018-01-31 RX ADMIN — Medication 3 MILLILITER(S): at 12:13

## 2018-01-31 RX ADMIN — Medication 50 MILLILITER(S): at 06:05

## 2018-01-31 RX ADMIN — Medication 100 MILLIEQUIVALENT(S): at 08:00

## 2018-01-31 RX ADMIN — Medication 50 MILLILITER(S): at 13:57

## 2018-01-31 RX ADMIN — Medication 100 MILLIEQUIVALENT(S): at 07:00

## 2018-01-31 RX ADMIN — CEFTAZIDIME 100 GRAM(S): 6 INJECTION, POWDER, FOR SOLUTION INTRAVENOUS at 21:51

## 2018-01-31 RX ADMIN — CHLORHEXIDINE GLUCONATE 15 MILLILITER(S): 213 SOLUTION TOPICAL at 18:13

## 2018-01-31 RX ADMIN — Medication 400 MILLIGRAM(S): at 16:28

## 2018-01-31 RX ADMIN — Medication 100 MILLIEQUIVALENT(S): at 06:04

## 2018-01-31 RX ADMIN — Medication 1000 MILLIGRAM(S): at 17:00

## 2018-01-31 RX ADMIN — Medication 3 MILLILITER(S): at 16:52

## 2018-01-31 RX ADMIN — PANTOPRAZOLE SODIUM 40 MILLIGRAM(S): 20 TABLET, DELAYED RELEASE ORAL at 18:13

## 2018-01-31 NOTE — PHYSICAL THERAPY INITIAL EVALUATION ADULT - IMPAIRMENTS FOUND, PT EVAL
fine motor/gross motor/aerobic capacity/endurance/muscle strength/posture/gait, locomotion, and balance

## 2018-01-31 NOTE — PROGRESS NOTE ADULT - SUBJECTIVE AND OBJECTIVE BOX
. Pt self extubated- reintubated. placed on sedation overnight. had 0.6 second pause on strip@3am x1 propofol stopped and started fentanyl gtt. EKG: v3-v4 st depression Trops: 0.04- repeat @10  F/u dig level 1.2. Am Labs repleted    SUBJECTIVE: Patient awake, tolerating CPAP.        MEDICATIONS  (STANDING):  albumin human 25% IVPB 50 milliLiter(s) IV Intermittent every 8 hours  ALBUTerol/ipratropium for Nebulization 3 milliLiter(s) Nebulizer every 6 hours  cefTAZidime  IVPB 2 Gram(s) IV Intermittent every 8 hours  chlorhexidine 0.12% Liquid 15 milliLiter(s) Swish and Spit two times a day  digoxin  Injectable 0.125 milliGRAM(s) IV Push daily  heparin  Infusion 800 Unit(s)/Hr (8.5 mL/Hr) IV Continuous <Continuous>  insulin lispro (HumaLOG) corrective regimen sliding scale   SubCutaneous every 6 hours  metolazone 10 milliGRAM(s) Oral two times a day  micafungin IVPB 100 milliGRAM(s) IV Intermittent every 24 hours  micafungin IVPB      pantoprazole  Injectable 40 milliGRAM(s) IV Push every 12 hours  sodium chloride 0.9% lock flush 20 milliLiter(s) IV Push once    MEDICATIONS  (PRN):  acetaminophen    Suspension 650 milliGRAM(s) Enteral Tube every 6 hours PRN For Temp greater than 38 C (100.4 F)  sodium chloride 0.9% lock flush 10 milliLiter(s) IV Push every 1 hour PRN After each medication administration      Oliveira:	  [ ] None	[x] Daily Oliveira Order Placed	   Indication:	  [x] Strict I and O's    [ ] Obstruction     [ ] Incontinence + Stage 3 or 4 Decubitus  Central Line:  [ ] None	   [x]  Medication / TPN Administration     [ ] No Peripheral IV     ICU Vital Signs Last 24 Hrs  T(C): 37.7 (31 Jan 2018 09:00), Max: 37.8 (31 Jan 2018 05:30)  T(F): 99.9 (31 Jan 2018 09:00), Max: 100 (31 Jan 2018 05:30)  HR: 70 (31 Jan 2018 12:14) (54 - 79)  BP: 121/68 (31 Jan 2018 12:00) (90/45 - 124/66)  BP(mean): 87 (31 Jan 2018 12:00) (59 - 95)  ABP: --  ABP(mean): --  RR: 29 (31 Jan 2018 12:00) (16 - 29)  SpO2: 100% (31 Jan 2018 12:14) (93% - 100%)      Physical Exam:  General: NAD on vent  HEENT: NC/AT, EOMI, PERRLA, normal hearing, no oral lesions, neck supple w/o LAD  Pulmonary: Nonlabored breathing, no respiratory distress, B/l basilar diminished breath sounds  Cardiovascular: Irregular Normal rate. Pronounced holosystolic murmur  Abdominal: soft, NT. Drains in place. J-tube holding feeds  Extremities: Edematous throughout. Distal extremeties. Right middle toe no change.   Neuro: A/O x3, CNs II-XII grossly intact, normal motor/sensation, no focal deficits  Pulses: palpable distal pulses        Vent settings:  Mode: CPAP with PS, FiO2: 40, PEEP: 5, PS: 5, MAP: 6.8, PIP: 11    I&O's Summary    30 Jan 2018 07:01  -  31 Jan 2018 07:00  --------------------------------------------------------  IN: 2228.4 mL / OUT: 2170 mL / NET: 58.4 mL    31 Jan 2018 07:01  -  31 Jan 2018 12:30  --------------------------------------------------------  IN: 521.5 mL / OUT: 785 mL / NET: -263.5 mL        LABS:                        8.2    9.7   )-----------( 61       ( 31 Jan 2018 04:45 )             25.5     01-31    142  |  105  |  35<H>  ----------------------------<  163<H>  3.5   |  31  |  0.93    Ca    7.7<L>      31 Jan 2018 04:45  Phos  2.3     01-31  Mg     2.6     01-31    TPro  4.5<L>  /  Alb  2.5<L>  /  TBili  10.9<H>  /  DBili  x   /  AST  68<H>  /  ALT  67<H>  /  AlkPhos  96  01-31    PT/INR - ( 30 Jan 2018 04:02 )   PT: 15.4 sec;   INR: 1.38          PTT - ( 31 Jan 2018 04:45 )  PTT:56.6 sec    CAPILLARY BLOOD GLUCOSE      POCT Blood Glucose.: 123 mg/dL (31 Jan 2018 11:52)  POCT Blood Glucose.: 135 mg/dL (31 Jan 2018 05:23)  POCT Blood Glucose.: 111 mg/dL (30 Jan 2018 23:19)  POCT Blood Glucose.: 112 mg/dL (30 Jan 2018 16:46)  POCT Blood Glucose.: 132 mg/dL (30 Jan 2018 12:32)    LIVER FUNCTIONS - ( 31 Jan 2018 04:45 )  Alb: 2.5 g/dL / Pro: 4.5 g/dL / ALK PHOS: 96 U/L / ALT: 67 U/L / AST: 68 U/L / GGT: x           < from: Xray Chest 1 View-PORTABLE IMMEDIATE (01.30.18 @ 01:25) >  RPRETATION:  TIME OF STUDY: 1/30/2018 1:25 AM    CLINICAL INFORMATION:  Reintubated in SICU.    PRIORS: Chest radiograph dated 1/29/2018.    FINDINGS: An AP portable view of the chest is compared to 1/29/2018.   Again seen is a left PICC with tip in the SVC. Endotracheal tube is seen   with tip above the diaphragm.  Right basal chest tube is again seen. Left   perihilar opacity is again seen. Small left pleural effusion and left   lower lobe atelectasis is unchanged. Right lower lobe   infiltrate/atelectasis, unchanged. Mild pulmonary venous congestion,   unchanged. No pneumothorax. Heart size is stable.     IMPRESSION: No significant interval change. Endotracheal tube in   appropriate position.        < end of copied text >

## 2018-01-31 NOTE — PHYSICAL THERAPY INITIAL EVALUATION ADULT - CRITERIA FOR SKILLED THERAPEUTIC INTERVENTIONS
functional limitations in following categories/rehab potential/anticipated discharge recommendation/impairments found/risk reduction/prevention/therapy frequency

## 2018-01-31 NOTE — PROGRESS NOTE ADULT - ASSESSMENT
ASSESSMENT/PLAN 71y/o male pt c Respiratory failure, perforated duodenal ulcer, POD post repair, RLL pneumonia, RLL atelectases, no obvious RADHA, elevated R hemidiaphragm, AFIB, worsening thrombocytopenia, possible endocarditis, s/p reop.    1. O2: Continue CMV mode, consider Precedex, waking and weaning trials  2. Bronchodilators:  Atrovent/ albuterol q 4 – 6 hours as needed  3. Corticosteroids: off  4. ID/Antibiotics: Ceftazidime, Micafungin strict ID supervision   5. Cardiac/HTN: Monitor, optimize HR; on heparin drip for AFIB, structural cardiology  feedback  6. GI: Rx/ prophylaxis c PPI/Protonix  7. Heme: Rx/VT prophylaxis IV heparin; platelets to be followed closely  8. Aspiration precautions, feeding PEG  9. Surgery mngmnt priority  10. Edema: diuretic titration  11. Favor continuing weaning  attempts in this patient  with multiple and cumulative risk factors for tracheostomy, I discussed this c SICU      Discussed with managing team, CCRN, SICU, surgery

## 2018-01-31 NOTE — PHYSICAL THERAPY INITIAL EVALUATION ADULT - EDEMA 1+ (TRACE)
right leg/right hand/left ankle/right foot/left wrist/left hand/left leg/left arm/right wrist/right arm/left foot/right ankle

## 2018-01-31 NOTE — PHYSICAL THERAPY INITIAL EVALUATION ADULT - GENERAL OBSERVATIONS, REHAB EVAL
Pt received semi-supine in bed intubated (CPAP FiO2 40%, lip line 22cm), +tele, +B/L SCDs, +abarca, +PEG, +rectal thermometer, +jejunal tube, duodenal tube, +NG tube, +2 abdominal GUCCI drains, +nephrostomy bag RUE, +blisters and edema BLE, +blisters LLE draining, respiratory therapist Cass and surgical resident Nikolai present. c/o abdominal pain

## 2018-01-31 NOTE — PROGRESS NOTE ADULT - SUBJECTIVE AND OBJECTIVE BOX
24 hr events: ON: Lytes repleted  1/30: tbili 12.4, RUQ US shows CBD 0.2, no pericholecystic fluid. RUQ US doppler pending. GUCCI amylase 11.  ON: PTT 61.3 repeat 60 - f/u repeat @10am . Pt self extubated- reintubated. placed on sedation overnight. had 0.6 second pause on strip@3am x1 propofol stopped and started fentanyl gtt. EKG: v3-v4 st depression Trops: 0.04- repeat @10  F/u dig level 1.2. Am Labs repleted    SUBJECTIVE: Patient awake, tolerating CPAP.        MEDICATIONS  (STANDING):  albumin human 25% IVPB 50 milliLiter(s) IV Intermittent every 8 hours  ALBUTerol/ipratropium for Nebulization 3 milliLiter(s) Nebulizer every 6 hours  cefTAZidime  IVPB 2 Gram(s) IV Intermittent every 8 hours  chlorhexidine 0.12% Liquid 15 milliLiter(s) Swish and Spit two times a day  digoxin  Injectable 0.125 milliGRAM(s) IV Push daily  heparin  Infusion 800 Unit(s)/Hr (8.5 mL/Hr) IV Continuous <Continuous>  insulin lispro (HumaLOG) corrective regimen sliding scale   SubCutaneous every 6 hours  metolazone 10 milliGRAM(s) Oral two times a day  micafungin IVPB 100 milliGRAM(s) IV Intermittent every 24 hours  micafungin IVPB      pantoprazole  Injectable 40 milliGRAM(s) IV Push every 12 hours  sodium chloride 0.9% lock flush 20 milliLiter(s) IV Push once    MEDICATIONS  (PRN):  acetaminophen    Suspension 650 milliGRAM(s) Enteral Tube every 6 hours PRN For Temp greater than 38 C (100.4 F)  sodium chloride 0.9% lock flush 10 milliLiter(s) IV Push every 1 hour PRN After each medication administration      Oliveira:	  [ ] None	[x] Daily Oliveira Order Placed	   Indication:	  [x] Strict I and O's    [ ] Obstruction     [ ] Incontinence + Stage 3 or 4 Decubitus  Central Line:  [ ] None	   [x]  Medication / TPN Administration     [ ] No Peripheral IV     ICU Vital Signs Last 24 Hrs  T(C): 37.7 (31 Jan 2018 09:00), Max: 37.8 (31 Jan 2018 05:30)  T(F): 99.9 (31 Jan 2018 09:00), Max: 100 (31 Jan 2018 05:30)  HR: 70 (31 Jan 2018 12:14) (54 - 79)  BP: 121/68 (31 Jan 2018 12:00) (90/45 - 124/66)  BP(mean): 87 (31 Jan 2018 12:00) (59 - 95)  ABP: --  ABP(mean): --  RR: 29 (31 Jan 2018 12:00) (16 - 29)  SpO2: 100% (31 Jan 2018 12:14) (93% - 100%)      Physical Exam:  General: NAD  HEENT: NC/AT, EOMI, PERRLA, normal hearing, no oral lesions, neck supple w/o LAD  Pulmonary: Nonlabored breathing, no respiratory distress, B/l basilar diminished breath sounds  Cardiovascular: Irregular Normal rate. Pronounced holosystolic murmur  Abdominal: soft, NT. Drains in place. J-tube holding feeds  Extremities: Edematous throughout. Distal extremeties. Right middle toe no change.   Neuro: A/O x3, CNs II-XII grossly intact, normal motor/sensation, no focal deficits  Pulses: palpable distal pulses        Vent settings:  Mode: CPAP with PS, FiO2: 40, PEEP: 5, PS: 5, MAP: 6.8, PIP: 11    I&O's Summary    30 Jan 2018 07:01  -  31 Jan 2018 07:00  --------------------------------------------------------  IN: 2228.4 mL / OUT: 2170 mL / NET: 58.4 mL    31 Jan 2018 07:01  -  31 Jan 2018 12:30  --------------------------------------------------------  IN: 521.5 mL / OUT: 785 mL / NET: -263.5 mL        LABS:                        8.2    9.7   )-----------( 61       ( 31 Jan 2018 04:45 )             25.5     01-31    142  |  105  |  35<H>  ----------------------------<  163<H>  3.5   |  31  |  0.93    Ca    7.7<L>      31 Jan 2018 04:45  Phos  2.3     01-31  Mg     2.6     01-31    TPro  4.5<L>  /  Alb  2.5<L>  /  TBili  10.9<H>  /  DBili  x   /  AST  68<H>  /  ALT  67<H>  /  AlkPhos  96  01-31    PT/INR - ( 30 Jan 2018 04:02 )   PT: 15.4 sec;   INR: 1.38          PTT - ( 31 Jan 2018 04:45 )  PTT:56.6 sec    CAPILLARY BLOOD GLUCOSE      POCT Blood Glucose.: 123 mg/dL (31 Jan 2018 11:52)  POCT Blood Glucose.: 135 mg/dL (31 Jan 2018 05:23)  POCT Blood Glucose.: 111 mg/dL (30 Jan 2018 23:19)  POCT Blood Glucose.: 112 mg/dL (30 Jan 2018 16:46)  POCT Blood Glucose.: 132 mg/dL (30 Jan 2018 12:32)    LIVER FUNCTIONS - ( 31 Jan 2018 04:45 )  Alb: 2.5 g/dL / Pro: 4.5 g/dL / ALK PHOS: 96 U/L / ALT: 67 U/L / AST: 68 U/L / GGT: x             Cultures:    Drips:    RADIOLOGY & ADDITIONAL STUDIES:

## 2018-01-31 NOTE — PROGRESS NOTE ADULT - SUBJECTIVE AND OBJECTIVE BOX
Patient seen and examined at bedside.   intubated  sedated  FiO2 40%  good urine output    T(C): , Max: 38.1 (01-31-18 @ 16:00)  T(F): , Max: 100.5 (01-31-18 @ 16:00)  HR: 82 (01-31-18 @ 17:02)  BP: 119/60 (01-31-18 @ 16:00)  BP(mean): 82 (01-31-18 @ 16:00)  RR: 18 (01-31-18 @ 17:00)  SpO2: 100% (01-31-18 @ 17:02)  Wt(kg): --    01-30 @ 07:01  -  01-31 @ 07:00  --------------------------------------------------------  IN:    Albumin 25%: 150 mL    Enteral Tube Flush: 800 mL    fentaNYL  Infusion: 131.9 mL    heparin Infusion: 195.5 mL    IV PiggyBack: 400 mL    Jevity: 440 mL    propofol Infusion: 111 mL  Total IN: 2228.4 mL    OUT:    Bulb: 90 mL    Bulb: 135 mL    Drain: 200 mL    Indwelling Catheter - Urethral: 1745 mL  Total OUT: 2170 mL    Total NET: 58.4 mL      01-31 @ 07:01 - 01-31 @ 17:12  --------------------------------------------------------  IN:    Albumin 25%: 50 mL    Enteral Tube Flush: 200 mL    fentaNYL  Infusion: 17 mL    heparin Infusion: 85 mL    IV PiggyBack: 300 mL    Jevity: 208 mL    propofol Infusion: 10 mL    Solution: 315 mL  Total IN: 1185 mL    OUT:    Bulb: 50 mL    Bulb: 120 mL    Drain: 50 mL    Drain: 200 mL    Indwelling Catheter - Urethral: 780 mL  Total OUT: 1200 mL    Total NET: -15 mL            albumin human 25% IVPB 50 every 8 hours  ALBUTerol/ipratropium for Nebulization 3 every 6 hours  cefTAZidime  IVPB 2 every 8 hours  chlorhexidine 0.12% Liquid 15 two times a day  digoxin  Injectable 0.125 daily  heparin  Infusion 800 <Continuous>  insulin lispro (HumaLOG) corrective regimen sliding scale  every 6 hours  metolazone 10 two times a day  micafungin IVPB 100 every 24 hours  micafungin IVPB    pantoprazole  Injectable 40 every 12 hours  sodium chloride 0.9% lock flush 20 once    Allergies    No Known Allergies          PHYSICAL EXAM:  Constitutional:  sedated intubated  Respiratory: some rhonchi, reduced BS bases  Cardiovascular: S1, S2.  Regular rate and rhythm.    Gastrointestinal: soft  Vasc/Extremities:  No lower extremity edema.    Neurological: No focal deficits.  Skin: Warm. Dry.    ACCESS:     LABS:                        8.2    9.7   )-----------( 61       ( 31 Jan 2018 04:45 )             25.5     01-31    142  |  105  |  35<H>  ----------------------------<  163<H>  3.5   |  31  |  0.93    Ca    7.7<L>      31 Jan 2018 04:45  Phos  2.3     01-31  Mg     2.6     01-31    TPro  4.5<L>  /  Alb  2.5<L>  /  TBili  10.9<H>  /  DBili  x   /  AST  68<H>  /  ALT  67<H>  /  AlkPhos  96  01-31      PT/INR - ( 30 Jan 2018 04:02 )   PT: 15.4 sec;   INR: 1.38          PTT - ( 31 Jan 2018 04:45 )  PTT:56.6 sec

## 2018-01-31 NOTE — PHYSICAL THERAPY INITIAL EVALUATION ADULT - ADDITIONAL COMMENTS
unable to assess social hx 2/2 pt being intubated at this time, will need to be assessed upon extubation social hx taken from healthcare proxy El: pt lives alone in elevator access apt building w/ no stairs to enter. No use of DME prior to this admission, no HHA, independent in all ADLs

## 2018-01-31 NOTE — PROGRESS NOTE ADULT - SUBJECTIVE AND OBJECTIVE BOX
Critical Care    INTERVAL HPI/OVERNIGHT EVENTS:    ANTIBIOTICS    MEDICATIONS  (STANDING):  albumin human 25% IVPB 50 milliLiter(s) IV Intermittent every 8 hours  ALBUTerol/ipratropium for Nebulization 3 milliLiter(s) Nebulizer every 6 hours  cefTAZidime  IVPB 2 Gram(s) IV Intermittent every 8 hours  chlorhexidine 0.12% Liquid 15 milliLiter(s) Swish and Spit two times a day  digoxin  Injectable 0.125 milliGRAM(s) IV Push daily  heparin  Infusion 800 Unit(s)/Hr (8.5 mL/Hr) IV Continuous <Continuous>  insulin lispro (HumaLOG) corrective regimen sliding scale   SubCutaneous every 6 hours  metolazone 10 milliGRAM(s) Oral two times a day  micafungin IVPB 100 milliGRAM(s) IV Intermittent every 24 hours  micafungin IVPB      pantoprazole  Injectable 40 milliGRAM(s) IV Push every 12 hours  sodium chloride 0.9% lock flush 20 milliLiter(s) IV Push once    MEDICATIONS  (PRN):  acetaminophen    Suspension 650 milliGRAM(s) Enteral Tube every 6 hours PRN For Temp greater than 38 C (100.4 F)  sodium chloride 0.9% lock flush 10 milliLiter(s) IV Push every 1 hour PRN After each medication administration      Allergies    No Known Allergies    Intolerances        REVIEW OF SYSTEMS:    patient intubated  not communicative    Vital Signs Last 24 Hrs  T(C): 37.8 (31 Jan 2018 14:25), Max: 37.8 (31 Jan 2018 05:30)  T(F): 100.1 (31 Jan 2018 14:25), Max: 100.1 (31 Jan 2018 14:25)  HR: 74 (31 Jan 2018 14:00) (54 - 79)  BP: 128/68 (31 Jan 2018 14:00) (90/45 - 128/68)  BP(mean): 87 (31 Jan 2018 14:00) (59 - 95)  RR: 24 (31 Jan 2018 14:00) (16 - 29)  SpO2: 100% (31 Jan 2018 14:00) (93% - 100%)    PHYSICAL EXAM:    General:  in no acute distress  Eyes: PERRL, EOM intact; conjunctiva and sclera clear  Head: Normocephalic; atraumatic  ENMT: No nasal discharge; airway clear  Neck: Supple; non tender; no masses  Respiratory: No wheezes, rales or rhonchi  Cardiovascular: Regular rate and rhythm. S1 and S2 Normal; No murmurs, gallops or rubs  Gastrointestinal: Soft non-tender non-distended; Normal bowel sounds; No hepatosplenomegaly                8.2    9.7   )-----------( 61       ( 31 Jan 2018 04:45 )             25.5     01-31    142  |  105  |  35<H>  ----------------------------<  163<H>  3.5   |  31  |  0.93    Ca    7.7<L>      31 Jan 2018 04:45  Phos  2.3     01-31  Mg     2.6     01-31    TPro  4.5<L>  /  Alb  2.5<L>  /  TBili  10.9<H>  /  DBili  x   /  AST  68<H>  /  ALT  67<H>  /  AlkPhos  96  01-31    PT/INR - ( 30 Jan 2018 04:02 )   PT: 15.4 sec;   INR: 1.38          PTT - ( 31 Jan 2018 04:45 )  PTT:56.6 sec        MICROBIOLOGY:  Culture Results:   No growth at 4 days. (01-27 @ 13:39)  Culture Results:   No growth at 4 days. (01-27 @ 13:38)  Culture Results:   Numerous Stenotrophomonas maltophilia  No routine respiratory geeta Isolated (01-27 @ 10:09)  Culture Results:   Candida albicans isolated (01-24 @ 16:33)      RADIOLOGY & ADDITIONAL STUDIES:

## 2018-01-31 NOTE — PHYSICAL THERAPY INITIAL EVALUATION ADULT - PERTINENT HX OF CURRENT PROBLEM, REHAB EVAL
72yM with PMH severe MR/AR presented after recent dx acute cholecystitis and RLL PNA at [facility] with lower abdominal pain, new onset rapid Afib, sepsis, acute CHF exacerbation, initially admitted to cardiology on bipap, then transferred to SICU with perforated duodenum s/p Shreyas patch and jejunostomy (1/20) c/b breakdown s/p RTOR, washout, new Shreyas patch, gastrostomy, duodenostomy, IVCF (1/22)

## 2018-01-31 NOTE — PROGRESS NOTE ADULT - ASSESSMENT
72yM with PMH severe MR/AR presented after recent dx acute cholecystitis and RLL PNA at [facility] with lower abdominal pain, new onset rapid Afib, sepsis, acute CHF exacerbation, initially admitted to cardiology on bipap, then transferred to SICU with perforated duodenum s/p Shreyas patch and jejunostomy (1/20) c/b breakdown s/p RTOR, washout, new Shreyas patch, gastrostomy, duodenostomy, IVCF (1/22)    Neuro: dilaudid PRN  CV: Afib: Echo EF 50%, ?vegetation and Severe MR. metolazone 10bid, digoxin,   Pulm: Intubated on cmv 40/500/12/5 chlorhex  GI: NPO with NGT to LIWS. Jtube TF jevity @ 20 cc. retrograde D-tube to LIWS, G-tube to LIWS Protonix bid. FW 200q4h,   : Oliveira, GAETANO  ID: Perforated duodenum/PNA, surgical swab grew Candida: micafungin (1/22--), ceftaz(1/29--) //// discontinued Vanco (1/19-1/20) Zosyn (1/19-1/22), f/u Bld cx's, linezolid (1/22-23), meropenem (1/22-1/27), zosyn (1/27-1/29)    Endo: ISS  HEME:DVT: Heparin gtt PTT, thrombocytopenic, HIT +, serotonin release assay NEGATIVE. IVCF (1/22) //1FFP, 1PLT (1/22).   PPx: SCD, R pop DVT, s/p IVC filter 1/22  Lines: PIV, PICC (1/29--), L brachial Jeane (1/20-1/27),  LSC HD cath (1/22-1/27)  Wounds: Ex lap, JPx2 to bulb suction, J-tube to gravity, D-tube to LIWS, G-tube to LIWS  PT/OT: early mobilization.

## 2018-01-31 NOTE — PROGRESS NOTE ADULT - SUBJECTIVE AND OBJECTIVE BOX
Interventional, Pulmonary, Critical, Chest Special Procedures.    Pt was seen and fully examined by myself.     Time spent with patient in minutes:37    Patient is a 72y old  Male who presents with a chief complaint of acute cholecystitis (18 Jan 2018 14:49)The patient was seen and reviewed at SICU rounds.  The patient now opening eyes on commands, appears comfortable on CMV mode c PIP 21, [id number]%  All circuits integrity verified and preserved.    HPI:  71 yo M with h/o severe mitral and aortic valve regurgitation presented to ED with 5 day h/o left lower abdominal pain, nausea and multiple episodes of vomiting as well as PO intolerance. Pt reports that the pain in his abdomen is illicited by movement. Went to [facility] two days ago and underwent CT scan which revealed acute cholecystitis as well as right lower lobe infectious pneumonia. Pt was told he would require surgery and left AMA because he was waiting too long. Denies f/c. Last echo 1/4/17 with EF 60-65%. Normal BMs and passing flatus. (18 Jan 2018 14:49)    REVIEW OF SYSTEMS: updated  CTS feedback appreciated.  Constitutional: No fever, weight loss, chills or fatigue  Eyes: No eye pain, visual disturbances, or discharge  ENMT:  No difficulty hearing, tinnitus, vertigo; No sinus or throat pain. No epistaxis, dysphagia, dysphonia, hoarseness or odynophagia  Neck: No pain, stiffness or neck swelling.  No masses or deformities  Respiratory: No cough, wheezing, chills or hemoptysis  - COPD  - ILD   - PE   - ASTHMA     - PNEUMONIA  Cardiovascular: No chest pain, dysrhythmia, palpitations, dizziness or edema   - COPD     - CAD   - CHF   - HTN  Gastrointestinal: No abdominal or epigastric pain. No nausea, vomiting or hematemesis; No diarrhea or constipation. No melena or hematochezia. No dysphagia or Icterus.          Genitourinary: No dysuria, frequency, hematuria or incontinence   - CKD/GAETANO      - ESRD  Neurological: No headaches, memory loss, loss of strength, numbness or tremors      -DEMENTIA     - STROKE    - SEIZURE  Skin: No itching, burning, rashes or lesions   Lymph Nodes: No enlarged glands  Endocrine: No heat or cold intolerance; No hair loss       - DM     - THYROID DISORDER  Musculoskeletal: No joint pain or swelling; No muscle, back or extremity pain  Psychiatric: No depression, anxiety, mood swings or difficulty sleeping  Heme/Lymph: No easy bruising or bleeding gums         - ANEMIA      - CANCER   -COAGULOPATHY  Allergy and Immunologic: No hives or eczema    PAST MEDICAL & SURGICAL HISTORY:  Mitral regurgitation  Aortic regurgitation  No significant past surgical history    FAMILY HISTORY:    SOCIAL HISTORY:      - Tobacco     - ETOH    Allergies    No Known Allergies    Intolerances      Vital Signs Last 24 Hrs  T(C): 37.7 (31 Jan 2018 09:00), Max: 37.8 (31 Jan 2018 05:30)  T(F): 99.9 (31 Jan 2018 09:00), Max: 100 (31 Jan 2018 05:30)  HR: 70 (31 Jan 2018 12:14) (54 - 79)  BP: 121/68 (31 Jan 2018 12:00) (90/45 - 124/66)  BP(mean): 87 (31 Jan 2018 12:00) (59 - 95)  RR: 29 (31 Jan 2018 12:00) (16 - 29)  SpO2: 100% (31 Jan 2018 12:14) (93% - 100%)    01-30 @ 07:01 - 01-31 @ 07:00  --------------------------------------------------------  IN: 2228.4 mL / OUT: 2170 mL / NET: 58.4 mL    01-31 @ 07:01 - 01-31 @ 12:38  --------------------------------------------------------  IN: 521.5 mL / OUT: 785 mL / NET: -263.5 mL      Mode: CPAP with PS  FiO2: 40  PEEP: 5  PS: 5  MAP: 6.8  PIP: 11    PHYSICAL EXAM:  Comfortable, no distress  Eyes: PERRL, EOM intact; conjunctiva and sclera clear  Head: Normocephalic;  No Trauma  ENMT: No nasal discharge, hoarseness, cough or hemoptysis.  Airway clear  Neck: Supple; non tender; no masses or deformities.    No JVD  Respiratory:  - WHEEZING   few R  RHONCHI  - RALES  = CRACKLES.  Diminished breath sounds  BILATERAL  RIGHT  LEFT bases   Cardiovascular: Regular rate and rhythm. S1 and S2 Normal; No murmurs, gallops or rubs     - PPM/AICD  Gastrointestinal: Soft ,distended; Normal bowel sounds; No hepatosplenomegaly.     +PEG    + multiple surgical drains integrity checked   + ANGELES  Genitourinary: No costovertebral angle tenderness. No dysuria  Extremities: Decreased AROM, + clubbing, cyanosis + edema    Vascular: Peripheral pulses palpable 2+ bilaterally  Neurological: intubated and opening eyes to commands  Skin: Warm and dry. No obvious rash  Lymph Nodes: No acute cervical or supraclavicular adenopathy  Psychiatric: less sedated, calm affect  DEVICES:  - DENTURES   +IV R / L     7.5 ETUBE   -TRACH   -CTUBE  R / L      LABS:                          8.2    9.7   )-----------( 61       ( 31 Jan 2018 04:45 )             25.5     01-31    142  |  105  |  35<H>  ----------------------------<  163<H>  3.5   |  31  |  0.93    Ca    7.7<L>      31 Jan 2018 04:45  Phos  2.3     01-31  Mg     2.6     01-31    TPro  4.5<L>  /  Alb  2.5<L>  /  TBili  10.9<H>  /  DBili  x   /  AST  68<H>  /  ALT  67<H>  /  AlkPhos  96  01-31    PT/INR - ( 30 Jan 2018 04:02 )   PT: 15.4 sec;   INR: 1.38          PTT - ( 31 Jan 2018 04:45 )  PTT:56.6 sec  RADIOLOGY & ADDITIONAL STUDIES (The following images were personally reviewed):CXR series

## 2018-01-31 NOTE — PHYSICAL THERAPY INITIAL EVALUATION ADULT - DID THE PATIENT HAVE SURGERY?
Exploratory laparotomy washout, primary closure of duodenal ulcer with overlying omental patch, retrograde duodenostomy, Jon Gastrostomy, revision of feeding jejunostomy, retention suture placement/yes

## 2018-01-31 NOTE — PHYSICAL THERAPY INITIAL EVALUATION ADULT - MD ORDER
01/22/18: Diagnostic laparoscopy with repair of perforated duodenal ulcer with kaylee patch, wide washout, placement of feefing J-tube.

## 2018-01-31 NOTE — PROGRESS NOTE ADULT - SUBJECTIVE AND OBJECTIVE BOX
Patient was seen and examined. Patient remains critically ill in the surgical intensive care unit. We have discussed elective tracheostomy placement this week given his failure to wean from the ventilator at this point. That being said he continues to make progress and almost all fronts. His bilirubin albeit markedly elevated has come down this morning. Ultrasound was reviewed and there is no evidence of biliary ductal dilation or common bile duct dilation. What is more there is no clinical evidence of biliary leak given his overall stability and physiologic improvement as well as the character of his Min-Wei drains. His white blood cell count has also improved. A gastrostomy end duodenostomy tubes continue to function. He has remained at a rate of 20 mL per hour and tube feeding.    Impression: Patient remains critically ill status post laparotomy primary ulcer closure, omental flap, abdominal washout and abdominal closure with retention sutures, placement of gastrostomy, placement of retrograde duodenostomy, and placement of anterior grade jejunostomy. He is at nutritional risk and has had some bowel function.     Recommend increasing tube feedings slowly over the course of 48 hours to goal. Monitor for reflux of tube feeding into duodenostomy or gastrostomy output. Should this occur recommend stop tube feeding. Defer supportive care and antibiotic therapy to surgical ICU and infectious disease. Defer cardiac care to cardiology. Should he survive this illness he will need to consider open heart surgery and valvular replacement per our CT surgery team at some point in the future. Plan for bedside percutaneous tracheostomy with Dr. Ramirez on Thursday ICU to hold anticoagulation as appropriate. I answered all questions. Given patients proven ability to self extubate, would continue to be very cautious of times when patient might be able to remove drains or tubes inadvertently as removal of an abdominal drain coud prove detrimental.     In terms of hyperbilirubinemia consider Doppler study of arterial flow to the liver as well as portal venous flow, this was not studied per se at the recent Ultrasound.    Care discussed in detail with his physically present health care proxy. I answered all questions.

## 2018-01-31 NOTE — PROGRESS NOTE ADULT - ASSESSMENT
72 year old male recovering from oliguric ATN- remains non oliguric  + shock also s/p CT angio, s/p treatment with Vanco - supra therapeutic levels,  s/p laparotomy for perforation of the duodenum., revision done on 1/22.   On 1/23 was placed on CVVHD- discontinued after only 3-4 hours (clotted); improved urine flow   No RRT needed since that initial attempt  BUN/creat have normalized  electrolytes stable and acceptable  would try to  generate net negative balance    Will follow up needed  nephrologically stable at this time

## 2018-02-01 DIAGNOSIS — I34.0 NONRHEUMATIC MITRAL (VALVE) INSUFFICIENCY: ICD-10-CM

## 2018-02-01 LAB
ALBUMIN SERPL ELPH-MCNC: 2.7 G/DL — LOW (ref 3.3–5)
ALP SERPL-CCNC: 114 U/L — SIGNIFICANT CHANGE UP (ref 40–120)
ALT FLD-CCNC: 81 U/L — HIGH (ref 10–45)
ANION GAP SERPL CALC-SCNC: 8 MMOL/L — SIGNIFICANT CHANGE UP (ref 5–17)
APPEARANCE UR: (no result)
APTT BLD: 34.9 SEC — SIGNIFICANT CHANGE UP (ref 27.5–37.4)
APTT BLD: 58 SEC — HIGH (ref 27.5–37.4)
AST SERPL-CCNC: 87 U/L — HIGH (ref 10–40)
BILIRUB SERPL-MCNC: 10.5 MG/DL — HIGH (ref 0.2–1.2)
BILIRUB UR-MCNC: (no result)
BUN SERPL-MCNC: 31 MG/DL — HIGH (ref 7–23)
CALCIUM SERPL-MCNC: 7.9 MG/DL — LOW (ref 8.4–10.5)
CHLORIDE SERPL-SCNC: 104 MMOL/L — SIGNIFICANT CHANGE UP (ref 96–108)
CO2 SERPL-SCNC: 31 MMOL/L — SIGNIFICANT CHANGE UP (ref 22–31)
COLOR SPEC: YELLOW — SIGNIFICANT CHANGE UP
CREAT SERPL-MCNC: 0.94 MG/DL — SIGNIFICANT CHANGE UP (ref 0.5–1.3)
CULTURE RESULTS: SIGNIFICANT CHANGE UP
CULTURE RESULTS: SIGNIFICANT CHANGE UP
DIFF PNL FLD: (no result)
GLUCOSE BLDC GLUCOMTR-MCNC: 136 MG/DL — HIGH (ref 70–99)
GLUCOSE BLDC GLUCOMTR-MCNC: 148 MG/DL — HIGH (ref 70–99)
GLUCOSE BLDC GLUCOMTR-MCNC: 168 MG/DL — HIGH (ref 70–99)
GLUCOSE BLDC GLUCOMTR-MCNC: 193 MG/DL — HIGH (ref 70–99)
GLUCOSE SERPL-MCNC: 177 MG/DL — HIGH (ref 70–99)
GLUCOSE UR QL: NEGATIVE — SIGNIFICANT CHANGE UP
HCT VFR BLD CALC: 23.2 % — LOW (ref 39–50)
HCT VFR BLD CALC: 25 % — LOW (ref 39–50)
HGB BLD-MCNC: 7.7 G/DL — LOW (ref 13–17)
HGB BLD-MCNC: 8.3 G/DL — LOW (ref 13–17)
INR BLD: 1.23 — HIGH (ref 0.88–1.16)
KETONES UR-MCNC: NEGATIVE — SIGNIFICANT CHANGE UP
LEUKOCYTE ESTERASE UR-ACNC: NEGATIVE — SIGNIFICANT CHANGE UP
MAGNESIUM SERPL-MCNC: 2.7 MG/DL — HIGH (ref 1.6–2.6)
MCHC RBC-ENTMCNC: 30.6 PG — SIGNIFICANT CHANGE UP (ref 27–34)
MCHC RBC-ENTMCNC: 30.9 PG — SIGNIFICANT CHANGE UP (ref 27–34)
MCHC RBC-ENTMCNC: 33.2 G/DL — SIGNIFICANT CHANGE UP (ref 32–36)
MCHC RBC-ENTMCNC: 33.2 G/DL — SIGNIFICANT CHANGE UP (ref 32–36)
MCV RBC AUTO: 92.3 FL — SIGNIFICANT CHANGE UP (ref 80–100)
MCV RBC AUTO: 93.2 FL — SIGNIFICANT CHANGE UP (ref 80–100)
NITRITE UR-MCNC: NEGATIVE — SIGNIFICANT CHANGE UP
PH UR: 5 — SIGNIFICANT CHANGE UP (ref 5–8)
PHOSPHATE SERPL-MCNC: 2.6 MG/DL — SIGNIFICANT CHANGE UP (ref 2.5–4.5)
PLATELET # BLD AUTO: 64 K/UL — LOW (ref 150–400)
PLATELET # BLD AUTO: 79 K/UL — LOW (ref 150–400)
POTASSIUM SERPL-MCNC: 3.4 MMOL/L — LOW (ref 3.5–5.3)
POTASSIUM SERPL-SCNC: 3.4 MMOL/L — LOW (ref 3.5–5.3)
PROT SERPL-MCNC: 5.1 G/DL — LOW (ref 6–8.3)
PROT UR-MCNC: (no result) MG/DL
PROTHROM AB SERPL-ACNC: 13.7 SEC — HIGH (ref 9.8–12.7)
RBC # BLD: 2.49 M/UL — LOW (ref 4.2–5.8)
RBC # BLD: 2.71 M/UL — LOW (ref 4.2–5.8)
RBC # FLD: 20.7 % — HIGH (ref 10.3–16.9)
RBC # FLD: 20.9 % — HIGH (ref 10.3–16.9)
SODIUM SERPL-SCNC: 143 MMOL/L — SIGNIFICANT CHANGE UP (ref 135–145)
SP GR SPEC: 1.02 — SIGNIFICANT CHANGE UP (ref 1–1.03)
SPECIMEN SOURCE: SIGNIFICANT CHANGE UP
SPECIMEN SOURCE: SIGNIFICANT CHANGE UP
UROBILINOGEN FLD QL: 0.2 E.U./DL — SIGNIFICANT CHANGE UP
VIRUS SPEC CULT: SIGNIFICANT CHANGE UP
VIRUS SPEC CULT: SIGNIFICANT CHANGE UP
WBC # BLD: 9.4 K/UL — SIGNIFICANT CHANGE UP (ref 3.8–10.5)
WBC # BLD: 9.6 K/UL — SIGNIFICANT CHANGE UP (ref 3.8–10.5)
WBC # FLD AUTO: 9.4 K/UL — SIGNIFICANT CHANGE UP (ref 3.8–10.5)
WBC # FLD AUTO: 9.6 K/UL — SIGNIFICANT CHANGE UP (ref 3.8–10.5)

## 2018-02-01 PROCEDURE — 71045 X-RAY EXAM CHEST 1 VIEW: CPT | Mod: 26

## 2018-02-01 PROCEDURE — 31600 PLANNED TRACHEOSTOMY: CPT | Mod: GC

## 2018-02-01 PROCEDURE — 31622 DX BRONCHOSCOPE/WASH: CPT | Mod: GC

## 2018-02-01 PROCEDURE — 99232 SBSQ HOSP IP/OBS MODERATE 35: CPT

## 2018-02-01 PROCEDURE — 99291 CRITICAL CARE FIRST HOUR: CPT

## 2018-02-01 RX ORDER — FENTANYL CITRATE 50 UG/ML
50 INJECTION INTRAVENOUS ONCE
Qty: 0 | Refills: 0 | Status: DISCONTINUED | OUTPATIENT
Start: 2018-02-01 | End: 2018-02-01

## 2018-02-01 RX ORDER — HYDROMORPHONE HYDROCHLORIDE 2 MG/ML
0.5 INJECTION INTRAMUSCULAR; INTRAVENOUS; SUBCUTANEOUS ONCE
Qty: 0 | Refills: 0 | Status: DISCONTINUED | OUTPATIENT
Start: 2018-02-01 | End: 2018-02-01

## 2018-02-01 RX ORDER — HEPARIN SODIUM 5000 [USP'U]/ML
850 INJECTION INTRAVENOUS; SUBCUTANEOUS
Qty: 25000 | Refills: 0 | Status: DISCONTINUED | OUTPATIENT
Start: 2018-02-01 | End: 2018-02-03

## 2018-02-01 RX ORDER — POTASSIUM CHLORIDE 20 MEQ
10 PACKET (EA) ORAL
Qty: 0 | Refills: 0 | Status: COMPLETED | OUTPATIENT
Start: 2018-02-01 | End: 2018-02-01

## 2018-02-01 RX ORDER — FLUCONAZOLE 150 MG/1
400 TABLET ORAL EVERY 24 HOURS
Qty: 0 | Refills: 0 | Status: DISCONTINUED | OUTPATIENT
Start: 2018-02-01 | End: 2018-02-18

## 2018-02-01 RX ORDER — CISATRACURIUM BESYLATE 2 MG/ML
10 INJECTION INTRAVENOUS ONCE
Qty: 0 | Refills: 0 | Status: COMPLETED | OUTPATIENT
Start: 2018-02-01 | End: 2018-02-01

## 2018-02-01 RX ORDER — POTASSIUM PHOSPHATE, MONOBASIC POTASSIUM PHOSPHATE, DIBASIC 236; 224 MG/ML; MG/ML
15 INJECTION, SOLUTION INTRAVENOUS ONCE
Qty: 0 | Refills: 0 | Status: COMPLETED | OUTPATIENT
Start: 2018-02-01 | End: 2018-02-01

## 2018-02-01 RX ORDER — ACETAMINOPHEN 500 MG
1000 TABLET ORAL ONCE
Qty: 0 | Refills: 0 | Status: COMPLETED | OUTPATIENT
Start: 2018-02-01 | End: 2018-02-01

## 2018-02-01 RX ORDER — FUROSEMIDE 40 MG
20 TABLET ORAL ONCE
Qty: 0 | Refills: 0 | Status: COMPLETED | OUTPATIENT
Start: 2018-02-01 | End: 2018-02-01

## 2018-02-01 RX ORDER — MIDAZOLAM HYDROCHLORIDE 1 MG/ML
4 INJECTION, SOLUTION INTRAMUSCULAR; INTRAVENOUS ONCE
Qty: 0 | Refills: 0 | Status: DISCONTINUED | OUTPATIENT
Start: 2018-02-01 | End: 2018-02-01

## 2018-02-01 RX ADMIN — HYDROMORPHONE HYDROCHLORIDE 0.5 MILLIGRAM(S): 2 INJECTION INTRAMUSCULAR; INTRAVENOUS; SUBCUTANEOUS at 03:25

## 2018-02-01 RX ADMIN — Medication 3 MILLILITER(S): at 06:00

## 2018-02-01 RX ADMIN — Medication 100 MILLIEQUIVALENT(S): at 11:08

## 2018-02-01 RX ADMIN — Medication 3 MILLILITER(S): at 17:58

## 2018-02-01 RX ADMIN — CISATRACURIUM BESYLATE 10 MILLIGRAM(S): 2 INJECTION INTRAVENOUS at 09:15

## 2018-02-01 RX ADMIN — HEPARIN SODIUM 8.5 UNIT(S)/HR: 5000 INJECTION INTRAVENOUS; SUBCUTANEOUS at 16:14

## 2018-02-01 RX ADMIN — HYDROMORPHONE HYDROCHLORIDE 0.5 MILLIGRAM(S): 2 INJECTION INTRAMUSCULAR; INTRAVENOUS; SUBCUTANEOUS at 04:00

## 2018-02-01 RX ADMIN — MIDAZOLAM HYDROCHLORIDE 4 MILLIGRAM(S): 1 INJECTION, SOLUTION INTRAMUSCULAR; INTRAVENOUS at 09:30

## 2018-02-01 RX ADMIN — POTASSIUM PHOSPHATE, MONOBASIC POTASSIUM PHOSPHATE, DIBASIC 62.5 MILLIMOLE(S): 236; 224 INJECTION, SOLUTION INTRAVENOUS at 10:41

## 2018-02-01 RX ADMIN — Medication 3 MILLILITER(S): at 11:51

## 2018-02-01 RX ADMIN — CEFTAZIDIME 100 GRAM(S): 6 INJECTION, POWDER, FOR SOLUTION INTRAVENOUS at 21:46

## 2018-02-01 RX ADMIN — Medication 400 MILLIGRAM(S): at 19:15

## 2018-02-01 RX ADMIN — FENTANYL CITRATE 50 MICROGRAM(S): 50 INJECTION INTRAVENOUS at 10:00

## 2018-02-01 RX ADMIN — Medication 100 MILLIEQUIVALENT(S): at 10:28

## 2018-02-01 RX ADMIN — CEFTAZIDIME 100 GRAM(S): 6 INJECTION, POWDER, FOR SOLUTION INTRAVENOUS at 07:14

## 2018-02-01 RX ADMIN — Medication 0.12 MILLIGRAM(S): at 12:00

## 2018-02-01 RX ADMIN — CHLORHEXIDINE GLUCONATE 15 MILLILITER(S): 213 SOLUTION TOPICAL at 07:14

## 2018-02-01 RX ADMIN — CEFTAZIDIME 100 GRAM(S): 6 INJECTION, POWDER, FOR SOLUTION INTRAVENOUS at 14:00

## 2018-02-01 RX ADMIN — CHLORHEXIDINE GLUCONATE 15 MILLILITER(S): 213 SOLUTION TOPICAL at 18:14

## 2018-02-01 RX ADMIN — PANTOPRAZOLE SODIUM 40 MILLIGRAM(S): 20 TABLET, DELAYED RELEASE ORAL at 07:14

## 2018-02-01 RX ADMIN — Medication 50 MILLILITER(S): at 21:45

## 2018-02-01 RX ADMIN — PANTOPRAZOLE SODIUM 40 MILLIGRAM(S): 20 TABLET, DELAYED RELEASE ORAL at 18:14

## 2018-02-01 RX ADMIN — FLUCONAZOLE 100 MILLIGRAM(S): 150 TABLET ORAL at 14:00

## 2018-02-01 RX ADMIN — Medication 20 MILLIGRAM(S): at 13:40

## 2018-02-01 RX ADMIN — FENTANYL CITRATE 50 MICROGRAM(S): 50 INJECTION INTRAVENOUS at 09:15

## 2018-02-01 RX ADMIN — Medication 2: at 00:47

## 2018-02-01 RX ADMIN — Medication 3 MILLILITER(S): at 00:35

## 2018-02-01 RX ADMIN — Medication 50 MILLILITER(S): at 14:00

## 2018-02-01 RX ADMIN — Medication 2: at 07:59

## 2018-02-01 RX ADMIN — Medication 100 MILLIEQUIVALENT(S): at 09:00

## 2018-02-01 RX ADMIN — Medication 50 MILLILITER(S): at 07:15

## 2018-02-01 NOTE — PROGRESS NOTE ADULT - ASSESSMENT
72yM with PMH severe MR/AR presented after recent dx acute cholecystitis and RLL PNA at [facility] with lower abdominal pain, new onset rapid Afib, sepsis, acute CHF exacerbation, initially admitted to cardiology on bipap, then transferred to SICU with perforated duodenum s/p Shreyas patch and jejunostomy (1/20) c/b breakdown s/p RTOR, washout, new Shreyas patch, gastrostomy, duodenostomy, IVCF (1/22)    Neuro: Tylenol PRN  CV: Afib: Echo EF 50%, ?vegetation and Severe MR. metolazone 10 BID, digoxin,   Pulm: Trached today: 8.0 shiley on cmv 40/500/12/5 chlorhex, duonebs  GI: NPO, Jtube TF Jevity 1.2 goal 68cc, Protonix BID. FW 200q4h, Albumin 25% q8h. NGT to LIWS, retrograde D-tube to LIWS, G-tube to LIWS,   : Oliveira, GAETANO  ID: Perforated duodenum/PNA, surgical swab grew Candida albicans: fluconazole (2/1--), ceftaz (1/29--) // D/c: micafungin (1/22-2/1), Vanco (1/19-20), Zosyn (1/19-22; 1/27-29), linezolid (1/22-23), meropenem (1/22-27)    Endo: ISS  HEME:DVT: Heparin gtt [thrombocytopenic, HIT+, serotonin release assay negative.] IVCF (1/22) // 1FFP, 1PLT (1/22).   PPx: SCD, R pop DVT, s/p IVC filter 1/22  Lines: PIV, PICC (1/29--), L brachial Hitchita (1/20-1/27),  LSC HD cath (1/22-1/27)  Wounds: Ex lap, JPx2 to bulb suction, J-tube to gravity, D-tube to LIWS, G-tube to LIWS  PT/OT: Early mobilization.

## 2018-02-01 NOTE — PROGRESS NOTE ADULT - SUBJECTIVE AND OBJECTIVE BOX
24 hr events:    SUBJECTIVE:    Flatus: [ ] YES [ ] NO             Bowel Movement: [ ] YES [ ] NO  Pain (0-10):            Pain Control Adequate: [ ] YES [ ] NO  Nausea: [ ] YES [ ] NO            Vomiting: [ ] YES [ ] NO  Diarrhea: [ ] YES [ ] NO         Constipation: [ ] YES [ ] NO     Chest Pain: [ ] YES [ ] NO    SOB:  [ ] YES [ ] NO    MEDICATIONS  (STANDING):  albumin human 25% IVPB 50 milliLiter(s) IV Intermittent every 8 hours  ALBUTerol/ipratropium for Nebulization 3 milliLiter(s) Nebulizer every 6 hours  cefTAZidime  IVPB 2 Gram(s) IV Intermittent every 8 hours  chlorhexidine 0.12% Liquid 15 milliLiter(s) Swish and Spit two times a day  digoxin  Injectable 0.125 milliGRAM(s) IV Push daily  fluconAZOLE IVPB 400 milliGRAM(s) IV Intermittent every 24 hours  heparin  Infusion 850 Unit(s)/Hr (8.5 mL/Hr) IV Continuous <Continuous>  insulin lispro (HumaLOG) corrective regimen sliding scale   SubCutaneous every 6 hours  metolazone 10 milliGRAM(s) Oral two times a day  pantoprazole  Injectable 40 milliGRAM(s) IV Push every 12 hours  sodium chloride 0.9% lock flush 20 milliLiter(s) IV Push once    MEDICATIONS  (PRN):  acetaminophen    Suspension 650 milliGRAM(s) Enteral Tube every 6 hours PRN For Temp greater than 38 C (100.4 F)  sodium chloride 0.9% lock flush 10 milliLiter(s) IV Push every 1 hour PRN After each medication administration      Oliveira:	  [ ] None	[ ] Daily Oliveira Order Placed	   Indication:	  [ ] Strict I and O's    [ ] Obstruction     [ ] Incontinence + Stage 3 or 4 Decubitus  Central Line:  [ ] None	   [ ]  Medication / TPN Administration     Drips:     ICU Vital Signs Last 24 Hrs  T(C): 38.3 (01 Feb 2018 18:18), Max: 38.3 (01 Feb 2018 18:18)  T(F): 101 (01 Feb 2018 18:18), Max: 101 (01 Feb 2018 18:18)  HR: 68 (01 Feb 2018 18:00) (62 - 82)  BP: 108/54 (01 Feb 2018 18:00) (96/51 - 126/95)  BP(mean): 71 (01 Feb 2018 18:00) (63 - 116)  ABP: --  ABP(mean): --  RR: 24 (01 Feb 2018 18:00) (15 - 25)  SpO2: 100% (01 Feb 2018 18:00) (92% - 100%)      Physical Exam:  General: NAD  HEENT: NC/AT, EOMI, PERRLA, normal hearing, no oral lesions, neck supple w/o LAD  Pulmonary: Nonlabored breathing, no respiratory distress, CTA-B  Cardiovascular: NSR, no murmurs  Abdominal: soft, NT/ND, +BS, no organomegaly  Extremities: WWP, 5/5 strength x 4, no clubbing/cyanosis/edema  Neuro: A/O x3, CNs II-XII grossly intact, normal motor/sensation, no focal deficits  Pulses: palpable distal pulses    Lines/tubes/drains:    Vent settings:  Mode: AC/ CMV (Assist Control/ Continuous Mandatory Ventilation), RR (machine): 16, TV (machine): 500, FiO2: 40, PEEP: 5, ITime: 0.8, MAP: 9.2, PIP: 22    I&O's Summary    31 Jan 2018 07:01  -  01 Feb 2018 07:00  --------------------------------------------------------  IN: 2071.5 mL / OUT: 2730 mL / NET: -658.5 mL    01 Feb 2018 07:01  -  01 Feb 2018 18:21  --------------------------------------------------------  IN: 2088 mL / OUT: 2520 mL / NET: -432 mL        LABS:                        7.7    9.4   )-----------( 79       ( 01 Feb 2018 13:33 )             23.2     02-01    143  |  104  |  31<H>  ----------------------------<  177<H>  3.4<L>   |  31  |  0.94    Ca    7.9<L>      01 Feb 2018 07:29  Phos  2.6     02-01  Mg     2.7     02-01    TPro  5.1<L>  /  Alb  2.7<L>  /  TBili  10.5<H>  /  DBili  x   /  AST  87<H>  /  ALT  81<H>  /  AlkPhos  114  02-01    PT/INR - ( 01 Feb 2018 07:29 )   PT: 13.7 sec;   INR: 1.23          PTT - ( 01 Feb 2018 07:29 )  PTT:34.9 sec    CAPILLARY BLOOD GLUCOSE      POCT Blood Glucose.: 148 mg/dL (01 Feb 2018 17:12)  POCT Blood Glucose.: 136 mg/dL (01 Feb 2018 11:25)  POCT Blood Glucose.: 168 mg/dL (01 Feb 2018 07:34)  POCT Blood Glucose.: 159 mg/dL (31 Jan 2018 23:07)    LIVER FUNCTIONS - ( 01 Feb 2018 07:29 )  Alb: 2.7 g/dL / Pro: 5.1 g/dL / ALK PHOS: 114 U/L / ALT: 81 U/L / AST: 87 U/L / GGT: x             Cultures:    RADIOLOGY & ADDITIONAL STUDIES: 24 hr events:  O/N: needed dilaudid 0.5 for pain. Heparin stopped @ 3am for trach today. No TFs in drain outputs.   1/31: CPAP trial, back on AC after PT. Consented for trach. Increasing TF. RUQ doppler WNL.    SUBJECTIVE:  Intubated without sedation in AM. Pt nods head yes to being comfortable. Nods no to CP, SOB, palpitations, abd pain, nausea, fevers/chills.       MEDICATIONS  (STANDING):  albumin human 25% IVPB 50 milliLiter(s) IV Intermittent every 8 hours  ALBUTerol/ipratropium for Nebulization 3 milliLiter(s) Nebulizer every 6 hours  cefTAZidime  IVPB 2 Gram(s) IV Intermittent every 8 hours  chlorhexidine 0.12% Liquid 15 milliLiter(s) Swish and Spit two times a day  digoxin  Injectable 0.125 milliGRAM(s) IV Push daily  fluconAZOLE IVPB 400 milliGRAM(s) IV Intermittent every 24 hours  heparin  Infusion 850 Unit(s)/Hr (8.5 mL/Hr) IV Continuous <Continuous>  insulin lispro (HumaLOG) corrective regimen sliding scale   SubCutaneous every 6 hours  metolazone 10 milliGRAM(s) Oral two times a day  pantoprazole  Injectable 40 milliGRAM(s) IV Push every 12 hours  sodium chloride 0.9% lock flush 20 milliLiter(s) IV Push once    MEDICATIONS  (PRN):  acetaminophen    Suspension 650 milliGRAM(s) Enteral Tube every 6 hours PRN For Temp greater than 38 C (100.4 F)  sodium chloride 0.9% lock flush 10 milliLiter(s) IV Push every 1 hour PRN After each medication administration      Oliveira:	  [ ] None	[x ] Daily Oliveira Order Placed	   Indication:	  [x ] Strict I and O's    [ ] Obstruction     [ ] Incontinence + Stage 3 or 4 Decubitus  Central Line:  [ ] None	   [x ]  Medication / TPN Administration       ICU Vital Signs Last 24 Hrs  T(C): 38.3 (01 Feb 2018 18:18), Max: 38.3 (01 Feb 2018 18:18)  T(F): 101 (01 Feb 2018 18:18), Max: 101 (01 Feb 2018 18:18)  HR: 68 (01 Feb 2018 18:00) (62 - 82)  BP: 108/54 (01 Feb 2018 18:00) (96/51 - 126/95)  BP(mean): 71 (01 Feb 2018 18:00) (63 - 116)  ABP: --  ABP(mean): --  RR: 24 (01 Feb 2018 18:00) (15 - 25)  SpO2: 100% (01 Feb 2018 18:00) (92% - 100%)      Physical Exam:  General: NAD, tracks me in the room  HEENT: NC/AT, EOMI, normal hearing  Pulmonary: in AM, intubated on AC, lungs clear in upper lobes, decreased breath sounds with crackles in lower lobes bilaterally  Cardiovascular: NSR, pansystolic III/VI murmur  Abdominal: soft, NT/ND. Retention sutures in place, no surrounding erythema. G-tube, retrograde D-tube (bilious), feeding J tube in place. RLQ GUCCI drains with gold serous output, no tube feeds in drains.  Extremities: feet cool, but palpable pedal pulses. R 1st and 4th toes purple with blisters, no erythema, no drainage  Neuro: alert, awake, tracks me in the room      Vent settings:  Mode: AC/ CMV (Assist Control/ Continuous Mandatory Ventilation), RR (machine): 16, TV (machine): 500, FiO2: 40, PEEP: 5, ITime: 0.8, MAP: 9.2, PIP: 22    I&O's Summary    31 Jan 2018 07:01  -  01 Feb 2018 07:00  --------------------------------------------------------  IN: 2071.5 mL / OUT: 2730 mL / NET: -658.5 mL    01 Feb 2018 07:01  -  01 Feb 2018 18:21  --------------------------------------------------------  IN: 2088 mL / OUT: 2520 mL / NET: -432 mL        LABS:                        7.7    9.4   )-----------( 79       ( 01 Feb 2018 13:33 )             23.2     02-01    143  |  104  |  31<H>  ----------------------------<  177<H>  3.4<L>   |  31  |  0.94    Ca    7.9<L>      01 Feb 2018 07:29  Phos  2.6     02-01  Mg     2.7     02-01    TPro  5.1<L>  /  Alb  2.7<L>  /  TBili  10.5<H>  /  DBili  x   /  AST  87<H>  /  ALT  81<H>  /  AlkPhos  114  02-01    PT/INR - ( 01 Feb 2018 07:29 )   PT: 13.7 sec;   INR: 1.23          PTT - ( 01 Feb 2018 07:29 )  PTT:34.9 sec    CAPILLARY BLOOD GLUCOSE      POCT Blood Glucose.: 148 mg/dL (01 Feb 2018 17:12)  POCT Blood Glucose.: 136 mg/dL (01 Feb 2018 11:25)  POCT Blood Glucose.: 168 mg/dL (01 Feb 2018 07:34)  POCT Blood Glucose.: 159 mg/dL (31 Jan 2018 23:07)    LIVER FUNCTIONS - ( 01 Feb 2018 07:29 )  Alb: 2.7 g/dL / Pro: 5.1 g/dL / ALK PHOS: 114 U/L / ALT: 81 U/L / AST: 87 U/L / GGT: x

## 2018-02-01 NOTE — BRIEF OPERATIVE NOTE - POST-OP DX
Perforated duodenal ulcer  01/20/2018    Active  Erlinda Nesbitt
Perforated duodenal ulcer  01/20/2018    Active  Erlinda Nesbitt  Perforated viscus  01/22/2018    Active  Power Mcgill
Perforated duodenal ulcer  01/20/2018    Active  Erlinda Nesbitt  Perforated viscus  01/22/2018    Active  Power Mcgill  Respiratory failure  02/01/2018    Active  Devin Billy
Perforated duodenal ulcer  01/20/2018    Active  Erlinda Nesbitt  Perforated viscus  01/22/2018    Active  Power Mcgill  Respiratory failure  02/01/2018    Active  Devin Billy

## 2018-02-01 NOTE — BRIEF OPERATIVE NOTE - OPERATION/FINDINGS
Findings:  Bronchoscope inserted through ETT. ETT noted to be in good position. Airway evaluation revealed Sharp Jeanine. MISSAEL and LLL evaluation revealed clear airways with no secretions. RUL, RML, RLL revealed clear airways with no secretions. The tracheostomy placement was performed under bronchoscopic guidance - see separate procedure note. The bronchoscope was used to confirm correct tracheostomy placement by placing the scope through the tracheostomy. The bronchoscope was then withdrawn from the ETT. No bleeding noted    Specimens: None.
Tracheostomy, size 8.0. Secured in place
perforated duodenal ulcer, diffuse spillage of enteric contents, with most significant spillage in the nenita-hepapic area
bile and succus encountered upon entry of abdominal cavity.  prior kaylee patch necrosis with leakage of perforated duodenal ulcer.  duodenal ulcer was primarily closed with 2-0 silk interrupted. fresh kaylee patch placed over primary closure.  Jon gastrostomy placed, retrograde duodenostomy placed with witzel tunnel, revision of feeding jejunostomy. placement of retention sutures.  previous GUCCI drains left in place

## 2018-02-01 NOTE — BRIEF OPERATIVE NOTE - PRE-OP DX
Perforated viscus  01/22/2018    Active  Power Mcgill  Pneumoperitoneum  01/20/2018    Active  Erlinda Nesbitt
Perforated viscus  01/22/2018    Active  Power Mcgill  Pneumoperitoneum  01/20/2018    Active  Erlinda Nesbitt  Respiratory failure  02/01/2018    Active  Devin Billy
Pneumoperitoneum  01/20/2018    Active  Erlinda Nesbitt
Perforated viscus  01/22/2018    Active  Power Mcgill  Pneumoperitoneum  01/20/2018    Active  Erlinda Nesbitt  Respiratory failure  02/01/2018    Active  Devin Billy

## 2018-02-01 NOTE — PROGRESS NOTE ADULT - SUBJECTIVE AND OBJECTIVE BOX
Interventional, Pulmonary, Critical, Chest Special Procedures.    Pt was seen and fully examined by myself.     Time spent with patient in minutes:67    Patient is a 72y old  Male who presents with a chief complaint of acute cholecystitis (18 Jan 2018 14:49)Events of last 24hrs reviewed. The patient is now POD I tracheostomy by surgery, no ouvert bleeding at the site. The patient is now awake and engaging. Follows commands, ill appearing.    HPI:  73 yo M with h/o severe mitral and aortic valve regurgitation presented to ED with 5 day h/o left lower abdominal pain, nausea and multiple episodes of vomiting as well as PO intolerance. Pt reports that the pain in his abdomen is illicited by movement. Went to [facility] two days ago and underwent CT scan which revealed acute cholecystitis as well as right lower lobe infectious pneumonia. Pt was told he would require surgery and left AMA because he was waiting too long. Denies f/c. Last echo 1/4/17 with EF 60-65%. Normal BMs and passing flatus. (18 Jan 2018 14:49)    REVIEW OF SYSTEMS:  as above    PAST MEDICAL & SURGICAL HISTORY:  Mitral regurgitation  Aortic regurgitation  No significant past surgical history    FAMILY HISTORY:    SOCIAL HISTORY:      - Tobacco     - ETOH    Allergies    No Known Allergies    Intolerances      Vital Signs Last 24 Hrs  T(C): 38.1 (01 Feb 2018 15:16), Max: 38.1 (31 Jan 2018 16:00)  T(F): 100.6 (01 Feb 2018 15:16), Max: 100.6 (01 Feb 2018 15:16)  HR: 75 (01 Feb 2018 15:00) (66 - 82)  BP: 119/59 (01 Feb 2018 15:00) (96/51 - 125/72)  BP(mean): 77 (01 Feb 2018 15:00) (63 - 86)  RR: 19 (01 Feb 2018 15:00) (15 - 24)  SpO2: 100% (01 Feb 2018 15:00) (97% - 100%)    01-31 @ 07:01  -  02-01 @ 07:00  --------------------------------------------------------  IN: 2071.5 mL / OUT: 2730 mL / NET: -658.5 mL    02-01 @ 07:01  -  02-01 @ 15:19  --------------------------------------------------------  IN: 1774 mL / OUT: 1470 mL / NET: 304 mL      Mode: AC/ CMV (Assist Control/ Continuous Mandatory Ventilation)  RR (machine): 16  TV (machine): 500  FiO2: 40  PEEP: 5  ITime: 0.8  MAP: 7.5  PIP: 17    PHYSICAL EXAM:  Comfortable, no distress  Eyes: PERRL, EOM intact; conjunctiva and sclera clear  Head: Normocephalic;  No Trauma  ENMT: No nasal discharge, hoarseness, cough or hemoptysis.  Airway clear  Neck: Supple; non tender; no masses or deformities.    No JVD  Respiratory:  - WHEEZING   few R  RHONCHI  - RALES  = CRACKLES.  Diminished breath sounds  BILATERAL  RIGHT  LEFT bases   Cardiovascular: Regular rate and rhythm. S1 and S2 Normal; No murmurs, gallops or rubs     - PPM/AICD  Gastrointestinal: Soft ,distended; Normal bowel sounds; No hepatosplenomegaly.     +PEG    + multiple surgical drains integrity checked   + ANGELES  Genitourinary: No costovertebral angle tenderness. No dysuria  Extremities: Decreased AROM, + clubbing, cyanosis + edema    Vascular: Peripheral pulses palpable 2+ bilaterally  Neurological: intubated and opening eyes to commands  Skin: Warm and dry. No obvious rash  Lymph Nodes: No acute cervical or supraclavicular adenopathy  Psychiatric: less sedated, calm affect    DEVICES:  - DENTURES   +IV R / L     - ETUBE   +TRACH site is clean,  -CTUBE  R / L      LABS:                          7.7    9.4   )-----------( 79       ( 01 Feb 2018 13:33 )             23.2     02-01    143  |  104  |  31<H>  ----------------------------<  177<H>  3.4<L>   |  31  |  0.94    Ca    7.9<L>      01 Feb 2018 07:29  Phos  2.6     02-01  Mg     2.7     02-01    TPro  5.1<L>  /  Alb  2.7<L>  /  TBili  10.5<H>  /  DBili  x   /  AST  87<H>  /  ALT  81<H>  /  AlkPhos  114  02-01    PT/INR - ( 01 Feb 2018 07:29 )   PT: 13.7 sec;   INR: 1.23          PTT - ( 01 Feb 2018 07:29 )  PTT:34.9 sec  RADIOLOGY & ADDITIONAL STUDIES (The following images were personally reviewed):cxr series

## 2018-02-01 NOTE — BRIEF OPERATIVE NOTE - PROCEDURE
<<-----Click on this checkbox to enter Procedure Tracheostomy, percutaneous, at bedside  02/01/2018  with bronchoscopic assistance  Active  GANAND

## 2018-02-01 NOTE — PROGRESS NOTE ADULT - SUBJECTIVE AND OBJECTIVE BOX
SUBJECTIVE:  Intubated without sedation in AM. Pt nods head yes to being comfortable. Nods no to CP, SOB, palpitations, abd pain, nausea, fevers/chills. for trac      MEDICATIONS  (STANDING):  albumin human 25% IVPB 50 milliLiter(s) IV Intermittent every 8 hours  ALBUTerol/ipratropium for Nebulization 3 milliLiter(s) Nebulizer every 6 hours  cefTAZidime  IVPB 2 Gram(s) IV Intermittent every 8 hours  chlorhexidine 0.12% Liquid 15 milliLiter(s) Swish and Spit two times a day  digoxin  Injectable 0.125 milliGRAM(s) IV Push daily  fluconAZOLE IVPB 400 milliGRAM(s) IV Intermittent every 24 hours  heparin  Infusion 850 Unit(s)/Hr (8.5 mL/Hr) IV Continuous <Continuous>  insulin lispro (HumaLOG) corrective regimen sliding scale   SubCutaneous every 6 hours  metolazone 10 milliGRAM(s) Oral two times a day  pantoprazole  Injectable 40 milliGRAM(s) IV Push every 12 hours  sodium chloride 0.9% lock flush 20 milliLiter(s) IV Push once    MEDICATIONS  (PRN):  acetaminophen    Suspension 650 milliGRAM(s) Enteral Tube every 6 hours PRN For Temp greater than 38 C (100.4 F)  sodium chloride 0.9% lock flush 10 milliLiter(s) IV Push every 1 hour PRN After each medication administration      Oliveira:	  [ ] None	[x ] Daily Oliveira Order Placed	   Indication:	  [x ] Strict I and O's    [ ] Obstruction     [ ] Incontinence + Stage 3 or 4 Decubitus  Central Line:  [ ] None	   [x ]  Medication / TPN Administration       ICU Vital Signs Last 24 Hrs  T(C): 38.3 (01 Feb 2018 18:18), Max: 38.3 (01 Feb 2018 18:18)  T(F): 101 (01 Feb 2018 18:18), Max: 101 (01 Feb 2018 18:18)  HR: 68 (01 Feb 2018 18:00) (62 - 82)  BP: 108/54 (01 Feb 2018 18:00) (96/51 - 126/95)  BP(mean): 71 (01 Feb 2018 18:00) (63 - 116)  ABP: --  ABP(mean): --  RR: 24 (01 Feb 2018 18:00) (15 - 25)  SpO2: 100% (01 Feb 2018 18:00) (92% - 100%)      Physical Exam:  General: NAD, tracks me in the room  HEENT: NC/AT, EOMI, normal hearing  Pulmonary: in AM, intubated on AC, lungs clear in upper lobes, decreased breath sounds with crackles in lower lobes bilaterally  Cardiovascular: NSR, pansystolic III/VI murmur  Abdominal: soft, NT/ND. Retention sutures in place, no surrounding erythema. G-tube, retrograde D-tube (bilious), feeding J tube in place. RLQ GUCCI drains with gold serous output, no tube feeds in drains.  Extremities: feet cool, but palpable pedal pulses. R 1st and 4th toes purple with blisters, no erythema, no drainage  Neuro: alert, awake, tracks me in the room      Vent settings:  Mode: AC/ CMV (Assist Control/ Continuous Mandatory Ventilation), RR (machine): 16, TV (machine): 500, FiO2: 40, PEEP: 5, ITime: 0.8, MAP: 9.2, PIP: 22    I&O's Summary    31 Jan 2018 07:01  -  01 Feb 2018 07:00  --------------------------------------------------------  IN: 2071.5 mL / OUT: 2730 mL / NET: -658.5 mL    01 Feb 2018 07:01  -  01 Feb 2018 18:21  --------------------------------------------------------  IN: 2088 mL / OUT: 2520 mL / NET: -432 mL        LABS:                        7.7    9.4   )-----------( 79       ( 01 Feb 2018 13:33 )             23.2     02-01    143  |  104  |  31<H>  ----------------------------<  177<H>  3.4<L>   |  31  |  0.94    Ca    7.9<L>      01 Feb 2018 07:29  Phos  2.6     02-01  Mg     2.7     02-01    TPro  5.1<L>  /  Alb  2.7<L>  /  TBili  10.5<H>  /  DBili  x   /  AST  87<H>  /  ALT  81<H>  /  AlkPhos  114  02-01    PT/INR - ( 01 Feb 2018 07:29 )   PT: 13.7 sec;   INR: 1.23          PTT - ( 01 Feb 2018 07:29 )  PTT:34.9 sec    CAPILLARY BLOOD GLUCOSE      POCT Blood Glucose.: 148 mg/dL (01 Feb 2018 17:12)  POCT Blood Glucose.: 136 mg/dL (01 Feb 2018 11:25)  POCT Blood Glucose.: 168 mg/dL (01 Feb 2018 07:34)  POCT Blood Glucose.: 159 mg/dL (31 Jan 2018 23:07)    LIVER FUNCTIONS - ( 01 Feb 2018 07:29 )  Alb: 2.7 g/dL / Pro: 5.1 g/dL / ALK PHOS: 114 U/L / ALT: 81 U/L / AST: 87 U/L / GGT: x

## 2018-02-01 NOTE — PROGRESS NOTE ADULT - ASSESSMENT
ASSESSMENT/PLAN 73y/o male pt c Respiratory failure, perforated duodenal ulcer, POD post repair, RLL pneumonia, RLL atelectases, no obvious RADHA, elevated R hemidiaphragm, AFIB, worsening thrombocytopenia, possible endocarditis, s/p reop. now POD I tracheostomy    1. O2: Continue CMV mode today, consider Precedex, waking and weaning trials  2. Bronchodilators:  Atrovent/ albuterol q 4 – 6 hours as needed  3. Corticosteroids: off  4. ID/Antibiotics: Ceftazidime, Micafungin strict ID supervision   5. Cardiac/HTN: Monitor, optimize HR; on heparin drip for AFIB, structural cardiology  feedback  6. GI: Rx/ prophylaxis c PPI/Protonix  7. Heme: Rx/VT prophylaxis IV heparin; platelets to be followed closely  8. Aspiration precautions, feeding PEG  9. Surgery mngmnt priority  10. Edema: diuretic titration      Discussed with managing team, CCRN, SICU, surgery

## 2018-02-01 NOTE — PROGRESS NOTE ADULT - SUBJECTIVE AND OBJECTIVE BOX
Critical Care    INTERVAL HPI/OVERNIGHT EVENTS:    ANTIBIOTICS    MEDICATIONS  (STANDING):  albumin human 25% IVPB 50 milliLiter(s) IV Intermittent every 8 hours  ALBUTerol/ipratropium for Nebulization 3 milliLiter(s) Nebulizer every 6 hours  cefTAZidime  IVPB 2 Gram(s) IV Intermittent every 8 hours  chlorhexidine 0.12% Liquid 15 milliLiter(s) Swish and Spit two times a day  digoxin  Injectable 0.125 milliGRAM(s) IV Push daily  fluconAZOLE IVPB 400 milliGRAM(s) IV Intermittent every 24 hours  heparin  Infusion 850 Unit(s)/Hr (8.5 mL/Hr) IV Continuous <Continuous>  insulin lispro (HumaLOG) corrective regimen sliding scale   SubCutaneous every 6 hours  metolazone 10 milliGRAM(s) Oral two times a day  pantoprazole  Injectable 40 milliGRAM(s) IV Push every 12 hours  sodium chloride 0.9% lock flush 20 milliLiter(s) IV Push once    MEDICATIONS  (PRN):  acetaminophen    Suspension 650 milliGRAM(s) Enteral Tube every 6 hours PRN For Temp greater than 38 C (100.4 F)  sodium chloride 0.9% lock flush 10 milliLiter(s) IV Push every 1 hour PRN After each medication administration      Allergies    No Known Allergies    Intolerances        REVIEW OF SYSTEMS:    patient intubated    Vital Signs Last 24 Hrs  T(C): 37.7 (01 Feb 2018 09:00), Max: 38.1 (31 Jan 2018 16:00)  T(F): 99.9 (01 Feb 2018 09:00), Max: 100.5 (31 Jan 2018 16:00)  HR: 70 (01 Feb 2018 11:12) (66 - 84)  BP: 110/58 (01 Feb 2018 11:00) (96/51 - 132/59)  BP(mean): 68 (01 Feb 2018 11:00) (63 - 113)  RR: 18 (01 Feb 2018 11:00) (16 - 32)  SpO2: 100% (01 Feb 2018 11:12) (65% - 100%)    PHYSICAL EXAM:    General:  in no acute distress  Eyes: PERRL, EOM intact; conjunctiva and sclera clear  Head: Normocephalic; atraumatic  ENMT: No nasal discharge; airway clear  Neck: Supple; non tender; no masses  Respiratory: No wheezes, rales or rhonchi  Cardiovascular: Regular rate and rhythm. S1 and S2 Normal; No murmurs, gallops or rubs  Gastrointestinal: Soft non-tender non-distended; Normal bowel sounds; No hepatosplenomegaly      LABS:                        8.3    9.6   )-----------( 64       ( 01 Feb 2018 07:29 )             25.0     02-01    143  |  104  |  31<H>  ----------------------------<  177<H>  3.4<L>   |  31  |  0.94    Ca    7.9<L>      01 Feb 2018 07:29  Phos  2.6     02-01  Mg     2.7     02-01    TPro  5.1<L>  /  Alb  2.7<L>  /  TBili  10.5<H>  /  DBili  x   /  AST  87<H>  /  ALT  81<H>  /  AlkPhos  114  02-01    PT/INR - ( 01 Feb 2018 07:29 )   PT: 13.7 sec;   INR: 1.23          PTT - ( 01 Feb 2018 07:29 )  PTT:34.9 sec        MICROBIOLOGY:  Culture Results:   No growth at 4 days. (01-27 @ 13:39)  Culture Results:   No growth at 4 days. (01-27 @ 13:38)  Culture Results:   Numerous Stenotrophomonas maltophilia  No routine respiratory geeta Isolated (01-27 @ 10:09)      RADIOLOGY & ADDITIONAL STUDIES:

## 2018-02-01 NOTE — PROGRESS NOTE ADULT - SUBJECTIVE AND OBJECTIVE BOX
Chief Complaint/Reason for Consult: cv mgmt  INTERVAL HPI: s/p trach tele with lety afib rvr over night now afib rate controlled no pausess, ep and cts recs appreciated  	  MEDICATIONS:  digoxin  Injectable 0.125 milliGRAM(s) IV Push daily  metolazone 10 milliGRAM(s) Oral two times a day    cefTAZidime  IVPB 2 Gram(s) IV Intermittent every 8 hours  fluconAZOLE IVPB 400 milliGRAM(s) IV Intermittent every 24 hours    ALBUTerol/ipratropium for Nebulization 3 milliLiter(s) Nebulizer every 6 hours    acetaminophen    Suspension 650 milliGRAM(s) Enteral Tube every 6 hours PRN    pantoprazole  Injectable 40 milliGRAM(s) IV Push every 12 hours    insulin lispro (HumaLOG) corrective regimen sliding scale   SubCutaneous every 6 hours    albumin human 25% IVPB 50 milliLiter(s) IV Intermittent every 8 hours  chlorhexidine 0.12% Liquid 15 milliLiter(s) Swish and Spit two times a day  heparin  Infusion 850 Unit(s)/Hr IV Continuous <Continuous>      REVIEW OF SYSTEMS:  [x] As per HPI  CONSTITUTIONAL: No fever, weight loss, or fatigue  RESPIRATORY: No cough, wheezing, chills or hemoptysis; No Shortness of Breath  CARDIOVASCULAR: No chest pain, palpitations, dizziness, or leg swelling  GASTROINTESTINAL: No abdominal or epigastric pain. No nausea, vomiting, or hematemesis; No diarrhea or constipation. No melena or hematochezia.  MUSCULOSKELETAL: No joint pain or swelling; No muscle, back, or extremity pain  [x] All others negative	  [ ] Unable to obtain    PHYSICAL EXAM:  T(C): 37.7 (02-01-18 @ 09:00), Max: 38.1 (01-31-18 @ 16:00)  HR: 70 (02-01-18 @ 11:12) (66 - 84)  BP: 110/58 (02-01-18 @ 11:00) (96/51 - 132/59)  RR: 18 (02-01-18 @ 11:00) (16 - 32)  SpO2: 100% (02-01-18 @ 11:12) (65% - 100%)  Wt(kg): --  I&O's Summary    31 Jan 2018 07:01  -  01 Feb 2018 07:00  --------------------------------------------------------  IN: 2071.5 mL / OUT: 2730 mL / NET: -658.5 mL    01 Feb 2018 07:01  -  01 Feb 2018 11:51  --------------------------------------------------------  IN: 848 mL / OUT: 270 mL / NET: 578 mL          Appearance: Normal	  HEENT:   Normal oral mucosa  Cardiovascular: Normal S1 S2, No JVD, No murmurs, No edema  Respiratory: Lungs clear to auscultation	  Gastrointestinal:  Soft, Non-tender, + BS	  Extremities: Normal range of motion, No clubbing, cyanosis or edema  Vascular: Peripheral pulses palpable 2+ bilaterally    TELEMETRY: 	    ECG:   	  RADIOLOGY:   CXR:  CT:  US:    CARDIAC TESTING:  Echocardiogram:  Catheterization:  Stress Test:      LABS:	 	    CARDIAC MARKERS:                                  8.3    9.6   )-----------( 64       ( 01 Feb 2018 07:29 )             25.0     02-01    143  |  104  |  31<H>  ----------------------------<  177<H>  3.4<L>   |  31  |  0.94    Ca    7.9<L>      01 Feb 2018 07:29  Phos  2.6     02-01  Mg     2.7     02-01    TPro  5.1<L>  /  Alb  2.7<L>  /  TBili  10.5<H>  /  DBili  x   /  AST  87<H>  /  ALT  81<H>  /  AlkPhos  114  02-01    proBNP:   Lipid Profile:   HgA1c:   TSH:     ASSESSMENT/PLAN: 	    # Afib - Patient with continued Afib now with controlled VR (HR 60s) s/p Digoxin. Dose was held yesterday for slow VR, now resolving today. Digoxin level noted to be 1.2 therapeutic. Continue Digoxin. Hold for HR < 60bpm. Cr is stable currently 1.07.     #CHF - severe MR CTS recs appreciated, continue diureses per SICU   Net neg >500cc     continue metalazone   and PRN Lasix IV  off pressors

## 2018-02-02 PROBLEM — Z00.00 ENCOUNTER FOR PREVENTIVE HEALTH EXAMINATION: Status: ACTIVE | Noted: 2018-02-02

## 2018-02-02 LAB
ALBUMIN SERPL ELPH-MCNC: 2.9 G/DL — LOW (ref 3.3–5)
ALP SERPL-CCNC: 118 U/L — SIGNIFICANT CHANGE UP (ref 40–120)
ALT FLD-CCNC: 67 U/L — HIGH (ref 10–45)
ANION GAP SERPL CALC-SCNC: 8 MMOL/L — SIGNIFICANT CHANGE UP (ref 5–17)
APTT BLD: 52.9 SEC — HIGH (ref 27.5–37.4)
APTT BLD: 53.7 SEC — HIGH (ref 27.5–37.4)
AST SERPL-CCNC: 64 U/L — HIGH (ref 10–40)
BILIRUB SERPL-MCNC: 8 MG/DL — HIGH (ref 0.2–1.2)
BUN SERPL-MCNC: 29 MG/DL — HIGH (ref 7–23)
CALCIUM SERPL-MCNC: 8.2 MG/DL — LOW (ref 8.4–10.5)
CHLORIDE SERPL-SCNC: 99 MMOL/L — SIGNIFICANT CHANGE UP (ref 96–108)
CO2 SERPL-SCNC: 33 MMOL/L — HIGH (ref 22–31)
CREAT SERPL-MCNC: 0.92 MG/DL — SIGNIFICANT CHANGE UP (ref 0.5–1.3)
DIGOXIN SERPL-MCNC: 1.2 NG/ML — SIGNIFICANT CHANGE UP (ref 0.8–2)
GLUCOSE BLDC GLUCOMTR-MCNC: 147 MG/DL — HIGH (ref 70–99)
GLUCOSE BLDC GLUCOMTR-MCNC: 162 MG/DL — HIGH (ref 70–99)
GLUCOSE BLDC GLUCOMTR-MCNC: 189 MG/DL — HIGH (ref 70–99)
GLUCOSE BLDC GLUCOMTR-MCNC: 198 MG/DL — HIGH (ref 70–99)
GLUCOSE SERPL-MCNC: 228 MG/DL — HIGH (ref 70–99)
GRAM STN FLD: SIGNIFICANT CHANGE UP
HCT VFR BLD CALC: 22.5 % — LOW (ref 39–50)
HGB BLD-MCNC: 7.4 G/DL — LOW (ref 13–17)
MAGNESIUM SERPL-MCNC: 2.5 MG/DL — SIGNIFICANT CHANGE UP (ref 1.6–2.6)
MCHC RBC-ENTMCNC: 30.7 PG — SIGNIFICANT CHANGE UP (ref 27–34)
MCHC RBC-ENTMCNC: 32.9 G/DL — SIGNIFICANT CHANGE UP (ref 32–36)
MCV RBC AUTO: 93.4 FL — SIGNIFICANT CHANGE UP (ref 80–100)
PHOSPHATE SERPL-MCNC: 2.4 MG/DL — LOW (ref 2.5–4.5)
PLATELET # BLD AUTO: 70 K/UL — LOW (ref 150–400)
POTASSIUM SERPL-MCNC: 3.1 MMOL/L — LOW (ref 3.5–5.3)
POTASSIUM SERPL-SCNC: 3.1 MMOL/L — LOW (ref 3.5–5.3)
PROT SERPL-MCNC: 5.2 G/DL — LOW (ref 6–8.3)
RBC # BLD: 2.41 M/UL — LOW (ref 4.2–5.8)
RBC # FLD: 21.5 % — HIGH (ref 10.3–16.9)
SODIUM SERPL-SCNC: 140 MMOL/L — SIGNIFICANT CHANGE UP (ref 135–145)
SPECIMEN SOURCE: SIGNIFICANT CHANGE UP
WBC # BLD: 8.6 K/UL — SIGNIFICANT CHANGE UP (ref 3.8–10.5)
WBC # FLD AUTO: 8.6 K/UL — SIGNIFICANT CHANGE UP (ref 3.8–10.5)

## 2018-02-02 PROCEDURE — 99233 SBSQ HOSP IP/OBS HIGH 50: CPT | Mod: GC

## 2018-02-02 PROCEDURE — 71045 X-RAY EXAM CHEST 1 VIEW: CPT | Mod: 26

## 2018-02-02 RX ORDER — POTASSIUM CHLORIDE 20 MEQ
40 PACKET (EA) ORAL EVERY 4 HOURS
Qty: 0 | Refills: 0 | Status: DISCONTINUED | OUTPATIENT
Start: 2018-02-02 | End: 2018-02-02

## 2018-02-02 RX ORDER — POTASSIUM CHLORIDE 20 MEQ
40 PACKET (EA) ORAL EVERY 4 HOURS
Qty: 0 | Refills: 0 | Status: COMPLETED | OUTPATIENT
Start: 2018-02-02 | End: 2018-02-02

## 2018-02-02 RX ORDER — BACITRACIN ZINC 500 UNIT/G
1 OINTMENT IN PACKET (EA) TOPICAL
Qty: 0 | Refills: 0 | Status: DISCONTINUED | OUTPATIENT
Start: 2018-02-02 | End: 2018-02-22

## 2018-02-02 RX ORDER — POTASSIUM CHLORIDE 20 MEQ
10 PACKET (EA) ORAL
Qty: 0 | Refills: 0 | Status: COMPLETED | OUTPATIENT
Start: 2018-02-02 | End: 2018-02-02

## 2018-02-02 RX ADMIN — Medication 50 MILLIEQUIVALENT(S): at 10:02

## 2018-02-02 RX ADMIN — CEFTAZIDIME 100 GRAM(S): 6 INJECTION, POWDER, FOR SOLUTION INTRAVENOUS at 06:26

## 2018-02-02 RX ADMIN — FLUCONAZOLE 100 MILLIGRAM(S): 150 TABLET ORAL at 14:22

## 2018-02-02 RX ADMIN — Medication 50 MILLIEQUIVALENT(S): at 14:25

## 2018-02-02 RX ADMIN — Medication 3 MILLILITER(S): at 01:50

## 2018-02-02 RX ADMIN — Medication 1 APPLICATION(S): at 18:41

## 2018-02-02 RX ADMIN — Medication 3 MILLILITER(S): at 23:47

## 2018-02-02 RX ADMIN — Medication 3 MILLILITER(S): at 17:01

## 2018-02-02 RX ADMIN — PANTOPRAZOLE SODIUM 40 MILLIGRAM(S): 20 TABLET, DELAYED RELEASE ORAL at 06:26

## 2018-02-02 RX ADMIN — CHLORHEXIDINE GLUCONATE 15 MILLILITER(S): 213 SOLUTION TOPICAL at 18:40

## 2018-02-02 RX ADMIN — CEFTAZIDIME 100 GRAM(S): 6 INJECTION, POWDER, FOR SOLUTION INTRAVENOUS at 22:00

## 2018-02-02 RX ADMIN — CEFTAZIDIME 100 GRAM(S): 6 INJECTION, POWDER, FOR SOLUTION INTRAVENOUS at 14:23

## 2018-02-02 RX ADMIN — Medication 40 MILLIEQUIVALENT(S): at 14:22

## 2018-02-02 RX ADMIN — CHLORHEXIDINE GLUCONATE 15 MILLILITER(S): 213 SOLUTION TOPICAL at 06:27

## 2018-02-02 RX ADMIN — Medication 2: at 14:23

## 2018-02-02 RX ADMIN — Medication 2: at 00:02

## 2018-02-02 RX ADMIN — Medication 3 MILLILITER(S): at 12:05

## 2018-02-02 RX ADMIN — PANTOPRAZOLE SODIUM 40 MILLIGRAM(S): 20 TABLET, DELAYED RELEASE ORAL at 18:40

## 2018-02-02 RX ADMIN — HEPARIN SODIUM 8.5 UNIT(S)/HR: 5000 INJECTION INTRAVENOUS; SUBCUTANEOUS at 18:40

## 2018-02-02 RX ADMIN — Medication 2: at 18:39

## 2018-02-02 RX ADMIN — Medication 40 MILLIEQUIVALENT(S): at 09:59

## 2018-02-02 RX ADMIN — Medication 40 MILLIEQUIVALENT(S): at 14:39

## 2018-02-02 RX ADMIN — Medication 3 MILLILITER(S): at 07:05

## 2018-02-02 RX ADMIN — Medication 0.12 MILLIGRAM(S): at 14:22

## 2018-02-02 RX ADMIN — Medication 50 MILLILITER(S): at 06:25

## 2018-02-02 RX ADMIN — Medication 2: at 06:27

## 2018-02-02 NOTE — PROGRESS NOTE ADULT - SUBJECTIVE AND OBJECTIVE BOX
f 24 hr events:  O/N: spiked to 101, sputum cx & UA sent but BCx pending collection. 10pm PTT 58.   2/1: trach placed by James/Jt. heparin restarted at 5pm. cultures +candida albicans sens to fluc so sera discontinued and fluconazole added. given lasix 20 x 1    SUBJECTIVE:  Slept well overnight. No fevers, no CP, no dyspnea, no lightheadedness, no abd pain, no nausea.     MEDICATIONS  (STANDING):  ALBUTerol/ipratropium for Nebulization 3 milliLiter(s) Nebulizer every 6 hours  BACItracin   Ointment 1 Application(s) Topical two times a day  cefTAZidime  IVPB 2 Gram(s) IV Intermittent every 8 hours  chlorhexidine 0.12% Liquid 15 milliLiter(s) Swish and Spit two times a day  digoxin  Injectable 0.125 milliGRAM(s) IV Push daily  fluconAZOLE IVPB 400 milliGRAM(s) IV Intermittent every 24 hours  heparin  Infusion 850 Unit(s)/Hr (8.5 mL/Hr) IV Continuous <Continuous>  insulin lispro (HumaLOG) corrective regimen sliding scale   SubCutaneous every 6 hours  metolazone 10 milliGRAM(s) Oral two times a day  pantoprazole  Injectable 40 milliGRAM(s) IV Push every 12 hours  sodium chloride 0.9% lock flush 20 milliLiter(s) IV Push once    MEDICATIONS  (PRN):  acetaminophen    Suspension 650 milliGRAM(s) Enteral Tube every 6 hours PRN For Temp greater than 38 C (100.4 F)  sodium chloride 0.9% lock flush 10 milliLiter(s) IV Push every 1 hour PRN After each medication administration      ICU Vital Signs Last 24 Hrs  T(C): 37.1 (2018 14:35), Max: 37.8 (2018 22:01)  T(F): 98.8 (2018 14:35), Max: 100 (2018 22:01)  HR: 78 (2018 18:00) (54 - 90)  BP: 104/72 (2018 18:00) (93/47 - 149/68)  BP(mean): 83 (2018 18:00) (58 - 106)  ABP: --  ABP(mean): --  RR: 21 (2018 18:00) (16 - 31)  SpO2: 100% (2018 18:00) (94% - 100%)      Physical Exam:  General: NAD, tracks me in the room, able to mouth words  HEENT: NC/AT, EOMI, normal hearing  Pulmonary: in AM, intubated on AC, normal resp effort. Normal resp effort on CPAP for 2 hours in AM  Cardiovascular: NSR, pansystolic III/VI murmur  Abdominal: soft, NT/ND. Retention sutures in place, no surrounding erythema. G-tube, retrograde D-tube (bilious), feeding J tube in place. RLQ GUCCI drains with gold serous output, no tube feeds in drains.  Extremities: feet cool, but palpable pedal pulses. R 1st and 4th toes purple with blisters, no erythema, no drainage    Vent settings:  Mode: AC/ CMV (Assist Control/ Continuous Mandatory Ventilation), RR (machine): 16, TV (machine): 500, FiO2: 40, PEEP: 5, ITime: 8, MAP: 10, PIP: 30    I&O's Summary    2018 07:01  -  2018 07:00  --------------------------------------------------------  IN: 3156 mL / OUT: 3970 mL / NET: -814 mL    2018 07:01  -  2018 19:29  --------------------------------------------------------  IN: 977 mL / OUT: 615 mL / NET: 362 mL        LABS:                        7.4    8.6   )-----------( 70       ( 2018 06:22 )             22.5     02-02    140  |  99  |  29<H>  ----------------------------<  228<H>  3.1<L>   |  33<H>  |  0.92    Ca    8.2<L>      2018 06:22  Phos  2.4     02-  Mg     2.5     02-    TPro  5.2<L>  /  Alb  2.9<L>  /  TBili  8.0<H>  /  DBili  x   /  AST  64<H>  /  ALT  67<H>  /  AlkPhos  118  02-02    PT/INR - ( 2018 07:29 )   PT: 13.7 sec;   INR: 1.23          PTT - ( 2018 06:22 )  PTT:52.9 sec  Urinalysis Basic - ( 2018 19:04 )    Color: Yellow / Appearance: SL Cloudy / S.020 / pH: x  Gluc: x / Ketone: NEGATIVE  / Bili: Small / Urobili: 0.2 E.U./dL   Blood: x / Protein: Trace mg/dL / Nitrite: NEGATIVE   Leuk Esterase: NEGATIVE / RBC: < 5 /HPF / WBC < 5 /HPF   Sq Epi: x / Non Sq Epi: 0-5 /HPF / Bacteria: Present /HPF      CAPILLARY BLOOD GLUCOSE      POCT Blood Glucose.: 189 mg/dL (2018 17:00)  POCT Blood Glucose.: 162 mg/dL (2018 12:39)  POCT Blood Glucose.: 198 mg/dL (2018 05:16)  POCT Blood Glucose.: 193 mg/dL (2018 22:59)    LIVER FUNCTIONS - ( 2018 06:22 )  Alb: 2.9 g/dL / Pro: 5.2 g/dL / ALK PHOS: 118 U/L / ALT: 67 U/L / AST: 64 U/L / GGT: x             Cultures:Culture Results:   No growth to date ( @ 19:18)

## 2018-02-02 NOTE — PROGRESS NOTE ADULT - SUBJECTIVE AND OBJECTIVE BOX
Interventional, Pulmonary, Critical, Chest Special Procedures.    Pt was seen and fully examined by myself.     Time spent with patient in minutes:67    Patient is a 72y old  Male who presents with a chief complaint of acute cholecystitis (18 Jan 2018 14:49)The patient awake and engaging, less jaundiced, pain free. The patient was placed on CPAP with good tolerance.     HPI:  73 yo M with h/o severe mitral and aortic valve regurgitation presented to ED with 5 day h/o left lower abdominal pain, nausea and multiple episodes of vomiting as well as PO intolerance. Pt reports that the pain in his abdomen is illicited by movement. Went to [facility] two days ago and underwent CT scan which revealed acute cholecystitis as well as right lower lobe infectious pneumonia. Pt was told he would require surgery and left AMA because he was waiting too long. Denies f/c. Last echo 1/4/17 with EF 60-65%. Normal BMs and passing flatus. (18 Jan 2018 14:49)    REVIEW OF SYSTEMS: updated  Constitutional: No fever, weight loss, chills or fatigue  Eyes: No eye pain, visual disturbances, or discharge  ENMT:  No difficulty hearing, tinnitus, vertigo; No sinus or throat pain. No epistaxis, dysphagia, dysphonia, hoarseness or odynophagia  Neck: No pain, stiffness or neck swelling.  No masses or deformities  Respiratory: No cough, wheezing, chills or hemoptysis  - COPD  - ILD   - PE   - ASTHMA     - PNEUMONIA  Cardiovascular: No chest pain, dysrhythmia, palpitations, dizziness or edema   - COPD     - CAD   - CHF   - HTN  Gastrointestinal: No abdominal or epigastric pain. No nausea, vomiting or hematemesis; No diarrhea or constipation. No melena or hematochezia. No dysphagia or Icterus.          Genitourinary: No dysuria, frequency, hematuria or incontinence   - CKD/GAETANO      - ESRD  Neurological: No headaches, memory loss, loss of strength, numbness or tremors      -DEMENTIA     - STROKE    - SEIZURE  Skin: No itching, burning, rashes or lesions   Lymph Nodes: No enlarged glands  Endocrine: No heat or cold intolerance; No hair loss       - DM     - THYROID DISORDER  Musculoskeletal: No joint pain or swelling; No muscle, back or extremity pain  Psychiatric: No depression, anxiety, mood swings or difficulty sleeping  Heme/Lymph: No easy bruising or bleeding gums         - ANEMIA      - CANCER   -COAGULOPATHY  Allergy and Immunologic: No hives or eczema    PAST MEDICAL & SURGICAL HISTORY:  Mitral regurgitation  Aortic regurgitation  No significant past surgical history    FAMILY HISTORY:    SOCIAL HISTORY:      - Tobacco     - ETOH    Allergies    No Known Allergies    Intolerances      Vital Signs Last 24 Hrs  T(C): 37.1 (02 Feb 2018 14:35), Max: 38.3 (01 Feb 2018 18:18)  T(F): 98.8 (02 Feb 2018 14:35), Max: 101 (01 Feb 2018 18:18)  HR: 70 (02 Feb 2018 15:00) (54 - 90)  BP: 118/73 (02 Feb 2018 15:00) (93/47 - 149/68)  BP(mean): 103 (02 Feb 2018 15:00) (58 - 103)  RR: 19 (02 Feb 2018 15:00) (16 - 31)  SpO2: 98% (02 Feb 2018 15:00) (97% - 100%)    02-01 @ 07:01 - 02-02 @ 07:00  --------------------------------------------------------  IN: 3156 mL / OUT: 3970 mL / NET: -814 mL    02-02 @ 07:01  -  02-02 @ 16:17  --------------------------------------------------------  IN: 764.5 mL / OUT: 615 mL / NET: 149.5 mL      Mode: AC/ CMV (Assist Control/ Continuous Mandatory Ventilation)  RR (machine): 16  TV (machine): 500  FiO2: 40  PEEP: 5  ITime: 0.8  MAP: 12  PIP: 29    PHYSICAL EXAM:  Comfortable, no distress  Eyes: PERRL, EOM intact; conjunctiva and sclera clear  Head: Normocephalic;  No Trauma  ENMT: No nasal discharge, hoarseness, cough or hemoptysis.  Airway clear  Neck: Supple; non tender; no masses or deformities.    No JVD  Respiratory:  - WHEEZING   few R  RHONCHI  - RALES  = CRACKLES.  Diminished breath sounds  BILATERAL  RIGHT  LEFT bases   Cardiovascular: Regular rate and rhythm. S1 and S2 Normal; No murmurs, gallops or rubs     - PPM/AICD  Gastrointestinal: Soft ,distended; Normal bowel sounds; No hepatosplenomegaly.     +PEG    + multiple surgical drains integrity checked   + ANGELES  Genitourinary: No costovertebral angle tenderness. No dysuria  Extremities: Decreased AROM, + clubbing, cyanosis + edema, R foot toes unchanged pattern of necrosis, L foot less edema   Vascular: Peripheral pulses palpable 2+ bilaterally  Neurological: intubated and opening eyes to commands  Skin: Warm and dry. No obvious rash  Lymph Nodes: No acute cervical or supraclavicular adenopathy  Psychiatric: not sedated, calm affect    DEVICES:  - DENTURES   +IV R / L     - ETUBE   +TRACH site is clean,      LABS:                          7.4    8.6   )-----------( 70       ( 02 Feb 2018 06:22 )             22.5     02-02    140  |  99  |  29<H>  ----------------------------<  228<H>  3.1<L>   |  33<H>  |  0.92    Ca    8.2<L>      02 Feb 2018 06:22  Phos  2.4     02-02  Mg     2.5     02-02    TPro  5.2<L>  /  Alb  2.9<L>  /  TBili  8.0<H>  /  DBili  x   /  AST  64<H>  /  ALT  67<H>  /  AlkPhos  118  02-02    PT/INR - ( 01 Feb 2018 07:29 )   PT: 13.7 sec;   INR: 1.23          PTT - ( 02 Feb 2018 06:22 )  PTT:52.9 sec  < from: Xray Chest 1 View- PORTABLE-Routine (02.02.18 @ 07:34) >    EXAM:  XR CHEST PORTABLE ROUTINE 1V                          PROCEDURE DATE:  02/02/2018                     INTERPRETATION:  XR CHEST PORTABLE ROUTINE 1V dated 2/2/2018 7:34 AM    CLINICAL INFORMATION: s/p trach    COMPARISON: February 1, 2018    FINDINGS: Tracheostomy catheter in place. Bilateral pulmonary infiltrates   are not significantly changed. Small bilateral pleural effusions are   again seen. There is no pneumothorax.    IMPRESSION: No significant change.    < end of copied text >  RADIOLOGY & ADDITIONAL STUDIES (The following images were personally reviewed):

## 2018-02-02 NOTE — ADVANCED PRACTICE NURSE CONSULT - ASSESSMENT
72yM with PMH severe MR/AR presented after recent dx acute cholecystitis and RLL PNA at [facility] with lower abdominal pain, new onset rapid Afib, sepsis, acute CHF exacerbation, initially admitted to cardiology on bipap, then transferred to SICU with perforated duodenum s/p Shreyas patch and jejunostomy (1/20) c/b breakdown s/p RTOR, washout, new Shreyas patch, gastrostomy, duodenostomy, IVCF (1/22). Pt recently on pressors, poor circulation to bilat feet. Right foot with blisters over toes, some have de-roofed. Left big toe with blister that is still intact. Pt nods "no" when asked if he can feel sensation when dressings applied to right foot. Large blister also noted to right heel, approx 5x5 cm, beginning to open. Scabs x 3 to forehead, per RN pt had pulse ox probe (?) that had velcro which caused the wounds. Multiple skin tears at right antecubital from tape. 72yM with PMH severe MR/AR presented after recent dx acute cholecystitis and RLL PNA at [facility] with lower abdominal pain, new onset rapid Afib, sepsis, acute CHF exacerbation, initially admitted to cardiology on bipap, then transferred to SICU with perforated duodenum s/p Shreyas patch and jejunostomy (1/20) c/b breakdown s/p RTOR, washout, new Shreyas patch, gastrostomy, duodenostomy, IVCF (1/22). Pt recently on pressors, poor circulation to bilat feet. Right foot with blisters over toes, some have de-roofed. Left big toe with blister that is still intact. Pt nods "no" when asked if he can feel sensation when dressings applied to right foot but feet warm.  Large blister also noted to right heel, approx 5x5 cm, beginning to open. Scabs x 3 to forehead, per RN pt had pulse ox probe (?) that had velcro which caused the wounds. Multiple skin tears at right antecubital from tape. 72yM with PMH severe MR/AR presented after recent dx acute cholecystitis and RLL PNA at [facility] with lower abdominal pain, new onset rapid Afib, sepsis, acute CHF exacerbation, initially admitted to cardiology on bipap, then transferred to SICU with perforated duodenum s/p Shreyas patch and jejunostomy (1/20) c/b breakdown s/p RTOR, washout, new Shreyas patch, gastrostomy, duodenostomy, IVCF (1/22). Pt recently on pressors, poor circulation to bilat feet. Right foot with blisters over toes, some have de-roofed. Left big toe with blister that is still intact. Pt nods "no" when asked if he can feel sensation when dressings applied to right foot but feet warm.  Large blister also noted to right heel, approx 5x5 cm, beginning to open. Scabs x 3 to forehead, unknown cause, multiple skin tears at right antecubital from tape.

## 2018-02-02 NOTE — PROGRESS NOTE ADULT - PROBLEM SELECTOR PLAN 1
Will continue to watch progress and potentially evaluate for surgical intervention on his mitral valve pending clinical improvement.      Medical management and vent weaning per primary team.

## 2018-02-02 NOTE — PROGRESS NOTE ADULT - ASSESSMENT
ASSESSMENT/PLAN 73y/o male pt c Respiratory failure, perforated duodenal ulcer, POD post repair, RLL pneumonia, RLL atelectases, no obvious RADHA, elevated R hemidiaphragm, AFIB,  thrombocytopenia, possible endocarditis, s/p reop. now POD I tracheostomy,     1. O2: continue CPAP use CMV if evidence of fatigue  2. Bronchodilators:  Atrovent/ albuterol q 4 – 6 hours as needed  3. Corticosteroids: off  4. ID/Antibiotics: Ceftazidime, Micafungin strict ID supervision   5. Cardiac/HTN: Monitor, optimize HR; on heparin drip for AFIB,  6. GI: Rx/ prophylaxis c PPI/Protonix  7. Heme: Rx/VT prophylaxis IV heparin; platelets to be followed closely  8. Aspiration precautions, feeding PEG  9. Surgery mngmnt priority  10. Edema: diuretic titration      Discussed with managing team, CCRN, SICU, surgery

## 2018-02-02 NOTE — PROGRESS NOTE ADULT - SUBJECTIVE AND OBJECTIVE BOX
SUBJECTIVE ASSESSMENT:  Patient has trach in place, but able to mouth words.  He denies any pain at this time.  When I asked "Are you doing okay?" He nodded his head yes.  He denies abdominal pain, N/V.  Has not mobilized in the past week due to being critically ill.         Vital Signs Last 24 Hrs  T(C): 37 (2018 09:17), Max: 38.3 (2018 18:18)  T(F): 98.6 (2018 09:17), Max: 101 (2018 18:18)  HR: 60 (2018 09:00) (54 - 78)  BP: 110/51 (2018 09:00) (93/47 - 126/95)  BP(mean): 78 (2018 09:00) (58 - 116)  RR: 18 (2018 09:00) (15 - 25)  SpO2: 100% (2018 09:00) (92% - 100%)  I&O's Detail    2018 07:01  -  2018 07:00  --------------------------------------------------------  IN:    Albumin 25%: 150 mL    Enteral Tube Flush: 350 mL    heparin Infusion: 136 mL    IV PiggyBack: 800 mL    Jevity: 1108 mL    Platelets - Single Donor: 297 mL    Solution: 315 mL  Total IN: 3156 mL    OUT:    Bulb: 140 mL    Bulb: 190 mL    Drain: 200 mL    Indwelling Catheter - Urethral: 3440 mL  Total OUT: 3970 mL    Total NET: -814 mL      2018 07:01  -  2018 10:13  --------------------------------------------------------  IN:    heparin Infusion: 17 mL    Jevity: 112 mL  Total IN: 129 mL    OUT:    Indwelling Catheter - Urethral: 95 mL  Total OUT: 95 mL    Total NET: 34 mL      ANGELES: Yes    PHYSICAL EXAM:    General: Trach in place.  On CPAP.  Looks jaundiced.  But appears alert and awake.  Nodding his head appropriately to questions.      Neurological: No focal deficits, but overall very weak, deconditioned.     Cardiovascular: S1S2 +loud systolic murmur heard best over apex (V/VI), also heard over LSB (IV/VI)    Respiratory: Clear anteriorly.  Could not turn him at this time to listen posteriorly.     Gastrointestinal: Soft, midline incision clean    Extremities: B/L hands are cool and very puffy.  Lower extremities have general dependent edema, and multiple blisters on his right toes/feet.      Vascular: Pedal pulses palpable.       LABS:                        7.4    8.6   )-----------( 70       ( 2018 06:22 )             22.5       PT/INR - ( 2018 07:29 )   PT: 13.7 sec;   INR: 1.23          PTT - ( 2018 06:22 )  PTT:52.9 sec        140  |  99  |  29<H>  ----------------------------<  228<H>  3.1<L>   |  33<H>  |  0.92    Ca    8.2<L>      2018 06:22  Phos  2.4     -  Mg     2.5         TPro  5.2<L>  /  Alb  2.9<L>  /  TBili  8.0<H>  /  DBili  x   /  AST  64<H>  /  ALT  67<H>  /  AlkPhos  118  02-      Urinalysis Basic - ( 2018 19:04 )    Color: Yellow / Appearance: SL Cloudy / S.020 / pH: x  Gluc: x / Ketone: NEGATIVE  / Bili: Small / Urobili: 0.2 E.U./dL   Blood: x / Protein: Trace mg/dL / Nitrite: NEGATIVE   Leuk Esterase: NEGATIVE / RBC: < 5 /HPF / WBC < 5 /HPF   Sq Epi: x / Non Sq Epi: 0-5 /HPF / Bacteria: Present /HPF        MEDICATIONS  (STANDING):  ALBUTerol/ipratropium for Nebulization 3 milliLiter(s) Nebulizer every 6 hours  cefTAZidime  IVPB 2 Gram(s) IV Intermittent every 8 hours  chlorhexidine 0.12% Liquid 15 milliLiter(s) Swish and Spit two times a day  digoxin  Injectable 0.125 milliGRAM(s) IV Push daily  fluconAZOLE IVPB 400 milliGRAM(s) IV Intermittent every 24 hours  heparin  Infusion 850 Unit(s)/Hr (8.5 mL/Hr) IV Continuous <Continuous>  insulin lispro (HumaLOG) corrective regimen sliding scale   SubCutaneous every 6 hours  metolazone 10 milliGRAM(s) Oral two times a day  pantoprazole  Injectable 40 milliGRAM(s) IV Push every 12 hours  potassium chloride    Tablet ER 40 milliEquivalent(s) Oral every 4 hours  potassium chloride  10 mEq/50 mL IVPB 10 milliEquivalent(s) IV Intermittent every 1 hour  sodium chloride 0.9% lock flush 20 milliLiter(s) IV Push once    MEDICATIONS  (PRN):  acetaminophen    Suspension 650 milliGRAM(s) Enteral Tube every 6 hours PRN For Temp greater than 38 C (100.4 F)  sodium chloride 0.9% lock flush 10 milliLiter(s) IV Push every 1 hour PRN After each medication administration        RADIOLOGY & ADDITIONAL TESTS:    < from: Xray Chest 1 View- PORTABLE-Urgent (18 @ 09:51) >  An AP portable view of the chest is compared to 2018. No significant   interval change in the pulmonary pathology. The endotracheal tube has   been removed and replaced by a tracheostomy tube. No pneumothorax.    IMPRESSION:  No significant interval change in the pulmonary pathology.    < end of copied text >

## 2018-02-02 NOTE — PROGRESS NOTE ADULT - SUBJECTIVE AND OBJECTIVE BOX
24 hr events:  O/N: spiked to 101    SUBJECTIVE:  Slept well overnight. No fevers, no CP, no dyspnea, no lightheadedness, no abd pain, no nausea.   trach in place  MEDICATIONS  (STANDING):  ALBUTerol/ipratropium for Nebulization 3 milliLiter(s) Nebulizer every 6 hours  BACItracin   Ointment 1 Application(s) Topical two times a day  cefTAZidime  IVPB 2 Gram(s) IV Intermittent every 8 hours  chlorhexidine 0.12% Liquid 15 milliLiter(s) Swish and Spit two times a day  digoxin  Injectable 0.125 milliGRAM(s) IV Push daily  fluconAZOLE IVPB 400 milliGRAM(s) IV Intermittent every 24 hours  heparin  Infusion 850 Unit(s)/Hr (8.5 mL/Hr) IV Continuous <Continuous>  insulin lispro (HumaLOG) corrective regimen sliding scale   SubCutaneous every 6 hours  metolazone 10 milliGRAM(s) Oral two times a day  pantoprazole  Injectable 40 milliGRAM(s) IV Push every 12 hours  sodium chloride 0.9% lock flush 20 milliLiter(s) IV Push once    MEDICATIONS  (PRN):  acetaminophen    Suspension 650 milliGRAM(s) Enteral Tube every 6 hours PRN For Temp greater than 38 C (100.4 F)  sodium chloride 0.9% lock flush 10 milliLiter(s) IV Push every 1 hour PRN After each medication administration      ICU Vital Signs Last 24 Hrs  T(C): 37.1 (2018 14:35), Max: 37.8 (2018 22:01)  T(F): 98.8 (2018 14:35), Max: 100 (2018 22:01)  HR: 78 (2018 18:00) (54 - 90)  BP: 104/72 (2018 18:00) (93/47 - 149/68)  BP(mean): 83 (2018 18:00) (58 - 106)  ABP: --  ABP(mean): --  RR: 21 (2018 18:00) (16 - 31)  SpO2: 100% (2018 18:00) (94% - 100%)      Physical Exam:  General: NAD, tracks me in the room, able to mouth words  HEENT: NC/AT, EOMI, normal hearing  Pulmonary: in AM, intubated on AC, normal resp effort. Normal resp effort on CPAP for 2 hours in AM  Cardiovascular: NSR, pansystolic III/VI murmur  Abdominal: soft, NT/ND. Retention sutures in place, no surrounding erythema. G-tube, retrograde D-tube (bilious), feeding J tube in place. RLQ GUCCI drains with gold serous output, no tube feeds in drains.  Extremities: feet cool, but palpable pedal pulses. R 1st and 4th toes purple with blisters, no erythema, no drainage    Vent settings:  Mode: AC/ CMV (Assist Control/ Continuous Mandatory Ventilation), RR (machine): 16, TV (machine): 500, FiO2: 40, PEEP: 5, ITime: 8, MAP: 10, PIP: 30    I&O's Summary    2018 07:01  -  2018 07:00  --------------------------------------------------------  IN: 3156 mL / OUT: 3970 mL / NET: -814 mL    2018 07:01  -  2018 19:29  --------------------------------------------------------  IN: 977 mL / OUT: 615 mL / NET: 362 mL        LABS:                        7.4    8.6   )-----------( 70       ( 2018 06:22 )             22.5     02-02    140  |  99  |  29<H>  ----------------------------<  228<H>  3.1<L>   |  33<H>  |  0.92    Ca    8.2<L>      2018 06:22  Phos  2.4     02-02  Mg     2.5     02-02    TPro  5.2<L>  /  Alb  2.9<L>  /  TBili  8.0<H>  /  DBili  x   /  AST  64<H>  /  ALT  67<H>  /  AlkPhos  118  02-    PT/INR - ( 2018 07:29 )   PT: 13.7 sec;   INR: 1.23          PTT - ( 2018 06:22 )  PTT:52.9 sec  Urinalysis Basic - ( 2018 19:04 )    Color: Yellow / Appearance: SL Cloudy / S.020 / pH: x  Gluc: x / Ketone: NEGATIVE  / Bili: Small / Urobili: 0.2 E.U./dL   Blood: x / Protein: Trace mg/dL / Nitrite: NEGATIVE   Leuk Esterase: NEGATIVE / RBC: < 5 /HPF / WBC < 5 /HPF   Sq Epi: x / Non Sq Epi: 0-5 /HPF / Bacteria: Present /HPF      CAPILLARY BLOOD GLUCOSE      POCT Blood Glucose.: 189 mg/dL (2018 17:00)  POCT Blood Glucose.: 162 mg/dL (2018 12:39)  POCT Blood Glucose.: 198 mg/dL (2018 05:16)  POCT Blood Glucose.: 193 mg/dL (2018 22:59)    LIVER FUNCTIONS - ( 2018 06:22 )  Alb: 2.9 g/dL / Pro: 5.2 g/dL / ALK PHOS: 118 U/L / ALT: 67 U/L / AST: 64 U/L / GGT: x             Cultures:Culture Results:   No growth to date ( @ 19:18)

## 2018-02-02 NOTE — PROGRESS NOTE ADULT - ASSESSMENT
71 y/o male with PMHx severe MR/AI who presented to the hospital with abdominal pain initially.  He was worked up and found to have acute cholecystitis and a RLL PNA.  He was then found to have new onset afib with RVR, sepsis and acute CHF exacerbation.  He was admitted to cardiology and placed on Bipap but then transferred to SICU due to a declining clinical picture and was found to have a perforated duodenum, s/p surgery on 1/20/18 with Dr. Metcalf requiring return to the OR/washout due to 'patch breakdown', jejunostomy feeding tube and an IVC filter on 1/22/18.  He now has tracheostomy (2/1/18) and feeding tube.  Seems to be improving clinically.

## 2018-02-02 NOTE — CHART NOTE - NSCHARTNOTEFT_GEN_A_CORE
Admitting Diagnosis:   72yM with PMH severe MR/AR presented after recent dx acute cholecystitis and RLL PNA at [facility] with lower abdominal pain, new onset rapid Afib, sepsis, acute CHF exacerbation, initially admitted to cardiology on bipap, then transferred to SICU with perforated duodenum s/p Shreyas patch and jejunostomy (1/20) c/b breakdown s/p RTOR, washout, new Shreyas patch, gastrostomy, duodenostomy, IVCF (1/22). S/P trach 2/1    PAST MEDICAL & SURGICAL HISTORY:  Mitral regurgitation  Aortic regurgitation  No significant past surgical history    Current Nutrition Order: Glucerna 1.2 at 68 mL x 24 hr via j-tube to provide 1632 mL TV, 1958 kcal, 98 g protein, & 1314 mL fluid. Running at 56 mL at time of visit.     GI Issues: WDL    Pain: HONEY 2/2 vent    Skin Integrity: Surgical incision     Labs:   02-02    140  |  99  |  29<H>  ----------------------------<  228<H>  3.1<L>   |  33<H>  |  0.92    Ca    8.2<L>      02 Feb 2018 06:22  Phos  2.4     02-02  Mg     2.5     02-02    TPro  5.2<L>  /  Alb  2.9<L>  /  TBili  8.0<H>  /  DBili  x   /  AST  64<H>  /  ALT  67<H>  /  AlkPhos  118  02-02    POCT Blood Glucose.: 198 mg/dL (02 Feb 2018 05:16)  POCT Blood Glucose.: 193 mg/dL (01 Feb 2018 22:59)  POCT Blood Glucose.: 148 mg/dL (01 Feb 2018 17:12)  POCT Blood Glucose.: 136 mg/dL (01 Feb 2018 11:25)    Medications:  MEDICATIONS  (STANDING):  ALBUTerol/ipratropium for Nebulization 3 milliLiter(s) Nebulizer every 6 hours  cefTAZidime  IVPB 2 Gram(s) IV Intermittent every 8 hours  chlorhexidine 0.12% Liquid 15 milliLiter(s) Swish and Spit two times a day  digoxin  Injectable 0.125 milliGRAM(s) IV Push daily  fluconAZOLE IVPB 400 milliGRAM(s) IV Intermittent every 24 hours  heparin  Infusion 850 Unit(s)/Hr (8.5 mL/Hr) IV Continuous <Continuous>  insulin lispro (HumaLOG) corrective regimen sliding scale   SubCutaneous every 6 hours  metolazone 10 milliGRAM(s) Oral two times a day  pantoprazole  Injectable 40 milliGRAM(s) IV Push every 12 hours  potassium chloride   Powder 40 milliEquivalent(s) Oral every 4 hours  potassium chloride  10 mEq/50 mL IVPB 10 milliEquivalent(s) IV Intermittent every 1 hour  sodium chloride 0.9% lock flush 20 milliLiter(s) IV Push once    MEDICATIONS  (PRN):  acetaminophen    Suspension 650 milliGRAM(s) Enteral Tube every 6 hours PRN For Temp greater than 38 C (100.4 F)  sodium chloride 0.9% lock flush 10 milliLiter(s) IV Push every 1 hour PRN After each medication administration    Weight:  No new weights to assess    Estimated energy needs using 75 kg IBW:  25-30 kcal/kg (0461-6074 kcal).   1.4-1.6 g/kg (105-120 g protein).   30-35 mL/kg (9178-6963 mL fluid).      Subjective: Remains intubated now via trach. EN running at 56 mL/hr at time of visit. Noted pt changed from Jevity to Glucerna 2/2 high fingersticks.     Previous Nutrition Diagnosis: Inadequate EN infusion RT current order AEB pt meeting 31% of kcal needs & 26% of protein needs.     Active [X   ]  Resolved [   ] - EN not yet at goal rate.     Goal: Meet % of nutrition needs via tolerated route.     Recommendations:  1. Consider Glucerna 1.2 at 75 mL x 24 hr via J-tube + prostat qday to provide 1800 mL TV, 2260 kcal, 123 g protein, & 1449 mL fluid. Monitor for signs of intolerance  2. Trend weights daily.   3. Lytes to be monitored & repleted by primary team.     Education: N/A -vent    Risk Level: High [ X  ] Moderate [   ] Low [   ]

## 2018-02-02 NOTE — PROGRESS NOTE ADULT - ASSESSMENT
SUBJECTIVE:                                                    Solange Villafana is a 20 month old female who presents to clinic today for the following health issues:      Acute Illness   Acute illness concerns?- Fever and possible ear infection   Onset: x 1 day     Fever: YES    Fussiness: YES    Decreased energy level: no    Conjunctivitis:  YES: both    Ear Pain: YES: both    Rhinorrhea: YES    Congestion: YES    Sore Throat: no     Cough: YES    Wheeze: no    Breathing fast: no    Decreased Appetite: no    Nausea: no    Vomiting: no    Diarrhea:  no    Decreased wet diapers/output:no    Sick/Strep Exposure: no     Therapies Tried and outcome: Tylenol and ibuprofen      Had tubes placed and rechecked with Dr. Haney last month. Tubes had fallen out and he wanted to recheck in 2 months (which would be next month) to see how she was doing.  They may or may not put in tubes again.   She has had hearing changes from ear infections in the past.   Allergies to omnicef.      Pulling on ears last night and fever of 104.  Fever broke this am. Drinking fluids well and eating also. Mom is with today.       Problem list and histories reviewed & adjusted, as indicated.  Additional history: as documented    Patient Active Problem List   Diagnosis     Single liveborn, born in hospital, delivered     Dysfunction of eustachian tube, bilateral     Past Surgical History:   Procedure Laterality Date     MYRINGOTOMY Bilateral 8/4/2016    Procedure: MYRINGOTOMY;  Surgeon: Christos Haney MD;  Location:  OR     NO HISTORY OF SURGERY         Social History   Substance Use Topics     Smoking status: Never Smoker     Smokeless tobacco: Not on file     Alcohol use Not on file     Family History   Problem Relation Age of Onset     Family history unknown: Yes     OSTEOPOROSIS Maternal Grandmother      osteopenia     Depression Paternal Grandmother      Anxiety Disorder Paternal Grandmother          Current Outpatient Prescriptions    Medication Sig Dispense Refill     amoxicillin (AMOXIL) 400 MG/5ML suspension Take 6 mLs (480 mg) by mouth 2 times daily For 10 days 120 mL 0     Allergies   Allergen Reactions     Omnicef [Cefdinir]      Rash         ROS:  Constitutional, HEENT, cardiovascular, pulmonary, gi and gu systems are negative, except as otherwise noted.    OBJECTIVE:                                                    Pulse 145  Temp 98.5  F (36.9  C) (Tympanic)  Wt 25 lb 2.5 oz (11.4 kg)  SpO2 99%  There is no height or weight on file to calculate BMI.  GENERAL:  No acute distress.  Interacts appropriately.  Breathing without difficulty.  Alert.  HEENT:  Blue tubes present in canals/cerumen.  Both Tms are very injected and red. No bulging. Both intact.  Oral mucosa moist. Posterior pharynx has no erythema.  Posterior pharynx has no exudate. Without edema.  NECK:  Soft and supple.  without tenderness.  Without lymphadenopathy.  Normal range of motion.    CARDIAC:   Regular rate and rhythm.  No murmurs, rubs, or gallops.   PULMONARY: Clear to auscultation bilaterally.  No  wheezes, crackles, or rhonchi.  Normal air exchange/breath sounds.  No use of accessory muscles.    PSYCH: Normal affect.  SKIN: No rashes.         ASSESSMENT/PLAN:                                                    ASSESSMENT / PLAN:  (H65.93) Bilateral non-suppurative otitis media  (primary encounter diagnosis)  Comment:   Plan:     (H66.002) Acute suppurative otitis media of left ear without spontaneous rupture of tympanic membrane, recurrence not specified  Comment:   Plan: amoxicillin (AMOXIL) 400 MG/5ML suspension          May need tube again, will discuss with ENT    Take with food. Side effects discussed.  Call with worsening symptoms or if no improvement in 5 days.  Analgesics for pain with food as needed.      Patient Instructions   Schedule with Dr. Haney sooner as she has bilateral ear infection today          KATHERINE Ragland  [facility]       72yM with PMH severe MR/AR presented after recent dx acute cholecystitis and RLL PNA at [facility] with lower abdominal pain, new onset rapid Afib, sepsis, acute CHF exacerbation, initially admitted to cardiology on bipap, then transferred to SICU with perforated duodenum s/p Shreyas patch and jejunostomy (1/20) c/b breakdown s/p RTOR, washout, new Shreyas patch, gastrostomy, duodenostomy, IVCF (1/22)    Neuro: Tylenol PRN  CV: Afib: Echo EF 50%, ?vegetation and Severe MR. metolazone 10 BID, digoxin,   Pulm: Trach 8.0 shiley on cmv 40/500/12/5 chlorhex, duonebs  GI: NPO, Jtube TF Glucerna 1.2 68cc, Protonix BID. FW 200q4h, Albumin 25% q8h. NGT to LIWS, retrograde D-tube to LIWS, G-tube to LIWS,   : d/c Oliveira, f/u ToV  ID: Perforated duodenum/PNA, surgical swab grew Candida albicans: fluconazole (2/1--), ceftaz (1/29, end 2/4) // D/c: micafungin (1/22-2/1), Vanco (1/19-20), Zosyn (1/19-22; 1/27-29), linezolid (1/22-23), meropenem (1/22-27)    Endo: ISS  HEME:DVT: Heparin gtt goal ptt 50-60 [thrombocytopenic, HIT+, serotonin release assay negative.] IVCF (1/22) // 1FFP, 1PLT (1/22).   PPx: SCD, R pop DVT, s/p IVC filter 1/22  Lines: PIV, PICC (1/29--), L brachial [place] (1/20-1/27),  LSC HD cath (1/22-1/27)  Wounds: Ex lap, JPx2 to bulb suction, J-tube to gravity, D-tube to LIWS, G-tube to LIWS  PT/OT: Early mobilization.

## 2018-02-03 LAB
ANION GAP SERPL CALC-SCNC: 10 MMOL/L — SIGNIFICANT CHANGE UP (ref 5–17)
APTT BLD: 45.4 SEC — HIGH (ref 27.5–37.4)
APTT BLD: 52.3 SEC — HIGH (ref 27.5–37.4)
APTT BLD: 56.9 SEC — HIGH (ref 27.5–37.4)
APTT BLD: 56.9 SEC — HIGH (ref 27.5–37.4)
BUN SERPL-MCNC: 27 MG/DL — HIGH (ref 7–23)
CALCIUM SERPL-MCNC: 8.1 MG/DL — LOW (ref 8.4–10.5)
CHLORIDE SERPL-SCNC: 100 MMOL/L — SIGNIFICANT CHANGE UP (ref 96–108)
CO2 SERPL-SCNC: 32 MMOL/L — HIGH (ref 22–31)
CREAT SERPL-MCNC: 0.8 MG/DL — SIGNIFICANT CHANGE UP (ref 0.5–1.3)
GLUCOSE BLDC GLUCOMTR-MCNC: 112 MG/DL — HIGH (ref 70–99)
GLUCOSE BLDC GLUCOMTR-MCNC: 135 MG/DL — HIGH (ref 70–99)
GLUCOSE BLDC GLUCOMTR-MCNC: 188 MG/DL — HIGH (ref 70–99)
GLUCOSE SERPL-MCNC: 205 MG/DL — HIGH (ref 70–99)
HCT VFR BLD CALC: 23.8 % — LOW (ref 39–50)
HGB BLD-MCNC: 7.8 G/DL — LOW (ref 13–17)
MAGNESIUM SERPL-MCNC: 2.4 MG/DL — SIGNIFICANT CHANGE UP (ref 1.6–2.6)
MCHC RBC-ENTMCNC: 31.2 PG — SIGNIFICANT CHANGE UP (ref 27–34)
MCHC RBC-ENTMCNC: 32.8 G/DL — SIGNIFICANT CHANGE UP (ref 32–36)
MCV RBC AUTO: 95.2 FL — SIGNIFICANT CHANGE UP (ref 80–100)
PHOSPHATE SERPL-MCNC: 2.1 MG/DL — LOW (ref 2.5–4.5)
PLATELET # BLD AUTO: 83 K/UL — LOW (ref 150–400)
POTASSIUM SERPL-MCNC: 4.3 MMOL/L — SIGNIFICANT CHANGE UP (ref 3.5–5.3)
POTASSIUM SERPL-SCNC: 4.3 MMOL/L — SIGNIFICANT CHANGE UP (ref 3.5–5.3)
RBC # BLD: 2.5 M/UL — LOW (ref 4.2–5.8)
RBC # FLD: 21.8 % — HIGH (ref 10.3–16.9)
SODIUM SERPL-SCNC: 142 MMOL/L — SIGNIFICANT CHANGE UP (ref 135–145)
WBC # BLD: 9.1 K/UL — SIGNIFICANT CHANGE UP (ref 3.8–10.5)
WBC # FLD AUTO: 9.1 K/UL — SIGNIFICANT CHANGE UP (ref 3.8–10.5)

## 2018-02-03 PROCEDURE — 71045 X-RAY EXAM CHEST 1 VIEW: CPT | Mod: 26

## 2018-02-03 PROCEDURE — 99232 SBSQ HOSP IP/OBS MODERATE 35: CPT

## 2018-02-03 PROCEDURE — 74177 CT ABD & PELVIS W/CONTRAST: CPT | Mod: 26

## 2018-02-03 PROCEDURE — 99233 SBSQ HOSP IP/OBS HIGH 50: CPT | Mod: GC

## 2018-02-03 PROCEDURE — 71260 CT THORAX DX C+: CPT | Mod: 26

## 2018-02-03 RX ORDER — DIATRIZOATE MEGLUMINE 180 MG/ML
30 INJECTION, SOLUTION INTRAVESICAL ONCE
Qty: 0 | Refills: 0 | Status: COMPLETED | OUTPATIENT
Start: 2018-02-03 | End: 2018-02-03

## 2018-02-03 RX ORDER — HEPARIN SODIUM 5000 [USP'U]/ML
1100 INJECTION INTRAVENOUS; SUBCUTANEOUS
Qty: 25000 | Refills: 0 | Status: DISCONTINUED | OUTPATIENT
Start: 2018-02-03 | End: 2018-02-03

## 2018-02-03 RX ORDER — HEPARIN SODIUM 5000 [USP'U]/ML
1000 INJECTION INTRAVENOUS; SUBCUTANEOUS
Qty: 25000 | Refills: 0 | Status: DISCONTINUED | OUTPATIENT
Start: 2018-02-03 | End: 2018-02-05

## 2018-02-03 RX ORDER — POTASSIUM PHOSPHATE, MONOBASIC POTASSIUM PHOSPHATE, DIBASIC 236; 224 MG/ML; MG/ML
15 INJECTION, SOLUTION INTRAVENOUS ONCE
Qty: 0 | Refills: 0 | Status: COMPLETED | OUTPATIENT
Start: 2018-02-03 | End: 2018-02-03

## 2018-02-03 RX ADMIN — Medication 3 MILLILITER(S): at 06:08

## 2018-02-03 RX ADMIN — Medication 3 MILLILITER(S): at 18:36

## 2018-02-03 RX ADMIN — PANTOPRAZOLE SODIUM 40 MILLIGRAM(S): 20 TABLET, DELAYED RELEASE ORAL at 06:00

## 2018-02-03 RX ADMIN — Medication 1 APPLICATION(S): at 06:43

## 2018-02-03 RX ADMIN — Medication 650 MILLIGRAM(S): at 05:56

## 2018-02-03 RX ADMIN — Medication 2: at 06:22

## 2018-02-03 RX ADMIN — CEFTAZIDIME 100 GRAM(S): 6 INJECTION, POWDER, FOR SOLUTION INTRAVENOUS at 06:00

## 2018-02-03 RX ADMIN — CHLORHEXIDINE GLUCONATE 15 MILLILITER(S): 213 SOLUTION TOPICAL at 17:56

## 2018-02-03 RX ADMIN — Medication 3 MILLILITER(S): at 11:21

## 2018-02-03 RX ADMIN — Medication 650 MILLIGRAM(S): at 23:04

## 2018-02-03 RX ADMIN — CEFTAZIDIME 100 GRAM(S): 6 INJECTION, POWDER, FOR SOLUTION INTRAVENOUS at 22:51

## 2018-02-03 RX ADMIN — DIATRIZOATE MEGLUMINE 30 MILLILITER(S): 180 INJECTION, SOLUTION INTRAVESICAL at 10:46

## 2018-02-03 RX ADMIN — FLUCONAZOLE 100 MILLIGRAM(S): 150 TABLET ORAL at 14:25

## 2018-02-03 RX ADMIN — Medication 1 APPLICATION(S): at 17:56

## 2018-02-03 RX ADMIN — PANTOPRAZOLE SODIUM 40 MILLIGRAM(S): 20 TABLET, DELAYED RELEASE ORAL at 17:56

## 2018-02-03 RX ADMIN — CHLORHEXIDINE GLUCONATE 15 MILLILITER(S): 213 SOLUTION TOPICAL at 05:54

## 2018-02-03 RX ADMIN — POTASSIUM PHOSPHATE, MONOBASIC POTASSIUM PHOSPHATE, DIBASIC 62.5 MILLIMOLE(S): 236; 224 INJECTION, SOLUTION INTRAVENOUS at 12:31

## 2018-02-03 RX ADMIN — Medication 0.12 MILLIGRAM(S): at 12:31

## 2018-02-03 RX ADMIN — CEFTAZIDIME 100 GRAM(S): 6 INJECTION, POWDER, FOR SOLUTION INTRAVENOUS at 14:25

## 2018-02-03 NOTE — PROGRESS NOTE ADULT - SUBJECTIVE AND OBJECTIVE BOX
Chief Complaint/Reason for Consult: periop cv mgmt  INTERVAL HPI: eventsn oted for temp, tele rate controlled afib no pauses no rvr  	  MEDICATIONS:  digoxin  Injectable 0.125 milliGRAM(s) IV Push daily  metolazone 10 milliGRAM(s) Oral two times a day    cefTAZidime  IVPB 2 Gram(s) IV Intermittent every 8 hours  fluconAZOLE IVPB 400 milliGRAM(s) IV Intermittent every 24 hours    ALBUTerol/ipratropium for Nebulization 3 milliLiter(s) Nebulizer every 6 hours    acetaminophen    Suspension 650 milliGRAM(s) Enteral Tube every 6 hours PRN    pantoprazole  Injectable 40 milliGRAM(s) IV Push every 12 hours    insulin lispro (HumaLOG) corrective regimen sliding scale   SubCutaneous every 6 hours    BACItracin   Ointment 1 Application(s) Topical two times a day  chlorhexidine 0.12% Liquid 15 milliLiter(s) Swish and Spit two times a day  heparin  Infusion 1100 Unit(s)/Hr IV Continuous <Continuous>  potassium phosphate IVPB 15 milliMole(s) IV Intermittent once      REVIEW OF SYSTEMS:  [x] As per HPI  CONSTITUTIONAL: No fever, weight loss, or fatigue  RESPIRATORY: No cough, wheezing, chills or hemoptysis; No Shortness of Breath  CARDIOVASCULAR: No chest pain, palpitations, dizziness, or leg swelling  GASTROINTESTINAL: No abdominal or epigastric pain. No nausea, vomiting, or hematemesis; No diarrhea or constipation. No melena or hematochezia.  MUSCULOSKELETAL: No joint pain or swelling; No muscle, back, or extremity pain  [x] All others negative	  [ ] Unable to obtain    PHYSICAL EXAM:  T(C): 37.9 (02-03-18 @ 10:00), Max: 38.7 (02-03-18 @ 04:00)  HR: 82 (02-03-18 @ 11:00) (66 - 97)  BP: 107/71 (02-03-18 @ 11:00) (97/72 - 149/68)  RR: 29 (02-03-18 @ 11:00) (16 - 33)  SpO2: 100% (02-03-18 @ 11:00) (94% - 100%)  Wt(kg): --  I&O's Summary    02 Feb 2018 07:01  -  03 Feb 2018 07:00  --------------------------------------------------------  IN: 2051 mL / OUT: 1670 mL / NET: 381 mL    03 Feb 2018 07:01  -  03 Feb 2018 12:02  --------------------------------------------------------  IN: 98 mL / OUT: 0 mL / NET: 98 mL          Appearance: Normal	  HEENT:   Normal oral mucosa  Cardiovascular: Normal S1 S2, No JVD, No murmurs, No edema  Respiratory: Lungs clear to auscultation	  Gastrointestinal:  Soft, Non-tender, + BS	  Extremities: Normal range of motion, No clubbing, cyanosis or edema  Vascular: Peripheral pulses palpable 2+ bilaterally    TELEMETRY: 	    ECG:   	  RADIOLOGY:   CXR:  CT:  US:    CARDIAC TESTING:  Echocardiogram:  Catheterization:  Stress Test:      LABS:	 	    CARDIAC MARKERS:                                  7.8    9.1   )-----------( 83       ( 03 Feb 2018 04:00 )             23.8     02-03    142  |  100  |  27<H>  ----------------------------<  205<H>  4.3   |  32<H>  |  0.80    Ca    8.1<L>      03 Feb 2018 04:00  Phos  2.1     02-03  Mg     2.4     02-03    TPro  5.2<L>  /  Alb  2.9<L>  /  TBili  8.0<H>  /  DBili  x   /  AST  64<H>  /  ALT  67<H>  /  AlkPhos  118  02-02    proBNP:   Lipid Profile:   HgA1c:   TSH:     ASSESSMENT/PLAN: 	    # Afib - Patient with continued Afib now with controlled VR (HR 60s) s/p Digoxin. Dose was held yesterday for slow VR, now resolving today. Digoxin level noted to be 1.2 therapeutic. Continue Digoxin. Hold for HR < 60bpm. Cr is stable currently 1.07.     #CHF - severe MR CTS recs appreciated, continue diureses per SICU   continue metalazone   and PRN Lasix IV  off pressors

## 2018-02-03 NOTE — PROGRESS NOTE ADULT - ASSESSMENT
ASSESSMENT/PLAN 73y/o male pt c Respiratory failure, perforated duodenal ulcer, POD post repair, RLL pneumonia, RLL atelectases, no obvious RADHA, elevated R hemidiaphragm, AFIB,  thrombocytopenia, possible endocarditis, s/p reop. now POP tracheostomy, new fever pattern    1. O2: Use CMV mode today, FiO2 40%  2. Bronchodilators:  Atrovent/ albuterol q 4 – 6 hours as needed  3. Corticosteroids: off  4. ID/Antibiotics: Ceftazidime, Micafungin strict ID supervision   5. Cardiac/HTN: Monitor, optimize HR; on heparin drip for AFIB,  6. GI: Rx/ prophylaxis c PPI/Protonix  7. Heme: Rx/VT prophylaxis IV heparin; platelets to be followed closely  8. Aspiration precautions, feeding PEG  9. Surgery mngmnt priority  10. Edema: diuretic to continue  11.CT C/A/P today      Discussed with managing team, CCRN, SICU, surgery

## 2018-02-03 NOTE — PROGRESS NOTE ADULT - SUBJECTIVE AND OBJECTIVE BOX
SUBJECTIVE: Pt seen and examined at bedside. o/n: Passed ToV (200, then 150 via condom cath). Temp 101.6, bctx sent. 4 am PTT subtherapeutic, increased to 10 cc/hr.    Vital Signs Last 24 Hrs  T(C): 38.3 (2018 08:00), Max: 38.7 (2018 04:00)  T(F): 101 (2018 08:00), Max: 101.6 (2018 04:00)  HR: 75 (2018 08:00) (56 - 97)  BP: 113/58 (2018 08:00) (97/72 - 149/68)  BP(mean): 69 (2018 08:00) (69 - 106)  RR: 32 (2018 08:00) (17 - 33)  SpO2: 100% (2018 08:00) (94% - 100%)    PHYSICAL EXAM  General: NAD, responsive to voice  HEENT: NC/AT, EOMI, normal hearing  Pulmonary: in AM, intubated on AC, normal resp effort. Normal resp effort on CPAP for 2 hours in AM  Cardiovascular: NSR, pansystolic III/VI murmur  Abdominal: soft, NT/ND. Retention sutures in place, no surrounding erythema. G-tube, retrograde D-tube (bilious), feeding J tube in place. RLQ GUCCI drains with gold serous output, no tube feeds in drains.  Extremities: feet cool, but palpable pedal pulses.     I&O's Detail    2018 07:01  -  2018 07:00  --------------------------------------------------------  IN:    Glucerna: 1156 mL    heparin Infusion: 207 mL    IV PiggyBack: 400 mL    Jevity: 288 mL  Total IN: 2051 mL    OUT:    Bulb: 180 mL    Bulb: 190 mL    Drain: 150 mL    Drain: 60 mL    Indwelling Catheter - Urethral: 265 mL    Voided: 825 mL  Total OUT: 1670 mL    Total NET: 381 mL      2018 07:01  -  2018 08:41  --------------------------------------------------------  IN:    Glucerna: 68 mL    heparin Infusion: 10 mL  Total IN: 78 mL    OUT:  Total OUT: 0 mL    Total NET: 78 mL          LABS:                        7.8    9.1   )-----------( 83       ( 2018 04:00 )             23.8     02-03    142  |  100  |  27<H>  ----------------------------<  205<H>  4.3   |  32<H>  |  0.80    Ca    8.1<L>      2018 04:00  Phos  2.1     02-03  Mg     2.4     02-03    TPro  5.2<L>  /  Alb  2.9<L>  /  TBili  8.0<H>  /  DBili  x   /  AST  64<H>  /  ALT  67<H>  /  AlkPhos  118  02-02    PTT - ( 2018 04:00 )  PTT:45.4 sec  Urinalysis Basic - ( 2018 19:04 )    Color: Yellow / Appearance: SL Cloudy / S.020 / pH: x  Gluc: x / Ketone: NEGATIVE  / Bili: Small / Urobili: 0.2 E.U./dL   Blood: x / Protein: Trace mg/dL / Nitrite: NEGATIVE   Leuk Esterase: NEGATIVE / RBC: < 5 /HPF / WBC < 5 /HPF   Sq Epi: x / Non Sq Epi: 0-5 /HPF / Bacteria: Present /HPF        MEDICATIONS  (STANDING):  ALBUTerol/ipratropium for Nebulization 3 milliLiter(s) Nebulizer every 6 hours  BACItracin   Ointment 1 Application(s) Topical two times a day  cefTAZidime  IVPB 2 Gram(s) IV Intermittent every 8 hours  chlorhexidine 0.12% Liquid 15 milliLiter(s) Swish and Spit two times a day  digoxin  Injectable 0.125 milliGRAM(s) IV Push daily  fluconAZOLE IVPB 400 milliGRAM(s) IV Intermittent every 24 hours  heparin  Infusion 850 Unit(s)/Hr (10 mL/Hr) IV Continuous <Continuous>  insulin lispro (HumaLOG) corrective regimen sliding scale   SubCutaneous every 6 hours  metolazone 10 milliGRAM(s) Oral two times a day  pantoprazole  Injectable 40 milliGRAM(s) IV Push every 12 hours  sodium chloride 0.9% lock flush 20 milliLiter(s) IV Push once    MEDICATIONS  (PRN):  acetaminophen    Suspension 650 milliGRAM(s) Enteral Tube every 6 hours PRN For Temp greater than 38 C (100.4 F)  sodium chloride 0.9% lock flush 10 milliLiter(s) IV Push every 1 hour PRN After each medication administration      RADIOLOGY & ADDITIONAL STUDIES:

## 2018-02-03 NOTE — PROGRESS NOTE ADULT - SUBJECTIVE AND OBJECTIVE BOX
SUBJECTIVE: Pt seen and examined at bedside. o/n: Passed ToV (200, then 150 via condom cath). Temp 101.6, bctx sent. 4 am PTT subtherapeutic, increased to 10 cc/hr.    Vital Signs Last 24 Hrs  T(C): 38.3 (2018 08:00), Max: 38.7 (2018 04:00)  T(F): 101 (2018 08:00), Max: 101.6 (2018 04:00)  HR: 75 (2018 08:00) (56 - 97)  BP: 113/58 (2018 08:00) (97/72 - 149/68)  BP(mean): 69 (2018 08:00) (69 - 106)  RR: 32 (2018 08:00) (17 - 33)  SpO2: 100% (2018 08:00) (94% - 100%)    PHYSICAL EXAM  General: NAD, responsive to voice  HEENT: NC/AT, EOMI, normal hearing  Pulmonary: in AM, intubated on AC, normal resp effort. Normal resp effort on CPAP for 2 hours in AM  Cardiovascular: NSR, pansystolic III/VI murmur  Abdominal: soft, NT/ND. Retention sutures in place, no surrounding erythema. G-tube, retrograde D-tube (bilious), feeding J tube in place. RLQ GUCCI drains with gold serous output, no tube feeds in drains.  Extremities: feet cool, but palpable pedal pulses.     I&O's Detail    2018 07:01  -  2018 07:00  --------------------------------------------------------  IN:    Glucerna: 1156 mL    heparin Infusion: 207 mL    IV PiggyBack: 400 mL    Jevity: 288 mL  Total IN: 2051 mL    OUT:    Bulb: 180 mL    Bulb: 190 mL    Drain: 150 mL    Drain: 60 mL    Indwelling Catheter - Urethral: 265 mL    Voided: 825 mL  Total OUT: 1670 mL    Total NET: 381 mL      2018 07:01  -  2018 08:41  --------------------------------------------------------  IN:    Glucerna: 68 mL    heparin Infusion: 10 mL  Total IN: 78 mL    OUT:  Total OUT: 0 mL    Total NET: 78 mL          LABS:                        7.8    9.1   )-----------( 83       ( 2018 04:00 )             23.8     02-03    142  |  100  |  27<H>  ----------------------------<  205<H>  4.3   |  32<H>  |  0.80    Ca    8.1<L>      2018 04:00  Phos  2.1     02-03  Mg     2.4     02-03    TPro  5.2<L>  /  Alb  2.9<L>  /  TBili  8.0<H>  /  DBili  x   /  AST  64<H>  /  ALT  67<H>  /  AlkPhos  118  02-02    PTT - ( 2018 04:00 )  PTT:45.4 sec  Urinalysis Basic - ( 2018 19:04 )    Color: Yellow / Appearance: SL Cloudy / S.020 / pH: x  Gluc: x / Ketone: NEGATIVE  / Bili: Small / Urobili: 0.2 E.U./dL   Blood: x / Protein: Trace mg/dL / Nitrite: NEGATIVE   Leuk Esterase: NEGATIVE / RBC: < 5 /HPF / WBC < 5 /HPF   Sq Epi: x / Non Sq Epi: 0-5 /HPF / Bacteria: Present /HPF        MEDICATIONS  (STANDING):  ALBUTerol/ipratropium for Nebulization 3 milliLiter(s) Nebulizer every 6 hours  BACItracin   Ointment 1 Application(s) Topical two times a day  cefTAZidime  IVPB 2 Gram(s) IV Intermittent every 8 hours  chlorhexidine 0.12% Liquid 15 milliLiter(s) Swish and Spit two times a day  digoxin  Injectable 0.125 milliGRAM(s) IV Push daily  fluconAZOLE IVPB 400 milliGRAM(s) IV Intermittent every 24 hours  heparin  Infusion 850 Unit(s)/Hr (10 mL/Hr) IV Continuous <Continuous>  insulin lispro (HumaLOG) corrective regimen sliding scale   SubCutaneous every 6 hours  metolazone 10 milliGRAM(s) Oral two times a day  pantoprazole  Injectable 40 milliGRAM(s) IV Push every 12 hours  sodium chloride 0.9% lock flush 20 milliLiter(s) IV Push once    MEDICATIONS  (PRN):  acetaminophen    Suspension 650 milliGRAM(s) Enteral Tube every 6 hours PRN For Temp greater than 38 C (100.4 F)  sodium chloride 0.9% lock flush 10 milliLiter(s) IV Push every 1 hour PRN After each medication administration      RADIOLOGY & ADDITIONAL STUDIES:    ASSESSMENT AND PLAN  72yM with PMH severe MR/AR presented after recent dx acute cholecystitis and RLL PNA at [facility] with lower abdominal pain, new onset rapid Afib, sepsis, acute CHF exacerbation, initially admitted to cardiology on bipap, then transferred to SICU with perforated duodenum s/p Shreyas patch and jejunostomy () c/b breakdown s/p RTOR, washout, new Shreyas patch, gastrostomy, duodenostomy, IVCF ()    Neuro: Tylenol PRN  CV: Afib: Echo EF 50%, ?vegetation and Severe MR. metolazone 10 BID, digoxin,   Pulm: Trach 8.0 shiley on cmv 40/500/12/5 chlorhex, duonebs  GI: NPO, Jtube TF Glucerna 1.2 68cc, Protonix BID. FW 200q4h, Albumin 25% q8h. NGT to LIWS, retrograde D-tube to LIWS, G-tube to LIWS,   : d/c Oliveira, f/u ToV  ID: Perforated duodenum/PNA, surgical swab grew Candida albicans: fluconazole (--), ceftaz (, end ) // D/c: micafungin (-), Vanco (-), Zosyn (; -), linezolid (-), meropenem (-)    Endo: ISS  HEME:DVT: Heparin gtt goal ptt 50-60 [thrombocytopenic, HIT+, serotonin release assay negative.] IVCF () // 1FFP, 1PLT ().   PPx: SCD, R pop DVT, s/p IVC filter   Lines: PIV, PICC (--), L brachial [place] (-),  LSC HD cath (-)  Wounds: Ex lap, JPx2 to bulb suction, J-tube to gravity, D-tube to LIWS, G-tube to LIWS  PT/OT: Early mobilization.

## 2018-02-04 DIAGNOSIS — R50.9 FEVER, UNSPECIFIED: ICD-10-CM

## 2018-02-04 LAB
ALBUMIN SERPL ELPH-MCNC: 2.8 G/DL — LOW (ref 3.3–5)
ALP SERPL-CCNC: 238 U/L — HIGH (ref 40–120)
ALT FLD-CCNC: 68 U/L — HIGH (ref 10–45)
ANION GAP SERPL CALC-SCNC: 9 MMOL/L — SIGNIFICANT CHANGE UP (ref 5–17)
APTT BLD: 48 SEC — HIGH (ref 27.5–37.4)
APTT BLD: 53.4 SEC — HIGH (ref 27.5–37.4)
AST SERPL-CCNC: 79 U/L — HIGH (ref 10–40)
BILIRUB DIRECT SERPL-MCNC: 2.8 MG/DL — HIGH (ref 0–0.2)
BILIRUB INDIRECT FLD-MCNC: 2.1 MG/DL — HIGH (ref 0.2–1)
BILIRUB SERPL-MCNC: 4.9 MG/DL — HIGH (ref 0.2–1.2)
BUN SERPL-MCNC: 29 MG/DL — HIGH (ref 7–23)
CALCIUM SERPL-MCNC: 8 MG/DL — LOW (ref 8.4–10.5)
CHLORIDE SERPL-SCNC: 99 MMOL/L — SIGNIFICANT CHANGE UP (ref 96–108)
CK MB CFR SERPL CALC: 2.8 NG/ML — SIGNIFICANT CHANGE UP (ref 0–6.7)
CK SERPL-CCNC: 34 U/L — SIGNIFICANT CHANGE UP (ref 30–200)
CO2 SERPL-SCNC: 33 MMOL/L — HIGH (ref 22–31)
CREAT SERPL-MCNC: 0.78 MG/DL — SIGNIFICANT CHANGE UP (ref 0.5–1.3)
GLUCOSE BLDC GLUCOMTR-MCNC: 137 MG/DL — HIGH (ref 70–99)
GLUCOSE BLDC GLUCOMTR-MCNC: 173 MG/DL — HIGH (ref 70–99)
GLUCOSE BLDC GLUCOMTR-MCNC: 183 MG/DL — HIGH (ref 70–99)
GLUCOSE BLDC GLUCOMTR-MCNC: 202 MG/DL — HIGH (ref 70–99)
GLUCOSE SERPL-MCNC: 177 MG/DL — HIGH (ref 70–99)
GRAM STN FLD: SIGNIFICANT CHANGE UP
HCT VFR BLD CALC: 24.2 % — LOW (ref 39–50)
HGB BLD-MCNC: 7.7 G/DL — LOW (ref 13–17)
MAGNESIUM SERPL-MCNC: 2.3 MG/DL — SIGNIFICANT CHANGE UP (ref 1.6–2.6)
MCHC RBC-ENTMCNC: 30.7 PG — SIGNIFICANT CHANGE UP (ref 27–34)
MCHC RBC-ENTMCNC: 31.8 G/DL — LOW (ref 32–36)
MCV RBC AUTO: 96.4 FL — SIGNIFICANT CHANGE UP (ref 80–100)
PHOSPHATE SERPL-MCNC: 2.9 MG/DL — SIGNIFICANT CHANGE UP (ref 2.5–4.5)
PLATELET # BLD AUTO: 104 K/UL — LOW (ref 150–400)
POTASSIUM SERPL-MCNC: 4 MMOL/L — SIGNIFICANT CHANGE UP (ref 3.5–5.3)
POTASSIUM SERPL-SCNC: 4 MMOL/L — SIGNIFICANT CHANGE UP (ref 3.5–5.3)
PROT SERPL-MCNC: 5.6 G/DL — LOW (ref 6–8.3)
RBC # BLD: 2.51 M/UL — LOW (ref 4.2–5.8)
RBC # FLD: 22.3 % — HIGH (ref 10.3–16.9)
SODIUM SERPL-SCNC: 141 MMOL/L — SIGNIFICANT CHANGE UP (ref 135–145)
SPECIMEN SOURCE: SIGNIFICANT CHANGE UP
TROPONIN T SERPL-MCNC: 0.03 NG/ML — HIGH (ref 0–0.01)
WBC # BLD: 8.4 K/UL — SIGNIFICANT CHANGE UP (ref 3.8–10.5)
WBC # FLD AUTO: 8.4 K/UL — SIGNIFICANT CHANGE UP (ref 3.8–10.5)

## 2018-02-04 PROCEDURE — 99233 SBSQ HOSP IP/OBS HIGH 50: CPT | Mod: GC

## 2018-02-04 PROCEDURE — 99232 SBSQ HOSP IP/OBS MODERATE 35: CPT

## 2018-02-04 PROCEDURE — 71045 X-RAY EXAM CHEST 1 VIEW: CPT | Mod: 26

## 2018-02-04 RX ADMIN — PANTOPRAZOLE SODIUM 40 MILLIGRAM(S): 20 TABLET, DELAYED RELEASE ORAL at 07:02

## 2018-02-04 RX ADMIN — Medication 4: at 07:51

## 2018-02-04 RX ADMIN — PANTOPRAZOLE SODIUM 40 MILLIGRAM(S): 20 TABLET, DELAYED RELEASE ORAL at 18:34

## 2018-02-04 RX ADMIN — Medication 3 MILLILITER(S): at 17:19

## 2018-02-04 RX ADMIN — CEFTAZIDIME 100 GRAM(S): 6 INJECTION, POWDER, FOR SOLUTION INTRAVENOUS at 21:54

## 2018-02-04 RX ADMIN — CEFTAZIDIME 100 GRAM(S): 6 INJECTION, POWDER, FOR SOLUTION INTRAVENOUS at 07:03

## 2018-02-04 RX ADMIN — Medication 650 MILLIGRAM(S): at 07:03

## 2018-02-04 RX ADMIN — Medication 1 APPLICATION(S): at 12:51

## 2018-02-04 RX ADMIN — Medication 650 MILLIGRAM(S): at 13:12

## 2018-02-04 RX ADMIN — CEFTAZIDIME 100 GRAM(S): 6 INJECTION, POWDER, FOR SOLUTION INTRAVENOUS at 12:50

## 2018-02-04 RX ADMIN — Medication 3 MILLILITER(S): at 01:11

## 2018-02-04 RX ADMIN — FLUCONAZOLE 100 MILLIGRAM(S): 150 TABLET ORAL at 12:50

## 2018-02-04 RX ADMIN — Medication 3 MILLILITER(S): at 12:40

## 2018-02-04 RX ADMIN — Medication 650 MILLIGRAM(S): at 19:43

## 2018-02-04 RX ADMIN — CHLORHEXIDINE GLUCONATE 15 MILLILITER(S): 213 SOLUTION TOPICAL at 07:02

## 2018-02-04 RX ADMIN — Medication 3 MILLILITER(S): at 05:58

## 2018-02-04 RX ADMIN — CHLORHEXIDINE GLUCONATE 15 MILLILITER(S): 213 SOLUTION TOPICAL at 18:34

## 2018-02-04 RX ADMIN — Medication 2: at 18:34

## 2018-02-04 RX ADMIN — Medication 1 APPLICATION(S): at 18:35

## 2018-02-04 RX ADMIN — Medication 0.12 MILLIGRAM(S): at 12:50

## 2018-02-04 RX ADMIN — Medication 2: at 00:30

## 2018-02-04 NOTE — PROGRESS NOTE ADULT - SUBJECTIVE AND OBJECTIVE BOX
SUBJECTIVE: Pt seen and examined at bedside.   24Hr events:  o/n: PTT 56, ct chest w atelectasis, ct ap neg for collection  2/3: 10am ptt 52 no changes.repeat 56 - no changes f/u repeat @10 taken for ct scan 2* fever to rule out collection.     Vital Signs Last 24 Hrs  T(C): 38.2 (04 Feb 2018 09:49), Max: 38.3 (03 Feb 2018 23:00)  T(F): 100.7 (04 Feb 2018 09:49), Max: 101 (03 Feb 2018 23:00)  HR: 80 (04 Feb 2018 11:00) (72 - 92)  BP: 107/62 (04 Feb 2018 11:00) (95/59 - 126/63)  BP(mean): 76 (04 Feb 2018 11:00) (67 - 90)  RR: 25 (04 Feb 2018 11:00) (15 - 32)  SpO2: 99% (04 Feb 2018 11:00) (87% - 100%)    PHYSICAL EXAM    Gen: NAD  CV: RRR  Pulm: CTAB  Abdominal: soft, NT/ND. Retention sutures in place, no surrounding erythema. G-tube, retrograde D-tube (bilious), feeding J tube in place. RLQ GUCCI drains with serous output  Extremities: WWP DP pulses palpable b/l      LABS:                        7.7    8.4   )-----------( 104      ( 04 Feb 2018 06:22 )             24.2     02-04    141  |  99  |  29<H>  ----------------------------<  177<H>  4.0   |  33<H>  |  0.78    Ca    8.0<L>      04 Feb 2018 06:22  Phos  2.9     02-04  Mg     2.3     02-04    TPro  5.6<L>  /  Alb  2.8<L>  /  TBili  4.9<H>  /  DBili  2.8<H>  /  AST  79<H>  /  ALT  68<H>  /  AlkPhos  238<H>  02-04    PTT - ( 04 Feb 2018 06:22 )  PTT:48.0 sec    ASSESSMENT AND PLAN  72yM with PMH severe MR/AR presented after recent dx acute cholecystitis and RLL PNA at [facility] with lower abdominal pain, new onset rapid Afib, sepsis, acute CHF exacerbation, initially admitted to cardiology on bipap, then transferred to SICU with perforated duodenum s/p Shreyas patch and jejunostomy (1/20) c/b breakdown s/p RTOR, washout, new Shreyas patch, gastrostomy, duodenostomy, IVCF (1/22).     Neuro: Tylenol PRN  CV: Afib: Echo EF 50%, ?vegetation and Severe MRTrevin metolazone 10 BID, digoxin,   Pulm: Trach 8.0 shiley on cmv 40/500/12/5 chlorhex, duonebs  GI: NPO, Jtube TF Glucerna 1.2 68cc, Protonix BID. FW 200q4h, Albumin 25% q8h. NGT to LIWS, retrograde D-tube to LIWS, G-tube to LIWS,   : voiding via [place] catheter  ID: Perforated duodenum/PNA, surgical swab grew Candida albicans: fluconazole (2/1--), ceftaz (1/29, end 2/4) // D/c: micafungin (1/22-2/1), Vanco (1/19-20), Zosyn (1/19-22; 1/27-29), linezolid (1/22-23), meropenem (1/22-27)    Endo: ISS  HEME:DVT: Heparin gtt goal ptt 50-60 [thrombocytopenic, HIT+, serotonin release assay negative.] IVCF (1/22) // 1FFP, 1PLT (1/22).   PPx: SCD, R pop DVT, s/p IVC filter 1/22  Lines: PIV, PICC (1/29--), L brachial [place] (1/20-1/27),  LSC HD cath (1/22-1/27)  Wounds: Ex lap, JPx2 to bulb suction, J-tube to gravity, D-tube to LIWS, G-tube to LIWS

## 2018-02-04 NOTE — PROGRESS NOTE ADULT - SUBJECTIVE AND OBJECTIVE BOX
Interventional, Pulmonary, Critical, Chest Special Procedures.    Pt was seen and fully examined by myself.     Time spent with patient in minutes:37    Patient is a 72y old  Male who presents with a chief complaint of acute cholecystitis (18 Jan 2018 14:49)Today the patient awake and engaging, denies pain when seen. The patient able to generate spontaneous  on demand.  Ventilator circuits were revised and secured.    HPI:  73 yo M with h/o severe mitral and aortic valve regurgitation presented to ED with 5 day h/o left lower abdominal pain, nausea and multiple episodes of vomiting as well as PO intolerance. Pt reports that the pain in his abdomen is illicited by movement. Went to [facility] two days ago and underwent CT scan which revealed acute cholecystitis as well as right lower lobe infectious pneumonia. Pt was told he would require surgery and left AMA because he was waiting too long. Denies f/c. Last echo 1/4/17 with EF 60-65%. Normal BMs and passing flatus. (18 Jan 2018 14:49)    REVIEW OF SYSTEMS:  Constitutional: + fever, weight loss, chills or fatigue  Eyes: No eye pain, visual disturbances, or discharge  ENMT:  No difficulty hearing, tinnitus, vertigo; No sinus or throat pain. No epistaxis, dysphagia, dysphonia, hoarseness or odynophagia  Neck: No pain, stiffness or neck swelling.  No masses or deformities  Respiratory: No cough, wheezing, chills or hemoptysis  - COPD  - ILD   - PE   - ASTHMA     - PNEUMONIA  Cardiovascular: No chest pain, dysrhythmia, palpitations, dizziness or edema   - COPD     - CAD   - CHF   - HTN  Gastrointestinal: No abdominal or epigastric pain. No nausea, vomiting or hematemesis; No diarrhea or constipation. No melena or hematochezia. No dysphagia or Icterus.          Genitourinary: No dysuria, frequency, hematuria or incontinence   - CKD/GAETANO      - ESRD  Neurological: No headaches, memory loss, loss of strength, numbness or tremors      -DEMENTIA     - STROKE    - SEIZURE  Skin: No itching, burning, rashes or lesions   Lymph Nodes: No enlarged glands  Endocrine: No heat or cold intolerance; No hair loss       - DM     - THYROID DISORDER  Musculoskeletal: No joint pain or swelling; No muscle, back or extremity pain  Psychiatric: No depression, anxiety, mood swings or difficulty sleeping  Heme/Lymph: No easy bruising or bleeding gums         - ANEMIA      - CANCER   -COAGULOPATHY  Allergy and Immunologic: No hives or eczema    PAST MEDICAL & SURGICAL HISTORY:  Mitral regurgitation  Aortic regurgitation  No significant past surgical history    FAMILY HISTORY:    SOCIAL HISTORY:      - Tobacco     - ETOH    Allergies    No Known Allergies    Intolerances      Vital Signs Last 24 Hrs  T(C): 38.5 (04 Feb 2018 14:05), Max: 38.5 (04 Feb 2018 14:05)  T(F): 101.3 (04 Feb 2018 14:05), Max: 101.3 (04 Feb 2018 14:05)  HR: 92 (04 Feb 2018 14:00) (72 - 92)  BP: 122/57 (04 Feb 2018 14:00) (95/59 - 126/63)  BP(mean): 84 (04 Feb 2018 14:00) (67 - 90)  RR: 30 (04 Feb 2018 14:00) (15 - 32)  SpO2: 99% (04 Feb 2018 14:00) (87% - 100%)    02-03 @ 07:01 - 02-04 @ 07:00  --------------------------------------------------------  IN: 1483 mL / OUT: 2275 mL / NET: -792 mL    02-04 @ 07:01  -  02-04 @ 14:23  --------------------------------------------------------  IN: 778 mL / OUT: 610 mL / NET: 168 mL      Mode: CPAP with PS  FiO2: 40  PEEP: 5  PS: 10  MAP: 7.5  PIP: 15    PHYSICAL EXAM:  More  comfortable, no distress  Eyes: PERRL, EOM intact; conjunctiva and sclera clear  Head: Normocephalic;  No Trauma  ENMT: No nasal discharge, hoarseness, cough or hemoptysis.  Airway clear  Neck: Supple; non tender; no masses or deformities.    No JVD  Respiratory:  - WHEEZING   few R  RHONCHI  - RALES  = CRACKLES.  Diminished breath sounds  BILATERAL  RIGHT  LEFT bases   Cardiovascular: Regular rate and rhythm. S1 and S2 Normal; No murmurs, gallops or rubs     - PPM/AICD  Gastrointestinal: Soft ,distended; Normal bowel sounds; No hepatosplenomegaly.     +PEG    + multiple surgical drains integrity checked   + ANGELES  Genitourinary: No costovertebral angle tenderness. No dysuria  Extremities: Decreased AROM, + clubbing, cyanosis + edema, R foot toes unchanged pattern of necrosis, L foot less edema   Vascular: Peripheral pulses palpable 2+ bilaterally  Neurological: intubated and opening eyes to commands  Skin: Warm and dry. No obvious rash  Lymph Nodes: No acute cervical or supraclavicular adenopathy  Psychiatric: not sedated, calm affect    DEVICES:  - DENTURES   +IV R / L     - ETUBE   +TRACH 8.0 Silley cuffed, site is clean,      LABS:                          7.7    8.4   )-----------( 104      ( 04 Feb 2018 06:22 )             24.2     02-04    141  |  99  |  29<H>  ----------------------------<  177<H>  4.0   |  33<H>  |  0.78    Ca    8.0<L>      04 Feb 2018 06:22  Phos  2.9     02-04  Mg     2.3     02-04    TPro  5.6<L>  /  Alb  2.8<L>  /  TBili  4.9<H>  /  DBili  2.8<H>  /  AST  79<H>  /  ALT  68<H>  /  AlkPhos  238<H>  02-04    PTT - ( 04 Feb 2018 13:13 )  PTT:53.4 sec  < from: CT Chest w/ IV Cont (02.03.18 @ 16:31) >  EXAM:  CT CHEST IC                          EXAM:  CT ABDOMEN AND PELVIS OC IC                          PROCEDURE DATE:  02/03/2018     100  Optiray 350   7           INTERPRETATION:  CLINICAL INDICATION: 72-year-old with fever; recent   perforatedduodenal ulcer and repair. For evaluation of abdominal   collection.        FINDINGS: CT of the chest, abdomen and pelvis was performed with the   administration of intravenous contrast. Reconstructions were performed in   the sagittal and coronal planes. Comparison is made to prior studies   dated 1/20/2018 and 1/18/2018.    Evaluation of the chest demonstrates the visualized thyroid gland is   normal in appearance. Tracheostomy tube in appropriate position. Moderate   cardiomegaly with enlargement of the left atrium and left ventricle.   Negative for thoracic inlet, axillary, mediastinal or hilar   lymphadenopathy. Small bilateral pleural effusions. Evaluation of the   lung parenchyma demonstrates bibasilar compressive atelectasis with   probable underlying consolidation in the bilateral lower lobes; there is   a region of poorly enhancing lung within the right lower lobe. No   endobronchial lesion. Bilateral pulmonary venous engorgement. No ena   central pulmonary embolism, though limited secondary to suboptimal   opacification branches. Evaluation of the abdomen demonstrates that the   liver, spleen, mass, bilateral adrenal glands and bilateral kidneys   appearance aside from multiple bilateral renal cysts, the largest located   in the upper pole of the left kidney measuring 10.9 cm. No interval   change in a cystic nodule within the tail of the pancreas measuring 1.8   cm which meets consensus criteria for short-term interval follow-up.   There is cholelithiasis and sludge within the gallbladder/vicarious   excretion of previous administered intravenous contrast. Evaluation of   the gastrointestinal tract demonstrates a gastrostomy tube with the tip   within the mid duodenum. Additional jejunostomy tube with the tip in a   loop of jejunum in the left upper quadrant. Few foci of extraperitoneal   air within the right upper quadrant adjacent to the gastric antrum and   proximal duodenum. Drainage catheters with the tips within the right   upper quadrant and rae hepatis. Diffuse anasarca. No free fluid.   Moderate vascular calcification. IVC filter in appropriate position.   Opacified aspects of the portal, splenic, superior mesenteric vein,   bilateral renal veins and IVC demonstrates no ena intraluminal   thrombus. Moderate short segment stenosis of the left internal iliac   artery secondary partially calcified peripheral mural thrombus. No   interval change in clustered enlarged left retroperitoneal lymph   nodesmeasuring up to 2.7 cm.  Evaluation of theosseous structures   demonstrates a fracture of the left inferior rib. No destructive osseous   lesion.        IMPRESSION:    Postprocedural changes from recent duodenal perforation and repair.   Negative for intra-abdominal collection.    Small bilateral pleural effusions with bibasilar consolidation.    < end of copied text >  RADIOLOGY & ADDITIONAL STUDIES (The following images were personally reviewed):

## 2018-02-04 NOTE — PROGRESS NOTE ADULT - ASSESSMENT
ASSESSMENT/PLAN 71y/o male pt c Respiratory failure, perforated duodenal ulcer, POD post repair, RLL pneumonia, RLL atelectases, no obvious RADHA, elevated R hemidiaphragm, AFIB,  thrombocytopenia, possible endocarditis, s/p reop. now POP tracheostomy, new fever pattern    1. O2:Use CPAP mode daytime, CMV HS, resume weaning tomorrow am if stable  2. Bronchodilators:  Atrovent/ albuterol q 4 – 6 hours as needed  3. Corticosteroids: off  4. ID/Antibiotics: Ceftazidime, Micafungin strict ID supervision   5. Cardiac/HTN: Monitor, optimize HR; on heparin drip for AFIB,  6. GI: Rx/ prophylaxis c PPI/Protonix  7. Heme: Rx/VT prophylaxis IV heparin; platelets to be followed closely  8. Aspiration precautions, feeding PEG  9. Surgery mngmnt priority  10. Edema: diuretic to continue

## 2018-02-04 NOTE — PROGRESS NOTE ADULT - SUBJECTIVE AND OBJECTIVE BOX
Critical care    INTERVAL HPI/OVERNIGHT EVENTS:    ANTIBIOTICS    MEDICATIONS  (STANDING):  ALBUTerol/ipratropium for Nebulization 3 milliLiter(s) Nebulizer every 6 hours  BACItracin   Ointment 1 Application(s) Topical two times a day  cefTAZidime  IVPB 2 Gram(s) IV Intermittent every 8 hours  chlorhexidine 0.12% Liquid 15 milliLiter(s) Swish and Spit two times a day  digoxin  Injectable 0.125 milliGRAM(s) IV Push daily  fluconAZOLE IVPB 400 milliGRAM(s) IV Intermittent every 24 hours  heparin  Infusion 1000 Unit(s)/Hr (10 mL/Hr) IV Continuous <Continuous>  insulin lispro (HumaLOG) corrective regimen sliding scale   SubCutaneous every 6 hours  metolazone 10 milliGRAM(s) Oral two times a day  pantoprazole  Injectable 40 milliGRAM(s) IV Push every 12 hours  sodium chloride 0.9% lock flush 20 milliLiter(s) IV Push once    MEDICATIONS  (PRN):  acetaminophen    Suspension 650 milliGRAM(s) Enteral Tube every 6 hours PRN For Temp greater than 38 C (100.4 F)  sodium chloride 0.9% lock flush 10 milliLiter(s) IV Push every 1 hour PRN After each medication administration      Allergies    No Known Allergies    Intolerances        REVIEW OF SYSTEMS:    patient unable to provide a review of systems  Vital Signs Last 24 Hrs  T(C): 38.2 (04 Feb 2018 09:49), Max: 38.3 (03 Feb 2018 23:00)  T(F): 100.7 (04 Feb 2018 09:49), Max: 101 (03 Feb 2018 23:00)  HR: 80 (04 Feb 2018 09:00) (72 - 92)  BP: 97/59 (04 Feb 2018 09:00) (95/59 - 126/63)  BP(mean): 67 (04 Feb 2018 09:00) (67 - 90)  RR: 32 (04 Feb 2018 09:00) (15 - 32)  SpO2: 99% (04 Feb 2018 09:00) (87% - 100%)    PHYSICAL EXAM:    General in no acute distress  Eyes: PERRL, EOM intact; conjunctiva and sclera clear  Head: Normocephalic; atraumatic  ENMT: No nasal discharge; airway clear  Neck: Supple; non tender; no masses  Respiratory: No wheezes, rales or rhonchi  Cardiovascular: Regular rate and rhythm. S1 and S2 Normal; No murmurs, gallops or rubs  Gastrointestinal: Soft non-tender non-distended; Normal bowel sounds; No hepatosplenomegaly    LABS:                        7.7    8.4   )-----------( 104      ( 04 Feb 2018 06:22 )             24.2     02-04    141  |  99  |  29<H>  ----------------------------<  177<H>  4.0   |  33<H>  |  0.78    Ca    8.0<L>      04 Feb 2018 06:22  Phos  2.9     02-04  Mg     2.3     02-04    TPro  5.6<L>  /  Alb  2.8<L>  /  TBili  4.9<H>  /  DBili  2.8<H>  /  AST  79<H>  /  ALT  68<H>  /  AlkPhos  238<H>  02-04    PTT - ( 04 Feb 2018 06:22 )  PTT:48.0 sec        MICROBIOLOGY:  Culture Results:   Moderate Gram Negative Rods  Identification and susceptibility to follow.  Normal Respiratory Geeta absent (02-03 @ 08:37)  Culture Results:   No growth at 1 day. (02-03 @ 08:24)  Culture Results:   Moderate Gram Negative Rods  Identification and susceptibility to follow.  Routine respiratory geeta absent (02-01 @ 19:18)      RADIOLOGY & ADDITIONAL STUDIES:

## 2018-02-04 NOTE — PROGRESS NOTE ADULT - SUBJECTIVE AND OBJECTIVE BOX
SUBJECTIVE: Pt seen and examined at bedside.   24Hr events:  o/n: PTT 56, ct chest w atelectasis, ct ap neg for collection    Vital Signs Last 24 Hrs  T(C): 38.2 (04 Feb 2018 09:49), Max: 38.3 (03 Feb 2018 23:00)  T(F): 100.7 (04 Feb 2018 09:49), Max: 101 (03 Feb 2018 23:00)  HR: 80 (04 Feb 2018 11:00) (72 - 92)  BP: 107/62 (04 Feb 2018 11:00) (95/59 - 126/63)  BP(mean): 76 (04 Feb 2018 11:00) (67 - 90)  RR: 25 (04 Feb 2018 11:00) (15 - 32)  SpO2: 99% (04 Feb 2018 11:00) (87% - 100%)    PHYSICAL EXAM    Gen: NAD  CV: RRR  Pulm: CTAB  Abdominal: soft, NT/ND. Retention sutures in place, no surrounding erythema. G-tube, retrograde D-tube (bilious), feeding J tube in place. RLQ GUCCI drains with serous output  Extremities: WWP DP pulses palpable b/l      LABS:                        7.7    8.4   )-----------( 104      ( 04 Feb 2018 06:22 )             24.2     02-04    141  |  99  |  29<H>  ----------------------------<  177<H>  4.0   |  33<H>  |  0.78    Ca    8.0<L>      04 Feb 2018 06:22  Phos  2.9     02-04  Mg     2.3     02-04    TPro  5.6<L>  /  Alb  2.8<L>  /  TBili  4.9<H>  /  DBili  2.8<H>  /  AST  79<H>  /  ALT  68<H>  /  AlkPhos  238<H>  02-04    PTT - ( 04 Feb 2018 06:22 )  PTT:48.0 sec    < from: CT Chest w/ IV Cont (02.03.18 @ 16:31) >  al changes from recent duodenal perforation and repair.   Negative for intra-abdominal collection.    Small bilateral pleural effusions with bibasilar consolidation.        < end of copied text >  < from: CT Abdomen and Pelvis w/ Oral Cont and w/ IV Cont (02.03.18 @ 16:31) >  ocedural changes from recent duodenal perforation and repair.   Negative for intra-abdominal collection.    Small bilateral pleural effusions with bibasilar consolidation.          < end of copied text >

## 2018-02-04 NOTE — PROGRESS NOTE ADULT - ATTENDING COMMENTS
Patient seen and examined with house-staff during bedside rounds.CV stable  Pulm--trach  GI--tf  supp rx.

## 2018-02-04 NOTE — PROGRESS NOTE ADULT - ATTENDING COMMENTS
Patient seen and examined with house-staff during bedside rounds.  Resident note read, including vitals, physical findings, laboratory data, and radiological reports.   Revisions included below.  Direct personal management at bed side and extensive interpretation of the data.  Plan was outlined and discussed in details with the housestaff.  Decision making of high complexity  Respiratory failure fever right lower lobe pneumonia sepsis atrial fibrilation CHF  No wean  follow cultures  Sputum positive for Gram negative cocci  ID to adjust antiobiotis    Diuresis if tolerated. Hb stable  No cardiac ischaemia

## 2018-02-04 NOTE — PROGRESS NOTE ADULT - SUBJECTIVE AND OBJECTIVE BOX
Chief Complaint/Reason for Consult: periop cv mgmt  INTERVAL HPI: ID recs appreciated, tele rate controlled no pauses no rvr  	  MEDICATIONS:  digoxin  Injectable 0.125 milliGRAM(s) IV Push daily  metolazone 10 milliGRAM(s) Oral two times a day    cefTAZidime  IVPB 2 Gram(s) IV Intermittent every 8 hours  fluconAZOLE IVPB 400 milliGRAM(s) IV Intermittent every 24 hours    ALBUTerol/ipratropium for Nebulization 3 milliLiter(s) Nebulizer every 6 hours    acetaminophen    Suspension 650 milliGRAM(s) Enteral Tube every 6 hours PRN    pantoprazole  Injectable 40 milliGRAM(s) IV Push every 12 hours    insulin lispro (HumaLOG) corrective regimen sliding scale   SubCutaneous every 6 hours    BACItracin   Ointment 1 Application(s) Topical two times a day  chlorhexidine 0.12% Liquid 15 milliLiter(s) Swish and Spit two times a day  heparin  Infusion 1000 Unit(s)/Hr IV Continuous <Continuous>      REVIEW OF SYSTEMS:  [x] As per HPI  CONSTITUTIONAL: No fever, weight loss, or fatigue  RESPIRATORY: No cough, wheezing, chills or hemoptysis; No Shortness of Breath  CARDIOVASCULAR: No chest pain, palpitations, dizziness, or leg swelling  GASTROINTESTINAL: No abdominal or epigastric pain. No nausea, vomiting, or hematemesis; No diarrhea or constipation. No melena or hematochezia.  MUSCULOSKELETAL: No joint pain or swelling; No muscle, back, or extremity pain  [x] All others negative	  [ ] Unable to obtain    PHYSICAL EXAM:  T(C): 38.2 (02-04-18 @ 09:49), Max: 38.3 (02-03-18 @ 23:00)  HR: 82 (02-04-18 @ 10:00) (72 - 92)  BP: 117/61 (02-04-18 @ 10:00) (95/59 - 126/63)  RR: 29 (02-04-18 @ 10:00) (15 - 32)  SpO2: 99% (02-04-18 @ 10:00) (87% - 100%)  Wt(kg): --  I&O's Summary    03 Feb 2018 07:01  -  04 Feb 2018 07:00  --------------------------------------------------------  IN: 1483 mL / OUT: 2275 mL / NET: -792 mL    04 Feb 2018 07:01  -  04 Feb 2018 11:09  --------------------------------------------------------  IN: 78 mL / OUT: 110 mL / NET: -32 mL          Appearance: Normal	  HEENT:   Normal oral mucosa  Cardiovascular: Normal S1 S2, No JVD, No murmurs, No edema  Respiratory: Lungs clear to auscultation	  Gastrointestinal:  Soft, Non-tender, + BS	  Extremities: Normal range of motion, No clubbing, cyanosis or edema  Vascular: Peripheral pulses palpable 2+ bilaterally    TELEMETRY: 	    ECG:   	  RADIOLOGY:   CXR:  CT:  US:    CARDIAC TESTING:  Echocardiogram:  Catheterization:  Stress Test:      LABS:	 	    CARDIAC MARKERS:                                  7.7    8.4   )-----------( 104      ( 04 Feb 2018 06:22 )             24.2     02-04    141  |  99  |  29<H>  ----------------------------<  177<H>  4.0   |  33<H>  |  0.78    Ca    8.0<L>      04 Feb 2018 06:22  Phos  2.9     02-04  Mg     2.3     02-04    TPro  5.6<L>  /  Alb  2.8<L>  /  TBili  4.9<H>  /  DBili  2.8<H>  /  AST  79<H>  /  ALT  68<H>  /  AlkPhos  238<H>  02-04    proBNP:   Lipid Profile:   HgA1c:   TSH:     ASSESSMENT/PLAN: 	    # Afib - Patient with continued Afib now with controlled VR (HR 60s) s/p Digoxin. Dose was held yesterday for slow VR, now resolving today. Digoxin level noted to be 1.2 therapeutic. Continue Digoxin. Hold for HR < 60bpm. Cr is stable    #CHF - severe MR CTS recs appreciated, continue diureses per SICU   continue metalazone   and PRN Lasix IV  off pressors

## 2018-02-05 DIAGNOSIS — R50.9 FEVER, UNSPECIFIED: ICD-10-CM

## 2018-02-05 LAB
-  CEFTAZIDIME: SIGNIFICANT CHANGE UP
-  CEFTAZIDIME: SIGNIFICANT CHANGE UP
-  LEVOFLOXACIN: SIGNIFICANT CHANGE UP
-  MEROPENEM: SIGNIFICANT CHANGE UP
-  TRIMETHOPRIM/SULFAMETHOXAZOLE: SIGNIFICANT CHANGE UP
-  TRIMETHOPRIM/SULFAMETHOXAZOLE: SIGNIFICANT CHANGE UP
ANION GAP SERPL CALC-SCNC: 6 MMOL/L — SIGNIFICANT CHANGE UP (ref 5–17)
APPEARANCE UR: CLEAR — SIGNIFICANT CHANGE UP
APTT BLD: 45.7 SEC — HIGH (ref 27.5–37.4)
APTT BLD: 52 SEC — HIGH (ref 27.5–37.4)
BASOPHILS NFR BLD AUTO: 0 % — SIGNIFICANT CHANGE UP (ref 0–2)
BILIRUB UR-MCNC: (no result)
BUN SERPL-MCNC: 32 MG/DL — HIGH (ref 7–23)
CALCIUM SERPL-MCNC: 8.2 MG/DL — LOW (ref 8.4–10.5)
CHLORIDE SERPL-SCNC: 97 MMOL/L — SIGNIFICANT CHANGE UP (ref 96–108)
CO2 SERPL-SCNC: 35 MMOL/L — HIGH (ref 22–31)
COLOR SPEC: YELLOW — SIGNIFICANT CHANGE UP
CREAT SERPL-MCNC: 0.82 MG/DL — SIGNIFICANT CHANGE UP (ref 0.5–1.3)
CULTURE RESULTS: SIGNIFICANT CHANGE UP
CULTURE RESULTS: SIGNIFICANT CHANGE UP
DIFF PNL FLD: (no result)
EOSINOPHIL NFR BLD AUTO: 1 % — SIGNIFICANT CHANGE UP (ref 0–6)
GLUCOSE BLDC GLUCOMTR-MCNC: 130 MG/DL — HIGH (ref 70–99)
GLUCOSE BLDC GLUCOMTR-MCNC: 156 MG/DL — HIGH (ref 70–99)
GLUCOSE BLDC GLUCOMTR-MCNC: 184 MG/DL — HIGH (ref 70–99)
GLUCOSE BLDC GLUCOMTR-MCNC: 188 MG/DL — HIGH (ref 70–99)
GLUCOSE SERPL-MCNC: 152 MG/DL — HIGH (ref 70–99)
GLUCOSE UR QL: NEGATIVE — SIGNIFICANT CHANGE UP
GRAM STN FLD: SIGNIFICANT CHANGE UP
HCT VFR BLD CALC: 24 % — LOW (ref 39–50)
HGB BLD-MCNC: 7.7 G/DL — LOW (ref 13–17)
HYPOCHROMIA BLD QL: SLIGHT — SIGNIFICANT CHANGE UP
KETONES UR-MCNC: (no result) MG/DL
LEUKOCYTE ESTERASE UR-ACNC: NEGATIVE — SIGNIFICANT CHANGE UP
LG PLATELETS BLD QL AUTO: PRESENT — SIGNIFICANT CHANGE UP
LYMPHOCYTES # BLD AUTO: 5 % — LOW (ref 13–44)
MAGNESIUM SERPL-MCNC: 2.3 MG/DL — SIGNIFICANT CHANGE UP (ref 1.6–2.6)
MANUAL DIF COMMENT BLD-IMP: SIGNIFICANT CHANGE UP
MANUAL SMEAR VERIFICATION: SIGNIFICANT CHANGE UP
MCHC RBC-ENTMCNC: 30.7 PG — SIGNIFICANT CHANGE UP (ref 27–34)
MCHC RBC-ENTMCNC: 32.1 G/DL — SIGNIFICANT CHANGE UP (ref 32–36)
MCV RBC AUTO: 95.6 FL — SIGNIFICANT CHANGE UP (ref 80–100)
METHOD TYPE: SIGNIFICANT CHANGE UP
METHOD TYPE: SIGNIFICANT CHANGE UP
MONOCYTES NFR BLD AUTO: 2 % — SIGNIFICANT CHANGE UP (ref 2–14)
NEUTROPHILS NFR BLD AUTO: 92 % — HIGH (ref 43–77)
NITRITE UR-MCNC: NEGATIVE — SIGNIFICANT CHANGE UP
ORGANISM # SPEC MICROSCOPIC CNT: SIGNIFICANT CHANGE UP
PH UR: 6 — SIGNIFICANT CHANGE UP (ref 5–8)
PHOSPHATE SERPL-MCNC: 2.9 MG/DL — SIGNIFICANT CHANGE UP (ref 2.5–4.5)
PLAT MORPH BLD: (no result)
PLATELET # BLD AUTO: 122 K/UL — LOW (ref 150–400)
POLYCHROMASIA BLD QL SMEAR: SLIGHT — SIGNIFICANT CHANGE UP
POTASSIUM SERPL-MCNC: 3.9 MMOL/L — SIGNIFICANT CHANGE UP (ref 3.5–5.3)
POTASSIUM SERPL-SCNC: 3.9 MMOL/L — SIGNIFICANT CHANGE UP (ref 3.5–5.3)
PREALB SERPL-MCNC: 11 MG/DL — LOW (ref 20–40)
PROT UR-MCNC: 100 MG/DL
RBC # BLD: 2.51 M/UL — LOW (ref 4.2–5.8)
RBC # FLD: 22.5 % — HIGH (ref 10.3–16.9)
RBC BLD AUTO: (no result)
SMUDGE CELLS # BLD: SIGNIFICANT CHANGE UP
SODIUM SERPL-SCNC: 138 MMOL/L — SIGNIFICANT CHANGE UP (ref 135–145)
SP GR SPEC: 1.02 — SIGNIFICANT CHANGE UP (ref 1–1.03)
SPECIMEN SOURCE: SIGNIFICANT CHANGE UP
SPHEROCYTES BLD QL SMEAR: SLIGHT — SIGNIFICANT CHANGE UP
TARGETS BLD QL SMEAR: SLIGHT — SIGNIFICANT CHANGE UP
UROBILINOGEN FLD QL: 1 E.U./DL — SIGNIFICANT CHANGE UP
WBC # BLD: 7.5 K/UL — SIGNIFICANT CHANGE UP (ref 3.8–10.5)
WBC # FLD AUTO: 7.5 K/UL — SIGNIFICANT CHANGE UP (ref 3.8–10.5)

## 2018-02-05 PROCEDURE — 99233 SBSQ HOSP IP/OBS HIGH 50: CPT | Mod: GC

## 2018-02-05 PROCEDURE — 71045 X-RAY EXAM CHEST 1 VIEW: CPT | Mod: 26

## 2018-02-05 RX ORDER — HEPARIN SODIUM 5000 [USP'U]/ML
1100 INJECTION INTRAVENOUS; SUBCUTANEOUS
Qty: 25000 | Refills: 0 | Status: DISCONTINUED | OUTPATIENT
Start: 2018-02-05 | End: 2018-02-06

## 2018-02-05 RX ORDER — POTASSIUM CHLORIDE 20 MEQ
20 PACKET (EA) ORAL ONCE
Qty: 0 | Refills: 0 | Status: COMPLETED | OUTPATIENT
Start: 2018-02-05 | End: 2018-02-05

## 2018-02-05 RX ORDER — ACETAMINOPHEN 500 MG
1000 TABLET ORAL ONCE
Qty: 0 | Refills: 0 | Status: COMPLETED | OUTPATIENT
Start: 2018-02-05 | End: 2018-02-05

## 2018-02-05 RX ORDER — DOCUSATE SODIUM 100 MG
100 CAPSULE ORAL
Qty: 0 | Refills: 0 | Status: DISCONTINUED | OUTPATIENT
Start: 2018-02-05 | End: 2018-02-06

## 2018-02-05 RX ADMIN — Medication 20 MILLIEQUIVALENT(S): at 13:14

## 2018-02-05 RX ADMIN — Medication 100 MILLIGRAM(S): at 18:00

## 2018-02-05 RX ADMIN — Medication 2: at 08:04

## 2018-02-05 RX ADMIN — FLUCONAZOLE 100 MILLIGRAM(S): 150 TABLET ORAL at 13:13

## 2018-02-05 RX ADMIN — CEFTAZIDIME 100 GRAM(S): 6 INJECTION, POWDER, FOR SOLUTION INTRAVENOUS at 07:01

## 2018-02-05 RX ADMIN — Medication 650 MILLIGRAM(S): at 16:39

## 2018-02-05 RX ADMIN — Medication 1 APPLICATION(S): at 07:12

## 2018-02-05 RX ADMIN — HEPARIN SODIUM 11 UNIT(S)/HR: 5000 INJECTION INTRAVENOUS; SUBCUTANEOUS at 17:59

## 2018-02-05 RX ADMIN — Medication 1 APPLICATION(S): at 18:00

## 2018-02-05 RX ADMIN — PANTOPRAZOLE SODIUM 40 MILLIGRAM(S): 20 TABLET, DELAYED RELEASE ORAL at 07:52

## 2018-02-05 RX ADMIN — Medication 3 MILLILITER(S): at 17:17

## 2018-02-05 RX ADMIN — Medication 3 MILLILITER(S): at 06:55

## 2018-02-05 RX ADMIN — Medication 10 MILLIGRAM(S): at 18:00

## 2018-02-05 RX ADMIN — Medication 3 MILLILITER(S): at 13:13

## 2018-02-05 RX ADMIN — PANTOPRAZOLE SODIUM 40 MILLIGRAM(S): 20 TABLET, DELAYED RELEASE ORAL at 18:00

## 2018-02-05 RX ADMIN — Medication 400 MILLIGRAM(S): at 05:50

## 2018-02-05 RX ADMIN — Medication 0.12 MILLIGRAM(S): at 13:14

## 2018-02-05 RX ADMIN — Medication 2: at 18:00

## 2018-02-05 RX ADMIN — Medication 1 APPLICATION(S): at 11:23

## 2018-02-05 RX ADMIN — Medication 3 MILLILITER(S): at 01:01

## 2018-02-05 RX ADMIN — CHLORHEXIDINE GLUCONATE 15 MILLILITER(S): 213 SOLUTION TOPICAL at 07:01

## 2018-02-05 RX ADMIN — Medication 2: at 00:10

## 2018-02-05 RX ADMIN — CHLORHEXIDINE GLUCONATE 15 MILLILITER(S): 213 SOLUTION TOPICAL at 18:00

## 2018-02-05 NOTE — PROGRESS NOTE ADULT - ATTENDING COMMENTS
Pt with Temp 102 and no obvious source of sepsis. Ceftazidime d/nic with possible drug fever being suspected by ID. Pt failed CPAP trial. Decrease diuretics with increase BUn. F/U UUN.  OOB. surg to evaluate for trach exchange and cuff leak.

## 2018-02-05 NOTE — PROGRESS NOTE ADULT - ASSESSMENT
ASSESSMENT/PLAN 73y/o male pt c Respiratory failure, perforated duodenal ulcer, POD post repair, RLL pneumonia, RLL atelectases, no obvious RADHA, elevated R hemidiaphragm, AFIB,  thrombocytopenia, possible endocarditis, s/p reop. now POP tracheostomy, new fever pattern    1. O2: Attempt T collar daytime with CPAP mode on stand by  2. Bronchodilators:  Atrovent/ albuterol q 4 – 6 hours as needed  3. Corticosteroids: off  4. ID/Antibiotics:  Micafungin strict ID supervision  to address new Burkholderia cepacia  5. Cardiac/HTN: Monitor, optimize HR; on heparin drip for AFIB,  6. GI: Rx/ prophylaxis c PPI/Protonix  7. Heme: Rx/VT prophylaxis IV heparin; platelets to be followed closely  8. Aspiration precautions, feeding PEG  9. Surgery mngmnt priority  10. Edema: diuretic to continue  Discussed c managing team.

## 2018-02-05 NOTE — PROGRESS NOTE ADULT - ASSESSMENT
Patient with fevers no leukocytosis    The bacteria in the sputum may represent colonization , It is sensitive to Ceftazidime which is what he has been on  It likely represent colonization    This may represent a drug fever

## 2018-02-05 NOTE — PROGRESS NOTE ADULT - ASSESSMENT
72yM with PMH severe MR/AR presented after recent dx acute cholecystitis and RLL PNA at [facility] with lower abdominal pain, new onset rapid Afib, sepsis, acute CHF exacerbation, initially admitted to cardiology on bipap, then transferred to SICU with perforated duodenum s/p Shreyas patch and jejunostomy (1/20) c/b breakdown s/p RTOR, washout, new Shreyas patch, gastrostomy, duodenostomy, IVCF (1/22)    Neuro: Tylenol PRN  CV: Afib: Echo EF 50%, ?vegetation and Severe MR. metolazone 5 BID, digoxin,   Pulm: Trach 8.0 shiley on cmv 40/500/12/5 chlorhex, duonebs; ?trach leak  GI: NPO, Jtube TF Glucerna 1.2 68cc, Protonix BID. FW 200q4h, Albumin 25% q8h. NGT to LIWS, retrograde D-tube to LIWS, G-tube to LIWS,   : voids  ID: 2/2: sputum cx GNR(Burkholderia cepacia)/stenotrophomonas: Perforated duodenum/PNA, surgical swab grew Candida albicans: fluconazole (2/1--), ceftaz (1/29, end 2/4) // D/c: micafungin (1/22-2/1), Vanco (1/19-20), Zosyn (1/19-22; 1/27-29), linezolid (1/22-23), meropenem (1/22-27)    Endo: ISS  HEME:DVT: Heparin gtt goal ptt 50-60 [thrombocytopenic, HIT+, serotonin release assay negative.] IVCF (1/22) // 1FFP, 1PLT (1/22).   PPx: SCD, R pop DVT, s/p IVC filter 1/22 hep gtt   Lines: PIV, PICC (1/29--), L brachial Jeane (1/20-1/27),  LSC HD cath (1/22-1/27)  Wounds: Ex lap, JPx2 to bulb suction, J-tube to gravity, D-tube to LIWS, G-tube to LIWS; silvadene for toes  PT/OT: Early mobilization.

## 2018-02-05 NOTE — PROGRESS NOTE ADULT - SUBJECTIVE AND OBJECTIVE BOX
Critical Care    INTERVAL HPI/OVERNIGHT EVENTS:    ANTIBIOTICS    MEDICATIONS  (STANDING):  ALBUTerol/ipratropium for Nebulization 3 milliLiter(s) Nebulizer every 6 hours  BACItracin   Ointment 1 Application(s) Topical two times a day  chlorhexidine 0.12% Liquid 15 milliLiter(s) Swish and Spit two times a day  digoxin  Injectable 0.125 milliGRAM(s) IV Push daily  fluconAZOLE IVPB 400 milliGRAM(s) IV Intermittent every 24 hours  heparin  Infusion 1000 Unit(s)/Hr (10 mL/Hr) IV Continuous <Continuous>  insulin lispro (HumaLOG) corrective regimen sliding scale   SubCutaneous every 6 hours  metolazone 5 milliGRAM(s) Oral two times a day  pantoprazole  Injectable 40 milliGRAM(s) IV Push every 12 hours  silver sulfADIAZINE 1% Cream 1 Application(s) Topical daily  sodium chloride 0.9% lock flush 20 milliLiter(s) IV Push once    MEDICATIONS  (PRN):  acetaminophen    Suspension 650 milliGRAM(s) Enteral Tube every 6 hours PRN For Temp greater than 38 C (100.4 F)  sodium chloride 0.9% lock flush 10 milliLiter(s) IV Push every 1 hour PRN After each medication administration      Allergies    No Known Allergies    Intolerances        REVIEW OF SYSTEMS:    patient unable to provide a review of systems  he is on a ventilator    Vital Signs Last 24 Hrs  T(C): 37 (05 Feb 2018 11:00), Max: 38.9 (05 Feb 2018 05:46)  T(F): 98.6 (05 Feb 2018 11:00), Max: 102.1 (05 Feb 2018 05:46)  HR: 90 (05 Feb 2018 14:00) (78 - 96)  BP: 136/99 (05 Feb 2018 14:00) (95/58 - 136/99)  BP(mean): 125 (05 Feb 2018 14:00) (68 - 125)  RR: 32 (05 Feb 2018 14:00) (16 - 36)  SpO2: 99% (05 Feb 2018 14:00) (97% - 100%)    PHYSICAL EXAM:    General: Well developed; well nourished; in no acute distress  Eyes: PERRL, EOM intact; conjunctiva and sclera clear  Head: Normocephalic; atraumatic  ENMT: No nasal discharge; airway clear  Neck: Supple; non tender; no masses  Respiratory: No wheezes, rales or rhonchi  Cardiovascular: Regular rate and rhythm. S1 and S2 Normal; No murmurs, gallops or rubs  Gastrointestinal: Soft non-tender non-distended; Normal bowel sounds; No hepatosplenomegaly  Genitourinary: No costovertebral angle tenderness  LABS:                        7.7    7.5   )-----------( 122      ( 05 Feb 2018 06:23 )             24.0     02-05    138  |  97  |  32<H>  ----------------------------<  152<H>  3.9   |  35<H>  |  0.82    Ca    8.2<L>      05 Feb 2018 06:23  Phos  2.9     02-05  Mg     2.3     02-05    TPro  5.6<L>  /  Alb  2.8<L>  /  TBili  4.9<H>  /  DBili  2.8<H>  /  AST  79<H>  /  ALT  68<H>  /  AlkPhos  238<H>  02-04    PTT - ( 05 Feb 2018 06:23 )  PTT:45.7 sec        MICROBIOLOGY:  Culture Results:   Moderate Burkholderia cepacia  Normal Respiratory Geeta absent (02-03 @ 08:37)  Culture Results:   No growth at 2 days. (02-03 @ 08:24)  Culture Results:   Moderate Burkholderia cepacia  Routine respiratory geeta absent (02-01 @ 19:18)      RADIOLOGY & ADDITIONAL STUDIES:

## 2018-02-05 NOTE — PROGRESS NOTE ADULT - SUBJECTIVE AND OBJECTIVE BOX
24 hr events: Pt with fevers, GNR in sputum Cx from 2/3. Per ID, want to wait for culture speciation before changing abx; did well on CPAP 10/5; PTT 53- No changes. repeat daily. pt bp 's with rising bun - no lasix given. trach cuff leaking o/n- placed back on AC.     SUBJECTIVE: pain controlled; denies HA, n/c, cp/sob, abd pain, chills/sweats    MEDICATIONS  (STANDING):  ALBUTerol/ipratropium for Nebulization 3 milliLiter(s) Nebulizer every 6 hours  BACItracin   Ointment 1 Application(s) Topical two times a day  cefTAZidime  IVPB 2 Gram(s) IV Intermittent every 8 hours  chlorhexidine 0.12% Liquid 15 milliLiter(s) Swish and Spit two times a day  digoxin  Injectable 0.125 milliGRAM(s) IV Push daily  fluconAZOLE IVPB 400 milliGRAM(s) IV Intermittent every 24 hours  heparin  Infusion 1000 Unit(s)/Hr (10 mL/Hr) IV Continuous <Continuous>  insulin lispro (HumaLOG) corrective regimen sliding scale   SubCutaneous every 6 hours  metolazone 10 milliGRAM(s) Oral two times a day  pantoprazole  Injectable 40 milliGRAM(s) IV Push every 12 hours  sodium chloride 0.9% lock flush 20 milliLiter(s) IV Push once    MEDICATIONS  (PRN):  acetaminophen    Suspension 650 milliGRAM(s) Enteral Tube every 6 hours PRN For Temp greater than 38 C (100.4 F)  sodium chloride 0.9% lock flush 10 milliLiter(s) IV Push every 1 hour PRN After each medication administration     ICU Vital Signs Last 24 Hrs  T(C): 38.9 (05 Feb 2018 05:46), Max: 38.9 (05 Feb 2018 05:46)  T(F): 102.1 (05 Feb 2018 05:46), Max: 102.1 (05 Feb 2018 05:46)  HR: 94 (05 Feb 2018 07:00) (76 - 96)  BP: 109/52 (05 Feb 2018 07:00) (97/59 - 133/66)  BP(mean): 68 (05 Feb 2018 07:00) (67 - 92)  ABP: --  ABP(mean): --  RR: 25 (05 Feb 2018 07:00) (16 - 32)  SpO2: 98% (05 Feb 2018 07:00) (97% - 100%)      Physical Exam:  General: NAD  HEENT: NC/AT, trach in place  Pulmonary: Nonlabored breathing, no respiratory distress, CTA-B  Cardiovascular: afib, MR  Abdominal: soft, NT/ND, +BS, midline incision healing  Extremities: warm, skin necrosis on toes b/l  Neuro: A/O x3, normal motor/sensation, no focal deficits  Lines/tubes/drains: condom cath; feeding jejunostomy, gastrostomy, duodenostomy, GUCCI x2 serous output    Vent settings:  Mode: CPAP with PS, FiO2: 40, PEEP: 5, MAP: 8.6, PIP: 15    I&O's Summary    04 Feb 2018 07:01  -  05 Feb 2018 07:00  --------------------------------------------------------  IN: 1852 mL / OUT: 1330 mL / NET: 522 mL        LABS:                        7.7    7.5   )-----------( 122      ( 05 Feb 2018 06:23 )             24.0     02-05    138  |  97  |  32<H>  ----------------------------<  152<H>  3.9   |  35<H>  |  0.82    Ca    8.2<L>      05 Feb 2018 06:23  Phos  2.9     02-05  Mg     2.3     02-05    TPro  5.6<L>  /  Alb  2.8<L>  /  TBili  4.9<H>  /  DBili  2.8<H>  /  AST  79<H>  /  ALT  68<H>  /  AlkPhos  238<H>  02-04    PTT - ( 05 Feb 2018 06:23 )  PTT:45.7 sec    CAPILLARY BLOOD GLUCOSE      POCT Blood Glucose.: 156 mg/dL (05 Feb 2018 06:40)  POCT Blood Glucose.: 188 mg/dL (05 Feb 2018 00:04)  POCT Blood Glucose.: 173 mg/dL (04 Feb 2018 18:23)  POCT Blood Glucose.: 137 mg/dL (04 Feb 2018 11:08)    LIVER FUNCTIONS - ( 04 Feb 2018 06:22 )  Alb: 2.8 g/dL / Pro: 5.6 g/dL / ALK PHOS: 238 U/L / ALT: 68 U/L / AST: 79 U/L / GGT: x             Cultures:Culture Results:   Moderate Gram Negative Rods  Identification and susceptibility to follow.  Normal Respiratory Kimmy absent (02-03 @ 08:37)  Culture Results:   No growth at 1 day. (02-03 @ 08:24) 24 hr events: Pt with fevers, GNR in sputum Cx from 2/3. Per ID, want to wait for culture speciation before changing abx; did well on CPAP 10/5; PTT 53- No changes. repeat daily. pt bp 's with rising bun - no lasix given. trach cuff leaking o/n- placed back on AC.     SUBJECTIVE: pain controlled; denies HA, n/c, cp/sob, abd pain, chills/sweats    MEDICATIONS  (STANDING):  ALBUTerol/ipratropium for Nebulization 3 milliLiter(s) Nebulizer every 6 hours  BACItracin   Ointment 1 Application(s) Topical two times a day  cefTAZidime  IVPB 2 Gram(s) IV Intermittent every 8 hours  chlorhexidine 0.12% Liquid 15 milliLiter(s) Swish and Spit two times a day  digoxin  Injectable 0.125 milliGRAM(s) IV Push daily  fluconAZOLE IVPB 400 milliGRAM(s) IV Intermittent every 24 hours  heparin  Infusion 1000 Unit(s)/Hr (10 mL/Hr) IV Continuous <Continuous>  insulin lispro (HumaLOG) corrective regimen sliding scale   SubCutaneous every 6 hours  metolazone 10 milliGRAM(s) Oral two times a day  pantoprazole  Injectable 40 milliGRAM(s) IV Push every 12 hours  sodium chloride 0.9% lock flush 20 milliLiter(s) IV Push once    MEDICATIONS  (PRN):  acetaminophen    Suspension 650 milliGRAM(s) Enteral Tube every 6 hours PRN For Temp greater than 38 C (100.4 F)  sodium chloride 0.9% lock flush 10 milliLiter(s) IV Push every 1 hour PRN After each medication administration     ICU Vital Signs Last 24 Hrs  T(C): 38.9 (05 Feb 2018 05:46), Max: 38.9 (05 Feb 2018 05:46)  T(F): 102.1 (05 Feb 2018 05:46), Max: 102.1 (05 Feb 2018 05:46)  HR: 94 (05 Feb 2018 07:00) (76 - 96)  BP: 109/52 (05 Feb 2018 07:00) (97/59 - 133/66)  BP(mean): 68 (05 Feb 2018 07:00) (67 - 92)  ABP: --  ABP(mean): --  RR: 25 (05 Feb 2018 07:00) (16 - 32)  SpO2: 98% (05 Feb 2018 07:00) (97% - 100%)      Physical Exam:  General: NAD  HEENT: NC/AT, trach in place  Pulmonary: Nonlabored breathing, no respiratory distress, CTA-B  Cardiovascular: afib, MR  Abdominal: soft, NT/ND, +BS, midline incision healing  Extremities: warm, skin necrosis on toes b/l  Neuro: A/O x3, normal motor/sensation, no focal deficits  Lines/tubes/drains: condom cath; feeding jejunostomy, gastrostomy, duodenostomy, GUCCI x2 serous output    Vent settings:  Mode: CPAP with PS, FiO2: 40, PEEP: 5, MAP: 8.6, PIP: 15    I&O's Summary    04 Feb 2018 07:01  -  05 Feb 2018 07:00  --------------------------------------------------------  IN: 1852 mL / OUT: 1330 mL / NET: 522 mL        LABS:                        7.7    7.5   )-----------( 122      ( 05 Feb 2018 06:23 )             24.0     02-05    138  |  97  |  32<H>  ----------------------------<  152<H>  3.9   |  35<H>  |  0.82    Ca    8.2<L>      05 Feb 2018 06:23  Phos  2.9     02-05  Mg     2.3     02-05    TPro  5.6<L>  /  Alb  2.8<L>  /  TBili  4.9<H>  /  DBili  2.8<H>  /  AST  79<H>  /  ALT  68<H>  /  AlkPhos  238<H>  02-04    PTT - ( 05 Feb 2018 06:23 )  PTT:45.7 sec    CAPILLARY BLOOD GLUCOSE      POCT Blood Glucose.: 156 mg/dL (05 Feb 2018 06:40)  POCT Blood Glucose.: 188 mg/dL (05 Feb 2018 00:04)  POCT Blood Glucose.: 173 mg/dL (04 Feb 2018 18:23)  POCT Blood Glucose.: 137 mg/dL (04 Feb 2018 11:08)    LIVER FUNCTIONS - ( 04 Feb 2018 06:22 )  Alb: 2.8 g/dL / Pro: 5.6 g/dL / ALK PHOS: 238 U/L / ALT: 68 U/L / AST: 79 U/L / GGT: x             Cultures:Culture Results:   Burkholderia cepacia: moderate Gram Negative Rods  Normal Respiratory Kimmy absent (02-03 @ 08:37)

## 2018-02-05 NOTE — PROGRESS NOTE ADULT - SUBJECTIVE AND OBJECTIVE BOX
Interventional, Pulmonary, Critical, Chest Special Procedures.    Pt was seen and fully examined by myself.     Time spent with patient in minutes:37    Patient is a 72y old  Male who presents with a chief complaint of acute cholecystitis (18 Jan 2018 14:49)Events of last 24hrs reviewed, the patient c no new symptoms, discussed at bedside c physiotherapy.     HPI:  71 yo M with h/o severe mitral and aortic valve regurgitation presented to ED with 5 day h/o left lower abdominal pain, nausea and multiple episodes of vomiting as well as PO intolerance. Pt reports that the pain in his abdomen is illicited by movement. Went to [facility] two days ago and underwent CT scan which revealed acute cholecystitis as well as right lower lobe infectious pneumonia. Pt was told he would require surgery and left AMA because he was waiting too long. Denies f/c. Last echo 1/4/17 with EF 60-65%. Normal BMs and passing flatus. (18 Jan 2018 14:49)    REVIEW OF SYSTEMS: updated  Constitutional: No fever, weight loss, chills or fatigue  Eyes: No eye pain, visual disturbances, or discharge  ENMT:  No difficulty hearing, tinnitus, vertigo; No sinus or throat pain. No epistaxis, dysphagia, dysphonia, hoarseness or odynophagia  Neck: No pain, stiffness or neck swelling.  No masses or deformities  Respiratory: No cough, wheezing, chills or hemoptysis  - COPD  - ILD   - PE   - ASTHMA     - PNEUMONIA  Cardiovascular: No chest pain, dysrhythmia, palpitations, dizziness or edema   - COPD     - CAD   - CHF   - HTN  Gastrointestinal: No abdominal or epigastric pain. No nausea, vomiting or hematemesis; No diarrhea or constipation. No melena or hematochezia. No dysphagia or Icterus.          Genitourinary: No dysuria, frequency, hematuria or incontinence   - CKD/GAETANO      - ESRD  Neurological: No headaches, memory loss, loss of strength, numbness or tremors      -DEMENTIA     - STROKE    - SEIZURE  Skin: No itching, burning, rashes or lesions   Lymph Nodes: No enlarged glands  Endocrine: No heat or cold intolerance; No hair loss       - DM     - THYROID DISORDER  Musculoskeletal: No joint pain or swelling; No muscle, back or extremity pain  Psychiatric: No depression, anxiety, mood swings or difficulty sleeping  Heme/Lymph: No easy bruising or bleeding gums         - ANEMIA      - CANCER   -COAGULOPATHY  Allergy and Immunologic: No hives or eczema    PAST MEDICAL & SURGICAL HISTORY:  Mitral regurgitation  Aortic regurgitation  No significant past surgical history    FAMILY HISTORY:    SOCIAL HISTORY:      - Tobacco     - ETOH    Allergies    No Known Allergies    Intolerances      Vital Signs Last 24 Hrs  T(C): 37 (05 Feb 2018 11:00), Max: 38.9 (05 Feb 2018 05:46)  T(F): 98.6 (05 Feb 2018 11:00), Max: 102.1 (05 Feb 2018 05:46)  HR: 92 (05 Feb 2018 12:00) (78 - 96)  BP: 111/73 (05 Feb 2018 12:00) (95/58 - 133/66)  BP(mean): 85 (05 Feb 2018 12:00) (68 - 92)  RR: 18 (05 Feb 2018 12:00) (16 - 36)  SpO2: 99% (05 Feb 2018 12:00) (97% - 100%)    02-04 @ 07:01  -  02-05 @ 07:00  --------------------------------------------------------  IN: 2228 mL / OUT: 1625 mL / NET: 603 mL    02-05 @ 07:01  -  02-05 @ 13:14  --------------------------------------------------------  IN: 390 mL / OUT: 0 mL / NET: 390 mL      Mode: AC/ CMV (Assist Control/ Continuous Mandatory Ventilation)  RR (machine): 12  TV (machine): 500  FiO2: 40  PEEP: 5  ITime: 1.7  MAP: 10  PIP: 20    PHYSICAL EXAM:  More  comfortable, no distress  Eyes: PERRL, EOM intact; conjunctiva and sclera clear  Head: Normocephalic;  No Trauma  ENMT: No nasal discharge, hoarseness, cough or hemoptysis.  Airway clear  Neck: Supple; non tender; no masses or deformities.    No JVD  Respiratory:  - WHEEZING   few R  RHONCHI  - RALES  = CRACKLES.  Diminished breath sounds  BILATERAL  RIGHT  LEFT bases   Cardiovascular: Regular rate and rhythm. S1 and S2 Normal; No murmurs, gallops or rubs     - PPM/AICD  Gastrointestinal: Soft ,distended; Normal bowel sounds; No hepatosplenomegaly.     +PEG    + multiple surgical drains integrity checked   + ANGELES  Genitourinary: No costovertebral angle tenderness. No dysuria  Extremities: Decreased AROM, + clubbing, cyanosis + edema, R foot toes unchanged pattern of necrosis, L foot less edema   Vascular: Peripheral pulses palpable 2+ bilaterally  Neurological: intubated and opening eyes to commands  Skin: Warm and dry. No obvious rash  Lymph Nodes: No acute cervical or supraclavicular adenopathy  Psychiatric: not sedated, calm affect    DEVICES:  - DENTURES   +IV R / L     - ETUBE   +TRACH 8.0 Silley cuffed, site is clean,      LABS:                          7.7    7.5   )-----------( 122      ( 05 Feb 2018 06:23 )             24.0     02-05    138  |  97  |  32<H>  ----------------------------<  152<H>  3.9   |  35<H>  |  0.82    Ca    8.2<L>      05 Feb 2018 06:23  Phos  2.9     02-05  Mg     2.3     02-05    TPro  5.6<L>  /  Alb  2.8<L>  /  TBili  4.9<H>  /  DBili  2.8<H>  /  AST  79<H>  /  ALT  68<H>  /  AlkPhos  238<H>  02-04    PTT - ( 05 Feb 2018 06:23 )  PTT:45.7 sec  < from: Xray Chest 1 View- PORTABLE-Routine (02.05.18 @ 07:46) >    EXAM:  XR CHEST PORTABLE ROUTINE 1V                          PROCEDURE DATE:  02/05/2018                     INTERPRETATION:  Portable Chest X-Ray dated 2/5/2018 7:46 AM    Indication: intuabted in sicu    An AP portable view of the chest is compared to 2/4/2018. As compared to   2/4/2018. No significant interval change. Bibasilar infiltrates. Mild   pulmonary venous congestion. Right basal chest tube. Left PICC line   catheter. Tracheostomy tube. Left pleural effusion. Possible small right   pleural effusion.    IMPRESSION:  No significant interval change.    < end of copied text >  RADIOLOGY & ADDITIONAL STUDIES (The following images were personally reviewed):

## 2018-02-05 NOTE — PROGRESS NOTE ADULT - SUBJECTIVE AND OBJECTIVE BOX
24 hr events: Pt with fevers, GNR in sputum Cx from 2/3. Per ID, want to wait for culture speciation before changing abx; did well on CPAP 10/5; PTT 53- No changes. repeat daily. pt bp 's with rising bun - no lasix given. trach cuff leaking o/n- placed back on AC.     SUBJECTIVE: pain controlled; denies HA, n/c, cp/sob, abd pain, chills/sweats    MEDICATIONS  (STANDING):  ALBUTerol/ipratropium for Nebulization 3 milliLiter(s) Nebulizer every 6 hours  BACItracin   Ointment 1 Application(s) Topical two times a day  cefTAZidime  IVPB 2 Gram(s) IV Intermittent every 8 hours  chlorhexidine 0.12% Liquid 15 milliLiter(s) Swish and Spit two times a day  digoxin  Injectable 0.125 milliGRAM(s) IV Push daily  fluconAZOLE IVPB 400 milliGRAM(s) IV Intermittent every 24 hours  heparin  Infusion 1000 Unit(s)/Hr (10 mL/Hr) IV Continuous <Continuous>  insulin lispro (HumaLOG) corrective regimen sliding scale   SubCutaneous every 6 hours  metolazone 10 milliGRAM(s) Oral two times a day  pantoprazole  Injectable 40 milliGRAM(s) IV Push every 12 hours  sodium chloride 0.9% lock flush 20 milliLiter(s) IV Push once    MEDICATIONS  (PRN):  acetaminophen    Suspension 650 milliGRAM(s) Enteral Tube every 6 hours PRN For Temp greater than 38 C (100.4 F)  sodium chloride 0.9% lock flush 10 milliLiter(s) IV Push every 1 hour PRN After each medication administration     ICU Vital Signs Last 24 Hrs  T(C): 38.9 (05 Feb 2018 05:46), Max: 38.9 (05 Feb 2018 05:46)  T(F): 102.1 (05 Feb 2018 05:46), Max: 102.1 (05 Feb 2018 05:46)  HR: 94 (05 Feb 2018 07:00) (76 - 96)  BP: 109/52 (05 Feb 2018 07:00) (97/59 - 133/66)  BP(mean): 68 (05 Feb 2018 07:00) (67 - 92)  ABP: --  ABP(mean): --  RR: 25 (05 Feb 2018 07:00) (16 - 32)  SpO2: 98% (05 Feb 2018 07:00) (97% - 100%)      Physical Exam:  General: NAD  HEENT: NC/AT, trach in place  Pulmonary: Nonlabored breathing, no respiratory distress, CTA-B  Cardiovascular: afib, MR  Abdominal: soft, NT/ND, +BS, midline incision healing  Extremities: warm, skin necrosis on toes b/l  Neuro: A/O x3, normal motor/sensation, no focal deficits  Lines/tubes/drains: condom cath; feeding jejunostomy, gastrostomy, duodenostomy, GUCCI x2 serous output    Vent settings:  Mode: CPAP with PS, FiO2: 40, PEEP: 5, MAP: 8.6, PIP: 15    I&O's Summary    04 Feb 2018 07:01  -  05 Feb 2018 07:00  --------------------------------------------------------  IN: 1852 mL / OUT: 1330 mL / NET: 522 mL        LABS:                        7.7    7.5   )-----------( 122      ( 05 Feb 2018 06:23 )             24.0     02-05    138  |  97  |  32<H>  ----------------------------<  152<H>  3.9   |  35<H>  |  0.82    Ca    8.2<L>      05 Feb 2018 06:23  Phos  2.9     02-05  Mg     2.3     02-05    TPro  5.6<L>  /  Alb  2.8<L>  /  TBili  4.9<H>  /  DBili  2.8<H>  /  AST  79<H>  /  ALT  68<H>  /  AlkPhos  238<H>  02-04    PTT - ( 05 Feb 2018 06:23 )  PTT:45.7 sec    CAPILLARY BLOOD GLUCOSE      POCT Blood Glucose.: 156 mg/dL (05 Feb 2018 06:40)  POCT Blood Glucose.: 188 mg/dL (05 Feb 2018 00:04)  POCT Blood Glucose.: 173 mg/dL (04 Feb 2018 18:23)  POCT Blood Glucose.: 137 mg/dL (04 Feb 2018 11:08)    LIVER FUNCTIONS - ( 04 Feb 2018 06:22 )  Alb: 2.8 g/dL / Pro: 5.6 g/dL / ALK PHOS: 238 U/L / ALT: 68 U/L / AST: 79 U/L / GGT: x             Cultures:Culture Results:   Burkholderia cepacia: moderate Gram Negative Rods  Normal Respiratory Kimmy absent (02-03 @ 08:37)

## 2018-02-06 LAB
ALBUMIN SERPL ELPH-MCNC: 2.6 G/DL — LOW (ref 3.3–5)
ALP SERPL-CCNC: 221 U/L — HIGH (ref 40–120)
ALT FLD-CCNC: 63 U/L — HIGH (ref 10–45)
ANION GAP SERPL CALC-SCNC: 12 MMOL/L — SIGNIFICANT CHANGE UP (ref 5–17)
APTT BLD: 51.2 SEC — HIGH (ref 27.5–37.4)
APTT BLD: 52.4 SEC — HIGH (ref 27.5–37.4)
AST SERPL-CCNC: 78 U/L — HIGH (ref 10–40)
BASE EXCESS BLDA CALC-SCNC: 10.3 MMOL/L — HIGH (ref -2–3)
BILIRUB DIRECT SERPL-MCNC: 1.6 MG/DL — HIGH (ref 0–0.2)
BILIRUB INDIRECT FLD-MCNC: 1.8 MG/DL — HIGH (ref 0.2–1)
BILIRUB SERPL-MCNC: 3.4 MG/DL — HIGH (ref 0.2–1.2)
BUN SERPL-MCNC: 33 MG/DL — HIGH (ref 7–23)
CALCIUM SERPL-MCNC: 8.1 MG/DL — LOW (ref 8.4–10.5)
CHLORIDE SERPL-SCNC: 98 MMOL/L — SIGNIFICANT CHANGE UP (ref 96–108)
CO2 SERPL-SCNC: 27 MMOL/L — SIGNIFICANT CHANGE UP (ref 22–31)
COLLECT DURATION TIME UR: 24 HR — SIGNIFICANT CHANGE UP
CREAT SERPL-MCNC: 0.77 MG/DL — SIGNIFICANT CHANGE UP (ref 0.5–1.3)
GAS PNL BLDA: SIGNIFICANT CHANGE UP
GLUCOSE BLDC GLUCOMTR-MCNC: 138 MG/DL — HIGH (ref 70–99)
GLUCOSE BLDC GLUCOMTR-MCNC: 166 MG/DL — HIGH (ref 70–99)
GLUCOSE BLDC GLUCOMTR-MCNC: 182 MG/DL — HIGH (ref 70–99)
GLUCOSE SERPL-MCNC: 182 MG/DL — HIGH (ref 70–99)
HCO3 BLDA-SCNC: 34 MMOL/L — HIGH (ref 21–28)
HCT VFR BLD CALC: 28 % — LOW (ref 39–50)
HGB BLD-MCNC: 8.7 G/DL — LOW (ref 13–17)
MAGNESIUM SERPL-MCNC: 2.5 MG/DL — SIGNIFICANT CHANGE UP (ref 1.6–2.6)
MCHC RBC-ENTMCNC: 30.3 PG — SIGNIFICANT CHANGE UP (ref 27–34)
MCHC RBC-ENTMCNC: 31.1 G/DL — LOW (ref 32–36)
MCV RBC AUTO: 97.6 FL — SIGNIFICANT CHANGE UP (ref 80–100)
PCO2 BLDA: 40 MMHG — SIGNIFICANT CHANGE UP (ref 35–48)
PH BLDA: 7.54 — HIGH (ref 7.35–7.45)
PHOSPHATE SERPL-MCNC: 3.1 MG/DL — SIGNIFICANT CHANGE UP (ref 2.5–4.5)
PLATELET # BLD AUTO: 120 K/UL — LOW (ref 150–400)
PO2 BLDA: 100 MMHG — SIGNIFICANT CHANGE UP (ref 83–108)
POTASSIUM SERPL-MCNC: 4.3 MMOL/L — SIGNIFICANT CHANGE UP (ref 3.5–5.3)
POTASSIUM SERPL-SCNC: 4.3 MMOL/L — SIGNIFICANT CHANGE UP (ref 3.5–5.3)
PROT SERPL-MCNC: 6.5 G/DL — SIGNIFICANT CHANGE UP (ref 6–8.3)
RBC # BLD: 2.87 M/UL — LOW (ref 4.2–5.8)
RBC # FLD: 22.2 % — HIGH (ref 10.3–16.9)
SAO2 % BLDA: 98 % — SIGNIFICANT CHANGE UP (ref 95–100)
SODIUM SERPL-SCNC: 137 MMOL/L — SIGNIFICANT CHANGE UP (ref 135–145)
TOTAL VOLUME - 24 HOUR: 1000 ML — SIGNIFICANT CHANGE UP
URINE CREATININE CALCULATION: 0.7 G/24 H — LOW (ref 1–2)
UUN 24H UR-MRATE: 8 G/24H — SIGNIFICANT CHANGE UP (ref 6–17)
WBC # BLD: 7.6 K/UL — SIGNIFICANT CHANGE UP (ref 3.8–10.5)
WBC # FLD AUTO: 7.6 K/UL — SIGNIFICANT CHANGE UP (ref 3.8–10.5)

## 2018-02-06 PROCEDURE — 99233 SBSQ HOSP IP/OBS HIGH 50: CPT | Mod: GC

## 2018-02-06 PROCEDURE — 71045 X-RAY EXAM CHEST 1 VIEW: CPT | Mod: 26

## 2018-02-06 PROCEDURE — 99232 SBSQ HOSP IP/OBS MODERATE 35: CPT

## 2018-02-06 RX ORDER — APIXABAN 2.5 MG/1
5 TABLET, FILM COATED ORAL EVERY 12 HOURS
Qty: 0 | Refills: 0 | Status: DISCONTINUED | OUTPATIENT
Start: 2018-02-06 | End: 2018-02-06

## 2018-02-06 RX ORDER — ENOXAPARIN SODIUM 100 MG/ML
90 INJECTION SUBCUTANEOUS
Qty: 0 | Refills: 0 | Status: DISCONTINUED | OUTPATIENT
Start: 2018-02-06 | End: 2018-02-06

## 2018-02-06 RX ORDER — APIXABAN 2.5 MG/1
5 TABLET, FILM COATED ORAL EVERY 12 HOURS
Qty: 0 | Refills: 0 | Status: DISCONTINUED | OUTPATIENT
Start: 2018-02-06 | End: 2018-02-08

## 2018-02-06 RX ORDER — DOCUSATE SODIUM 100 MG
100 CAPSULE ORAL
Qty: 0 | Refills: 0 | Status: DISCONTINUED | OUTPATIENT
Start: 2018-02-06 | End: 2018-02-27

## 2018-02-06 RX ADMIN — Medication 3 MILLILITER(S): at 06:28

## 2018-02-06 RX ADMIN — Medication 3 MILLILITER(S): at 11:20

## 2018-02-06 RX ADMIN — PANTOPRAZOLE SODIUM 40 MILLIGRAM(S): 20 TABLET, DELAYED RELEASE ORAL at 06:23

## 2018-02-06 RX ADMIN — Medication 650 MILLIGRAM(S): at 12:20

## 2018-02-06 RX ADMIN — Medication 2: at 07:46

## 2018-02-06 RX ADMIN — Medication 0.12 MILLIGRAM(S): at 12:15

## 2018-02-06 RX ADMIN — Medication 650 MILLIGRAM(S): at 03:33

## 2018-02-06 RX ADMIN — Medication 2: at 11:30

## 2018-02-06 RX ADMIN — CHLORHEXIDINE GLUCONATE 15 MILLILITER(S): 213 SOLUTION TOPICAL at 06:23

## 2018-02-06 RX ADMIN — Medication 100 MILLIGRAM(S): at 07:46

## 2018-02-06 RX ADMIN — Medication 1 APPLICATION(S): at 12:15

## 2018-02-06 RX ADMIN — Medication 3 MILLILITER(S): at 01:03

## 2018-02-06 RX ADMIN — PANTOPRAZOLE SODIUM 40 MILLIGRAM(S): 20 TABLET, DELAYED RELEASE ORAL at 18:50

## 2018-02-06 RX ADMIN — FLUCONAZOLE 100 MILLIGRAM(S): 150 TABLET ORAL at 14:42

## 2018-02-06 RX ADMIN — CHLORHEXIDINE GLUCONATE 15 MILLILITER(S): 213 SOLUTION TOPICAL at 18:51

## 2018-02-06 RX ADMIN — Medication 100 MILLIGRAM(S): at 18:51

## 2018-02-06 RX ADMIN — Medication 1 APPLICATION(S): at 06:23

## 2018-02-06 RX ADMIN — Medication 1 APPLICATION(S): at 18:51

## 2018-02-06 RX ADMIN — APIXABAN 5 MILLIGRAM(S): 2.5 TABLET, FILM COATED ORAL at 17:03

## 2018-02-06 RX ADMIN — Medication 3 MILLILITER(S): at 17:47

## 2018-02-06 RX ADMIN — Medication 3 MILLILITER(S): at 23:34

## 2018-02-06 NOTE — PROGRESS NOTE ADULT - ASSESSMENT
72yM with PMH severe MR/AR presented after recent dx acute cholecystitis and RLL PNA at [facility] with lower abdominal pain, new onset rapid Afib, sepsis, acute CHF exacerbation, initially admitted to cardiology on bipap, then transferred to SICU with perforated duodenum s/p Shreyas patch and jejunostomy (1/20) c/b breakdown s/p RTOR, washout, new Shreyas patch, gastrostomy, duodenostomy, IVCF (1/22)    Neuro: Tylenol PRN  CV: Afib: Echo EF 50%, Severe MR, no vegetation. metolazone 5 BID, digoxin,   Pulm: Trach 8.0 shiley on cmv 40/500/12/5 chlorhex, duonebs  GI: NPO, Jtube TF Glucerna 1.2 68cc, Protonix BID. FW 200q4h, Albumin 25% q8h. NGT to LIWS, retrograde D-tube to LIWS, G-tube to LIWS, pcolace dulcolax   : voids  ID: 2/2: sputum cx GNR(Burkholderia cepacia)/stenotrophomonas: Perforated duodenum/PNA, surgical swab grew Candida albicans: fluconazole (2/1--),  // D/c: micafungin (1/2 )2-2/1), ceftaz (1/29, end 2/4 Vanco (1/19-20), Zosyn (1/19-22; 1/27-29), linezolid (1/22-23), meropenem (1/22-27)    Endo: ISS  HEME:DVT: Heparin gtt goal ptt 50-60 [thrombocytopenic, HIT+, serotonin release assay negative.] IVCF (1/22) // 1FFP, 1PLT (1/22).   PPx: SCD, R pop DVT, s/p IVC filter 1/22 hep gtt   Lines: PIV, PICC (1/29--), L brachial [place] (1/20-1/27),  LSC HD cath (1/22-1/27)  Wounds: Ex lap, JPx2 to bulb suction, J-tube to gravity, D-tube to LIWS, G-tube to LIWS; silvadene for toes  PT/OT: Early mobilization.

## 2018-02-06 NOTE — PROGRESS NOTE ADULT - SUBJECTIVE AND OBJECTIVE BOX
Chief Complaint/Reason for Consult: periop cv mgmt  INTERVAL HPI: ID recs appreciated, tele rate controlled no pauses no rvr  	  MEDICATIONS:  digoxin  Injectable 0.125 milliGRAM(s) IV Push daily  metolazone 5 milliGRAM(s) Oral two times a day    fluconAZOLE IVPB 400 milliGRAM(s) IV Intermittent every 24 hours    ALBUTerol/ipratropium for Nebulization 3 milliLiter(s) Nebulizer every 6 hours    acetaminophen    Suspension 650 milliGRAM(s) Enteral Tube every 6 hours PRN    bisacodyl Suppository 10 milliGRAM(s) Rectal daily PRN  docusate sodium 100 milliGRAM(s) Oral two times a day  pantoprazole  Injectable 40 milliGRAM(s) IV Push every 12 hours    insulin lispro (HumaLOG) corrective regimen sliding scale   SubCutaneous every 6 hours    BACItracin   Ointment 1 Application(s) Topical two times a day  chlorhexidine 0.12% Liquid 15 milliLiter(s) Swish and Spit two times a day  enoxaparin Injectable 90 milliGRAM(s) SubCutaneous two times a day  silver sulfADIAZINE 1% Cream 1 Application(s) Topical daily      REVIEW OF SYSTEMS:  [x] As per HPI  CONSTITUTIONAL: No fever, weight loss, or fatigue  RESPIRATORY: No cough, wheezing, chills or hemoptysis; No Shortness of Breath  CARDIOVASCULAR: No chest pain, palpitations, dizziness, or leg swelling  GASTROINTESTINAL: No abdominal or epigastric pain. No nausea, vomiting, or hematemesis; No diarrhea or constipation. No melena or hematochezia.  MUSCULOSKELETAL: No joint pain or swelling; No muscle, back, or extremity pain  [x] All others negative	  [ ] Unable to obtain    PHYSICAL EXAM:  T(C): 38.4 (02-06-18 @ 12:00), Max: 38.6 (02-05-18 @ 18:00)  HR: 96 (02-06-18 @ 14:00) (65 - 96)  BP: 134/69 (02-06-18 @ 14:00) (100/55 - 134/69)  RR: 18 (02-06-18 @ 14:00) (9 - 29)  SpO2: 98% (02-06-18 @ 14:00) (93% - 100%)  Wt(kg): --  I&O's Summary    05 Feb 2018 07:01  -  06 Feb 2018 07:00  --------------------------------------------------------  IN: 2139 mL / OUT: 2035 mL / NET: 104 mL    06 Feb 2018 07:01  -  06 Feb 2018 14:46  --------------------------------------------------------  IN: 859 mL / OUT: 625 mL / NET: 234 mL          Appearance: Normal	  HEENT:   Normal oral mucosa  Cardiovascular: Normal S1 S2, No JVD, No murmurs, No edema  Respiratory: Lungs clear to auscultation	  Gastrointestinal:  Soft, Non-tender, + BS	  Extremities: Normal range of motion, No clubbing, cyanosis or edema  Vascular: Peripheral pulses palpable 2+ bilaterally    TELEMETRY: 	    ECG:   	  RADIOLOGY:   CXR:  CT:  US:    CARDIAC TESTING:  Echocardiogram:  Catheterization:  Stress Test:      LABS:	 	    CARDIAC MARKERS:                                  8.7    7.6   )-----------( 120      ( 06 Feb 2018 05:43 )             28.0     02-06    137  |  98  |  33<H>  ----------------------------<  182<H>  4.3   |  27  |  0.77    Ca    8.1<L>      06 Feb 2018 05:43  Phos  3.1     02-06  Mg     2.5     02-06      proBNP:   Lipid Profile:   HgA1c:   TSH:     ASSESSMENT/PLAN: 	    # Afib - Patient with continued Afib now with controlled VR (HR 60s) s/p Digoxin. Dose was held yesterday for slow VR, now resolving today. Digoxin level noted to be 1.2 therapeutic. Continue Digoxin. Hold for HR < 60bpm. Cr is stable    #CHF - severe MR CTS recs appreciated, continue diureses per SICU   continue metalazone   and PRN Lasix IV  off pressors

## 2018-02-06 NOTE — PROGRESS NOTE ADULT - ATTENDING COMMENTS
JENNYFER Clark has acted as my scribe. Continue [id number] with high minute vent if tolerates. ABG with met alkalosis and resp alkalosis. Decrease diuresis. cont off Ceftazidime per ID and temp curve decreasing.

## 2018-02-06 NOTE — PROGRESS NOTE ADULT - SUBJECTIVE AND OBJECTIVE BOX
Interventional, Pulmonary, Critical, Chest Special Procedures.    Pt was seen and fully examined by myself.     Time spent with patient in minutes:37    Patient is a 72y old  Male who presents with a chief complaint of acute cholecystitis (18 Jan 2018 14:49)The patient awake and engaging, pain free when seen. The patient was able to generate  on demand, his resting TV pattern is improving.   The patient discussed c KRISTAN Huntley.    HPI:  71 yo M with h/o severe mitral and aortic valve regurgitation presented to ED with 5 day h/o left lower abdominal pain, nausea and multiple episodes of vomiting as well as PO intolerance. Pt reports that the pain in his abdomen is illicited by movement. Went to [facility] two days ago and underwent CT scan which revealed acute cholecystitis as well as right lower lobe infectious pneumonia. Pt was told he would require surgery and left AMA because he was waiting too long. Denies f/c. Last echo 1/4/17 with EF 60-65%. Normal BMs and passing flatus. (18 Jan 2018 14:49)    REVIEW OF SYSTEMS: updated  Constitutional: No fever, weight loss, chills or fatigue  Eyes: No eye pain, visual disturbances, or discharge  ENMT:  No difficulty hearing, tinnitus, vertigo; No sinus or throat pain. No epistaxis, dysphagia, dysphonia, hoarseness or odynophagia  Neck: No pain, stiffness or neck swelling.  No masses or deformities  Respiratory: No cough, wheezing, chills or hemoptysis  - COPD  - ILD   - PE   - ASTHMA     - PNEUMONIA  Cardiovascular: No chest pain, dysrhythmia, palpitations, dizziness or edema   - COPD     - CAD   - CHF   - HTN  Gastrointestinal: No abdominal or epigastric pain. No nausea, vomiting or hematemesis; No diarrhea or constipation. No melena or hematochezia. No dysphagia or Icterus.          Genitourinary: No dysuria, frequency, hematuria or incontinence   - CKD/GAETANO      - ESRD  Neurological: No headaches, memory loss, loss of strength, numbness or tremors      -DEMENTIA     - STROKE    - SEIZURE  Skin: No itching, burning, rashes or lesions   Lymph Nodes: No enlarged glands  Endocrine: No heat or cold intolerance; No hair loss       - DM     - THYROID DISORDER  Musculoskeletal: No joint pain or swelling; No muscle, back or extremity pain  Psychiatric: No depression, anxiety, mood swings or difficulty sleeping  Heme/Lymph: No easy bruising or bleeding gums         - ANEMIA      - CANCER   -COAGULOPATHY  Allergy and Immunologic: No hives or eczema    PAST MEDICAL & SURGICAL HISTORY:  Mitral regurgitation  Aortic regurgitation  No significant past surgical history    FAMILY HISTORY:    SOCIAL HISTORY:      - Tobacco     - ETOH    Allergies    No Known Allergies    Intolerances      Vital Signs Last 24 Hrs  T(C): 38.6 (06 Feb 2018 14:46), Max: 38.6 (05 Feb 2018 18:00)  T(F): 101.4 (06 Feb 2018 14:46), Max: 101.4 (05 Feb 2018 18:00)  HR: 96 (06 Feb 2018 15:00) (65 - 96)  BP: 99/57 (06 Feb 2018 15:00) (99/57 - 134/69)  BP(mean): 75 (06 Feb 2018 15:00) (63 - 88)  RR: 25 (06 Feb 2018 15:00) (9 - 29)  SpO2: 98% (06 Feb 2018 15:00) (93% - 100%)    02-05 @ 07:01 - 02-06 @ 07:00  --------------------------------------------------------  IN: 2139 mL / OUT: 2035 mL / NET: 104 mL    02-06 @ 07:01  -  02-06 @ 15:52  --------------------------------------------------------  IN: 859 mL / OUT: 625 mL / NET: 234 mL      Mode: CPAP with PS  FiO2: 40  PEEP: 5  PS: 10  MAP: 8  PIP: 16    PHYSICAL EXAM:  More  comfortable, no distress  Eyes: PERRL, EOM intact; conjunctiva and sclera clear  Head: Normocephalic;  No Trauma  ENMT: No nasal discharge, hoarseness, cough or hemoptysis.  Airway clear  Neck: Supple; non tender; no masses or deformities.    No JVD  Respiratory:  - WHEEZING   few R  RHONCHI  - RALES  = CRACKLES.  Diminished breath sounds  BILATERAL  RIGHT  LEFT bases   Cardiovascular: Regular rate and rhythm. S1 and S2 Normal; No murmurs, gallops or rubs     - PPM/AICD  Gastrointestinal: Soft ,distended; Normal bowel sounds; No hepatosplenomegaly.     +PEG    + multiple surgical drains integrity checked    ANGELES  Genitourinary: No costovertebral angle tenderness. No dysuria  Extremities: Decreased AROM, + clubbing, cyanosis + edema, R foot toes unchanged pattern of necrosis, L foot less edema   Vascular: Peripheral pulses palpable 2+ bilaterally  Neurological: intubated and opening eyes to commands  Skin: Warm and dry. No obvious rash  Lymph Nodes: No acute cervical or supraclavicular adenopathy  Psychiatric: not sedated, calm affect    DEVICES:  - DENTURES   +IV R / L     - ETUBE   +TRACH 8.0 Shilley cuffed, site is clean,      LABS:  ABG - ( 06 Feb 2018 10:43 )  pH: 7.54  /  pCO2: 40    /  pO2: 100   / HCO3: 34    / Base Excess: 10.3  /  SaO2: 98                                      8.7    7.6   )-----------( 120      ( 06 Feb 2018 05:43 )             28.0     02-06    137  |  98  |  33<H>  ----------------------------<  182<H>  4.3   |  27  |  0.77    Ca    8.1<L>      06 Feb 2018 05:43  Phos  3.1     02-06  Mg     2.5     02-06      PTT - ( 06 Feb 2018 05:43 )  PTT:51.2 sec  RADIOLOGY & ADDITIONAL STUDIES (The following images were personally reviewed):

## 2018-02-06 NOTE — PROGRESS NOTE ADULT - ASSESSMENT
ASSESSMENT/PLAN 71y/o male pt c Respiratory failure, perforated duodenal ulcer, POD post repair, RLL pneumonia, RLL atelectases, no obvious RADHA, elevated R hemidiaphragm, AFIB,  thrombocytopenia, possible endocarditis, s/p reop. now POP tracheostomy, new fever pattern    1. O2: Attempt T collar daytime with CPAP mode on stand by  2. Bronchodilators:  Atrovent/ albuterol q 4 – 6 hours as needed  3. Corticosteroids: off  4. ID/Antibiotics:  Micafungin strict ID supervision    5. Cardiac/HTN: Monitor, optimize HR;   6. GI: Rx/ prophylaxis c PPI/Protonix  7. Heme: Rx/VT pt now on Enoxa[jadiel  8. Aspiration precautions, feeding PEG  9. Surgery mngmnt priority  10. Edema: diuretic to continue  Discussed c managing team.

## 2018-02-06 NOTE — PROGRESS NOTE ADULT - SUBJECTIVE AND OBJECTIVE BOX
S: Pt reports no complaints overnight, no HA/CP/SOB. He slept well. Pain is well controlled.     Vitals: Vital Signs Last 24 Hrs  T(C): 37.2 (2018 05:01), Max: 38.6 (2018 08:55)  T(F): 99 (2018 05:01), Max: 101.5 (2018 08:55)  HR: 76 (2018 07:00) (66 - 92)  BP: 113/59 (2018 07:00) (95/58 - 136/99)  BP(mean): 81 (2018 07:00) (63 - 125)  RR: 15 (2018 07:00) (11 - 36)  SpO2: 100% (2018 07:00) (93% - 100%)             @ 07:01  -  - @ 07:00  --------------------------------------------------------  IN: 1960 mL / OUT: 1810 mL / NET: 150 mL            Labs:                         8.7    7.6   )-----------( 120      ( 2018 05:43 )             28.0   02-06    137  |  98  |  33<H>  ----------------------------<  182<H>  4.3   |  27  |  0.77    Ca    8.1<L>      2018 05:43  Phos  3.1     02-06  Mg     2.5     02-06    PTT - ( 2018 05:43 )  PTT:51.2 secUrinalysis Basic - ( 2018 16:24 )    Color: Yellow / Appearance: Clear / S.025 / pH: x  Gluc: x / Ketone: Trace mg/dL  / Bili: Small / Urobili: 1.0 E.U./dL   Blood: x / Protein: 100 mg/dL / Nitrite: NEGATIVE   Leuk Esterase: NEGATIVE / RBC: 5-10 /HPF / WBC < 5 /HPF   Sq Epi: x / Non Sq Epi: x / Bacteria: Many /HPF        Electrolytes repleated to goals as follows: Potassium 4, Phosphorus 3 and Magnesium 2 where appropriate and necessary    Exam:  Neuro: A&Ox3 by yes/no, NAD, grossly neuro intact  HEENT: PERRL < EOMI, MMM  Neck: Supple  CV: Irregularly irregular, 4/6 holosystolic murmur  Pulm: RUL scattered rhonchi, clears with cough or deep respiration, no wheezes, rales  Abd: Moderately distended, (+) tympani, feeding jejunostomy, draining gastrostomy with serous output, duodenostomy with bilious output, GUCCI x2 serous output, Midline incision with staples c/d/i, healing well, BS (+), Soft, NT  : condom cath in place, scrotal edema improving  Ext: B/L UE 3+ edema, B/L LEs 2+ edema, Dependent trace edema, all improving.   Vasc: Extremities warm to palpation, RLE with dry necrotic 4th and 5th toes is stable.  MSK: no joint swelling  Skin: no rash  Psych: affect appropriate

## 2018-02-06 NOTE — PROGRESS NOTE ADULT - SUBJECTIVE AND OBJECTIVE BOX
Critical Care    INTERVAL HPI/OVERNIGHT EVENTS:    ANTIBIOTICS    MEDICATIONS  (STANDING):  ALBUTerol/ipratropium for Nebulization 3 milliLiter(s) Nebulizer every 6 hours  BACItracin   Ointment 1 Application(s) Topical two times a day  chlorhexidine 0.12% Liquid 15 milliLiter(s) Swish and Spit two times a day  digoxin  Injectable 0.125 milliGRAM(s) IV Push daily  docusate sodium 100 milliGRAM(s) Oral two times a day  fluconAZOLE IVPB 400 milliGRAM(s) IV Intermittent every 24 hours  heparin  Infusion 1100 Unit(s)/Hr (11 mL/Hr) IV Continuous <Continuous>  insulin lispro (HumaLOG) corrective regimen sliding scale   SubCutaneous every 6 hours  metolazone 5 milliGRAM(s) Oral two times a day  pantoprazole  Injectable 40 milliGRAM(s) IV Push every 12 hours  silver sulfADIAZINE 1% Cream 1 Application(s) Topical daily  sodium chloride 0.9% lock flush 20 milliLiter(s) IV Push once    MEDICATIONS  (PRN):  acetaminophen    Suspension 650 milliGRAM(s) Enteral Tube every 6 hours PRN For Temp greater than 38 C (100.4 F)  bisacodyl Suppository 10 milliGRAM(s) Rectal daily PRN Constipation  sodium chloride 0.9% lock flush 10 milliLiter(s) IV Push every 1 hour PRN After each medication administration      Allergies    No Known Allergies    Intolerances        REVIEW OF SYSTEMS:    patient unable to provide a review of systems    Vital Signs Last 24 Hrs  T(C): 37.3 (2018 09:03), Max: 38.6 (2018 18:00)  T(F): 99.1 (2018 09:03), Max: 101.4 (2018 18:00)  HR: 88 (2018 10:00) (65 - 92)  BP: 126/62 (2018 10:00) (96/62 - 136/99)  BP(mean): 77 (2018 10:00) (63 - 125)  RR: 29 (2018 10:00) (9 - 32)  SpO2: 100% (2018 10:00) (93% - 100%)    PHYSICAL EXAM:    General:  in no acute distress  Eyes: PERRL, EOM intact; conjunctiva and sclera clear  Head: Normocephalic; atraumatic  ENMT: No nasal discharge; airway clear  Neck: Supple; non tender; no masses  Respiratory: No wheezes, rales or rhonchi  Cardiovascular: Regular rate and rhythm. S1 and S2 Normal; No murmurs, gallops or rubs  Gastrointestinal: Soft non-tender non-distended; Normal bowel sounds; No hepatosplenomegaly  Genitourinary: No costovertebral angle tenderness    LABS:                        8.7    7.6   )-----------( 120      ( 2018 05:43 )             28.0     02-06    137  |  98  |  33<H>  ----------------------------<  182<H>  4.3   |  27  |  0.77    Ca    8.1<L>      2018 05:43  Phos  3.1     02-  Mg     2.5     02-06      PTT - ( 2018 05:43 )  PTT:51.2 sec  Urinalysis Basic - ( 2018 16:24 )    Color: Yellow / Appearance: Clear / S.025 / pH: x  Gluc: x / Ketone: Trace mg/dL  / Bili: Small / Urobili: 1.0 E.U./dL   Blood: x / Protein: 100 mg/dL / Nitrite: NEGATIVE   Leuk Esterase: NEGATIVE / RBC: 5-10 /HPF / WBC < 5 /HPF   Sq Epi: x / Non Sq Epi: x / Bacteria: Many /HPF          MICROBIOLOGY:  Culture Results:   Moderate Burkholderia cepacia  Normal Respiratory Geeta absent ( @ 08:37)  Culture Results:   No growth at 3 days. ( @ 08:24)  Culture Results:   Moderate Burkholderia cepacia  Routine respiratory geeta absent ( @ 19:18)      RADIOLOGY & ADDITIONAL STUDIES:

## 2018-02-07 LAB
ALBUMIN SERPL ELPH-MCNC: 2.5 G/DL — LOW (ref 3.3–5)
ALBUMIN SERPL ELPH-MCNC: 3.1 G/DL — LOW (ref 3.3–5)
ALP SERPL-CCNC: 203 U/L — HIGH (ref 40–120)
ALP SERPL-CCNC: 204 U/L — HIGH (ref 40–120)
ALT FLD-CCNC: 54 U/L — HIGH (ref 10–45)
ALT FLD-CCNC: 66 U/L — HIGH (ref 10–45)
ANION GAP SERPL CALC-SCNC: 14 MMOL/L — SIGNIFICANT CHANGE UP (ref 5–17)
ANION GAP SERPL CALC-SCNC: 16 MMOL/L — SIGNIFICANT CHANGE UP (ref 5–17)
APPEARANCE UR: CLEAR — SIGNIFICANT CHANGE UP
AST SERPL-CCNC: 57 U/L — HIGH (ref 10–40)
AST SERPL-CCNC: 75 U/L — HIGH (ref 10–40)
BASOPHILS NFR BLD AUTO: 0.4 % — SIGNIFICANT CHANGE UP (ref 0–2)
BASOPHILS NFR BLD AUTO: 0.4 % — SIGNIFICANT CHANGE UP (ref 0–2)
BILIRUB DIRECT SERPL-MCNC: 1.6 MG/DL — HIGH (ref 0–0.2)
BILIRUB INDIRECT FLD-MCNC: 1.3 MG/DL — HIGH (ref 0.2–1)
BILIRUB SERPL-MCNC: 2.9 MG/DL — HIGH (ref 0.2–1.2)
BILIRUB SERPL-MCNC: 3.7 MG/DL — HIGH (ref 0.2–1.2)
BILIRUB UR-MCNC: (no result)
BUN SERPL-MCNC: 39 MG/DL — HIGH (ref 7–23)
BUN SERPL-MCNC: 39 MG/DL — HIGH (ref 7–23)
CALCIUM SERPL-MCNC: 7.8 MG/DL — LOW (ref 8.4–10.5)
CALCIUM SERPL-MCNC: 8.4 MG/DL — SIGNIFICANT CHANGE UP (ref 8.4–10.5)
CHLORIDE SERPL-SCNC: 93 MMOL/L — LOW (ref 96–108)
CHLORIDE SERPL-SCNC: 94 MMOL/L — LOW (ref 96–108)
CO2 SERPL-SCNC: 28 MMOL/L — SIGNIFICANT CHANGE UP (ref 22–31)
CO2 SERPL-SCNC: 30 MMOL/L — SIGNIFICANT CHANGE UP (ref 22–31)
COLOR SPEC: YELLOW — SIGNIFICANT CHANGE UP
CREAT SERPL-MCNC: 0.87 MG/DL — SIGNIFICANT CHANGE UP (ref 0.5–1.3)
CREAT SERPL-MCNC: 0.95 MG/DL — SIGNIFICANT CHANGE UP (ref 0.5–1.3)
DIFF PNL FLD: (no result)
EOSINOPHIL NFR BLD AUTO: 1 % — SIGNIFICANT CHANGE UP (ref 0–6)
EOSINOPHIL NFR BLD AUTO: 1.5 % — SIGNIFICANT CHANGE UP (ref 0–6)
GLUCOSE BLDC GLUCOMTR-MCNC: 128 MG/DL — HIGH (ref 70–99)
GLUCOSE BLDC GLUCOMTR-MCNC: 160 MG/DL — HIGH (ref 70–99)
GLUCOSE BLDC GLUCOMTR-MCNC: 183 MG/DL — HIGH (ref 70–99)
GLUCOSE BLDC GLUCOMTR-MCNC: 187 MG/DL — HIGH (ref 70–99)
GLUCOSE SERPL-MCNC: 157 MG/DL — HIGH (ref 70–99)
GLUCOSE SERPL-MCNC: 159 MG/DL — HIGH (ref 70–99)
GLUCOSE UR QL: NEGATIVE — SIGNIFICANT CHANGE UP
GRAM STN FLD: SIGNIFICANT CHANGE UP
HBA1C BLD-MCNC: 5.4 % — SIGNIFICANT CHANGE UP (ref 4–5.6)
HCT VFR BLD CALC: 24 % — LOW (ref 39–50)
HCT VFR BLD CALC: 27.7 % — LOW (ref 39–50)
HGB BLD-MCNC: 7.6 G/DL — LOW (ref 13–17)
HGB BLD-MCNC: 8.6 G/DL — LOW (ref 13–17)
KETONES UR-MCNC: NEGATIVE — SIGNIFICANT CHANGE UP
LACTATE SERPL-SCNC: 6.1 MMOL/L — CRITICAL HIGH (ref 0.5–2)
LEUKOCYTE ESTERASE UR-ACNC: NEGATIVE — SIGNIFICANT CHANGE UP
LYMPHOCYTES # BLD AUTO: 17.4 % — SIGNIFICANT CHANGE UP (ref 13–44)
LYMPHOCYTES # BLD AUTO: 23.1 % — SIGNIFICANT CHANGE UP (ref 13–44)
MAGNESIUM SERPL-MCNC: 2.6 MG/DL — SIGNIFICANT CHANGE UP (ref 1.6–2.6)
MCHC RBC-ENTMCNC: 30.9 PG — SIGNIFICANT CHANGE UP (ref 27–34)
MCHC RBC-ENTMCNC: 31 G/DL — LOW (ref 32–36)
MCHC RBC-ENTMCNC: 31 PG — SIGNIFICANT CHANGE UP (ref 27–34)
MCHC RBC-ENTMCNC: 31.7 G/DL — LOW (ref 32–36)
MCV RBC AUTO: 98 FL — SIGNIFICANT CHANGE UP (ref 80–100)
MCV RBC AUTO: 99.6 FL — SIGNIFICANT CHANGE UP (ref 80–100)
MONOCYTES NFR BLD AUTO: 5 % — SIGNIFICANT CHANGE UP (ref 2–14)
MONOCYTES NFR BLD AUTO: 6 % — SIGNIFICANT CHANGE UP (ref 2–14)
NEUTROPHILS NFR BLD AUTO: 70 % — SIGNIFICANT CHANGE UP (ref 43–77)
NEUTROPHILS NFR BLD AUTO: 75.2 % — SIGNIFICANT CHANGE UP (ref 43–77)
NITRITE UR-MCNC: NEGATIVE — SIGNIFICANT CHANGE UP
PH UR: 6.5 — SIGNIFICANT CHANGE UP (ref 5–8)
PHOSPHATE SERPL-MCNC: 3.3 MG/DL — SIGNIFICANT CHANGE UP (ref 2.5–4.5)
PLATELET # BLD AUTO: 157 K/UL — SIGNIFICANT CHANGE UP (ref 150–400)
PLATELET # BLD AUTO: 188 K/UL — SIGNIFICANT CHANGE UP (ref 150–400)
POTASSIUM SERPL-MCNC: 4 MMOL/L — SIGNIFICANT CHANGE UP (ref 3.5–5.3)
POTASSIUM SERPL-MCNC: 4.2 MMOL/L — SIGNIFICANT CHANGE UP (ref 3.5–5.3)
POTASSIUM SERPL-SCNC: 4 MMOL/L — SIGNIFICANT CHANGE UP (ref 3.5–5.3)
POTASSIUM SERPL-SCNC: 4.2 MMOL/L — SIGNIFICANT CHANGE UP (ref 3.5–5.3)
PROT SERPL-MCNC: 5.7 G/DL — LOW (ref 6–8.3)
PROT SERPL-MCNC: 6.8 G/DL — SIGNIFICANT CHANGE UP (ref 6–8.3)
PROT UR-MCNC: 100 MG/DL
RBC # BLD: 2.45 M/UL — LOW (ref 4.2–5.8)
RBC # BLD: 2.78 M/UL — LOW (ref 4.2–5.8)
RBC # FLD: 22 % — HIGH (ref 10.3–16.9)
RBC # FLD: 22.4 % — HIGH (ref 10.3–16.9)
SODIUM SERPL-SCNC: 137 MMOL/L — SIGNIFICANT CHANGE UP (ref 135–145)
SODIUM SERPL-SCNC: 138 MMOL/L — SIGNIFICANT CHANGE UP (ref 135–145)
SP GR SPEC: 1.02 — SIGNIFICANT CHANGE UP (ref 1–1.03)
SPECIMEN SOURCE: SIGNIFICANT CHANGE UP
UROBILINOGEN FLD QL: 4 E.U./DL
WBC # BLD: 12.8 K/UL — HIGH (ref 3.8–10.5)
WBC # BLD: 7.5 K/UL — SIGNIFICANT CHANGE UP (ref 3.8–10.5)
WBC # FLD AUTO: 12.8 K/UL — HIGH (ref 3.8–10.5)
WBC # FLD AUTO: 7.5 K/UL — SIGNIFICANT CHANGE UP (ref 3.8–10.5)

## 2018-02-07 PROCEDURE — 99233 SBSQ HOSP IP/OBS HIGH 50: CPT | Mod: GC

## 2018-02-07 PROCEDURE — 73620 X-RAY EXAM OF FOOT: CPT | Mod: 26,50

## 2018-02-07 PROCEDURE — 71045 X-RAY EXAM CHEST 1 VIEW: CPT | Mod: 26,77

## 2018-02-07 PROCEDURE — 71045 X-RAY EXAM CHEST 1 VIEW: CPT | Mod: 26

## 2018-02-07 RX ORDER — SODIUM CHLORIDE 9 MG/ML
1000 INJECTION INTRAMUSCULAR; INTRAVENOUS; SUBCUTANEOUS ONCE
Qty: 0 | Refills: 0 | Status: COMPLETED | OUTPATIENT
Start: 2018-02-07 | End: 2018-02-07

## 2018-02-07 RX ORDER — MEPERIDINE HYDROCHLORIDE 50 MG/ML
25 INJECTION INTRAMUSCULAR; INTRAVENOUS; SUBCUTANEOUS ONCE
Qty: 0 | Refills: 0 | Status: DISCONTINUED | OUTPATIENT
Start: 2018-02-07 | End: 2018-02-07

## 2018-02-07 RX ORDER — DIGOXIN 250 MCG
0.12 TABLET ORAL DAILY
Qty: 0 | Refills: 0 | Status: DISCONTINUED | OUTPATIENT
Start: 2018-02-07 | End: 2018-02-27

## 2018-02-07 RX ORDER — POVIDONE-IODINE 5 %
1 AEROSOL (ML) TOPICAL DAILY
Qty: 0 | Refills: 0 | Status: DISCONTINUED | OUTPATIENT
Start: 2018-02-07 | End: 2018-02-27

## 2018-02-07 RX ORDER — VANCOMYCIN HCL 1 G
1250 VIAL (EA) INTRAVENOUS EVERY 12 HOURS
Qty: 0 | Refills: 0 | Status: DISCONTINUED | OUTPATIENT
Start: 2018-02-07 | End: 2018-02-08

## 2018-02-07 RX ORDER — PIPERACILLIN AND TAZOBACTAM 4; .5 G/20ML; G/20ML
3.38 INJECTION, POWDER, LYOPHILIZED, FOR SOLUTION INTRAVENOUS EVERY 6 HOURS
Qty: 0 | Refills: 0 | Status: DISCONTINUED | OUTPATIENT
Start: 2018-02-07 | End: 2018-02-08

## 2018-02-07 RX ORDER — SODIUM CHLORIDE 9 MG/ML
1000 INJECTION INTRAMUSCULAR; INTRAVENOUS; SUBCUTANEOUS
Qty: 0 | Refills: 0 | Status: DISCONTINUED | OUTPATIENT
Start: 2018-02-07 | End: 2018-02-08

## 2018-02-07 RX ORDER — ALBUMIN HUMAN 25 %
50 VIAL (ML) INTRAVENOUS EVERY 8 HOURS
Qty: 0 | Refills: 0 | Status: DISCONTINUED | OUTPATIENT
Start: 2018-02-07 | End: 2018-02-09

## 2018-02-07 RX ADMIN — Medication 2: at 06:30

## 2018-02-07 RX ADMIN — Medication 2: at 00:37

## 2018-02-07 RX ADMIN — Medication 1 APPLICATION(S): at 06:26

## 2018-02-07 RX ADMIN — MEPERIDINE HYDROCHLORIDE 25 MILLIGRAM(S): 50 INJECTION INTRAMUSCULAR; INTRAVENOUS; SUBCUTANEOUS at 21:25

## 2018-02-07 RX ADMIN — Medication 1 APPLICATION(S): at 18:49

## 2018-02-07 RX ADMIN — CHLORHEXIDINE GLUCONATE 15 MILLILITER(S): 213 SOLUTION TOPICAL at 18:49

## 2018-02-07 RX ADMIN — Medication 3 MILLILITER(S): at 11:01

## 2018-02-07 RX ADMIN — Medication 650 MILLIGRAM(S): at 23:15

## 2018-02-07 RX ADMIN — Medication 650 MILLIGRAM(S): at 01:01

## 2018-02-07 RX ADMIN — CHLORHEXIDINE GLUCONATE 15 MILLILITER(S): 213 SOLUTION TOPICAL at 06:26

## 2018-02-07 RX ADMIN — Medication 100 MILLIGRAM(S): at 18:49

## 2018-02-07 RX ADMIN — Medication 166.67 MILLIGRAM(S): at 17:13

## 2018-02-07 RX ADMIN — Medication 3 MILLILITER(S): at 06:58

## 2018-02-07 RX ADMIN — Medication 100 MILLIGRAM(S): at 06:25

## 2018-02-07 RX ADMIN — APIXABAN 5 MILLIGRAM(S): 2.5 TABLET, FILM COATED ORAL at 18:49

## 2018-02-07 RX ADMIN — FLUCONAZOLE 100 MILLIGRAM(S): 150 TABLET ORAL at 14:00

## 2018-02-07 RX ADMIN — PANTOPRAZOLE SODIUM 40 MILLIGRAM(S): 20 TABLET, DELAYED RELEASE ORAL at 06:27

## 2018-02-07 RX ADMIN — Medication 3 MILLILITER(S): at 17:49

## 2018-02-07 RX ADMIN — MEPERIDINE HYDROCHLORIDE 25 MILLIGRAM(S): 50 INJECTION INTRAMUSCULAR; INTRAVENOUS; SUBCUTANEOUS at 23:15

## 2018-02-07 RX ADMIN — Medication 650 MILLIGRAM(S): at 15:41

## 2018-02-07 RX ADMIN — MEPERIDINE HYDROCHLORIDE 25 MILLIGRAM(S): 50 INJECTION INTRAMUSCULAR; INTRAVENOUS; SUBCUTANEOUS at 21:44

## 2018-02-07 RX ADMIN — Medication 1 APPLICATION(S): at 14:55

## 2018-02-07 RX ADMIN — Medication 0.12 MILLIGRAM(S): at 12:08

## 2018-02-07 RX ADMIN — Medication 50 MILLILITER(S): at 19:31

## 2018-02-07 RX ADMIN — Medication 50 MILLILITER(S): at 11:22

## 2018-02-07 RX ADMIN — APIXABAN 5 MILLIGRAM(S): 2.5 TABLET, FILM COATED ORAL at 06:25

## 2018-02-07 RX ADMIN — PIPERACILLIN AND TAZOBACTAM 200 GRAM(S): 4; .5 INJECTION, POWDER, LYOPHILIZED, FOR SOLUTION INTRAVENOUS at 23:19

## 2018-02-07 RX ADMIN — Medication 2: at 17:07

## 2018-02-07 RX ADMIN — SODIUM CHLORIDE 1000 MILLILITER(S): 9 INJECTION INTRAMUSCULAR; INTRAVENOUS; SUBCUTANEOUS at 23:16

## 2018-02-07 RX ADMIN — PANTOPRAZOLE SODIUM 40 MILLIGRAM(S): 20 TABLET, DELAYED RELEASE ORAL at 18:49

## 2018-02-07 NOTE — PROGRESS NOTE ADULT - SUBJECTIVE AND OBJECTIVE BOX
Interventional, Pulmonary, Critical, Chest Special Procedures.    Pt was seen and fully examined by myself.     Time spent with patient in minutes:37    Patient is a 72y old  Male who presents with a chief complaint of acute cholecystitis (18 Jan 2018 14:49)The patient awake and engaging, able to generate spontaneous tidal volumes 750 on CPAP, no evidence of respiratory fatigue.  CXR finding of R subdiaphragmatic abdominal drain facing R hemidiaphragm discussed c SICU team. Recommend retraction vs removal per surgery unless contraindicated.    HPI:  73 yo M with h/o severe mitral and aortic valve regurgitation presented to ED with 5 day h/o left lower abdominal pain, nausea and multiple episodes of vomiting as well as PO intolerance. Pt reports that the pain in his abdomen is illicited by movement. Went to [facility] two days ago and underwent CT scan which revealed acute cholecystitis as well as right lower lobe infectious pneumonia. Pt was told he would require surgery and left AMA because he was waiting too long. Denies f/c. Last echo 1/4/17 with EF 60-65%. Normal BMs and passing flatus. (18 Jan 2018 14:49)    REVIEW OF SYSTEMS:updated  Constitutional: No fever, weight loss, chills or fatigue  Eyes: No eye pain, visual disturbances, or discharge  ENMT:  No difficulty hearing, tinnitus, vertigo; No sinus or throat pain. No epistaxis, dysphagia, dysphonia, hoarseness or odynophagia  Neck: No pain, stiffness or neck swelling.  No masses or deformities  Respiratory: No cough, wheezing, chills or hemoptysis  - COPD  - ILD   - PE   - ASTHMA     - PNEUMONIA  Cardiovascular: No chest pain, dysrhythmia, palpitations, dizziness or edema   - COPD     - CAD   - CHF   - HTN  Gastrointestinal: No abdominal or epigastric pain. No nausea, vomiting or hematemesis; No diarrhea or constipation. No melena or hematochezia. No dysphagia or Icterus.          Genitourinary: No dysuria, frequency, hematuria or incontinence   - CKD/GAETANO      - ESRD  Neurological: No headaches, memory loss, loss of strength, numbness or tremors      -DEMENTIA     - STROKE    - SEIZURE  Skin: No itching, burning, rashes or lesions   Lymph Nodes: No enlarged glands  Endocrine: No heat or cold intolerance; No hair loss       - DM     - THYROID DISORDER  Musculoskeletal: No joint pain or swelling; No muscle, back or extremity pain  Psychiatric: No depression, anxiety, mood swings or difficulty sleeping  Heme/Lymph: No easy bruising or bleeding gums         - ANEMIA      - CANCER   -COAGULOPATHY  Allergy and Immunologic: No hives or eczema    PAST MEDICAL & SURGICAL HISTORY:  Mitral regurgitation  Aortic regurgitation  No significant past surgical history    FAMILY HISTORY:    SOCIAL HISTORY:      - Tobacco     - ETOH    Allergies    No Known Allergies    Intolerances      Vital Signs Last 24 Hrs  T(C): 38.8 (07 Feb 2018 15:30), Max: 38.9 (07 Feb 2018 00:00)  T(F): 101.9 (07 Feb 2018 15:30), Max: 102 (07 Feb 2018 00:00)  HR: 102 (07 Feb 2018 15:00) (60 - 112)  BP: 133/69 (07 Feb 2018 15:00) (89/67 - 137/70)  BP(mean): 88 (07 Feb 2018 15:00) (66 - 95)  RR: 21 (07 Feb 2018 15:00) (0 - 30)  SpO2: 99% (07 Feb 2018 15:00) (95% - 100%)    02-06 @ 07:01 - 02-07 @ 07:00  --------------------------------------------------------  IN: 2037 mL / OUT: 1775 mL / NET: 262 mL    02-07 @ 07:01 - 02-07 @ 16:06  --------------------------------------------------------  IN: 854 mL / OUT: 820 mL / NET: 34 mL      Mode: CPAP with PS  FiO2: 40  PEEP: 5  PS: 10  ITime: 1  MAP: 8.3  PC: 10  PIP: 16    PHYSICAL EXAM:  More  comfortable, no distress  Eyes: PERRL, EOM intact; conjunctiva and sclera clear  Head: Normocephalic;  No Trauma  ENMT: No nasal discharge, hoarseness, cough or hemoptysis.  Airway clear  Neck: Supple; non tender; no masses or deformities.    No JVD  Respiratory:  - WHEEZING   few R  RHONCHI  - RALES  = CRACKLES.  Diminished breath sounds  BILATERAL  RIGHT  LEFT bases   Cardiovascular: Regular rate and rhythm. S1 and S2 Normal; No murmurs, gallops or rubs     - PPM/AICD  Gastrointestinal: Soft ,distended; Normal bowel sounds; No hepatosplenomegaly.     +PEG    + multiple surgical drains integrity checked    ANGELES  Genitourinary: No costovertebral angle tenderness. No dysuria  Extremities: Decreased AROM, + clubbing, cyanosis + edema, R foot toes unchanged pattern of necrosis, L foot less edema   Vascular: Peripheral pulses palpable 2+ bilaterally  Neurological: intubated and opening eyes to commands  Skin: Warm and dry. No obvious rash  Lymph Nodes: No acute cervical or supraclavicular adenopathy  Psychiatric: not sedated, calm affect    DEVICES:  - DENTURES   +IV R / L     - ETUBE   +TRACH 8.0 Shilley cuffed, site is clean,      LABS:  ABG - ( 06 Feb 2018 10:43 )  pH: 7.54  /  pCO2: 40    /  pO2: 100   / HCO3: 34    / Base Excess: 10.3  /  SaO2: 98                                      7.6    7.5   )-----------( 157      ( 07 Feb 2018 06:21 )             24.0     02-07    137  |  93<L>  |  39<H>  ----------------------------<  159<H>  4.0   |  30  |  0.95    Ca    7.8<L>      07 Feb 2018 06:20  Phos  3.3     02-07  Mg     2.6     02-07    TPro  5.7<L>  /  Alb  2.5<L>  /  TBili  2.9<H>  /  DBili  1.6<H>  /  AST  57<H>  /  ALT  54<H>  /  AlkPhos  204<H>  02-07    PTT - ( 06 Feb 2018 05:43 )  PTT:51.2 sec  < from: Xray Chest 1 View- PORTABLE-Routine (02.07.18 @ 04:47) >  EXAM:  XR CHEST PORTABLE ROUTINE 1V                          PROCEDURE DATE:  02/07/2018                     INTERPRETATION:  Portable Chest X-Ray dated 2/7/2018 4:47 AM    Indication: SICU patient for interval change    An AP portable view of the chest is compared to 2/6/2018. Unchanged   airspace consolidation in the lower lobes. Mild pulmonary venous   congestion. Small left pleural effusion. Right chest tube. Tracheostomy   tube and left PICC line catheter appears unchanged.    IMPRESSION:  No significant interval change.    < end of copied text >  RADIOLOGY & ADDITIONAL STUDIES (The following images were personally reviewed):

## 2018-02-07 NOTE — PROGRESS NOTE ADULT - SUBJECTIVE AND OBJECTIVE BOX
S: Pt reports no complaints this morning, he slept well and feels as though he is breathing comfortably. No HA/CP/SOB. Pain is well controlled.     Vitals: Vital Signs Last 24 Hrs  T(C): 38.7 (2018 04:00), Max: 38.9 (2018 00:00)  T(F): 101.6 (2018 04:00), Max: 102 (2018 00:00)  HR: 86 (:) (65 - 112)  BP: 102/60 (:00) (89/67 - 137/70)  BP(mean): 72 (:) (66 - 90)  RR: 5 (:) (5 - 32)  SpO2: 99% (:00) (97% - 100%)             @ 07:  -  07 @ 07:00  --------------------------------------------------------  IN: 2037 mL / OUT: 1775 mL / NET: 262 mL     @ 07:  -   @ 07:27  --------------------------------------------------------  IN: 68 mL / OUT: 340 mL / NET: -272 mL            Labs:                         7.6    7.5   )-----------( 157      ( 2018 06:21 )             24.0       137  |  93<L>  |  39<H>  ----------------------------<  159<H>  4.0   |  30  |  0.95    Ca    7.8<L>      2018 06:20  Phos  3.3       Mg     2.6         TPro  5.7<L>  /  Alb  2.5<L>  /  TBili  2.9<H>  /  DBili  1.6<H>  /  AST  57<H>  /  ALT  54<H>  /  AlkPhos  204<H>    PTT - ( 2018 05:43 )  PTT:51.2 secUrinalysis Basic - ( 2018 00:30 )    Color: Yellow / Appearance: Clear / S.020 / pH: x  Gluc: x / Ketone: NEGATIVE  / Bili: Small / Urobili: 4.0 E.U./dL   Blood: x / Protein: 100 mg/dL / Nitrite: NEGATIVE   Leuk Esterase: NEGATIVE / RBC: 5-10 /HPF / WBC < 5 /HPF   Sq Epi: x / Non Sq Epi: 5-10 /HPF / Bacteria: Present /HPF        Electrolytes repleated to goals as follows: Potassium 4, Phosphorus 3 and Magnesium 2 where appropriate and necessary    Neuro: A&Ox3 by yes/no, NAD, grossly neuro intact  HEENT: PERRL < EOMI, MMM  Neck: Supple  CV: Irregularly irregular, 4/6 holosystolic murmur  Pulm: Expiratory rhonchi throughout at the end of expiratory phase, otherwise clear, no wheezes, rales  Abd: Moderately distended, atympani, feeding jejunostomy, draining gastrostomy with serous output, duodenostomy with bilious output, GUCCI x2 serous output, Midline incision with staples c/d/i, healing well, BS (+), Soft, NT  : condom cath in place, scrotal edema improving  Ext: B/L UE 3+ edema, B/L LEs 2+ edema, Dependent trace edema, all improving.   Vasc: Extremities warm to palpation, RLE with dry necrotic 4th and 5th toes is stable.  MSK: no joint swelling  Skin: no rash  Psych: affect appropriate

## 2018-02-07 NOTE — CHART NOTE - NSCHARTNOTEFT_GEN_A_CORE
Admitting Diagnosis:   72yM with PMH severe MR/AR presented after recent dx acute cholecystitis and RLL PNA at [facility] with lower abdominal pain, new onset rapid Afib, sepsis, acute CHF exacerbation, initially admitted to cardiology on bipap, then transferred to SICU with perforated duodenum s/p Shreyas patch and jejunostomy (1/20) c/b breakdown s/p RTOR, washout, new Shreyas patch, gastrostomy, duodenostomy, IVCF (1/22)    PAST MEDICAL & SURGICAL HISTORY:  Mitral regurgitation  Aortic regurgitation  No significant past surgical history    Current Nutrition Order: Glucerna 1.2 at 68 mL x 24 hr via j-tube to provide 1632 mL TV, 1958 kcal, 98 g protein, & 1314 mL fluid. EN running at goal rate.     GI Issues: Last BM 2/5    Pain: HONEY 2/2 vent    Skin Integrity: Surgical incision; L heel-unstageable     Labs:   02-07    137  |  93<L>  |  39<H>  ----------------------------<  159<H>  4.0   |  30  |  0.95    Ca    7.8<L>      07 Feb 2018 06:20  Phos  3.3     02-07  Mg     2.6     02-07    TPro  5.7<L>  /  Alb  2.5<L>  /  TBili  2.9<H>  /  DBili  1.6<H>  /  AST  57<H>  /  ALT  54<H>  /  AlkPhos  204<H>  02-07    POCT Blood Glucose.: 128 mg/dL (07 Feb 2018 10:46)  POCT Blood Glucose.: 160 mg/dL (07 Feb 2018 06:08)  POCT Blood Glucose.: 187 mg/dL (07 Feb 2018 00:12)  POCT Blood Glucose.: 138 mg/dL (06 Feb 2018 17:10)    Medications:  MEDICATIONS  (STANDING):  albumin human 25% IVPB 50 milliLiter(s) IV Intermittent every 8 hours  ALBUTerol/ipratropium for Nebulization 3 milliLiter(s) Nebulizer every 6 hours  apixaban 5 milliGRAM(s) Oral every 12 hours  BACItracin   Ointment 1 Application(s) Topical two times a day  chlorhexidine 0.12% Liquid 15 milliLiter(s) Swish and Spit two times a day  digoxin     Tablet 0.125 milliGRAM(s) Oral daily  docusate sodium Liquid 100 milliGRAM(s) Oral two times a day  fluconAZOLE IVPB 400 milliGRAM(s) IV Intermittent every 24 hours  insulin lispro (HumaLOG) corrective regimen sliding scale   SubCutaneous every 6 hours  pantoprazole  Injectable 40 milliGRAM(s) IV Push every 12 hours  povidone iodine 10% Solution 1 Application(s) Topical daily  silver sulfADIAZINE 1% Cream 1 Application(s) Topical daily  sodium chloride 0.9% lock flush 20 milliLiter(s) IV Push once    MEDICATIONS  (PRN):  acetaminophen    Suspension 650 milliGRAM(s) Enteral Tube every 6 hours PRN For Temp greater than 38 C (100.4 F)  bisacodyl Suppository 10 milliGRAM(s) Rectal daily PRN Constipation  sodium chloride 0.9% lock flush 10 milliLiter(s) IV Push every 1 hour PRN After each medication administration    Weight:  No new weights to assess    Estimated energy needs using 75 kg IBW:  25-30 kcal/kg (1206-4782 kcal).   1.4-1.6 g/kg (105-120 g protein).   30-35 mL/kg (4964-8130 mL fluid).      Subjective: EN running at goal rate, pt tolerating well. UUN 8, however missing 600 mL of urine collection. Currently nitrogen balance is +3.6.     Previous Nutrition Diagnosis: Inadequate EN infusion RT current order AEB pt meeting 31% of kcal needs & 26% of protein needs.     Active [   ]  Resolved [ X  ]    If resolved, new PES: Increased nutrient needs RT increased demand for kcal & protein AEB critical illness, intubation.     Goal: Meet % of nutrition needs via EN continuously.     Recommendations:  1. Continue current EN at goal rate. Monitor for signs of intolerance.   2. Trend weights daily  3. Bowel regimen PRN    Education: N/A-vent    Risk Level: High [ X  ] Moderate [   ] Low [   ]

## 2018-02-07 NOTE — PROGRESS NOTE ADULT - ASSESSMENT
ASSESSMENT/PLAN 73y/o male pt c Respiratory failure, perforated duodenal ulcer, POD post repair, RLL pneumonia, RLL atelectases, no obvious RADHA, elevated R hemidiaphragm, AFIB,  thrombocytopenia, possible endocarditis, s/p reop. now POP tracheostomy, new fever pattern    1. O2: Attempt T collar daytime with CPAP mode on stand by  2. Bronchodilators:  Atrovent/ albuterol q 4 – 6 hours as needed  3. Corticosteroids: off  4. ID/Antibiotics:  Micafungin strict ID supervision    5. Cardiac/HTN: Monitor, optimize HR;   6. GI: Rx/ prophylaxis c PPI/Protonix  7. Heme: Rx/VT pt now on Enoxa[jadiel  8. Aspiration precautions, feeding PEG  9. Surgery mngmnt priority, recommend reposition vs removal of R subdiaphragmatic drain unless contraindicated.  10. Edema: diuretic to continue  Discussed c managing team. SICU

## 2018-02-07 NOTE — CONSULT NOTE ADULT - SUBJECTIVE AND OBJECTIVE BOX
Vascular Attending:  Ignacio      HPI:  71yo M with PMH severe MR/AR presented after recent dx of acute cholecystitis and RLL PNA at [facility] with lower abdominal pain, new onset rapid Afib, sepsis, acute CHF exacerbation, initially admitted to cardiology on bipap, then transferred to SICU with perforated duodenum s/p Shreyas patch and jejunostomy () c/b breakdown s/p RTOR, washout, new Shreyas patch, gastrostomy, duodenostomy, IVC filter (for right popliteal & right peroneal calf veins DVT) ().    Patient recently with fevers, overnight Tm 102 (@midnight), no WBC. As per ID possible drug fever to Ceftazidime - holding for now to observe.    Patient noted to have discoloration and blisters of right toes, with recent history of severe LE edema and having been on pressors. Vascular consulted to evaluate wounds.    PAST MEDICAL & SURGICAL HISTORY:  Mitral regurgitation  Aortic regurgitation  No significant past surgical history      MEDICATIONS  (STANDING):  albumin human 25% IVPB 50 milliLiter(s) IV Intermittent every 8 hours  ALBUTerol/ipratropium for Nebulization 3 milliLiter(s) Nebulizer every 6 hours  apixaban 5 milliGRAM(s) Oral every 12 hours  BACItracin   Ointment 1 Application(s) Topical two times a day  chlorhexidine 0.12% Liquid 15 milliLiter(s) Swish and Spit two times a day  digoxin     Tablet 0.125 milliGRAM(s) Oral daily  docusate sodium Liquid 100 milliGRAM(s) Oral two times a day  fluconAZOLE IVPB 400 milliGRAM(s) IV Intermittent every 24 hours  insulin lispro (HumaLOG) corrective regimen sliding scale   SubCutaneous every 6 hours  pantoprazole  Injectable 40 milliGRAM(s) IV Push every 12 hours  povidone iodine 10% Solution 1 Application(s) Topical daily  silver sulfADIAZINE 1% Cream 1 Application(s) Topical daily  sodium chloride 0.9% lock flush 20 milliLiter(s) IV Push once    MEDICATIONS  (PRN):  acetaminophen    Suspension 650 milliGRAM(s) Enteral Tube every 6 hours PRN For Temp greater than 38 C (100.4 F)  bisacodyl Suppository 10 milliGRAM(s) Rectal daily PRN Constipation  sodium chloride 0.9% lock flush 10 milliLiter(s) IV Push every 1 hour PRN After each medication administration      Allergies  No Known Allergies      Vital Signs Last 24 Hrs  T(C): 37.9 (2018 08:27), Max: 38.9 (2018 00:00)  T(F): 100.2 (2018 08:27), Max: 102 (2018 00:00)  HR: 96 (2018 12:00) (60 - 112)  BP: 116/62 (2018 12:00) (89/67 - 137/70)  BP(mean): 95 (2018 12:00) (66 - 95)  RR: 18 (2018 12:00) (5 - 32)  SpO2: 99% (2018 12:00) (95% - 100%)      PHYSICAL EXAM:    Constitutional: alert and awake, trached    Respiratory: no respiratory distress    Cardiovascular: irregular S1 S2    Extremities: Bilateral LE edema, calfs soft and non-tender. RLE: 1st toe, tip of 3rd and 4th and 5th toe eschar, with blistering over 1st, 4th and 5th toe and macerated skinbetween 2-5th toes . No purulent discharge, no foul odor. No surrounding erythema. LLE: 1st toe purplish discoloration, small ulcer over medial aspect of 5th toe, no surrounding erythema/warmth, no purulent discharge/foul odor.    Vascular: 2+DP/PT pulses bilateral      LABS:                        7.6    7.5   )-----------( 157      ( 2018 06:21 )             24.0     -    137  |  93<L>  |  39<H>  ----------------------------<  159<H>  4.0   |  30  |  0.95    Ca    7.8<L>      2018 06:20  Phos  3.3       Mg     2.6         TPro  5.7<L>  /  Alb  2.5<L>  /  TBili  2.9<H>  /  DBili  1.6<H>  /  AST  57<H>  /  ALT  54<H>  /  AlkPhos  204<H>      PTT - ( 2018 05:43 )  PTT:51.2 sec  Urinalysis Basic - ( 2018 00:30 )    Color: Yellow / Appearance: Clear / S.020 / pH: x  Gluc: x / Ketone: NEGATIVE  / Bili: Small / Urobili: 4.0 E.U./dL   Blood: x / Protein: 100 mg/dL / Nitrite: NEGATIVE   Leuk Esterase: NEGATIVE / RBC: 5-10 /HPF / WBC < 5 /HPF   Sq Epi: x / Non Sq Epi: 5-10 /HPF / Bacteria: Present /HPF Vascular Attending:  Ignacio      HPI:  73yo M with PMH severe MR/AR presented after recent dx of acute cholecystitis and RLL PNA at [facility] with lower abdominal pain, new onset rapid Afib, sepsis, acute CHF exacerbation, initially admitted to cardiology on bipap, then transferred to SICU with perforated duodenum s/p Shreyas patch and jejunostomy () c/b breakdown s/p RTOR, washout, new Shreyas patch, gastrostomy, duodenostomy, IVC filter (for right popliteal & right peroneal calf veins DVT) ().    Patient recently with fevers, overnight Tm 102 (@midnight), no WBC. As per ID possible drug fever to Ceftazidime - holding for now to observe.    Patient noted to have discoloration and blisters of right toes, with recent history of severe LE edema and having been on pressors. Vascular consulted to evaluate wounds.    PAST MEDICAL & SURGICAL HISTORY:  Mitral regurgitation  Aortic regurgitation  No significant past surgical history      MEDICATIONS  (STANDING):  albumin human 25% IVPB 50 milliLiter(s) IV Intermittent every 8 hours  ALBUTerol/ipratropium for Nebulization 3 milliLiter(s) Nebulizer every 6 hours  apixaban 5 milliGRAM(s) Oral every 12 hours  BACItracin   Ointment 1 Application(s) Topical two times a day  chlorhexidine 0.12% Liquid 15 milliLiter(s) Swish and Spit two times a day  digoxin     Tablet 0.125 milliGRAM(s) Oral daily  docusate sodium Liquid 100 milliGRAM(s) Oral two times a day  fluconAZOLE IVPB 400 milliGRAM(s) IV Intermittent every 24 hours  insulin lispro (HumaLOG) corrective regimen sliding scale   SubCutaneous every 6 hours  pantoprazole  Injectable 40 milliGRAM(s) IV Push every 12 hours  povidone iodine 10% Solution 1 Application(s) Topical daily  silver sulfADIAZINE 1% Cream 1 Application(s) Topical daily  sodium chloride 0.9% lock flush 20 milliLiter(s) IV Push once    MEDICATIONS  (PRN):  acetaminophen    Suspension 650 milliGRAM(s) Enteral Tube every 6 hours PRN For Temp greater than 38 C (100.4 F)  bisacodyl Suppository 10 milliGRAM(s) Rectal daily PRN Constipation  sodium chloride 0.9% lock flush 10 milliLiter(s) IV Push every 1 hour PRN After each medication administration      Allergies  No Known Allergies      Vital Signs Last 24 Hrs  T(C): 37.9 (2018 08:27), Max: 38.9 (2018 00:00)  T(F): 100.2 (2018 08:27), Max: 102 (2018 00:00)  HR: 96 (2018 12:00) (60 - 112)  BP: 116/62 (2018 12:00) (89/67 - 137/70)  BP(mean): 95 (2018 12:00) (66 - 95)  RR: 18 (2018 12:00) (5 - 32)  SpO2: 99% (2018 12:00) (95% - 100%)      PHYSICAL EXAM:    Constitutional: alert and awake, trached    Respiratory: no respiratory distress    Cardiovascular: irregular S1 S2    Extremities: Bilateral LE edema, calfs soft and non-tender. RLE: 1st toe, tip of 3rd and 4th and 5th toe with slough tissue, with blistering over 1st, 4th and 5th toe and macerated skin between 2-5th toes . Small area of gangrene at tip of 3rd and 4th toe. No purulent discharge, no foul odor. No surrounding erythema. LLE: 1st toe purplish discoloration surrounded by a blister, small ulcer over medial aspect of 5th toe, no surrounding erythema/warmth, no purulent discharge/foul odor.    Vascular: 2+DP/PT pulses bilateral      LABS:                        7.6    7.5   )-----------( 157      ( 2018 06:21 )             24.0     02-    137  |  93<L>  |  39<H>  ----------------------------<  159<H>  4.0   |  30  |  0.95    Ca    7.8<L>      2018 06:20  Phos  3.3     02-  Mg     2.6     -07    TPro  5.7<L>  /  Alb  2.5<L>  /  TBili  2.9<H>  /  DBili  1.6<H>  /  AST  57<H>  /  ALT  54<H>  /  AlkPhos  204<H>      PTT - ( 2018 05:43 )  PTT:51.2 sec  Urinalysis Basic - ( 2018 00:30 )    Color: Yellow / Appearance: Clear / S.020 / pH: x  Gluc: x / Ketone: NEGATIVE  / Bili: Small / Urobili: 4.0 E.U./dL   Blood: x / Protein: 100 mg/dL / Nitrite: NEGATIVE   Leuk Esterase: NEGATIVE / RBC: 5-10 /HPF / WBC < 5 /HPF   Sq Epi: x / Non Sq Epi: 5-10 /HPF / Bacteria: Present /HPF

## 2018-02-07 NOTE — CONSULT NOTE ADULT - ASSESSMENT
73 yo M with right foot blistering with some eschar over the tips of the toes. No signs of infection at this time, will continue to monitor and check for fever  -Recommend daily wound care with silvadene to open ulcerations and betadine to necrotic areas    Will discuss further plan with Attending. 73 yo M with right foot blistering with some eschar over the tips of the toes. No signs of infection at this time, will continue to monitor and check for fever  -Recommend daily wound care with silvadene to open ulcerations and betadine to necrotic areas    Will discuss further plan with Attending.    Addendum: patient seen with Attending @215pm at bedside.   Right foot: dead blistered skin debrided, nails of 1st, 3rd, 4th and 5th toe peeled off. Pink skin revealed underneath, no abscess or purulent discharge, no foul odor. Small necrotic 0.5cm ulcer over 3rd and 4th toes.   Left foot: 1st toe blister I&D'ed, toe nail removed, culture collected.     - Recommend daily wound cleaning with betadine/peroxide and dry kerlix, run gauze between toes   - Start Cefepime for coverage  - f/u wound cx    Will continue to follow, plan d/w SICU team

## 2018-02-07 NOTE — PROGRESS NOTE ADULT - ATTENDING COMMENTS
JENNYFER Clark has acted as my scribe. Cx remain positive for Gram neg in sputum as outlined but no change in resp status or secretions. CXR with effusions. Hold diuresis with slight bump in BUN and Cr today. UUN noted and in positive nitrogen balance. continue CPAP 5 PS 10. continue anticoagulation for Afib and DVT.

## 2018-02-08 DIAGNOSIS — I50.9 HEART FAILURE, UNSPECIFIED: ICD-10-CM

## 2018-02-08 LAB
-  LEVOFLOXACIN: SIGNIFICANT CHANGE UP
-  TRIMETHOPRIM/SULFAMETHOXAZOLE: SIGNIFICANT CHANGE UP
ALBUMIN SERPL ELPH-MCNC: 2.7 G/DL — LOW (ref 3.3–5)
ALP SERPL-CCNC: 135 U/L — HIGH (ref 40–120)
ALT FLD-CCNC: 52 U/L — HIGH (ref 10–45)
ANION GAP SERPL CALC-SCNC: 10 MMOL/L — SIGNIFICANT CHANGE UP (ref 5–17)
ANION GAP SERPL CALC-SCNC: 10 MMOL/L — SIGNIFICANT CHANGE UP (ref 5–17)
AST SERPL-CCNC: 62 U/L — HIGH (ref 10–40)
BASOPHILS NFR BLD AUTO: 0.2 % — SIGNIFICANT CHANGE UP (ref 0–2)
BILIRUB SERPL-MCNC: 3.3 MG/DL — HIGH (ref 0.2–1.2)
BLD GP AB SCN SERPL QL: NEGATIVE — SIGNIFICANT CHANGE UP
BUN SERPL-MCNC: 37 MG/DL — HIGH (ref 7–23)
BUN SERPL-MCNC: 42 MG/DL — HIGH (ref 7–23)
CALCIUM SERPL-MCNC: 5.8 MG/DL — CRITICAL LOW (ref 8.4–10.5)
CALCIUM SERPL-MCNC: 7.7 MG/DL — LOW (ref 8.4–10.5)
CHLORIDE SERPL-SCNC: 101 MMOL/L — SIGNIFICANT CHANGE UP (ref 96–108)
CHLORIDE SERPL-SCNC: 110 MMOL/L — HIGH (ref 96–108)
CO2 SERPL-SCNC: 25 MMOL/L — SIGNIFICANT CHANGE UP (ref 22–31)
CO2 SERPL-SCNC: 31 MMOL/L — SIGNIFICANT CHANGE UP (ref 22–31)
CREAT SERPL-MCNC: 0.84 MG/DL — SIGNIFICANT CHANGE UP (ref 0.5–1.3)
CREAT SERPL-MCNC: 1 MG/DL — SIGNIFICANT CHANGE UP (ref 0.5–1.3)
CULTURE RESULTS: SIGNIFICANT CHANGE UP
CULTURE RESULTS: SIGNIFICANT CHANGE UP
EOSINOPHIL NFR BLD AUTO: 2.3 % — SIGNIFICANT CHANGE UP (ref 0–6)
GLUCOSE BLDC GLUCOMTR-MCNC: 105 MG/DL — HIGH (ref 70–99)
GLUCOSE BLDC GLUCOMTR-MCNC: 119 MG/DL — HIGH (ref 70–99)
GLUCOSE BLDC GLUCOMTR-MCNC: 179 MG/DL — HIGH (ref 70–99)
GLUCOSE BLDC GLUCOMTR-MCNC: 85 MG/DL — SIGNIFICANT CHANGE UP (ref 70–99)
GLUCOSE SERPL-MCNC: 101 MG/DL — HIGH (ref 70–99)
GLUCOSE SERPL-MCNC: 66 MG/DL — LOW (ref 70–99)
GRAM STN FLD: SIGNIFICANT CHANGE UP
HCT VFR BLD CALC: 20.4 % — CRITICAL LOW (ref 39–50)
HCT VFR BLD CALC: 20.7 % — CRITICAL LOW (ref 39–50)
HCT VFR BLD CALC: 20.8 % — CRITICAL LOW (ref 39–50)
HCT VFR BLD CALC: 21.6 % — LOW (ref 39–50)
HCT VFR BLD CALC: 24.4 % — LOW (ref 39–50)
HGB BLD-MCNC: 6.3 G/DL — CRITICAL LOW (ref 13–17)
HGB BLD-MCNC: 6.4 G/DL — CRITICAL LOW (ref 13–17)
HGB BLD-MCNC: 6.6 G/DL — CRITICAL LOW (ref 13–17)
HGB BLD-MCNC: 6.8 G/DL — CRITICAL LOW (ref 13–17)
HGB BLD-MCNC: 7.8 G/DL — LOW (ref 13–17)
LACTATE SERPL-SCNC: 1.4 MMOL/L — SIGNIFICANT CHANGE UP (ref 0.5–2)
LACTATE SERPL-SCNC: 2.3 MMOL/L — HIGH (ref 0.5–2)
LACTATE SERPL-SCNC: 2.8 MMOL/L — HIGH (ref 0.5–2)
LACTATE SERPL-SCNC: 3.3 MMOL/L — HIGH (ref 0.5–2)
LYMPHOCYTES # BLD AUTO: 12.8 % — LOW (ref 13–44)
MAGNESIUM SERPL-MCNC: 2.3 MG/DL — SIGNIFICANT CHANGE UP (ref 1.6–2.6)
MCHC RBC-ENTMCNC: 30.2 PG — SIGNIFICANT CHANGE UP (ref 27–34)
MCHC RBC-ENTMCNC: 30.3 PG — SIGNIFICANT CHANGE UP (ref 27–34)
MCHC RBC-ENTMCNC: 30.4 G/DL — LOW (ref 32–36)
MCHC RBC-ENTMCNC: 30.4 PG — SIGNIFICANT CHANGE UP (ref 27–34)
MCHC RBC-ENTMCNC: 30.5 PG — SIGNIFICANT CHANGE UP (ref 27–34)
MCHC RBC-ENTMCNC: 30.8 G/DL — LOW (ref 32–36)
MCHC RBC-ENTMCNC: 30.8 PG — SIGNIFICANT CHANGE UP (ref 27–34)
MCHC RBC-ENTMCNC: 31.5 G/DL — LOW (ref 32–36)
MCHC RBC-ENTMCNC: 32 G/DL — SIGNIFICANT CHANGE UP (ref 32–36)
MCHC RBC-ENTMCNC: 32.4 G/DL — SIGNIFICANT CHANGE UP (ref 32–36)
MCV RBC AUTO: 100 FL — SIGNIFICANT CHANGE UP (ref 80–100)
MCV RBC AUTO: 100 FL — SIGNIFICANT CHANGE UP (ref 80–100)
MCV RBC AUTO: 93.6 FL — SIGNIFICANT CHANGE UP (ref 80–100)
MCV RBC AUTO: 95.3 FL — SIGNIFICANT CHANGE UP (ref 80–100)
MCV RBC AUTO: 96 FL — SIGNIFICANT CHANGE UP (ref 80–100)
METHOD TYPE: SIGNIFICANT CHANGE UP
MONOCYTES NFR BLD AUTO: 4.6 % — SIGNIFICANT CHANGE UP (ref 2–14)
NEUTROPHILS NFR BLD AUTO: 80.1 % — HIGH (ref 43–77)
ORGANISM # SPEC MICROSCOPIC CNT: SIGNIFICANT CHANGE UP
ORGANISM # SPEC MICROSCOPIC CNT: SIGNIFICANT CHANGE UP
PHOSPHATE SERPL-MCNC: 3.6 MG/DL — SIGNIFICANT CHANGE UP (ref 2.5–4.5)
PLATELET # BLD AUTO: 107 K/UL — LOW (ref 150–400)
PLATELET # BLD AUTO: 131 K/UL — LOW (ref 150–400)
PLATELET # BLD AUTO: 137 K/UL — LOW (ref 150–400)
PLATELET # BLD AUTO: 140 K/UL — LOW (ref 150–400)
PLATELET # BLD AUTO: 144 K/UL — LOW (ref 150–400)
POTASSIUM SERPL-MCNC: 3 MMOL/L — LOW (ref 3.5–5.3)
POTASSIUM SERPL-MCNC: 3.8 MMOL/L — SIGNIFICANT CHANGE UP (ref 3.5–5.3)
POTASSIUM SERPL-SCNC: 3 MMOL/L — LOW (ref 3.5–5.3)
POTASSIUM SERPL-SCNC: 3.8 MMOL/L — SIGNIFICANT CHANGE UP (ref 3.5–5.3)
PROT SERPL-MCNC: 5.4 G/DL — LOW (ref 6–8.3)
RBC # BLD: 2.07 M/UL — LOW (ref 4.2–5.8)
RBC # BLD: 2.08 M/UL — LOW (ref 4.2–5.8)
RBC # BLD: 2.18 M/UL — LOW (ref 4.2–5.8)
RBC # BLD: 2.25 M/UL — LOW (ref 4.2–5.8)
RBC # BLD: 2.56 M/UL — LOW (ref 4.2–5.8)
RBC # FLD: 21.1 % — HIGH (ref 10.3–16.9)
RBC # FLD: 21.5 % — HIGH (ref 10.3–16.9)
RBC # FLD: 22 % — HIGH (ref 10.3–16.9)
RBC # FLD: 22 % — HIGH (ref 10.3–16.9)
RBC # FLD: 22.1 % — HIGH (ref 10.3–16.9)
RH IG SCN BLD-IMP: POSITIVE — SIGNIFICANT CHANGE UP
SODIUM SERPL-SCNC: 142 MMOL/L — SIGNIFICANT CHANGE UP (ref 135–145)
SODIUM SERPL-SCNC: 145 MMOL/L — SIGNIFICANT CHANGE UP (ref 135–145)
SPECIMEN SOURCE: SIGNIFICANT CHANGE UP
VANCOMYCIN TROUGH SERPL-MCNC: 14 UG/ML — SIGNIFICANT CHANGE UP (ref 10–20)
WBC # BLD: 5.9 K/UL — SIGNIFICANT CHANGE UP (ref 3.8–10.5)
WBC # BLD: 7.3 K/UL — SIGNIFICANT CHANGE UP (ref 3.8–10.5)
WBC # BLD: 7.7 K/UL — SIGNIFICANT CHANGE UP (ref 3.8–10.5)
WBC # BLD: 8.1 K/UL — SIGNIFICANT CHANGE UP (ref 3.8–10.5)
WBC # BLD: 8.2 K/UL — SIGNIFICANT CHANGE UP (ref 3.8–10.5)
WBC # FLD AUTO: 5.9 K/UL — SIGNIFICANT CHANGE UP (ref 3.8–10.5)
WBC # FLD AUTO: 7.3 K/UL — SIGNIFICANT CHANGE UP (ref 3.8–10.5)
WBC # FLD AUTO: 7.7 K/UL — SIGNIFICANT CHANGE UP (ref 3.8–10.5)
WBC # FLD AUTO: 8.1 K/UL — SIGNIFICANT CHANGE UP (ref 3.8–10.5)
WBC # FLD AUTO: 8.2 K/UL — SIGNIFICANT CHANGE UP (ref 3.8–10.5)

## 2018-02-08 PROCEDURE — 99291 CRITICAL CARE FIRST HOUR: CPT

## 2018-02-08 PROCEDURE — 71045 X-RAY EXAM CHEST 1 VIEW: CPT | Mod: 26,76

## 2018-02-08 RX ORDER — ALBUMIN HUMAN 25 %
250 VIAL (ML) INTRAVENOUS ONCE
Qty: 0 | Refills: 0 | Status: COMPLETED | OUTPATIENT
Start: 2018-02-08 | End: 2018-02-08

## 2018-02-08 RX ORDER — METHYLENE BLUE 65 MG
10 TABLET ORAL ONCE
Qty: 0 | Refills: 0 | Status: COMPLETED | OUTPATIENT
Start: 2018-02-08 | End: 2018-02-08

## 2018-02-08 RX ORDER — LIDOCAINE HCL 20 MG/ML
50 VIAL (ML) INJECTION ONCE
Qty: 0 | Refills: 0 | Status: COMPLETED | OUTPATIENT
Start: 2018-02-08 | End: 2018-02-08

## 2018-02-08 RX ORDER — VANCOMYCIN HCL 1 G
1250 VIAL (EA) INTRAVENOUS ONCE
Qty: 0 | Refills: 0 | Status: COMPLETED | OUTPATIENT
Start: 2018-02-08 | End: 2018-02-08

## 2018-02-08 RX ORDER — POTASSIUM CHLORIDE 20 MEQ
40 PACKET (EA) ORAL ONCE
Qty: 0 | Refills: 0 | Status: COMPLETED | OUTPATIENT
Start: 2018-02-08 | End: 2018-02-08

## 2018-02-08 RX ORDER — SODIUM CHLORIDE 9 MG/ML
1000 INJECTION INTRAMUSCULAR; INTRAVENOUS; SUBCUTANEOUS
Qty: 0 | Refills: 0 | Status: DISCONTINUED | OUTPATIENT
Start: 2018-02-08 | End: 2018-02-09

## 2018-02-08 RX ORDER — DIPHENHYDRAMINE HCL 50 MG
25 CAPSULE ORAL ONCE
Qty: 0 | Refills: 0 | Status: COMPLETED | OUTPATIENT
Start: 2018-02-08 | End: 2018-02-08

## 2018-02-08 RX ORDER — POTASSIUM CHLORIDE 20 MEQ
20 PACKET (EA) ORAL ONCE
Qty: 0 | Refills: 0 | Status: DISCONTINUED | OUTPATIENT
Start: 2018-02-08 | End: 2018-02-08

## 2018-02-08 RX ORDER — SODIUM CHLORIDE 9 MG/ML
1000 INJECTION INTRAMUSCULAR; INTRAVENOUS; SUBCUTANEOUS ONCE
Qty: 0 | Refills: 0 | Status: COMPLETED | OUTPATIENT
Start: 2018-02-08 | End: 2018-02-08

## 2018-02-08 RX ORDER — HYDROGEN PEROXIDE 0.3 KG/100L
1 LIQUID TOPICAL ONCE
Qty: 0 | Refills: 0 | Status: COMPLETED | OUTPATIENT
Start: 2018-02-08 | End: 2018-02-08

## 2018-02-08 RX ORDER — POTASSIUM CHLORIDE 20 MEQ
20 PACKET (EA) ORAL
Qty: 0 | Refills: 0 | Status: COMPLETED | OUTPATIENT
Start: 2018-02-08 | End: 2018-02-09

## 2018-02-08 RX ADMIN — HYDROGEN PEROXIDE 1 APPLICATION(S): 0.3 LIQUID TOPICAL at 09:32

## 2018-02-08 RX ADMIN — CHLORHEXIDINE GLUCONATE 15 MILLILITER(S): 213 SOLUTION TOPICAL at 17:25

## 2018-02-08 RX ADMIN — Medication 100 MILLIGRAM(S): at 17:24

## 2018-02-08 RX ADMIN — Medication 1 APPLICATION(S): at 06:13

## 2018-02-08 RX ADMIN — Medication 650 MILLIGRAM(S): at 11:17

## 2018-02-08 RX ADMIN — PANTOPRAZOLE SODIUM 40 MILLIGRAM(S): 20 TABLET, DELAYED RELEASE ORAL at 06:14

## 2018-02-08 RX ADMIN — Medication 3 MILLILITER(S): at 17:11

## 2018-02-08 RX ADMIN — Medication 1 APPLICATION(S): at 17:25

## 2018-02-08 RX ADMIN — Medication 50 MILLILITER(S): at 03:05

## 2018-02-08 RX ADMIN — Medication 3 MILLILITER(S): at 05:24

## 2018-02-08 RX ADMIN — Medication 40 MILLIEQUIVALENT(S): at 11:17

## 2018-02-08 RX ADMIN — SODIUM CHLORIDE 4000 MILLILITER(S): 9 INJECTION INTRAMUSCULAR; INTRAVENOUS; SUBCUTANEOUS at 01:59

## 2018-02-08 RX ADMIN — PIPERACILLIN AND TAZOBACTAM 200 GRAM(S): 4; .5 INJECTION, POWDER, LYOPHILIZED, FOR SOLUTION INTRAVENOUS at 06:14

## 2018-02-08 RX ADMIN — FLUCONAZOLE 100 MILLIGRAM(S): 150 TABLET ORAL at 13:54

## 2018-02-08 RX ADMIN — CHLORHEXIDINE GLUCONATE 15 MILLILITER(S): 213 SOLUTION TOPICAL at 06:13

## 2018-02-08 RX ADMIN — APIXABAN 5 MILLIGRAM(S): 2.5 TABLET, FILM COATED ORAL at 06:13

## 2018-02-08 RX ADMIN — Medication 1 APPLICATION(S): at 11:24

## 2018-02-08 RX ADMIN — Medication 3 MILLILITER(S): at 11:59

## 2018-02-08 RX ADMIN — Medication 125 MILLILITER(S): at 09:30

## 2018-02-08 RX ADMIN — Medication 166.67 MILLIGRAM(S): at 06:14

## 2018-02-08 RX ADMIN — Medication 250 MILLIGRAM(S): at 21:51

## 2018-02-08 RX ADMIN — Medication 50 MILLILITER(S): at 19:15

## 2018-02-08 RX ADMIN — Medication 50 MILLILITER(S): at 14:00

## 2018-02-08 RX ADMIN — PANTOPRAZOLE SODIUM 40 MILLIGRAM(S): 20 TABLET, DELAYED RELEASE ORAL at 17:24

## 2018-02-08 RX ADMIN — Medication 2: at 00:47

## 2018-02-08 RX ADMIN — Medication 0.12 MILLIGRAM(S): at 06:14

## 2018-02-08 RX ADMIN — Medication 3 MILLILITER(S): at 23:12

## 2018-02-08 RX ADMIN — Medication 100 MILLIGRAM(S): at 06:14

## 2018-02-08 RX ADMIN — Medication 50 MILLILITER(S): at 12:28

## 2018-02-08 RX ADMIN — Medication 10 MILLILITER(S): at 17:13

## 2018-02-08 RX ADMIN — Medication 50 MILLIEQUIVALENT(S): at 21:51

## 2018-02-08 RX ADMIN — Medication 3 MILLILITER(S): at 00:36

## 2018-02-08 RX ADMIN — Medication 50 MILLIEQUIVALENT(S): at 23:04

## 2018-02-08 NOTE — PROGRESS NOTE ADULT - ASSESSMENT
72 M with right foot blistering with some eschar over the tips of the toes. No signs of infection at this time, will continue to monitor.     - Recommend daily wound cleaning with betadine/peroxide and dry kerlix, run gauze between toes   - will follow, please call with any acute changes

## 2018-02-08 NOTE — PROGRESS NOTE ADULT - SUBJECTIVE AND OBJECTIVE BOX
Critical Care    INTERVAL HPI/OVERNIGHT EVENTS:    ANTIBIOTICS    MEDICATIONS  (STANDING):  albumin human 25% IVPB 50 milliLiter(s) IV Intermittent every 8 hours  ALBUTerol/ipratropium for Nebulization 3 milliLiter(s) Nebulizer every 6 hours  apixaban 5 milliGRAM(s) Oral every 12 hours  BACItracin   Ointment 1 Application(s) Topical two times a day  chlorhexidine 0.12% Liquid 15 milliLiter(s) Swish and Spit two times a day  digoxin     Tablet 0.125 milliGRAM(s) Oral daily  docusate sodium Liquid 100 milliGRAM(s) Oral two times a day  fluconAZOLE IVPB 400 milliGRAM(s) IV Intermittent every 24 hours  insulin lispro (HumaLOG) corrective regimen sliding scale   SubCutaneous every 6 hours  levoFLOXacin IVPB 750 milliGRAM(s) IV Intermittent every 24 hours  lidocaine 2% Injectable 50 milliLiter(s) Local Injection once  methylene blue 0.5% Injectable 10 milliLiter(s) IV Push once  pantoprazole  Injectable 40 milliGRAM(s) IV Push every 12 hours  povidone iodine 10% Solution 1 Application(s) Topical daily  silver sulfADIAZINE 1% Cream 1 Application(s) Topical daily  sodium chloride 0.9% lock flush 20 milliLiter(s) IV Push once  sodium chloride 0.9%. 1000 milliLiter(s) (100 mL/Hr) IV Continuous <Continuous>    MEDICATIONS  (PRN):  acetaminophen    Suspension 650 milliGRAM(s) Enteral Tube every 6 hours PRN For Temp greater than 38 C (100.4 F)  bisacodyl Suppository 10 milliGRAM(s) Rectal daily PRN Constipation  sodium chloride 0.9% lock flush 10 milliLiter(s) IV Push every 1 hour PRN After each medication administration      Allergies    No Known Allergies    Intolerances        REVIEW OF SYSTEMS:    patient unable to provide a review of systesm    Vital Signs Last 24 Hrs  T(C): 37.8 (2018 13:57), Max: 39 (2018 23:00)  T(F): 100 (2018 13:57), Max: 102.2 (2018 23:00)  HR: 84 (2018 14:00) (69 - 136)  BP: 102/52 (2018 14:00) (77/54 - 140/75)  BP(mean): 62 (2018 14:00) (59 - 103)  RR: 20 (2018 14:00) (14 - 43)  SpO2: 100% (2018 14:00) (83% - 100%)    PHYSICAL EXAM:      Eyes: PERRL, EOM intact; conjunctiva and sclera clear  Head: Normocephalic; atraumatic  ENMT: No nasal discharge; airway clear  Neck: Supple; non tender; no masses  Respiratory: No wheezes, rales or rhonchi  Cardiovascular: Regular rate and rhythm. S1 and S2 Normal; No murmurs, gallops or rubs  Gastrointestinal: Soft non-tender non-distended; Normal bowel sounds; No hepatosplenomegaly  Genitourinary: No costovertebral angle tenderness  Extremities: gangrene of toes  Vascular: Peripheral pulses palpable 2+ bilaterally  Neurological: Alert and oriented x3  Skin: Warm and dry. No acute rash  Lymph Nodes: No acute cervical adenopathy  Musculoskeletal: Normal gait, tone, without deformities    LABS:                        6.8    7.7   )-----------( 144      ( 2018 12:07 )             21.6     02-08    142  |  101  |  42<H>  ----------------------------<  101<H>  3.8   |  31  |  1.00    Ca    7.7<L>      2018 05:29  Phos  3.6     02-08  Mg     2.3     02-08    TPro  5.4<L>  /  Alb  2.7<L>  /  TBili  3.3<H>  /  DBili  1.9<H>  /  AST  62<H>  /  ALT  52<H>  /  AlkPhos  135<H>  02-08      Urinalysis Basic - ( 2018 00:30 )    Color: Yellow / Appearance: Clear / S.020 / pH: x  Gluc: x / Ketone: NEGATIVE  / Bili: Small / Urobili: 4.0 E.U./dL   Blood: x / Protein: 100 mg/dL / Nitrite: NEGATIVE   Leuk Esterase: NEGATIVE / RBC: 5-10 /HPF / WBC < 5 /HPF   Sq Epi: x / Non Sq Epi: 5-10 /HPF / Bacteria: Present /HPF          MICROBIOLOGY:  Culture Results:   No growth at 12 hours ( @ 23:14)  Culture Results:   No growth at 12 hours ( @ 23:14)  Culture Results:   No growth to date. ( @ 15:38)  Culture Results:   Numerous Gram Negative Rods  Identification and susceptibility to follow. ( @ 09:32)  Culture Results:   No growth at 1 day. ( @ 00:58)  Culture Results:   No growth at 1 day. ( @ 00:58)  Culture Results:   Moderate Burkholderia cepacia  See previous culture for sensitivity  Moderate Stenotrophomonas maltophilia ( @ 17:03)  Culture Results:   Moderate Burkholderia cepacia  Normal Respiratory Geeta absent ( @ 08:37)  Culture Results:   No growth at 5 days. ( @ 08:24)  Culture Results:   Moderate Burkholderia cepacia  Routine respiratory geeta absent ( @ 19:18)      RADIOLOGY & ADDITIONAL STUDIES:

## 2018-02-08 NOTE — PROGRESS NOTE ADULT - PROBLEM SELECTOR PLAN 1
Agree with plans to change IV Lines  and gastrograffin stduy    CAT scan if no improvement  add IV Vancomycin one gram every 24 hours pending cultures

## 2018-02-08 NOTE — PROGRESS NOTE ADULT - SUBJECTIVE AND OBJECTIVE BOX
24 hr events: pt remains febrile, f/u CX (sputum, blood, urine), tolerating CPAP, consider changing lines, ?drug fever per ID no new abx, consider moving abd drain under R hemidiaphragm per Rads- concern for erosion but neeta isn't concerned. Vascular - removed bl great toe nails and I&D'd left 1st toe -> sent for culture. started vancomycin. On CPAP all day with decreased support.  9pm pt c/o chills w/ shivering/rigoring, became tachypneic, desaturated to <80%, tachycardic to 140s, easily bag ventilated & suctioned (low concern for plug), given demerol 50 w/improvement, LA 6, WBC up to 12. Febrile to 102. started NS@75 & held J-tube TFs. hypotensive to 85/58, responded to 1 liter bolus, but hypotensive again to 77/54, given another 1 liter bolus, Marco new BCx. CXR- R lung infiltrate unchanged; new hazy L midlung opacity (possibly worsening effusion vs. developing consolidation). Started Zosyn per Neeta. Low UO, bladder scan w/300cc, abarca replaced w/ 425cc immediate output.      SUBJECTIVE: pt feels febrile this AM, endorses diffuse pruritis w/out difficulty breathing; pain controlled, denies HA, N/V, CP/SOB, abd pain, BM/flatus    MEDICATIONS  (STANDING):  albumin human 25% IVPB 50 milliLiter(s) IV Intermittent every 8 hours  ALBUTerol/ipratropium for Nebulization 3 milliLiter(s) Nebulizer every 6 hours  apixaban 5 milliGRAM(s) Oral every 12 hours  BACItracin   Ointment 1 Application(s) Topical two times a day  chlorhexidine 0.12% Liquid 15 milliLiter(s) Swish and Spit two times a day  digoxin     Tablet 0.125 milliGRAM(s) Oral daily  docusate sodium Liquid 100 milliGRAM(s) Oral two times a day  fluconAZOLE IVPB 400 milliGRAM(s) IV Intermittent every 24 hours  insulin lispro (HumaLOG) corrective regimen sliding scale   SubCutaneous every 6 hours  levoFLOXacin IVPB 750 milliGRAM(s) IV Intermittent every 24 hours  methylene blue 0.5% Injectable 10 milliLiter(s) IV Push once  pantoprazole  Injectable 40 milliGRAM(s) IV Push every 12 hours  povidone iodine 10% Solution 1 Application(s) Topical daily  silver sulfADIAZINE 1% Cream 1 Application(s) Topical daily  sodium chloride 0.9% lock flush 20 milliLiter(s) IV Push once  sodium chloride 0.9%. 1000 milliLiter(s) (100 mL/Hr) IV Continuous <Continuous>    MEDICATIONS  (PRN):  acetaminophen    Suspension 650 milliGRAM(s) Enteral Tube every 6 hours PRN For Temp greater than 38 C (100.4 F)  bisacodyl Suppository 10 milliGRAM(s) Rectal daily PRN Constipation  sodium chloride 0.9% lock flush 10 milliLiter(s) IV Push every 1 hour PRN After each medication administration      ICU Vital Signs Last 24 Hrs  T(C): 37.8 (2018 13:57), Max: 39 (2018 23:00)  T(F): 100 (2018 13:57), Max: 102.2 (2018 23:00)  HR: 85 (2018 16:50) (69 - 136)  BP: 103/55 (2018 16:00) (77/54 - 140/75)  BP(mean): 70 (2018 16:00) (59 - 103)  ABP: --  ABP(mean): --  RR: 17 (2018 16:00) (14 - 43)  SpO2: 100% (2018 16:50) (83% - 100%)      Physical Exam:  General: NAD, no rashes/uticaria  HEENT: NC/AT, trach in place  Pulmonary: Nonlabored breathing, no respiratory distress, expiratory wheeze b/l, crackles b/l  Cardiovascular: afib, MR  Abdominal: soft, RUQ mild tenderness, ND, incisions CDI w/out erythema, GUCCI x2 blood tinged serous drainage, gastrostomy/jejunostomy tubes in place  Extremities: b/l feet s/p toe debridement  Neuro: A/O x3, normal motor/sensation, no focal deficits    Vent settings:  Mode: AC/ CMV (Assist Control/ Continuous Mandatory Ventilation), RR (machine): 12, TV (machine): 500, FiO2: 40, PEEP: 5, ITime: 1, MAP: 9, PIP: 16    I&O's Summary    2018 07:  -  2018 07:00  --------------------------------------------------------  IN: 4790 mL / OUT: 1819 mL / NET: 2971 mL    2018 07:  -  2018 16:53  --------------------------------------------------------  IN: 1925 mL / OUT: 625 mL / NET: 1300 mL        LABS:                        6.8    7.7   )-----------( 144      ( 2018 12:07 )             21.6     02-08    142  |  101  |  42<H>  ----------------------------<  101<H>  3.8   |  31  |  1.00    Ca    7.7<L>      2018 05:29  Phos  3.6       Mg     2.3         TPro  5.4<L>  /  Alb  2.7<L>  /  TBili  3.3<H>  /  DBili  1.9<H>  /  AST  62<H>  /  ALT  52<H>  /  AlkPhos  135<H>        Urinalysis Basic - ( 2018 00:30 )    Color: Yellow / Appearance: Clear / S.020 / pH: x  Gluc: x / Ketone: NEGATIVE  / Bili: Small / Urobili: 4.0 E.U./dL   Blood: x / Protein: 100 mg/dL / Nitrite: NEGATIVE   Leuk Esterase: NEGATIVE / RBC: 5-10 /HPF / WBC < 5 /HPF   Sq Epi: x / Non Sq Epi: 5-10 /HPF / Bacteria: Present /HPF      CAPILLARY BLOOD GLUCOSE      POCT Blood Glucose.: 119 mg/dL (2018 11:32)  POCT Blood Glucose.: 105 mg/dL (2018 05:28)  POCT Blood Glucose.: 179 mg/dL (2018 00:43)    LIVER FUNCTIONS - ( 2018 05:29 )  Alb: 2.7 g/dL / Pro: 5.4 g/dL / ALK PHOS: 135 U/L / ALT: 52 U/L / AST: 62 U/L / GGT: x             Cultures:Culture Results:   No growth at 12 hours ( @ 23:14)  Culture Results:   No growth at 12 hours ( @ 23:14)  Culture Results:   No growth to date. ( @ 15:38)  Culture Results:   Numerous Gram Negative Rods  Identification and susceptibility to follow. ( @ 09:32)  Culture Results:   No growth at 1 day. ( @ 00:58)  Culture Results:   No growth at 1 day. ( @ 00:58)      RADIOLOGY & ADDITIONAL STUDIES:  CXR shows new hazy L mid-lung opacity

## 2018-02-08 NOTE — PROGRESS NOTE ADULT - SUBJECTIVE AND OBJECTIVE BOX
Interventional, Pulmonary, Critical, Chest Special Procedures.    Pt was seen and fully examined by myself.     Time spent with patient in minutes:67    Patient is a 72y old  Male who presents with a chief complaint of acute cholecystitis (18 Jan 2018 14:49)Events of last night reviewed and discussed c SICU team. The patient now ill appearing, on CMV mode, denies pain.    HPI:  73 yo M with h/o severe mitral and aortic valve regurgitation presented to ED with 5 day h/o left lower abdominal pain, nausea and multiple episodes of vomiting as well as PO intolerance. Pt reports that the pain in his abdomen is illicited by movement. Went to [facility] two days ago and underwent CT scan which revealed acute cholecystitis as well as right lower lobe infectious pneumonia. Pt was told he would require surgery and left AMA because he was waiting too long. Denies f/c. Last echo 1/4/17 with EF 60-65%. Normal BMs and passing flatus. (18 Jan 2018 14:49)    REVIEW OF SYSTEMS: updated  Constitutional: No fever, weight loss, chills or fatigue  Eyes: No eye pain, visual disturbances, or discharge  ENMT:  No difficulty hearing, tinnitus, vertigo; No sinus or throat pain. No epistaxis, dysphagia, dysphonia, hoarseness or odynophagia  Neck: No pain, stiffness or neck swelling.  No masses or deformities  Respiratory: No cough, wheezing, chills or hemoptysis  - COPD  - ILD   - PE   - ASTHMA     - PNEUMONIA  Cardiovascular: No chest pain, dysrhythmia, palpitations, dizziness or edema   - COPD     - CAD   - CHF   - HTN  Gastrointestinal: No abdominal or epigastric pain. No nausea, vomiting or hematemesis; No diarrhea or constipation. No melena or hematochezia. No dysphagia or Icterus.          Genitourinary: No dysuria, frequency, hematuria or incontinence   - CKD/GAETANO      - ESRD  Neurological: No headaches, memory loss, loss of strength, numbness or tremors      -DEMENTIA     - STROKE    - SEIZURE  Skin: No itching, burning, rashes or lesions   Lymph Nodes: No enlarged glands  Endocrine: No heat or cold intolerance; No hair loss       - DM     - THYROID DISORDER  Musculoskeletal: No joint pain or swelling; No muscle, back or extremity pain  Psychiatric: No depression, anxiety, mood swings or difficulty sleeping  Heme/Lymph: No easy bruising or bleeding gums         - ANEMIA      - CANCER   -COAGULOPATHY  Allergy and Immunologic: No hives or eczema    PAST MEDICAL & SURGICAL HISTORY:  Mitral regurgitation  Aortic regurgitation  No significant past surgical history    FAMILY HISTORY:    SOCIAL HISTORY:      - Tobacco     - ETOH    Allergies    No Known Allergies    Intolerances      Vital Signs Last 24 Hrs  T(C): 38 (08 Feb 2018 09:17), Max: 39 (07 Feb 2018 23:00)  T(F): 100.4 (08 Feb 2018 09:17), Max: 102.2 (07 Feb 2018 23:00)  HR: 90 (08 Feb 2018 12:01) (69 - 136)  BP: 103/58 (08 Feb 2018 12:00) (77/54 - 140/75)  BP(mean): 69 (08 Feb 2018 12:00) (59 - 103)  RR: 23 (08 Feb 2018 12:00) (14 - 43)  SpO2: 99% (08 Feb 2018 12:01) (83% - 100%)    02-07 @ 07:01  -  02-08 @ 07:00  --------------------------------------------------------  IN: 4790 mL / OUT: 1819 mL / NET: 2971 mL    02-08 @ 07:01  -  02-08 @ 13:09  --------------------------------------------------------  IN: 1275 mL / OUT: 505 mL / NET: 770 mL      Mode: AC/ CMV (Assist Control/ Continuous Mandatory Ventilation)  RR (machine): 12  TV (machine): 500  FiO2: 40  PEEP: 5  ITime: 1  MAP: 11  PIP: 18    PHYSICAL EXAM:  Un   comfortable, no distress  Eyes: PERRL, EOM intact; conjunctiva and sclera clear  Head: Normocephalic;  No Trauma  ENMT: No nasal discharge, hoarseness, cough or hemoptysis.  Airway clear  Neck: Supple; non tender; no masses or deformities.    No JVD  Respiratory:  - WHEEZING   few R  RHONCHI  - RALES  = CRACKLES.  Diminished breath sounds  BILATERAL  RIGHT  LEFT bases   Cardiovascular: Regular rate and rhythm. S1 and S2 Normal; ++murmurs, gallops or rubs     - PPM/AICD  Gastrointestinal: Soft ,distended; Normal bowel sounds; No hepatosplenomegaly.     +PEG    + multiple surgical drains integrity checked    ANGELES  Genitourinary: No costovertebral angle tenderness. No dysuria  Extremities: Decreased AROM, + clubbing, cyanosis + edema, R foot toes unchanged pattern of necrosis, L foot less edema   Vascular: Peripheral pulses palpable 2+ bilaterally  Neurological: intubated and opening eyes to commands  Skin: Warm and dry. No obvious rash  Lymph Nodes: No acute cervical or supraclavicular adenopathy  Psychiatric: not sedated, calm affect    DEVICES:  - DENTURES   +IV R / L     - ETUBE   +TRACH 8.0 Shilley cuffed, site is clean,        LABS:                          6.8    7.7   )-----------( 144      ( 08 Feb 2018 12:07 )             21.6     02-08    142  |  101  |  42<H>  ----------------------------<  101<H>  3.8   |  31  |  1.00    Ca    7.7<L>      08 Feb 2018 05:29  Phos  3.6     02-08  Mg     2.3     02-08    TPro  5.4<L>  /  Alb  2.7<L>  /  TBili  3.3<H>  /  DBili  1.9<H>  /  AST  62<H>  /  ALT  52<H>  /  AlkPhos  135<H>  02-08      RADIOLOGY & ADDITIONAL STUDIES (The following images were personally reviewed):cxr series

## 2018-02-08 NOTE — PROGRESS NOTE ADULT - SUBJECTIVE AND OBJECTIVE BOX
Surgery attending:    Patient seen and examined. Appreciate ongoing excellent intensive care. Events overnight noted. Patient with episode of rigors and rising fever. The patient has no abdominal pain or abdominal complaints. His abdomen is soft, and he has appropriate discomfort with palpation secondary to multiple transabdominal drains and full thickness retention sutures. He continues to have bowel function and is tolerating tube feeds without issue.    In terms of possible a source:    Min-Wei drains in the perihepatic space and rae hepatis are serous in nature of their effluent. The retrograde duodenostomy continues to put out bile. The gastrostomy tube has scant output.    The patient continues to have thick secretions with his tracheostomy and his chest x-ray remains abnormal.    He does have a central catheter which is peripherally inserted.     Yesterday he underwent debridement of necrotic tissue near his toes and feet nails. Discussion with the attending vascular surgeon has revealed that this certainly could be a potential source for ongoing issues.    Recent CT scan over the weekend was reassuring with regard to any intra-abdominal collection. Although the patient's bilirubin remains elevated and slightly risen, I do not believe that this represents cholecystitis given his lack of abdominal pain. Furthermore, ultrasound of the right upper quadrant would not likely lead to a definitive answer with regard to this and will undoubtedly be equivocal given the fluid in the area near the drains and. The patient's antibiotic coverage has been broadened which I agree with, and he is not a candidate for cholecystectomy at this time given the right upper quadrant process.    If other processes are a ruled out, would favor HIDA scan although antibiotic coverage has been broadened anyway and clinically would suspect other sources.     It believe it would be prudent to remove the centrally inserted catheter and replace it.    Given the 14+ days since surgery I have discussed with the ICU staff in the intensive care unit to flush the gastrostomy with 100 mL of methylene blue tinged water. The expectation will be that blue tinged secretions will be detected in the retrograde duodenostomy. We will look for evidence of leak within the Imn-Wei drainage. Should we detect some would consider interventional radiology placement of additional drains or repositioning.    Although leak/intrabdominal process is well within the realm of possibility, given his clinical condition and prolonged ICU course I suspect other sources of his sepsis are more likely such as his clearly abnormal lungs / central line / transient bacteremia secondary to toe debridement.    Should he not improve with expansion of antibiotic coverage, line replacement, local wound care to the toes, and/or the bedside methylene blue test be positive, then would favor full-body CT scan with oral contrast administered through the gastrostomy tube. Would consider imaging of the sinuses and potentially extremities if there is concern for ongoing infection. In terms of his anemia this may represent fluid boluses along with hemolysis secondary to bacteremia and agree with transfusion, at the moment there is no definitive evidence of gastrointestinal blood loss although we will monitor for it. Given his continued bowel function and the great importance of nutrition, I see no reason not to continue his tube feeding at this time unless the SICU requires stopping.    Surgical perspective discussed with ICU staff. Defer care/management to surgical intensive care unit.

## 2018-02-08 NOTE — PROGRESS NOTE ADULT - ASSESSMENT
72yM with PMH severe MR/AR presented after recent dx acute cholecystitis and RLL PNA at [facility] with lower abdominal pain, new onset rapid Afib, sepsis, acute CHF exacerbation, initially admitted to cardiology on bipap, then transferred to SICU with perforated duodenum s/p Shreyas patch and jejunostomy (1/20) c/b breakdown s/p RTOR, washout, new Shreyas patch, gastrostomy, duodenostomy, IVCF (1/22)

## 2018-02-08 NOTE — PROGRESS NOTE ADULT - SUBJECTIVE AND OBJECTIVE BOX
INCOMPLETE    Vital Signs Last 24 Hrs  T(C): 37.3 (08 Feb 2018 17:05), Max: 39 (07 Feb 2018 23:00)  T(F): 99.2 (08 Feb 2018 17:05), Max: 102.2 (07 Feb 2018 23:00)  HR: 78 (08 Feb 2018 18:00) (73 - 136)  BP: 103/54 (08 Feb 2018 18:00) (77/54 - 140/75)  BP(mean): 75 (08 Feb 2018 18:00) (59 - 103)  RR: 16 (08 Feb 2018 18:00) (14 - 43)  SpO2: 100% (08 Feb 2018 18:00) (83% - 100%)  I&O's Summary    07 Feb 2018 07:01  -  08 Feb 2018 07:00  --------------------------------------------------------  IN: 4790 mL / OUT: 1819 mL / NET: 2971 mL    08 Feb 2018 07:01  -  08 Feb 2018 18:31  --------------------------------------------------------  IN: 1925 mL / OUT: 875 mL / NET: 1050 mL        Physical Exam:  General:  Pulmonary:  Cardiovascular:  Abdominal:  Extremities:  Pulses:   Right:                                                                          Left:  FEM [ ]2+ [ ]1+ [ ]doppler                                             FEM [ ]2+ [ ]1+ [ ]doppler    POP [ ]2+ [ ]1+ [ ]doppler                                             POP [ ]2+ [ ]1+ [ ]doppler    DP [ ]2+ [ ]1+ [ ]doppler                                                DP [ ]2+ [ ]1+ [ ]doppler  PT[ ]2+ [ ]1+ [ ]doppler                                                  PT [ ]2+ [ ]1+ [ ]doppler      LABS:                        7.8    7.3   )-----------( 131      ( 08 Feb 2018 17:01 )             24.4     02-08    145  |  110<H>  |  37<H>  ----------------------------<  66<L>  3.0<L>   |  25  |  0.84    Ca    5.8<LL>      08 Feb 2018 16:39  Phos  3.6     02-08  Mg     2.3     02-08    TPro  5.4<L>  /  Alb  2.7<L>  /  TBili  3.3<H>  /  DBili  1.9<H>  /  AST  62<H>  /  ALT  52<H>  /  AlkPhos  135<H>  02-08        Radiology and Additional Studies: Pt febrile overnight and pan-cultured. Intermittently hypotensive but fluid responsive. Bilateral great toe nails removed at bedside yesterday.       Vital Signs Last 24 Hrs  T(C): 37.3 (08 Feb 2018 17:05), Max: 39 (07 Feb 2018 23:00)  T(F): 99.2 (08 Feb 2018 17:05), Max: 102.2 (07 Feb 2018 23:00)  HR: 78 (08 Feb 2018 18:00) (73 - 136)  BP: 103/54 (08 Feb 2018 18:00) (77/54 - 140/75)  BP(mean): 75 (08 Feb 2018 18:00) (59 - 103)  RR: 16 (08 Feb 2018 18:00) (14 - 43)  SpO2: 100% (08 Feb 2018 18:00) (83% - 100%)  I&O's Summary    07 Feb 2018 07:01  -  08 Feb 2018 07:00  --------------------------------------------------------  IN: 4790 mL / OUT: 1819 mL / NET: 2971 mL    08 Feb 2018 07:01  -  08 Feb 2018 18:31  --------------------------------------------------------  IN: 1925 mL / OUT: 875 mL / NET: 1050 mL        Physical Exam:  - NAD, appears comfortable  - non-labored on BiPAP  - 2+ DP and PT pulses bilaterally; Bilateral LE edema, calfs soft and non-tender. RLE: 1st toe, tip of 3rd and 4th and 5th toe with slough tissue, 4th and 5th toe and macerated skin between 2-5th toes.  No purulent discharge, no foul odor. No surrounding erythema. LLE: 1st toe purplish discoloration surrounded by a blister, small ulcer over medial aspect of 5th toe, no surrounding erythema/warmth, no purulent discharge/foul odor.    LABS:                        7.8    7.3   )-----------( 131      ( 08 Feb 2018 17:01 )             24.4     02-08    145  |  110<H>  |  37<H>  ----------------------------<  66<L>  3.0<L>   |  25  |  0.84    Ca    5.8<LL>      08 Feb 2018 16:39  Phos  3.6     02-08  Mg     2.3     02-08    TPro  5.4<L>  /  Alb  2.7<L>  /  TBili  3.3<H>  /  DBili  1.9<H>  /  AST  62<H>  /  ALT  52<H>  /  AlkPhos  135<H>  02-08      Radiology and Additional Studies: n/a

## 2018-02-08 NOTE — PROGRESS NOTE ADULT - SUBJECTIVE AND OBJECTIVE BOX
: pt remains febrile, f/u CX (sputum, blood, urine), tolerating CPAP, consider changing lines, ?drug fever per ID no new abx, consider moving abd drain under R hemidiaphragm per Rads-  Vascular - removed bl great toe nails and I&D'd left 1st toe -> sent for culture. started vancomycin. On CPAP all day with decreased support.  9pm pt c/o chills w/ shivering/rigoring, became tachypneic, desaturated to <80%, tachycardic to 140s, easily bag ventilated & suctioned (low concern for plug), given demerol 50 w/improvement, LA 6, WBC up to 12. Febrile to 102. started NS@75 & held J-tube TFs. hypotensive to 85/58, responded to 1 liter bolus, but hypotensive again to 77/54, given another 1 liter bolus, Marco new BCx. CXR- R lung infiltrate unchanged; new hazy L midlung opacity (possibly worsening effusion vs. developing consolidation). Started Zosyn . Low UO, bladder scan w/300cc, abarca replaced w/ 425cc immediate output.      SUBJECTIVE: pt feels febrile this AM, endorses diffuse pruritis w/out difficulty breathing; pain controlled, denies HA, N/V, CP/SOB, abd pain, BM/flatus    MEDICATIONS  (STANDING):  albumin human 25% IVPB 50 milliLiter(s) IV Intermittent every 8 hours  ALBUTerol/ipratropium for Nebulization 3 milliLiter(s) Nebulizer every 6 hours  apixaban 5 milliGRAM(s) Oral every 12 hours  BACItracin   Ointment 1 Application(s) Topical two times a day  chlorhexidine 0.12% Liquid 15 milliLiter(s) Swish and Spit two times a day  digoxin     Tablet 0.125 milliGRAM(s) Oral daily  docusate sodium Liquid 100 milliGRAM(s) Oral two times a day  fluconAZOLE IVPB 400 milliGRAM(s) IV Intermittent every 24 hours  insulin lispro (HumaLOG) corrective regimen sliding scale   SubCutaneous every 6 hours  levoFLOXacin IVPB 750 milliGRAM(s) IV Intermittent every 24 hours  methylene blue 0.5% Injectable 10 milliLiter(s) IV Push once  pantoprazole  Injectable 40 milliGRAM(s) IV Push every 12 hours  povidone iodine 10% Solution 1 Application(s) Topical daily  silver sulfADIAZINE 1% Cream 1 Application(s) Topical daily  sodium chloride 0.9% lock flush 20 milliLiter(s) IV Push once  sodium chloride 0.9%. 1000 milliLiter(s) (100 mL/Hr) IV Continuous <Continuous>    MEDICATIONS  (PRN):  acetaminophen    Suspension 650 milliGRAM(s) Enteral Tube every 6 hours PRN For Temp greater than 38 C (100.4 F)  bisacodyl Suppository 10 milliGRAM(s) Rectal daily PRN Constipation  sodium chloride 0.9% lock flush 10 milliLiter(s) IV Push every 1 hour PRN After each medication administration      ICU Vital Signs Last 24 Hrs  T(C): 37.8 (2018 13:57), Max: 39 (2018 23:00)  T(F): 100 (2018 13:57), Max: 102.2 (2018 23:00)  HR: 85 (2018 16:50) (69 - 136)  BP: 103/55 (2018 16:00) (77/54 - 140/75)  BP(mean): 70 (2018 16:00) (59 - 103)  ABP: --  ABP(mean): --  RR: 17 (2018 16:00) (14 - 43)  SpO2: 100% (2018 16:50) (83% - 100%)      Physical Exam:  General: NAD, no rashes/uticaria  HEENT: NC/AT, trach in place  Pulmonary: Nonlabored breathing, no respiratory distress, expiratory wheeze b/l, crackles b/l  Cardiovascular: afib, MR  Abdominal: soft, RUQ mild tenderness, ND, incisions CDI w/out erythema, GUCCI x2 blood tinged serous drainage, gastrostomy/jejunostomy tubes in place  Extremities: b/l feet s/p toe debridement  Neuro: A/O x3, normal motor/sensation, no focal deficits    Vent settings:  Mode: AC/ CMV (Assist Control/ Continuous Mandatory Ventilation), RR (machine): 12, TV (machine): 500, FiO2: 40, PEEP: 5, ITime: 1, MAP: 9, PIP: 16    I&O's Summary    2018 07:01  -  2018 07:00  --------------------------------------------------------  IN: 4790 mL / OUT: 1819 mL / NET: 2971 mL    2018 07:  -  2018 16:53  --------------------------------------------------------  IN: 1925 mL / OUT: 625 mL / NET: 1300 mL        LABS:                        6.8    7.7   )-----------( 144      ( 2018 12:07 )             21.6     02-08    142  |  101  |  42<H>  ----------------------------<  101<H>  3.8   |  31  |  1.00    Ca    7.7<L>      2018 05:29  Phos  3.6     02-08  Mg     2.3     02-08    TPro  5.4<L>  /  Alb  2.7<L>  /  TBili  3.3<H>  /  DBili  1.9<H>  /  AST  62<H>  /  ALT  52<H>  /  AlkPhos  135<H>  02-08      Urinalysis Basic - ( 2018 00:30 )    Color: Yellow / Appearance: Clear / S.020 / pH: x  Gluc: x / Ketone: NEGATIVE  / Bili: Small / Urobili: 4.0 E.U./dL   Blood: x / Protein: 100 mg/dL / Nitrite: NEGATIVE   Leuk Esterase: NEGATIVE / RBC: 5-10 /HPF / WBC < 5 /HPF   Sq Epi: x / Non Sq Epi: 5-10 /HPF / Bacteria: Present /HPF      CAPILLARY BLOOD GLUCOSE      POCT Blood Glucose.: 119 mg/dL (2018 11:32)  POCT Blood Glucose.: 105 mg/dL (2018 05:28)  POCT Blood Glucose.: 179 mg/dL (2018 00:43)    LIVER FUNCTIONS - ( 2018 05:29 )  Alb: 2.7 g/dL / Pro: 5.4 g/dL / ALK PHOS: 135 U/L / ALT: 52 U/L / AST: 62 U/L / GGT: x             Cultures:Culture Results:   No growth at 12 hours ( @ 23:14)  Culture Results:   No growth at 12 hours ( @ 23:14)  Culture Results:   No growth to date. ( @ 15:38)  Culture Results:   Numerous Gram Negative Rods  Identification and susceptibility to follow. ( @ 09:32)  Culture Results:   No growth at 1 day. ( @ 00:58)  Culture Results:   No growth at 1 day. ( @ 00:58)      RADIOLOGY & ADDITIONAL STUDIES:  CXR shows new hazy L mid-lung opacity

## 2018-02-08 NOTE — PROGRESS NOTE ADULT - ASSESSMENT
ASSESSMENT/PLAN 71y/o male pt c Respiratory failure, perforated duodenal ulcer, POD post repair, RLL pneumonia, RLL atelectases, no obvious RADHA, elevated R hemidiaphragm, AFIB,  thrombocytopenia, possible endocarditis, s/p reop. now POP tracheostomy, new fever pattern, anemia    1. O2: continue CMV mode today  2. Bronchodilators:  Atrovent/ albuterol q 4 – 6 hours as needed  3. Corticosteroids: off  4. ID/Antibiotics:  Micafungin strict ID supervision , adjust ABX pending multiple new CX  5. Cardiac/HTN: Monitor, optimize HR;   6. GI: Rx/ prophylaxis c PPI/Protonix  7. Heme: Rx/VT pt now on Eliquis, transfuse PRBCS to target Hb.>8., follow H/H closely  8. Aspiration precautions, feeding PEG  9. Surgery mngmnt priority, recommend reposition vs removal of R subdiaphragmatic drain unless contraindicated./discussed c SICU?   10. Edema: diuretic to continue  Discussed c managing team. SICU

## 2018-02-08 NOTE — PROVIDER CONTACT NOTE (CRITICAL VALUE NOTIFICATION) - ACTION/TREATMENT ORDERED:
Will transfuse 1 Unit PRBC's
Decrease Heparin gtt rate to 9 mL/hr
Hold Heparin gtt for 1 hour, restart at 11 mL/hr

## 2018-02-08 NOTE — PROGRESS NOTE ADULT - ATTENDING COMMENTS
with increased temp, rigor, and elevated lactate overnight. BUN/Cr increased as well and received 2 liters NSovernight. Hb dropped likely secondary to hemodilution and transfused. Repeat lactate noted to be decreased. Abx broadened to cover Gram neg rods in sputum and continue to dose Vanco by level. no wean. CT abd tonight if becomes unstable with increasing fever. Adjust meds for decreased CrCL secondary to ATN. Feeds held.

## 2018-02-08 NOTE — PROGRESS NOTE ADULT - ASSESSMENT
possible sources of fever include IV line    Doubt pulmonary  sourcey since patient has been on appropriate antibiotics    possible intraabdominal infection

## 2018-02-09 LAB
-  LEVOFLOXACIN: SIGNIFICANT CHANGE UP
-  TRIMETHOPRIM/SULFAMETHOXAZOLE: SIGNIFICANT CHANGE UP
ALBUMIN SERPL ELPH-MCNC: 3 G/DL — LOW (ref 3.3–5)
ALP SERPL-CCNC: 110 U/L — SIGNIFICANT CHANGE UP (ref 40–120)
ALT FLD-CCNC: 42 U/L — SIGNIFICANT CHANGE UP (ref 10–45)
ANION GAP SERPL CALC-SCNC: 10 MMOL/L — SIGNIFICANT CHANGE UP (ref 5–17)
AST SERPL-CCNC: 40 U/L — SIGNIFICANT CHANGE UP (ref 10–40)
BILIRUB DIRECT SERPL-MCNC: 2.4 MG/DL — HIGH (ref 0–0.2)
BILIRUB INDIRECT FLD-MCNC: 1.8 MG/DL — HIGH (ref 0.2–1)
BILIRUB SERPL-MCNC: 4.2 MG/DL — HIGH (ref 0.2–1.2)
BUN SERPL-MCNC: 39 MG/DL — HIGH (ref 7–23)
CALCIUM SERPL-MCNC: 7.9 MG/DL — LOW (ref 8.4–10.5)
CHLORIDE SERPL-SCNC: 101 MMOL/L — SIGNIFICANT CHANGE UP (ref 96–108)
CO2 SERPL-SCNC: 28 MMOL/L — SIGNIFICANT CHANGE UP (ref 22–31)
CREAT SERPL-MCNC: 0.93 MG/DL — SIGNIFICANT CHANGE UP (ref 0.5–1.3)
GLUCOSE BLDC GLUCOMTR-MCNC: 105 MG/DL — HIGH (ref 70–99)
GLUCOSE BLDC GLUCOMTR-MCNC: 105 MG/DL — HIGH (ref 70–99)
GLUCOSE BLDC GLUCOMTR-MCNC: 114 MG/DL — HIGH (ref 70–99)
GLUCOSE BLDC GLUCOMTR-MCNC: 165 MG/DL — HIGH (ref 70–99)
GLUCOSE BLDC GLUCOMTR-MCNC: 97 MG/DL — SIGNIFICANT CHANGE UP (ref 70–99)
GLUCOSE SERPL-MCNC: 112 MG/DL — HIGH (ref 70–99)
HCT VFR BLD CALC: 25 % — LOW (ref 39–50)
HGB BLD-MCNC: 8 G/DL — LOW (ref 13–17)
MAGNESIUM SERPL-MCNC: 2.3 MG/DL — SIGNIFICANT CHANGE UP (ref 1.6–2.6)
MCHC RBC-ENTMCNC: 30.2 PG — SIGNIFICANT CHANGE UP (ref 27–34)
MCHC RBC-ENTMCNC: 32 G/DL — SIGNIFICANT CHANGE UP (ref 32–36)
MCV RBC AUTO: 94.3 FL — SIGNIFICANT CHANGE UP (ref 80–100)
METHOD TYPE: SIGNIFICANT CHANGE UP
PHOSPHATE SERPL-MCNC: 3.1 MG/DL — SIGNIFICANT CHANGE UP (ref 2.5–4.5)
PLATELET # BLD AUTO: 146 K/UL — LOW (ref 150–400)
POTASSIUM SERPL-MCNC: 3.9 MMOL/L — SIGNIFICANT CHANGE UP (ref 3.5–5.3)
POTASSIUM SERPL-SCNC: 3.9 MMOL/L — SIGNIFICANT CHANGE UP (ref 3.5–5.3)
PROT SERPL-MCNC: 5.6 G/DL — LOW (ref 6–8.3)
RBC # BLD: 2.65 M/UL — LOW (ref 4.2–5.8)
RBC # FLD: 21.6 % — HIGH (ref 10.3–16.9)
SODIUM SERPL-SCNC: 139 MMOL/L — SIGNIFICANT CHANGE UP (ref 135–145)
VANCOMYCIN TROUGH SERPL-MCNC: 20 UG/ML — SIGNIFICANT CHANGE UP (ref 10–20)
WBC # BLD: 6.4 K/UL — SIGNIFICANT CHANGE UP (ref 3.8–10.5)
WBC # FLD AUTO: 6.4 K/UL — SIGNIFICANT CHANGE UP (ref 3.8–10.5)

## 2018-02-09 PROCEDURE — 71045 X-RAY EXAM CHEST 1 VIEW: CPT | Mod: 26

## 2018-02-09 PROCEDURE — 99291 CRITICAL CARE FIRST HOUR: CPT

## 2018-02-09 RX ORDER — POTASSIUM CHLORIDE 20 MEQ
20 PACKET (EA) ORAL ONCE
Qty: 0 | Refills: 0 | Status: COMPLETED | OUTPATIENT
Start: 2018-02-09 | End: 2018-02-09

## 2018-02-09 RX ORDER — APIXABAN 2.5 MG/1
5 TABLET, FILM COATED ORAL EVERY 12 HOURS
Qty: 0 | Refills: 0 | Status: DISCONTINUED | OUTPATIENT
Start: 2018-02-09 | End: 2018-02-12

## 2018-02-09 RX ADMIN — Medication 0.12 MILLIGRAM(S): at 06:28

## 2018-02-09 RX ADMIN — APIXABAN 5 MILLIGRAM(S): 2.5 TABLET, FILM COATED ORAL at 22:24

## 2018-02-09 RX ADMIN — CHLORHEXIDINE GLUCONATE 15 MILLILITER(S): 213 SOLUTION TOPICAL at 17:07

## 2018-02-09 RX ADMIN — PANTOPRAZOLE SODIUM 40 MILLIGRAM(S): 20 TABLET, DELAYED RELEASE ORAL at 17:07

## 2018-02-09 RX ADMIN — Medication 1 APPLICATION(S): at 10:51

## 2018-02-09 RX ADMIN — Medication 100 MILLIGRAM(S): at 17:07

## 2018-02-09 RX ADMIN — Medication 3 MILLILITER(S): at 05:41

## 2018-02-09 RX ADMIN — FLUCONAZOLE 100 MILLIGRAM(S): 150 TABLET ORAL at 14:14

## 2018-02-09 RX ADMIN — Medication 2: at 22:24

## 2018-02-09 RX ADMIN — Medication 650 MILLIGRAM(S): at 20:37

## 2018-02-09 RX ADMIN — Medication 20 MILLIEQUIVALENT(S): at 08:12

## 2018-02-09 RX ADMIN — Medication 3 MILLILITER(S): at 12:36

## 2018-02-09 RX ADMIN — Medication 1 APPLICATION(S): at 06:45

## 2018-02-09 RX ADMIN — APIXABAN 5 MILLIGRAM(S): 2.5 TABLET, FILM COATED ORAL at 10:28

## 2018-02-09 RX ADMIN — PANTOPRAZOLE SODIUM 40 MILLIGRAM(S): 20 TABLET, DELAYED RELEASE ORAL at 06:28

## 2018-02-09 RX ADMIN — Medication 100 MILLIGRAM(S): at 06:28

## 2018-02-09 RX ADMIN — CHLORHEXIDINE GLUCONATE 15 MILLILITER(S): 213 SOLUTION TOPICAL at 06:28

## 2018-02-09 RX ADMIN — Medication 50 MILLIEQUIVALENT(S): at 00:04

## 2018-02-09 RX ADMIN — Medication 1 APPLICATION(S): at 17:06

## 2018-02-09 RX ADMIN — Medication 3 MILLILITER(S): at 17:10

## 2018-02-09 RX ADMIN — Medication 50 MILLILITER(S): at 02:31

## 2018-02-09 NOTE — PROGRESS NOTE ADULT - SUBJECTIVE AND OBJECTIVE BOX
INTERVAL HPI/OVERNIGHT EVENTS:comfortable/afebrile        MEDICATIONS  (STANDING):  ALBUTerol/ipratropium for Nebulization 3 milliLiter(s) Nebulizer every 6 hours  apixaban 5 milliGRAM(s) Oral every 12 hours  BACItracin   Ointment 1 Application(s) Topical two times a day  chlorhexidine 0.12% Liquid 15 milliLiter(s) Swish and Spit two times a day  digoxin     Tablet 0.125 milliGRAM(s) Oral daily  docusate sodium Liquid 100 milliGRAM(s) Oral two times a day  fluconAZOLE IVPB 400 milliGRAM(s) IV Intermittent every 24 hours  insulin lispro (HumaLOG) corrective regimen sliding scale   SubCutaneous every 6 hours  levoFLOXacin IVPB 750 milliGRAM(s) IV Intermittent every 24 hours  pantoprazole  Injectable 40 milliGRAM(s) IV Push every 12 hours  povidone iodine 10% Solution 1 Application(s) Topical daily  silver sulfADIAZINE 1% Cream 1 Application(s) Topical daily  sodium chloride 0.9% lock flush 20 milliLiter(s) IV Push once    MEDICATIONS  (PRN):  acetaminophen    Suspension 650 milliGRAM(s) Enteral Tube every 6 hours PRN For Temp greater than 38 C (100.4 F)  bisacodyl Suppository 10 milliGRAM(s) Rectal daily PRN Constipation  sodium chloride 0.9% lock flush 10 milliLiter(s) IV Push every 1 hour PRN After each medication administration      Allergies    No Known Allergies    Intolerances        REVIEW OF SYSTEMS:    patient appears comfortable    unable to provide a review of systems    Vital Signs Last 24 Hrs  T(C): 37.3 (09 Feb 2018 09:17), Max: 37.8 (08 Feb 2018 13:57)  T(F): 99.2 (09 Feb 2018 09:17), Max: 100 (08 Feb 2018 13:57)  HR: 102 (09 Feb 2018 12:36) (74 - 102)  BP: 126/75 (09 Feb 2018 12:00) (89/59 - 129/59)  BP(mean): 90 (09 Feb 2018 12:00) (62 - 90)  RR: 20 (09 Feb 2018 12:00) (13 - 26)  SpO2: 100% (09 Feb 2018 12:36) (85% - 100%)    PHYSICAL EXAM:    Eyes: PERRL, EOM intact; conjunctiva and sclera clear  Head: Normocephalic; atraumatic  ENMT: No nasal discharge; airway clear  Neck: Supple; non tender; no masses  Respiratory: No wheezes, rales or rhonchi  Cardiovascular: Regular rate and rhythm. S1 and S2 Normal; No murmurs, gallops or rubs  Gastrointestinal: Soft non-tender non-distended; Normal bowel sounds; No hepatosplenomegaly  Genitourinary: No costovertebral angle tenderness    LABS:                        8.0    6.4   )-----------( 146      ( 09 Feb 2018 06:12 )             25.0     02-09    139  |  101  |  39<H>  ----------------------------<  112<H>  3.9   |  28  |  0.93    Ca    7.9<L>      09 Feb 2018 06:12  Phos  3.1     02-09  Mg     2.3     02-09    TPro  5.6<L>  /  Alb  3.0<L>  /  TBili  4.2<H>  /  DBili  2.4<H>  /  AST  40  /  ALT  42  /  AlkPhos  110  02-09            MICROBIOLOGY:  Culture Results:   No growth at 1 day. (02-07 @ 23:14)  Culture Results:   No growth at 1 day. (02-07 @ 23:14)  Culture Results:   Culture in progress (02-07 @ 15:38)  Culture Results:   Numerous Stenotrophomonas maltophilia  Numerous Gram Negative Rods  Identification and susceptibility to follow. (02-07 @ 09:32)  Culture Results:   No growth at 2 days. (02-07 @ 00:58)  Culture Results:   No growth at 2 days. (02-07 @ 00:58)  Culture Results:   Moderate Burkholderia cepacia  See previous culture for sensitivity  Moderate Stenotrophomonas maltophilia (02-05 @ 17:03)  Culture Results:   Moderate Burkholderia cepacia  Normal Respiratory Kimmy absent (02-03 @ 08:37)  Culture Results:   No growth at 5 days. (02-03 @ 08:24)      RADIOLOGY & ADDITIONAL STUDIES:

## 2018-02-09 NOTE — PROGRESS NOTE ADULT - SUBJECTIVE AND OBJECTIVE BOX
vf 24 hr events:  O/N: Vanc trough 14; vanc redosed 1g w/ trough in AM. K+ 3.0 repleted. Restarted TFs  10-20cc/h per Dr. Metcalf. Afebrile.   2/8: Given albumin and 1uprbc abx changed to levaquin given poss blactam allergy and stenotrophamonas and bhavani sens. US lung:  poss ct abd: tlc placed picc line removed. called micro regarding sens, Vanco held will get trough at 6pm and dose accordingly. Repeat LA 2.8. PICC line removed placed TLC; TF held    SUBJECTIVE: this AM, no abd pain, no dyspnea, no CP, no nausea, no fevers/chills. Had soft BM overnight, + flatus. No pruritis.       MEDICATIONS  (STANDING):  ALBUTerol/ipratropium for Nebulization 3 milliLiter(s) Nebulizer every 6 hours  apixaban 5 milliGRAM(s) Oral every 12 hours  BACItracin   Ointment 1 Application(s) Topical two times a day  chlorhexidine 0.12% Liquid 15 milliLiter(s) Swish and Spit two times a day  digoxin     Tablet 0.125 milliGRAM(s) Oral daily  docusate sodium Liquid 100 milliGRAM(s) Oral two times a day  fluconAZOLE IVPB 400 milliGRAM(s) IV Intermittent every 24 hours  insulin lispro (HumaLOG) corrective regimen sliding scale   SubCutaneous every 6 hours  levoFLOXacin IVPB 750 milliGRAM(s) IV Intermittent every 24 hours  pantoprazole  Injectable 40 milliGRAM(s) IV Push every 12 hours  povidone iodine 10% Solution 1 Application(s) Topical daily  silver sulfADIAZINE 1% Cream 1 Application(s) Topical daily  sodium chloride 0.9% lock flush 20 milliLiter(s) IV Push once    MEDICATIONS  (PRN):  acetaminophen    Suspension 650 milliGRAM(s) Enteral Tube every 6 hours PRN For Temp greater than 38 C (100.4 F)  bisacodyl Suppository 10 milliGRAM(s) Rectal daily PRN Constipation  sodium chloride 0.9% lock flush 10 milliLiter(s) IV Push every 1 hour PRN After each medication administration      Oliveira:	  [ ] None	[x ] Daily Oliveira Order Placed	   Indication:	  [x ] Strict I and O's    [ ] Obstruction     [ ] Incontinence + Stage 3 or 4 Decubitus  Central Line:  [ ] None	   [ x]  Medication / TPN Administration     Drips:     ICU Vital Signs Last 24 Hrs  T(C): 37.6 (09 Feb 2018 22:01), Max: 37.9 (09 Feb 2018 14:02)  T(F): 99.6 (09 Feb 2018 22:01), Max: 100.3 (09 Feb 2018 14:02)  HR: 76 (10 Feb 2018 02:00) (74 - 103)  BP: 108/68 (10 Feb 2018 02:00) (93/57 - 129/59)  BP(mean): 80 (10 Feb 2018 02:00) (63 - 90)  ABP: --  ABP(mean): --  RR: 15 (10 Feb 2018 02:00) (12 - 24)  SpO2: 100% (10 Feb 2018 02:00) (85% - 100%)      Physical Exam:  General: NAD, no rashes/urticaria  HEENT: NC/AT, trach in place  Pulmonary: Nonlabored breathing, no respiratory distress, mild upper lobe rhonchi b/l, mild bibasilar crackles  Cardiovascular: irreg irreg rhythm, normal rate, holosystolic murmur best heard over mitral area  Abdominal: soft, NT, ND, incisions CDI w/out erythema, GUCCI x2 serous drainage, gastrostomy/jejunostomy (bilious output) tubes in place  Extremities: toe wounds with minimal bleeding, granulating, healing well, no purulent drainage  Neuro: normal motor/sensation, no focal deficits    Vent settings:  Mode: CPAP with PS, FiO2: 40, PEEP: 5, PS: 10, MAP: 7.9, PIP: 16    I&O's Summary    08 Feb 2018 07:01  -  09 Feb 2018 07:00  --------------------------------------------------------  IN: 3725 mL / OUT: 2067 mL / NET: 1658 mL    09 Feb 2018 07:01  -  10 Feb 2018 02:48  --------------------------------------------------------  IN: 1170 mL / OUT: 915 mL / NET: 255 mL        LABS:                        8.0    6.4   )-----------( 146      ( 09 Feb 2018 06:12 )             25.0     02-09    139  |  101  |  39<H>  ----------------------------<  112<H>  3.9   |  28  |  0.93    Ca    7.9<L>      09 Feb 2018 06:12  Phos  3.1     02-09  Mg     2.3     02-09    TPro  5.6<L>  /  Alb  3.0<L>  /  TBili  4.2<H>  /  DBili  2.4<H>  /  AST  40  /  ALT  42  /  AlkPhos  110  02-09        CAPILLARY BLOOD GLUCOSE      POCT Blood Glucose.: 165 mg/dL (09 Feb 2018 21:58)  POCT Blood Glucose.: 97 mg/dL (09 Feb 2018 15:55)  POCT Blood Glucose.: 105 mg/dL (09 Feb 2018 11:13)  POCT Blood Glucose.: 105 mg/dL (09 Feb 2018 07:05)    LIVER FUNCTIONS - ( 09 Feb 2018 06:12 )  Alb: 3.0 g/dL / Pro: 5.6 g/dL / ALK PHOS: 110 U/L / ALT: 42 U/L / AST: 40 U/L / GGT: x

## 2018-02-09 NOTE — PROGRESS NOTE ADULT - SUBJECTIVE AND OBJECTIVE BOX
Interventional, Pulmonary, Critical, Chest Special Procedures.    Pt was seen and fully examined by myself.     Time spent with patient in minutes:37    Patient is a 72y old  Male who presents with a chief complaint of acute cholecystitis (18 Jan 2018 14:49)The patient seen and discussed at rounds c SICU team. Temp pattern reviewed, ID feedback appreciated. pty now more awake and engaging, denies paIN.  Circuit vent integrity verified and preserved.    HPI:  73 yo M with h/o severe mitral and aortic valve regurgitation presented to ED with 5 day h/o left lower abdominal pain, nausea and multiple episodes of vomiting as well as PO intolerance. Pt reports that the pain in his abdomen is illicited by movement. Went to [facility] two days ago and underwent CT scan which revealed acute cholecystitis as well as right lower lobe infectious pneumonia. Pt was told he would require surgery and left AMA because he was waiting too long. Denies f/c. Last echo 1/4/17 with EF 60-65%. Normal BMs and passing flatus. (18 Jan 2018 14:49)    REVIEW OF SYSTEMS:  Constitutional: NEW PATTERN fever, weight loss, chills + fatigue  Eyes: No eye pain, visual disturbances, or discharge  ENMT:  No difficulty hearing, tinnitus, vertigo; No sinus or throat pain. No epistaxis, dysphagia, dysphonia, hoarseness or odynophagia  Neck: No pain, stiffness or neck swelling.  No masses or deformities  Respiratory: No cough, wheezing, chills or hemoptysis  - COPD  - ILD   - PE   - ASTHMA     - PNEUMONIA  Cardiovascular: No chest pain, dysrhythmia, palpitations, dizziness or edema   - COPD     - CAD   - CHF   - HTN  Gastrointestinal: No abdominal or epigastric pain. No nausea, vomiting or hematemesis; No diarrhea or constipation. No melena or hematochezia. No dysphagia or Icterus.          Genitourinary: No dysuria, frequency, hematuria or incontinence   - CKD/GAETANO      - ESRD  Neurological: No headaches, memory loss, loss of strength, numbness or tremors      -DEMENTIA     - STROKE    - SEIZURE  Skin: No itching, burning, rashes or lesions   Lymph Nodes: No enlarged glands  Endocrine: No heat or cold intolerance; No hair loss       - DM     - THYROID DISORDER  Musculoskeletal: No joint pain or swelling; No muscle, back or extremity pain  Psychiatric: No depression, anxiety, mood swings or difficulty sleeping  Heme/Lymph: No easy bruising or bleeding gums         - ANEMIA      - CANCER   -COAGULOPATHY  Allergy and Immunologic: No hives or eczema    PAST MEDICAL & SURGICAL HISTORY:  Mitral regurgitation  Aortic regurgitation  No significant past surgical history    FAMILY HISTORY:    SOCIAL HISTORY:      - Tobacco     - ETOH    Allergies    No Known Allergies    Intolerances      Vital Signs Last 24 Hrs  T(C): 37.3 (09 Feb 2018 09:17), Max: 37.8 (08 Feb 2018 13:57)  T(F): 99.2 (09 Feb 2018 09:17), Max: 100 (08 Feb 2018 13:57)  HR: 92 (09 Feb 2018 10:19) (74 - 100)  BP: 94/68 (09 Feb 2018 10:00) (89/59 - 129/59)  BP(mean): 76 (09 Feb 2018 10:00) (62 - 82)  RR: 21 (09 Feb 2018 10:19) (13 - 26)  SpO2: 100% (09 Feb 2018 10:19) (85% - 100%)    02-08 @ 07:01 - 02-09 @ 07:00  --------------------------------------------------------  IN: 3725 mL / OUT: 2067 mL / NET: 1658 mL    02-09 @ 07:01  -  02-09 @ 10:25  --------------------------------------------------------  IN: 350 mL / OUT: 180 mL / NET: 170 mL      Mode: CPAP with PS  FiO2: 40  PEEP: 5  MAP: 7.5  PIP: 15    PHYSICAL EXAM:  MORE  comfortable, no distress  Eyes: PERRL, EOM intact; conjunctiva and sclera clear  Head: Normocephalic;  No Trauma  ENMT: No nasal discharge, hoarseness, cough or hemoptysis.  Airway clear  Neck: Supple; non tender; no masses or deformities.    No JVD  Respiratory:  - WHEEZING   few bilateral   RHONCHI  - RALES  = CRACKLES.  Diminished breath sounds  BILATERAL  RIGHT  LEFT bases   Cardiovascular: Regular rate and rhythm. S1 and S2 Normal; ++murmurs, gallops or rubs     - PPM/AICD  Gastrointestinal: Soft ,distended; Normal bowel sounds; No hepatosplenomegaly.     +PEG    + multiple surgical drains integrity checked    ANGELES  Genitourinary: No costovertebral angle tenderness. No dysuria  Extremities: Decreased AROM, + clubbing, cyanosis + edema, R foot toes unchanged pattern of necrosis, L foot less edema   Vascular: Peripheral pulses palpable 2+ bilaterally  Neurological: intubated and opening eyes to commands  Skin: Warm and dry. No obvious rash  Lymph Nodes: No acute cervical or supraclavicular adenopathy  Psychiatric: not sedated, calm affect    DEVICES:  - DENTURES   +IV R / L     - ETUBE   +TRACH 8.0 Shilley cuffed, site is clean,      LABS:                          8.0    6.4   )-----------( 146      ( 09 Feb 2018 06:12 )             25.0     02-09    139  |  101  |  39<H>  ----------------------------<  112<H>  3.9   |  28  |  0.93    Ca    7.9<L>      09 Feb 2018 06:12  Phos  3.1     02-09  Mg     2.3     02-09    TPro  5.6<L>  /  Alb  3.0<L>  /  TBili  4.2<H>  /  DBili  2.4<H>  /  AST  40  /  ALT  42  /  AlkPhos  110  02-09    < from: Xray Chest 1 View-PORTABLE IMMEDIATE (02.08.18 @ 15:48) >    EXAM:  XR CHEST PORTABLE IMMED 1V                          PROCEDURE DATE:  02/08/2018                     INTERPRETATION:  XR CHEST PORTABLE IMMED 1V dated 2/8/2018 3:48 PM    CLINICAL INFORMATION: s/p tlc placement    COMPARISON: February 20, 2018 at 0356 hours    FINDINGS: Tracheostomy catheter and left arm PICC line are again seen,   unchanged. Right upper quadrant catheter is again seen. There is a new   right internal jugular vein central line with its tip in the cavoatrial   junction. There is no pneumothorax. There is a stable infiltrate at the   right base. The left lung demonstrates improved aeration. There is a   stable small left pleural effusion.    IMPRESSION: New right internal jugular vein central line in place. No   pneumothorax. Improved aeration in the left lung. No other significant   change.    < end of copied text >    RADIOLOGY & ADDITIONAL STUDIES (The following images were personally reviewed):

## 2018-02-09 NOTE — PROGRESS NOTE ADULT - ASSESSMENT
72yM with PMH severe MR/AR presented after recent dx acute cholecystitis and RLL PNA at [facility] with lower abdominal pain, new onset rapid Afib, sepsis, acute CHF exacerbation, initially admitted to cardiology on bipap, then transferred to SICU with perforated duodenum s/p Shreyas patch and jejunostomy (1/20) c/b breakdown s/p RTOR, washout, new Shreyas patch, gastrostomy, duodenostomy, IVCF (1/22)    Neuro: Tylenol PRN  CV: Afib: Echo EF 50%, Severe MR, no vegetation, digoxin, Eliquis  Pulm: Trach 8.0 shiley on AC/VC 40/500/12/5 chlorhex, duonebs  GI: NPO, NS@75, Holding Jtube TF Glucerna 1.2 68cc, Protonix BID, albumin 25% q8h. NGT to LIWS, retrograde D-tube to LIWS, G-tube to LIWS, colace dulcolax   : Oliveira (2/8--)  ID: C albicans intraperitoneal infxn: fluconazole (2/1--); Alexi/Stenotrophomonas: Levaquin(2/8-)  // D/c: Zosyn (1/19-22; 1/27-29; 2/7--) micafungin (1/22-2/1), ceftazidime (1/29-2/4), linezolid (1/22-23), meropenem (1/22-27), vancomycin (1/19-20; 2/7-2/9)   Endo: ISS  HEME: Eliquis, IVCF (1/22) // 1FFP, 1PLT (1/22).   PPx: SCD, R pop DVT, s/p IVC filter   Lines: PIV, TLC( 2/8-) D/c: PICC (1/29-2/8), L brachial Jeane (1/20-1/27),  LSC HD cath (1/22-1/27)   Wounds: Ex lap, JPx2 to bulb suction, J-tube to gravity, D-tube to LIWS, G-tube to LIWS; silvadene for toes  PT/OT: Early mobilization.

## 2018-02-09 NOTE — PROGRESS NOTE ADULT - SUBJECTIVE AND OBJECTIVE BOX
Critical Care    INTERVAL HPI/OVERNIGHT EVENTS:    ANTIBIOTICS    MEDICATIONS  (STANDING):  ALBUTerol/ipratropium for Nebulization 3 milliLiter(s) Nebulizer every 6 hours  apixaban 5 milliGRAM(s) Oral every 12 hours  BACItracin   Ointment 1 Application(s) Topical two times a day  chlorhexidine 0.12% Liquid 15 milliLiter(s) Swish and Spit two times a day  digoxin     Tablet 0.125 milliGRAM(s) Oral daily  docusate sodium Liquid 100 milliGRAM(s) Oral two times a day  fluconAZOLE IVPB 400 milliGRAM(s) IV Intermittent every 24 hours  insulin lispro (HumaLOG) corrective regimen sliding scale   SubCutaneous every 6 hours  levoFLOXacin IVPB 750 milliGRAM(s) IV Intermittent every 24 hours  pantoprazole  Injectable 40 milliGRAM(s) IV Push every 12 hours  povidone iodine 10% Solution 1 Application(s) Topical daily  silver sulfADIAZINE 1% Cream 1 Application(s) Topical daily  sodium chloride 0.9% lock flush 20 milliLiter(s) IV Push once    MEDICATIONS  (PRN):  acetaminophen    Suspension 650 milliGRAM(s) Enteral Tube every 6 hours PRN For Temp greater than 38 C (100.4 F)  bisacodyl Suppository 10 milliGRAM(s) Rectal daily PRN Constipation  sodium chloride 0.9% lock flush 10 milliLiter(s) IV Push every 1 hour PRN After each medication administration      Allergies    No Known Allergies    Intolerances        REVIEW OF SYSTEMS:    patient appears comfortable    unable to provide a review of systems    Vital Signs Last 24 Hrs  T(C): 37.3 (09 Feb 2018 09:17), Max: 37.8 (08 Feb 2018 13:57)  T(F): 99.2 (09 Feb 2018 09:17), Max: 100 (08 Feb 2018 13:57)  HR: 102 (09 Feb 2018 12:36) (74 - 102)  BP: 126/75 (09 Feb 2018 12:00) (89/59 - 129/59)  BP(mean): 90 (09 Feb 2018 12:00) (62 - 90)  RR: 20 (09 Feb 2018 12:00) (13 - 26)  SpO2: 100% (09 Feb 2018 12:36) (85% - 100%)    PHYSICAL EXAM:    Eyes: PERRL, EOM intact; conjunctiva and sclera clear  Head: Normocephalic; atraumatic  ENMT: No nasal discharge; airway clear  Neck: Supple; non tender; no masses  Respiratory: No wheezes, rales or rhonchi  Cardiovascular: Regular rate and rhythm. S1 and S2 Normal; No murmurs, gallops or rubs  Gastrointestinal: Soft non-tender non-distended; Normal bowel sounds; No hepatosplenomegaly  Genitourinary: No costovertebral angle tenderness    LABS:                        8.0    6.4   )-----------( 146      ( 09 Feb 2018 06:12 )             25.0     02-09    139  |  101  |  39<H>  ----------------------------<  112<H>  3.9   |  28  |  0.93    Ca    7.9<L>      09 Feb 2018 06:12  Phos  3.1     02-09  Mg     2.3     02-09    TPro  5.6<L>  /  Alb  3.0<L>  /  TBili  4.2<H>  /  DBili  2.4<H>  /  AST  40  /  ALT  42  /  AlkPhos  110  02-09            MICROBIOLOGY:  Culture Results:   No growth at 1 day. (02-07 @ 23:14)  Culture Results:   No growth at 1 day. (02-07 @ 23:14)  Culture Results:   Culture in progress (02-07 @ 15:38)  Culture Results:   Numerous Stenotrophomonas maltophilia  Numerous Gram Negative Rods  Identification and susceptibility to follow. (02-07 @ 09:32)  Culture Results:   No growth at 2 days. (02-07 @ 00:58)  Culture Results:   No growth at 2 days. (02-07 @ 00:58)  Culture Results:   Moderate Burkholderia cepacia  See previous culture for sensitivity  Moderate Stenotrophomonas maltophilia (02-05 @ 17:03)  Culture Results:   Moderate Burkholderia cepacia  Normal Respiratory Kimmy absent (02-03 @ 08:37)  Culture Results:   No growth at 5 days. (02-03 @ 08:24)      RADIOLOGY & ADDITIONAL STUDIES:

## 2018-02-09 NOTE — PROGRESS NOTE ADULT - ASSESSMENT
ASSESSMENT/PLAN 73y/o male pt c Respiratory failure, perforated duodenal ulcer, POD post repair, pneumonia, RLL atelectases, no obvious RADHA, elevated R hemidiaphragm, AFIB,  thrombocytopenia, possible endocarditis, s/p reop. now POP tracheostomy, new fever pattern, anemia    1. O2: resume weaning, attempt CPAP mode today  2. Bronchodilators:  Atrovent/ albuterol q 4 – 6 hours as needed  3. Corticosteroids: off  4. ID/Antibiotics:  strict ID supervision , adjust ABX pending multiple new CX  5. Cardiac/HTN: Monitor, optimize HR;   6. GI: Rx/ prophylaxis c PPI/Protonix  7. Heme: Rx/VT resume anticoagulation, unless surgically contraindicated,  follow H/H closely  8. Aspiration precautions, feeding PEG  9. Surgery mngmnt priority, recommend reposition vs removal of R subdiaphragmatic drain unless contraindicated./discussed c SICU?   10. Edema: diuretic to continue  Discussed c managing team. SICU

## 2018-02-10 LAB
-  CEFTAZIDIME: SIGNIFICANT CHANGE UP
-  TRIMETHOPRIM/SULFAMETHOXAZOLE: SIGNIFICANT CHANGE UP
ALBUMIN SERPL ELPH-MCNC: 2.6 G/DL — LOW (ref 3.3–5)
ALP SERPL-CCNC: 130 U/L — HIGH (ref 40–120)
ALT FLD-CCNC: 43 U/L — SIGNIFICANT CHANGE UP (ref 10–45)
ANION GAP SERPL CALC-SCNC: 11 MMOL/L — SIGNIFICANT CHANGE UP (ref 5–17)
AST SERPL-CCNC: 45 U/L — HIGH (ref 10–40)
BILIRUB DIRECT SERPL-MCNC: 2.1 MG/DL — HIGH (ref 0–0.2)
BILIRUB SERPL-MCNC: 3.7 MG/DL — HIGH (ref 0.2–1.2)
BUN SERPL-MCNC: 37 MG/DL — HIGH (ref 7–23)
CALCIUM SERPL-MCNC: 7.9 MG/DL — LOW (ref 8.4–10.5)
CHLORIDE SERPL-SCNC: 99 MMOL/L — SIGNIFICANT CHANGE UP (ref 96–108)
CO2 SERPL-SCNC: 27 MMOL/L — SIGNIFICANT CHANGE UP (ref 22–31)
CREAT SERPL-MCNC: 0.76 MG/DL — SIGNIFICANT CHANGE UP (ref 0.5–1.3)
CULTURE RESULTS: SIGNIFICANT CHANGE UP
GLUCOSE BLDC GLUCOMTR-MCNC: 119 MG/DL — HIGH (ref 70–99)
GLUCOSE BLDC GLUCOMTR-MCNC: 135 MG/DL — HIGH (ref 70–99)
GLUCOSE BLDC GLUCOMTR-MCNC: 152 MG/DL — HIGH (ref 70–99)
GLUCOSE BLDC GLUCOMTR-MCNC: 153 MG/DL — HIGH (ref 70–99)
GLUCOSE BLDC GLUCOMTR-MCNC: 154 MG/DL — HIGH (ref 70–99)
GLUCOSE SERPL-MCNC: 158 MG/DL — HIGH (ref 70–99)
HCT VFR BLD CALC: 26.7 % — LOW (ref 39–50)
HGB BLD-MCNC: 8.5 G/DL — LOW (ref 13–17)
MAGNESIUM SERPL-MCNC: 2.4 MG/DL — SIGNIFICANT CHANGE UP (ref 1.6–2.6)
MCHC RBC-ENTMCNC: 30.5 PG — SIGNIFICANT CHANGE UP (ref 27–34)
MCHC RBC-ENTMCNC: 31.8 G/DL — LOW (ref 32–36)
MCV RBC AUTO: 95.7 FL — SIGNIFICANT CHANGE UP (ref 80–100)
METHOD TYPE: SIGNIFICANT CHANGE UP
ORGANISM # SPEC MICROSCOPIC CNT: SIGNIFICANT CHANGE UP
PHOSPHATE SERPL-MCNC: 2.7 MG/DL — SIGNIFICANT CHANGE UP (ref 2.5–4.5)
PLATELET # BLD AUTO: 154 K/UL — SIGNIFICANT CHANGE UP (ref 150–400)
POTASSIUM SERPL-MCNC: 3.5 MMOL/L — SIGNIFICANT CHANGE UP (ref 3.5–5.3)
POTASSIUM SERPL-SCNC: 3.5 MMOL/L — SIGNIFICANT CHANGE UP (ref 3.5–5.3)
PROT SERPL-MCNC: 5.4 G/DL — LOW (ref 6–8.3)
RBC # BLD: 2.79 M/UL — LOW (ref 4.2–5.8)
RBC # FLD: 21.6 % — HIGH (ref 10.3–16.9)
SODIUM SERPL-SCNC: 137 MMOL/L — SIGNIFICANT CHANGE UP (ref 135–145)
SPECIMEN SOURCE: SIGNIFICANT CHANGE UP
WBC # BLD: 6 K/UL — SIGNIFICANT CHANGE UP (ref 3.8–10.5)
WBC # FLD AUTO: 6 K/UL — SIGNIFICANT CHANGE UP (ref 3.8–10.5)

## 2018-02-10 PROCEDURE — 99233 SBSQ HOSP IP/OBS HIGH 50: CPT

## 2018-02-10 PROCEDURE — 71045 X-RAY EXAM CHEST 1 VIEW: CPT | Mod: 26

## 2018-02-10 RX ADMIN — Medication 2: at 08:29

## 2018-02-10 RX ADMIN — Medication 3 MILLILITER(S): at 17:28

## 2018-02-10 RX ADMIN — CHLORHEXIDINE GLUCONATE 15 MILLILITER(S): 213 SOLUTION TOPICAL at 06:17

## 2018-02-10 RX ADMIN — Medication 650 MILLIGRAM(S): at 05:46

## 2018-02-10 RX ADMIN — PANTOPRAZOLE SODIUM 40 MILLIGRAM(S): 20 TABLET, DELAYED RELEASE ORAL at 06:17

## 2018-02-10 RX ADMIN — APIXABAN 5 MILLIGRAM(S): 2.5 TABLET, FILM COATED ORAL at 22:52

## 2018-02-10 RX ADMIN — Medication 100 MILLIGRAM(S): at 18:05

## 2018-02-10 RX ADMIN — Medication 1 APPLICATION(S): at 11:44

## 2018-02-10 RX ADMIN — FLUCONAZOLE 100 MILLIGRAM(S): 150 TABLET ORAL at 14:16

## 2018-02-10 RX ADMIN — PANTOPRAZOLE SODIUM 40 MILLIGRAM(S): 20 TABLET, DELAYED RELEASE ORAL at 18:05

## 2018-02-10 RX ADMIN — Medication 650 MILLIGRAM(S): at 14:54

## 2018-02-10 RX ADMIN — Medication 0.12 MILLIGRAM(S): at 06:17

## 2018-02-10 RX ADMIN — Medication 1 APPLICATION(S): at 11:43

## 2018-02-10 RX ADMIN — Medication 1 APPLICATION(S): at 06:26

## 2018-02-10 RX ADMIN — Medication 1 APPLICATION(S): at 18:06

## 2018-02-10 RX ADMIN — Medication 3 MILLILITER(S): at 12:47

## 2018-02-10 RX ADMIN — CHLORHEXIDINE GLUCONATE 15 MILLILITER(S): 213 SOLUTION TOPICAL at 18:05

## 2018-02-10 RX ADMIN — APIXABAN 5 MILLIGRAM(S): 2.5 TABLET, FILM COATED ORAL at 10:36

## 2018-02-10 RX ADMIN — Medication 3 MILLILITER(S): at 05:37

## 2018-02-10 RX ADMIN — Medication 2: at 18:16

## 2018-02-10 RX ADMIN — Medication 3 MILLILITER(S): at 00:06

## 2018-02-10 NOTE — PROGRESS NOTE ADULT - ASSESSMENT
72yM with PMH severe MR/AR presented after recent dx acute cholecystitis and RLL PNA at [facility] with lower abdominal pain, new onset rapid Afib, sepsis, acute CHF exacerbation, initially admitted to cardiology on bipap, then transferred to SICU with perforated duodenum s/p Shreyas patch and jejunostomy (1/20) c/b breakdown s/p RTOR, washout, new Shreyas patch, gastrostomy, duodenostomy, IVCF (1/22)    Neuro: Tylenol PRN  CV: Afib: Echo EF 50%, Severe MR, no vegetation, digoxin, Eliquis  Pulm: Trach 8.0 shiley on CPAP, chlorhex, duonebs  GI: NPO, Jtube TF Glucerna 1.2 68cc, Protonix BID. Retrograde D-tube to LIWS, G-tube to LIWS, colace dulcolax   : Oliveira 2/2 retention on 2/10. Will repeat TOV in three days.   ID: C albicans intraperitoneal infxn: fluconazole (2/1--); Alexi/Stenotrophomonas: Levaquin(2/8-)  // D/c: Zosyn (1/19-22; 1/27-29; 2/7--) micafungin (1/22-2/1), ceftazidime (1/29-2/4), linezolid (1/22-23), meropenem (1/22-27), vancomycin (1/19-20; 2/7-2/9)   Endo: ISS  HEME: Eliquis, IVCF (1/22) // 1FFP, 1PLT (1/22).   PPx: SCD, R pop DVT, s/p IVC filter   Lines: PIV, TLC( 2/8-) D/c: PICC (1/29-2/8), L brachial [place] (1/20-1/27),  LSC HD cath (1/22-1/27)   Wounds: Ex lap, JPx2 to bulb suction, J for feeds, D-tube to LIWS, G-tube to LIWS; betadine/peroxide to toes  PT/OT: Early mobilization.

## 2018-02-10 NOTE — PROGRESS NOTE ADULT - SUBJECTIVE AND OBJECTIVE BOX
INTERVAL HPI/OVERNIGHT EVENTS: o/n: failed ToV, Abarca replaced. 450 cc out instantly.   2/9: albumin and vanc discontinued. eliquis started. Abarca removed.    PT seen and examined on AM rounds. c/o abdominal pain. Denies CP/SOB/N    MEDICATIONS  (STANDING):  ALBUTerol/ipratropium for Nebulization 3 milliLiter(s) Nebulizer every 6 hours  apixaban 5 milliGRAM(s) Oral every 12 hours  BACItracin   Ointment 1 Application(s) Topical two times a day  chlorhexidine 0.12% Liquid 15 milliLiter(s) Swish and Spit two times a day  digoxin     Tablet 0.125 milliGRAM(s) Oral daily  docusate sodium Liquid 100 milliGRAM(s) Oral two times a day  fluconAZOLE IVPB 400 milliGRAM(s) IV Intermittent every 24 hours  insulin lispro (HumaLOG) corrective regimen sliding scale   SubCutaneous every 6 hours  levoFLOXacin IVPB 750 milliGRAM(s) IV Intermittent every 24 hours  pantoprazole  Injectable 40 milliGRAM(s) IV Push every 12 hours  povidone iodine 10% Solution 1 Application(s) Topical daily  silver sulfADIAZINE 1% Cream 1 Application(s) Topical daily  sodium chloride 0.9% lock flush 20 milliLiter(s) IV Push once    MEDICATIONS  (PRN):  acetaminophen    Suspension 650 milliGRAM(s) Enteral Tube every 6 hours PRN For Temp greater than 38 C (100.4 F)  bisacodyl Suppository 10 milliGRAM(s) Rectal daily PRN Constipation  sodium chloride 0.9% lock flush 10 milliLiter(s) IV Push every 1 hour PRN After each medication administration      Drug Dosing Weight  Height (cm): 177.8 (18 Jan 2018 11:40)  Weight (kg): 89.4 (18 Jan 2018 11:40)  BMI (kg/m2): 28.3 (18 Jan 2018 11:40)  BSA (m2): 2.07 (18 Jan 2018 11:40)      PAST MEDICAL & SURGICAL HISTORY:  Mitral regurgitation  Aortic regurgitation  No significant past surgical history      ICU Vital Signs Last 24 Hrs  T(C): 37.4 (10 Feb 2018 08:53), Max: 37.9 (09 Feb 2018 14:02)  T(F): 99.3 (10 Feb 2018 08:53), Max: 100.3 (09 Feb 2018 14:02)  HR: 82 (10 Feb 2018 13:00) (76 - 103)  BP: 112/59 (10 Feb 2018 13:00) (93/57 - 124/63)  BP(mean): 75 (10 Feb 2018 13:00) (63 - 88)  ABP: --  ABP(mean): --  RR: 22 (10 Feb 2018 13:00) (12 - 32)  SpO2: 100% (10 Feb 2018 13:00) (98% - 100%)            09 Feb 2018 07:01  -  10 Feb 2018 07:00  --------------------------------------------------------  IN:    Enteral Tube Flush: 200 mL    Glucerna: 928 mL    IV PiggyBack: 350 mL    sodium chloride 0.9%: 200 mL  Total IN: 1678 mL    OUT:    Bulb: 255 mL    Bulb: 60 mL    Drain: 250 mL    Drain: 25 mL    Indwelling Catheter - Urethral: 1130 mL  Total OUT: 1720 mL    Total NET: -42 mL      10 Feb 2018 07:01  -  10 Feb 2018 13:34  --------------------------------------------------------  IN:    Enteral Tube Flush: 60 mL    Glucerna: 408 mL  Total IN: 468 mL    OUT:    Indwelling Catheter - Urethral: 310 mL  Total OUT: 310 mL    Total NET: 158 mL          Mode: CPAP with PS  FiO2: 40  PEEP: 5  PS: 10  MAP: 8  PIP: 15      PHYSICAL EXAM:  General: NAD, sleeping in bed but easily arousable  Neuro: A&Ox3, no focal deficits  HEENT: poor dentition, NC/AT, trach in place, CDI  Pulmonary: tachypneic on CPAP, decreased BS at b/l bases, + mild bibasilar crackles  CV: irreg irreg rhythm, holosystolic murmur best heard over apex  Abdominal: soft, NT, ND, incisions CDI, no surrounding erythema or drainage. GUCCI x 2 serous drainage, gastrostomy/jejunostomy (bilious output) tubes in place  : abarca draining dark urine. +scrotal edema  Extremities: toe wounds with minimal bleeding, granulating, healing well, no purulent drainage. +b/l LE edema        LABS:  CBC Full  -  ( 10 Feb 2018 05:36 )  WBC Count : 6.0 K/uL  Hemoglobin : 8.5 g/dL  Hematocrit : 26.7 %  Platelet Count - Automated : 154 K/uL  Mean Cell Volume : 95.7 fL  Mean Cell Hemoglobin : 30.5 pg  Mean Cell Hemoglobin Concentration : 31.8 g/dL  Auto Neutrophil # : x  Auto Lymphocyte # : x  Auto Monocyte # : x  Auto Eosinophil # : x  Auto Basophil # : x  Auto Neutrophil % : x  Auto Lymphocyte % : x  Auto Monocyte % : x  Auto Eosinophil % : x  Auto Basophil % : x    02-10    137  |  99  |  37<H>  ----------------------------<  158<H>  3.5   |  27  |  0.76    Ca    7.9<L>      10 Feb 2018 05:37  Phos  2.7     02-10  Mg     2.4     02-10    TPro  5.4<L>  /  Alb  2.6<L>  /  TBili  3.7<H>  /  DBili  2.1<H>  /  AST  45<H>  /  ALT  43  /  AlkPhos  130<H>  02-10

## 2018-02-10 NOTE — PROGRESS NOTE ADULT - ASSESSMENT
A -acute respiratory failure/atrial fibrillation/MR/sp abd surgery/intrabd sepsis    Suggest  would not advance vent, may need ac, RR in 30s  continue feeds  negative fluid balance  complete course of levofloxacin  dc fs well controlled  check UUN prealbumin  mobilize  consider LTAC  continue eliquis

## 2018-02-10 NOTE — PROGRESS NOTE ADULT - SUBJECTIVE AND OBJECTIVE BOX
Interventional, Pulmonary, Critical, Chest Special Procedures.    Pt was seen and fully examined by myself.     Time spent with patient in minutes:37    Patient is a 72y old  Male who presents with a chief complaint of acute cholecystitis (18 Jan 2018 14:49)The patient awake and engaging, pain free when seen. Intermittent tachypnea, pt able to generate  on demand and on PS/PEEP mode.    HPI:  73 yo M with h/o severe mitral and aortic valve regurgitation presented to ED with 5 day h/o left lower abdominal pain, nausea and multiple episodes of vomiting as well as PO intolerance. Pt reports that the pain in his abdomen is illicited by movement. Went to [facility] two days ago and underwent CT scan which revealed acute cholecystitis as well as right lower lobe infectious pneumonia. Pt was told he would require surgery and left AMA because he was waiting too long. Denies f/c. Last echo 1/4/17 with EF 60-65%. Normal BMs and passing flatus. (18 Jan 2018 14:49)    REVIEW OF SYSTEMS: updated  Constitutional: No fever, weight loss, chills or fatigue  Eyes: No eye pain, visual disturbances, or discharge  ENMT:  No difficulty hearing, tinnitus, vertigo; No sinus or throat pain. No epistaxis, dysphagia, dysphonia, hoarseness or odynophagia  Neck: No pain, stiffness or neck swelling.  No masses or deformities  Respiratory: No cough, wheezing, chills or hemoptysis  - COPD  - ILD   - PE   - ASTHMA     - PNEUMONIA  Cardiovascular: No chest pain, dysrhythmia, palpitations, dizziness or edema   - COPD     - CAD   - CHF   - HTN  Gastrointestinal: No abdominal or epigastric pain. No nausea, vomiting or hematemesis; No diarrhea or constipation. No melena or hematochezia. No dysphagia or Icterus.          Genitourinary: No dysuria, frequency, hematuria or incontinence   - CKD/GAETANO      - ESRD  Neurological: No headaches, memory loss, loss of strength, numbness or tremors      -DEMENTIA     - STROKE    - SEIZURE  Skin: No itching, burning, rashes or lesions   Lymph Nodes: No enlarged glands  Endocrine: No heat or cold intolerance; No hair loss       - DM     - THYROID DISORDER  Musculoskeletal: No joint pain or swelling; No muscle, back or extremity pain  Psychiatric: No depression, anxiety, mood swings or difficulty sleeping  Heme/Lymph: No easy bruising or bleeding gums         - ANEMIA      - CANCER   -COAGULOPATHY  Allergy and Immunologic: No hives or eczema    PAST MEDICAL & SURGICAL HISTORY:  Mitral regurgitation  Aortic regurgitation  No significant past surgical history    FAMILY HISTORY:    SOCIAL HISTORY:      - Tobacco     - ETOH    Allergies    No Known Allergies    Intolerances      Vital Signs Last 24 Hrs  T(C): 37.4 (10 Feb 2018 08:53), Max: 37.9 (09 Feb 2018 14:02)  T(F): 99.3 (10 Feb 2018 08:53), Max: 100.3 (09 Feb 2018 14:02)  HR: 86 (10 Feb 2018 11:00) (76 - 103)  BP: 103/57 (10 Feb 2018 11:00) (93/57 - 126/75)  BP(mean): 74 (10 Feb 2018 11:00) (63 - 90)  RR: 18 (10 Feb 2018 11:00) (12 - 32)  SpO2: 100% (10 Feb 2018 11:00) (98% - 100%)    02-09 @ 07:01  -  02-10 @ 07:00  --------------------------------------------------------  IN: 1678 mL / OUT: 1720 mL / NET: -42 mL    02-10 @ 07:01  -  02-10 @ 11:33  --------------------------------------------------------  IN: 332 mL / OUT: 215 mL / NET: 117 mL      Mode: CPAP with PS  FiO2: 40  PEEP: 5  PS: 10  MAP: 8  PIP: 16    PHYSICAL EXAM:  MORE  comfortable, no distress  Eyes: PERRL, EOM intact; conjunctiva and sclera clear  Head: Normocephalic;  No Trauma  ENMT: No nasal discharge, hoarseness, cough or hemoptysis.  Airway clear  Neck: Supple; non tender; no masses or deformities.    No JVD  Respiratory:  - WHEEZING   few bilateral   RHONCHI  - RALES  = CRACKLES.  Diminished breath sounds  BILATERAL  RIGHT  LEFT bases   Cardiovascular: Regular rate and rhythm. S1 and S2 Normal; ++murmurs, gallops or rubs     - PPM/AICD  Gastrointestinal: Soft ,distended; Normal bowel sounds; No hepatosplenomegaly.     +PEG    + multiple surgical drains integrity checked    ANGELES  Genitourinary: No costovertebral angle tenderness. No dysuria  Extremities: Decreased AROM, + clubbing, cyanosis + edema, R foot toes unchanged pattern of necrosis, L foot less edema   Vascular: Peripheral pulses palpable 2+ bilaterally  Neurological: intubated and opening eyes to commands  Skin: Warm and dry. No obvious rash  Lymph Nodes: No acute cervical or supraclavicular adenopathy  Psychiatric: not sedated, calm affect    DEVICES:  - DENTURES   +IV R / L     - ETUBE   +TRACH 8.0 Shilley cuffed, site is clean,      LABS:                          8.5    6.0   )-----------( 154      ( 10 Feb 2018 05:36 )             26.7     02-10    137  |  99  |  37<H>  ----------------------------<  158<H>  3.5   |  27  |  0.76    Ca    7.9<L>      10 Feb 2018 05:37  Phos  2.7     02-10  Mg     2.4     02-10    TPro  5.4<L>  /  Alb  2.6<L>  /  TBili  3.7<H>  /  DBili  2.1<H>  /  AST  45<H>  /  ALT  43  /  AlkPhos  130<H>  02-10      RADIOLOGY & ADDITIONAL STUDIES (The following images were personally reviewed):series reviewed.

## 2018-02-10 NOTE — PROGRESS NOTE ADULT - ASSESSMENT
ASSESSMENT/PLAN 73y/o male pt c Respiratory failure, perforated duodenal ulcer, POD post repair, pneumonia, RLL atelectases, no obvious RADHA, elevated R hemidiaphragm, AFIB,  thrombocytopenia, possible endocarditis, s/p reop. now POP tracheostomy, new fever pattern, anemia    1. O2: CPAP mode today, weaning attempts when RR decreases  2. Bronchodilators:  Atrovent/ albuterol q 4 – 6 hours as needed  3. Corticosteroids: off  4. ID/Antibiotics:  strict ID supervision , adjust ABX pending multiple new CX  5. Cardiac/HTN: Monitor, optimize HR;   6. GI: Rx/ prophylaxis c PPI/Protonix  7. Heme: Rx/VT resume anticoagulation, unless surgically contraindicated,  follow H/H closely  8. Aspiration precautions, feeding PEG  9. Surgery mngmnt priority, recommend reposition vs removal of R subdiaphragmatic drain unless contraindicated./discussed c SICU.    10. Edema: diuretic to continue  Discussed c managing team. SICU

## 2018-02-10 NOTE — PROGRESS NOTE ADULT - SUBJECTIVE AND OBJECTIVE BOX
INTERVAL HPI/OVERNIGHT EVENTS: o/n: failed ToV, Abarca replaced. 450 cc out instantly.   2/9: albumin and vanc discontinued. eliquis started. Abarca removed.    PT seen and examined on AM rounds. c/o abd pain, -- continue present.  On eliquis    MEDICATIONS  (STANDING):  ALBUTerol/ipratropium for Nebulization 3 milliLiter(s) Nebulizer every 6 hours  apixaban 5 milliGRAM(s) Oral every 12 hours  BACItracin   Ointment 1 Application(s) Topical two times a day  chlorhexidine 0.12% Liquid 15 milliLiter(s) Swish and Spit two times a day  digoxin     Tablet 0.125 milliGRAM(s) Oral daily  docusate sodium Liquid 100 milliGRAM(s) Oral two times a day  fluconAZOLE IVPB 400 milliGRAM(s) IV Intermittent every 24 hours  insulin lispro (HumaLOG) corrective regimen sliding scale   SubCutaneous every 6 hours  levoFLOXacin IVPB 750 milliGRAM(s) IV Intermittent every 24 hours  pantoprazole  Injectable 40 milliGRAM(s) IV Push every 12 hours  povidone iodine 10% Solution 1 Application(s) Topical daily  silver sulfADIAZINE 1% Cream 1 Application(s) Topical daily  sodium chloride 0.9% lock flush 20 milliLiter(s) IV Push once    MEDICATIONS  (PRN):  acetaminophen    Suspension 650 milliGRAM(s) Enteral Tube every 6 hours PRN For Temp greater than 38 C (100.4 F)  bisacodyl Suppository 10 milliGRAM(s) Rectal daily PRN Constipation  sodium chloride 0.9% lock flush 10 milliLiter(s) IV Push every 1 hour PRN After each medication administration      Drug Dosing Weight  Height (cm): 177.8 (18 Jan 2018 11:40)  Weight (kg): 89.4 (18 Jan 2018 11:40)  BMI (kg/m2): 28.3 (18 Jan 2018 11:40)  BSA (m2): 2.07 (18 Jan 2018 11:40)      PAST MEDICAL & SURGICAL HISTORY:  Mitral regurgitation  Aortic regurgitation  No significant past surgical history      ICU Vital Signs Last 24 Hrs  T(C): 37.4 (10 Feb 2018 08:53), Max: 37.9 (09 Feb 2018 14:02)  T(F): 99.3 (10 Feb 2018 08:53), Max: 100.3 (09 Feb 2018 14:02)  HR: 82 (10 Feb 2018 13:00) (76 - 103)  BP: 112/59 (10 Feb 2018 13:00) (93/57 - 124/63)  BP(mean): 75 (10 Feb 2018 13:00) (63 - 88)  ABP: --  ABP(mean): --  RR: 22 (10 Feb 2018 13:00) (12 - 32)  SpO2: 100% (10 Feb 2018 13:00) (98% - 100%)            09 Feb 2018 07:01  -  10 Feb 2018 07:00  --------------------------------------------------------  IN:    Enteral Tube Flush: 200 mL    Glucerna: 928 mL    IV PiggyBack: 350 mL    sodium chloride 0.9%: 200 mL  Total IN: 1678 mL    OUT:    Bulb: 255 mL    Bulb: 60 mL    Drain: 250 mL    Drain: 25 mL    Indwelling Catheter - Urethral: 1130 mL  Total OUT: 1720 mL    Total NET: -42 mL      10 Feb 2018 07:01  -  10 Feb 2018 13:34  --------------------------------------------------------  IN:    Enteral Tube Flush: 60 mL    Glucerna: 408 mL  Total IN: 468 mL    OUT:    Indwelling Catheter - Urethral: 310 mL  Total OUT: 310 mL    Total NET: 158 mL          Mode: CPAP with PS  FiO2: 40  PEEP: 5  PS: 10  MAP: 8  PIP: 15      PHYSICAL EXAM:  General: NAD, sleeping in bed but easily arousable  Neuro: A&Ox3, no focal deficits  HEENT: poor dentition, NC/AT, trach in place, CDI  Pulmonary: tachypneic on CPAP, decreased BS at b/l bases, + mild bibasilar crackles  CV: irreg irreg rhythm, holosystolic murmur best heard over apex  Abdominal: soft, NT, ND, incisions CDI, no surrounding erythema or drainage. GUCCI x 2 serous drainage, gastrostomy/jejunostomy (bilious output) tubes in place  : abarca draining dark urine. +scrotal edema  Extremities: toe wounds with minimal bleeding, granulating, healing well, no purulent drainage. +b/l LE edema        LABS:  CBC Full  -  ( 10 Feb 2018 05:36 )  WBC Count : 6.0 K/uL  Hemoglobin : 8.5 g/dL  Hematocrit : 26.7 %  Platelet Count - Automated : 154 K/uL  Mean Cell Volume : 95.7 fL  Mean Cell Hemoglobin : 30.5 pg  Mean Cell Hemoglobin Concentration : 31.8 g/dL  Auto Neutrophil # : x  Auto Lymphocyte # : x  Auto Monocyte # : x  Auto Eosinophil # : x  Auto Basophil # : x  Auto Neutrophil % : x  Auto Lymphocyte % : x  Auto Monocyte % : x  Auto Eosinophil % : x  Auto Basophil % : x    02-10    137  |  99  |  37<H>  ----------------------------<  158<H>  3.5   |  27  |  0.76    Ca    7.9<L>      10 Feb 2018 05:37  Phos  2.7     02-10  Mg     2.4     02-10    TPro  5.4<L>  /  Alb  2.6<L>  /  TBili  3.7<H>  /  DBili  2.1<H>  /  AST  45<H>  /  ALT  43  /  AlkPhos  130<H>  02-10

## 2018-02-10 NOTE — PROGRESS NOTE ADULT - SUBJECTIVE AND OBJECTIVE BOX
Patient is a 72y old  Male who presents with a chief complaint of acute cholecystitis (18 Jan 2018 14:49)        INTERVAL HPI/OVERNIGHT EVENTS: none    SYMPTOMS on vent, appears comfortable    DRIPS none    Mode: CPAP with PS  FiO2: 40  PEEP: 5  PS: 10  MAP: 7.9  PIP: 16      ICU Vital Signs Last 24 Hrs  T(C): 37.6 (09 Feb 2018 22:01), Max: 37.9 (09 Feb 2018 14:02)  T(F): 99.6 (09 Feb 2018 22:01), Max: 100.3 (09 Feb 2018 14:02)  HR: 78 (10 Feb 2018 05:00) (76 - 103)  BP: 111/63 (10 Feb 2018 05:00) (93/57 - 129/59)  BP(mean): 76 (10 Feb 2018 05:00) (63 - 90)  ABP: --  ABP(mean): --  RR: 16 (10 Feb 2018 05:00) (12 - 22)  SpO2: 100% (10 Feb 2018 05:00) (85% - 100%)      I&O's Summary    09 Feb 2018 07:01  -  10 Feb 2018 07:00  --------------------------------------------------------  IN: 1510 mL / OUT: 1590 mL / NET: -80 mL        EXAM    Chest ronchi    Heart ireg    Abdomen distended, soft with bs    Extremities with edema, warm    Neuro responsive      LABS:  CAPILLARY BLOOD GLUCOSE                            8.5    6.0   )-----------( 154      ( 10 Feb 2018 05:36 )             26.7     02-10    137  |  99  |  37<H>  ----------------------------<  158<H>  3.5   |  27  |  0.76    Ca    7.9<L>      10 Feb 2018 05:37  Phos  2.7     02-10  Mg     2.4     02-10    TPro  5.4<L>  /  Alb  2.6<L>  /  TBili  3.7<H>  /  DBili  2.1<H>  /  AST  45<H>  /  ALT  43  /  AlkPhos  130<H>  02-10              RADIOLOGY & ADDITIONAL STUDIES:    CRITICAL CARE TIME SPENT:

## 2018-02-11 LAB
-  CEFAZOLIN: SIGNIFICANT CHANGE UP
-  CLINDAMYCIN: SIGNIFICANT CHANGE UP
-  ERYTHROMYCIN: SIGNIFICANT CHANGE UP
-  LINEZOLID: SIGNIFICANT CHANGE UP
-  OXACILLIN: SIGNIFICANT CHANGE UP
-  PENICILLIN: SIGNIFICANT CHANGE UP
-  VANCOMYCIN: SIGNIFICANT CHANGE UP
ALBUMIN SERPL ELPH-MCNC: 2.7 G/DL — LOW (ref 3.3–5)
ALP SERPL-CCNC: 146 U/L — HIGH (ref 40–120)
ALT FLD-CCNC: 48 U/L — HIGH (ref 10–45)
ANION GAP SERPL CALC-SCNC: 10 MMOL/L — SIGNIFICANT CHANGE UP (ref 5–17)
AST SERPL-CCNC: 50 U/L — HIGH (ref 10–40)
BILIRUB SERPL-MCNC: 3.6 MG/DL — HIGH (ref 0.2–1.2)
BUN SERPL-MCNC: 32 MG/DL — HIGH (ref 7–23)
CALCIUM SERPL-MCNC: 7.8 MG/DL — LOW (ref 8.4–10.5)
CHLORIDE SERPL-SCNC: 98 MMOL/L — SIGNIFICANT CHANGE UP (ref 96–108)
CO2 SERPL-SCNC: 30 MMOL/L — SIGNIFICANT CHANGE UP (ref 22–31)
CREAT SERPL-MCNC: 0.62 MG/DL — SIGNIFICANT CHANGE UP (ref 0.5–1.3)
CULTURE RESULTS: SIGNIFICANT CHANGE UP
GLUCOSE BLDC GLUCOMTR-MCNC: 104 MG/DL — HIGH (ref 70–99)
GLUCOSE BLDC GLUCOMTR-MCNC: 143 MG/DL — HIGH (ref 70–99)
GLUCOSE BLDC GLUCOMTR-MCNC: 144 MG/DL — HIGH (ref 70–99)
GLUCOSE BLDC GLUCOMTR-MCNC: 173 MG/DL — HIGH (ref 70–99)
GLUCOSE SERPL-MCNC: 162 MG/DL — HIGH (ref 70–99)
HCT VFR BLD CALC: 37.2 % — LOW (ref 39–50)
HGB BLD-MCNC: 11.5 G/DL — LOW (ref 13–17)
MAGNESIUM SERPL-MCNC: 2.2 MG/DL — SIGNIFICANT CHANGE UP (ref 1.6–2.6)
MCHC RBC-ENTMCNC: 29.4 PG — SIGNIFICANT CHANGE UP (ref 27–34)
MCHC RBC-ENTMCNC: 30.9 G/DL — LOW (ref 32–36)
MCV RBC AUTO: 95.1 FL — SIGNIFICANT CHANGE UP (ref 80–100)
METHOD TYPE: SIGNIFICANT CHANGE UP
ORGANISM # SPEC MICROSCOPIC CNT: SIGNIFICANT CHANGE UP
ORGANISM # SPEC MICROSCOPIC CNT: SIGNIFICANT CHANGE UP
PHOSPHATE SERPL-MCNC: 2.7 MG/DL — SIGNIFICANT CHANGE UP (ref 2.5–4.5)
PLATELET # BLD AUTO: 125 K/UL — LOW (ref 150–400)
POTASSIUM SERPL-MCNC: 3.1 MMOL/L — LOW (ref 3.5–5.3)
POTASSIUM SERPL-SCNC: 3.1 MMOL/L — LOW (ref 3.5–5.3)
PREALB SERPL-MCNC: 12 MG/DL — LOW (ref 20–40)
PROT SERPL-MCNC: 5.3 G/DL — LOW (ref 6–8.3)
RBC # BLD: 3.91 M/UL — LOW (ref 4.2–5.8)
RBC # FLD: 21.8 % — HIGH (ref 10.3–16.9)
SODIUM SERPL-SCNC: 138 MMOL/L — SIGNIFICANT CHANGE UP (ref 135–145)
SPECIMEN SOURCE: SIGNIFICANT CHANGE UP
UUN UR-MCNC: 1078 MG/DL — SIGNIFICANT CHANGE UP
WBC # BLD: 4.2 K/UL — SIGNIFICANT CHANGE UP (ref 3.8–10.5)
WBC # FLD AUTO: 4.2 K/UL — SIGNIFICANT CHANGE UP (ref 3.8–10.5)

## 2018-02-11 PROCEDURE — 71045 X-RAY EXAM CHEST 1 VIEW: CPT | Mod: 26

## 2018-02-11 PROCEDURE — 99233 SBSQ HOSP IP/OBS HIGH 50: CPT

## 2018-02-11 RX ORDER — FUROSEMIDE 40 MG
20 TABLET ORAL ONCE
Qty: 0 | Refills: 0 | Status: COMPLETED | OUTPATIENT
Start: 2018-02-11 | End: 2018-02-11

## 2018-02-11 RX ORDER — FUROSEMIDE 40 MG
20 TABLET ORAL DAILY
Qty: 0 | Refills: 0 | Status: DISCONTINUED | OUTPATIENT
Start: 2018-02-11 | End: 2018-02-11

## 2018-02-11 RX ORDER — POTASSIUM CHLORIDE 20 MEQ
40 PACKET (EA) ORAL EVERY 4 HOURS
Qty: 0 | Refills: 0 | Status: COMPLETED | OUTPATIENT
Start: 2018-02-11 | End: 2018-02-11

## 2018-02-11 RX ADMIN — PANTOPRAZOLE SODIUM 40 MILLIGRAM(S): 20 TABLET, DELAYED RELEASE ORAL at 18:14

## 2018-02-11 RX ADMIN — CHLORHEXIDINE GLUCONATE 15 MILLILITER(S): 213 SOLUTION TOPICAL at 18:16

## 2018-02-11 RX ADMIN — CHLORHEXIDINE GLUCONATE 15 MILLILITER(S): 213 SOLUTION TOPICAL at 06:26

## 2018-02-11 RX ADMIN — Medication 3 MILLILITER(S): at 07:06

## 2018-02-11 RX ADMIN — Medication 100 MILLIGRAM(S): at 06:26

## 2018-02-11 RX ADMIN — Medication 40 MILLIEQUIVALENT(S): at 12:00

## 2018-02-11 RX ADMIN — Medication 100 MILLIGRAM(S): at 18:15

## 2018-02-11 RX ADMIN — Medication 2: at 22:21

## 2018-02-11 RX ADMIN — FLUCONAZOLE 100 MILLIGRAM(S): 150 TABLET ORAL at 14:00

## 2018-02-11 RX ADMIN — Medication 3 MILLILITER(S): at 17:36

## 2018-02-11 RX ADMIN — Medication 0.12 MILLIGRAM(S): at 06:26

## 2018-02-11 RX ADMIN — PANTOPRAZOLE SODIUM 40 MILLIGRAM(S): 20 TABLET, DELAYED RELEASE ORAL at 06:26

## 2018-02-11 RX ADMIN — Medication 20 MILLIGRAM(S): at 22:01

## 2018-02-11 RX ADMIN — Medication 1 APPLICATION(S): at 18:16

## 2018-02-11 RX ADMIN — Medication 40 MILLIEQUIVALENT(S): at 16:21

## 2018-02-11 RX ADMIN — Medication 20 MILLIGRAM(S): at 07:59

## 2018-02-11 RX ADMIN — Medication 40 MILLIEQUIVALENT(S): at 08:02

## 2018-02-11 RX ADMIN — Medication 1 APPLICATION(S): at 06:26

## 2018-02-11 RX ADMIN — Medication 3 MILLILITER(S): at 02:04

## 2018-02-11 RX ADMIN — APIXABAN 5 MILLIGRAM(S): 2.5 TABLET, FILM COATED ORAL at 06:26

## 2018-02-11 RX ADMIN — Medication 1 APPLICATION(S): at 12:07

## 2018-02-11 RX ADMIN — APIXABAN 5 MILLIGRAM(S): 2.5 TABLET, FILM COATED ORAL at 18:14

## 2018-02-11 RX ADMIN — Medication 3 MILLILITER(S): at 11:25

## 2018-02-11 NOTE — PROGRESS NOTE ADULT - SUBJECTIVE AND OBJECTIVE BOX
Patient is a 72y old  Male who presents with a chief complaint of acute cholecystitis (18 Jan 2018 14:49)        INTERVAL HPI/OVERNIGHT EVENTS: none    SYMPTOMS comfortable without sob, apin    DRIPS none    Mode: AC/ CMV (Assist Control/ Continuous Mandatory Ventilation)  RR (machine): 12  TV (machine): 500  FiO2: 40  PEEP: 5  MAP: 12  PIP: 19      ICU Vital Signs Last 24 Hrs  T(C): 36.5 (11 Feb 2018 05:03), Max: 38.3 (10 Feb 2018 14:08)  T(F): 97.7 (11 Feb 2018 05:03), Max: 100.9 (10 Feb 2018 14:08)  HR: 92 (11 Feb 2018 07:06) (78 - 94)  BP: 113/69 (11 Feb 2018 05:00) (93/58 - 140/70)  BP(mean): 83 (11 Feb 2018 05:00) (64 - 94)  ABP: --  ABP(mean): --  RR: 19 (11 Feb 2018 05:00) (8 - 32)  SpO2: 93% (11 Feb 2018 07:06) (93% - 100%)      I&O's Summary    10 Feb 2018 07:01  -  11 Feb 2018 07:00  --------------------------------------------------------  IN: 1688 mL / OUT: 1334 mL / NET: 354 mL        EXAM    Chest ronchi    Heart ireg    Abdomen soft with multiple drains, bs    Extremities pitting edema    Neuro awake, alert      LABS:  CAPILLARY BLOOD GLUCOSE                            11.5   4.2   )-----------( 125      ( 11 Feb 2018 05:19 )             37.2     02-11    138  |  98  |  32<H>  ----------------------------<  162<H>  3.1<L>   |  30  |  0.62    Ca    7.8<L>      11 Feb 2018 05:19  Phos  2.7     02-11  Mg     2.2     02-11    TPro  5.3<L>  /  Alb  2.7<L>  /  TBili  3.6<H>  /  DBili  x   /  AST  50<H>  /  ALT  48<H>  /  AlkPhos  146<H>  02-11              RADIOLOGY & ADDITIONAL STUDIES: cxr no change    CRITICAL CARE TIME SPENT:

## 2018-02-11 NOTE — PROGRESS NOTE ADULT - SUBJECTIVE AND OBJECTIVE BOX
INTERVAL HPI/OVERNIGHT EVENTS: o/n: VC-AC o/n for rest, less tachypneic and more comfortable   2/10: RR 30s on CPAP so no trach collar today.      Pt seen and examined. No currents complaints. Pain is controlled. He denies SOB/CP/N      MEDICATIONS  (STANDING):  ALBUTerol/ipratropium for Nebulization 3 milliLiter(s) Nebulizer every 6 hours  apixaban 5 milliGRAM(s) Oral every 12 hours  BACItracin   Ointment 1 Application(s) Topical two times a day  chlorhexidine 0.12% Liquid 15 milliLiter(s) Swish and Spit two times a day  digoxin     Tablet 0.125 milliGRAM(s) Oral daily  docusate sodium Liquid 100 milliGRAM(s) Oral two times a day  fluconAZOLE IVPB 400 milliGRAM(s) IV Intermittent every 24 hours  insulin lispro (HumaLOG) corrective regimen sliding scale   SubCutaneous every 6 hours  levoFLOXacin IVPB 750 milliGRAM(s) IV Intermittent every 24 hours  pantoprazole  Injectable 40 milliGRAM(s) IV Push every 12 hours  potassium chloride   Powder 40 milliEquivalent(s) Oral every 4 hours  povidone iodine 10% Solution 1 Application(s) Topical daily  silver sulfADIAZINE 1% Cream 1 Application(s) Topical daily  sodium chloride 0.9% lock flush 20 milliLiter(s) IV Push once    MEDICATIONS  (PRN):  acetaminophen    Suspension 650 milliGRAM(s) Enteral Tube every 6 hours PRN For Temp greater than 38 C (100.4 F)  bisacodyl Suppository 10 milliGRAM(s) Rectal daily PRN Constipation  sodium chloride 0.9% lock flush 10 milliLiter(s) IV Push every 1 hour PRN After each medication administration      Drug Dosing Weight  Height (cm): 177.8 (18 Jan 2018 11:40)  Weight (kg): 89.4 (18 Jan 2018 11:40)  BMI (kg/m2): 28.3 (18 Jan 2018 11:40)  BSA (m2): 2.07 (18 Jan 2018 11:40)      PAST MEDICAL & SURGICAL HISTORY:  Mitral regurgitation  Aortic regurgitation  No significant past surgical history      ICU Vital Signs Last 24 Hrs  T(C): 36.5 (11 Feb 2018 05:03), Max: 38.3 (10 Feb 2018 14:08)  T(F): 97.7 (11 Feb 2018 05:03), Max: 100.9 (10 Feb 2018 14:08)  HR: 84 (11 Feb 2018 08:00) (78 - 94)  BP: 118/67 (11 Feb 2018 08:00) (93/58 - 140/70)  BP(mean): 80 (11 Feb 2018 08:00) (66 - 94)  ABP: --  ABP(mean): --  RR: 11 (11 Feb 2018 08:00) (8 - 32)  SpO2: 99% (11 Feb 2018 08:00) (92% - 100%)            10 Feb 2018 07:01  -  11 Feb 2018 07:00  --------------------------------------------------------  IN:    Enteral Tube Flush: 60 mL    Glucerna: 1632 mL    IV PiggyBack: 200 mL  Total IN: 1892 mL    OUT:    Bulb: 20 mL    Bulb: 80 mL    Drain: 250 mL    Indwelling Catheter - Urethral: 1437 mL  Total OUT: 1787 mL    Total NET: 105 mL      11 Feb 2018 07:01  -  11 Feb 2018 08:45  --------------------------------------------------------  IN:    Glucerna: 68 mL  Total IN: 68 mL    OUT:    Indwelling Catheter - Urethral: 38 mL  Total OUT: 38 mL    Total NET: 30 mL          Mode: AC/ CMV (Assist Control/ Continuous Mandatory Ventilation)  RR (machine): 12  TV (machine): 500  FiO2: 40  PEEP: 5  MAP: 12  PIP: 19      PHYSICAL EXAM:  General: NAD, resting comfortably in bed  Neuro: A&Ox3, no focal deficits  HEENT: poor dentition, MMM, NC/AT, trach in place, CDI  CV: irregularly irregular, holosystolic murmur heard best over apex  Pulm: breathing comfortably on AC. decreased BS at b/l bases, +rhonchi  Abd: soft, NT, ND, incision CDI, no surrounding erythema or drainage. GUCCI x 2 with minimal serous drainage, gastrostomy/jejunostomy (bilious output) tubes in place. feeding J with TF running at goal  : abarca draining yellow urine. +scrotal edema  Ext: toe wounds with minimal bleeding, granulating, healing well, no purulent drainage. +b/l LE pitting edema            LABS:  CBC Full  -  ( 11 Feb 2018 05:19 )  WBC Count : 4.2 K/uL  Hemoglobin : 11.5 g/dL  Hematocrit : 37.2 %  Platelet Count - Automated : 125 K/uL  Mean Cell Volume : 95.1 fL  Mean Cell Hemoglobin : 29.4 pg  Mean Cell Hemoglobin Concentration : 30.9 g/dL  Auto Neutrophil # : x  Auto Lymphocyte # : x  Auto Monocyte # : x  Auto Eosinophil # : x  Auto Basophil # : x  Auto Neutrophil % : x  Auto Lymphocyte % : x  Auto Monocyte % : x  Auto Eosinophil % : x  Auto Basophil % : x    02-11    138  |  98  |  32<H>  ----------------------------<  162<H>  3.1<L>   |  30  |  0.62    Ca    7.8<L>      11 Feb 2018 05:19  Phos  2.7     02-11  Mg     2.2     02-11    TPro  5.3<L>  /  Alb  2.7<L>  /  TBili  3.6<H>  /  DBili  x   /  AST  50<H>  /  ALT  48<H>  /  AlkPhos  146<H>  02-11

## 2018-02-11 NOTE — PROGRESS NOTE ADULT - SUBJECTIVE AND OBJECTIVE BOX
INTERVAL HPI/OVERNIGHT EVENTS: On CPAP.            MEDICATIONS  (STANDING):  ALBUTerol/ipratropium for Nebulization 3 milliLiter(s) Nebulizer every 6 hours  apixaban 5 milliGRAM(s) Oral every 12 hours  BACItracin   Ointment 1 Application(s) Topical two times a day  chlorhexidine 0.12% Liquid 15 milliLiter(s) Swish and Spit two times a day  digoxin     Tablet 0.125 milliGRAM(s) Oral daily  docusate sodium Liquid 100 milliGRAM(s) Oral two times a day  fluconAZOLE IVPB 400 milliGRAM(s) IV Intermittent every 24 hours  insulin lispro (HumaLOG) corrective regimen sliding scale   SubCutaneous every 6 hours  levoFLOXacin IVPB 750 milliGRAM(s) IV Intermittent every 24 hours  pantoprazole  Injectable 40 milliGRAM(s) IV Push every 12 hours  potassium chloride   Powder 40 milliEquivalent(s) Oral every 4 hours  povidone iodine 10% Solution 1 Application(s) Topical daily  silver sulfADIAZINE 1% Cream 1 Application(s) Topical daily  sodium chloride 0.9% lock flush 20 milliLiter(s) IV Push once    MEDICATIONS  (PRN):  acetaminophen    Suspension 650 milliGRAM(s) Enteral Tube every 6 hours PRN For Temp greater than 38 C (100.4 F)  bisacodyl Suppository 10 milliGRAM(s) Rectal daily PRN Constipation  sodium chloride 0.9% lock flush 10 milliLiter(s) IV Push every 1 hour PRN After each medication administration      Drug Dosing Weight  Height (cm): 177.8 (18 Jan 2018 11:40)  Weight (kg): 89.4 (18 Jan 2018 11:40)  BMI (kg/m2): 28.3 (18 Jan 2018 11:40)  BSA (m2): 2.07 (18 Jan 2018 11:40)      PAST MEDICAL & SURGICAL HISTORY:  Mitral regurgitation  Aortic regurgitation  No significant past surgical history      ICU Vital Signs Last 24 Hrs  T(C): 36.5 (11 Feb 2018 05:03), Max: 38.3 (10 Feb 2018 14:08)  T(F): 97.7 (11 Feb 2018 05:03), Max: 100.9 (10 Feb 2018 14:08)  HR: 84 (11 Feb 2018 08:00) (78 - 94)  BP: 118/67 (11 Feb 2018 08:00) (93/58 - 140/70)  BP(mean): 80 (11 Feb 2018 08:00) (66 - 94)  ABP: --  ABP(mean): --  RR: 11 (11 Feb 2018 08:00) (8 - 32)  SpO2: 99% (11 Feb 2018 08:00) (92% - 100%)            10 Feb 2018 07:01  -  11 Feb 2018 07:00  --------------------------------------------------------  IN:    Enteral Tube Flush: 60 mL    Glucerna: 1632 mL    IV PiggyBack: 200 mL  Total IN: 1892 mL    OUT:    Bulb: 20 mL    Bulb: 80 mL    Drain: 250 mL    Indwelling Catheter - Urethral: 1437 mL  Total OUT: 1787 mL    Total NET: 105 mL      11 Feb 2018 07:01  -  11 Feb 2018 08:45  --------------------------------------------------------  IN:    Glucerna: 68 mL  Total IN: 68 mL    OUT:    Indwelling Catheter - Urethral: 38 mL  Total OUT: 38 mL    Total NET: 30 mL          Mode: AC/ CMV (Assist Control/ Continuous Mandatory Ventilation)--CPAP  RR (machine): 12  TV (machine): 500  FiO2: 40  PEEP: 5  MAP: 12  PIP: 19      PHYSICAL EXAM:  General: NAD, resting comfortably in bed  Neuro: A&Ox3, no focal deficits  HEENT: poor dentition, MMM, NC/AT, trach in place, CDI  CV: irregularly irregular, holosystolic murmur heard best over apex  Pulm: breathing comfortably on AC. decreased BS at b/l bases, +rhonchi  Abd: soft, NT, ND, incision CDI, no surrounding erythema or drainage. GUCCI x 2 with minimal serous drainage, gastrostomy/jejunostomy (bilious output) tubes in place. feeding J with TF running at goal  : abarca draining yellow urine. +scrotal edema  Ext: toe wounds with minimal bleeding, granulating, healing well, no purulent drainage. +b/l LE pitting edema            LABS:  CBC Full  -  ( 11 Feb 2018 05:19 )  WBC Count : 4.2 K/uL  Hemoglobin : 11.5 g/dL  Hematocrit : 37.2 %  Platelet Count - Automated : 125 K/uL  Mean Cell Volume : 95.1 fL  Mean Cell Hemoglobin : 29.4 pg  Mean Cell Hemoglobin Concentration : 30.9 g/dL  Auto Neutrophil # : x  Auto Lymphocyte # : x  Auto Monocyte # : x  Auto Eosinophil # : x  Auto Basophil # : x  Auto Neutrophil % : x  Auto Lymphocyte % : x  Auto Monocyte % : x  Auto Eosinophil % : x  Auto Basophil % : x    02-11    138  |  98  |  32<H>  ----------------------------<  162<H>  3.1<L>   |  30  |  0.62    Ca    7.8<L>      11 Feb 2018 05:19  Phos  2.7     02-11  Mg     2.2     02-11    TPro  5.3<L>  /  Alb  2.7<L>  /  TBili  3.6<H>  /  DBili  x   /  AST  50<H>  /  ALT  48<H>  /  AlkPhos  146<H>  02-11

## 2018-02-11 NOTE — PROGRESS NOTE ADULT - SUBJECTIVE AND OBJECTIVE BOX
Interventional, Pulmonary, Critical, Chest Special Procedures.    Pt was seen and fully examined by myself.     Time spent with patient in minutes:37    Patient is a 72y old  Male who presents with a chief complaint of acute cholecystitis (18 Jan 2018 14:49)The patient awake and engaging today, pain free when seen. ill appearing. Discussed c KRISTAN Huntley in details.     HPI:  73 yo M with h/o severe mitral and aortic valve regurgitation presented to ED with 5 day h/o left lower abdominal pain, nausea and multiple episodes of vomiting as well as PO intolerance. Pt reports that the pain in his abdomen is illicited by movement. Went to [facility] two days ago and underwent CT scan which revealed acute cholecystitis as well as right lower lobe infectious pneumonia. Pt was told he would require surgery and left AMA because he was waiting too long. Denies f/c. Last echo 1/4/17 with EF 60-65%. Normal BMs and passing flatus. (18 Jan 2018 14:49)    REVIEW OF SYSTEMS: updated  Constitutional: No fever, weight loss, chills or fatigue  Eyes: No eye pain, visual disturbances, or discharge  ENMT:  No difficulty hearing, tinnitus, vertigo; No sinus or throat pain. No epistaxis, dysphagia, dysphonia, hoarseness or odynophagia  Neck: No pain, stiffness or neck swelling.  No masses or deformities  Respiratory: No cough, wheezing, chills or hemoptysis  - COPD  - ILD   - PE   - ASTHMA     - PNEUMONIA  Cardiovascular: No chest pain, dysrhythmia, palpitations, dizziness or edema   - COPD     - CAD   - CHF   - HTN  Gastrointestinal: No abdominal or epigastric pain. No nausea, vomiting or hematemesis; No diarrhea or constipation. No melena or hematochezia. No dysphagia or Icterus.          Genitourinary: No dysuria, frequency, hematuria or incontinence   - CKD/GAETANO      - ESRD  Neurological: No headaches, memory loss, loss of strength, numbness or tremors      -DEMENTIA     - STROKE    - SEIZURE  Skin: No itching, burning, rashes or lesions   Lymph Nodes: No enlarged glands  Endocrine: No heat or cold intolerance; No hair loss       - DM     - THYROID DISORDER  Musculoskeletal: No joint pain or swelling; No muscle, back or extremity pain  Psychiatric: No depression, anxiety, mood swings or difficulty sleeping  Heme/Lymph: No easy bruising or bleeding gums         - ANEMIA      - CANCER   -COAGULOPATHY  Allergy and Immunologic: No hives or eczema    PAST MEDICAL & SURGICAL HISTORY:  Mitral regurgitation  Aortic regurgitation  No significant past surgical history    FAMILY HISTORY:    SOCIAL HISTORY:      - Tobacco     - ETOH    Allergies    No Known Allergies    Intolerances      Vital Signs Last 24 Hrs  T(C): 38 (11 Feb 2018 10:41), Max: 38.3 (10 Feb 2018 14:08)  T(F): 100.4 (11 Feb 2018 10:41), Max: 100.9 (10 Feb 2018 14:08)  HR: 94 (11 Feb 2018 12:00) (80 - 96)  BP: 135/69 (11 Feb 2018 11:00) (95/69 - 140/70)  BP(mean): 88 (11 Feb 2018 11:00) (66 - 94)  RR: 24 (11 Feb 2018 12:00) (8 - 30)  SpO2: 100% (11 Feb 2018 12:00) (92% - 100%)    02-10 @ 07:01 - 02-11 @ 07:00  --------------------------------------------------------  IN: 1892 mL / OUT: 1787 mL / NET: 105 mL    02-11 @ 07:01  - 02-11 @ 12:16  --------------------------------------------------------  IN: 440 mL / OUT: 468 mL / NET: -28 mL      Mode: CPAP with PS  FiO2: 40  PEEP: 5  PS: 10  MAP: 7.9  PIP: 15    PHYSICAL EXAM:  MORE  comfortable, no distress  Eyes: PERRL, EOM intact; conjunctiva and sclera clear  Head: Normocephalic;  No Trauma  ENMT: No nasal discharge, hoarseness, cough or hemoptysis.  Airway clear  Neck: Supple; non tender; no masses or deformities.    No JVD  Respiratory:  - WHEEZING   few bilateral   RHONCHI  - RALES  = CRACKLES.  Diminished breath sounds  BILATERAL  RIGHT  LEFT bases   Cardiovascular: Regular rate and rhythm. S1 and S2 Normal; ++murmurs, gallops or rubs     - PPM/AICD  Gastrointestinal: Soft ,distended; Normal bowel sounds; No hepatosplenomegaly.     +PEG    + multiple surgical drains integrity checked    ANGELES  Genitourinary: No costovertebral angle tenderness. No dysuria  Extremities: Decreased AROM, + clubbing, cyanosis + edema, R foot toes unchanged pattern of necrosis, L foot less edema   Vascular: Peripheral pulses palpable 2+ bilaterally  Neurological: intubated and opening eyes to commands  Skin: Warm and dry. No obvious rash  Lymph Nodes: No acute cervical or supraclavicular adenopathy  Psychiatric: not sedated, calm affect    DEVICES:  - DENTURES   +IV R / L     - ETUBE   +TRACH 8.0 Shilley cuffed, site is clean,      LABS:                          11.5   4.2   )-----------( 125      ( 11 Feb 2018 05:19 )             37.2     02-11    138  |  98  |  32<H>  ----------------------------<  162<H>  3.1<L>   |  30  |  0.62    Ca    7.8<L>      11 Feb 2018 05:19  Phos  2.7     02-11  Mg     2.2     02-11    TPro  5.3<L>  /  Alb  2.7<L>  /  TBili  3.6<H>  /  DBili  x   /  AST  50<H>  /  ALT  48<H>  /  AlkPhos  146<H>  02-11  < from: Xray Chest 1 View- PORTABLE-Routine (02.11.18 @ 03:11) >    EXAM:  XR CHEST PORTABLE ROUTINE 1V                          PROCEDURE DATE:  02/11/2018                     INTERPRETATION:  Clinical History: Interval change    Portable examination is demonstrates no interval change lung pathology   comparison to prior examination of the chest 2/10/2018. No interval   change position remaining support devices.    Impression: No interval change lung pathology    < end of copied text >      RADIOLOGY & ADDITIONAL STUDIES (The following images were personally reviewed):

## 2018-02-11 NOTE — PROGRESS NOTE ADULT - PROBLEM SELECTOR PLAN 1
Continue antibiotic Continue antibiotic  Give Vancomycin one gram IV today pending sensitiviity tests

## 2018-02-11 NOTE — PROGRESS NOTE ADULT - SUBJECTIVE AND OBJECTIVE BOX
Critical Care    INTERVAL HPI/OVERNIGHT EVENTS:    ANTIBIOTICS    MEDICATIONS  (STANDING):  ALBUTerol/ipratropium for Nebulization 3 milliLiter(s) Nebulizer every 6 hours  apixaban 5 milliGRAM(s) Oral every 12 hours  BACItracin   Ointment 1 Application(s) Topical two times a day  chlorhexidine 0.12% Liquid 15 milliLiter(s) Swish and Spit two times a day  digoxin     Tablet 0.125 milliGRAM(s) Oral daily  docusate sodium Liquid 100 milliGRAM(s) Oral two times a day  fluconAZOLE IVPB 400 milliGRAM(s) IV Intermittent every 24 hours  insulin lispro (HumaLOG) corrective regimen sliding scale   SubCutaneous every 6 hours  levoFLOXacin IVPB 750 milliGRAM(s) IV Intermittent every 24 hours  pantoprazole  Injectable 40 milliGRAM(s) IV Push every 12 hours  potassium chloride   Powder 40 milliEquivalent(s) Oral every 4 hours  povidone iodine 10% Solution 1 Application(s) Topical daily  silver sulfADIAZINE 1% Cream 1 Application(s) Topical daily  sodium chloride 0.9% lock flush 20 milliLiter(s) IV Push once    MEDICATIONS  (PRN):  acetaminophen    Suspension 650 milliGRAM(s) Enteral Tube every 6 hours PRN For Temp greater than 38 C (100.4 F)  bisacodyl Suppository 10 milliGRAM(s) Rectal daily PRN Constipation  sodium chloride 0.9% lock flush 10 milliLiter(s) IV Push every 1 hour PRN After each medication administration      Allergies    No Known Allergies    Intolerances        REVIEW OF SYSTEMS:    patient appears comfortable      Vital Signs Last 24 Hrs  T(C): 36.5 (11 Feb 2018 05:03), Max: 38.3 (10 Feb 2018 14:08)  T(F): 97.7 (11 Feb 2018 05:03), Max: 100.9 (10 Feb 2018 14:08)  HR: 92 (11 Feb 2018 09:00) (80 - 94)  BP: 111/76 (11 Feb 2018 09:00) (95/69 - 140/70)  BP(mean): 88 (11 Feb 2018 09:00) (66 - 94)  RR: 12 (11 Feb 2018 09:00) (8 - 30)  SpO2: 96% (11 Feb 2018 09:00) (92% - 100%)    PHYSICAL EXAM:    General:  in no acute distress  Eyes: PERRL, EOM intact; conjunctiva and sclera clear  Head: Normocephalic; atraumatic  ENMT: No nasal discharge; airway clear  Neck: Supple; non tender; no masses  Respiratory: No wheezes, rales or rhonchi  Cardiovascular: Regular rate and rhythm. S1 and S2 Normal; No murmurs, gallops or rubs  Gastrointestinal: Soft non-tender non-distended; Normal bowel sounds; No hepatosplenomegaly  Genitourinary: No costovertebral angle tenderness    LABS:                        11.5   4.2   )-----------( 125      ( 11 Feb 2018 05:19 )             37.2     02-11    138  |  98  |  32<H>  ----------------------------<  162<H>  3.1<L>   |  30  |  0.62    Ca    7.8<L>      11 Feb 2018 05:19  Phos  2.7     02-11  Mg     2.2     02-11    TPro  5.3<L>  /  Alb  2.7<L>  /  TBili  3.6<H>  /  DBili  x   /  AST  50<H>  /  ALT  48<H>  /  AlkPhos  146<H>  02-11            MICROBIOLOGY:  Culture Results:   No growth at 3 days. (02-07 @ 23:14)  Culture Results:   No growth at 3 days. (02-07 @ 23:14)  Culture Results:   Rare Staphylococcus aureus  Susceptibility to follow. (02-07 @ 15:38)  Culture Results:   Numerous Stenotrophomonas maltophilia  Numerous Burkholderia cepacia  Accompanied by normal respiratory geeta (02-07 @ 09:32)  Culture Results:   No growth at 4 days. (02-07 @ 00:58)  Culture Results:   No growth at 4 days. (02-07 @ 00:58)  Culture Results:   Moderate Burkholderia cepacia  See previous culture for sensitivity  Moderate Stenotrophomonas maltophilia (02-05 @ 17:03)      RADIOLOGY & ADDITIONAL STUDIES:

## 2018-02-11 NOTE — PROGRESS NOTE ADULT - ASSESSMENT
A - acute respiratory fialure/ intra ab sepsis/ anemia/atrail fibrillation/ fuid overlaod    Suggest:  try cpap ps of 12  start diuretic ,lasix 20 mg bid enterally  complete course of levofloxacin, fluconazole  UUN  continue eliquis  no change in feeds

## 2018-02-11 NOTE — PROGRESS NOTE ADULT - ASSESSMENT
72yM with PMH severe MR/AR presented after recent dx acute cholecystitis and RLL PNA at [facility] with lower abdominal pain, new onset rapid Afib, sepsis, acute CHF exacerbation, initially admitted to cardiology on bipap, then transferred to SICU with perforated duodenum s/p Shreyas patch and jejunostomy (1/20) c/b breakdown s/p RTOR, washout, new Shreyas patch, gastrostomy, duodenostomy, IVCF (1/22)    Neuro: Tylenol PRN  CV: Afib: Echo EF 50%, Severe MR, no vegetation, digoxin, Eliquis. Will give lasix today.  Pulm: Trach 8.0 shiley on AC; will wean to CPAP 15/5, chlorhex, duonebs  GI: NPO, Jtube TF Glucerna 1.2 68cc, Protonix BID. Retrograde D-tube to LIWS, G-tube to LIWS, colace dulcolax   : Oliveira (placed for retention on 2/10)  ID: C albicans intraperitoneal infxn: fluconazole (2/1--); Alexi/Stenotrophomonas: Levaquin(2/8-)  // D/c: Zosyn (1/19-22; 1/27-29; 2/7--) micafungin (1/22-2/1), ceftazidime (1/29-2/4), linezolid (1/22-23), meropenem (1/22-27), vancomycin (1/19-20; 2/7-2/9)   Endo: ISS  HEME: Eliquis, IVCF (1/22) // 1FFP, 1PLT (1/22).   PPx: SCD, R pop DVT, s/p IVC filter   Lines: PIV, TLC( 2/8-) D/c: PICC (1/29-2/8), L brachial [place] (1/20-1/27),  LSC HD cath (1/22-1/27)   Wounds: Ex lap, JPx2 to bulb suction, J for feeds, D-tube to LIWS, G-tube to LIWS; betadine/peroxide to toes  PT/OT: Early mobilization.

## 2018-02-12 LAB
ALBUMIN SERPL ELPH-MCNC: 2.4 G/DL — LOW (ref 3.3–5)
ALP SERPL-CCNC: 149 U/L — HIGH (ref 40–120)
ALT FLD-CCNC: 48 U/L — HIGH (ref 10–45)
ANION GAP SERPL CALC-SCNC: 11 MMOL/L — SIGNIFICANT CHANGE UP (ref 5–17)
ANISOCYTOSIS BLD QL: SLIGHT — SIGNIFICANT CHANGE UP
APPEARANCE UR: CLEAR — SIGNIFICANT CHANGE UP
AST SERPL-CCNC: 48 U/L — HIGH (ref 10–40)
BACTERIA # UR AUTO: PRESENT /HPF
BILIRUB SERPL-MCNC: 3.5 MG/DL — HIGH (ref 0.2–1.2)
BILIRUB UR-MCNC: (no result)
BUN SERPL-MCNC: 39 MG/DL — HIGH (ref 7–23)
CALCIUM SERPL-MCNC: 8 MG/DL — LOW (ref 8.4–10.5)
CHLORIDE SERPL-SCNC: 101 MMOL/L — SIGNIFICANT CHANGE UP (ref 96–108)
CO2 SERPL-SCNC: 28 MMOL/L — SIGNIFICANT CHANGE UP (ref 22–31)
COLOR SPEC: YELLOW — SIGNIFICANT CHANGE UP
COMMENT - URINE: SIGNIFICANT CHANGE UP
COMMENT - URINE: SIGNIFICANT CHANGE UP
CREAT SERPL-MCNC: 0.88 MG/DL — SIGNIFICANT CHANGE UP (ref 0.5–1.3)
CULTURE RESULTS: SIGNIFICANT CHANGE UP
DIFF PNL FLD: (no result)
EPI CELLS # UR: (no result) /HPF (ref 0–5)
GLUCOSE BLDC GLUCOMTR-MCNC: 118 MG/DL — HIGH (ref 70–99)
GLUCOSE BLDC GLUCOMTR-MCNC: 130 MG/DL — HIGH (ref 70–99)
GLUCOSE BLDC GLUCOMTR-MCNC: 160 MG/DL — HIGH (ref 70–99)
GLUCOSE SERPL-MCNC: 171 MG/DL — HIGH (ref 70–99)
GLUCOSE UR QL: NEGATIVE — SIGNIFICANT CHANGE UP
GRAM STN FLD: SIGNIFICANT CHANGE UP
GRAN CASTS # UR COMP ASSIST: (no result) /LPF
HCT VFR BLD CALC: 24.4 % — LOW (ref 39–50)
HCT VFR BLD CALC: 25 % — LOW (ref 39–50)
HCT VFR BLD CALC: 27.3 % — LOW (ref 39–50)
HGB BLD-MCNC: 7.8 G/DL — LOW (ref 13–17)
HGB BLD-MCNC: 8 G/DL — LOW (ref 13–17)
HGB BLD-MCNC: 8.8 G/DL — LOW (ref 13–17)
KETONES UR-MCNC: (no result) MG/DL
LEUKOCYTE ESTERASE UR-ACNC: NEGATIVE — SIGNIFICANT CHANGE UP
LYMPHOCYTES # BLD AUTO: 24 % — SIGNIFICANT CHANGE UP (ref 13–44)
MACROCYTES BLD QL: SLIGHT — SIGNIFICANT CHANGE UP
MAGNESIUM SERPL-MCNC: 2.3 MG/DL — SIGNIFICANT CHANGE UP (ref 1.6–2.6)
MANUAL SMEAR VERIFICATION: SIGNIFICANT CHANGE UP
MCHC RBC-ENTMCNC: 30.4 PG — SIGNIFICANT CHANGE UP (ref 27–34)
MCHC RBC-ENTMCNC: 30.4 PG — SIGNIFICANT CHANGE UP (ref 27–34)
MCHC RBC-ENTMCNC: 30.7 PG — SIGNIFICANT CHANGE UP (ref 27–34)
MCHC RBC-ENTMCNC: 32 G/DL — SIGNIFICANT CHANGE UP (ref 32–36)
MCHC RBC-ENTMCNC: 32 G/DL — SIGNIFICANT CHANGE UP (ref 32–36)
MCHC RBC-ENTMCNC: 32.2 G/DL — SIGNIFICANT CHANGE UP (ref 32–36)
MCV RBC AUTO: 94.9 FL — SIGNIFICANT CHANGE UP (ref 80–100)
MCV RBC AUTO: 95.1 FL — SIGNIFICANT CHANGE UP (ref 80–100)
MCV RBC AUTO: 95.1 FL — SIGNIFICANT CHANGE UP (ref 80–100)
MICROCYTES BLD QL: SLIGHT — SIGNIFICANT CHANGE UP
MONOCYTES NFR BLD AUTO: 6 % — SIGNIFICANT CHANGE UP (ref 2–14)
MYELOCYTES NFR BLD: 1 % — HIGH
NEUTROPHILS NFR BLD AUTO: 68 % — SIGNIFICANT CHANGE UP (ref 43–77)
NEUTS BAND # BLD: 1 % — SIGNIFICANT CHANGE UP
NITRITE UR-MCNC: POSITIVE
OVALOCYTES BLD QL SMEAR: SLIGHT — SIGNIFICANT CHANGE UP
PH UR: 5 — SIGNIFICANT CHANGE UP (ref 5–8)
PHOSPHATE SERPL-MCNC: 3.2 MG/DL — SIGNIFICANT CHANGE UP (ref 2.5–4.5)
PLAT MORPH BLD: (no result)
PLATELET # BLD AUTO: 148 K/UL — LOW (ref 150–400)
PLATELET # BLD AUTO: 150 K/UL — SIGNIFICANT CHANGE UP (ref 150–400)
PLATELET # BLD AUTO: 162 K/UL — SIGNIFICANT CHANGE UP (ref 150–400)
POLYCHROMASIA BLD QL SMEAR: SLIGHT — SIGNIFICANT CHANGE UP
POTASSIUM SERPL-MCNC: 4 MMOL/L — SIGNIFICANT CHANGE UP (ref 3.5–5.3)
POTASSIUM SERPL-SCNC: 4 MMOL/L — SIGNIFICANT CHANGE UP (ref 3.5–5.3)
PROT SERPL-MCNC: 5.4 G/DL — LOW (ref 6–8.3)
PROT UR-MCNC: 30 MG/DL
RBC # BLD: 2.57 M/UL — LOW (ref 4.2–5.8)
RBC # BLD: 2.63 M/UL — LOW (ref 4.2–5.8)
RBC # BLD: 2.87 M/UL — LOW (ref 4.2–5.8)
RBC # FLD: 21.2 % — HIGH (ref 10.3–16.9)
RBC # FLD: 21.4 % — HIGH (ref 10.3–16.9)
RBC # FLD: 21.4 % — HIGH (ref 10.3–16.9)
RBC BLD AUTO: (no result)
RBC CASTS # UR COMP ASSIST: (no result) /HPF
SODIUM SERPL-SCNC: 140 MMOL/L — SIGNIFICANT CHANGE UP (ref 135–145)
SP GR SPEC: 1.02 — SIGNIFICANT CHANGE UP (ref 1–1.03)
SPECIMEN SOURCE: SIGNIFICANT CHANGE UP
UROBILINOGEN FLD QL: 4 E.U./DL
WBC # BLD: 7 K/UL — SIGNIFICANT CHANGE UP (ref 3.8–10.5)
WBC # BLD: 7.2 K/UL — SIGNIFICANT CHANGE UP (ref 3.8–10.5)
WBC # BLD: 7.2 K/UL — SIGNIFICANT CHANGE UP (ref 3.8–10.5)
WBC # FLD AUTO: 7 K/UL — SIGNIFICANT CHANGE UP (ref 3.8–10.5)
WBC # FLD AUTO: 7.2 K/UL — SIGNIFICANT CHANGE UP (ref 3.8–10.5)
WBC # FLD AUTO: 7.2 K/UL — SIGNIFICANT CHANGE UP (ref 3.8–10.5)
WBC UR QL: < 5 /HPF — SIGNIFICANT CHANGE UP

## 2018-02-12 PROCEDURE — 74177 CT ABD & PELVIS W/CONTRAST: CPT | Mod: 26

## 2018-02-12 PROCEDURE — 99291 CRITICAL CARE FIRST HOUR: CPT

## 2018-02-12 PROCEDURE — 70450 CT HEAD/BRAIN W/O DYE: CPT | Mod: 26

## 2018-02-12 PROCEDURE — 71260 CT THORAX DX C+: CPT | Mod: 26

## 2018-02-12 PROCEDURE — 99223 1ST HOSP IP/OBS HIGH 75: CPT

## 2018-02-12 PROCEDURE — 73701 CT LOWER EXTREMITY W/DYE: CPT | Mod: 26,50

## 2018-02-12 PROCEDURE — 70486 CT MAXILLOFACIAL W/O DYE: CPT | Mod: 26

## 2018-02-12 RX ORDER — ALBUMIN HUMAN 25 %
250 VIAL (ML) INTRAVENOUS ONCE
Qty: 0 | Refills: 0 | Status: COMPLETED | OUTPATIENT
Start: 2018-02-12 | End: 2018-02-12

## 2018-02-12 RX ORDER — HYDROMORPHONE HYDROCHLORIDE 2 MG/ML
0.5 INJECTION INTRAMUSCULAR; INTRAVENOUS; SUBCUTANEOUS ONCE
Qty: 0 | Refills: 0 | Status: DISCONTINUED | OUTPATIENT
Start: 2018-02-12 | End: 2018-02-13

## 2018-02-12 RX ORDER — DIATRIZOATE MEGLUMINE 180 MG/ML
30 INJECTION, SOLUTION INTRAVESICAL ONCE
Qty: 0 | Refills: 0 | Status: COMPLETED | OUTPATIENT
Start: 2018-02-12 | End: 2018-02-12

## 2018-02-12 RX ADMIN — DIATRIZOATE MEGLUMINE 30 MILLILITER(S): 180 INJECTION, SOLUTION INTRAVESICAL at 11:20

## 2018-02-12 RX ADMIN — APIXABAN 5 MILLIGRAM(S): 2.5 TABLET, FILM COATED ORAL at 06:09

## 2018-02-12 RX ADMIN — Medication 2: at 06:33

## 2018-02-12 RX ADMIN — Medication 0.12 MILLIGRAM(S): at 06:09

## 2018-02-12 RX ADMIN — Medication 100 MILLIGRAM(S): at 06:10

## 2018-02-12 RX ADMIN — Medication 3 MILLILITER(S): at 18:23

## 2018-02-12 RX ADMIN — Medication 1 APPLICATION(S): at 18:04

## 2018-02-12 RX ADMIN — Medication 3 MILLILITER(S): at 00:57

## 2018-02-12 RX ADMIN — PANTOPRAZOLE SODIUM 40 MILLIGRAM(S): 20 TABLET, DELAYED RELEASE ORAL at 06:08

## 2018-02-12 RX ADMIN — Medication 650 MILLIGRAM(S): at 03:07

## 2018-02-12 RX ADMIN — Medication 125 MILLILITER(S): at 11:15

## 2018-02-12 RX ADMIN — PANTOPRAZOLE SODIUM 40 MILLIGRAM(S): 20 TABLET, DELAYED RELEASE ORAL at 20:05

## 2018-02-12 RX ADMIN — Medication 1 APPLICATION(S): at 06:09

## 2018-02-12 RX ADMIN — Medication 3 MILLILITER(S): at 06:19

## 2018-02-12 RX ADMIN — CHLORHEXIDINE GLUCONATE 15 MILLILITER(S): 213 SOLUTION TOPICAL at 06:08

## 2018-02-12 NOTE — PROGRESS NOTE ADULT - SUBJECTIVE AND OBJECTIVE BOX
Interventional, Pulmonary, Critical, Chest Special Procedures.    Pt was seen and fully examined by myself.     Time spent with patient in minutes:67    Patient is a 72y old  Male who presents with a chief complaint of acute cholecystitis (18 Jan 2018 14:49)Events of last night reviewed, new fever pattern noted. The patient ill appearing, awake, denies pain when seen. No ouvert bleeding.  Vent circuits verified and preserved.    HPI:  71 yo M with h/o severe mitral and aortic valve regurgitation presented to ED with 5 day h/o left lower abdominal pain, nausea and multiple episodes of vomiting as well as PO intolerance. Pt reports that the pain in his abdomen is illicited by movement. Went to [facility] two days ago and underwent CT scan which revealed acute cholecystitis as well as right lower lobe infectious pneumonia. Pt was told he would require surgery and left AMA because he was waiting too long. Denies f/c. Last echo 1/4/17 with EF 60-65%. Normal BMs and passing flatus. (18 Jan 2018 14:49)    REVIEW OF SYSTEMS:  Constitutional: + fever, weight loss, chills or fatigue  Eyes: No eye pain, visual disturbances, or discharge  ENMT:  No difficulty hearing, tinnitus, vertigo; No sinus or throat pain. No epistaxis, dysphagia, dysphonia, hoarseness or odynophagia  Neck: No pain, stiffness or neck swelling.  No masses or deformities  Respiratory: No cough, wheezing, chills or hemoptysis  - COPD  - ILD   - PE   - ASTHMA     - PNEUMONIA  Cardiovascular: No chest pain, dysrhythmia, palpitations, dizziness or edema   - COPD     - CAD   - CHF   - HTN  Gastrointestinal: No abdominal or epigastric pain. No nausea, vomiting or hematemesis; No diarrhea or constipation. No melena or hematochezia. No dysphagia or Icterus.          Genitourinary: No dysuria, frequency, hematuria or incontinence   - CKD/GAETANO      - ESRD  Neurological: No headaches, memory loss, loss of strength, numbness or tremors      -DEMENTIA     - STROKE    - SEIZURE  Skin: No itching, burning, rashes or lesions   Lymph Nodes: No enlarged glands  Endocrine: No heat or cold intolerance; No hair loss       - DM     - THYROID DISORDER  Musculoskeletal: No joint pain or swelling; No muscle, back or extremity pain  Psychiatric: No depression, anxiety, mood swings or difficulty sleeping  Heme/Lymph: No easy bruising or bleeding gums         - ANEMIA      - CANCER   -COAGULOPATHY  Allergy and Immunologic: No hives or eczema    PAST MEDICAL & SURGICAL HISTORY:  Mitral regurgitation  Aortic regurgitation  No significant past surgical history    FAMILY HISTORY:    SOCIAL HISTORY:      - Tobacco     - ETOH    Allergies    No Known Allergies    Intolerances      Vital Signs Last 24 Hrs  T(C): 36.6 (12 Feb 2018 09:03), Max: 38.4 (12 Feb 2018 03:00)  T(F): 97.9 (12 Feb 2018 09:03), Max: 101.2 (12 Feb 2018 03:00)  HR: 90 (12 Feb 2018 12:00) (80 - 118)  BP: 102/60 (12 Feb 2018 12:00) (92/69 - 127/68)  BP(mean): 75 (12 Feb 2018 12:00) (73 - 93)  RR: 24 (12 Feb 2018 12:00) (11 - 26)  SpO2: 96% (12 Feb 2018 12:00) (96% - 100%)    02-11 @ 07:01 - 02-12 @ 07:00  --------------------------------------------------------  IN: 1932 mL / OUT: 1893 mL / NET: 39 mL    02-12 @ 07:01  -  02-12 @ 12:26  --------------------------------------------------------  IN: 440 mL / OUT: 320 mL / NET: 120 mL      Mode: AC/ CMV (Assist Control/ Continuous Mandatory Ventilation)  RR (machine): 12  TV (machine): 500  FiO2: 40  PEEP: 5  ITime: 1  MAP: 7.8  PIP: 15    PHYSICAL EXAM: Uncomfortable, no distress  Eyes: PERRL, EOM intact; conjunctiva and sclera clear  Head: Normocephalic;  No Trauma  ENMT: No nasal discharge, hoarseness, cough or hemoptysis.  Airway clear  Neck: Supple; non tender; no masses or deformities.    No JVD  Respiratory:  - WHEEZING   few bilateral   RHONCHI  - RALES  = CRACKLES.  Diminished breath sounds  BILATERAL  RIGHT  LEFT bases   Cardiovascular: Regular rate and rhythm. S1 and S2 Normal; ++murmurs, gallops or rubs     - PPM/AICD  Gastrointestinal: Soft ,distended; Normal bowel sounds; No hepatosplenomegaly.     +PEG    + multiple surgical drains integrity checked    ANGELES  Genitourinary: No costovertebral angle tenderness. No dysuria  Extremities: Decreased AROM, + clubbing, cyanosis + edema, R foot toes unchanged pattern of necrosis, L foot less edema   Vascular: Peripheral pulses palpable 2+ bilaterally  Neurological: intubated and opening eyes to commands  Skin: Warm and dry. No obvious rash  Lymph Nodes: No acute cervical or supraclavicular adenopathy  Psychiatric: not sedated, calm affect    DEVICES:  - DENTURES   +IV R / L     - ETUBE   +TRACH 8.0 Shilley cuffed, site is clean,      LABS:                          8.0    7.0   )-----------( 150      ( 12 Feb 2018 11:53 )             25.0     02-12    140  |  101  |  39<H>  ----------------------------<  171<H>  4.0   |  28  |  0.88    Ca    8.0<L>      12 Feb 2018 05:49  Phos  3.2     02-12  Mg     2.3     02-12    TPro  5.4<L>  /  Alb  2.4<L>  /  TBili  3.5<H>  /  DBili  x   /  AST  48<H>  /  ALT  48<H>  /  AlkPhos  149<H>  02-12    < from: Xray Chest 1 View- PORTABLE-Routine (02.11.18 @ 03:11) >  EXAM:  XR CHEST PORTABLE ROUTINE 1V                          PROCEDURE DATE:  02/11/2018                     INTERPRETATION:  Clinical History: Interval change    Portable examination is demonstrates no interval change lung pathology   comparison to prior examination of the chest 2/10/2018. No interval   change position remaining support devices.    Impression: No interval change lung pathology    < end of copied text >    RADIOLOGY & ADDITIONAL STUDIES (The following images were personally reviewed):

## 2018-02-12 NOTE — CHART NOTE - NSCHARTNOTEFT_GEN_A_CORE
Admitting Diagnosis:   72yM with PMH severe MR/AR presented after recent dx acute cholecystitis and RLL PNA at [facility] with lower abdominal pain, new onset rapid Afib, sepsis, acute CHF exacerbation, initially admitted to cardiology on bipap, then transferred to SICU with perforated duodenum s/p Shreyas patch and jejunostomy (1/20) c/b breakdown s/p RTOR, washout, new Shreyas patch, gastrostomy, duodenostomy, IVCF (1/22)    PAST MEDICAL & SURGICAL HISTORY:  Mitral regurgitation  Aortic regurgitation  No significant past surgical history    Current Nutrition Order: Glucerna 1.2 at 68 mL x 24 hr via j-tube to provide 1632 mL TV, 1958 kcal, 98 g protein, & 1314 mL fluid. EN running at goal rate.     GI Issues: WDL; no residuals     Pain: HONEY 2/2 vent    Skin Integrity: Unstageable-Forehead; DTI R heel/L heel     Labs:   02-12    140  |  101  |  39<H>  ----------------------------<  171<H>  4.0   |  28  |  0.88    Ca    8.0<L>      12 Feb 2018 05:49  Phos  3.2     02-12  Mg     2.3     02-12    TPro  5.4<L>  /  Alb  2.4<L>  /  TBili  3.5<H>  /  DBili  x   /  AST  48<H>  /  ALT  48<H>  /  AlkPhos  149<H>  02-12    POCT Blood Glucose.: 118 mg/dL (12 Feb 2018 11:36)  POCT Blood Glucose.: 160 mg/dL (12 Feb 2018 06:24)  POCT Blood Glucose.: 173 mg/dL (11 Feb 2018 22:16)  POCT Blood Glucose.: 143 mg/dL (11 Feb 2018 16:40)    Medications:  MEDICATIONS  (STANDING):  ALBUTerol/ipratropium for Nebulization 3 milliLiter(s) Nebulizer every 6 hours  BACItracin   Ointment 1 Application(s) Topical two times a day  chlorhexidine 0.12% Liquid 15 milliLiter(s) Swish and Spit two times a day  diatrizoate meglumine/diatrizoate sodium. 30 milliLiter(s) Oral once  digoxin     Tablet 0.125 milliGRAM(s) Oral daily  docusate sodium Liquid 100 milliGRAM(s) Oral two times a day  fluconAZOLE IVPB 400 milliGRAM(s) IV Intermittent every 24 hours  insulin lispro (HumaLOG) corrective regimen sliding scale   SubCutaneous every 6 hours  pantoprazole  Injectable 40 milliGRAM(s) IV Push every 12 hours  povidone iodine 10% Solution 1 Application(s) Topical daily  silver sulfADIAZINE 1% Cream 1 Application(s) Topical daily  sodium chloride 0.9% lock flush 20 milliLiter(s) IV Push once    MEDICATIONS  (PRN):  acetaminophen    Suspension 650 milliGRAM(s) Enteral Tube every 6 hours PRN For Temp greater than 38 C (100.4 F)  bisacodyl Suppository 10 milliGRAM(s) Rectal daily PRN Constipation  sodium chloride 0.9% lock flush 10 milliLiter(s) IV Push every 1 hour PRN After each medication administration    Weight:  No new weights to assess    Estimated energy needs using 75 kg IBW:  25-30 kcal/kg (6559-6002 kcal).   1.4-1.6 g/kg (105-120 g protein).   30-35 mL/kg (5690-9953 mL fluid).      Subjective: Remains intubated via trach. EN running at goal rate via J-tube.     Previous Nutrition Diagnosis: Increased nutrient needs RT increased demand for kcal & protein AEB critical illness, intubation.     Active [ X  ]  Resolved [   ]    Goal: Meet % of nutrition needs via tolerated route.     Recommendations:  1. Continue EN at current rate. Monitor for signs of intolerance.   2. Trend daily weights.   3. Bowel regimen PRN    Education: N/A-vent    Risk Level: High [  X ] Moderate [   ] Low [   ] Admitting Diagnosis:   72yM with PMH severe MR/AR presented after recent dx acute cholecystitis and RLL PNA at [facility] with lower abdominal pain, new onset rapid Afib, sepsis, acute CHF exacerbation, initially admitted to cardiology on bipap, then transferred to SICU with perforated duodenum s/p Shreyas patch and jejunostomy (1/20) c/b breakdown s/p RTOR, washout, new Shreyas patch, gastrostomy, duodenostomy, IVCF (1/22)    PAST MEDICAL & SURGICAL HISTORY:  Mitral regurgitation  Aortic regurgitation  No significant past surgical history    Current Nutrition Order: Glucerna 1.2 at 68 mL x 24 hr via j-tube to provide 1632 mL TV, 1958 kcal, 98 g protein, & 1314 mL fluid. EN running at goal rate.     GI Issues: WDL; no residuals     Pain: HONEY 2/2 vent    Skin Integrity: Unstageable-Forehead; DTI R heel/L heel     Labs:   02-12    140  |  101  |  39<H>  ----------------------------<  171<H>  4.0   |  28  |  0.88    Ca    8.0<L>      12 Feb 2018 05:49  Phos  3.2     02-12  Mg     2.3     02-12    TPro  5.4<L>  /  Alb  2.4<L>  /  TBili  3.5<H>  /  DBili  x   /  AST  48<H>  /  ALT  48<H>  /  AlkPhos  149<H>  02-12    POCT Blood Glucose.: 118 mg/dL (12 Feb 2018 11:36)  POCT Blood Glucose.: 160 mg/dL (12 Feb 2018 06:24)  POCT Blood Glucose.: 173 mg/dL (11 Feb 2018 22:16)  POCT Blood Glucose.: 143 mg/dL (11 Feb 2018 16:40)    Medications:  MEDICATIONS  (STANDING):  ALBUTerol/ipratropium for Nebulization 3 milliLiter(s) Nebulizer every 6 hours  BACItracin   Ointment 1 Application(s) Topical two times a day  chlorhexidine 0.12% Liquid 15 milliLiter(s) Swish and Spit two times a day  diatrizoate meglumine/diatrizoate sodium. 30 milliLiter(s) Oral once  digoxin     Tablet 0.125 milliGRAM(s) Oral daily  docusate sodium Liquid 100 milliGRAM(s) Oral two times a day  fluconAZOLE IVPB 400 milliGRAM(s) IV Intermittent every 24 hours  insulin lispro (HumaLOG) corrective regimen sliding scale   SubCutaneous every 6 hours  pantoprazole  Injectable 40 milliGRAM(s) IV Push every 12 hours  povidone iodine 10% Solution 1 Application(s) Topical daily  silver sulfADIAZINE 1% Cream 1 Application(s) Topical daily  sodium chloride 0.9% lock flush 20 milliLiter(s) IV Push once    MEDICATIONS  (PRN):  acetaminophen    Suspension 650 milliGRAM(s) Enteral Tube every 6 hours PRN For Temp greater than 38 C (100.4 F)  bisacodyl Suppository 10 milliGRAM(s) Rectal daily PRN Constipation  sodium chloride 0.9% lock flush 10 milliLiter(s) IV Push every 1 hour PRN After each medication administration    Weight:  No new weights to assess    Estimated energy needs using 75 kg IBW:  25-30 kcal/kg (8986-4158 kcal).   1.4-1.6 g/kg (105-120 g protein).   30-35 mL/kg (9474-5430 mL fluid).      Subjective: Remains intubated via trach. EN running at goal rate via J-tube.     Previous Nutrition Diagnosis: Increased nutrient needs RT increased demand for kcal & protein AEB critical illness, intubation.     Active [ X  ]  Resolved [   ]    Goal: Meet % of nutrition needs via tolerated route.     Recommendations:  1. Continue EN at current rate. Monitor for signs of intolerance.   2. Trend daily weights.   3. Bowel regimen PRN    Education: N/A-vent    Risk Level: High [  ] Moderate [ X  ] Low [   ]

## 2018-02-12 NOTE — PROGRESS NOTE ADULT - SUBJECTIVE AND OBJECTIVE BOX
Critical Care    INTERVAL HPI/OVERNIGHT EVENTS:    ANTIBIOTICS    MEDICATIONS  (STANDING):  ALBUTerol/ipratropium for Nebulization 3 milliLiter(s) Nebulizer every 6 hours  BACItracin   Ointment 1 Application(s) Topical two times a day  chlorhexidine 0.12% Liquid 15 milliLiter(s) Swish and Spit two times a day  diatrizoate meglumine/diatrizoate sodium. 30 milliLiter(s) Oral once  digoxin     Tablet 0.125 milliGRAM(s) Oral daily  docusate sodium Liquid 100 milliGRAM(s) Oral two times a day  fluconAZOLE IVPB 400 milliGRAM(s) IV Intermittent every 24 hours  insulin lispro (HumaLOG) corrective regimen sliding scale   SubCutaneous every 6 hours  pantoprazole  Injectable 40 milliGRAM(s) IV Push every 12 hours  povidone iodine 10% Solution 1 Application(s) Topical daily  silver sulfADIAZINE 1% Cream 1 Application(s) Topical daily  sodium chloride 0.9% lock flush 20 milliLiter(s) IV Push once    MEDICATIONS  (PRN):  acetaminophen    Suspension 650 milliGRAM(s) Enteral Tube every 6 hours PRN For Temp greater than 38 C (100.4 F)  bisacodyl Suppository 10 milliGRAM(s) Rectal daily PRN Constipation  sodium chloride 0.9% lock flush 10 milliLiter(s) IV Push every 1 hour PRN After each medication administration      Allergies    No Known Allergies    Intolerances        REVIEW OF SYSTEMS:    patient has no complaitns    Vital Signs Last 24 Hrs  T(C): 36.6 (2018 09:03), Max: 38.4 (2018 03:00)  T(F): 97.9 (2018 09:03), Max: 101.2 (2018 03:00)  HR: 92 (2018 10:00) (80 - 118)  BP: 115/59 (2018 10:00) (92/69 - 127/68)  BP(mean): 78 (2018 10:00) (73 - 93)  RR: 23 (2018 10:00) (11 - 26)  SpO2: 100% (2018 10:00) (98% - 100%)    PHYSICAL EXAM:    General:  in no acute distress  Eyes: PERRL, EOM intact; conjunctiva and sclera clear  Head: Normocephalic; atraumatic  ENMT: No nasal discharge; airway clear  Neck: Supple; non tender; no masses  Respiratory: No wheezes, rales or rhonchi  Cardiovascular: Regular rate and rhythm. S1 and S2 Normal; No murmurs, gallops or rubs  Gastrointestinal: Soft non-tender non-distended; Normal bowel sounds; No hepatosplenomegaly  Genitourinary: No costovertebral angle tenderness  Extremities: some gangrene of toes on both feet  Vascular: Peripheral pulses palpable 2+ bilaterally  Neurological: Alert and oriented x3  Skin: Warm and dry. No acute rash  Lymph Nodes: No acute cervical adenopathy  Musculoskeletal: Normal gait, tone, without deformities    LABS:                        8.8    7.2   )-----------( 162      ( 2018 05:49 )             27.3     02-    140  |  101  |  39<H>  ----------------------------<  171<H>  4.0   |  28  |  0.88    Ca    8.0<L>      2018 05:49  Phos  3.2       Mg     2.3         TPro  5.4<L>  /  Alb  2.4<L>  /  TBili  3.5<H>  /  DBili  x   /  AST  48<H>  /  ALT  48<H>  /  AlkPhos  149<H>        Urinalysis Basic - ( 2018 07:11 )    Color: Yellow / Appearance: Clear / S.025 / pH: x  Gluc: x / Ketone: Trace mg/dL  / Bili: Moderate / Urobili: 4.0 E.U./dL   Blood: x / Protein: 30 mg/dL / Nitrite: POSITIVE   Leuk Esterase: NEGATIVE / RBC: 5-10 /HPF / WBC < 5 /HPF   Sq Epi: x / Non Sq Epi: 5-10 /HPF / Bacteria: Present /HPF          MICROBIOLOGY:  Culture Results:   No growth at 4 days. ( @ 23:14)  Culture Results:   No growth at 4 days. ( @ 23:14)  Culture Results:   Rare Staphylococcus aureus ( @ 15:38)  Culture Results:   Numerous Stenotrophomonas maltophilia  Numerous Burkholderia cepacia  Accompanied by normal respiratory geeta ( @ 09:32)  Culture Results:   No growth at 5 days. ( @ 00:58)  Culture Results:   No growth at 5 days. ( @ 00:58)  Culture Results:   Moderate Burkholderia cepacia  See previous culture for sensitivity  Moderate Stenotrophomonas maltophilia ( @ 17:03)      RADIOLOGY & ADDITIONAL STUDIES:

## 2018-02-12 NOTE — PROGRESS NOTE ADULT - ASSESSMENT
ASSESSMENT/PLAN 71y/o male pt c Respiratory failure, perforated duodenal ulcer, POD post repair, pneumonia, RLL atelectases, no obvious RADHA, elevated R hemidiaphragm, AFIB,  thrombocytopenia, possible endocarditis, s/p reop. now POP tracheostomy, new fever pattern, anemia    1. O2: Use CMV mode today for planned CT  2. Bronchodilators:  Atrovent/ albuterol q 4 – 6 hours as needed  3. Corticosteroids: off  4. ID/Antibiotics:  strict ID supervision , adjust ABX pending multiple new CX  5. Cardiac/HTN: Monitor, optimize HR;   6. GI: Rx/ prophylaxis c PPI/Protonix  7. Heme: Rx/VT  follow H/H closely Eliquis held for worsening anemia, possible bleeding, resume when not surgically contraindicated  8. Aspiration precautions, feeding PEG  9. Surgery mngmnt priority, recommend reposition vs removal of R subdiaphragmatic drain unless contraindicated./discussed c SICU.    10. Edema: diuretic to continue  11. CT C/A/P today  Discussed c managing team. SICU, ID

## 2018-02-12 NOTE — PROGRESS NOTE ADULT - ATTENDING COMMENTS
Pt for Ct today with persistent fevers. would complete 14 days of Gram neg coverage for pna and continue treatment of MSSa in foot wounds. Pt was on Vanco when decompensated last week hence continue gram neg coverage. CT abd under review by surgery team. continue off eloquis with bloody drainage from duodenostomy tube and continue to trend CBC. Dr. Metcalf aware. PPI ordered BID. No wean. Hold diuresis today with contrast CT.

## 2018-02-12 NOTE — CONSULT NOTE ADULT - SUBJECTIVE AND OBJECTIVE BOX
Surgeon: Dr. Mo    Requesting Physician: Dr. Metcalf    HISTORY OF PRESENT ILLNESS:    71 y/o M admitted in January with RLL PNA, abdominal pain, new onset Afib, and CHF exacerbation.  While being treated he developed a perforated duodenal ulcer and was brought to the OR on  with Dr. Metcalf for a Shreyas patch.  Since then he has been unable to wean from vent and had a trach on .  He has also remained febrile despite appropriate antibiotics and antifungals per ID based on OR cultures.  In light of the persistent fevers he underwent a CT chest/abdomen/pelvis today to look for alternative source of infection.  CT reveals a moderate left effusion, new since prior CT on 2/3, for which Dr. Mo was asked to evaluate for drainage.    PAST MEDICAL & SURGICAL HISTORY:  Mitral regurgitation  Aortic regurgitation  No significant past surgical history      MEDICATIONS  (STANDING):  ALBUTerol/ipratropium for Nebulization 3 milliLiter(s) Nebulizer every 6 hours  BACItracin   Ointment 1 Application(s) Topical two times a day  chlorhexidine 0.12% Liquid 15 milliLiter(s) Swish and Spit two times a day  digoxin     Tablet 0.125 milliGRAM(s) Oral daily  docusate sodium Liquid 100 milliGRAM(s) Oral two times a day  fluconAZOLE IVPB 400 milliGRAM(s) IV Intermittent every 24 hours  HYDROmorphone  Injectable 0.5 milliGRAM(s) IV Push once  insulin lispro (HumaLOG) corrective regimen sliding scale   SubCutaneous every 6 hours  pantoprazole  Injectable 40 milliGRAM(s) IV Push every 12 hours  povidone iodine 10% Solution 1 Application(s) Topical daily  silver sulfADIAZINE 1% Cream 1 Application(s) Topical daily  sodium chloride 0.9% lock flush 20 milliLiter(s) IV Push once    MEDICATIONS  (PRN):  acetaminophen    Suspension 650 milliGRAM(s) Enteral Tube every 6 hours PRN For Temp greater than 38 C (100.4 F)  bisacodyl Suppository 10 milliGRAM(s) Rectal daily PRN Constipation  sodium chloride 0.9% lock flush 10 milliLiter(s) IV Push every 1 hour PRN After each medication administration      Allergies    No Known Allergies    Intolerances        SOCIAL HISTORY: unable to assess at this time    FAMILY HISTORY: unable to assess at this time      Review of Systems (upon admission) - unable to assess at present  CONSTITUTIONAL:  Fevers / chills, sweats, fatigue, weight loss, weight gain                                    NEGATIVE  NEURO:  parathesias, seizures, syncope, confusion                                                                               NEGATIVE  EYES:  Blurry vision, discharge, pain, loss of vision                                                                                  NEGATIVE  ENMT:  Difficulty hearing, vertigo, dysphagia, epistaxis, recent dental work                                     NEGATIVE  CV:  Chest pain, palpitations, MAGALLANES, orthopnea                                                                                         NEGATIVE  RESPIRATORY:  Wheezing, SOB, cough / sputum, hemoptysis                                                              NEGATIVE  GI:  +abdominal pain.  Denies Nausea, vommiting, diarrhea, constipation, melena                                                                  POSITIVE  : Hematuria, dysuria, urgency, incontinence                                                                                       NEGATIVE  MUSKULOSKELETAL:  arthritis, joint swelling, muscle weakness                                                           NEGATIVE  SKIN/BREAST:  rash, itching, tanya loss, masses                                                                                            NEGATIVE  PSYCH:  depresion, anxiety, suicidal ideation                                                                                             NEGATIVE  HEME/LYMPH:  bruises easily, enlarged lymph nodes, tender lymph nodes                                        NEGATIVE  ENDOCRINE:  cold intolerance, heat intolerance, polydipsia                                                                   NEGATIVE    PHYSICAL EXAM  Vital Signs Last 24 Hrs  T(C): 36.6 (2018 09:03), Max: 38.4 (2018 03:00)  T(F): 97.9 (2018 09:03), Max: 101.2 (2018 03:00)  HR: 92 (2018 17:00) (80 - 112)  BP: 105/62 (2018 17:00) (99/63 - 127/68)  BP(mean): 75 (2018 17:00) (73 - 92)  RR: 23 (2018 17:00) (11 - 25)  SpO2: 99% (:00) (96% - 100%)    CONSTITUTIONAL:                                                                          WNL  NEURO:                                                                                             WNL                      EYES:                                                                                                  WNL  ENMT:                                                                                                WNL  CV:                                                                                                      WNL  RESPIRATORY:                                                                                  WNL  GI:                                                                                                       WNL  : ANGELES + / -                                                                                 WNL  MUSKULOSKELETAL:                                                                       WNL  SKIN / BREAST:                                                                                 WNL                                                          LABS:                        7.8    7.2   )-----------( 148      ( 2018 16:48 )             24.4     02-12    140  |  101  |  39<H>  ----------------------------<  171<H>  4.0   |  28  |  0.88    Ca    8.0<L>      2018 05:49  Phos  3.2       Mg     2.3         TPro  5.4<L>  /  Alb  2.4<L>  /  TBili  3.5<H>  /  DBili  x   /  AST  48<H>  /  ALT  48<H>  /  AlkPhos  149<H>        Urinalysis Basic - ( 2018 07:11 )    Color: Yellow / Appearance: Clear / S.025 / pH: x  Gluc: x / Ketone: Trace mg/dL  / Bili: Moderate / Urobili: 4.0 E.U./dL   Blood: x / Protein: 30 mg/dL / Nitrite: POSITIVE   Leuk Esterase: NEGATIVE / RBC: 5-10 /HPF / WBC < 5 /HPF   Sq Epi: x / Non Sq Epi: 5-10 /HPF / Bacteria: Present /HPF              RADIOLOGY & ADDITIONAL STUDIES:  CAROTID U/S:    CXR:    CT Scan:    EKG:    TTE / AMRIK:    Cardiac Cath: Surgeon: Dr. Mo    Requesting Physician: Dr. Metcalf    HISTORY OF PRESENT ILLNESS:    71 y/o M admitted in January with RLL PNA, abdominal pain, new onset Afib, and CHF exacerbation.  While being treated he developed a perforated duodenal ulcer and was brought to the OR on  with Dr. Metcalf for a Shreyas patch.  Since then he has been unable to wean from vent and had a trach on .  He has also remained febrile despite appropriate antibiotics and antifungals per ID based on OR cultures.  In light of the persistent fevers he underwent a CT chest/abdomen/pelvis today to look for alternative source of infection.  CT reveals a moderate left effusion, new since prior CT on 2/3, for which Dr. Mo was asked to evaluate for drainage.    PAST MEDICAL & SURGICAL HISTORY:  Mitral regurgitation  Aortic regurgitation  No significant past surgical history      MEDICATIONS  (STANDING):  ALBUTerol/ipratropium for Nebulization 3 milliLiter(s) Nebulizer every 6 hours  BACItracin   Ointment 1 Application(s) Topical two times a day  chlorhexidine 0.12% Liquid 15 milliLiter(s) Swish and Spit two times a day  digoxin     Tablet 0.125 milliGRAM(s) Oral daily  docusate sodium Liquid 100 milliGRAM(s) Oral two times a day  fluconAZOLE IVPB 400 milliGRAM(s) IV Intermittent every 24 hours  HYDROmorphone  Injectable 0.5 milliGRAM(s) IV Push once  insulin lispro (HumaLOG) corrective regimen sliding scale   SubCutaneous every 6 hours  pantoprazole  Injectable 40 milliGRAM(s) IV Push every 12 hours  povidone iodine 10% Solution 1 Application(s) Topical daily  silver sulfADIAZINE 1% Cream 1 Application(s) Topical daily  sodium chloride 0.9% lock flush 20 milliLiter(s) IV Push once    MEDICATIONS  (PRN):  acetaminophen    Suspension 650 milliGRAM(s) Enteral Tube every 6 hours PRN For Temp greater than 38 C (100.4 F)  bisacodyl Suppository 10 milliGRAM(s) Rectal daily PRN Constipation  sodium chloride 0.9% lock flush 10 milliLiter(s) IV Push every 1 hour PRN After each medication administration      Allergies    No Known Allergies    Intolerances        SOCIAL HISTORY: unable to assess at this time    FAMILY HISTORY: unable to assess at this time      Review of Systems (upon admission) - unable to assess at present  CONSTITUTIONAL:  Fevers / chills, sweats, fatigue, weight loss, weight gain                                    NEGATIVE  NEURO:  parathesias, seizures, syncope, confusion                                                                               NEGATIVE  EYES:  Blurry vision, discharge, pain, loss of vision                                                                                  NEGATIVE  ENMT:  Difficulty hearing, vertigo, dysphagia, epistaxis, recent dental work                                     NEGATIVE  CV:  Chest pain, palpitations, MAGALLANES, orthopnea                                                                                         NEGATIVE  RESPIRATORY:  Wheezing, SOB, cough / sputum, hemoptysis                                                              NEGATIVE  GI:  +abdominal pain.  Denies Nausea, vommiting, diarrhea, constipation, melena                                                                  POSITIVE  : Hematuria, dysuria, urgency, incontinence                                                                                       NEGATIVE  MUSKULOSKELETAL:  arthritis, joint swelling, muscle weakness                                                           NEGATIVE  SKIN/BREAST:  rash, itching, tanya loss, masses                                                                                            NEGATIVE  PSYCH:  depresion, anxiety, suicidal ideation                                                                                             NEGATIVE  HEME/LYMPH:  bruises easily, enlarged lymph nodes, tender lymph nodes                                        NEGATIVE  ENDOCRINE:  cold intolerance, heat intolerance, polydipsia                                                                   NEGATIVE    PHYSICAL EXAM  Vital Signs Last 24 Hrs  T(C): 36.6 (2018 09:03), Max: 38.4 (2018 03:00)  T(F): 97.9 (2018 09:03), Max: 101.2 (2018 03:00)  HR: 92 (2018 17:00) (80 - 112)  BP: 105/62 (2018 17:00) (99/63 - 127/68)  BP(mean): 75 (2018 17:00) (73 - 92)  RR: 23 (2018 17:00) (11 - 25)  SpO2: 99% (:00) (96% - 100%)    CONSTITUTIONAL: resting comfortably in bed, NAD  NEURO: Alert and responsive                    EYES:  EOMI, enicteric  ENMT: MMM, NCAT  CV: S1S2 RRR, +JENISE  RESPIRATORY: bibasilar crackles to anterior ausculation  GI: laparotomy incision well healed, staples intact, abd soft, nondistended  : ANGELES +   MUSKULOSKELETAL: no gross deformities  SKIN / BREAST: no rashes/lesions on chest                                                          LABS:                        7.8    7.2   )-----------( 148      ( 2018 16:48 )             24.4     02-12    140  |  101  |  39<H>  ----------------------------<  171<H>  4.0   |  28  |  0.88    Ca    8.0<L>      2018 05:49  Phos  3.2       Mg     2.3         TPro  5.4<L>  /  Alb  2.4<L>  /  TBili  3.5<H>  /  DBili  x   /  AST  48<H>  /  ALT  48<H>  /  AlkPhos  149<H>        Urinalysis Basic - ( 2018 07:11 )    Color: Yellow / Appearance: Clear / S.025 / pH: x  Gluc: x / Ketone: Trace mg/dL  / Bili: Moderate / Urobili: 4.0 E.U./dL   Blood: x / Protein: 30 mg/dL / Nitrite: POSITIVE   Leuk Esterase: NEGATIVE / RBC: 5-10 /HPF / WBC < 5 /HPF   Sq Epi: x / Non Sq Epi: 5-10 /HPF / Bacteria: Present /HPF              RADIOLOGY & ADDITIONAL STUDIES:    CXR: no signif effusions or infiltrates evident    CT Scan: small to moderate left pleural effusion with LLL atelectasis

## 2018-02-12 NOTE — PROGRESS NOTE ADULT - ASSESSMENT
72yM with PMH severe MR/AR presented after recent dx acute cholecystitis and RLL PNA at [facility] with lower abdominal pain, new onset rapid Afib, sepsis, acute CHF exacerbation, initially admitted to cardiology on bipap, then transferred to SICU with perforated duodenum s/p Shreyas patch and jejunostomy (1/20) c/b breakdown s/p RTOR, washout, new Shreyas patch, gastrostomy, duodenostomy, IVCF (1/22)    Neuro: Tylenol PRN  CV: Afib: Echo EF 50%, Severe MR, no vegetation, digoxin, Eliquis.   Pulm: Trach 8.0 shiley on AC; will wean to CPAP, chlorhex, duonebs  GI: NPO, Jtube TF Glucerna 1.2 68cc, Protonix BID. Retrograde D-tube to LIWS, G-tube to LIWS, colace dulcolax   : Oliveira (placed for retention on 2/10)  ID: C albicans intraperitoneal infxn: fluconazole (2/1--); Alexi/Stenotrophomonas: Levaquin(2/8-)  // D/c: Zosyn (1/19-22; 1/27-29; 2/7--) micafungin (1/22-2/1), ceftazidime (1/29-2/4), linezolid (1/22-23), meropenem (1/22-27), vancomycin (1/19-20; 2/7-2/9) ; f/u sputum, blood, urine cx sent s/p fever  Endo: ISS  HEME: Eliquis, IVCF (1/22) // 1FFP, 1PLT (1/22).   PPx: SCD, R pop DVT, s/p IVC filter   Lines: PIV, TLC( 2/8-) D/c: PICC (1/29-2/8), L brachial [place] (1/20-1/27),  LSC HD cath (1/22-1/27)   Wounds: Ex lap, JPx2 to bulb suction, J for feeds, D-tube to LIWS, G-tube to LIWS; betadine/peroxide to toes  PT/OT: Early mobilization.

## 2018-02-12 NOTE — PROGRESS NOTE ADULT - SUBJECTIVE AND OBJECTIVE BOX
SUBJECTIVE: pain controlled, no HA, N/V, CP, pt feels SOB w/out congestion, pt feels warm w/out chills, no abd pain, +dark BM  temp spike,    MEDICATIONS  (STANDING):  ALBUTerol/ipratropium for Nebulization 3 milliLiter(s) Nebulizer every 6 hours  apixaban 5 milliGRAM(s) Oral every 12 hours  BACItracin   Ointment 1 Application(s) Topical two times a day  chlorhexidine 0.12% Liquid 15 milliLiter(s) Swish and Spit two times a day  digoxin     Tablet 0.125 milliGRAM(s) Oral daily  docusate sodium Liquid 100 milliGRAM(s) Oral two times a day  fluconAZOLE IVPB 400 milliGRAM(s) IV Intermittent every 24 hours  insulin lispro (HumaLOG) corrective regimen sliding scale   SubCutaneous every 6 hours  levoFLOXacin IVPB 750 milliGRAM(s) IV Intermittent every 24 hours  pantoprazole  Injectable 40 milliGRAM(s) IV Push every 12 hours  povidone iodine 10% Solution 1 Application(s) Topical daily  silver sulfADIAZINE 1% Cream 1 Application(s) Topical daily  sodium chloride 0.9% lock flush 20 milliLiter(s) IV Push once    MEDICATIONS  (PRN):  acetaminophen    Suspension 650 milliGRAM(s) Enteral Tube every 6 hours PRN For Temp greater than 38 C (100.4 F)  bisacodyl Suppository 10 milliGRAM(s) Rectal daily PRN Constipation  sodium chloride 0.9% lock flush 10 milliLiter(s) IV Push every 1 hour PRN After each medication administration      ICU Vital Signs Last 24 Hrs  T(C): 36.9 (12 Feb 2018 06:00), Max: 38.4 (12 Feb 2018 03:00)  T(F): 98.5 (12 Feb 2018 06:00), Max: 101.2 (12 Feb 2018 03:00)  HR: 96 (12 Feb 2018 07:00) (84 - 118)  BP: 123/66 (12 Feb 2018 07:00) (92/69 - 135/69)  BP(mean): 83 (12 Feb 2018 07:00) (73 - 93)  ABP: --  ABP(mean): --  RR: 24 (12 Feb 2018 07:00) (11 - 26)  SpO2: 100% (12 Feb 2018 07:00) (96% - 100%)      Physical Exam:  General: NAD,   HEENT: NC, normal hearing, trach in place  Pulmonary: Nonlabored breathing, no respiratory distress, coarse upper airway b/l on inspiration, no crackles  Cardiovascular: afib, MR  Abdominal: soft, tender LLQ no rebound or guarding,   Extremities: b/l feet dressings C/D/I  Neuro: A/O x3, CNs II-XII grossly intact, normal motor/sensation, no focal deficits    Lines/tubes/drains: R TLC, R GUCCI x2- blood tinged serous; gastrostomy, duodenostomy, feeding jejunostomy    Vent settings:  Mode: AC/ CMV (Assist Control/ Continuous Mandatory Ventilation), RR (machine): 12, TV (machine): 500, FiO2: 40, PEEP: 5, ITime: 1, MAP: 9, PIP: 16    I&O's Summary    11 Feb 2018 07:01  -  12 Feb 2018 07:00  --------------------------------------------------------  IN: 1932 mL / OUT: 1893 mL / NET: 39 mL    12 Feb 2018 07:01  -  12 Feb 2018 07:24  --------------------------------------------------------  IN: 68 mL / OUT: 0 mL / NET: 68 mL        LABS:                        8.8    7.2   )-----------( 162      ( 12 Feb 2018 05:49 )             27.3     02-12    140  |  101  |  39<H>  ----------------------------<  171<H>  4.0   |  28  |  0.88    Ca    8.0<L>      12 Feb 2018 05:49  Phos  3.2     02-12  Mg     2.3     02-12    TPro  5.4<L>  /  Alb  2.4<L>  /  TBili  3.5<H>  /  DBili  x   /  AST  48<H>  /  ALT  48<H>  /  AlkPhos  149<H>  02-12        CAPILLARY BLOOD GLUCOSE      POCT Blood Glucose.: 160 mg/dL (12 Feb 2018 06:24)  POCT Blood Glucose.: 173 mg/dL (11 Feb 2018 22:16)  POCT Blood Glucose.: 143 mg/dL (11 Feb 2018 16:40)  POCT Blood Glucose.: 104 mg/dL (11 Feb 2018 11:24)    LIVER FUNCTIONS - ( 12 Feb 2018 05:49 )  Alb: 2.4 g/dL / Pro: 5.4 g/dL / ALK PHOS: 149 U/L / ALT: 48 U/L / AST: 48 U/L / GGT: x             Cultures:  L first toe: rare S. aureus

## 2018-02-12 NOTE — CONSULT NOTE ADULT - ASSESSMENT
A/P: 73 y/o M with recent RLL PNA, new onset Afib, CHF exacerbation and perforated duodenal ulcer s/p Shreyas patch persistent fevers despite appropriate antibiotics and antifungals, now found to have a moderate left pleural effusion    1. Pleural effusion   - in light of persistent fevers, agree with need to drain effusion for cultures, but would not expect significant improvement in respiratory function   - Dr. Mo to review CT tomorrow AM, but given size/location of effusion would most likely prefer IR guided drain    2. Persistent fevers   - c/w current antibiotic/antifungal regimen per ID   - follow up official CT reports   - will send pleural fluid for aerobic/anerobic/fungal cultures    3. Respiratory failure   - tolerating AC, c/w CPAP trials

## 2018-02-12 NOTE — PROGRESS NOTE ADULT - ASSESSMENT
I note MSSA growing from a recent I and D of foot  Not clear if this represents an infection since the foot does not appear cellulutic

## 2018-02-12 NOTE — PROGRESS NOTE ADULT - SUBJECTIVE AND OBJECTIVE BOX
24 hr events: given 20 lasix x1 po. CPAP 15/5. O/N Rested on VC-AC.  did not get the second lasix 20 PO during day, given at 10pm w/ good response. T101.2 at 3am - bld cx sent (urine/sputum after)    SUBJECTIVE: pain controlled, no HA, N/V, CP, pt feels SOB w/out congestion, pt feels warm w/out chills, no abd pain, +dark BM    MEDICATIONS  (STANDING):  ALBUTerol/ipratropium for Nebulization 3 milliLiter(s) Nebulizer every 6 hours  apixaban 5 milliGRAM(s) Oral every 12 hours  BACItracin   Ointment 1 Application(s) Topical two times a day  chlorhexidine 0.12% Liquid 15 milliLiter(s) Swish and Spit two times a day  digoxin     Tablet 0.125 milliGRAM(s) Oral daily  docusate sodium Liquid 100 milliGRAM(s) Oral two times a day  fluconAZOLE IVPB 400 milliGRAM(s) IV Intermittent every 24 hours  insulin lispro (HumaLOG) corrective regimen sliding scale   SubCutaneous every 6 hours  levoFLOXacin IVPB 750 milliGRAM(s) IV Intermittent every 24 hours  pantoprazole  Injectable 40 milliGRAM(s) IV Push every 12 hours  povidone iodine 10% Solution 1 Application(s) Topical daily  silver sulfADIAZINE 1% Cream 1 Application(s) Topical daily  sodium chloride 0.9% lock flush 20 milliLiter(s) IV Push once    MEDICATIONS  (PRN):  acetaminophen    Suspension 650 milliGRAM(s) Enteral Tube every 6 hours PRN For Temp greater than 38 C (100.4 F)  bisacodyl Suppository 10 milliGRAM(s) Rectal daily PRN Constipation  sodium chloride 0.9% lock flush 10 milliLiter(s) IV Push every 1 hour PRN After each medication administration      ICU Vital Signs Last 24 Hrs  T(C): 36.9 (12 Feb 2018 06:00), Max: 38.4 (12 Feb 2018 03:00)  T(F): 98.5 (12 Feb 2018 06:00), Max: 101.2 (12 Feb 2018 03:00)  HR: 96 (12 Feb 2018 07:00) (84 - 118)  BP: 123/66 (12 Feb 2018 07:00) (92/69 - 135/69)  BP(mean): 83 (12 Feb 2018 07:00) (73 - 93)  ABP: --  ABP(mean): --  RR: 24 (12 Feb 2018 07:00) (11 - 26)  SpO2: 100% (12 Feb 2018 07:00) (96% - 100%)      Physical Exam:  General: NAD, ice packs on flank/groin  HEENT: NC, normal hearing, trach in place  Pulmonary: Nonlabored breathing, no respiratory distress, coarse upper airway b/l on inspiration, no crackles  Cardiovascular: afib, MR  Abdominal: soft, tender LLQ no rebound or guarding, ND, +BS, well-healed midline incision w/staples  Extremities: b/l feet dressings C/D/I  Neuro: A/O x3, CNs II-XII grossly intact, normal motor/sensation, no focal deficits    Lines/tubes/drains: R TLC, R GUCCI x2- blood tinged serous; gastrostomy, duodenostomy, feeding jejunostomy    Vent settings:  Mode: AC/ CMV (Assist Control/ Continuous Mandatory Ventilation), RR (machine): 12, TV (machine): 500, FiO2: 40, PEEP: 5, ITime: 1, MAP: 9, PIP: 16    I&O's Summary    11 Feb 2018 07:01  -  12 Feb 2018 07:00  --------------------------------------------------------  IN: 1932 mL / OUT: 1893 mL / NET: 39 mL    12 Feb 2018 07:01  -  12 Feb 2018 07:24  --------------------------------------------------------  IN: 68 mL / OUT: 0 mL / NET: 68 mL        LABS:                        8.8    7.2   )-----------( 162      ( 12 Feb 2018 05:49 )             27.3     02-12    140  |  101  |  39<H>  ----------------------------<  171<H>  4.0   |  28  |  0.88    Ca    8.0<L>      12 Feb 2018 05:49  Phos  3.2     02-12  Mg     2.3     02-12    TPro  5.4<L>  /  Alb  2.4<L>  /  TBili  3.5<H>  /  DBili  x   /  AST  48<H>  /  ALT  48<H>  /  AlkPhos  149<H>  02-12        CAPILLARY BLOOD GLUCOSE      POCT Blood Glucose.: 160 mg/dL (12 Feb 2018 06:24)  POCT Blood Glucose.: 173 mg/dL (11 Feb 2018 22:16)  POCT Blood Glucose.: 143 mg/dL (11 Feb 2018 16:40)  POCT Blood Glucose.: 104 mg/dL (11 Feb 2018 11:24)    LIVER FUNCTIONS - ( 12 Feb 2018 05:49 )  Alb: 2.4 g/dL / Pro: 5.4 g/dL / ALK PHOS: 149 U/L / ALT: 48 U/L / AST: 48 U/L / GGT: x             Cultures:  L first toe: rare S. aureus

## 2018-02-12 NOTE — PROGRESS NOTE ADULT - ASSESSMENT
72yM with PMH severe MR/AR presented after recent dx acute cholecystitis and RLL PNA at [facility] with lower abdominal pain, new onset rapid Afib, sepsis, acute CHF exacerbation, initially admitted to cardiology on bipap, then transferred to SICU with perforated duodenum s/p Shreyas patch and jejunostomy (1/20) c/b breakdown s/p RTOR, washout, new Shreyas patch, gastrostomy, duodenostomy, IVCF (1/22)    Neuro: Tylenol PRN  CV: Afib: Echo EF 50%, Severe MR, no vegetation, digoxin, Eliquis.   Pulm: Trach 8.0 shiley on AC; will wean to CPAP, chlorhex, duonebs  GI: NPO, Jtube TF Glucerna 1.2 68cc, Protonix BID. Retrograde D-tube to LIWS, G-tube to LIWS, colace dulcolax   : Oliveira (placed for retention on 2/10)  ID: C albicans intraperitoneal infxn: fluconazole (2/1--); Alexi/Stenotrophomonas: Levaquin(2/8-)  // D/c: Zosyn (1/19-22; 1/27-29; 2/7--) micafungin (1/22-2/1), ceftazidime (1/29-2/4), linezolid (1/22-23), meropenem (1/22-27), vancomycin (1/19-20; 2/7-2/9) ; f/u sputum, blood, urine cx sent s/p fever  Endo: ISS  HEME: Eliquis, IVCF (1/22) // 1FFP, 1PLT (1/22).   PPx: SCD, R pop DVT, s/p IVC filter   Lines: PIV, TLC( 2/8-) D/c: PICC (1/29-2/8), L brachial Braymer (1/20-1/27),  LSC HD cath (1/22-1/27)   Wounds: Ex lap, JPx2 to bulb suction, J for feeds, D-tube to LIWS, G-tube to LIWS; betadine/peroxide to toes  PT/OT: Early mobilization.

## 2018-02-13 LAB
ANION GAP SERPL CALC-SCNC: 9 MMOL/L — SIGNIFICANT CHANGE UP (ref 5–17)
BUN SERPL-MCNC: 37 MG/DL — HIGH (ref 7–23)
CALCIUM SERPL-MCNC: 7.7 MG/DL — LOW (ref 8.4–10.5)
CHLORIDE SERPL-SCNC: 100 MMOL/L — SIGNIFICANT CHANGE UP (ref 96–108)
CO2 SERPL-SCNC: 28 MMOL/L — SIGNIFICANT CHANGE UP (ref 22–31)
CREAT SERPL-MCNC: 0.76 MG/DL — SIGNIFICANT CHANGE UP (ref 0.5–1.3)
CULTURE RESULTS: SIGNIFICANT CHANGE UP
CULTURE RESULTS: SIGNIFICANT CHANGE UP
GLUCOSE BLDC GLUCOMTR-MCNC: 128 MG/DL — HIGH (ref 70–99)
GLUCOSE BLDC GLUCOMTR-MCNC: 139 MG/DL — HIGH (ref 70–99)
GLUCOSE BLDC GLUCOMTR-MCNC: 149 MG/DL — HIGH (ref 70–99)
GLUCOSE BLDC GLUCOMTR-MCNC: 157 MG/DL — HIGH (ref 70–99)
GLUCOSE SERPL-MCNC: 156 MG/DL — HIGH (ref 70–99)
HCT VFR BLD CALC: 24.9 % — LOW (ref 39–50)
HGB BLD-MCNC: 7.9 G/DL — LOW (ref 13–17)
MAGNESIUM SERPL-MCNC: 2.3 MG/DL — SIGNIFICANT CHANGE UP (ref 1.6–2.6)
MCHC RBC-ENTMCNC: 30.2 PG — SIGNIFICANT CHANGE UP (ref 27–34)
MCHC RBC-ENTMCNC: 31.7 G/DL — LOW (ref 32–36)
MCV RBC AUTO: 95 FL — SIGNIFICANT CHANGE UP (ref 80–100)
PHOSPHATE SERPL-MCNC: 3.3 MG/DL — SIGNIFICANT CHANGE UP (ref 2.5–4.5)
PLATELET # BLD AUTO: 160 K/UL — SIGNIFICANT CHANGE UP (ref 150–400)
POTASSIUM SERPL-MCNC: 3.4 MMOL/L — LOW (ref 3.5–5.3)
POTASSIUM SERPL-SCNC: 3.4 MMOL/L — LOW (ref 3.5–5.3)
RBC # BLD: 2.62 M/UL — LOW (ref 4.2–5.8)
RBC # FLD: 21.2 % — HIGH (ref 10.3–16.9)
SODIUM SERPL-SCNC: 137 MMOL/L — SIGNIFICANT CHANGE UP (ref 135–145)
SPECIMEN SOURCE: SIGNIFICANT CHANGE UP
SPECIMEN SOURCE: SIGNIFICANT CHANGE UP
WBC # BLD: 6.7 K/UL — SIGNIFICANT CHANGE UP (ref 3.8–10.5)
WBC # FLD AUTO: 6.7 K/UL — SIGNIFICANT CHANGE UP (ref 3.8–10.5)

## 2018-02-13 PROCEDURE — 71045 X-RAY EXAM CHEST 1 VIEW: CPT | Mod: 26

## 2018-02-13 PROCEDURE — 99233 SBSQ HOSP IP/OBS HIGH 50: CPT | Mod: GC

## 2018-02-13 PROCEDURE — 99231 SBSQ HOSP IP/OBS SF/LOW 25: CPT

## 2018-02-13 PROCEDURE — 76705 ECHO EXAM OF ABDOMEN: CPT | Mod: 26

## 2018-02-13 RX ORDER — LIDOCAINE HCL 20 MG/ML
50 VIAL (ML) INJECTION ONCE
Qty: 0 | Refills: 0 | Status: DISCONTINUED | OUTPATIENT
Start: 2018-02-13 | End: 2018-02-27

## 2018-02-13 RX ORDER — APIXABAN 2.5 MG/1
5 TABLET, FILM COATED ORAL EVERY 12 HOURS
Qty: 0 | Refills: 0 | Status: DISCONTINUED | OUTPATIENT
Start: 2018-02-13 | End: 2018-02-13

## 2018-02-13 RX ORDER — POTASSIUM CHLORIDE 20 MEQ
40 PACKET (EA) ORAL ONCE
Qty: 0 | Refills: 0 | Status: COMPLETED | OUTPATIENT
Start: 2018-02-13 | End: 2018-02-13

## 2018-02-13 RX ORDER — HYDROGEN PEROXIDE 0.3 KG/100L
1 LIQUID TOPICAL DAILY
Qty: 0 | Refills: 0 | Status: DISCONTINUED | OUTPATIENT
Start: 2018-02-13 | End: 2018-02-21

## 2018-02-13 RX ORDER — FUROSEMIDE 40 MG
20 TABLET ORAL ONCE
Qty: 0 | Refills: 0 | Status: COMPLETED | OUTPATIENT
Start: 2018-02-13 | End: 2018-02-13

## 2018-02-13 RX ORDER — APIXABAN 2.5 MG/1
5 TABLET, FILM COATED ORAL EVERY 12 HOURS
Qty: 0 | Refills: 0 | Status: DISCONTINUED | OUTPATIENT
Start: 2018-02-13 | End: 2018-02-14

## 2018-02-13 RX ORDER — POTASSIUM CHLORIDE 20 MEQ
10 PACKET (EA) ORAL
Qty: 0 | Refills: 0 | Status: COMPLETED | OUTPATIENT
Start: 2018-02-13 | End: 2018-02-13

## 2018-02-13 RX ADMIN — CHLORHEXIDINE GLUCONATE 15 MILLILITER(S): 213 SOLUTION TOPICAL at 07:12

## 2018-02-13 RX ADMIN — Medication 2: at 17:00

## 2018-02-13 RX ADMIN — HYDROGEN PEROXIDE 1 APPLICATION(S): 0.3 LIQUID TOPICAL at 18:29

## 2018-02-13 RX ADMIN — Medication 3 MILLILITER(S): at 05:31

## 2018-02-13 RX ADMIN — FLUCONAZOLE 100 MILLIGRAM(S): 150 TABLET ORAL at 14:05

## 2018-02-13 RX ADMIN — Medication 3 MILLILITER(S): at 16:04

## 2018-02-13 RX ADMIN — Medication 1 APPLICATION(S): at 19:01

## 2018-02-13 RX ADMIN — Medication 3 MILLILITER(S): at 00:18

## 2018-02-13 RX ADMIN — APIXABAN 5 MILLIGRAM(S): 2.5 TABLET, FILM COATED ORAL at 19:00

## 2018-02-13 RX ADMIN — Medication 100 MILLIEQUIVALENT(S): at 10:13

## 2018-02-13 RX ADMIN — Medication 0.12 MILLIGRAM(S): at 07:12

## 2018-02-13 RX ADMIN — Medication 650 MILLIGRAM(S): at 02:53

## 2018-02-13 RX ADMIN — Medication 20 MILLIGRAM(S): at 10:12

## 2018-02-13 RX ADMIN — PANTOPRAZOLE SODIUM 40 MILLIGRAM(S): 20 TABLET, DELAYED RELEASE ORAL at 07:11

## 2018-02-13 RX ADMIN — Medication 3 MILLILITER(S): at 09:03

## 2018-02-13 RX ADMIN — Medication 1 APPLICATION(S): at 12:39

## 2018-02-13 RX ADMIN — Medication 1 APPLICATION(S): at 07:13

## 2018-02-13 RX ADMIN — Medication 100 MILLIEQUIVALENT(S): at 08:51

## 2018-02-13 RX ADMIN — Medication 100 MILLIEQUIVALENT(S): at 07:53

## 2018-02-13 RX ADMIN — Medication 40 MILLIEQUIVALENT(S): at 07:53

## 2018-02-13 RX ADMIN — CHLORHEXIDINE GLUCONATE 15 MILLILITER(S): 213 SOLUTION TOPICAL at 19:01

## 2018-02-13 RX ADMIN — Medication 100 MILLIGRAM(S): at 19:01

## 2018-02-13 RX ADMIN — PANTOPRAZOLE SODIUM 40 MILLIGRAM(S): 20 TABLET, DELAYED RELEASE ORAL at 19:00

## 2018-02-13 NOTE — PROGRESS NOTE ADULT - SUBJECTIVE AND OBJECTIVE BOX
24 hr events: Duodenostomy with blood-tinged drainage, eliquis discontinued. Pan CT scan performed -  Consulted  (large left renal cyst), ENT (left maxilary periapical abscesses), and CT sx (left pleural eff) for Ct findings. o/n Retrograde D-tube w/ blood-tinged drainage. Had multiple normal BMs overnight, rectal tube     SUBJECTIVE: pain controlled, denies HA, N/V, CP/SOB, abd discomfort, F/C/S, +BM    MEDICATIONS  (STANDING):  ALBUTerol/ipratropium for Nebulization 3 milliLiter(s) Nebulizer every 6 hours  BACItracin   Ointment 1 Application(s) Topical two times a day  chlorhexidine 0.12% Liquid 15 milliLiter(s) Swish and Spit two times a day  digoxin     Tablet 0.125 milliGRAM(s) Oral daily  docusate sodium Liquid 100 milliGRAM(s) Oral two times a day  fluconAZOLE IVPB 400 milliGRAM(s) IV Intermittent every 24 hours  hydrogen peroxide 3% Solution 1 Application(s) Topical daily  HYDROmorphone  Injectable 0.5 milliGRAM(s) IV Push once  insulin lispro (HumaLOG) corrective regimen sliding scale   SubCutaneous every 6 hours  levoFLOXacin  Tablet 750 milliGRAM(s) Oral every 24 hours  pantoprazole  Injectable 40 milliGRAM(s) IV Push every 12 hours  potassium chloride  10 mEq/100 mL IVPB 10 milliEquivalent(s) IV Intermittent every 1 hour  povidone iodine 10% Solution 1 Application(s) Topical daily  silver sulfADIAZINE 1% Cream 1 Application(s) Topical daily  sodium chloride 0.9% lock flush 20 milliLiter(s) IV Push once    MEDICATIONS  (PRN):  acetaminophen    Suspension 650 milliGRAM(s) Enteral Tube every 6 hours PRN For Temp greater than 38 C (100.4 F)  bisacodyl Suppository 10 milliGRAM(s) Rectal daily PRN Constipation  sodium chloride 0.9% lock flush 10 milliLiter(s) IV Push every 1 hour PRN After each medication administration    ICU Vital Signs Last 24 Hrs  T(C): 37.4 (2018 05:11), Max: 38.1 (2018 01:00)  T(F): 99.4 (2018 05:11), Max: 100.6 (2018 01:00)  HR: 78 (2018 07:) (74 - 107)  BP: 104/60 (:00) (98/61 - 119/58)  BP(mean): 72 (:) (68 - 88)  ABP: --  ABP(mean): --  RR: 21 (:) (12 - 30)  SpO2: 100% (:) (96% - 100%)    Physical Exam:  General: NAD  HEENT: NC, trach in place w/milky secretions  Pulmonary: Nonlabored breathing, no respiratory distress, coarse UL BS b/l   Cardiovascular: Afib, MR  Abdominal: soft, NT/ND, +BS, JPx2 blood-tinged serous fluid, feeding jejunostomy, gastrostomy, retrograde duodenostomy w/ thin dark blood-tinged fluid, no erythema/warmth/purulence at drain sites  Extremities: blood-tinged b/l pedal dressings  Neuro: A/O x3, CNs II-XII grossly intact, normal motor/sensation, no focal deficits    Lines/tubes/drains: RIJ, R GUCCI x2, gastrostomy, retrograde duodenostomy, feeding jejunostomy, abarca    Vent settings:  Mode: AC/ CMV (Assist Control/ Continuous Mandatory Ventilation), RR (machine): 12, TV (machine): 500, FiO2: 40, PEEP: 5, ITime: 1, MAP: 7.3, PIP: 16    I&O's Summary    2018 07:  -  2018 07:00  --------------------------------------------------------  IN: 1628 mL / OUT: 2060 mL / NET: -432 mL    2018 07:01  -  2018 07:56  --------------------------------------------------------  IN: 68 mL / OUT: 0 mL / NET: 68 mL        LABS:                        7.9    6.7   )-----------( 160      ( 2018 05:47 )             24.9         137  |  100  |  37<H>  ----------------------------<  156<H>  3.4<L>   |  28  |  0.76    Ca    7.7<L>      2018 05:47  Phos  3.3       Mg     2.3         TPro  5.4<L>  /  Alb  2.4<L>  /  TBili  3.5<H>  /  DBili  x   /  AST  48<H>  /  ALT  48<H>  /  AlkPhos  149<H>        Urinalysis Basic - ( 2018 07:11 )    Color: Yellow / Appearance: Clear / S.025 / pH: x  Gluc: x / Ketone: Trace mg/dL  / Bili: Moderate / Urobili: 4.0 E.U./dL   Blood: x / Protein: 30 mg/dL / Nitrite: POSITIVE   Leuk Esterase: NEGATIVE / RBC: 5-10 /HPF / WBC < 5 /HPF   Sq Epi: x / Non Sq Epi: 5-10 /HPF / Bacteria: Present /HPF      CAPILLARY BLOOD GLUCOSE      POCT Blood Glucose.: 139 mg/dL (2018 06:28)  POCT Blood Glucose.: 149 mg/dL (2018 00:18)  POCT Blood Glucose.: 130 mg/dL (2018 18:00)  POCT Blood Glucose.: 118 mg/dL (2018 11:36)    LIVER FUNCTIONS - ( 2018 05:49 )  Alb: 2.4 g/dL / Pro: 5.4 g/dL / ALK PHOS: 149 U/L / ALT: 48 U/L / AST: 48 U/L / GGT: x             Cultures:Culture Results:   Sputum specimen rejected.  Microscopic examination indicates  oropharyngeal contamination.  Please repeat. ( @ 10:39)  Culture Results:   No growth at 12 hours ( @ 09:01)  Culture Results:   No growth at 12 hours ( @ 08:55)    < from: CT Lower Extremity w/ IV Cont, Bilateral (02.12.18 @ 15:36) >    1.  No CT evidence of osteomyelitis, however the region of greatest   concern was excluded from the field-of-view (distal left great toe). If   there is further clinical concern for osteomyelitis a targeted MR exam is   recommended.  2.  No abscess or other drainable fluid collection.  3.  Diffuse anasarca.  4.  Filling defect within the distal femoral vein extending into the   popliteal fossa which may represent deep vein thrombosis. Correlation   with lower extremity Doppler is recommended.    Findings discussed with the SICU resident at 4:37PM on 18 stated   that they already knew about the DVT.      < end of copied text >  < from: CT Lower Extremity w/ IV Cont, Bilateral (02.12.18 @ 15:36) >    1.  No CT evidence of osteomyelitis, however the region of greatest   concern was excluded from the field-of-view (distal left great toe). If   there is further clinical concern for osteomyelitis a targeted MR exam is   recommended.  2.  No abscess or other drainable fluid collection.  3.  Diffuse anasarca.  4.  Filling defect within the distal femoral vein extending into the   popliteal fossa which may represent deep vein thrombosis. Correlation   with lower extremity Doppler is recommended.    Findings discussed with the SICU resident at 4:37PM on 18 stated   that they already knew about the DVT.      < end of copied text >  < from: CT Chest w/ IV Cont (18 @ 15:35) >  of a left PICC line. Interval placement of a right IJ   central venous catheter with tip in the superior vena cava.     Hepatic steatosis.  The gallbladder is again contracted and contains a   2.2 cm gallstone and sludge. No change in a few low-density pancreatic   lesions, the largest in the body measuring 1.6 cm. There is also a 1.1 cm   hyperdense pancreatic lesion in the pancreatic body. The pancreatic duct   is not dilated. No splenic abnormalities are seen. There are again left   adrenal nodules, the largest measuring 2.4 cm. They cannot be further   characterized on this exam. The right adrenal gland is unremarkable.. No   change in multiple bilateral renal cysts.    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 IVC filter again   present, with a few of the prongs projecting beyond the lumen of the   inferior vena cava.    There is no lymphadenopathy or ascites in the abdomen or pelvis.    Again status post repair of perforated duodenal ulcer, with skin staples   midline abdomen. Percutaneous gastrostomy tube in place. There is also a   retrograde duodenostomy. A jejunostomy tube is present. There are two   drains via the right abdomen with their distal portions near the superior   and inferior portions of the liver. There is still a small amount of   extraluminal gas in the right upper quadrant near the first segment of   the duodenum, tracking towards the liver. This is suspicious for an   anastomotic leak. There is no abscess. No extravasated contrast. Large   amount stool present throughout colon, with dilatation of the rectum.    Abarca catheter is present in bladder. Small focus of gas within the   bladder likely secondary to recent catheterization. Small bilateral   fat-containing inguinal hernias.    Multiple left rib fractures again seen. No change in sclerotic lesion   right greater trochanter.     Anasarca.       IMPRESSION:  1.  Since 2/3/2018, there is still extraluminal gas in the right upper   quadrant, suspicious for an anastomotic leak. No abscess or extravasation   of contrast material.    2.  Worsening of now moderate left pleural effusion. Worsening   atelectasis left base.    3.  Improvement in atelectasis right base.    < end of copied text >

## 2018-02-13 NOTE — PROGRESS NOTE ADULT - ASSESSMENT
72yM with PMH severe MR/AR presented after recent dx acute cholecystitis and RLL PNA at [facility] with lower abdominal pain, new onset rapid Afib, sepsis, acute CHF exacerbation, initially admitted to cardiology on bipap, then transferred to SICU with perforated duodenum s/p Shreyas patch and jejunostomy (1/20) c/b breakdown s/p RTOR, washout, new Shreyas patch, gastrostomy, duodenostomy, IVCF (1/22)    Neuro: Tylenol/Dilaudid PRN  CV: Afib: Echo EF 50%, Severe MR, no vegetation, Digoxin 0.125, holding Eliquis.  Pulm: Trach 8.0 Shiley AC- 500/40/12/5; Peridex, Duonebs  GI: NPO, Glucerna 1.2 @68 via J tube, Protonix BID. Retrograde D-tube to LIWS (red), G-tube to LIWS (green), Colace, Dulcolax suppository  : Oliveira (placed for retention on 2/10)  ID: C albicans intraperitoneal infxn: fluconazole (2/1--); Alexi/Stenotrophomonas: Levaquin (2/8-16) // D/c: Zosyn (1/19-22; 1/27-29; 2/7), micafungin (1/22-2/1), ceftazidime (1/29-2/4), linezolid (1/22-23), meropenem (1/22-27), vancomycin (1/19-20; 2/7-2/9)   Endo: ISS  HEME: Holding Eliquis (bloody D tube), IVCF (1/22) // 1FFP, 1PLT (1/22).   PPx: SCD, R pop DVT, s/p IVC filter   Lines: PIV, TLC (2/8--) // D/c: PICC (1/29-2/8), L brachial Jeane (1/20-27), L SC HD cath (1/22-27)   Wounds: Ex lap, JPx2 to bulb suction, J tube for feeds, D-tube to LIWS (red), G-tube to LIWS (green); betadine/peroxide to toes  PT/OT: Early mobilization.

## 2018-02-13 NOTE — CONSULT NOTE ADULT - SUBJECTIVE AND OBJECTIVE BOX
71 yo M w/ MR admitted in CHF complicated by rupture of duodenal ulcer requiring repair requiring prolonged intubation, now trached. As per surgical resident, molar removed from patients mouth last week for mobility.    WBC: WNL    Sinus CT: multiple areas of edentulism, periodontal disease and small areas of periapical pathology at left maxilla.    Exam:   Gen: Patient appears stated age, awake and pleasant  Neuro: unable to verbally communicate but responds to questions asked with blinking and gestures  HEENT: Trach midline, fair oral hygiene, thick saliva, no food debris. Multiple areas of edentulism. No swelling in maxillary or mandibular buccal vestibule, all tissues pink and non tender to palpation. Tooth #13 (left maxillary second premolar) mobile but surrounding tissues are pink and not edematous. Tongue has FROM, uvula midline.    A/P: No acute infection which would account for fever, tooth #13 mobile but not at acute risk of aspiration. Any oral infections would be covered by patients current abx.  -no oral surgical intervention required at this time  -continue vigilant oral hygiene with soft brushes and Peridex  -f/u with general dentist as outpatient  -Thank you for the consult, please call with questions or concerns    Evan Rascon DDS, MD  [phone]

## 2018-02-13 NOTE — CONSULT NOTE ADULT - SUBJECTIVE AND OBJECTIVE BOX
HPI:   72yM with PMH severe MR/AR presented after recent dx acute cholecystitis and RLL PNA at [facility] with lower abdominal pain, new onset rapid Afib, sepsis, acute CHF exacerbation, initially admitted to cardiology on bipap, then transferred to SICU with perforated duodenum s/p Shreyas patch and jejunostomy (1/20) c/b breakdown s/p RTOR, washout, new Shreyas patch, gastrostomy, duodenostomy, IVCF (1/22)    Gu consulted for incidental multiple bilateral renal cysts found on CT, largest measuring 10.9cm left. Could not elicit a history from patient as he was ventilated and family was not at bedside.         Vital Signs Last 24 Hrs  T(C): 36.6 (13 Feb 2018 14:12), Max: 38.1 (13 Feb 2018 01:00)  T(F): 97.9 (13 Feb 2018 14:12), Max: 100.6 (13 Feb 2018 01:00)  HR: 106 (13 Feb 2018 15:00) (74 - 118)  BP: 118/71 (13 Feb 2018 15:00) (98/61 - 127/72)  BP(mean): 85 (13 Feb 2018 15:00) (68 - 88)  RR: 25 (13 Feb 2018 15:00) (12 - 33)  SpO2: 99% (13 Feb 2018 15:00) (97% - 100%)  I&O's Summary    12 Feb 2018 07:01  -  13 Feb 2018 07:00  --------------------------------------------------------  IN: 1628 mL / OUT: 2060 mL / NET: -432 mL    13 Feb 2018 07:01  -  13 Feb 2018 15:17  --------------------------------------------------------  IN: 844 mL / OUT: 557 mL / NET: 287 mL        PE:  Gen: ventilated  Abd: soft, NTND  : abarca draining clear urine    LABS:                        7.9    6.7   )-----------( 160      ( 13 Feb 2018 05:47 )             24.9     02-13    137  |  100  |  37<H>  ----------------------------<  156<H>  3.4<L>   |  28  |  0.76    Ca    7.7<L>      13 Feb 2018 05:47  Phos  3.3     02-13  Mg     2.3     02-13    TPro  5.4<L>  /  Alb  2.4<L>  /  TBili  3.5<H>  /  DBili  x   /  AST  48<H>  /  ALT  48<H>  /  AlkPhos  149<H>  02-12      Cultures  Culture Results:   Sputum specimen rejected.  Microscopic examination indicates  oropharyngeal contamination.  Please repeat. (02-12 @ 10:39)  Culture Results:   No growth at 1 day. (02-12 @ 09:01)  Culture Results:   No growth at 1 day. (02-12 @ 08:55)      A/P: 72yM with PMH severe MR/AR presented after recent dx acute cholecystitis and RLL PNA at [facility] with lower abdominal pain, new onset rapid Afib, sepsis, acute CHF exacerbation, initially admitted to cardiology on bipap, then transferred to SICU with perforated duodenum s/p Shreyas patch and jejunostomy (1/20) c/b breakdown s/p RTOR, washout, new Shreyas patch, gastrostomy, duodenostomy, IVCF (1/22). Gu consulted for multiple bilateral renal cysts seen on CT, largest measuring 10.9cm left  1- No  intervention is warranted at his time. Patient can make a followup appointment with Dr Zuniga to follow the cysts with q6mo ultrasounds  2- D/w  team

## 2018-02-13 NOTE — PROGRESS NOTE ADULT - ATTENDING COMMENTS
Patient seen and examined with house-staff during bedside rounds.  Resident note read, including vitals, physical findings, laboratory data, and radiological reports.   Revisions included below.  Direct personal management at bed side and extensive interpretation of the data.  Plan was outlined and discussed in details with the housestaff.  Decision making of high complexity  No indication for thoracentesis. His temperature curve is down.  I reviewed the CT scans.  Continue on the current regimen.  Follow with gallium scan of he spikes again.  WBC is normal.  Line was changed.  No evidence of colitis on the CT scan of abdomen.  Tolerating tube feed.  The leg wounds are not infected.

## 2018-02-13 NOTE — PROGRESS NOTE ADULT - SUBJECTIVE AND OBJECTIVE BOX
Interventional, Pulmonary, Critical, Chest Special Procedures.    Pt was seen and fully examined by myself.     Time spent with patient in minutes:67    Patient is a 72y old  Male who presents with a chief complaint of acute cholecystitis (18 Jan 2018 14:49)Events of last 24 hrs noted, pt did not tolerate CPAP this am, back on CMV, the patient pain free and engaging.  Vent circuits integrity verified and preserved.    HPI:  71 yo M with h/o severe mitral and aortic valve regurgitation presented to ED with 5 day h/o left lower abdominal pain, nausea and multiple episodes of vomiting as well as PO intolerance. Pt reports that the pain in his abdomen is illicited by movement. Went to [facility] two days ago and underwent CT scan which revealed acute cholecystitis as well as right lower lobe infectious pneumonia. Pt was told he would require surgery and left AMA because he was waiting too long. Denies f/c. Last echo 1/4/17 with EF 60-65%. Normal BMs and passing flatus. (18 Jan 2018 14:49)    REVIEW OF SYSTEMS:  Constitutional: + fever, weight loss, chills or fatigue  Eyes: No eye pain, visual disturbances, or discharge  ENMT:  No difficulty hearing, tinnitus, vertigo; No sinus or throat pain. No epistaxis, dysphagia, dysphonia, hoarseness or odynophagia  Neck: No pain, stiffness or neck swelling.  No masses or deformities  Respiratory: No cough, wheezing, chills or hemoptysis  - COPD  - ILD   - PE   - ASTHMA     - PNEUMONIA  Cardiovascular: No chest pain, dysrhythmia, palpitations, dizziness or edema   - COPD     - CAD   - CHF   - HTN  Gastrointestinal: No abdominal or epigastric pain. No nausea, vomiting or hematemesis; No diarrhea or constipation. No melena or hematochezia. No dysphagia or Icterus.          Genitourinary: No dysuria, frequency, hematuria or incontinence   - CKD/GAETANO      - ESRD  Neurological: No headaches, memory loss, loss of strength, numbness or tremors      -DEMENTIA     - STROKE    - SEIZURE  Skin: No itching, burning, rashes or lesions   Lymph Nodes: No enlarged glands  Endocrine: No heat or cold intolerance; No hair loss       - DM     - THYROID DISORDER  Musculoskeletal: No joint pain or swelling; No muscle, back or extremity pain  Psychiatric: No depression, anxiety, mood swings or difficulty sleeping  Heme/Lymph: No easy bruising or bleeding gums         - ANEMIA      - CANCER   -COAGULOPATHY  Allergy and Immunologic: No hives or eczema    PAST MEDICAL & SURGICAL HISTORY:  Mitral regurgitation  Aortic regurgitation  No significant past surgical history    FAMILY HISTORY:    SOCIAL HISTORY:      - Tobacco     - ETOH    Allergies    No Known Allergies    Intolerances      Vital Signs Last 24 Hrs  T(C): 37.2 (13 Feb 2018 09:20), Max: 38.1 (13 Feb 2018 01:00)  T(F): 99 (13 Feb 2018 09:20), Max: 100.6 (13 Feb 2018 01:00)  HR: 98 (13 Feb 2018 11:10) (74 - 107)  BP: 107/58 (13 Feb 2018 11:10) (98/61 - 116/63)  BP(mean): 73 (13 Feb 2018 11:10) (68 - 88)  RR: 33 (13 Feb 2018 11:10) (12 - 33)  SpO2: 100% (13 Feb 2018 11:10) (96% - 100%)    02-12 @ 07:01 - 02-13 @ 07:00  --------------------------------------------------------  IN: 1628 mL / OUT: 2060 mL / NET: -432 mL    02-13 @ 07:01 - 02-13 @ 11:44  --------------------------------------------------------  IN: 572 mL / OUT: 357 mL / NET: 215 mL      Mode: AC/ CMV (Assist Control/ Continuous Mandatory Ventilation)  RR (machine): 12  TV (machine): 500  FiO2: 40  PEEP: 5  ITime: 0.85  MAP: 7.8  PIP: 18    PHYSICAL EXAM:  PHYSICAL EXAM:   Uncomfortable, no distress  Eyes: PERRL, EOM intact; conjunctiva and sclera clear  Head: Normocephalic;  No Trauma  ENMT: No nasal discharge, hoarseness, cough or hemoptysis.  Airway clear  Neck: Supple; non tender; no masses or deformities.    No JVD  Respiratory:  - WHEEZING   few bilateral   RHONCHI  - RALES  = CRACKLES.  Diminished breath sounds  BILATERAL  RIGHT  LEFT bases   Cardiovascular: Regular rate and rhythm. S1 and S2 Normal; ++murmurs, gallops or rubs     - PPM/AICD  Gastrointestinal: Soft ,distended; Normal bowel sounds; No hepatosplenomegaly.     +PEG    + multiple surgical drains integrity checked    ANGELES  Genitourinary: No costovertebral angle tenderness. No dysuria  Extremities: Decreased AROM, + clubbing, cyanosis + edema, R foot toes unchanged pattern of necrosis, L foot less edema   Vascular: Peripheral pulses palpable 2+ bilaterally  Neurological: intubated and opening eyes to commands  Skin: Warm and dry. No obvious rash  Lymph Nodes: No acute cervical or supraclavicular adenopathy  Psychiatric: not sedated, calm affect    DEVICES:  - DENTURES   +IV R / L     - ETUBE   +TRACH 8.0 Shilley cuffed, site is clean,      LABS:                          7.9    6.7   )-----------( 160      ( 13 Feb 2018 05:47 )             24.9     02-13    137  |  100  |  37<H>  ----------------------------<  156<H>  3.4<L>   |  28  |  0.76    Ca    7.7<L>      13 Feb 2018 05:47  Phos  3.3     02-13  Mg     2.3     02-13    TPro  5.4<L>  /  Alb  2.4<L>  /  TBili  3.5<H>  /  DBili  x   /  AST  48<H>  /  ALT  48<H>  /  AlkPhos  149<H>  02-12      RADIOLOGY & ADDITIONAL STUDIES (The following images were personally reviewed):

## 2018-02-13 NOTE — PROGRESS NOTE ADULT - SUBJECTIVE AND OBJECTIVE BOX
24 hr events: Duodenostomy with blood-tinged drainage, eliquis discontinued. Pan CT scan performed -  Consulted  (large left renal cyst), ENT (left maxilary periapical abscesses), and CT sx (left pleural eff) for Ct findings. o/n Retrograde D-tube w/ blood-tinged drainage. Had multiple normal BMs overnight, rectal tube inserted    SUBJECTIVE: pain controlled, denies HA, N/V, CP/SOB, abd discomfort, F/C/S, +BM    MEDICATIONS  (STANDING):  ALBUTerol/ipratropium for Nebulization 3 milliLiter(s) Nebulizer every 6 hours  BACItracin   Ointment 1 Application(s) Topical two times a day  chlorhexidine 0.12% Liquid 15 milliLiter(s) Swish and Spit two times a day  digoxin     Tablet 0.125 milliGRAM(s) Oral daily  docusate sodium Liquid 100 milliGRAM(s) Oral two times a day  fluconAZOLE IVPB 400 milliGRAM(s) IV Intermittent every 24 hours  hydrogen peroxide 3% Solution 1 Application(s) Topical daily  HYDROmorphone  Injectable 0.5 milliGRAM(s) IV Push once  insulin lispro (HumaLOG) corrective regimen sliding scale   SubCutaneous every 6 hours  levoFLOXacin  Tablet 750 milliGRAM(s) Oral every 24 hours  pantoprazole  Injectable 40 milliGRAM(s) IV Push every 12 hours  potassium chloride  10 mEq/100 mL IVPB 10 milliEquivalent(s) IV Intermittent every 1 hour  povidone iodine 10% Solution 1 Application(s) Topical daily  silver sulfADIAZINE 1% Cream 1 Application(s) Topical daily  sodium chloride 0.9% lock flush 20 milliLiter(s) IV Push once    MEDICATIONS  (PRN):  acetaminophen    Suspension 650 milliGRAM(s) Enteral Tube every 6 hours PRN For Temp greater than 38 C (100.4 F)  bisacodyl Suppository 10 milliGRAM(s) Rectal daily PRN Constipation  sodium chloride 0.9% lock flush 10 milliLiter(s) IV Push every 1 hour PRN After each medication administration    ICU Vital Signs Last 24 Hrs  T(C): 37.4 (2018 05:11), Max: 38.1 (2018 01:00)  T(F): 99.4 (2018 05:11), Max: 100.6 (2018 01:00)  HR: 78 (2018 07:00) (74 - 107)  BP: 104/60 (2018 07:00) (98/61 - 119/58)  BP(mean): 72 (:) (68 - 88)  ABP: --  ABP(mean): --  RR: 21 (:) (12 - 30)  SpO2: 100% (:) (96% - 100%)    Physical Exam:  General: NAD  HEENT: NC, trach in place w/milky secretions  Pulmonary: Nonlabored breathing, no respiratory distress, coarse UL BS b/l   Cardiovascular: Afib, MR  Abdominal: soft, NT/ND, +BS, JPx2 blood-tinged serous fluid, feeding jejunostomy, gastrostomy, retrograde duodenostomy w/ thin dark blood-tinged fluid, no erythema/warmth/purulence at drain sites  Extremities: blood-tinged b/l pedal dressings  Neuro: A/O x3, CNs II-XII grossly intact, normal motor/sensation, no focal deficits    Lines/tubes/drains: RIJ, R GUCCI x2, gastrostomy, retrograde duodenostomy, feeding jejunostomy, abarca    Vent settings:  Mode: AC/ CMV (Assist Control/ Continuous Mandatory Ventilation), RR (machine): 12, TV (machine): 500, FiO2: 40, PEEP: 5, ITime: 1, MAP: 7.3, PIP: 16    I&O's Summary    2018 07:  -  2018 07:00  --------------------------------------------------------  IN: 1628 mL / OUT: 2060 mL / NET: -432 mL    2018 07:01  -  2018 07:56  --------------------------------------------------------  IN: 68 mL / OUT: 0 mL / NET: 68 mL        LABS:                        7.9    6.7   )-----------( 160      ( 2018 05:47 )             24.9         137  |  100  |  37<H>  ----------------------------<  156<H>  3.4<L>   |  28  |  0.76    Ca    7.7<L>      2018 05:47  Phos  3.3       Mg     2.3         TPro  5.4<L>  /  Alb  2.4<L>  /  TBili  3.5<H>  /  DBili  x   /  AST  48<H>  /  ALT  48<H>  /  AlkPhos  149<H>        Urinalysis Basic - ( 2018 07:11 )    Color: Yellow / Appearance: Clear / S.025 / pH: x  Gluc: x / Ketone: Trace mg/dL  / Bili: Moderate / Urobili: 4.0 E.U./dL   Blood: x / Protein: 30 mg/dL / Nitrite: POSITIVE   Leuk Esterase: NEGATIVE / RBC: 5-10 /HPF / WBC < 5 /HPF   Sq Epi: x / Non Sq Epi: 5-10 /HPF / Bacteria: Present /HPF      CAPILLARY BLOOD GLUCOSE      POCT Blood Glucose.: 139 mg/dL (2018 06:28)  POCT Blood Glucose.: 149 mg/dL (2018 00:18)  POCT Blood Glucose.: 130 mg/dL (2018 18:00)  POCT Blood Glucose.: 118 mg/dL (2018 11:36)    LIVER FUNCTIONS - ( 2018 05:49 )  Alb: 2.4 g/dL / Pro: 5.4 g/dL / ALK PHOS: 149 U/L / ALT: 48 U/L / AST: 48 U/L / GGT: x             Cultures:Culture Results:   Sputum specimen rejected.  Microscopic examination indicates  oropharyngeal contamination.  Please repeat. ( @ 10:39)  Culture Results:   No growth at 12 hours ( @ 09:01)  Culture Results:   No growth at 12 hours ( @ 08:55)

## 2018-02-13 NOTE — CONSULT NOTE ADULT - ATTENDING COMMENTS
This is a 72y male with PMH severe MR/AR presented after recent dx acute cholecystitis and RLL PNA at [facility] with lower abdominal pain, new onset rapid Afib, sepsis, acute CHF exacerbation, initially admitted to cardiology on bipap, then transferred to SICU with perforated duodenum s/p Shreyas patch and jejunostomy (1/20) c/b breakdown s/p RTOR, washout, new Shreyas patch, gastrostomy, duodenostomy, IVCF (1/22). He has had complicated course in the hospital and continues to be febrile.  consulted for incidental multiple bilateral renal cysts found on CT, largest measuring 10.9cm left. Cysts appear to be simple cysts, not likely associated with fevers. No  intervention at this time.
AF  GI ac katherine/duodenitis  Pulm--pn eff    IVF  CULT  AB
Thank you.   Presented with abdominal sepsis, now s/p 2 x laparotomies.   Apparently has had known MR/AI for some time; repeat echo now confirms severe MR with bileaflet prolapse, mild to moderate AI, mildly dilated aortic root, preserved LV systolic function.  Also has a history of AFib.  Currently critically ill s/p abdominal surgery, although is gradually improving in all respects.  Clinical and echocardiographic suspicion for endocarditis is low.  Not currently in decompensated heart failure.    Patient would ultimately benefit from mitral valve surgery, but currently there does not seem to be any urgency for this.  Would recommend continuing current treatment, allowing patient to recover from recent events, and consider surgery down the track (even as an outpatient, if at all possible), depending on clinical progress.    Also note patient's living will and DNR status and this will have to be re-evaluated depending on progress of clinical condition and desire to have valve surgery in the future.    Will continue to follow progress.
events noted and reviewed in detail with Dr. Felix and with SICU team  Acute oliguric kidney injury  probably ATN  severe valvular disease so difficult to determine intravascular volume.  Back on pressors  feet dusky  abd distended  r/o abdominal compartment syndrome- measure bladder pressure  give fluid challenge before diuresing-   anticipate that he will need CVVDH within next 12- 24 hours unless better able to perfuse kidneys

## 2018-02-13 NOTE — PROGRESS NOTE ADULT - ASSESSMENT
ASSESSMENT/PLAN 71y/o male pt c Respiratory failure, perforated duodenal ulcer, POD post repair, pneumonia, RLL atelectases, no obvious RADHA, elevated R hemidiaphragm, AFIB,  thrombocytopenia, possible endocarditis, s/p reop. now POP tracheostomy, new fever pattern, anemia    1. O2: Use CMV mode today   2. Bronchodilators:  Atrovent/ albuterol q 4 – 6 hours as needed  3. Corticosteroids: off  4. ID/Antibiotics:  strict ID supervision , adjust ABX pending multiple new CX  5. Cardiac/HTN: Monitor, optimize HR;   6. GI: Rx/ prophylaxis c PPI/Protonix  7. Heme: Rx/VT  follow H/H closely Eliquis held for worsening anemia, possible bleeding, resume when not surgically contraindicated  8. Aspiration precautions, feeding PEG  9. Surgery mngmnt priority,  R subdiaphragmatic drain reported repositioned .discussed c SICU.    10. Edema: diuretic to continue  11. Mobilize, PT  Discussed c managing team. SICU, ID

## 2018-02-13 NOTE — PROGRESS NOTE ADULT - SUBJECTIVE AND OBJECTIVE BOX
Surgery attending:    Mr. Matthews continues to make slow improvement. He did have low-grade temperatures early in the week. He underwent CT scan of the head chest abdomen and pelvis, sinuses and extremities yesterday. I had asked for contrast to be placed in the gastrostomy tube to look for evidence of overt contrast leakage from the duodenal repair.    There is no evidence of contrast leaking from the duodenal repair or any drainable fluid collection in the abdomen. Albeit there are small flecks of air with in the omental patch this is not unreasonable, or unexpected given the technique used to "plug" an ulcer with omentum and DOES NOT  at this time clinically represent free spillage of contents within the abdomen but clinically more in line with containment by the omental patch as designed. There is no anastomosis but rather ulcer repair/patch.    Clinically he remains stable with a soft and non-concerning abdominal examination. The drain output in both the perihepatic space and the portal space had been serous in nature. Recent leak tests with methylene blue that had been positive with patch failure previously have been negative and reassuring along with his overall clinical condition.    Retrograde duodenostomy tube continues to drain bile along with gastrostomy tube. These drains should considerably decompressed the duodenum and allow for continued healing. It is important to continue nutrition and nutritional support via the anterior grade jejunostomy tube.    CT of the chest reveals potential issues with the left lung and fluid around the lung. There was also a question of a dental abscess.     Appropriate consult  should be called to potentially address thes given his continued issues with vent weaning and more aggressive measures may be beneficial. Defer to SICU staff. Discussed with them in detail.    Mr. Manriquez continues to make progress. He will need be duodenostomy tube and gastrostomy tube for several more weeks. No attempts should be made to feed by mouth at this point. Recommend continuing proton pump inhibitor therapy and avoiding nonsteroidal anti-inflammatory drugs.

## 2018-02-13 NOTE — PROGRESS NOTE ADULT - ASSESSMENT
72yM with PMH severe MR/AR presented after recent dx acute cholecystitis and RLL PNA at [facility] with lower abdominal pain, new onset rapid Afib, sepsis, acute CHF exacerbation, initially admitted to cardiology on bipap, then transferred to SICU with perforated duodenum s/p Shreyas patch and jejunostomy (1/20) c/b breakdown s/p RTOR, washout, new Shreyas patch, gastrostomy, duodenostomy, IVCF (1/22)    Neuro: Tylenol/Dilaudid PRN  CV: Afib: Echo EF 50%, Severe MR, no vegetation, Digoxin 0.125, holding Eliquis.  Pulm: Trach 8.0 Shiley AC- 500/40/12/5; Peridex, Duonebs  GI: NPO, Glucerna 1.2 @68 via J tube, Protonix BID. Retrograde D-tube to LIWS (red), G-tube to LIWS (green), Colace, Dulcolax suppository  : Oliveira (placed for retention on 2/10)  ID: C albicans intraperitoneal infxn: fluconazole (2/1--); Alexi/Stenotrophomonas: Levaquin (2/8-16) // D/c: Zosyn (1/19-22; 1/27-29; 2/7), micafungin (1/22-2/1), ceftazidime (1/29-2/4), linezolid (1/22-23), meropenem (1/22-27), vancomycin (1/19-20; 2/7-2/9)   Endo: ISS  HEME: Holding Eliquis (bloody D tube), IVCF (1/22) // 1FFP, 1PLT (1/22).   PPx: SCD, R pop DVT, s/p IVC filter   Lines: PIV, TLC (2/8--) // D/c: PICC (1/29-2/8), L brachial Jeane (1/20-27), L SC HD cath (1/22-27)   Wounds: Ex lap, JPx2 to bulb suction, J tube for feeds, D-tube to LIWS (red), G-tube to LIWS (green); betadine/peroxide to toes  PT/OT: Early mobilization.

## 2018-02-14 ENCOUNTER — RESULT REVIEW (OUTPATIENT)
Age: 73
End: 2018-02-14

## 2018-02-14 LAB
ALBUMIN SERPL ELPH-MCNC: 2.4 G/DL — LOW (ref 3.3–5)
ALP SERPL-CCNC: 144 U/L — HIGH (ref 40–120)
ALT FLD-CCNC: 44 U/L — SIGNIFICANT CHANGE UP (ref 10–45)
ANION GAP SERPL CALC-SCNC: 9 MMOL/L — SIGNIFICANT CHANGE UP (ref 5–17)
AST SERPL-CCNC: 55 U/L — HIGH (ref 10–40)
BILIRUB DIRECT SERPL-MCNC: 2 MG/DL — HIGH (ref 0–0.2)
BILIRUB INDIRECT FLD-MCNC: 1.3 MG/DL — HIGH (ref 0.2–1)
BILIRUB SERPL-MCNC: 3.3 MG/DL — HIGH (ref 0.2–1.2)
BUN SERPL-MCNC: 36 MG/DL — HIGH (ref 7–23)
CALCIUM SERPL-MCNC: 7.7 MG/DL — LOW (ref 8.4–10.5)
CHLORIDE SERPL-SCNC: 99 MMOL/L — SIGNIFICANT CHANGE UP (ref 96–108)
CO2 SERPL-SCNC: 29 MMOL/L — SIGNIFICANT CHANGE UP (ref 22–31)
CREAT SERPL-MCNC: 0.76 MG/DL — SIGNIFICANT CHANGE UP (ref 0.5–1.3)
GLUCOSE BLDC GLUCOMTR-MCNC: 117 MG/DL — HIGH (ref 70–99)
GLUCOSE BLDC GLUCOMTR-MCNC: 130 MG/DL — HIGH (ref 70–99)
GLUCOSE BLDC GLUCOMTR-MCNC: 148 MG/DL — HIGH (ref 70–99)
GLUCOSE BLDC GLUCOMTR-MCNC: 148 MG/DL — HIGH (ref 70–99)
GLUCOSE FLD-MCNC: 114 MG/DL — SIGNIFICANT CHANGE UP
GLUCOSE SERPL-MCNC: 147 MG/DL — HIGH (ref 70–99)
GRAM STN FLD: SIGNIFICANT CHANGE UP
HCT VFR BLD CALC: 26.5 % — LOW (ref 39–50)
HGB BLD-MCNC: 8.1 G/DL — LOW (ref 13–17)
LDH SERPL L TO P-CCNC: 276 U/L — SIGNIFICANT CHANGE UP
MAGNESIUM SERPL-MCNC: 2.3 MG/DL — SIGNIFICANT CHANGE UP (ref 1.6–2.6)
MCHC RBC-ENTMCNC: 30.1 PG — SIGNIFICANT CHANGE UP (ref 27–34)
MCHC RBC-ENTMCNC: 30.6 G/DL — LOW (ref 32–36)
MCV RBC AUTO: 98.5 FL — SIGNIFICANT CHANGE UP (ref 80–100)
PH FLD: 7.72 — SIGNIFICANT CHANGE UP
PHOSPHATE SERPL-MCNC: 3.4 MG/DL — SIGNIFICANT CHANGE UP (ref 2.5–4.5)
PLATELET # BLD AUTO: 159 K/UL — SIGNIFICANT CHANGE UP (ref 150–400)
POTASSIUM SERPL-MCNC: 3.8 MMOL/L — SIGNIFICANT CHANGE UP (ref 3.5–5.3)
POTASSIUM SERPL-SCNC: 3.8 MMOL/L — SIGNIFICANT CHANGE UP (ref 3.5–5.3)
PROT FLD-MCNC: 2.2 G/DL — SIGNIFICANT CHANGE UP
PROT SERPL-MCNC: 5.3 G/DL — LOW (ref 6–8.3)
RBC # BLD: 2.69 M/UL — LOW (ref 4.2–5.8)
RBC # FLD: 21.3 % — HIGH (ref 10.3–16.9)
SODIUM SERPL-SCNC: 137 MMOL/L — SIGNIFICANT CHANGE UP (ref 135–145)
SP GR UR: 1.01 — SIGNIFICANT CHANGE UP
SPECIMEN SOURCE FLD: SIGNIFICANT CHANGE UP
SPECIMEN SOURCE: SIGNIFICANT CHANGE UP
WBC # BLD: 6.9 K/UL — SIGNIFICANT CHANGE UP (ref 3.8–10.5)
WBC # FLD AUTO: 6.9 K/UL — SIGNIFICANT CHANGE UP (ref 3.8–10.5)

## 2018-02-14 PROCEDURE — 71045 X-RAY EXAM CHEST 1 VIEW: CPT | Mod: 26

## 2018-02-14 PROCEDURE — 32557 INSERT CATH PLEURA W/ IMAGE: CPT | Mod: LT

## 2018-02-14 PROCEDURE — 99233 SBSQ HOSP IP/OBS HIGH 50: CPT | Mod: GC

## 2018-02-14 RX ORDER — FUROSEMIDE 40 MG
20 TABLET ORAL ONCE
Qty: 0 | Refills: 0 | Status: COMPLETED | OUTPATIENT
Start: 2018-02-14 | End: 2018-02-14

## 2018-02-14 RX ORDER — FUROSEMIDE 40 MG
40 TABLET ORAL ONCE
Qty: 0 | Refills: 0 | Status: COMPLETED | OUTPATIENT
Start: 2018-02-14 | End: 2018-02-14

## 2018-02-14 RX ORDER — POTASSIUM CHLORIDE 20 MEQ
20 PACKET (EA) ORAL ONCE
Qty: 0 | Refills: 0 | Status: COMPLETED | OUTPATIENT
Start: 2018-02-14 | End: 2018-02-14

## 2018-02-14 RX ADMIN — CHLORHEXIDINE GLUCONATE 15 MILLILITER(S): 213 SOLUTION TOPICAL at 17:57

## 2018-02-14 RX ADMIN — Medication 100 MILLIGRAM(S): at 17:57

## 2018-02-14 RX ADMIN — APIXABAN 5 MILLIGRAM(S): 2.5 TABLET, FILM COATED ORAL at 07:18

## 2018-02-14 RX ADMIN — Medication 3 MILLILITER(S): at 01:18

## 2018-02-14 RX ADMIN — CHLORHEXIDINE GLUCONATE 15 MILLILITER(S): 213 SOLUTION TOPICAL at 06:50

## 2018-02-14 RX ADMIN — PANTOPRAZOLE SODIUM 40 MILLIGRAM(S): 20 TABLET, DELAYED RELEASE ORAL at 06:50

## 2018-02-14 RX ADMIN — Medication 3 MILLILITER(S): at 19:10

## 2018-02-14 RX ADMIN — Medication 1 APPLICATION(S): at 17:51

## 2018-02-14 RX ADMIN — Medication 20 MILLIEQUIVALENT(S): at 08:55

## 2018-02-14 RX ADMIN — HYDROGEN PEROXIDE 1 APPLICATION(S): 0.3 LIQUID TOPICAL at 17:50

## 2018-02-14 RX ADMIN — Medication 0.12 MILLIGRAM(S): at 06:50

## 2018-02-14 RX ADMIN — Medication 3 MILLILITER(S): at 13:38

## 2018-02-14 RX ADMIN — Medication 100 MILLIGRAM(S): at 06:49

## 2018-02-14 RX ADMIN — FLUCONAZOLE 100 MILLIGRAM(S): 150 TABLET ORAL at 14:03

## 2018-02-14 RX ADMIN — Medication 1 APPLICATION(S): at 17:52

## 2018-02-14 RX ADMIN — Medication 20 MILLIGRAM(S): at 10:44

## 2018-02-14 RX ADMIN — Medication 1 APPLICATION(S): at 07:17

## 2018-02-14 RX ADMIN — Medication 40 MILLIGRAM(S): at 17:56

## 2018-02-14 RX ADMIN — Medication 3 MILLILITER(S): at 07:00

## 2018-02-14 RX ADMIN — PANTOPRAZOLE SODIUM 40 MILLIGRAM(S): 20 TABLET, DELAYED RELEASE ORAL at 17:56

## 2018-02-14 NOTE — PROGRESS NOTE ADULT - ASSESSMENT
72yM with PMH severe MR/AR presented after recent dx acute cholecystitis and RLL PNA at [facility] with lower abdominal pain, new onset rapid Afib, sepsis, acute CHF exacerbation, initially admitted to cardiology on bipap, then transferred to SICU with perforated duodenum s/p Shreyas patch and jejunostomy (1/20) c/b breakdown s/p RTOR, washout, new Shreyas patch, gastrostomy, duodenostomy, IVCF (1/22)    Neuro: Tylenol/Dilaudid PRN  CV: Afib: Echo EF 50%, Severe MR, no vegetation, Digoxin 0.125,  Eliquis.  Pulm: Trach 8.0 Shiley AC- 500/40/12/5 w/CPAP trials; Peridex, Duonebs  GI: NPO, Glucerna 1.2 @68 via J tube, Protonix BID. Retrograde D-tube to LIWS (red), G-tube to LIWS (green), Colace, Dulcolax suppository  : Oliveira (placed for retention on 2/10)  ID: C albicans intraperitoneal infxn: fluconazole (2/1--); Alexi/Stenotrophomonas: Levaquin (2/8-16) // D/c: Zosyn (1/19-22; 1/27-29; 2/7), micafungin (1/22-2/1), ceftazidime (1/29-2/4), linezolid (1/22-23), meropenem (1/22-27), vancomycin (1/19-20; 2/7-2/9)   Endo: ISS  HEME: Holding Eliquis (bloody D tube), IVCF (1/22) // 1FFP, 1PLT (1/22).   PPx: SCD, R pop DVT, s/p IVC filter   Lines: PIV, TLC (2/8--) // D/c: PICC (1/29-2/8), L brachial Jeane (1/20-27), L SC HD cath (1/22-27)   Wounds: Ex lap, JPx2 to bulb suction, J tube for feeds, D-tube to LIWS (red), G-tube to LIWS (green); betadine/peroxide to toes  PT/OT: Early mobilization.

## 2018-02-14 NOTE — PROGRESS NOTE ADULT - SUBJECTIVE AND OBJECTIVE BOX
Critical Care    INTERVAL HPI/OVERNIGHT EVENTS:    ANTIBIOTICS    MEDICATIONS  (STANDING):  ALBUTerol/ipratropium for Nebulization 3 milliLiter(s) Nebulizer every 6 hours  BACItracin   Ointment 1 Application(s) Topical two times a day  chlorhexidine 0.12% Liquid 15 milliLiter(s) Swish and Spit two times a day  digoxin     Tablet 0.125 milliGRAM(s) Oral daily  docusate sodium Liquid 100 milliGRAM(s) Oral two times a day  fluconAZOLE IVPB 400 milliGRAM(s) IV Intermittent every 24 hours  hydrogen peroxide 3% Solution 1 Application(s) Topical daily  insulin lispro (HumaLOG) corrective regimen sliding scale   SubCutaneous every 6 hours  levoFLOXacin IVPB 750 milliGRAM(s) IV Intermittent every 24 hours  lidocaine 2% Injectable 50 milliLiter(s) Local Injection once  pantoprazole  Injectable 40 milliGRAM(s) IV Push every 12 hours  povidone iodine 10% Solution 1 Application(s) Topical daily  sodium chloride 0.9% lock flush 20 milliLiter(s) IV Push once    MEDICATIONS  (PRN):  acetaminophen    Suspension 650 milliGRAM(s) Enteral Tube every 6 hours PRN For Temp greater than 38 C (100.4 F)  bisacodyl Suppository 10 milliGRAM(s) Rectal daily PRN Constipation  sodium chloride 0.9% lock flush 10 milliLiter(s) IV Push every 1 hour PRN After each medication administration      Allergies    No Known Allergies    Intolerances        REVIEW OF SYSTEMS:    Constitutional: No fever, weight loss or fatigue  Eyes: No eye pain, visual disturbances, or discharge  ENMT:  No difficulty hearing, tinnitus, vertigo; No sinus or throat pain  Neck: No pain or stiffness  Respiratory: No cough, wheezing, chills or hemoptysis  Cardiovascular: No chest pain, palpitations, shortness of breath, dizziness or leg swelling    Vital Signs Last 24 Hrs  T(C): 37.4 (14 Feb 2018 10:02), Max: 38.5 (13 Feb 2018 19:04)  T(F): 99.4 (14 Feb 2018 10:02), Max: 101.3 (13 Feb 2018 19:04)  HR: 96 (14 Feb 2018 14:00) (72 - 120)  BP: 121/69 (14 Feb 2018 14:00) (97/61 - 132/75)  BP(mean): 100 (14 Feb 2018 12:00) (69 - 100)  RR: 26 (14 Feb 2018 14:00) (15 - 46)  SpO2: 99% (14 Feb 2018 14:00) (81% - 100%)    PHYSICAL EXAM:    General: Well developed; well nourished; in no acute distress  Eyes: PERRL, EOM intact; conjunctiva and sclera clear  Head: Normocephalic; atraumatic  ENMT: No nasal discharge; airway clear  Neck: Supple; non tender; no masses  Respiratory: No wheezes, rales or rhonchi  Cardiovascular: Regular rate and rhythm. S1 and S2 Normal; No murmurs, gallops or rubs  Gastrointestinal: Soft non-tender non-distended; Normal bowel sounds; No hepatosplenomegaly  Genitourinary: No costovertebral angle tenderness  Extremities: Normal range of motion, No clubbing, cyanosis or edema  Vascular: Peripheral pulses palpable 2+ bilaterally  Neurological: Alert and oriented x3  Skin: Warm and dry. No acute rash  Lymph Nodes: No acute cervical adenopathy  Musculoskeletal:   gangrene of toes    LABS:                        8.1    6.9   )-----------( 159      ( 14 Feb 2018 06:27 )             26.5     02-14    137  |  99  |  36<H>  ----------------------------<  147<H>  3.8   |  29  |  0.76    Ca    7.7<L>      14 Feb 2018 06:27  Phos  3.4     02-14  Mg     2.3     02-14    TPro  5.3<L>  /  Alb  2.4<L>  /  TBili  3.3<H>  /  DBili  2.0<H>  /  AST  55<H>  /  ALT  44  /  AlkPhos  144<H>  02-14            MICROBIOLOGY:  Culture Results:   Sputum specimen rejected.  Microscopic examination indicates  oropharyngeal contamination.  Please repeat. (02-12 @ 10:39)  Culture Results:   No growth at 2 days. (02-12 @ 09:01)  Culture Results:   No growth at 2 days. (02-12 @ 08:55)  Culture Results:   No growth at 5 days. (02-07 @ 23:14)  Culture Results:   No growth at 5 days. (02-07 @ 23:14)  Culture Results:   Rare Staphylococcus aureus (02-07 @ 15:38)      RADIOLOGY & ADDITIONAL STUDIES:

## 2018-02-14 NOTE — PROGRESS NOTE ADULT - SUBJECTIVE AND OBJECTIVE BOX
SUBJECTIVE: pain controlled, denies HA, N/V, CP/SOB, -flatus, +BM, no chills, temp spoke    MEDICATIONS  (STANDING):  ALBUTerol/ipratropium for Nebulization 3 milliLiter(s) Nebulizer every 6 hours  BACItracin   Ointment 1 Application(s) Topical two times a day  chlorhexidine 0.12% Liquid 15 milliLiter(s) Swish and Spit two times a day  digoxin     Tablet 0.125 milliGRAM(s) Oral daily  docusate sodium Liquid 100 milliGRAM(s) Oral two times a day  fluconAZOLE IVPB 400 milliGRAM(s) IV Intermittent every 24 hours  furosemide   Injectable 20 milliGRAM(s) IV Push once  hydrogen peroxide 3% Solution 1 Application(s) Topical daily  insulin lispro (HumaLOG) corrective regimen sliding scale   SubCutaneous every 6 hours  levoFLOXacin IVPB 750 milliGRAM(s) IV Intermittent every 24 hours  lidocaine 2% Injectable 50 milliLiter(s) Local Injection once  pantoprazole  Injectable 40 milliGRAM(s) IV Push every 12 hours  povidone iodine 10% Solution 1 Application(s) Topical daily  sodium chloride 0.9% lock flush 20 milliLiter(s) IV Push once    MEDICATIONS  (PRN):  acetaminophen    Suspension 650 milliGRAM(s) Enteral Tube every 6 hours PRN For Temp greater than 38 C (100.4 F)  bisacodyl Suppository 10 milliGRAM(s) Rectal daily PRN Constipation  sodium chloride 0.9% lock flush 10 milliLiter(s) IV Push every 1 hour PRN After each medication administration    ICU Vital Signs Last 24 Hrs  T(C): 37.4 (14 Feb 2018 10:02), Max: 38.5 (13 Feb 2018 19:04)  T(F): 99.4 (14 Feb 2018 10:02), Max: 101.3 (13 Feb 2018 19:04)  HR: 82 (14 Feb 2018 09:00) (72 - 118)  BP: 107/60 (14 Feb 2018 09:00) (99/61 - 127/72)  BP(mean): 72 (14 Feb 2018 09:00) (69 - 93)  ABP: --  ABP(mean): --  RR: 25 (14 Feb 2018 09:00) (15 - 37)  SpO2: 100% (14 Feb 2018 09:00) (96% - 100%)      Physical Exam:  General: NAD  HEENT: NC, trach in place w/out secretions  Pulmonary: Nonlabored breathing, no respiratory distress, b/l ronchi  Cardiovascular: Afib, MR  Abdominal: soft, NT/ND, +BS, well-healed midline incision, drain sites w/out erythema/purulence  Extremities: serosanguinous tinged b/l pedal dressings  Neuro: A/O x3, CNs II-XII grossly intact, normal motor/sensation, no focal deficits    Lines/tubes/drains: R GUCCI (blood-tinged serous), gastrostomy, retrograde duodenostomy (bilious/blood-tinged), feeding jejunostomy    Vent settings:  Mode: AC/ CMV (Assist Control/ Continuous Mandatory Ventilation), RR (machine): 12, TV (machine): 500, FiO2: 40, PEEP: 5, ITime: 0.85, MAP: 6.7, PIP: 11    I&O's Summary    13 Feb 2018 07:01  -  14 Feb 2018 07:00  --------------------------------------------------------  IN: 2104 mL / OUT: 1672 mL / NET: 432 mL    14 Feb 2018 07:01  -  14 Feb 2018 10:11  --------------------------------------------------------  IN: 136 mL / OUT: 105 mL / NET: 31 mL        LABS:                        8.1    6.9   )-----------( 159      ( 14 Feb 2018 06:27 )             26.5     02-14    137  |  99  |  36<H>  ----------------------------<  147<H>  3.8   |  29  |  0.76    Ca    7.7<L>      14 Feb 2018 06:27  Phos  3.4     02-14  Mg     2.3     02-14    TPro  5.3<L>  /  Alb  2.4<L>  /  TBili  3.3<H>  /  DBili  2.0<H>  /  AST  55<H>  /  ALT  44  /  AlkPhos  144<H>  02-14        CAPILLARY BLOOD GLUCOSE      POCT Blood Glucose.: 148 mg/dL (14 Feb 2018 07:49)  POCT Blood Glucose.: 117 mg/dL (14 Feb 2018 00:58)  POCT Blood Glucose.: 157 mg/dL (13 Feb 2018 16:08)  POCT Blood Glucose.: 128 mg/dL (13 Feb 2018 10:23)    LIVER FUNCTIONS - ( 14 Feb 2018 06:27 )  Alb: 2.4 g/dL / Pro: 5.3 g/dL / ALK PHOS: 144 U/L / ALT: 44 U/L / AST: 55 U/L / GGT: x             Cultures:Culture Results:   Sputum specimen rejected.  Microscopic examination indicates  oropharyngeal contamination.  Please repeat. (02-12 @ 10:39)

## 2018-02-14 NOTE — PROGRESS NOTE ADULT - ASSESSMENT
S Aureus growing from toes. It is unclear if this is an infectious process. The toes are grangrenous

## 2018-02-14 NOTE — PROGRESS NOTE ADULT - ATTENDING COMMENTS
Patient seen and examined with house-staff during bedside rounds.  Resident note read, including vitals, physical findings, laboratory data, and radiological reports.   Revisions included below.  Direct personal management at bed side and extensive interpretation of the data.  Plan was outlined and discussed in details with the housestaff.  Decision making of high complexity  I discussed the case during Rounds and with the primary surgeon. Ultrasound of the chest revealed moderate pleural effusion with proximity of the long to the chest wall. Chest tube  inserted by interventional radiology.  Monitor the drainage and the analysis of the pleural fluid. Proceed with  gallium scan.. I discussed the case with healthcare proxy

## 2018-02-14 NOTE — PROGRESS NOTE ADULT - SUBJECTIVE AND OBJECTIVE BOX
24 hr events: dental recs no acute infx responsible for fever, sub phrenic elier removed. midline incision staples removed. Lasix 20 given US RUQ done: showing thickened wall.  TLC pulled back by 2 cm; urology recs nothing to do, f/u as outpatient. Restarted eliquis O/N: febrile to 101.3    SUBJECTIVE: pain controlled, denies HA, N/V, CP/SOB, -flatus, +BM, no chills    MEDICATIONS  (STANDING):  ALBUTerol/ipratropium for Nebulization 3 milliLiter(s) Nebulizer every 6 hours  BACItracin   Ointment 1 Application(s) Topical two times a day  chlorhexidine 0.12% Liquid 15 milliLiter(s) Swish and Spit two times a day  digoxin     Tablet 0.125 milliGRAM(s) Oral daily  docusate sodium Liquid 100 milliGRAM(s) Oral two times a day  fluconAZOLE IVPB 400 milliGRAM(s) IV Intermittent every 24 hours  furosemide   Injectable 20 milliGRAM(s) IV Push once  hydrogen peroxide 3% Solution 1 Application(s) Topical daily  insulin lispro (HumaLOG) corrective regimen sliding scale   SubCutaneous every 6 hours  levoFLOXacin IVPB 750 milliGRAM(s) IV Intermittent every 24 hours  lidocaine 2% Injectable 50 milliLiter(s) Local Injection once  pantoprazole  Injectable 40 milliGRAM(s) IV Push every 12 hours  povidone iodine 10% Solution 1 Application(s) Topical daily  sodium chloride 0.9% lock flush 20 milliLiter(s) IV Push once    MEDICATIONS  (PRN):  acetaminophen    Suspension 650 milliGRAM(s) Enteral Tube every 6 hours PRN For Temp greater than 38 C (100.4 F)  bisacodyl Suppository 10 milliGRAM(s) Rectal daily PRN Constipation  sodium chloride 0.9% lock flush 10 milliLiter(s) IV Push every 1 hour PRN After each medication administration    ICU Vital Signs Last 24 Hrs  T(C): 37.4 (14 Feb 2018 10:02), Max: 38.5 (13 Feb 2018 19:04)  T(F): 99.4 (14 Feb 2018 10:02), Max: 101.3 (13 Feb 2018 19:04)  HR: 82 (14 Feb 2018 09:00) (72 - 118)  BP: 107/60 (14 Feb 2018 09:00) (99/61 - 127/72)  BP(mean): 72 (14 Feb 2018 09:00) (69 - 93)  ABP: --  ABP(mean): --  RR: 25 (14 Feb 2018 09:00) (15 - 37)  SpO2: 100% (14 Feb 2018 09:00) (96% - 100%)      Physical Exam:  General: NAD  HEENT: NC, trach in place w/out secretions  Pulmonary: Nonlabored breathing, no respiratory distress, b/l ronchi  Cardiovascular: Afib, MR  Abdominal: soft, NT/ND, +BS, well-healed midline incision, drain sites w/out erythema/purulence  Extremities: serosanguinous tinged b/l pedal dressings  Neuro: A/O x3, CNs II-XII grossly intact, normal motor/sensation, no focal deficits    Lines/tubes/drains: R ELIER (blood-tinged serous), gastrostomy, retrograde duodenostomy (bilious/blood-tinged), feeding jejunostomy    Vent settings:  Mode: AC/ CMV (Assist Control/ Continuous Mandatory Ventilation), RR (machine): 12, TV (machine): 500, FiO2: 40, PEEP: 5, ITime: 0.85, MAP: 6.7, PIP: 11    I&O's Summary    13 Feb 2018 07:01  -  14 Feb 2018 07:00  --------------------------------------------------------  IN: 2104 mL / OUT: 1672 mL / NET: 432 mL    14 Feb 2018 07:01  -  14 Feb 2018 10:11  --------------------------------------------------------  IN: 136 mL / OUT: 105 mL / NET: 31 mL        LABS:                        8.1    6.9   )-----------( 159      ( 14 Feb 2018 06:27 )             26.5     02-14    137  |  99  |  36<H>  ----------------------------<  147<H>  3.8   |  29  |  0.76    Ca    7.7<L>      14 Feb 2018 06:27  Phos  3.4     02-14  Mg     2.3     02-14    TPro  5.3<L>  /  Alb  2.4<L>  /  TBili  3.3<H>  /  DBili  2.0<H>  /  AST  55<H>  /  ALT  44  /  AlkPhos  144<H>  02-14        CAPILLARY BLOOD GLUCOSE      POCT Blood Glucose.: 148 mg/dL (14 Feb 2018 07:49)  POCT Blood Glucose.: 117 mg/dL (14 Feb 2018 00:58)  POCT Blood Glucose.: 157 mg/dL (13 Feb 2018 16:08)  POCT Blood Glucose.: 128 mg/dL (13 Feb 2018 10:23)    LIVER FUNCTIONS - ( 14 Feb 2018 06:27 )  Alb: 2.4 g/dL / Pro: 5.3 g/dL / ALK PHOS: 144 U/L / ALT: 44 U/L / AST: 55 U/L / GGT: x             Cultures:Culture Results:   Sputum specimen rejected.  Microscopic examination indicates  oropharyngeal contamination.  Please repeat. (02-12 @ 10:39)

## 2018-02-14 NOTE — PROGRESS NOTE ADULT - ASSESSMENT
ASSESSMENT/PLAN 73y/o male pt c Respiratory failure, perforated duodenal ulcer, POD post repair, pneumonia, RLL, LLL  atelectases, no obvious RADHA, elevated R hemidiaphragm, L pleural effusion, AFIB,  thrombocytopenia, possible endocarditis, s/p reop. now POP tracheostomy, new fever pattern, anemia    1. O2: Attempt CPAP, CMV if pt fatigues and overnight  2. Bronchodilators:  Atrovent/ albuterol q 4 – 6 hours as needed  3. Corticosteroids: off  4. ID/Antibiotics:  strict ID supervision , adjust ABX pending multiple new CX, Gallium Scan discussed c  and SICU  5. Cardiac/HTN: Monitor, optimize HR;   6. GI: Rx/ prophylaxis c PPI/Protonix  7. Heme: Rx/VT  follow H/H closely Eliquis held for worsening anemia, possible bleeding, resume when not surgically contraindicated  8. Aspiration precautions, feeding PEG  9. Surgery mngmnt priority,  R subdiaphragmatic drain reported repositioned .discussed c SICU.    10. Edema: diuretic to continue  11. Mobilize, PT  Discussed c managing team. SICU, ID

## 2018-02-15 LAB
ANION GAP SERPL CALC-SCNC: 8 MMOL/L — SIGNIFICANT CHANGE UP (ref 5–17)
BUN SERPL-MCNC: 39 MG/DL — HIGH (ref 7–23)
CALCIUM SERPL-MCNC: 7.9 MG/DL — LOW (ref 8.4–10.5)
CHLORIDE SERPL-SCNC: 100 MMOL/L — SIGNIFICANT CHANGE UP (ref 96–108)
CO2 SERPL-SCNC: 32 MMOL/L — HIGH (ref 22–31)
CREAT SERPL-MCNC: 0.88 MG/DL — SIGNIFICANT CHANGE UP (ref 0.5–1.3)
GLUCOSE BLDC GLUCOMTR-MCNC: 128 MG/DL — HIGH (ref 70–99)
GLUCOSE BLDC GLUCOMTR-MCNC: 149 MG/DL — HIGH (ref 70–99)
GLUCOSE BLDC GLUCOMTR-MCNC: 168 MG/DL — HIGH (ref 70–99)
GLUCOSE BLDC GLUCOMTR-MCNC: 177 MG/DL — HIGH (ref 70–99)
GLUCOSE BLDC GLUCOMTR-MCNC: 185 MG/DL — HIGH (ref 70–99)
GLUCOSE BLDC GLUCOMTR-MCNC: 190 MG/DL — HIGH (ref 70–99)
GLUCOSE SERPL-MCNC: 126 MG/DL — HIGH (ref 70–99)
HCT VFR BLD CALC: 26.4 % — LOW (ref 39–50)
HGB BLD-MCNC: 8.3 G/DL — LOW (ref 13–17)
MAGNESIUM SERPL-MCNC: 2.2 MG/DL — SIGNIFICANT CHANGE UP (ref 1.6–2.6)
MCHC RBC-ENTMCNC: 30.5 PG — SIGNIFICANT CHANGE UP (ref 27–34)
MCHC RBC-ENTMCNC: 31.4 G/DL — LOW (ref 32–36)
MCV RBC AUTO: 97.1 FL — SIGNIFICANT CHANGE UP (ref 80–100)
PHOSPHATE SERPL-MCNC: 3.4 MG/DL — SIGNIFICANT CHANGE UP (ref 2.5–4.5)
PLATELET # BLD AUTO: 153 K/UL — SIGNIFICANT CHANGE UP (ref 150–400)
POTASSIUM SERPL-MCNC: 3.7 MMOL/L — SIGNIFICANT CHANGE UP (ref 3.5–5.3)
POTASSIUM SERPL-SCNC: 3.7 MMOL/L — SIGNIFICANT CHANGE UP (ref 3.5–5.3)
RBC # BLD: 2.72 M/UL — LOW (ref 4.2–5.8)
RBC # FLD: 21.8 % — HIGH (ref 10.3–16.9)
SODIUM SERPL-SCNC: 140 MMOL/L — SIGNIFICANT CHANGE UP (ref 135–145)
WBC # BLD: 7.2 K/UL — SIGNIFICANT CHANGE UP (ref 3.8–10.5)
WBC # FLD AUTO: 7.2 K/UL — SIGNIFICANT CHANGE UP (ref 3.8–10.5)

## 2018-02-15 PROCEDURE — 99233 SBSQ HOSP IP/OBS HIGH 50: CPT | Mod: GC

## 2018-02-15 PROCEDURE — 71045 X-RAY EXAM CHEST 1 VIEW: CPT | Mod: 26,76

## 2018-02-15 RX ORDER — APIXABAN 2.5 MG/1
5 TABLET, FILM COATED ORAL EVERY 12 HOURS
Qty: 0 | Refills: 0 | Status: DISCONTINUED | OUTPATIENT
Start: 2018-02-15 | End: 2018-02-27

## 2018-02-15 RX ORDER — POTASSIUM CHLORIDE 20 MEQ
40 PACKET (EA) ORAL ONCE
Qty: 0 | Refills: 0 | Status: COMPLETED | OUTPATIENT
Start: 2018-02-15 | End: 2018-02-15

## 2018-02-15 RX ADMIN — CHLORHEXIDINE GLUCONATE 15 MILLILITER(S): 213 SOLUTION TOPICAL at 06:36

## 2018-02-15 RX ADMIN — Medication 0.12 MILLIGRAM(S): at 06:35

## 2018-02-15 RX ADMIN — Medication 100 MILLIGRAM(S): at 17:51

## 2018-02-15 RX ADMIN — Medication 3 MILLILITER(S): at 06:00

## 2018-02-15 RX ADMIN — Medication 2: at 23:43

## 2018-02-15 RX ADMIN — FLUCONAZOLE 100 MILLIGRAM(S): 150 TABLET ORAL at 16:53

## 2018-02-15 RX ADMIN — CHLORHEXIDINE GLUCONATE 15 MILLILITER(S): 213 SOLUTION TOPICAL at 17:52

## 2018-02-15 RX ADMIN — Medication 2: at 07:54

## 2018-02-15 RX ADMIN — Medication 1 APPLICATION(S): at 06:37

## 2018-02-15 RX ADMIN — Medication 3 MILLILITER(S): at 17:34

## 2018-02-15 RX ADMIN — Medication 1 APPLICATION(S): at 17:51

## 2018-02-15 RX ADMIN — Medication 3 MILLILITER(S): at 01:33

## 2018-02-15 RX ADMIN — Medication 40 MILLIEQUIVALENT(S): at 09:57

## 2018-02-15 RX ADMIN — PANTOPRAZOLE SODIUM 40 MILLIGRAM(S): 20 TABLET, DELAYED RELEASE ORAL at 17:51

## 2018-02-15 RX ADMIN — APIXABAN 5 MILLIGRAM(S): 2.5 TABLET, FILM COATED ORAL at 22:49

## 2018-02-15 RX ADMIN — PANTOPRAZOLE SODIUM 40 MILLIGRAM(S): 20 TABLET, DELAYED RELEASE ORAL at 06:35

## 2018-02-15 RX ADMIN — Medication 100 MILLIGRAM(S): at 06:36

## 2018-02-15 RX ADMIN — Medication 2: at 00:44

## 2018-02-15 RX ADMIN — Medication 3 MILLILITER(S): at 11:32

## 2018-02-15 RX ADMIN — Medication 650 MILLIGRAM(S): at 06:36

## 2018-02-15 RX ADMIN — APIXABAN 5 MILLIGRAM(S): 2.5 TABLET, FILM COATED ORAL at 09:57

## 2018-02-15 NOTE — PROGRESS NOTE ADULT - ASSESSMENT
72yM with PMH severe MR/AR presented after recent dx acute cholecystitis and RLL PNA at [facility] with lower abdominal pain, new onset rapid Afib, sepsis, acute CHF exacerbation, initially admitted to cardiology on bipap, then transferred to SICU with perforated duodenum s/p Shreyas patch and jejunostomy (1/20) c/b breakdown s/p RTOR, washout, new Shreyas patch, gastrostomy, duodenostomy, IVCF (1/22)    Neuro: Tylenol/Dilaudid PRN  CV: Afib: Echo EF 50%, Severe MR, no vegetation, Digoxin 0.125,  Eliquis.  Pulm: Trach 8.0 Shiley AC- 500/40/12/5 w/CPAP trials; Peridex, Duonebs ( 2/14-) Left Ctx to suction (-20)  GI: NPO, Glucerna 1.2 @68 via J tube, Protonix BID. Retrograde D-tube to LIWS (red), G-tube to LIWS (green), Colace, Dulcolax suppository  : Oliveira (placed for retention on 2/10)  ID: C albicans intraperitoneal infxn: fluconazole (2/1--); Alexi/Stenotrophomonas: Levaquin (2/8-16) // D/c: Zosyn (1/19-22; 1/27-29; 2/7), micafungin (1/22-2/1), ceftazidime (1/29-2/4), linezolid (1/22-23), meropenem (1/22-27), vancomycin (1/19-20; 2/7-2/9)   Endo: ISS  HEME: Holding Eliquis (bloody D tube), IVCF (1/22) // 1FFP, 1PLT (1/22).   PPx: SCD, R pop DVT, s/p IVC filter   Lines: PIV, TLC (2/8--) // D/c: PICC (1/29-2/8), L brachial Butler (1/20-27), L SC HD cath (1/22-27)   Wounds: Ex lap, JPx2 to bulb suction, J tube for feeds, D-tube to LIWS (red), G-tube to LIWS (green); betadine/peroxide to toes  PT/OT: Early mobilization.

## 2018-02-15 NOTE — PROGRESS NOTE ADULT - SUBJECTIVE AND OBJECTIVE BOX
Orderd gallium scan will inject today and get scan in 72 hrs. Left ctx placed by  cc removed given lasix 40 in afternoon for goal I.O negative 1L; o/n febrile Tmax 101    SUBJECTIVE: pain controlled, denies HA, N/V, CP/SOB, chills/sweats    MEDICATIONS  (STANDING):  ALBUTerol/ipratropium for Nebulization 3 milliLiter(s) Nebulizer every 6 hours  BACItracin   Ointment 1 Application(s) Topical two times a day  chlorhexidine 0.12% Liquid 15 milliLiter(s) Swish and Spit two times a day  digoxin     Tablet 0.125 milliGRAM(s) Oral daily  docusate sodium Liquid 100 milliGRAM(s) Oral two times a day  fluconAZOLE IVPB 400 milliGRAM(s) IV Intermittent every 24 hours  hydrogen peroxide 3% Solution 1 Application(s) Topical daily  insulin lispro (HumaLOG) corrective regimen sliding scale   SubCutaneous every 6 hours  levoFLOXacin IVPB 750 milliGRAM(s) IV Intermittent every 24 hours  lidocaine 2% Injectable 50 milliLiter(s) Local Injection once  pantoprazole  Injectable 40 milliGRAM(s) IV Push every 12 hours  potassium chloride   Powder 40 milliEquivalent(s) Oral once  povidone iodine 10% Solution 1 Application(s) Topical daily  sodium chloride 0.9% lock flush 20 milliLiter(s) IV Push once    MEDICATIONS  (PRN):  acetaminophen    Suspension 650 milliGRAM(s) Enteral Tube every 6 hours PRN For Temp greater than 38 C (100.4 F)  bisacodyl Suppository 10 milliGRAM(s) Rectal daily PRN Constipation  sodium chloride 0.9% lock flush 10 milliLiter(s) IV Push every 1 hour PRN After each medication administration      ICU Vital Signs Last 24 Hrs  T(C): 37.8 (15 Feb 2018 06:43), Max: 38.3 (14 Feb 2018 17:01)  T(F): 100.1 (15 Feb 2018 06:43), Max: 101 (14 Feb 2018 17:01)  HR: 88 (15 Feb 2018 06:00) (77 - 120)  BP: 104/57 (15 Feb 2018 06:00) (97/61 - 132/75)  BP(mean): 66 (15 Feb 2018 06:00) (66 - 100)  ABP: --  ABP(mean): --  RR: 23 (15 Feb 2018 06:00) (14 - 46)  SpO2: 99% (15 Feb 2018 06:00) (81% - 100%)      Physical Exam:  General: NAD  HEENT: NC, trach in place  Pulmonary: Nonlabored breathing, no respiratory distress, ronchi b/l, L chest tube -20 (80 output, sanguinous)  Cardiovascular: Afib, MR  Abdominal: soft, NT/ND, +BS, R GUCCI (serous)/ gastrostomy/ retrograde duodenostomy (bilious)/feeding jejunostomy all w/out erythema/purulence, well-healed midline incision   Extremities: b/l pedal dressings w/minimal serosang fluid  Neuro: A/O x3, CNs II-XII grossly intact, normal motor/sensation, no focal deficits    Lines/tubes/drains: R GUCCI (serous), gastrostomy, retrograde duodenostomy (bilious), feeding jejunostomy, L Chest tube to -20, abarca     Vent settings:  Mode: AC/ CMV (Assist Control/ Continuous Mandatory Ventilation), RR (machine): 12, TV (machine): 500, FiO2: 40, PEEP: 5, ITime: 0.85, MAP: 8.1, PIP: 17    I&O's Summary    14 Feb 2018 07:01  -  15 Feb 2018 07:00  --------------------------------------------------------  IN: 1696 mL / OUT: 2513 mL / NET: -817 mL        LABS:                        8.3    7.2   )-----------( 153      ( 15 Feb 2018 05:36 )             26.4     02-15    140  |  100  |  39<H>  ----------------------------<  126<H>  3.7   |  32<H>  |  0.88    Ca    7.9<L>      15 Feb 2018 05:36  Phos  3.4     02-15  Mg     2.2     02-15    TPro  5.3<L>  /  Alb  2.4<L>  /  TBili  3.3<H>  /  DBili  2.0<H>  /  AST  55<H>  /  ALT  44  /  AlkPhos  144<H>  02-14        CAPILLARY BLOOD GLUCOSE      POCT Blood Glucose.: 168 mg/dL (15 Feb 2018 00:41)  POCT Blood Glucose.: 148 mg/dL (14 Feb 2018 18:10)  POCT Blood Glucose.: 130 mg/dL (14 Feb 2018 11:18)  POCT Blood Glucose.: 148 mg/dL (14 Feb 2018 07:49)    LIVER FUNCTIONS - ( 14 Feb 2018 06:27 )  Alb: 2.4 g/dL / Pro: 5.3 g/dL / ALK PHOS: 144 U/L / ALT: 44 U/L / AST: 55 U/L / GGT: x             Cultures: no organisms in pleural fluid

## 2018-02-15 NOTE — CHART NOTE - NSCHARTNOTEFT_GEN_A_CORE
Patient with Left pleural effusion POD#1 s/p IR drainage.      Culture - Body Fluid with Gram Stain (02.14.18 @ 14:53)    Gram Stain:   No organisms seen  Rare White blood cells    Specimen Source: .Body Fluid Left Pleural Fluid    Culture Results:   No growth to date.    The pigtail can be managed by IR.  D/w Dr. Mo. Will continue to follow peripherally.

## 2018-02-15 NOTE — PROGRESS NOTE ADULT - ASSESSMENT
ASSESSMENT/PLAN 73y/o male pt c Respiratory failure, perforated duodenal ulcer, POD post repair, pneumonia, RLL, LLL  atelectases, no obvious RADHA, elevated R hemidiaphragm, L pleural effusion, AFIB,  thrombocytopenia, possible endocarditis, s/p reop. now POP tracheostomy, new fever pattern, anemia    1. O2: Attempt CPAP, CMV if pt fatigues and overnight  2. Bronchodilators:  Atrovent/ albuterol q 4 – 6 hours as needed  3. Corticosteroids: off  4. ID/Antibiotics:  strict ID supervision , adjust ABX pending multiple new CX, Gallium Scan discussed c  and SICU  5. Cardiac/HTN: Monitor, optimize HR;   6. GI: Rx/ prophylaxis c PPI/Protonix  7. Heme: Rx/VT  follow H/H closely Eliquis held for worsening anemia, possible bleeding, resume when not surgically contraindicated  8. Aspiration precautions, feeding PEG  9. Surgery mngmnt priority,  R subdiaphragmatic drain reported repositioned .discussed c SICU.    10. Edema: diuretic to continue  11.L CTube on suction pending CX  Discussed c managing team. SICU, ID

## 2018-02-15 NOTE — PROGRESS NOTE ADULT - SUBJECTIVE AND OBJECTIVE BOX
Interventional, Pulmonary, Critical, Chest Special Procedures.    Pt was seen and fully examined by myself.     Time spent with patient in minutes:37    Patient is a 72y old  Male who presents with a chief complaint of acute cholecystitis (18 Jan 2018 14:49)The patient awake and engaging, tolerating now CPAP,     HPI:  73 yo M with h/o severe mitral and aortic valve regurgitation presented to ED with 5 day h/o left lower abdominal pain, nausea and multiple episodes of vomiting as well as PO intolerance. Pt reports that the pain in his abdomen is illicited by movement. Went to [facility] two days ago and underwent CT scan which revealed acute cholecystitis as well as right lower lobe infectious pneumonia. Pt was told he would require surgery and left AMA because he was waiting too long. Denies f/c. Last echo 1/4/17 with EF 60-65%. Normal BMs and passing flatus. (18 Jan 2018 14:49)    REVIEW OF SYSTEMS:updated  Constitutional: No fever, weight loss, chills or fatigue  Eyes: No eye pain, visual disturbances, or discharge  ENMT:  No difficulty hearing, tinnitus, vertigo; No sinus or throat pain. No epistaxis, dysphagia, dysphonia, hoarseness or odynophagia  Neck: No pain, stiffness or neck swelling.  No masses or deformities  Respiratory: No cough, wheezing, chills or hemoptysis  - COPD  - ILD   - PE   - ASTHMA     - PNEUMONIA  Cardiovascular: No chest pain, dysrhythmia, palpitations, dizziness or edema   - COPD     - CAD   - CHF   - HTN  Gastrointestinal: No abdominal or epigastric pain. No nausea, vomiting or hematemesis; No diarrhea or constipation. No melena or hematochezia. No dysphagia or Icterus.          Genitourinary: No dysuria, frequency, hematuria or incontinence   - CKD/GAETANO      - ESRD  Neurological: No headaches, memory loss, loss of strength, numbness or tremors      -DEMENTIA     - STROKE    - SEIZURE  Skin: No itching, burning, rashes or lesions   Lymph Nodes: No enlarged glands  Endocrine: No heat or cold intolerance; No hair loss       - DM     - THYROID DISORDER  Musculoskeletal: No joint pain or swelling; No muscle, back or extremity pain  Psychiatric: No depression, anxiety, mood swings or difficulty sleeping  Heme/Lymph: No easy bruising or bleeding gums         - ANEMIA      - CANCER   -COAGULOPATHY  Allergy and Immunologic: No hives or eczema    PAST MEDICAL & SURGICAL HISTORY:  Mitral regurgitation  Aortic regurgitation  No significant past surgical history    FAMILY HISTORY:    SOCIAL HISTORY:      - Tobacco     - ETOH    Allergies    No Known Allergies    Intolerances      Vital Signs Last 24 Hrs  T(C): 38.1 (15 Feb 2018 14:05), Max: 38.3 (14 Feb 2018 21:01)  T(F): 100.6 (15 Feb 2018 14:05), Max: 101 (14 Feb 2018 21:01)  HR: 104 (15 Feb 2018 17:00) (80 - 124)  BP: 118/73 (15 Feb 2018 16:00) (94/51 - 130/72)  BP(mean): 82 (15 Feb 2018 16:00) (63 - 94)  RR: 30 (15 Feb 2018 17:00) (14 - 36)  SpO2: 100% (15 Feb 2018 17:00) (99% - 100%)    02-14 @ 07:01  -  02-15 @ 07:00  --------------------------------------------------------  IN: 1764 mL / OUT: 2668 mL / NET: -904 mL    02-15 @ 07:01  - 02-15 @ 17:06  --------------------------------------------------------  IN: 930 mL / OUT: 605 mL / NET: 325 mL      Mode: CPAP with PS  FiO2: 40  PEEP: 5  PS: 10  ITime: 0.85  MAP: 8.8  PIP: 16    PHYSICAL EXAM:  More comfortable, no distress  Eyes: PERRL, EOM intact; conjunctiva and sclera clear  Head: Normocephalic;  No Trauma  ENMT: No nasal discharge, hoarseness, cough or hemoptysis.  Airway clear  Neck: Supple; non tender; no masses or deformities.    No JVD  Respiratory:  - WHEEZING   few bilateral   RHONCHI  - RALES  = CRACKLES.  Diminished breath sounds  BILATERAL  RIGHT  LEFT bases   Cardiovascular: Regular rate and rhythm. S1 and S2 Normal; ++murmurs, gallops or rubs     - PPM/AICD  Gastrointestinal: Soft ,distended; Normal bowel sounds; No hepatosplenomegaly.     +PEG    + multiple surgical drains integrity checked    ANGELES  Genitourinary: No costovertebral angle tenderness. No dysuria  Extremities: Decreased AROM, + clubbing, cyanosis + edema, R foot toes unchanged pattern of necrosis, L foot less edema   Vascular: Peripheral pulses palpable 2+ bilaterally  Neurological: intubated and opening eyes to commands  Skin: Warm and dry. No obvious rash  Lymph Nodes: No acute cervical or supraclavicular adenopathy  Psychiatric: not sedated, calm affect    DEVICES:  - DENTURES   +IV R / L     - ETUBE   +TRACH 8.0 Shilley cuffed, site is clean, new L CTube circuit integrity preserved, O/P marked serosanguinous.    LABS:                          8.3    7.2   )-----------( 153      ( 15 Feb 2018 05:36 )             26.4     02-15    140  |  100  |  39<H>  ----------------------------<  126<H>  3.7   |  32<H>  |  0.88    Ca    7.9<L>      15 Feb 2018 05:36  Phos  3.4     02-15  Mg     2.2     02-15    TPro  5.3<L>  /  Alb  2.4<L>  /  TBili  3.3<H>  /  DBili  2.0<H>  /  AST  55<H>  /  ALT  44  /  AlkPhos  144<H>  02-14    < from: Xray Chest 1 View- PORTABLE-Routine (02.15.18 @ 08:56) >    EXAM:  XR CHEST PORTABLE ROUTINE 1V                          PROCEDURE DATE:  02/15/2018                     INTERPRETATION:  Portable Chest X-Ray dated 2/15/2018 8:56 AM    Indication: post chest tube to water seal    An AP portable view of the chest is compared to 2/15/2018 at 4:07 AM. No   significant interval change in the pulmonary pathology and support   devices, as previously reported.    IMPRESSION:  No significant interval change.    < end of copied text >    RADIOLOGY & ADDITIONAL STUDIES (The following images were personally reviewed):

## 2018-02-15 NOTE — PROGRESS NOTE ADULT - ATTENDING COMMENTS
Patient seen and examined with house-staff during bedside rounds.  Resident note read, including vitals, physical findings, laboratory data, and radiological reports.   Revisions included below.  Direct personal management at bed side and extensive interpretation of the data.  Plan was outlined and discussed in details with the housestaff.  Decision making of high complexity  the patient is clinically stable. He had to trials of pressure support ventilation and was rested on CMV.  continue weaning trials as tolerated. Awaiting gallium scan The abdominal drain is to be removed as per surgery.  The pleural effusion is exited if with minimal drainage. Evaluate in the morning for removal off the chest tube.  continue physical therapy as tolerated.. Patient is medically stable. Diuresis as tolerated

## 2018-02-15 NOTE — PROGRESS NOTE ADULT - SUBJECTIVE AND OBJECTIVE BOX
24 hr events: Lasix 20 x1 and I/O @2PM:- 150.  Orderd gallium scan will inject today and get scan in 72 hrs. Left ctx placed by  cc removed given lasix 40 in afternoon for goal I.O negative 1L; o/n febrile Tmax 101    SUBJECTIVE: pain controlled, denies HA, N/V, CP/SOB, chills/sweats    MEDICATIONS  (STANDING):  ALBUTerol/ipratropium for Nebulization 3 milliLiter(s) Nebulizer every 6 hours  BACItracin   Ointment 1 Application(s) Topical two times a day  chlorhexidine 0.12% Liquid 15 milliLiter(s) Swish and Spit two times a day  digoxin     Tablet 0.125 milliGRAM(s) Oral daily  docusate sodium Liquid 100 milliGRAM(s) Oral two times a day  fluconAZOLE IVPB 400 milliGRAM(s) IV Intermittent every 24 hours  hydrogen peroxide 3% Solution 1 Application(s) Topical daily  insulin lispro (HumaLOG) corrective regimen sliding scale   SubCutaneous every 6 hours  levoFLOXacin IVPB 750 milliGRAM(s) IV Intermittent every 24 hours  lidocaine 2% Injectable 50 milliLiter(s) Local Injection once  pantoprazole  Injectable 40 milliGRAM(s) IV Push every 12 hours  potassium chloride   Powder 40 milliEquivalent(s) Oral once  povidone iodine 10% Solution 1 Application(s) Topical daily  sodium chloride 0.9% lock flush 20 milliLiter(s) IV Push once    MEDICATIONS  (PRN):  acetaminophen    Suspension 650 milliGRAM(s) Enteral Tube every 6 hours PRN For Temp greater than 38 C (100.4 F)  bisacodyl Suppository 10 milliGRAM(s) Rectal daily PRN Constipation  sodium chloride 0.9% lock flush 10 milliLiter(s) IV Push every 1 hour PRN After each medication administration      ICU Vital Signs Last 24 Hrs  T(C): 37.8 (15 Feb 2018 06:43), Max: 38.3 (14 Feb 2018 17:01)  T(F): 100.1 (15 Feb 2018 06:43), Max: 101 (14 Feb 2018 17:01)  HR: 88 (15 Feb 2018 06:00) (77 - 120)  BP: 104/57 (15 Feb 2018 06:00) (97/61 - 132/75)  BP(mean): 66 (15 Feb 2018 06:00) (66 - 100)  ABP: --  ABP(mean): --  RR: 23 (15 Feb 2018 06:00) (14 - 46)  SpO2: 99% (15 Feb 2018 06:00) (81% - 100%)      Physical Exam:  General: NAD  HEENT: NC, trach in place  Pulmonary: Nonlabored breathing, no respiratory distress, ronchi b/l, L chest tube -20 (80 output, sanguinous)  Cardiovascular: Afib, MR  Abdominal: soft, NT/ND, +BS, R GUCCI (serous)/ gastrostomy/ retrograde duodenostomy (bilious)/feeding jejunostomy all w/out erythema/purulence, well-healed midline incision   Extremities: b/l pedal dressings w/minimal serosang fluid  Neuro: A/O x3, CNs II-XII grossly intact, normal motor/sensation, no focal deficits    Lines/tubes/drains: R GUCCI (serous), gastrostomy, retrograde duodenostomy (bilious), feeding jejunostomy, L Chest tube to -20, abarca     Vent settings:  Mode: AC/ CMV (Assist Control/ Continuous Mandatory Ventilation), RR (machine): 12, TV (machine): 500, FiO2: 40, PEEP: 5, ITime: 0.85, MAP: 8.1, PIP: 17    I&O's Summary    14 Feb 2018 07:01  -  15 Feb 2018 07:00  --------------------------------------------------------  IN: 1696 mL / OUT: 2513 mL / NET: -817 mL        LABS:                        8.3    7.2   )-----------( 153      ( 15 Feb 2018 05:36 )             26.4     02-15    140  |  100  |  39<H>  ----------------------------<  126<H>  3.7   |  32<H>  |  0.88    Ca    7.9<L>      15 Feb 2018 05:36  Phos  3.4     02-15  Mg     2.2     02-15    TPro  5.3<L>  /  Alb  2.4<L>  /  TBili  3.3<H>  /  DBili  2.0<H>  /  AST  55<H>  /  ALT  44  /  AlkPhos  144<H>  02-14        CAPILLARY BLOOD GLUCOSE      POCT Blood Glucose.: 168 mg/dL (15 Feb 2018 00:41)  POCT Blood Glucose.: 148 mg/dL (14 Feb 2018 18:10)  POCT Blood Glucose.: 130 mg/dL (14 Feb 2018 11:18)  POCT Blood Glucose.: 148 mg/dL (14 Feb 2018 07:49)    LIVER FUNCTIONS - ( 14 Feb 2018 06:27 )  Alb: 2.4 g/dL / Pro: 5.3 g/dL / ALK PHOS: 144 U/L / ALT: 44 U/L / AST: 55 U/L / GGT: x             Cultures: no organisms in pleural fluid

## 2018-02-16 LAB
ANION GAP SERPL CALC-SCNC: 8 MMOL/L — SIGNIFICANT CHANGE UP (ref 5–17)
BUN SERPL-MCNC: 38 MG/DL — HIGH (ref 7–23)
CALCIUM SERPL-MCNC: 7.8 MG/DL — LOW (ref 8.4–10.5)
CHLORIDE SERPL-SCNC: 102 MMOL/L — SIGNIFICANT CHANGE UP (ref 96–108)
CO2 SERPL-SCNC: 30 MMOL/L — SIGNIFICANT CHANGE UP (ref 22–31)
CREAT SERPL-MCNC: 0.79 MG/DL — SIGNIFICANT CHANGE UP (ref 0.5–1.3)
CULTURE RESULTS: NO GROWTH — SIGNIFICANT CHANGE UP
GLUCOSE BLDC GLUCOMTR-MCNC: 125 MG/DL — HIGH (ref 70–99)
GLUCOSE BLDC GLUCOMTR-MCNC: 167 MG/DL — HIGH (ref 70–99)
GLUCOSE BLDC GLUCOMTR-MCNC: 193 MG/DL — HIGH (ref 70–99)
GLUCOSE SERPL-MCNC: 151 MG/DL — HIGH (ref 70–99)
HCT VFR BLD CALC: 26.4 % — LOW (ref 39–50)
HGB BLD-MCNC: 8.3 G/DL — LOW (ref 13–17)
MAGNESIUM SERPL-MCNC: 2.2 MG/DL — SIGNIFICANT CHANGE UP (ref 1.6–2.6)
MCHC RBC-ENTMCNC: 31 PG — SIGNIFICANT CHANGE UP (ref 27–34)
MCHC RBC-ENTMCNC: 31.4 G/DL — LOW (ref 32–36)
MCV RBC AUTO: 98.5 FL — SIGNIFICANT CHANGE UP (ref 80–100)
NON-GYNECOLOGICAL CYTOLOGY STUDY: SIGNIFICANT CHANGE UP
PHOSPHATE SERPL-MCNC: 3.2 MG/DL — SIGNIFICANT CHANGE UP (ref 2.5–4.5)
PLATELET # BLD AUTO: 155 K/UL — SIGNIFICANT CHANGE UP (ref 150–400)
POTASSIUM SERPL-MCNC: 4.1 MMOL/L — SIGNIFICANT CHANGE UP (ref 3.5–5.3)
POTASSIUM SERPL-SCNC: 4.1 MMOL/L — SIGNIFICANT CHANGE UP (ref 3.5–5.3)
RBC # BLD: 2.68 M/UL — LOW (ref 4.2–5.8)
RBC # FLD: 22.2 % — HIGH (ref 10.3–16.9)
SODIUM SERPL-SCNC: 140 MMOL/L — SIGNIFICANT CHANGE UP (ref 135–145)
SPECIMEN SOURCE: SIGNIFICANT CHANGE UP
WBC # BLD: 7.4 K/UL — SIGNIFICANT CHANGE UP (ref 3.8–10.5)
WBC # FLD AUTO: 7.4 K/UL — SIGNIFICANT CHANGE UP (ref 3.8–10.5)

## 2018-02-16 PROCEDURE — 71045 X-RAY EXAM CHEST 1 VIEW: CPT | Mod: 26

## 2018-02-16 PROCEDURE — 71045 X-RAY EXAM CHEST 1 VIEW: CPT | Mod: 26,77,76

## 2018-02-16 PROCEDURE — 99233 SBSQ HOSP IP/OBS HIGH 50: CPT | Mod: GC

## 2018-02-16 PROCEDURE — 78806: CPT | Mod: 26

## 2018-02-16 RX ORDER — FUROSEMIDE 40 MG
20 TABLET ORAL ONCE
Qty: 0 | Refills: 0 | Status: COMPLETED | OUTPATIENT
Start: 2018-02-16 | End: 2018-02-16

## 2018-02-16 RX ADMIN — APIXABAN 5 MILLIGRAM(S): 2.5 TABLET, FILM COATED ORAL at 21:11

## 2018-02-16 RX ADMIN — HYDROGEN PEROXIDE 1 APPLICATION(S): 0.3 LIQUID TOPICAL at 17:51

## 2018-02-16 RX ADMIN — Medication 3 MILLILITER(S): at 12:52

## 2018-02-16 RX ADMIN — Medication 1 APPLICATION(S): at 17:52

## 2018-02-16 RX ADMIN — CHLORHEXIDINE GLUCONATE 15 MILLILITER(S): 213 SOLUTION TOPICAL at 06:33

## 2018-02-16 RX ADMIN — Medication 3 MILLILITER(S): at 00:01

## 2018-02-16 RX ADMIN — Medication 1 APPLICATION(S): at 06:36

## 2018-02-16 RX ADMIN — PANTOPRAZOLE SODIUM 40 MILLIGRAM(S): 20 TABLET, DELAYED RELEASE ORAL at 06:35

## 2018-02-16 RX ADMIN — Medication 2: at 07:01

## 2018-02-16 RX ADMIN — Medication 3 MILLILITER(S): at 07:02

## 2018-02-16 RX ADMIN — Medication 0.12 MILLIGRAM(S): at 06:35

## 2018-02-16 RX ADMIN — Medication 3 MILLILITER(S): at 17:30

## 2018-02-16 RX ADMIN — Medication 100 MILLIGRAM(S): at 06:35

## 2018-02-16 RX ADMIN — Medication 3 MILLILITER(S): at 23:57

## 2018-02-16 RX ADMIN — Medication 100 MILLIGRAM(S): at 18:24

## 2018-02-16 RX ADMIN — Medication 650 MILLIGRAM(S): at 21:31

## 2018-02-16 RX ADMIN — PANTOPRAZOLE SODIUM 40 MILLIGRAM(S): 20 TABLET, DELAYED RELEASE ORAL at 18:23

## 2018-02-16 RX ADMIN — Medication 2: at 22:57

## 2018-02-16 RX ADMIN — Medication 20 MILLIGRAM(S): at 18:23

## 2018-02-16 RX ADMIN — Medication 20 MILLIGRAM(S): at 09:30

## 2018-02-16 RX ADMIN — CHLORHEXIDINE GLUCONATE 15 MILLILITER(S): 213 SOLUTION TOPICAL at 18:11

## 2018-02-16 RX ADMIN — FLUCONAZOLE 100 MILLIGRAM(S): 150 TABLET ORAL at 15:11

## 2018-02-16 RX ADMIN — APIXABAN 5 MILLIGRAM(S): 2.5 TABLET, FILM COATED ORAL at 10:36

## 2018-02-16 NOTE — PROGRESS NOTE ADULT - SUBJECTIVE AND OBJECTIVE BOX
Interventional, Pulmonary, Critical, Chest Special Procedures.    Pt was seen and fully examined by myself.     Time spent with patient in minutes:37    Patient is a 72y old  Male who presents with a chief complaint of acute cholecystitis (18 Jan 2018 14:49)The patient tolerating CPAP, denies any pain, engaging.  Vent circuits verified and preserved  L Ctube circuit verified and preserved.    HPI:  71 yo M with h/o severe mitral and aortic valve regurgitation presented to ED with 5 day h/o left lower abdominal pain, nausea and multiple episodes of vomiting as well as PO intolerance. Pt reports that the pain in his abdomen is illicited by movement. Went to [facility] two days ago and underwent CT scan which revealed acute cholecystitis as well as right lower lobe infectious pneumonia. Pt was told he would require surgery and left AMA because he was waiting too long. Denies f/c. Last echo 1/4/17 with EF 60-65%. Normal BMs and passing flatus. (18 Jan 2018 14:49)    REVIEW OF SYSTEMS:  Constitutional: + fever, weight loss, chills or fatigue  Eyes: No eye pain, visual disturbances, or discharge  ENMT:  No difficulty hearing, tinnitus, vertigo; No sinus or throat pain. No epistaxis, dysphagia, dysphonia, hoarseness or odynophagia  Neck: No pain, stiffness or neck swelling.  No masses or deformities  Respiratory: No cough, wheezing, chills or hemoptysis  - COPD  - ILD   - PE   - ASTHMA     - PNEUMONIA  Cardiovascular: No chest pain, dysrhythmia, palpitations, dizziness or edema   - COPD     - CAD   - CHF   - HTN  Gastrointestinal: No abdominal or epigastric pain. No nausea, vomiting or hematemesis; No diarrhea or constipation. No melena or hematochezia. No dysphagia or Icterus.          Genitourinary: No dysuria, frequency, hematuria or incontinence   - CKD/GAETANO      - ESRD  Neurological: No headaches, memory loss, loss of strength, numbness or tremors      -DEMENTIA     - STROKE    - SEIZURE  Skin: No itching, burning, rashes or lesions   Lymph Nodes: No enlarged glands  Endocrine: No heat or cold intolerance; No hair loss       - DM     - THYROID DISORDER  Musculoskeletal: No joint pain or swelling; No muscle, back or extremity pain  Psychiatric: No depression, anxiety, mood swings or difficulty sleeping  Heme/Lymph: No easy bruising or bleeding gums         - ANEMIA      - CANCER   -COAGULOPATHY  Allergy and Immunologic: No hives or eczema    PAST MEDICAL & SURGICAL HISTORY:  Mitral regurgitation  Aortic regurgitation  No significant past surgical history    FAMILY HISTORY:    SOCIAL HISTORY:      - Tobacco     - ETOH    Allergies    No Known Allergies    Intolerances      Vital Signs Last 24 Hrs  T(C): 37.8 (16 Feb 2018 09:50), Max: 38.2 (15 Feb 2018 18:01)  T(F): 100.1 (16 Feb 2018 09:50), Max: 100.8 (15 Feb 2018 18:01)  HR: 80 (16 Feb 2018 11:00) (80 - 106)  BP: 112/66 (16 Feb 2018 11:00) (93/70 - 127/65)  BP(mean): 77 (16 Feb 2018 11:00) (69 - 94)  RR: 20 (16 Feb 2018 11:00) (18 - 30)  SpO2: 99% (16 Feb 2018 12:53) (98% - 100%)    02-15 @ 07:01  -  02-16 @ 07:00  --------------------------------------------------------  IN: 2122 mL / OUT: 1325 mL / NET: 797 mL    02-16 @ 07:01  -  02-16 @ 14:45  --------------------------------------------------------  IN: 204 mL / OUT: 350 mL / NET: -146 mL      Mode: CPAP with PS  FiO2: 40  PEEP: 5  PS: 10  ITime: 0.85  MAP: 8  PIP: 15    PHYSICAL EXAM:  More comfortable, no distress  Eyes: PERRL, EOM intact; conjunctiva and sclera clear  Head: Normocephalic;  No Trauma  ENMT: No nasal discharge, hoarseness, cough or hemoptysis.  Airway clear  Neck: Supple; non tender; no masses or deformities.    No JVD  Respiratory:  - WHEEZING   few bilateral   RHONCHI  - RALES  = CRACKLES.  Diminished breath sounds  BILATERAL  RIGHT  LEFT bases   Cardiovascular: Regular rate and rhythm. S1 and S2 Normal; ++murmurs, gallops or rubs     - PPM/AICD  Gastrointestinal: Soft ,distended; Normal bowel sounds; No hepatosplenomegaly.     +PEG    + multiple surgical drains integrity checked    ANGELES  Genitourinary: No costovertebral angle tenderness. No dysuria  Extremities: Decreased AROM, + clubbing, cyanosis + edema, R foot toes unchanged pattern of necrosis, L foot less edema   Vascular: Peripheral pulses palpable 2+ bilaterally  Neurological: intubated and opening eyes to commands  Skin: Warm and dry. No obvious rash  Lymph Nodes: No acute cervical or supraclavicular adenopathy  Psychiatric: not sedated, calm affect    DEVICES:  - DENTURES   +IV R / L     - ETUBE   +TRACH 8.0 Shilley cuffed, site is clean, new L CTube circuit integrity preserved, O/P marked serosanguinous 70cc/24          LABS:                          8.3    7.4   )-----------( 155      ( 16 Feb 2018 06:07 )             26.4     02-16    140  |  102  |  38<H>  ----------------------------<  151<H>  4.1   |  30  |  0.79    Ca    7.8<L>      16 Feb 2018 06:07  Phos  3.2     02-16  Mg     2.2     02-16        < from: Xray Chest 1 View- PORTABLE-Routine (02.16.18 @ 04:11) >  EXAM:  XR CHEST PORTABLE ROUTINE 1V                          PROCEDURE DATE:  02/16/2018                     INTERPRETATION:  Portable Chest X-Ray dated 2/16/2018 4:11 AM    Indication: trached in sicu    An AP portable view of the chest is compared to 2/15/2018. No significant   interval change in the pulmonary pathology of the support devices, as   previously reported. Bibasilar infiltrates. Small left pleural effusion.   Right central venous catheter with the tip in the proximal right atrium.    Tracheostomy in place. Artifacts project over the chest obscuring detail.    IMPRESSION:  No significant interval change.    < end of copied text >  RADIOLOGY & ADDITIONAL STUDIES (The following images were personally reviewed):

## 2018-02-16 NOTE — PROGRESS NOTE ADULT - ASSESSMENT
Gallium scan shows increases activity in right scrotum    There is no abnormality on physical exam to correlated this finding with

## 2018-02-16 NOTE — PROGRESS NOTE ADULT - ATTENDING COMMENTS
Patient seen and examined with house-staff during bedside rounds.  Resident note read, including vitals, physical findings, laboratory data, and radiological reports.   Revisions included below.  Direct personal management at bed side and extensive interpretation of the data.  Plan was outlined and discussed in details with the housestaff.  Decision making of high complexity  CT was removed  He improved on vent and tolerated PSV for most of the day  rest on CMV  for gallium scan  Last day of antibiotic  No diarrhoea  Tolerating tube feed  Increase diuresis

## 2018-02-16 NOTE — CHART NOTE - NSCHARTNOTEFT_GEN_A_CORE
71 y/o M with recent RLL PNA, new onset Afib, CHF exacerbation, and perforated duodenal ulcer s/p Shreyas patch persistent fevers despite appropriate antibiotics and antifungals, now found to have a moderate left pleural effusion s/p drainage with IR.     -  Patient is s/p IR drainage, please refer to IR for management of tube  -  Please follow up on cultures from fluid as results are only preliminary.  -  Please re-consult as needed    Michelle Toure PA-C

## 2018-02-16 NOTE — PROGRESS NOTE ADULT - SUBJECTIVE AND OBJECTIVE BOX
Critical l care Note    INTERVAL HPI/OVERNIGHT EVENTS:    ANTIBIOTICS    MEDICATIONS  (STANDING):  ALBUTerol/ipratropium for Nebulization 3 milliLiter(s) Nebulizer every 6 hours  apixaban 5 milliGRAM(s) Oral every 12 hours  BACItracin   Ointment 1 Application(s) Topical two times a day  chlorhexidine 0.12% Liquid 15 milliLiter(s) Swish and Spit two times a day  digoxin     Tablet 0.125 milliGRAM(s) Oral daily  docusate sodium Liquid 100 milliGRAM(s) Oral two times a day  fluconAZOLE IVPB 400 milliGRAM(s) IV Intermittent every 24 hours  hydrogen peroxide 3% Solution 1 Application(s) Topical daily  insulin lispro (HumaLOG) corrective regimen sliding scale   SubCutaneous every 6 hours  lidocaine 2% Injectable 50 milliLiter(s) Local Injection once  pantoprazole  Injectable 40 milliGRAM(s) IV Push every 12 hours  povidone iodine 10% Solution 1 Application(s) Topical daily  sodium chloride 0.9% lock flush 20 milliLiter(s) IV Push once    MEDICATIONS  (PRN):  acetaminophen    Suspension 650 milliGRAM(s) Enteral Tube every 6 hours PRN For Temp greater than 38 C (100.4 F)  bisacodyl Suppository 10 milliGRAM(s) Rectal daily PRN Constipation  sodium chloride 0.9% lock flush 10 milliLiter(s) IV Push every 1 hour PRN After each medication administration      Allergies    No Known Allergies    Intolerances        REVIEW OF SYSTEMS:    Constitutional: No fever, weight loss or fatigue  Eyes: No eye pain, visual disturbances, or discharge  ENMT:  No difficulty hearing, tinnitus, vertigo; No sinus or throat pain  Neck: No pain or stiffness  Respiratory: No cough, wheezing, chills or hemoptysis  Cardiovascular: No chest pain, palpitations, shortness of breath, dizziness or leg swelling  Gastrointestinal: No abdominal or epigastric pain. No nausea, vomiting or hematemesis; No diarrhea or constipation. No melena or hematochezia.    Vital Signs Last 24 Hrs  T(C): 37.8 (16 Feb 2018 09:50), Max: 38.2 (15 Feb 2018 18:01)  T(F): 100.1 (16 Feb 2018 09:50), Max: 100.8 (15 Feb 2018 18:01)  HR: 80 (16 Feb 2018 11:00) (80 - 124)  BP: 112/66 (16 Feb 2018 11:00) (93/70 - 127/65)  BP(mean): 77 (16 Feb 2018 11:00) (69 - 94)  RR: 20 (16 Feb 2018 11:00) (18 - 36)  SpO2: 99% (16 Feb 2018 12:53) (98% - 100%)    PHYSICAL EXAM:    General:; in no acute distress  Eyes: PERRL, EOM intact; conjunctiva and sclera clear  Head: Normocephalic; atraumatic  ENMT: No nasal discharge; airway clear  Neck: Supple; non tender; no masses  Respiratory: No wheezes, rales or rhonchi  Cardiovascular: Regular rate and rhythm. S1 and S2 Normal; No murmurs, gallops or rubs  Gastrointestinal: Soft non-tender non-distended; Normal bowel sounds; No hepatosplenomegaly  Genitourinary: No costovertebral angle tenderness    no redness or selling in scrotum  Extremities: Normal range of motion, No clubbing, cyanosis or edema  Vascular: Peripheral pulses palpable 2+ bilaterally  Neurological: Alert and oriented x3  Skin: Warm and dry. No acute rash  Lymph Nodes: No acute cervical adenopathy  Musculoskeletal: Normal gait, tone, without deformities    LABS:                        8.3    7.4   )-----------( 155      ( 16 Feb 2018 06:07 )             26.4     02-16    140  |  102  |  38<H>  ----------------------------<  151<H>  4.1   |  30  |  0.79    Ca    7.8<L>      16 Feb 2018 06:07  Phos  3.2     02-16  Mg     2.2     02-16              MICROBIOLOGY:  Culture Results:   Testing in progress (02-15 @ 00:31)  Culture Results:   No growth to date. (02-14 @ 14:53)  Culture Results:   Sputum specimen rejected.  Microscopic examination indicates  oropharyngeal contamination.  Please repeat. (02-12 @ 10:39)  Culture Results:   No growth at 4 days. (02-12 @ 09:01)  Culture Results:   No growth at 4 days. (02-12 @ 08:55)      RADIOLOGY & ADDITIONAL STUDIES:

## 2018-02-16 NOTE — PROGRESS NOTE ADULT - SUBJECTIVE AND OBJECTIVE BOX
24 hr events: NGTD pleural fluid w/+LDH. Hb stable, restarted eliquis. d/c R GUCCI CTx to water seal; o/n Tmax 100.8F    SUBJECTIVE: pain controlled, denies HA, N/V, CP/SOB, chills/sweats, +Flatus/-BM    MEDICATIONS  (STANDING):  ALBUTerol/ipratropium for Nebulization 3 milliLiter(s) Nebulizer every 6 hours  apixaban 5 milliGRAM(s) Oral every 12 hours  BACItracin   Ointment 1 Application(s) Topical two times a day  chlorhexidine 0.12% Liquid 15 milliLiter(s) Swish and Spit two times a day  digoxin     Tablet 0.125 milliGRAM(s) Oral daily  docusate sodium Liquid 100 milliGRAM(s) Oral two times a day  fluconAZOLE IVPB 400 milliGRAM(s) IV Intermittent every 24 hours  hydrogen peroxide 3% Solution 1 Application(s) Topical daily  insulin lispro (HumaLOG) corrective regimen sliding scale   SubCutaneous every 6 hours  lidocaine 2% Injectable 50 milliLiter(s) Local Injection once  pantoprazole  Injectable 40 milliGRAM(s) IV Push every 12 hours  povidone iodine 10% Solution 1 Application(s) Topical daily  sodium chloride 0.9% lock flush 20 milliLiter(s) IV Push once    MEDICATIONS  (PRN):  acetaminophen    Suspension 650 milliGRAM(s) Enteral Tube every 6 hours PRN For Temp greater than 38 C (100.4 F)  bisacodyl Suppository 10 milliGRAM(s) Rectal daily PRN Constipation  sodium chloride 0.9% lock flush 10 milliLiter(s) IV Push every 1 hour PRN After each medication administration    ICU Vital Signs Last 24 Hrs  T(C): 37.8 (16 Feb 2018 02:20), Max: 38.2 (15 Feb 2018 18:01)  T(F): 100.1 (16 Feb 2018 02:20), Max: 100.8 (15 Feb 2018 18:01)  HR: 84 (16 Feb 2018 10:00) (80 - 124)  BP: 127/65 (16 Feb 2018 09:00) (93/70 - 127/65)  BP(mean): 74 (16 Feb 2018 09:00) (69 - 94)  ABP: --  ABP(mean): --  RR: 21 (16 Feb 2018 10:00) (18 - 36)  SpO2: 99% (16 Feb 2018 10:00) (98% - 100%)      Physical Exam:  General: NAD  HEENT: NC/AT, trach in place w/minimal secretions  Pulmonary: Nonlabored breathing, no respiratory distress, b/l ronchi  Cardiovascular: Afib, MR  Abdominal: soft, NT/ND, +BS, retrograde duodenostomy/gastrostomy/feeding J w/out erythema/purulence  Extremities: b/l pedal dressings w/serosang staining  Neuro: A/O x3, CNs II-XII grossly intact, normal motor/sensation, no focal deficit    Vent settings:  Mode: CPAP with PS, FiO2: 40, PEEP: 5, PS: 10, ITime: 0.85, MAP: 8, PIP: 16    I&O's Summary    15 Feb 2018 07:01  -  16 Feb 2018 07:00  --------------------------------------------------------  IN: 2122 mL / OUT: 1325 mL / NET: 797 mL    16 Feb 2018 07:01  -  16 Feb 2018 10:25  --------------------------------------------------------  IN: 136 mL / OUT: 100 mL / NET: 36 mL        LABS:                        8.3    7.4   )-----------( 155      ( 16 Feb 2018 06:07 )             26.4     02-16    140  |  102  |  38<H>  ----------------------------<  151<H>  4.1   |  30  |  0.79    Ca    7.8<L>      16 Feb 2018 06:07  Phos  3.2     02-16  Mg     2.2     02-16          CAPILLARY BLOOD GLUCOSE      POCT Blood Glucose.: 167 mg/dL (16 Feb 2018 06:58)  POCT Blood Glucose.: 177 mg/dL (15 Feb 2018 23:36)  POCT Blood Glucose.: 190 mg/dL (15 Feb 2018 22:10)  POCT Blood Glucose.: 149 mg/dL (15 Feb 2018 16:48)  POCT Blood Glucose.: 128 mg/dL (15 Feb 2018 11:22)        Cultures:Culture Results:   Testing in progress (02-15 @ 00:31)  Culture Results:   No growth to date. (02-14 @ 14:53)

## 2018-02-16 NOTE — PROGRESS NOTE ADULT - ASSESSMENT
ASSESSMENT/PLAN 71y/o male pt c Respiratory failure, perforated duodenal ulcer, POD post repair, pneumonia, RLL, LLL  atelectases, no obvious RADHA, elevated R hemidiaphragm, L pleural effusion, AFIB,  thrombocytopenia, possible endocarditis, s/p reop. now POP tracheostomy, new fever pattern, anemia    1. O2: Attempt CPAP, CMV if pt fatigues and overnight  2. Bronchodilators:  Atrovent/ albuterol q 4 – 6 hours as needed  3. Corticosteroids: off  4. ID/Antibiotics:  strict ID supervision , adjust ABX pending multiple new CX, Gallium Scan discussed c  and SICU  5. Cardiac/HTN: Monitor, optimize HR;   6. GI: Rx/ prophylaxis c PPI/Protonix  7. Heme: Rx/VT  follow H/H closely Eliquis held for worsening anemia, possible bleeding, resume when not surgically contraindicated  8. Aspiration precautions, feeding PEG  9. Surgery mngmnt priority,  R subdiaphragmatic drain reported repositioned .discussed c SICU.    10. Edema: diuretic to continue  11.L CTube on water seal pending CX  Discussed c managing team. SICU, ID

## 2018-02-17 LAB
ALBUMIN SERPL ELPH-MCNC: 2.2 G/DL — LOW (ref 3.3–5)
ALP SERPL-CCNC: 171 U/L — HIGH (ref 40–120)
ALT FLD-CCNC: 46 U/L — HIGH (ref 10–45)
ANION GAP SERPL CALC-SCNC: 8 MMOL/L — SIGNIFICANT CHANGE UP (ref 5–17)
AST SERPL-CCNC: 52 U/L — HIGH (ref 10–40)
BILIRUB DIRECT SERPL-MCNC: 1.6 MG/DL — HIGH (ref 0–0.2)
BILIRUB INDIRECT FLD-MCNC: 0.8 MG/DL — SIGNIFICANT CHANGE UP (ref 0.2–1)
BILIRUB SERPL-MCNC: 2.4 MG/DL — HIGH (ref 0.2–1.2)
BUN SERPL-MCNC: 37 MG/DL — HIGH (ref 7–23)
CALCIUM SERPL-MCNC: 7.8 MG/DL — LOW (ref 8.4–10.5)
CHLORIDE SERPL-SCNC: 100 MMOL/L — SIGNIFICANT CHANGE UP (ref 96–108)
CO2 SERPL-SCNC: 29 MMOL/L — SIGNIFICANT CHANGE UP (ref 22–31)
CREAT SERPL-MCNC: 0.69 MG/DL — SIGNIFICANT CHANGE UP (ref 0.5–1.3)
CULTURE RESULTS: SIGNIFICANT CHANGE UP
CULTURE RESULTS: SIGNIFICANT CHANGE UP
GLUCOSE BLDC GLUCOMTR-MCNC: 135 MG/DL — HIGH (ref 70–99)
GLUCOSE BLDC GLUCOMTR-MCNC: 145 MG/DL — HIGH (ref 70–99)
GLUCOSE BLDC GLUCOMTR-MCNC: 152 MG/DL — HIGH (ref 70–99)
GLUCOSE BLDC GLUCOMTR-MCNC: 153 MG/DL — HIGH (ref 70–99)
GLUCOSE SERPL-MCNC: 150 MG/DL — HIGH (ref 70–99)
HCT VFR BLD CALC: 26.5 % — LOW (ref 39–50)
HGB BLD-MCNC: 8.1 G/DL — LOW (ref 13–17)
MAGNESIUM SERPL-MCNC: 2.3 MG/DL — SIGNIFICANT CHANGE UP (ref 1.6–2.6)
MCHC RBC-ENTMCNC: 30.5 PG — SIGNIFICANT CHANGE UP (ref 27–34)
MCHC RBC-ENTMCNC: 30.6 G/DL — LOW (ref 32–36)
MCV RBC AUTO: 99.6 FL — SIGNIFICANT CHANGE UP (ref 80–100)
PHOSPHATE SERPL-MCNC: 3.7 MG/DL — SIGNIFICANT CHANGE UP (ref 2.5–4.5)
PLATELET # BLD AUTO: 131 K/UL — LOW (ref 150–400)
POTASSIUM SERPL-MCNC: 3.6 MMOL/L — SIGNIFICANT CHANGE UP (ref 3.5–5.3)
POTASSIUM SERPL-SCNC: 3.6 MMOL/L — SIGNIFICANT CHANGE UP (ref 3.5–5.3)
PROT SERPL-MCNC: 5.4 G/DL — LOW (ref 6–8.3)
RBC # BLD: 2.66 M/UL — LOW (ref 4.2–5.8)
RBC # FLD: 22.3 % — HIGH (ref 10.3–16.9)
SODIUM SERPL-SCNC: 137 MMOL/L — SIGNIFICANT CHANGE UP (ref 135–145)
SPECIMEN SOURCE: SIGNIFICANT CHANGE UP
SPECIMEN SOURCE: SIGNIFICANT CHANGE UP
WBC # BLD: 7.5 K/UL — SIGNIFICANT CHANGE UP (ref 3.8–10.5)
WBC # FLD AUTO: 7.5 K/UL — SIGNIFICANT CHANGE UP (ref 3.8–10.5)

## 2018-02-17 PROCEDURE — 99233 SBSQ HOSP IP/OBS HIGH 50: CPT | Mod: GC

## 2018-02-17 PROCEDURE — 71045 X-RAY EXAM CHEST 1 VIEW: CPT | Mod: 26

## 2018-02-17 RX ORDER — POTASSIUM CHLORIDE 20 MEQ
40 PACKET (EA) ORAL ONCE
Qty: 0 | Refills: 0 | Status: COMPLETED | OUTPATIENT
Start: 2018-02-17 | End: 2018-02-17

## 2018-02-17 RX ORDER — FUROSEMIDE 40 MG
20 TABLET ORAL ONCE
Qty: 0 | Refills: 0 | Status: COMPLETED | OUTPATIENT
Start: 2018-02-17 | End: 2018-02-17

## 2018-02-17 RX ADMIN — Medication 100 MILLIGRAM(S): at 17:30

## 2018-02-17 RX ADMIN — PANTOPRAZOLE SODIUM 40 MILLIGRAM(S): 20 TABLET, DELAYED RELEASE ORAL at 17:30

## 2018-02-17 RX ADMIN — Medication 2: at 19:52

## 2018-02-17 RX ADMIN — APIXABAN 5 MILLIGRAM(S): 2.5 TABLET, FILM COATED ORAL at 11:13

## 2018-02-17 RX ADMIN — FLUCONAZOLE 100 MILLIGRAM(S): 150 TABLET ORAL at 15:25

## 2018-02-17 RX ADMIN — APIXABAN 5 MILLIGRAM(S): 2.5 TABLET, FILM COATED ORAL at 19:52

## 2018-02-17 RX ADMIN — Medication 1 APPLICATION(S): at 06:00

## 2018-02-17 RX ADMIN — Medication 3 MILLILITER(S): at 05:41

## 2018-02-17 RX ADMIN — Medication 3 MILLILITER(S): at 17:07

## 2018-02-17 RX ADMIN — CHLORHEXIDINE GLUCONATE 15 MILLILITER(S): 213 SOLUTION TOPICAL at 05:29

## 2018-02-17 RX ADMIN — PANTOPRAZOLE SODIUM 40 MILLIGRAM(S): 20 TABLET, DELAYED RELEASE ORAL at 06:00

## 2018-02-17 RX ADMIN — HYDROGEN PEROXIDE 1 APPLICATION(S): 0.3 LIQUID TOPICAL at 16:14

## 2018-02-17 RX ADMIN — Medication 40 MILLIEQUIVALENT(S): at 11:13

## 2018-02-17 RX ADMIN — Medication 0.12 MILLIGRAM(S): at 05:29

## 2018-02-17 RX ADMIN — Medication 40 MILLIEQUIVALENT(S): at 17:30

## 2018-02-17 RX ADMIN — Medication 20 MILLIGRAM(S): at 17:30

## 2018-02-17 RX ADMIN — Medication 3 MILLILITER(S): at 23:09

## 2018-02-17 RX ADMIN — Medication 1 APPLICATION(S): at 16:14

## 2018-02-17 RX ADMIN — Medication 1 APPLICATION(S): at 17:31

## 2018-02-17 RX ADMIN — Medication 3 MILLILITER(S): at 12:07

## 2018-02-17 RX ADMIN — Medication 40 MILLIEQUIVALENT(S): at 06:38

## 2018-02-17 RX ADMIN — Medication 100 MILLIGRAM(S): at 05:29

## 2018-02-17 RX ADMIN — CHLORHEXIDINE GLUCONATE 15 MILLILITER(S): 213 SOLUTION TOPICAL at 17:31

## 2018-02-17 RX ADMIN — Medication 20 MILLIGRAM(S): at 11:13

## 2018-02-17 NOTE — PROGRESS NOTE ADULT - ASSESSMENT
72yM with PMH severe MR/AR presented after recent dx acute cholecystitis and RLL PNA at [facility] with lower abdominal pain, new onset rapid Afib, sepsis, acute CHF exacerbation, initially admitted to cardiology on bipap, then transferred to SICU with perforated duodenum s/p Shreyas patch and jejunostomy (1/20) c/b breakdown s/p RTOR, washout, new Shreyas patch, gastrostomy, duodenostomy, IVCF (1/22)    Neuro: Tylenol/Dilaudid PRN  CV: Afib: Echo EF 50%, Severe MR, pulm htn, no vegetation, continue with Digoxin 0.125,  Eliquis for afib. diuresed with lasix yesterday. will continue to give lasix for goal I&O negative.   Pulm: Trach tolerated CPAP 10/5, will try 8/5 this morning and plan to transition to trach collar today if tolerates. continue lasix diuresis; Peridex, Duonebs ( 2/14-) Left Ctx removed 2/16, CXR no PTX afterwards.   GI: NPO, Glucerna 1.2 @68 via J tube, Protonix BID. Retrograde D-tube to LIWS (red), G-tube to LIWS (green), Colace, Dulcolax suppository  : Abarca (placed for retention on 2/10) - continue abarca for strict I&O for now with diuresis.   ID: C albicans intraperitoneal infxn: fluconazole x 3wks per ID (2/1--);   // D/c: Alexi/Stenotrophomonas: Levaquin (2/8-16), Zosyn (1/19-22; 1/27-29; 2/7), micafungin (1/22-2/1), ceftazidime (1/29-2/4), linezolid (1/22-23), meropenem (1/22-27), vancomycin (1/19-20; 2/7-2/9)   Endo: ISS  HEME: s/p IVCF (1/22) // 1FFP, 1PLT (1/22).   PPx: SCD, R pop DVT, s/p IVC filter   Lines: PIV, TLC (2/8--) // D/c: PICC (1/29-2/8), L brachial [place] (1/20-27), L SC HD cath (1/22-27)   Wounds: Ex lap, JPx2 to bulb suction, J tube for feeds, D-tube to LIWS (red), G-tube to LIWS (green); betadine/peroxide to toes  PT/OT: Early mobilization. was OOB to chair few days ago. 72yM with PMH severe MR/AR presented after recent dx acute cholecystitis and RLL PNA at [facility] with lower abdominal pain, new onset rapid Afib, sepsis, acute CHF exacerbation, initially admitted to cardiology on bipap, then transferred to SICU with perforated duodenum s/p Shreyas patch and jejunostomy (1/20) c/b breakdown s/p RTOR, washout, new Shreyas patch, gastrostomy, duodenostomy, IVCF (1/22), acute postoperative respiratory failure    Neuro: Tylenol/Dilaudid PRN  CV: Afib: Echo EF 50%, Severe MR, pulm htn, no vegetation, continue with Digoxin 0.125,  Eliquis for afib. diuresed with lasix yesterday. will continue to give lasix for goal I&O negative.   Pulm: Trach tolerated CPAP 10/5, will try 8/5 this morning and plan to transition to trach collar today if tolerates. continue lasix diuresis; Peridex, Duonebs ( 2/14-) Left Ctx removed 2/16, CXR no PTX afterwards.   GI: NPO, Glucerna 1.2 @68 via J tube, Protonix BID. Retrograde D-tube to LIWS (red), G-tube to LIWS (green), Colace, Dulcolax suppository  : Abarca (placed for retention on 2/10) - continue abarca for strict I&O for now with diuresis.   ID: C albicans intraperitoneal infxn: fluconazole x 3wks per ID (2/1--);   // D/c: Alexi/Stenotrophomonas: Levaquin (2/8-16), Zosyn (1/19-22; 1/27-29; 2/7), micafungin (1/22-2/1), ceftazidime (1/29-2/4), linezolid (1/22-23), meropenem (1/22-27), vancomycin (1/19-20; 2/7-2/9)   Endo: ISS  HEME: s/p IVCF (1/22) // 1FFP, 1PLT (1/22).   PPx: SCD, R pop DVT, s/p IVC filter   Lines: PIV, TLC (2/8--) // D/c: PICC (1/29-2/8), L brachial [place] (1/20-27), L SC HD cath (1/22-27)   Wounds: Ex lap, JPx2 to bulb suction, J tube for feeds, D-tube to LIWS (red), G-tube to LIWS (green); betadine/peroxide to toes  PT/OT: Early mobilization. was OOB to chair few days ago.

## 2018-02-17 NOTE — PROGRESS NOTE ADULT - SUBJECTIVE AND OBJECTIVE BOX
S: tolerated CPAP 10/5. lowered to 8/5 this morning with plans to trach collar today.       O: ICU Vital Signs Last 24 Hrs  T(F): 99.2 (02-17-18 @ 08:00), Max: 100.4 (02-16-18 @ 21:00)  HR: 80 (02-17-18 @ 09:00) (74 - 108)  BP: 97/58 (02-17-18 @ 09:00) (94/54 - 125/74)  BP(mean): 69 (02-17-18 @ 09:00) (64 - 86)  ABP: --  RR: 21 (02-17-18 @ 09:00) (15 - 29)  SpO2: 100% (02-17-18 @ 09:00) (95% - 100%)    PHYSICAL EXAM:     Neurological: AAOx3, CNII-XII intact,  strength 5/5 b/l  Cardiovascular: RRR  Respiratory: CTA  Gastrointestinal: soft, NT, incision/J-tube site/ clean/dry,  Extremities: warm, no dependent edema  Vascular: no cyanosis/erythema    LABS:    02-17    137  |  100  |  37<H>  ----------------------------<  150<H>  3.6   |  29  |  0.69    Ca    7.8<L>      17 Feb 2018 05:26  Phos  3.7     02-17  Mg     2.3     02-17    TPro  5.4<L>  /  Alb  2.2<L>  /  TBili  2.4<H>  /  DBili  1.6<H>  /  AST  52<H>  /  ALT  46<H>  /  AlkPhos  171<H>  02-17  LIVER FUNCTIONS - ( 17 Feb 2018 05:26 )  Alb: 2.2 g/dL / Pro: 5.4 g/dL / ALK PHOS: 171 U/L / ALT: 46 U/L / AST: 52 U/L / GGT: x                               8.1    7.5   )-----------( 131      ( 17 Feb 2018 05:26 )             26.5     CAPILLARY BLOOD GLUCOSE      POCT Blood Glucose.: 145 mg/dL (17 Feb 2018 05:57)  POCT Blood Glucose.: 193 mg/dL (16 Feb 2018 22:19)  POCT Blood Glucose.: 125 mg/dL (16 Feb 2018 17:59)    MEDICATIONS  (STANDING):  ALBUTerol/ipratropium for Nebulization 3 milliLiter(s) Nebulizer every 6 hours  apixaban 5 milliGRAM(s) Oral every 12 hours  BACItracin   Ointment 1 Application(s) Topical two times a day  chlorhexidine 0.12% Liquid 15 milliLiter(s) Swish and Spit two times a day  digoxin     Tablet 0.125 milliGRAM(s) Oral daily  docusate sodium Liquid 100 milliGRAM(s) Oral two times a day  fluconAZOLE IVPB 400 milliGRAM(s) IV Intermittent every 24 hours  furosemide   Injectable 20 milliGRAM(s) IV Push once  hydrogen peroxide 3% Solution 1 Application(s) Topical daily  insulin lispro (HumaLOG) corrective regimen sliding scale   SubCutaneous every 6 hours  lidocaine 2% Injectable 50 milliLiter(s) Local Injection once  pantoprazole  Injectable 40 milliGRAM(s) IV Push every 12 hours  potassium chloride   Powder 40 milliEquivalent(s) Enteral Tube once  povidone iodine 10% Solution 1 Application(s) Topical daily  sodium chloride 0.9% lock flush 20 milliLiter(s) IV Push once    MEDICATIONS  (PRN):  acetaminophen    Suspension 650 milliGRAM(s) Enteral Tube every 6 hours PRN For Temp greater than 38 C (100.4 F)  bisacodyl Suppository 10 milliGRAM(s) Rectal daily PRN Constipation  sodium chloride 0.9% lock flush 10 milliLiter(s) IV Push every 1 hour PRN After each medication administration      Oliveira:	  [ ] None	[ ] Daily Oliveira Order Placed	   Indication:	  [ ] Strict I and O's    [ ] Obstruction     [ ] Incontinence + Stage 3 or 4 Decubitus  Central Line:  [ ] None	   [ ]  Medication / TPN Administration     [ ] No Peripheral IV S: tolerated CPAP 10/5. lowered to 8/5 this morning with plans to trach collar today.       O: ICU Vital Signs Last 24 Hrs  T(F): 99.2 (02-17-18 @ 08:00), Max: 100.4 (02-16-18 @ 21:00)  HR: 80 (02-17-18 @ 09:00) (74 - 108)  BP: 97/58 (02-17-18 @ 09:00) (94/54 - 125/74)  BP(mean): 69 (02-17-18 @ 09:00) (64 - 86)  ABP: --  RR: 21 (02-17-18 @ 09:00) (15 - 29)  SpO2: 100% (02-17-18 @ 09:00) (95% - 100%)    PHYSICAL EXAM:     Neurological: AAOx3, CNII-XII intact,  strength 5/5 b/l  Cardiovascular: RRR  Respiratory: CTA  Gastrointestinal: soft, NT, incision/J-tube site/D-tube site/G-tube site clean/dry, no erythema/pus.   Extremities: warm, has some dependent edema, but improved from prior exam.   Vascular: b/l feet with dsg intact (underneath with wounds to toes healing well), no cyanosis.     LABS:    02-17    137  |  100  |  37<H>  ----------------------------<  150<H>  3.6   |  29  |  0.69    Ca    7.8<L>      17 Feb 2018 05:26  Phos  3.7     02-17  Mg     2.3     02-17    TPro  5.4<L>  /  Alb  2.2<L>  /  TBili  2.4<H>  /  DBili  1.6<H>  /  AST  52<H>  /  ALT  46<H>  /  AlkPhos  171<H>  02-17  LIVER FUNCTIONS - ( 17 Feb 2018 05:26 )  Alb: 2.2 g/dL / Pro: 5.4 g/dL / ALK PHOS: 171 U/L / ALT: 46 U/L / AST: 52 U/L / GGT: x                               8.1    7.5   )-----------( 131      ( 17 Feb 2018 05:26 )             26.5     CAPILLARY BLOOD GLUCOSE      POCT Blood Glucose.: 145 mg/dL (17 Feb 2018 05:57)  POCT Blood Glucose.: 193 mg/dL (16 Feb 2018 22:19)  POCT Blood Glucose.: 125 mg/dL (16 Feb 2018 17:59)    MEDICATIONS  (STANDING):  ALBUTerol/ipratropium for Nebulization 3 milliLiter(s) Nebulizer every 6 hours  apixaban 5 milliGRAM(s) Oral every 12 hours  BACItracin   Ointment 1 Application(s) Topical two times a day  chlorhexidine 0.12% Liquid 15 milliLiter(s) Swish and Spit two times a day  digoxin Tablet 0.125 milliGRAM(s) Oral daily  docusate sodium Liquid 100 milliGRAM(s) Oral two times a day  fluconAZOLE IVPB 400 milliGRAM(s) IV Intermittent every 24 hours  furosemide   Injectable 20 milliGRAM(s) IV Push once  hydrogen peroxide 3% Solution 1 Application(s) Topical daily  insulin lispro (HumaLOG) corrective regimen sliding scale   SubCutaneous every 6 hours  lidocaine 2% Injectable 50 milliLiter(s) Local Injection once  pantoprazole  Injectable 40 milliGRAM(s) IV Push every 12 hours  potassium chloride   Powder 40 milliEquivalent(s) Enteral Tube once  povidone iodine 10% Solution 1 Application(s) Topical daily  sodium chloride 0.9% lock flush 20 milliLiter(s) IV Push once    MEDICATIONS  (PRN):  acetaminophen    Suspension 650 milliGRAM(s) Enteral Tube every 6 hours PRN For Temp greater than 38 C (100.4 F)  bisacodyl Suppository 10 milliGRAM(s) Rectal daily PRN Constipation  sodium chloride 0.9% lock flush 10 milliLiter(s) IV Push every 1 hour PRN After each medication administration      Oliveira:	  [ ] None	[x ] Daily Oliveira Order Placed	   Indication:	  [x ] Strict I and O's    [ ] Obstruction     [ ] Incontinence + Stage 3 or 4 Decubitus  Central Line:  [ ] None	   [ ]  Medication / TPN Administration     [ ] No Peripheral IV S: tolerated CPAP 10/5. lowered to 8/5 this morning with plans to trach collar today.       O: ICU Vital Signs Last 24 Hrs  T(F): 99.2 (02-17-18 @ 08:00), Max: 100.4 (02-16-18 @ 21:00)  HR: 80 (02-17-18 @ 09:00) (74 - 108)  BP: 97/58 (02-17-18 @ 09:00) (94/54 - 125/74)  BP(mean): 69 (02-17-18 @ 09:00) (64 - 86)  ABP: --  RR: 21 (02-17-18 @ 09:00) (15 - 29)  SpO2: 100% (02-17-18 @ 09:00) (95% - 100%)    PHYSICAL EXAM:     Neurological: AAOx3, CNII-XII intact,  strength 5/5 b/l  Cardiovascular: RRR  Respiratory: CTA  Gastrointestinal: soft, NT, incision/J-tube site/D-tube site/G-tube site clean/dry, no erythema/pus.   Extremities: warm, has some dependent edema, but improved from prior exam.   Vascular: b/l feet with dsg intact (underneath with wounds to toes healing well), no cyanosis.     LABS:    02-17    137  |  100  |  37<H>  ----------------------------<  150<H>  3.6   |  29  |  0.69    Ca    7.8<L>      17 Feb 2018 05:26  Phos  3.7     02-17  Mg     2.3     02-17    TPro  5.4<L>  /  Alb  2.2<L>  /  TBili  2.4<H>  /  DBili  1.6<H>  /  AST  52<H>  /  ALT  46<H>  /  AlkPhos  171<H>  02-17  LIVER FUNCTIONS - ( 17 Feb 2018 05:26 )  Alb: 2.2 g/dL / Pro: 5.4 g/dL / ALK PHOS: 171 U/L / ALT: 46 U/L / AST: 52 U/L / GGT: x                               8.1    7.5   )-----------( 131      ( 17 Feb 2018 05:26 )             26.5     CAPILLARY BLOOD GLUCOSE      POCT Blood Glucose.: 145 mg/dL (17 Feb 2018 05:57)  POCT Blood Glucose.: 193 mg/dL (16 Feb 2018 22:19)  POCT Blood Glucose.: 125 mg/dL (16 Feb 2018 17:59)    MEDICATIONS  (STANDING):  ALBUTerol/ipratropium for Nebulization 3 milliLiter(s) Nebulizer every 6 hours  apixaban 5 milliGRAM(s) Oral every 12 hours  BACItracin   Ointment 1 Application(s) Topical two times a day  chlorhexidine 0.12% Liquid 15 milliLiter(s) Swish and Spit two times a day  digoxin Tablet 0.125 milliGRAM(s) Oral daily  docusate sodium Liquid 100 milliGRAM(s) Oral two times a day  fluconAZOLE IVPB 400 milliGRAM(s) IV Intermittent every 24 hours  furosemide   Injectable 20 milliGRAM(s) IV Push once  hydrogen peroxide 3% Solution 1 Application(s) Topical daily  insulin lispro (HumaLOG) corrective regimen sliding scale   SubCutaneous every 6 hours  lidocaine 2% Injectable 50 milliLiter(s) Local Injection once  pantoprazole  Injectable 40 milliGRAM(s) IV Push every 12 hours  potassium chloride   Powder 40 milliEquivalent(s) Enteral Tube once  povidone iodine 10% Solution 1 Application(s) Topical daily  sodium chloride 0.9% lock flush 20 milliLiter(s) IV Push once    MEDICATIONS  (PRN):  acetaminophen    Suspension 650 milliGRAM(s) Enteral Tube every 6 hours PRN For Temp greater than 38 C (100.4 F)  bisacodyl Suppository 10 milliGRAM(s) Rectal daily PRN Constipation  sodium chloride 0.9% lock flush 10 milliLiter(s) IV Push every 1 hour PRN After each medication administration      Oliveira:	  [ ] None	[x ] Daily Oliveira Order Placed	   Indication:	  [x ] Strict I and O's    [ ] Obstruction     [ ] Incontinence + Stage 3 or 4 Decubitus  Central Line:  [ ] None	   [x ]  Medication / TPN Administration     [ ] No Peripheral IV S: tolerated CPAP 10/5. lowered to 8/5 this morning with plans to trach collar today.       O: ICU Vital Signs Last 24 Hrs  T(F): 99.2 (02-17-18 @ 08:00), Max: 100.4 (02-16-18 @ 21:00)  HR: 80 (02-17-18 @ 09:00) (74 - 108)  BP: 97/58 (02-17-18 @ 09:00) (94/54 - 125/74)  BP(mean): 69 (02-17-18 @ 09:00) (64 - 86)  ABP: --  RR: 21 (02-17-18 @ 09:00) (15 - 29)  SpO2: 100% (02-17-18 @ 09:00) (95% - 100%)    PHYSICAL EXAM:     Neurological: AAOx3, CNII-XII intact,  strength 5/5 b/l  Cardiovascular: RRR  Respiratory: CTA  Gastrointestinal: soft, NT, incision/J-tube site/D-tube site/G-tube site clean/dry, no erythema/pus.   Extremities: warm, has some dependent edema, but improved compared with prior exam.   Vascular: b/l feet with dsg intact (underneath with wounds to toes healing well), no cyanosis.     LABS:    02-17    137  |  100  |  37<H>  ----------------------------<  150<H>  3.6   |  29  |  0.69    Ca    7.8<L>      17 Feb 2018 05:26  Phos  3.7     02-17  Mg     2.3     02-17    TPro  5.4<L>  /  Alb  2.2<L>  /  TBili  2.4<H>  /  DBili  1.6<H>  /  AST  52<H>  /  ALT  46<H>  /  AlkPhos  171<H>  02-17  LIVER FUNCTIONS - ( 17 Feb 2018 05:26 )  Alb: 2.2 g/dL / Pro: 5.4 g/dL / ALK PHOS: 171 U/L / ALT: 46 U/L / AST: 52 U/L / GGT: x                               8.1    7.5   )-----------( 131      ( 17 Feb 2018 05:26 )             26.5     CAPILLARY BLOOD GLUCOSE      POCT Blood Glucose.: 145 mg/dL (17 Feb 2018 05:57)  POCT Blood Glucose.: 193 mg/dL (16 Feb 2018 22:19)  POCT Blood Glucose.: 125 mg/dL (16 Feb 2018 17:59)    MEDICATIONS  (STANDING):  ALBUTerol/ipratropium for Nebulization 3 milliLiter(s) Nebulizer every 6 hours  apixaban 5 milliGRAM(s) Oral every 12 hours  BACItracin   Ointment 1 Application(s) Topical two times a day  chlorhexidine 0.12% Liquid 15 milliLiter(s) Swish and Spit two times a day  digoxin Tablet 0.125 milliGRAM(s) Oral daily  docusate sodium Liquid 100 milliGRAM(s) Oral two times a day  fluconAZOLE IVPB 400 milliGRAM(s) IV Intermittent every 24 hours  furosemide   Injectable 20 milliGRAM(s) IV Push once  hydrogen peroxide 3% Solution 1 Application(s) Topical daily  insulin lispro (HumaLOG) corrective regimen sliding scale   SubCutaneous every 6 hours  lidocaine 2% Injectable 50 milliLiter(s) Local Injection once  pantoprazole  Injectable 40 milliGRAM(s) IV Push every 12 hours  potassium chloride   Powder 40 milliEquivalent(s) Enteral Tube once  povidone iodine 10% Solution 1 Application(s) Topical daily  sodium chloride 0.9% lock flush 20 milliLiter(s) IV Push once    MEDICATIONS  (PRN):  acetaminophen    Suspension 650 milliGRAM(s) Enteral Tube every 6 hours PRN For Temp greater than 38 C (100.4 F)  bisacodyl Suppository 10 milliGRAM(s) Rectal daily PRN Constipation  sodium chloride 0.9% lock flush 10 milliLiter(s) IV Push every 1 hour PRN After each medication administration      Oliveira:	  [ ] None	[x ] Daily Oliveira Order Placed	   Indication:	  [x ] Strict I and O's    [ ] Obstruction     [ ] Incontinence + Stage 3 or 4 Decubitus  Central Line:  [ ] None	   [x ]  Medication / TPN Administration     [ ] No Peripheral IV S: tolerated CPAP 10/5. lowered to 8/5 this morning with plans to trach collar today. On ROS no CP or SOB or nausea      O: ICU Vital Signs Last 24 Hrs  T(F): 99.2 (02-17-18 @ 08:00), Max: 100.4 (02-16-18 @ 21:00)  HR: 80 (02-17-18 @ 09:00) (74 - 108)  BP: 97/58 (02-17-18 @ 09:00) (94/54 - 125/74)  BP(mean): 69 (02-17-18 @ 09:00) (64 - 86)  ABP: --  RR: 21 (02-17-18 @ 09:00) (15 - 29)  SpO2: 100% (02-17-18 @ 09:00) (95% - 100%)    PHYSICAL EXAM:   General: no distress, trach in place  Neurological: AAOx3, CNII-XII intact,  strength 5/5 b/l  HEENT: PERRL, MMM  Cardiovascular: RRR  Respiratory: CTA  Gastrointestinal: soft, NT, incision/J-tube site/D-tube site/G-tube site clean/dry, no erythema/pus.   Extremities: warm, has some dependent edema, but improved compared with prior exam.   Vascular: b/l feet with dsg intact (underneath with wounds to toes healing well), no cyanosis. 2+ radial and DP pulses  MSK: no joint swelling  Skin: no rash    LABS:    02-17    137  |  100  |  37<H>  ----------------------------<  150<H>  3.6   |  29  |  0.69    Ca    7.8<L>      17 Feb 2018 05:26  Phos  3.7     02-17  Mg     2.3     02-17    TPro  5.4<L>  /  Alb  2.2<L>  /  TBili  2.4<H>  /  DBili  1.6<H>  /  AST  52<H>  /  ALT  46<H>  /  AlkPhos  171<H>  02-17  LIVER FUNCTIONS - ( 17 Feb 2018 05:26 )  Alb: 2.2 g/dL / Pro: 5.4 g/dL / ALK PHOS: 171 U/L / ALT: 46 U/L / AST: 52 U/L / GGT: x                               8.1    7.5   )-----------( 131      ( 17 Feb 2018 05:26 )             26.5     CAPILLARY BLOOD GLUCOSE      POCT Blood Glucose.: 145 mg/dL (17 Feb 2018 05:57)  POCT Blood Glucose.: 193 mg/dL (16 Feb 2018 22:19)  POCT Blood Glucose.: 125 mg/dL (16 Feb 2018 17:59)    MEDICATIONS  (STANDING):  ALBUTerol/ipratropium for Nebulization 3 milliLiter(s) Nebulizer every 6 hours  apixaban 5 milliGRAM(s) Oral every 12 hours  BACItracin   Ointment 1 Application(s) Topical two times a day  chlorhexidine 0.12% Liquid 15 milliLiter(s) Swish and Spit two times a day  digoxin Tablet 0.125 milliGRAM(s) Oral daily  docusate sodium Liquid 100 milliGRAM(s) Oral two times a day  fluconAZOLE IVPB 400 milliGRAM(s) IV Intermittent every 24 hours  furosemide   Injectable 20 milliGRAM(s) IV Push once  hydrogen peroxide 3% Solution 1 Application(s) Topical daily  insulin lispro (HumaLOG) corrective regimen sliding scale   SubCutaneous every 6 hours  lidocaine 2% Injectable 50 milliLiter(s) Local Injection once  pantoprazole  Injectable 40 milliGRAM(s) IV Push every 12 hours  potassium chloride   Powder 40 milliEquivalent(s) Enteral Tube once  povidone iodine 10% Solution 1 Application(s) Topical daily  sodium chloride 0.9% lock flush 20 milliLiter(s) IV Push once    MEDICATIONS  (PRN):  acetaminophen    Suspension 650 milliGRAM(s) Enteral Tube every 6 hours PRN For Temp greater than 38 C (100.4 F)  bisacodyl Suppository 10 milliGRAM(s) Rectal daily PRN Constipation  sodium chloride 0.9% lock flush 10 milliLiter(s) IV Push every 1 hour PRN After each medication administration      Oliveira:	  [ ] None	[x ] Daily Oliveira Order Placed	   Indication:	  [x ] Strict I and O's    [ ] Obstruction     [ ] Incontinence + Stage 3 or 4 Decubitus  Central Line:  [ ] None	   [x ]  Medication / TPN Administration     [ ] No Peripheral IV

## 2018-02-17 NOTE — PROGRESS NOTE ADULT - SUBJECTIVE AND OBJECTIVE BOX
Interventional, Pulmonary, Critical, Chest Special Procedures.    Pt was seen and fully examined by myself.     Time spent with patient in minutes:37    Patient is a 72y old  Male who presents with a chief complaint of acute cholecystitis (18 Jan 2018 14:49)The patient c better inspiratory effort after liberated from L CTube, placed and tolerating TCollar when seen. pt. pain free.    HPI:  71 yo M with h/o severe mitral and aortic valve regurgitation presented to ED with 5 day h/o left lower abdominal pain, nausea and multiple episodes of vomiting as well as PO intolerance. Pt reports that the pain in his abdomen is illicited by movement. Went to [facility] two days ago and underwent CT scan which revealed acute cholecystitis as well as right lower lobe infectious pneumonia. Pt was told he would require surgery and left AMA because he was waiting too long. Denies f/c. Last echo 1/4/17 with EF 60-65%. Normal BMs and passing flatus. (18 Jan 2018 14:49)    REVIEW OF SYSTEMS:  Constitutional: No fever, weight loss, chills ++ fatigue  Eyes: No eye pain, visual disturbances, or discharge  ENMT:  No difficulty hearing, tinnitus, vertigo; No sinus or throat pain. No epistaxis, dysphagia, dysphonia, hoarseness or odynophagia  Neck: No pain, stiffness or neck swelling.  No masses or deformities  Respiratory: + mild cough, wheezing, chills or hemoptysis  - COPD  - ILD   - PE   - ASTHMA     + PNEUMONIA  Cardiovascular: No chest pain, dysrhythmia, palpitations, dizziness or edema   - COPD     - CAD   - CHF   - HTN  Gastrointestinal: No abdominal or epigastric pain. No nausea, vomiting or hematemesis; No diarrhea or constipation. No melena or hematochezia. No dysphagia or Icterus.          Genitourinary: No dysuria, frequency, hematuria or incontinence   - CKD/GAETANO      - ESRD  Neurological: No headaches, memory loss, loss of strength, numbness or tremors      -DEMENTIA     - STROKE    - SEIZURE  Skin: No itching, burning, rashes or lesions   Lymph Nodes: No enlarged glands  Endocrine: No heat or cold intolerance; No hair loss       - DM     - THYROID DISORDER  Musculoskeletal: No joint pain or swelling; No muscle, back or extremity pain  Psychiatric: No depression, anxiety, mood swings or difficulty sleeping  Heme/Lymph: No easy bruising or bleeding gums         - ANEMIA      - CANCER   -COAGULOPATHY  Allergy and Immunologic: No hives or eczema    PAST MEDICAL & SURGICAL HISTORY:  Mitral regurgitation  Aortic regurgitation  No significant past surgical history    FAMILY HISTORY:    SOCIAL HISTORY:      - Tobacco     - ETOH    Allergies    No Known Allergies    Intolerances      Vital Signs Last 24 Hrs  T(C): 37.4 (17 Feb 2018 14:21), Max: 38 (16 Feb 2018 21:00)  T(F): 99.4 (17 Feb 2018 14:21), Max: 100.4 (16 Feb 2018 21:00)  HR: 94 (17 Feb 2018 15:00) (74 - 108)  BP: 102/60 (17 Feb 2018 15:00) (94/54 - 133/60)  BP(mean): 80 (17 Feb 2018 15:00) (64 - 86)  RR: 21 (17 Feb 2018 15:00) (15 - 29)  SpO2: 100% (17 Feb 2018 15:00) (95% - 100%)    02-16 @ 07:01  -  02-17 @ 07:00  --------------------------------------------------------  IN: 1764 mL / OUT: 2465 mL / NET: -701 mL    02-17 @ 07:01  -  02-17 @ 15:50  --------------------------------------------------------  IN: 812 mL / OUT: 1065 mL / NET: -253 mL      Mode: standby    PHYSICAL EXAM:  More comfortable, no distress  Eyes: PERRL, EOM intact; conjunctiva and sclera clear  Head: Normocephalic;  No Trauma  ENMT: No nasal discharge, hoarseness, cough or hemoptysis.  Airway clear  Neck: Supple; non tender; no masses or deformities.    No JVD  Respiratory:  - WHEEZING   few bilateral   RHONCHI  - RALES  = CRACKLES.  Diminished breath sounds  BILATERAL  RIGHT  LEFT bases   Cardiovascular: Regular rate and rhythm. S1 and S2 Normal; ++murmurs, gallops or rubs     - PPM/AICD  Gastrointestinal: Soft ,distended; Normal bowel sounds; No hepatosplenomegaly.     +PEG    + multiple surgical drains integrity checked    ANGELES  Genitourinary: No costovertebral angle tenderness. No dysuria  Extremities: Decreased AROM, + clubbing, cyanosis + edema, R foot toes unchanged pattern of necrosis, L foot less edema   Vascular: Peripheral pulses palpable 2+ bilaterally  Neurological: intubated and opening eyes to commands  Skin: Warm and dry. No obvious rash  Lymph Nodes: No acute cervical or supraclavicular adenopathy  Psychiatric: not sedated, calm affect    DEVICES:  - DENTURES   +IV R / L     - ETUBE   +TRACH 8.0 Shilley cuffed, site is clean,  -CTUBE  R / L      LABS:                          8.1    7.5   )-----------( 131      ( 17 Feb 2018 05:26 )             26.5     02-17    137  |  100  |  37<H>  ----------------------------<  150<H>  3.6   |  29  |  0.69    Ca    7.8<L>      17 Feb 2018 05:26  Phos  3.7     02-17  Mg     2.3     02-17    TPro  5.4<L>  /  Alb  2.2<L>  /  TBili  2.4<H>  /  DBili  1.6<H>  /  AST  52<H>  /  ALT  46<H>  /  AlkPhos  171<H>  02-17      < from: Xray Chest 1 View- PORTABLE-Routine (02.17.18 @ 04:30) >  EXAM:  XR CHEST PORTABLE ROUTINE 1V                          PROCEDURE DATE:  02/17/2018                     INTERPRETATION:  Clinical History: Tracheostomy.     Portable examination the chest demonstrates no interval change position   remaining support devices in comparison to prior examination of the chest   2/16/2018. No interval change lung pathology.    Impression: No interval change lung pathology    < end of copied text >  RADIOLOGY & ADDITIONAL STUDIES (The following images were personally reviewed):

## 2018-02-17 NOTE — PROGRESS NOTE ADULT - SUBJECTIVE AND OBJECTIVE BOX
Mild improvement. Chest tube removed. Tolerating ok.    on CPAP trial.   Abdomen is soft, NT, ND.  Adequate urine output.  Drains - per recording.  Continue SICU care. Will follow.

## 2018-02-17 NOTE — PROGRESS NOTE ADULT - ASSESSMENT
ASSESSMENT/PLAN 73y/o male pt c Respiratory failure, perforated duodenal ulcer, POD post repair, pneumonia, RLL, LLL  atelectases, no obvious RADHA, elevated R hemidiaphragm, L pleural effusion, AFIB,  thrombocytopenia, possible endocarditis, s/p reop. now POP tracheostomy, new fever pattern, anemia    1. O2: Attempt TCollar daytime,  CPAP if pt fatigues and overnight  2. Bronchodilators:  Atrovent/ albuterol q 4 – 6 hours as needed  3. Corticosteroids: off  4. ID/Antibiotics:  strict ID supervision , adjust ABX pending multiple new CX, Gallium Scan discussed c  and SICU  5. Cardiac/HTN: Monitor, optimize HR;   6. GI: Rx/ prophylaxis c PPI/Protonix  7. Heme: Rx/VT  follow H/H closely  on Eliquis   8. Aspiration precautions, feeding PEG to continue  9. Surgery mngmnt priority,  10. Edema: diuretic to continue  11.LMobilize, OOB, PT  Discussed c managing team. SICU, ID

## 2018-02-18 LAB
ANION GAP SERPL CALC-SCNC: 8 MMOL/L — SIGNIFICANT CHANGE UP (ref 5–17)
BUN SERPL-MCNC: 40 MG/DL — HIGH (ref 7–23)
CALCIUM SERPL-MCNC: 7.9 MG/DL — LOW (ref 8.4–10.5)
CHLORIDE SERPL-SCNC: 103 MMOL/L — SIGNIFICANT CHANGE UP (ref 96–108)
CO2 SERPL-SCNC: 31 MMOL/L — SIGNIFICANT CHANGE UP (ref 22–31)
CREAT SERPL-MCNC: 0.69 MG/DL — SIGNIFICANT CHANGE UP (ref 0.5–1.3)
GLUCOSE BLDC GLUCOMTR-MCNC: 127 MG/DL — HIGH (ref 70–99)
GLUCOSE BLDC GLUCOMTR-MCNC: 131 MG/DL — HIGH (ref 70–99)
GLUCOSE BLDC GLUCOMTR-MCNC: 155 MG/DL — HIGH (ref 70–99)
GLUCOSE SERPL-MCNC: 131 MG/DL — HIGH (ref 70–99)
HCT VFR BLD CALC: 29.1 % — LOW (ref 39–50)
HGB BLD-MCNC: 8.9 G/DL — LOW (ref 13–17)
MAGNESIUM SERPL-MCNC: 2.4 MG/DL — SIGNIFICANT CHANGE UP (ref 1.6–2.6)
MCHC RBC-ENTMCNC: 30.6 G/DL — LOW (ref 32–36)
MCHC RBC-ENTMCNC: 31 PG — SIGNIFICANT CHANGE UP (ref 27–34)
MCV RBC AUTO: 101.4 FL — HIGH (ref 80–100)
PHOSPHATE SERPL-MCNC: 3.8 MG/DL — SIGNIFICANT CHANGE UP (ref 2.5–4.5)
PLATELET # BLD AUTO: 140 K/UL — LOW (ref 150–400)
POTASSIUM SERPL-MCNC: 4.5 MMOL/L — SIGNIFICANT CHANGE UP (ref 3.5–5.3)
POTASSIUM SERPL-SCNC: 4.5 MMOL/L — SIGNIFICANT CHANGE UP (ref 3.5–5.3)
RBC # BLD: 2.87 M/UL — LOW (ref 4.2–5.8)
RBC # FLD: 22.2 % — HIGH (ref 10.3–16.9)
SODIUM SERPL-SCNC: 142 MMOL/L — SIGNIFICANT CHANGE UP (ref 135–145)
WBC # BLD: 7.8 K/UL — SIGNIFICANT CHANGE UP (ref 3.8–10.5)
WBC # FLD AUTO: 7.8 K/UL — SIGNIFICANT CHANGE UP (ref 3.8–10.5)

## 2018-02-18 PROCEDURE — 71045 X-RAY EXAM CHEST 1 VIEW: CPT | Mod: 26

## 2018-02-18 PROCEDURE — 99233 SBSQ HOSP IP/OBS HIGH 50: CPT | Mod: GC

## 2018-02-18 RX ORDER — FLUCONAZOLE 150 MG/1
400 TABLET ORAL EVERY 24 HOURS
Qty: 0 | Refills: 0 | Status: DISCONTINUED | OUTPATIENT
Start: 2018-02-18 | End: 2018-02-20

## 2018-02-18 RX ORDER — POTASSIUM CHLORIDE 20 MEQ
40 PACKET (EA) ORAL ONCE
Qty: 0 | Refills: 0 | Status: COMPLETED | OUTPATIENT
Start: 2018-02-18 | End: 2018-02-18

## 2018-02-18 RX ORDER — FUROSEMIDE 40 MG
20 TABLET ORAL ONCE
Qty: 0 | Refills: 0 | Status: COMPLETED | OUTPATIENT
Start: 2018-02-18 | End: 2018-02-18

## 2018-02-18 RX ORDER — ALBUMIN HUMAN 25 %
50 VIAL (ML) INTRAVENOUS EVERY 8 HOURS
Qty: 0 | Refills: 0 | Status: DISCONTINUED | OUTPATIENT
Start: 2018-02-18 | End: 2018-02-19

## 2018-02-18 RX ADMIN — FLUCONAZOLE 400 MILLIGRAM(S): 150 TABLET ORAL at 14:51

## 2018-02-18 RX ADMIN — PANTOPRAZOLE SODIUM 40 MILLIGRAM(S): 20 TABLET, DELAYED RELEASE ORAL at 18:06

## 2018-02-18 RX ADMIN — PANTOPRAZOLE SODIUM 40 MILLIGRAM(S): 20 TABLET, DELAYED RELEASE ORAL at 06:43

## 2018-02-18 RX ADMIN — HYDROGEN PEROXIDE 1 APPLICATION(S): 0.3 LIQUID TOPICAL at 12:21

## 2018-02-18 RX ADMIN — Medication 100 MILLIGRAM(S): at 06:43

## 2018-02-18 RX ADMIN — Medication 2: at 18:06

## 2018-02-18 RX ADMIN — Medication 1 APPLICATION(S): at 06:44

## 2018-02-18 RX ADMIN — Medication 20 MILLIGRAM(S): at 14:51

## 2018-02-18 RX ADMIN — Medication 50 MILLILITER(S): at 18:07

## 2018-02-18 RX ADMIN — Medication 50 MILLILITER(S): at 10:00

## 2018-02-18 RX ADMIN — Medication 20 MILLIGRAM(S): at 09:03

## 2018-02-18 RX ADMIN — APIXABAN 5 MILLIGRAM(S): 2.5 TABLET, FILM COATED ORAL at 19:55

## 2018-02-18 RX ADMIN — Medication 1 APPLICATION(S): at 18:06

## 2018-02-18 RX ADMIN — Medication 3 MILLILITER(S): at 12:21

## 2018-02-18 RX ADMIN — Medication 40 MILLIEQUIVALENT(S): at 09:03

## 2018-02-18 RX ADMIN — CHLORHEXIDINE GLUCONATE 15 MILLILITER(S): 213 SOLUTION TOPICAL at 18:06

## 2018-02-18 RX ADMIN — Medication 100 MILLIGRAM(S): at 18:08

## 2018-02-18 RX ADMIN — Medication 2: at 00:20

## 2018-02-18 RX ADMIN — Medication 1 APPLICATION(S): at 12:21

## 2018-02-18 RX ADMIN — Medication 3 MILLILITER(S): at 17:16

## 2018-02-18 RX ADMIN — CHLORHEXIDINE GLUCONATE 15 MILLILITER(S): 213 SOLUTION TOPICAL at 06:44

## 2018-02-18 RX ADMIN — Medication 40 MILLIEQUIVALENT(S): at 18:07

## 2018-02-18 RX ADMIN — Medication 0.12 MILLIGRAM(S): at 06:43

## 2018-02-18 RX ADMIN — Medication 3 MILLILITER(S): at 06:02

## 2018-02-18 RX ADMIN — APIXABAN 5 MILLIGRAM(S): 2.5 TABLET, FILM COATED ORAL at 07:06

## 2018-02-18 NOTE — PROGRESS NOTE ADULT - SUBJECTIVE AND OBJECTIVE BOX
Interventional, Pulmonary, Critical, Chest Special Procedures.    Pt was seen and fully examined by myself.     Time spent with patient in minutes:37    Patient is a 72y old  Male who presents with a chief complaint of acute cholecystitis (18 Jan 2018 14:49)The patient tolerating TCollar this am, better inspiratory affort today, pt reports pain free.     HPI:  73 yo M with h/o severe mitral and aortic valve regurgitation presented to ED with 5 day h/o left lower abdominal pain, nausea and multiple episodes of vomiting as well as PO intolerance. Pt reports that the pain in his abdomen is illicited by movement. Went to [facility] two days ago and underwent CT scan which revealed acute cholecystitis as well as right lower lobe infectious pneumonia. Pt was told he would require surgery and left AMA because he was waiting too long. Denies f/c. Last echo 1/4/17 with EF 60-65%. Normal BMs and passing flatus. (18 Jan 2018 14:49)    REVIEW OF SYSTEMS: updated  Constitutional: No fever, weight loss, chills or fatigue  Eyes: No eye pain, visual disturbances, or discharge  ENMT:  No difficulty hearing, tinnitus, vertigo; No sinus or throat pain. No epistaxis, dysphagia, dysphonia, hoarseness or odynophagia  Neck: No pain, stiffness or neck swelling.  No masses or deformities  Respiratory: No cough, wheezing, chills or hemoptysis  - COPD  - ILD   - PE   - ASTHMA     - PNEUMONIA  Cardiovascular: No chest pain, dysrhythmia, palpitations, dizziness or edema   - COPD     - CAD   - CHF   - HTN  Gastrointestinal: No abdominal or epigastric pain. No nausea, vomiting or hematemesis; No diarrhea or constipation. No melena or hematochezia. No dysphagia or Icterus.          Genitourinary: No dysuria, frequency, hematuria or incontinence   - CKD/GAETANO      - ESRD  Neurological: No headaches, memory loss, loss of strength, numbness or tremors      -DEMENTIA     - STROKE    - SEIZURE  Skin: No itching, burning, rashes or lesions   Lymph Nodes: No enlarged glands  Endocrine: No heat or cold intolerance; No hair loss       - DM     - THYROID DISORDER  Musculoskeletal: No joint pain or swelling; No muscle, back or extremity pain  Psychiatric: No depression, anxiety, mood swings or difficulty sleeping  Heme/Lymph: No easy bruising or bleeding gums         - ANEMIA      - CANCER   -COAGULOPATHY  Allergy and Immunologic: No hives or eczema    PAST MEDICAL & SURGICAL HISTORY:  Mitral regurgitation  Aortic regurgitation  No significant past surgical history    FAMILY HISTORY:    SOCIAL HISTORY:      - Tobacco     - ETOH    Allergies    No Known Allergies    Intolerances      Vital Signs Last 24 Hrs  T(C): 37.4 (18 Feb 2018 10:00), Max: 37.8 (17 Feb 2018 20:00)  T(F): 99.3 (18 Feb 2018 10:00), Max: 100.1 (17 Feb 2018 20:00)  HR: 106 (18 Feb 2018 13:00) (86 - 116)  BP: 105/68 (18 Feb 2018 13:00) (60/45 - 117/72)  BP(mean): 71 (18 Feb 2018 13:00) (52 - 92)  RR: 39 (18 Feb 2018 13:00) (19 - 39)  SpO2: 100% (18 Feb 2018 13:00) (95% - 100%)    02-17 @ 07:01  -  02-18 @ 07:00  --------------------------------------------------------  IN: 1590 mL / OUT: 2398 mL / NET: -808 mL    02-18 @ 07:01  -  02-18 @ 14:24  --------------------------------------------------------  IN: 488 mL / OUT: 805 mL / NET: -317 mL      Mode: standby    PHYSICAL EXAM:  More comfortable, no distress  Eyes: PERRL, EOM intact; conjunctiva and sclera clear  Head: Normocephalic;  No Trauma  ENMT: No nasal discharge, hoarseness, cough or hemoptysis.  Airway clear  Neck: Supple; non tender; no masses or deformities.    No JVD  Respiratory:  - WHEEZING   few bilateral   RHONCHI  - RALES  = CRACKLES.  Diminished breath sounds  BILATERAL  RIGHT  LEFT bases   Cardiovascular: Regular rate and rhythm. S1 and S2 Normal; ++murmurs, gallops or rubs     - PPM/AICD  Gastrointestinal: Soft ,distended; Normal bowel sounds; No hepatosplenomegaly.     +PEG    + multiple surgical drains integrity checked    ANGELES  Genitourinary: No costovertebral angle tenderness. No dysuria  Extremities: Decreased AROM, + clubbing, cyanosis + edema, R foot toes unchanged pattern of necrosis, L foot less edema   Vascular: Peripheral pulses palpable 2+ bilaterally  Neurological: intubated and opening eyes to commands  Skin: Warm and dry. No obvious rash  Lymph Nodes: No acute cervical or supraclavicular adenopathy  Psychiatric: not sedated, calm affect    DEVICES:  - DENTURES   +IV R / L     - ETUBE   +TRACH 8.0 Shilley cuffed, site is clean,  -CTUBE      LABS:                          8.9    7.8   )-----------( 140      ( 18 Feb 2018 05:29 )             29.1     02-18    142  |  103  |  40<H>  ----------------------------<  131<H>  4.5   |  31  |  0.69    Ca    7.9<L>      18 Feb 2018 05:29  Phos  3.8     02-18  Mg     2.4     02-18    TPro  5.4<L>  /  Alb  2.2<L>  /  TBili  2.4<H>  /  DBili  1.6<H>  /  AST  52<H>  /  ALT  46<H>  /  AlkPhos  171<H>  02-17      < from: Xray Chest 1 View- PORTABLE-Routine (02.18.18 @ 02:50) >  EXAM:  XR CHEST PORTABLE ROUTINE 1V                          PROCEDURE DATE:  02/18/2018                     INTERPRETATION:  Clinical History: Tracheostomy.    Portable examination chest demonstrates no interval change lung pathology   in comparison to prior examination of the chest 2/17/2018. No interval   change position remaining support devices.    Impression: No interval change lung pathology    < end of copied text >  RADIOLOGY & ADDITIONAL STUDIES (The following images were personally reviewed):

## 2018-02-18 NOTE — PROGRESS NOTE ADULT - ASSESSMENT
ASSESSMENT/PLAN 73y/o male pt c Respiratory failure, perforated duodenal ulcer, POD post repair, pneumonia, RLL, LLL  atelectases, no obvious RADHA, elevated R hemidiaphragm, L pleural effusion, AFIB,  thrombocytopenia, possible endocarditis, s/p reop. now POP tracheostomy, new fever pattern, anemia    1. O2: Attempt TCollar daytime,  CPAP if pt fatigues and overnight  2. Bronchodilators:  Atrovent/ albuterol q 4 – 6 hours as needed  3. Corticosteroids: off  4. ID/Antibiotics:  strict ID supervision ,   5. Cardiac/HTN: Monitor, optimize HR;   6. GI: Rx/ prophylaxis c PPI/Protonix  7. Heme: Rx/VT  follow H/H closely  on Eliquis   8. Aspiration precautions, feeding PEG to continue Glucerna  9. Recommend D/C R IJ if OK c surgery  10. Edema: diuretic to continue  11.LMobilize, OOB, PT  12. Recommend D/C Oliveira and voiding trial in am if stable.  Discussed c managing team. SICU, ID

## 2018-02-18 NOTE — PROGRESS NOTE ADULT - ASSESSMENT
72yM with PMH severe MR/AR presented after recent dx acute cholecystitis and RLL PNA at [facility] with lower abdominal pain, new onset rapid Afib, sepsis, acute CHF exacerbation, initially admitted to cardiology on bipap, then transferred to SICU with perforated duodenum s/p Shreyas patch and jejunostomy (1/20) c/b breakdown s/p RTOR, washout, new Shreyas patch, gastrostomy, duodenostomy, IVCF (1/22), acute postoperative respiratory failure    Neuro: Tylenol/Dilaudid PRN  CV: Afib: Echo EF 50%, Severe MR, pulm htn, no vegetation, continue with Digoxin 0.125,  Eliquis for afib. diuresed with lasix yesterday. will continue to give lasix for goal I&O negative.   Pulm: Trach tolerated trach collar for most of the day yesterday, back on AC overnight, will continue lasix diuresis +albumin 25% and try trach collar again today; Peridex, Duonebs ( 2/14-) Left Ctx removed 2/16,   GI: NPO, tolerating Glucerna 1.2 @68 via J tube, Protonix BID. Retrograde D-tube to LIWS (red), G-tube to LIWS (green), Colace, Dulcolax suppository  : Abarca (placed for retention on 2/10) - continue abarca for strict I&O for now with diuresis.   ID: C albicans intraperitoneal infxn: fluconazole x 3wks per ID (2/1--);   // D/c: Alexi/Stenotrophomonas: Levaquin (2/8-16), Zosyn (1/19-22; 1/27-29; 2/7), micafungin (1/22-2/1), ceftazidime (1/29-2/4), linezolid (1/22-23), meropenem (1/22-27), vancomycin (1/19-20; 2/7-2/9)   Endo: ISS  HEME: s/p IVCF (1/22) // 1FFP, 1PLT (1/22).   PPx: SCD, R pop DVT, s/p IVC filter   Lines: TLC (2/8--) --will try to get PIV's and remove TLC. // D/c: PICC (1/29-2/8), L brachial Wingo (1/20-27), L SC HD cath (1/22-27)   Wounds: Ex lap, JPx2 to bulb suction, J tube for feeds, D-tube to LIWS (red), G-tube to LIWS (green); betadine/peroxide to toes  PT/OT: Early mobilization. OOB to chair today.

## 2018-02-18 NOTE — PROGRESS NOTE ADULT - SUBJECTIVE AND OBJECTIVE BOX
No acute issues.    Toleratinf tube feeds.  Trach collar trial in progress.  Continue diuresis.  Continue SICU care.  will follow.

## 2018-02-18 NOTE — PROVIDER CONTACT NOTE (CHANGE IN STATUS NOTIFICATION) - ACTION/TREATMENT ORDERED:
patient placed back on cpap, will continue to monitor.
JENNYFER Benavidez at bedside, no distress noted. encouraged patient to take slow deep breaths. vs otherwise stable. will continue to monitor.
Pt. was placed on BIPAP machine which improved his breathing and O2 sat to 94%.  Lasix 40 mg IVP @ 00:50 and @ 00:57. CXR was done and blood was sent to the lab.  Pt. was transferred to [facility]

## 2018-02-18 NOTE — PROGRESS NOTE ADULT - SUBJECTIVE AND OBJECTIVE BOX
S: net negative ~800 this morning s/p lasix yesterday. tolerated trach collar most of the day, until evening when he became slightly more tachypneic. was placed back on AC at that time.     O: ICU Vital Signs Last 24 Hrs  T(F): 99.3 (02-18-18 @ 10:00), Max: 100.1 (02-17-18 @ 20:00)  HR: 100 (02-18-18 @ 10:00) (86 - 116)  BP: 117/72 (02-18-18 @ 10:00) (60/45 - 133/60)  BP(mean): 89 (02-18-18 @ 10:00) (52 - 92)  ABP: --  RR: 20 (02-18-18 @ 10:00) (19 - 36)  SpO2: 100% (02-18-18 @ 10:00) (95% - 100%)    PHYSICAL EXAM:         LABS:    02-18    142  |  103  |  40<H>  ----------------------------<  131<H>  4.5   |  31  |  0.69    Ca    7.9<L>      18 Feb 2018 05:29  Phos  3.8     02-18  Mg     2.4     02-18    TPro  5.4<L>  /  Alb  2.2<L>  /  TBili  2.4<H>  /  DBili  1.6<H>  /  AST  52<H>  /  ALT  46<H>  /  AlkPhos  171<H>  02-17  LIVER FUNCTIONS - ( 17 Feb 2018 05:26 )  Alb: 2.2 g/dL / Pro: 5.4 g/dL / ALK PHOS: 171 U/L / ALT: 46 U/L / AST: 52 U/L / GGT: x                               8.9    7.8   )-----------( 140      ( 18 Feb 2018 05:29 )             29.1     CAPILLARY BLOOD GLUCOSE      POCT Blood Glucose.: 127 mg/dL (18 Feb 2018 05:38)  POCT Blood Glucose.: 153 mg/dL (17 Feb 2018 23:45)  POCT Blood Glucose.: 152 mg/dL (17 Feb 2018 19:43)  POCT Blood Glucose.: 135 mg/dL (17 Feb 2018 13:07)    MEDICATIONS  (STANDING):  albumin human 25% IVPB 50 milliLiter(s) IV Intermittent every 8 hours  ALBUTerol/ipratropium for Nebulization 3 milliLiter(s) Nebulizer every 6 hours  apixaban 5 milliGRAM(s) Oral every 12 hours  BACItracin   Ointment 1 Application(s) Topical two times a day  chlorhexidine 0.12% Liquid 15 milliLiter(s) Swish and Spit two times a day  digoxin     Tablet 0.125 milliGRAM(s) Oral daily  docusate sodium Liquid 100 milliGRAM(s) Oral two times a day  fluconAZOLE   40 mG/mL Suspension 400 milliGRAM(s) Enteral Tube every 24 hours  hydrogen peroxide 3% Solution 1 Application(s) Topical daily  insulin lispro (HumaLOG) corrective regimen sliding scale   SubCutaneous every 6 hours  lidocaine 2% Injectable 50 milliLiter(s) Local Injection once  pantoprazole  Injectable 40 milliGRAM(s) IV Push every 12 hours  povidone iodine 10% Solution 1 Application(s) Topical daily  sodium chloride 0.9% lock flush 20 milliLiter(s) IV Push once    MEDICATIONS  (PRN):  acetaminophen    Suspension 650 milliGRAM(s) Enteral Tube every 6 hours PRN For Temp greater than 38 C (100.4 F)  bisacodyl Suppository 10 milliGRAM(s) Rectal daily PRN Constipation  sodium chloride 0.9% lock flush 10 milliLiter(s) IV Push every 1 hour PRN After each medication administration      Oliveira:	  [ ] None	[ ] Daily Oliveira Order Placed	   Indication:	  [ ] Strict I and O's    [ ] Obstruction     [ ] Incontinence + Stage 3 or 4 Decubitus  Central Line:  [ ] None	   [ ]  Medication / TPN Administration     [ ] No Peripheral IV S: net negative ~800 this morning s/p lasix yesterday. tolerated trach collar most of the day, until evening when he became slightly more tachypneic. was placed back on AC at that time.     O: ICU Vital Signs Last 24 Hrs  T(F): 99.3 (02-18-18 @ 10:00), Max: 100.1 (02-17-18 @ 20:00)  HR: 100 (02-18-18 @ 10:00) (86 - 116)  BP: 117/72 (02-18-18 @ 10:00) (60/45 - 133/60)  BP(mean): 89 (02-18-18 @ 10:00) (52 - 92)  ABP: --  RR: 20 (02-18-18 @ 10:00) (19 - 36)  SpO2: 100% (02-18-18 @ 10:00) (95% - 100%)    PHYSICAL EXAM:   General: no distress, trach in place  Neurological: AAOx3, CNII-XII intact,  strength 5/5 b/l  HEENT: PERRL, MMM  Cardiovascular: RRR  Respiratory: CTA  Gastrointestinal: soft, NT, incision/J-tube site/D-tube site/G-tube site clean/dry, no erythema/pus.   Extremities: warm, has some dependent edema, but improved compared with prior exam.   Vascular: b/l feet with dsg intact (underneath with wounds to toes healing well, minimal eschar to tip of toes), no cyanosis. 2+ radial and DP pulses  MSK: no joint swelling  Skin: no rash        LABS:    02-18    142  |  103  |  40<H>  ----------------------------<  131<H>  4.5   |  31  |  0.69    Ca    7.9<L>      18 Feb 2018 05:29  Phos  3.8     02-18  Mg     2.4     02-18    TPro  5.4<L>  /  Alb  2.2<L>  /  TBili  2.4<H>  /  DBili  1.6<H>  /  AST  52<H>  /  ALT  46<H>  /  AlkPhos  171<H>  02-17  LIVER FUNCTIONS - ( 17 Feb 2018 05:26 )  Alb: 2.2 g/dL / Pro: 5.4 g/dL / ALK PHOS: 171 U/L / ALT: 46 U/L / AST: 52 U/L / GGT: x                               8.9    7.8   )-----------( 140      ( 18 Feb 2018 05:29 )             29.1     CAPILLARY BLOOD GLUCOSE      POCT Blood Glucose.: 127 mg/dL (18 Feb 2018 05:38)  POCT Blood Glucose.: 153 mg/dL (17 Feb 2018 23:45)  POCT Blood Glucose.: 152 mg/dL (17 Feb 2018 19:43)  POCT Blood Glucose.: 135 mg/dL (17 Feb 2018 13:07)    MEDICATIONS  (STANDING):  albumin human 25% IVPB 50 milliLiter(s) IV Intermittent every 8 hours  ALBUTerol/ipratropium for Nebulization 3 milliLiter(s) Nebulizer every 6 hours  apixaban 5 milliGRAM(s) Oral every 12 hours  BACItracin   Ointment 1 Application(s) Topical two times a day  chlorhexidine 0.12% Liquid 15 milliLiter(s) Swish and Spit two times a day  digoxin     Tablet 0.125 milliGRAM(s) Oral daily  docusate sodium Liquid 100 milliGRAM(s) Oral two times a day  fluconAZOLE   40 mG/mL Suspension 400 milliGRAM(s) Enteral Tube every 24 hours  hydrogen peroxide 3% Solution 1 Application(s) Topical daily  insulin lispro (HumaLOG) corrective regimen sliding scale   SubCutaneous every 6 hours  lidocaine 2% Injectable 50 milliLiter(s) Local Injection once  pantoprazole  Injectable 40 milliGRAM(s) IV Push every 12 hours  povidone iodine 10% Solution 1 Application(s) Topical daily  sodium chloride 0.9% lock flush 20 milliLiter(s) IV Push once    MEDICATIONS  (PRN):  acetaminophen    Suspension 650 milliGRAM(s) Enteral Tube every 6 hours PRN For Temp greater than 38 C (100.4 F)  bisacodyl Suppository 10 milliGRAM(s) Rectal daily PRN Constipation  sodium chloride 0.9% lock flush 10 milliLiter(s) IV Push every 1 hour PRN After each medication administration      Oliveira:	  [ ] None	[x ] Daily Oliveira Order Placed	   Indication:	  [ x] Strict I and O's    [ ] Obstruction     [ ] Incontinence + Stage 3 or 4 Decubitus  Central Line:  [ ] None	   [x ]  Medication / TPN Administration     [ ] No Peripheral IV S: net negative ~800 this morning s/p lasix yesterday. tolerated trach collar most of the day, until evening when he became slightly more tachypneic. was placed back on AC at that time.  Currently no SOB or CP.    O: ICU Vital Signs Last 24 Hrs  T(F): 99.3 (02-18-18 @ 10:00), Max: 100.1 (02-17-18 @ 20:00)  HR: 100 (02-18-18 @ 10:00) (86 - 116)  BP: 117/72 (02-18-18 @ 10:00) (60/45 - 133/60)  BP(mean): 89 (02-18-18 @ 10:00) (52 - 92)  ABP: --  RR: 20 (02-18-18 @ 10:00) (19 - 36)  SpO2: 100% (02-18-18 @ 10:00) (95% - 100%)    PHYSICAL EXAM:   General: no distress, trach in place  Neurological: AAOx3, CNII-XII intact,  strength 5/5 b/l  HEENT: PERRL, MMM  Cardiovascular: RRR  Respiratory: CTA  Gastrointestinal: soft, NT, incision/J-tube site/D-tube site/G-tube site clean/dry, no erythema/pus.   Extremities: warm, has some dependent edema, but improved compared with prior exam.   Vascular: b/l feet with dsg intact (underneath with wounds to toes healing well, minimal eschar to tip of toes), no cyanosis. 2+ radial and DP pulses  MSK: no joint swelling  Skin: no rash        LABS:    02-18    142  |  103  |  40<H>  ----------------------------<  131<H>  4.5   |  31  |  0.69    Ca    7.9<L>      18 Feb 2018 05:29  Phos  3.8     02-18  Mg     2.4     02-18    TPro  5.4<L>  /  Alb  2.2<L>  /  TBili  2.4<H>  /  DBili  1.6<H>  /  AST  52<H>  /  ALT  46<H>  /  AlkPhos  171<H>  02-17  LIVER FUNCTIONS - ( 17 Feb 2018 05:26 )  Alb: 2.2 g/dL / Pro: 5.4 g/dL / ALK PHOS: 171 U/L / ALT: 46 U/L / AST: 52 U/L / GGT: x                               8.9    7.8   )-----------( 140      ( 18 Feb 2018 05:29 )             29.1     CAPILLARY BLOOD GLUCOSE      POCT Blood Glucose.: 127 mg/dL (18 Feb 2018 05:38)  POCT Blood Glucose.: 153 mg/dL (17 Feb 2018 23:45)  POCT Blood Glucose.: 152 mg/dL (17 Feb 2018 19:43)  POCT Blood Glucose.: 135 mg/dL (17 Feb 2018 13:07)    MEDICATIONS  (STANDING):  albumin human 25% IVPB 50 milliLiter(s) IV Intermittent every 8 hours  ALBUTerol/ipratropium for Nebulization 3 milliLiter(s) Nebulizer every 6 hours  apixaban 5 milliGRAM(s) Oral every 12 hours  BACItracin   Ointment 1 Application(s) Topical two times a day  chlorhexidine 0.12% Liquid 15 milliLiter(s) Swish and Spit two times a day  digoxin     Tablet 0.125 milliGRAM(s) Oral daily  docusate sodium Liquid 100 milliGRAM(s) Oral two times a day  fluconAZOLE   40 mG/mL Suspension 400 milliGRAM(s) Enteral Tube every 24 hours  hydrogen peroxide 3% Solution 1 Application(s) Topical daily  insulin lispro (HumaLOG) corrective regimen sliding scale   SubCutaneous every 6 hours  lidocaine 2% Injectable 50 milliLiter(s) Local Injection once  pantoprazole  Injectable 40 milliGRAM(s) IV Push every 12 hours  povidone iodine 10% Solution 1 Application(s) Topical daily  sodium chloride 0.9% lock flush 20 milliLiter(s) IV Push once    MEDICATIONS  (PRN):  acetaminophen    Suspension 650 milliGRAM(s) Enteral Tube every 6 hours PRN For Temp greater than 38 C (100.4 F)  bisacodyl Suppository 10 milliGRAM(s) Rectal daily PRN Constipation  sodium chloride 0.9% lock flush 10 milliLiter(s) IV Push every 1 hour PRN After each medication administration      Oliveira:	  [ ] None	[x ] Daily Oliveira Order Placed	   Indication:	  [ x] Strict I and O's    [ ] Obstruction     [ ] Incontinence + Stage 3 or 4 Decubitus  Central Line:  [ ] None	   [x ]  Medication / TPN Administration     [ ] No Peripheral IV

## 2018-02-18 NOTE — PROGRESS NOTE ADULT - SUBJECTIVE AND OBJECTIVE BOX
Critical Care    INTERVAL HPI/OVERNIGHT EVENTS:    ANTIBIOTICS    MEDICATIONS  (STANDING):  albumin human 25% IVPB 50 milliLiter(s) IV Intermittent every 8 hours  ALBUTerol/ipratropium for Nebulization 3 milliLiter(s) Nebulizer every 6 hours  apixaban 5 milliGRAM(s) Oral every 12 hours  BACItracin   Ointment 1 Application(s) Topical two times a day  chlorhexidine 0.12% Liquid 15 milliLiter(s) Swish and Spit two times a day  digoxin     Tablet 0.125 milliGRAM(s) Oral daily  docusate sodium Liquid 100 milliGRAM(s) Oral two times a day  fluconAZOLE   40 mG/mL Suspension 400 milliGRAM(s) Enteral Tube every 24 hours  hydrogen peroxide 3% Solution 1 Application(s) Topical daily  insulin lispro (HumaLOG) corrective regimen sliding scale   SubCutaneous every 6 hours  lidocaine 2% Injectable 50 milliLiter(s) Local Injection once  pantoprazole  Injectable 40 milliGRAM(s) IV Push every 12 hours  povidone iodine 10% Solution 1 Application(s) Topical daily  sodium chloride 0.9% lock flush 20 milliLiter(s) IV Push once    MEDICATIONS  (PRN):  acetaminophen    Suspension 650 milliGRAM(s) Enteral Tube every 6 hours PRN For Temp greater than 38 C (100.4 F)  bisacodyl Suppository 10 milliGRAM(s) Rectal daily PRN Constipation  sodium chloride 0.9% lock flush 10 milliLiter(s) IV Push every 1 hour PRN After each medication administration      Allergies    No Known Allergies    Intolerances        REVIEW OF SYSTEMS:    Constitutional: No fever, weight loss or fatigue  Eyes: No eye pain, visual disturbances, or discharge  ENMT:  No difficulty hearing, tinnitus, vertigo; No sinus or throat pain  Neck: No pain or stiffness  Respiratory: No cough, wheezing, chills or hemoptysis  Cardiovascular: No chest pain, palpitations, shortness of breath, dizziness or leg swelling  Gastrointestinal: No abdominal or epigastric pain. No nausea, vomiting or hematemesis; No diarrhea or constipation. No melena or hematochezia.    Vital Signs Last 24 Hrs  T(C): 37.4 (18 Feb 2018 10:00), Max: 37.8 (17 Feb 2018 20:00)  T(F): 99.3 (18 Feb 2018 10:00), Max: 100.1 (17 Feb 2018 20:00)  HR: 100 (18 Feb 2018 10:00) (84 - 116)  BP: 117/72 (18 Feb 2018 10:00) (60/45 - 133/60)  BP(mean): 89 (18 Feb 2018 10:00) (52 - 92)  RR: 20 (18 Feb 2018 10:00) (19 - 36)  SpO2: 100% (18 Feb 2018 10:00) (95% - 100%)    PHYSICAL EXAM:    General: in no acute distress  Eyes: PERRL, EOM intact; conjunctiva and sclera clear  Head: Normocephalic; atraumatic  ENMT: No nasal discharge; airway clear  Neck: Supple; non tender; no masses  Respiratory: No wheezes, rales or rhonchi  Cardiovascular: Regular rate and rhythm. S1 and S2 Normal; No murmurs, gallops or rubs  Gastrointestinal: Soft non-tender non-distended; Normal bowel sounds; No hepatosplenomegaly    LABS:                        8.9    7.8   )-----------( 140      ( 18 Feb 2018 05:29 )             29.1     02-18    142  |  103  |  40<H>  ----------------------------<  131<H>  4.5   |  31  |  0.69    Ca    7.9<L>      18 Feb 2018 05:29  Phos  3.8     02-18  Mg     2.4     02-18    TPro  5.4<L>  /  Alb  2.2<L>  /  TBili  2.4<H>  /  DBili  1.6<H>  /  AST  52<H>  /  ALT  46<H>  /  AlkPhos  171<H>  02-17            MICROBIOLOGY:  Culture Results:   Testing in progress (02-15 @ 00:31)  Culture Results:   No growth (02-14 @ 14:53)  Culture Results:   Sputum specimen rejected.  Microscopic examination indicates  oropharyngeal contamination.  Please repeat. (02-12 @ 10:39)  Culture Results:   No growth at 5 days. (02-12 @ 09:01)  Culture Results:   No growth at 5 days. (02-12 @ 08:55)      RADIOLOGY & ADDITIONAL STUDIES:

## 2018-02-18 NOTE — PROVIDER CONTACT NOTE (CHANGE IN STATUS NOTIFICATION) - ASSESSMENT
noted patient to be tachypnic, pt says he is tired. vs otherwise stable
noted pt tachypnic 35-40rr/min.
A and O x 4, rapid and labored breathing with low O2 sat of 87% while he was on Oxygen 4 LPM via NC.  Abdomen was distended and tender on light touch. Rectal temp was 100.8 degrees farenheit.

## 2018-02-19 LAB
ALBUMIN SERPL ELPH-MCNC: 2.9 G/DL — LOW (ref 3.3–5)
ALP SERPL-CCNC: 200 U/L — HIGH (ref 40–120)
ALT FLD-CCNC: 51 U/L — HIGH (ref 10–45)
ANION GAP SERPL CALC-SCNC: 8 MMOL/L — SIGNIFICANT CHANGE UP (ref 5–17)
AST SERPL-CCNC: 60 U/L — HIGH (ref 10–40)
BILIRUB DIRECT SERPL-MCNC: 1.2 MG/DL — HIGH (ref 0–0.2)
BILIRUB INDIRECT FLD-MCNC: 1.2 MG/DL — HIGH (ref 0.2–1)
BILIRUB SERPL-MCNC: 2.4 MG/DL — HIGH (ref 0.2–1.2)
BUN SERPL-MCNC: 37 MG/DL — HIGH (ref 7–23)
CALCIUM SERPL-MCNC: 7.9 MG/DL — LOW (ref 8.4–10.5)
CHLORIDE SERPL-SCNC: 102 MMOL/L — SIGNIFICANT CHANGE UP (ref 96–108)
CO2 SERPL-SCNC: 30 MMOL/L — SIGNIFICANT CHANGE UP (ref 22–31)
CREAT SERPL-MCNC: 0.68 MG/DL — SIGNIFICANT CHANGE UP (ref 0.5–1.3)
GLUCOSE BLDC GLUCOMTR-MCNC: 110 MG/DL — HIGH (ref 70–99)
GLUCOSE BLDC GLUCOMTR-MCNC: 130 MG/DL — HIGH (ref 70–99)
GLUCOSE BLDC GLUCOMTR-MCNC: 133 MG/DL — HIGH (ref 70–99)
GLUCOSE BLDC GLUCOMTR-MCNC: 134 MG/DL — HIGH (ref 70–99)
GLUCOSE BLDC GLUCOMTR-MCNC: 159 MG/DL — HIGH (ref 70–99)
GLUCOSE SERPL-MCNC: 121 MG/DL — HIGH (ref 70–99)
HCT VFR BLD CALC: 27.8 % — LOW (ref 39–50)
HGB BLD-MCNC: 8.3 G/DL — LOW (ref 13–17)
MAGNESIUM SERPL-MCNC: 2.3 MG/DL — SIGNIFICANT CHANGE UP (ref 1.6–2.6)
MCHC RBC-ENTMCNC: 29.9 G/DL — LOW (ref 32–36)
MCHC RBC-ENTMCNC: 30.2 PG — SIGNIFICANT CHANGE UP (ref 27–34)
MCV RBC AUTO: 101.1 FL — HIGH (ref 80–100)
PHOSPHATE SERPL-MCNC: 3.4 MG/DL — SIGNIFICANT CHANGE UP (ref 2.5–4.5)
PLATELET # BLD AUTO: 137 K/UL — LOW (ref 150–400)
POTASSIUM SERPL-MCNC: 4.3 MMOL/L — SIGNIFICANT CHANGE UP (ref 3.5–5.3)
POTASSIUM SERPL-SCNC: 4.3 MMOL/L — SIGNIFICANT CHANGE UP (ref 3.5–5.3)
PREALB SERPL-MCNC: 16 MG/DL — LOW (ref 20–40)
PROT SERPL-MCNC: 6.2 G/DL — SIGNIFICANT CHANGE UP (ref 6–8.3)
RBC # BLD: 2.75 M/UL — LOW (ref 4.2–5.8)
RBC # FLD: 22.1 % — HIGH (ref 10.3–16.9)
SODIUM SERPL-SCNC: 140 MMOL/L — SIGNIFICANT CHANGE UP (ref 135–145)
WBC # BLD: 7.8 K/UL — SIGNIFICANT CHANGE UP (ref 3.8–10.5)
WBC # FLD AUTO: 7.8 K/UL — SIGNIFICANT CHANGE UP (ref 3.8–10.5)

## 2018-02-19 PROCEDURE — 99233 SBSQ HOSP IP/OBS HIGH 50: CPT | Mod: GC

## 2018-02-19 PROCEDURE — 71045 X-RAY EXAM CHEST 1 VIEW: CPT | Mod: 26

## 2018-02-19 RX ORDER — FUROSEMIDE 40 MG
20 TABLET ORAL ONCE
Qty: 0 | Refills: 0 | Status: COMPLETED | OUTPATIENT
Start: 2018-02-19 | End: 2018-02-19

## 2018-02-19 RX ORDER — MINERAL OIL
1 OIL (ML) MISCELLANEOUS DAILY
Qty: 0 | Refills: 0 | Status: DISCONTINUED | OUTPATIENT
Start: 2018-02-19 | End: 2018-02-26

## 2018-02-19 RX ORDER — TAMSULOSIN HYDROCHLORIDE 0.4 MG/1
0.4 CAPSULE ORAL AT BEDTIME
Qty: 0 | Refills: 0 | Status: DISCONTINUED | OUTPATIENT
Start: 2018-02-19 | End: 2018-02-27

## 2018-02-19 RX ADMIN — Medication 20 MILLIGRAM(S): at 10:23

## 2018-02-19 RX ADMIN — Medication 50 MILLILITER(S): at 02:40

## 2018-02-19 RX ADMIN — APIXABAN 5 MILLIGRAM(S): 2.5 TABLET, FILM COATED ORAL at 07:07

## 2018-02-19 RX ADMIN — APIXABAN 5 MILLIGRAM(S): 2.5 TABLET, FILM COATED ORAL at 21:03

## 2018-02-19 RX ADMIN — CHLORHEXIDINE GLUCONATE 15 MILLILITER(S): 213 SOLUTION TOPICAL at 06:32

## 2018-02-19 RX ADMIN — Medication 1 APPLICATION(S): at 06:32

## 2018-02-19 RX ADMIN — Medication 3 MILLILITER(S): at 11:12

## 2018-02-19 RX ADMIN — PANTOPRAZOLE SODIUM 40 MILLIGRAM(S): 20 TABLET, DELAYED RELEASE ORAL at 17:00

## 2018-02-19 RX ADMIN — Medication 3 MILLILITER(S): at 17:42

## 2018-02-19 RX ADMIN — Medication 3 MILLILITER(S): at 06:49

## 2018-02-19 RX ADMIN — PANTOPRAZOLE SODIUM 40 MILLIGRAM(S): 20 TABLET, DELAYED RELEASE ORAL at 06:32

## 2018-02-19 RX ADMIN — Medication 100 MILLIGRAM(S): at 17:00

## 2018-02-19 RX ADMIN — Medication 3 MILLILITER(S): at 00:48

## 2018-02-19 RX ADMIN — FLUCONAZOLE 400 MILLIGRAM(S): 150 TABLET ORAL at 16:54

## 2018-02-19 RX ADMIN — Medication 2: at 23:06

## 2018-02-19 RX ADMIN — TAMSULOSIN HYDROCHLORIDE 0.4 MILLIGRAM(S): 0.4 CAPSULE ORAL at 21:03

## 2018-02-19 RX ADMIN — Medication 3 MILLILITER(S): at 23:25

## 2018-02-19 RX ADMIN — Medication 1 APPLICATION(S): at 17:01

## 2018-02-19 RX ADMIN — Medication 1 APPLICATION(S): at 17:49

## 2018-02-19 RX ADMIN — Medication 0.12 MILLIGRAM(S): at 06:32

## 2018-02-19 RX ADMIN — Medication 1 APPLICATION(S): at 16:55

## 2018-02-19 RX ADMIN — CHLORHEXIDINE GLUCONATE 15 MILLILITER(S): 213 SOLUTION TOPICAL at 17:01

## 2018-02-19 NOTE — PROGRESS NOTE ADULT - PROBLEM SELECTOR PLAN 2
Continue Fluconazole 400 mg a day  Measure CBC with diiferential  and comprehensive metabolic panel at least every 2 days
s/p perforation of duodenum - revision on   - possible pneumonia on the right side   - continue with antibiotics - try to avoid nephrotoxic ones - Vanco
GI (Dr. Pagan) and Surgery (Dr. Phillips) consulted  Acute Cholecystitis as well as RLL PNA by CT scan at [facility]   - [facility] negative for cholecystitis revealed contracted gallbladder likely with stones.   - [facility] also equivocal for cholecystitis  -HIDA scan prelim negative for acute cholecystitis.   -LFTs remain elevated. Continue to monitor daily LFTs. No abdominal pain on exam  -No plan for surgical intervention. Surgery signed off
GI (Dr. Pagan) and Surgery (Dr. Phillips) consulted  Acute Cholecystitis as well as RLL PNA by CT scan at [facility]   - [facility] negative for cholecystitis revealed contracted gallbladder likely with stones.   - CT [facility] also equivocal for cholecystitis  -HIDA scan prelim negative for acute cholecystitis.   -LFTs remain elevated. Continue to monitor daily LFTs. No abdominal pain on exam  -No plan for surgical intervention. Surgery signed off
GI (Dr. Pagan) and Surgery (Dr. Phillips) consulted  Acute Cholecystitis as well as RLL PNA by CT scan at [facility]   - [facility] negative for cholecystitis revealed contracted gallbladder likely with stones.   - CT [facility] also equivocal for cholecystitis  -HIDA scan prelim negative for acute cholecystitis.   -LFTs remain elevated. Continue to monitor daily LFTs. No abdominal pain on exam  -No plan for surgical intervention. Surgery signed off
continue Fluconazole
s/p perforation of duodenum -   per surgery team - try to avoid nephrotoxic agents  med dosages reviewed
s/p perforation of duodenum - revision on   - possible pneumonia on the right side   - continue with antibiotics - try to avoid nephrotoxic ones - Vanco
s/p perforation of duodenum - revision on   - possible pneumonia on the right side - on zosyn, micofungin   - continue with antibiotics - try to avoid nephrotoxic ones - Vanco
s/p perforation of duodenum -   per surgery team -  reviewed meds and dosages

## 2018-02-19 NOTE — CHART NOTE - NSCHARTNOTEFT_GEN_A_CORE
Admitting Diagnosis:   72yM with PMH severe MR/AR presented after recent dx acute cholecystitis and RLL PNA at [facility] with lower abdominal pain, new onset rapid Afib, sepsis, acute CHF exacerbation, initially admitted to cardiology on bipap, then transferred to SICU with perforated duodenum s/p Shreyas patch and jejunostomy (1/20) c/b breakdown s/p RTOR, washout, new Shreyas patch, gastrostomy, duodenostomy, IVCF (1/22)    PAST MEDICAL & SURGICAL HISTORY:  Mitral regurgitation  Aortic regurgitation  No significant past surgical history    Current Nutrition Order: Glucerna 1.2 at 68 mL x 24 hr via j-tube to provide 1632 mL TV, 1958 kcal, 98 g protein, & 1314 mL fluid. EN running at goal rate.     GI Issues: Having regular BMs per RN. No n/v/d/c     Pain: None reported     Skin Integrity: Unstageable-forehead    Labs:   02-19    140  |  102  |  37<H>  ----------------------------<  121<H>  4.3   |  30  |  0.68    Ca    7.9<L>      19 Feb 2018 05:48  Phos  3.4     02-19  Mg     2.3     02-19    TPro  6.2  /  Alb  2.9<L>  /  TBili  2.4<H>  /  DBili  1.2<H>  /  AST  60<H>  /  ALT  51<H>  /  AlkPhos  200<H>  02-19    POCT Blood Glucose.: 110 mg/dL (19 Feb 2018 06:04)  POCT Blood Glucose.: 130 mg/dL (19 Feb 2018 01:03)  POCT Blood Glucose.: 155 mg/dL (18 Feb 2018 15:56)  POCT Blood Glucose.: 131 mg/dL (18 Feb 2018 11:16)    Medications:  MEDICATIONS  (STANDING):  ALBUTerol/ipratropium for Nebulization 3 milliLiter(s) Nebulizer every 6 hours  apixaban 5 milliGRAM(s) Oral every 12 hours  BACItracin   Ointment 1 Application(s) Topical two times a day  chlorhexidine 0.12% Liquid 15 milliLiter(s) Swish and Spit two times a day  digoxin     Tablet 0.125 milliGRAM(s) Oral daily  docusate sodium Liquid 100 milliGRAM(s) Oral two times a day  fluconAZOLE   40 mG/mL Suspension 400 milliGRAM(s) Enteral Tube every 24 hours  furosemide   Injectable 20 milliGRAM(s) IV Push once  hydrogen peroxide 3% Solution 1 Application(s) Topical daily  insulin lispro (HumaLOG) corrective regimen sliding scale   SubCutaneous every 6 hours  lidocaine 2% Injectable 50 milliLiter(s) Local Injection once  pantoprazole  Injectable 40 milliGRAM(s) IV Push every 12 hours  povidone iodine 10% Solution 1 Application(s) Topical daily  sodium chloride 0.9% lock flush 20 milliLiter(s) IV Push once    MEDICATIONS  (PRN):  acetaminophen    Suspension 650 milliGRAM(s) Enteral Tube every 6 hours PRN For Temp greater than 38 C (100.4 F)  bisacodyl Suppository 10 milliGRAM(s) Rectal daily PRN Constipation  sodium chloride 0.9% lock flush 10 milliLiter(s) IV Push every 1 hour PRN After each medication administration    Weight:  No new weights since admission - discussed with RN    Estimated energy needs using 75 kg IBW:  25-30 kcal/kg (8030-4648 kcal).   1.4-1.6 g/kg (105-120 g protein).   30-35 mL/kg (3216-3710 mL fluid).      Subjective: Tolerating trach collar with EN running at goal rate. Discussed updating weights with RN.     Previous Nutrition Diagnosis: Increased nutrient needs RT increased demand for kcal & protein AEB critical illness, intubation.     Active [ X  ]  Resolved [   ]    Goal: Continue to meet % of nutrition needs via tolerated route.     Recommendations:  1. Continue EN at current rate. Monitor for signs of intolerance.   2. Trend daily weights.   3. Bowel regimen PRN    Education: N/A this visit    Risk Level: High [   ] Moderate [ X  ] Low [   ]

## 2018-02-19 NOTE — PROGRESS NOTE ADULT - ASSESSMENT
72yM with PMH severe MR/AR presented after recent dx acute cholecystitis and RLL PNA at [facility] with lower abdominal pain, new onset rapid Afib, sepsis, acute CHF exacerbation, initially admitted to cardiology on bipap, then transferred to SICU with perforated duodenum s/p Shreyas patch and jejunostomy (1/20) c/b breakdown s/p RTOR, washout, new Shreyas patch, gastrostomy, duodenostomy, IVCF (1/22)    Neuro: Tylenol/Dilaudid PRN  CV: Afib: Echo EF 50%, Severe MR, no vegetation, Digoxin 0.125,  Eliquis.  Pulm: Trach 8.0 Shiley trach collar, Duonebs ( 2/14-) Left Ctx removed 2/16  GI: NPO, Glucerna 1.2 @68 via J tube, Protonix BID. Retrograde D-tube to LIWS (red), G-tube to LIWS (green), Colace, Dulcolax suppository  : Oliveira (placed for retention on 2/10)  ID: C albicans intraperitoneal infxn: fluconazole (2/1--); // D/c: Zosyn (1/19-22; 1/27-29; 2/7), micafungin (1/22-2/1), ceftazidime (1/29-2/4), linezolid (1/22-23), meropenem (1/22-27), vancomycin (1/19-20; 2/7-2/9) Alexi/Stenotrophomonas: Levaquin (2/8-16)   Endo: ISS  HEME: Eliquis, IVCF (1/22) // 1FFP, 1PLT (1/22).   PPx: SCD, R pop DVT, s/p IVC filter   Lines: PIV, TLC (2/8--) // D/c: PICC (1/29-2/8), L brachial Avenel (1/20-27), L SC HD cath (1/22-27)   Wounds: Ex lap, JPx2 to bulb suction, J tube for feeds, D-tube to LIWS (red), G-tube to LIWS (green); betadine/peroxide to toes  PT/OT: Early mobilization.     Plan for today: Albumin stopped. Plan to remove TLC today. Mobilization for PT. Trach collar as tolerated.

## 2018-02-19 NOTE — PROGRESS NOTE ADULT - ASSESSMENT
ASSESSMENT/PLAN 71y/o male pt c Respiratory failure, perforated duodenal ulcer, POD post repair, pneumonia, RLL, LLL  atelectases, no obvious RADHA, elevated R hemidiaphragm, L pleural effusion, AFIB,  thrombocytopenia, possible endocarditis, s/p reop. now POP tracheostomy, new fever pattern, anemia    1. O2: Attempt TCollar daytime,  CPAP if pt fatigues and overnight  2. Bronchodilators:  Atrovent/ albuterol q 4 – 6 hours as needed  3. Corticosteroids: off  4. ID/Antibiotics: as per   5. Cardiac/HTN: Monitor, optimize HR;   6. GI: Rx/ prophylaxis c PPI/Protonix  7. Heme: Rx/VT  follow H/H closely  on Eliquis   8. Aspiration precautions, feeding PEG to continue  9D/C Oliveira, voiding trial  10. Edema: diuretic to continue  11.LMobilize, OOB, PT  12.D/C MICHAEL  Discussed c managing team. SICU

## 2018-02-19 NOTE — PROGRESS NOTE ADULT - SUBJECTIVE AND OBJECTIVE BOX
Critical care      INTERVAL HPI/OVERNIGHT EVENTS:    ANTIBIOTICS    MEDICATIONS  (STANDING):  ALBUTerol/ipratropium for Nebulization 3 milliLiter(s) Nebulizer every 6 hours  apixaban 5 milliGRAM(s) Oral every 12 hours  BACItracin   Ointment 1 Application(s) Topical two times a day  chlorhexidine 0.12% Liquid 15 milliLiter(s) Swish and Spit two times a day  digoxin     Tablet 0.125 milliGRAM(s) Oral daily  docusate sodium Liquid 100 milliGRAM(s) Oral two times a day  fluconAZOLE   40 mG/mL Suspension 400 milliGRAM(s) Enteral Tube every 24 hours  hydrogen peroxide 3% Solution 1 Application(s) Topical daily  insulin lispro (HumaLOG) corrective regimen sliding scale   SubCutaneous every 6 hours  lidocaine 2% Injectable 50 milliLiter(s) Local Injection once  pantoprazole  Injectable 40 milliGRAM(s) IV Push every 12 hours  povidone iodine 10% Solution 1 Application(s) Topical daily  sodium chloride 0.9% lock flush 20 milliLiter(s) IV Push once    MEDICATIONS  (PRN):  acetaminophen    Suspension 650 milliGRAM(s) Enteral Tube every 6 hours PRN For Temp greater than 38 C (100.4 F)  bisacodyl Suppository 10 milliGRAM(s) Rectal daily PRN Constipation  sodium chloride 0.9% lock flush 10 milliLiter(s) IV Push every 1 hour PRN After each medication administration      Allergies    No Known Allergies    Intolerances        REVIEW OF SYSTEMS:    Constitutional: No fever, weight loss or fatigue  Eyes: No eye pain, visual disturbances, or discharge  ENMT:  No difficulty hearing, tinnitus, vertigo; No sinus or throat pain  Neck: No pain or stiffness  Respiratory: No cough, wheezing, chills or hemoptysis  Cardiovascular: No chest pain, palpitations, shortness of breath, dizziness or leg swelling  Gastrointestinal: No abdominal or epigastric pain. No nausea, vomiting or hematemesis; No diarrhea or constipation. No melena or hematochezia.  Genitourinary: No dysuria, frequency, hematuria or incontinence    Vital Signs Last 24 Hrs  T(C): 35.9 (19 Feb 2018 09:36), Max: 37.9 (19 Feb 2018 02:10)  T(F): 96.7 (19 Feb 2018 09:36), Max: 100.2 (19 Feb 2018 02:10)  HR: 104 (19 Feb 2018 10:10) (92 - 124)  BP: 112/60 (19 Feb 2018 10:10) (92/54 - 120/77)  BP(mean): 74 (19 Feb 2018 10:10) (70 - 94)  RR: 30 (19 Feb 2018 10:10) (10 - 39)  SpO2: 100% (19 Feb 2018 10:10) (97% - 100%)    PHYSICAL EXAM:    General: Well developed; well nourished; in no acute distress  Eyes: PERRL, EOM intact; conjunctiva and sclera clear  Head: Normocephalic; atraumatic  ENMT: No nasal discharge; airway clear  Neck: Supple; non tender; no masses  Respiratory: No wheezes, rales or rhonchi  Cardiovascular: Regular rate and rhythm. S1 and S2 Normal; No murmurs, gallops or rubs  Gastrointestinal: Soft non-tender non-distended; Normal bowel sounds; No hepatosplenomegaly  Genitourinary: No costovertebral angle tenderness    LABS:                        8.3    7.8   )-----------( 137      ( 19 Feb 2018 05:48 )             27.8     02-19    140  |  102  |  37<H>  ----------------------------<  121<H>  4.3   |  30  |  0.68    Ca    7.9<L>      19 Feb 2018 05:48  Phos  3.4     02-19  Mg     2.3     02-19    TPro  6.2  /  Alb  2.9<L>  /  TBili  2.4<H>  /  DBili  1.2<H>  /  AST  60<H>  /  ALT  51<H>  /  AlkPhos  200<H>  02-19            MICROBIOLOGY:  Culture Results:   Testing in progress (02-15 @ 00:31)  Culture Results:   No growth (02-14 @ 14:53)      RADIOLOGY & ADDITIONAL STUDIES:

## 2018-02-19 NOTE — PROGRESS NOTE ADULT - SUBJECTIVE AND OBJECTIVE BOX
24 hr events: ON: Neg 800 @8pm -no lasix given   2/18: lasix 20 x 2 (9am, 3pm). trach collar since morning, OOB to stretcher chair.     SUBJECTIVE: Patient doing well. Was put on trach collar at 7am and denies chest pain and shortness of breath. Also denies abdominal pain, N/V/D/C. Pain is well controlled.       MEDICATIONS  (STANDING):  ALBUTerol/ipratropium for Nebulization 3 milliLiter(s) Nebulizer every 6 hours  apixaban 5 milliGRAM(s) Oral every 12 hours  BACItracin   Ointment 1 Application(s) Topical two times a day  chlorhexidine 0.12% Liquid 15 milliLiter(s) Swish and Spit two times a day  digoxin     Tablet 0.125 milliGRAM(s) Oral daily  docusate sodium Liquid 100 milliGRAM(s) Oral two times a day  fluconAZOLE   40 mG/mL Suspension 400 milliGRAM(s) Enteral Tube every 24 hours  furosemide   Injectable 20 milliGRAM(s) IV Push once  hydrogen peroxide 3% Solution 1 Application(s) Topical daily  insulin lispro (HumaLOG) corrective regimen sliding scale   SubCutaneous every 6 hours  lidocaine 2% Injectable 50 milliLiter(s) Local Injection once  pantoprazole  Injectable 40 milliGRAM(s) IV Push every 12 hours  povidone iodine 10% Solution 1 Application(s) Topical daily  sodium chloride 0.9% lock flush 20 milliLiter(s) IV Push once    MEDICATIONS  (PRN):  acetaminophen    Suspension 650 milliGRAM(s) Enteral Tube every 6 hours PRN For Temp greater than 38 C (100.4 F)  bisacodyl Suppository 10 milliGRAM(s) Rectal daily PRN Constipation  sodium chloride 0.9% lock flush 10 milliLiter(s) IV Push every 1 hour PRN After each medication administration    Oliveira in place with good urine output    ICU Vital Signs Last 24 Hrs  T(C): 37.3 (19 Feb 2018 06:59), Max: 37.9 (19 Feb 2018 02:10)  T(F): 99.1 (19 Feb 2018 06:59), Max: 100.2 (19 Feb 2018 02:10)  HR: 98 (19 Feb 2018 08:00) (92 - 122)  BP: 94/74 (19 Feb 2018 08:00) (92/54 - 120/77)  BP(mean): 84 (19 Feb 2018 08:00) (70 - 94)  ABP: --  ABP(mean): --  RR: 36 (19 Feb 2018 08:00) (10 - 39)  SpO2: 100% (19 Feb 2018 08:00) (98% - 100%)      Physical Exam:  General: NAD  HEENT: NC/AT, EOMI, PERRLA, normal hearing, no oral lesions, neck supple w/o LAD  Pulmonary: Nonlabored breathing, no respiratory distress, some rhonchi bilaterally; tachypnic to 20s-30s  Cardiovascular: irregularly irregular, no murmurs, incisions and drains CDI  Abdominal: soft, NT/ND, +BS, no organomegaly  Extremities: WWP, 5/5 strength x 4, no clubbing/cyanosis/edema  Neuro: A/O x3, CNs II-XII grossly intact, normal motor/sensation, no focal deficits  Pulses: palpable distal pulses    Lines/tubes/drains: PEJ, retrograde duodenal;  TLC    Vent settings:  Mode: standby    I&O's Summary    18 Feb 2018 07:01  -  19 Feb 2018 07:00  --------------------------------------------------------  IN: 1744 mL / OUT: 2260 mL / NET: -516 mL    19 Feb 2018 07:01  -  19 Feb 2018 09:21  --------------------------------------------------------  IN: 68 mL / OUT: 40 mL / NET: 28 mL        LABS:                        8.3    7.8   )-----------( 137      ( 19 Feb 2018 05:48 )             27.8     02-19    140  |  102  |  37<H>  ----------------------------<  121<H>  4.3   |  30  |  0.68    Ca    7.9<L>      19 Feb 2018 05:48  Phos  3.4     02-19  Mg     2.3     02-19    TPro  6.2  /  Alb  2.9<L>  /  TBili  2.4<H>  /  DBili  1.2<H>  /  AST  60<H>  /  ALT  51<H>  /  AlkPhos  200<H>  02-19        CAPILLARY BLOOD GLUCOSE      POCT Blood Glucose.: 110 mg/dL (19 Feb 2018 06:04)  POCT Blood Glucose.: 130 mg/dL (19 Feb 2018 01:03)  POCT Blood Glucose.: 155 mg/dL (18 Feb 2018 15:56)  POCT Blood Glucose.: 131 mg/dL (18 Feb 2018 11:16)    LIVER FUNCTIONS - ( 19 Feb 2018 05:48 )  Alb: 2.9 g/dL / Pro: 6.2 g/dL / ALK PHOS: 200 U/L / ALT: 51 U/L / AST: 60 U/L / GGT: x             Cultures:    RADIOLOGY & ADDITIONAL STUDIES:

## 2018-02-19 NOTE — PROGRESS NOTE ADULT - SUBJECTIVE AND OBJECTIVE BOX
SUBJECTIVE: Patient doing well. Was put on trach collar at 7am and denies chest pain and shortness of breath. Also denies abdominal pain, N/V/D/C. Pain is well controlled.       MEDICATIONS  (STANDING):  ALBUTerol/ipratropium for Nebulization 3 milliLiter(s) Nebulizer every 6 hours  apixaban 5 milliGRAM(s) Oral every 12 hours  BACItracin   Ointment 1 Application(s) Topical two times a day  chlorhexidine 0.12% Liquid 15 milliLiter(s) Swish and Spit two times a day  digoxin     Tablet 0.125 milliGRAM(s) Oral daily  docusate sodium Liquid 100 milliGRAM(s) Oral two times a day  fluconAZOLE   40 mG/mL Suspension 400 milliGRAM(s) Enteral Tube every 24 hours  furosemide   Injectable 20 milliGRAM(s) IV Push once  hydrogen peroxide 3% Solution 1 Application(s) Topical daily  insulin lispro (HumaLOG) corrective regimen sliding scale   SubCutaneous every 6 hours  lidocaine 2% Injectable 50 milliLiter(s) Local Injection once  pantoprazole  Injectable 40 milliGRAM(s) IV Push every 12 hours  povidone iodine 10% Solution 1 Application(s) Topical daily  sodium chloride 0.9% lock flush 20 milliLiter(s) IV Push once    MEDICATIONS  (PRN):  acetaminophen    Suspension 650 milliGRAM(s) Enteral Tube every 6 hours PRN For Temp greater than 38 C (100.4 F)  bisacodyl Suppository 10 milliGRAM(s) Rectal daily PRN Constipation  sodium chloride 0.9% lock flush 10 milliLiter(s) IV Push every 1 hour PRN After each medication administration    Oliveira in place with good urine output    ICU Vital Signs Last 24 Hrs  T(C): 37.3 (19 Feb 2018 06:59), Max: 37.9 (19 Feb 2018 02:10)  T(F): 99.1 (19 Feb 2018 06:59), Max: 100.2 (19 Feb 2018 02:10)  HR: 98 (19 Feb 2018 08:00) (92 - 122)  BP: 94/74 (19 Feb 2018 08:00) (92/54 - 120/77)  BP(mean): 84 (19 Feb 2018 08:00) (70 - 94)  ABP: --  ABP(mean): --  RR: 36 (19 Feb 2018 08:00) (10 - 39)  SpO2: 100% (19 Feb 2018 08:00) (98% - 100%)      Physical Exam:  General: NAD  HEENT: NC/AT, EOMI, PERRLA, normal hearing, no oral lesions, neck supple w/o LAD  Pulmonary: Nonlabored breathing, no respiratory distress, some rhonchi bilaterally; tachypnic to 20s-30s  Cardiovascular: irregularly irregular, no murmurs, incisions and drains CDI  Abdominal: soft, NT/ND, +BS, no organomegaly  Extremities: WWP, 5/5 strength x 4, no clubbing/cyanosis/edema  Neuro: A/O x3, CNs II-XII grossly intact, normal motor/sensation, no focal deficits  Pulses: palpable distal pulses    Lines/tubes/drains: PEJ, retrograde duodenal;  TLC    Vent settings:  Mode: standby    I&O's Summary    18 Feb 2018 07:01  -  19 Feb 2018 07:00  --------------------------------------------------------  IN: 1744 mL / OUT: 2260 mL / NET: -516 mL    19 Feb 2018 07:01  -  19 Feb 2018 09:21  --------------------------------------------------------  IN: 68 mL / OUT: 40 mL / NET: 28 mL    ROS--- unchanged    LABS:                        8.3    7.8   )-----------( 137      ( 19 Feb 2018 05:48 )             27.8     02-19    140  |  102  |  37<H>  ----------------------------<  121<H>  4.3   |  30  |  0.68    Ca    7.9<L>      19 Feb 2018 05:48  Phos  3.4     02-19  Mg     2.3     02-19    TPro  6.2  /  Alb  2.9<L>  /  TBili  2.4<H>  /  DBili  1.2<H>  /  AST  60<H>  /  ALT  51<H>  /  AlkPhos  200<H>  02-19        CAPILLARY BLOOD GLUCOSE      POCT Blood Glucose.: 110 mg/dL (19 Feb 2018 06:04)  POCT Blood Glucose.: 130 mg/dL (19 Feb 2018 01:03)  POCT Blood Glucose.: 155 mg/dL (18 Feb 2018 15:56)  POCT Blood Glucose.: 131 mg/dL (18 Feb 2018 11:16)    LIVER FUNCTIONS - ( 19 Feb 2018 05:48 )  Alb: 2.9 g/dL / Pro: 6.2 g/dL / ALK PHOS: 200 U/L / ALT: 51 U/L / AST: 60 U/L / GGT: x           < from: Xray Chest 1 View- PORTABLE-Routine (02.18.18 @ 02:50) >  RPRETATION:  Clinical History: Tracheostomy.    Portable examination chest demonstrates no interval change lung pathology   in comparison to prior examination of the chest 2/17/2018. No interval   change position remaining support devices.    Impression: No interval change lung pathology          < end of copied text >  :

## 2018-02-19 NOTE — PROGRESS NOTE ADULT - ATTENDING COMMENTS
Patient seen and examined with house-staff during bedside rounds.  Resident note read, including vitals, physical findings, laboratory data, and radiological reports.   Revisions included below.  Direct personal management at bed side and extensive interpretation of the data.  Plan was outlined and discussed in details with the housestaff.  Decision making of high complexity  continue T collar  temperature decreased   off anabiotics  negative fluid balance  increase activity Patient seen and examined with house-staff during bedside rounds.  Resident note read, including vitals, physical findings, laboratory data, and radiological reports.   Revisions included below.  Direct personal management at bed side and extensive interpretation of the data.  Plan was outlined and discussed in details with the housestaff.  Decision making of high complexity  continue T collar  temperature decreased   off anabiotics  negative fluid balance-  increase activity  discussed with Dr Metcalf and will follow with swallow study

## 2018-02-19 NOTE — PROGRESS NOTE ADULT - SUBJECTIVE AND OBJECTIVE BOX
Interventional, Pulmonary, Critical, Chest Special Procedures.    Pt was seen and fully examined by myself.     Time spent with patient in minutes:37    Patient is a 72y old  Male who presents with a chief complaint of acute cholecystitis (18 Jan 2018 14:49)The patient pain free, eupneic on TCollar.    HPI:  73 yo M with h/o severe mitral and aortic valve regurgitation presented to ED with 5 day h/o left lower abdominal pain, nausea and multiple episodes of vomiting as well as PO intolerance. Pt reports that the pain in his abdomen is illicited by movement. Went to [facility] two days ago and underwent CT scan which revealed acute cholecystitis as well as right lower lobe infectious pneumonia. Pt was told he would require surgery and left AMA because he was waiting too long. Denies f/c. Last echo 1/4/17 with EF 60-65%. Normal BMs and passing flatus. (18 Jan 2018 14:49)    REVIEW OF SYSTEMS:updated  Constitutional: No fever, weight loss, chills or fatigue  Eyes: No eye pain, visual disturbances, or discharge  ENMT:  No difficulty hearing, tinnitus, vertigo; No sinus or throat pain. No epistaxis, dysphagia, dysphonia, hoarseness or odynophagia  Neck: No pain, stiffness or neck swelling.  No masses or deformities  Respiratory: No cough, wheezing, chills or hemoptysis  - COPD  - ILD   - PE   - ASTHMA     - PNEUMONIA  Cardiovascular: No chest pain, dysrhythmia, palpitations, dizziness or edema   - COPD     - CAD   - CHF   - HTN  Gastrointestinal: No abdominal or epigastric pain. No nausea, vomiting or hematemesis; No diarrhea or constipation. No melena or hematochezia. No dysphagia or Icterus.          Genitourinary: No dysuria, frequency, hematuria or incontinence   - CKD/GAETANO      - ESRD  Neurological: No headaches, memory loss, loss of strength, numbness or tremors      -DEMENTIA     - STROKE    - SEIZURE  Skin: No itching, burning, rashes or lesions   Lymph Nodes: No enlarged glands  Endocrine: No heat or cold intolerance; No hair loss       - DM     - THYROID DISORDER  Musculoskeletal: No joint pain or swelling; No muscle, back or extremity pain  Psychiatric: No depression, anxiety, mood swings or difficulty sleeping  Heme/Lymph: No easy bruising or bleeding gums         - ANEMIA      - CANCER   -COAGULOPATHY  Allergy and Immunologic: No hives or eczema    PAST MEDICAL & SURGICAL HISTORY:  Mitral regurgitation  Aortic regurgitation  No significant past surgical history    FAMILY HISTORY:    SOCIAL HISTORY:      - Tobacco     - ETOH    Allergies    No Known Allergies    Intolerances      Vital Signs Last 24 Hrs  T(C): 35.9 (19 Feb 2018 09:36), Max: 37.9 (19 Feb 2018 02:10)  T(F): 96.7 (19 Feb 2018 09:36), Max: 100.2 (19 Feb 2018 02:10)  HR: 85 (19 Feb 2018 11:09) (85 - 124)  BP: 119/63 (19 Feb 2018 11:00) (92/54 - 120/77)  BP(mean): 89 (19 Feb 2018 11:00) (70 - 94)  RR: 33 (19 Feb 2018 11:00) (10 - 39)  SpO2: 100% (19 Feb 2018 11:09) (97% - 100%)    02-18 @ 07:01  -  02-19 @ 07:00  --------------------------------------------------------  IN: 1744 mL / OUT: 2260 mL / NET: -516 mL    02-19 @ 07:01  -  02-19 @ 12:45  --------------------------------------------------------  IN: 340 mL / OUT: 230 mL / NET: 110 mL      Mode: standby    PHYSICAL EXAM:  More comfortable, no distress  Eyes: PERRL, EOM intact; conjunctiva and sclera clear  Head: Normocephalic;  No Trauma  ENMT: No nasal discharge, hoarseness, cough or hemoptysis.  Airway clear  Neck: Supple; non tender; no masses or deformities.    No JVD  Respiratory:  - WHEEZING   few bilateral   RHONCHI  - RALES  = CRACKLES.  Diminished breath sounds  BILATERAL  RIGHT  LEFT bases   Cardiovascular: Regular rate and rhythm. S1 and S2 Normal; ++murmurs, gallops or rubs     - PPM/AICD  Gastrointestinal: Soft ,distended; Normal bowel sounds; No hepatosplenomegaly.     +PEG    + multiple surgical drains integrity checked    ANGELES  Genitourinary: No costovertebral angle tenderness. No dysuria  Extremities: Decreased AROM, + clubbing, cyanosis + edema, , toes dressings in place  Vascular: Peripheral pulses palpable 2+ bilaterally  Neurological: intubated and opening eyes to commands  Skin: Warm and dry. No obvious rash  Lymph Nodes: No acute cervical or supraclavicular adenopathy  Psychiatric: not sedated, calm affect  DEVICES:  - DENTURES   +IV R / L     - ETUBE   -TRACH   -CTUBE  R / L      LABS:                          8.3    7.8   )-----------( 137      ( 19 Feb 2018 05:48 )             27.8     02-19    140  |  102  |  37<H>  ----------------------------<  121<H>  4.3   |  30  |  0.68    Ca    7.9<L>      19 Feb 2018 05:48  Phos  3.4     02-19  Mg     2.3     02-19    TPro  6.2  /  Alb  2.9<L>  /  TBili  2.4<H>  /  DBili  1.2<H>  /  AST  60<H>  /  ALT  51<H>  /  AlkPhos  200<H>  02-19      < from: Xray Chest 1 View- PORTABLE-Routine (02.18.18 @ 02:50) >  EXAM:  XR CHEST PORTABLE ROUTINE 1V                          PROCEDURE DATE:  02/18/2018                     INTERPRETATION:  Clinical History: Tracheostomy.    Portable examination chest demonstrates no interval change lung pathology   in comparison to prior examination of the chest 2/17/2018. No interval   change position remaining support devices.    Impression: No interval change lung pathology    < end of copied text >  RADIOLOGY & ADDITIONAL STUDIES (The following images were personally reviewed):

## 2018-02-20 DIAGNOSIS — B37.9 CANDIDIASIS, UNSPECIFIED: ICD-10-CM

## 2018-02-20 LAB
ANION GAP SERPL CALC-SCNC: 8 MMOL/L — SIGNIFICANT CHANGE UP (ref 5–17)
BUN SERPL-MCNC: 38 MG/DL — HIGH (ref 7–23)
CALCIUM SERPL-MCNC: 8.2 MG/DL — LOW (ref 8.4–10.5)
CHLORIDE SERPL-SCNC: 103 MMOL/L — SIGNIFICANT CHANGE UP (ref 96–108)
CO2 SERPL-SCNC: 31 MMOL/L — SIGNIFICANT CHANGE UP (ref 22–31)
CREAT SERPL-MCNC: 0.64 MG/DL — SIGNIFICANT CHANGE UP (ref 0.5–1.3)
GLUCOSE BLDC GLUCOMTR-MCNC: 133 MG/DL — HIGH (ref 70–99)
GLUCOSE BLDC GLUCOMTR-MCNC: 135 MG/DL — HIGH (ref 70–99)
GLUCOSE BLDC GLUCOMTR-MCNC: 149 MG/DL — HIGH (ref 70–99)
GLUCOSE BLDC GLUCOMTR-MCNC: 149 MG/DL — HIGH (ref 70–99)
GLUCOSE SERPL-MCNC: 159 MG/DL — HIGH (ref 70–99)
HCT VFR BLD CALC: 28.5 % — LOW (ref 39–50)
HGB BLD-MCNC: 8.6 G/DL — LOW (ref 13–17)
MAGNESIUM SERPL-MCNC: 2.2 MG/DL — SIGNIFICANT CHANGE UP (ref 1.6–2.6)
MCHC RBC-ENTMCNC: 30.2 G/DL — LOW (ref 32–36)
MCHC RBC-ENTMCNC: 30.7 PG — SIGNIFICANT CHANGE UP (ref 27–34)
MCV RBC AUTO: 101.8 FL — HIGH (ref 80–100)
PHOSPHATE SERPL-MCNC: 3.3 MG/DL — SIGNIFICANT CHANGE UP (ref 2.5–4.5)
PLATELET # BLD AUTO: 121 K/UL — LOW (ref 150–400)
POTASSIUM SERPL-MCNC: 4.1 MMOL/L — SIGNIFICANT CHANGE UP (ref 3.5–5.3)
POTASSIUM SERPL-SCNC: 4.1 MMOL/L — SIGNIFICANT CHANGE UP (ref 3.5–5.3)
RBC # BLD: 2.8 M/UL — LOW (ref 4.2–5.8)
RBC # FLD: 22 % — HIGH (ref 10.3–16.9)
SODIUM SERPL-SCNC: 142 MMOL/L — SIGNIFICANT CHANGE UP (ref 135–145)
WBC # BLD: 7.4 K/UL — SIGNIFICANT CHANGE UP (ref 3.8–10.5)
WBC # FLD AUTO: 7.4 K/UL — SIGNIFICANT CHANGE UP (ref 3.8–10.5)

## 2018-02-20 PROCEDURE — 99233 SBSQ HOSP IP/OBS HIGH 50: CPT | Mod: GC

## 2018-02-20 PROCEDURE — 71045 X-RAY EXAM CHEST 1 VIEW: CPT | Mod: 26

## 2018-02-20 RX ORDER — FUROSEMIDE 40 MG
20 TABLET ORAL ONCE
Qty: 0 | Refills: 0 | Status: COMPLETED | OUTPATIENT
Start: 2018-02-20 | End: 2018-02-20

## 2018-02-20 RX ADMIN — TAMSULOSIN HYDROCHLORIDE 0.4 MILLIGRAM(S): 0.4 CAPSULE ORAL at 21:34

## 2018-02-20 RX ADMIN — APIXABAN 5 MILLIGRAM(S): 2.5 TABLET, FILM COATED ORAL at 19:55

## 2018-02-20 RX ADMIN — HYDROGEN PEROXIDE 1 APPLICATION(S): 0.3 LIQUID TOPICAL at 11:47

## 2018-02-20 RX ADMIN — APIXABAN 5 MILLIGRAM(S): 2.5 TABLET, FILM COATED ORAL at 06:50

## 2018-02-20 RX ADMIN — Medication 0: at 23:17

## 2018-02-20 RX ADMIN — PANTOPRAZOLE SODIUM 40 MILLIGRAM(S): 20 TABLET, DELAYED RELEASE ORAL at 06:49

## 2018-02-20 RX ADMIN — Medication 1 APPLICATION(S): at 11:45

## 2018-02-20 RX ADMIN — Medication 3 MILLILITER(S): at 11:01

## 2018-02-20 RX ADMIN — Medication 1 APPLICATION(S): at 17:07

## 2018-02-20 RX ADMIN — CHLORHEXIDINE GLUCONATE 15 MILLILITER(S): 213 SOLUTION TOPICAL at 17:06

## 2018-02-20 RX ADMIN — Medication 3 MILLILITER(S): at 06:03

## 2018-02-20 RX ADMIN — Medication 1 APPLICATION(S): at 06:50

## 2018-02-20 RX ADMIN — PANTOPRAZOLE SODIUM 40 MILLIGRAM(S): 20 TABLET, DELAYED RELEASE ORAL at 17:07

## 2018-02-20 RX ADMIN — CHLORHEXIDINE GLUCONATE 15 MILLILITER(S): 213 SOLUTION TOPICAL at 06:50

## 2018-02-20 RX ADMIN — Medication 100 MILLIGRAM(S): at 17:07

## 2018-02-20 RX ADMIN — Medication 3 MILLILITER(S): at 19:06

## 2018-02-20 RX ADMIN — Medication 1 APPLICATION(S): at 11:46

## 2018-02-20 RX ADMIN — Medication 20 MILLIGRAM(S): at 01:41

## 2018-02-20 RX ADMIN — Medication 3 MILLILITER(S): at 23:26

## 2018-02-20 RX ADMIN — Medication 0.12 MILLIGRAM(S): at 06:50

## 2018-02-20 NOTE — PROGRESS NOTE ADULT - SUBJECTIVE AND OBJECTIVE BOX
SUBJECTIVE: pain controlled, denies HA, N/V, CP/SOB, chills/sweats, +BM/Flatus,   ON TC    MEDICATIONS  (STANDING):  ALBUTerol/ipratropium for Nebulization 3 milliLiter(s) Nebulizer every 6 hours  apixaban 5 milliGRAM(s) Oral every 12 hours  BACItracin   Ointment 1 Application(s) Topical two times a day  chlorhexidine 0.12% Liquid 15 milliLiter(s) Swish and Spit two times a day  digoxin     Tablet 0.125 milliGRAM(s) Oral daily  docusate sodium Liquid 100 milliGRAM(s) Oral two times a day  fluconAZOLE   40 mG/mL Suspension 400 milliGRAM(s) Enteral Tube every 24 hours  hydrogen peroxide 3% Solution 1 Application(s) Topical daily  insulin lispro (HumaLOG) corrective regimen sliding scale   SubCutaneous every 6 hours  lidocaine 2% Injectable 50 milliLiter(s) Local Injection once  mineral oil for Topical Use 1 Application(s) Topical daily  pantoprazole  Injectable 40 milliGRAM(s) IV Push every 12 hours  povidone iodine 10% Solution 1 Application(s) Topical daily  sodium chloride 0.9% lock flush 20 milliLiter(s) IV Push once  tamsulosin 0.4 milliGRAM(s) Oral at bedtime    MEDICATIONS  (PRN):  acetaminophen    Suspension 650 milliGRAM(s) Enteral Tube every 6 hours PRN For Temp greater than 38 C (100.4 F)  bisacodyl Suppository 10 milliGRAM(s) Rectal daily PRN Constipation  sodium chloride 0.9% lock flush 10 milliLiter(s) IV Push every 1 hour PRN After each medication administration    ICU Vital Signs Last 24 Hrs  T(C): 36 (20 Feb 2018 06:28), Max: 37.2 (19 Feb 2018 14:47)  T(F): 96.8 (20 Feb 2018 06:28), Max: 99 (19 Feb 2018 14:47)  HR: 102 (20 Feb 2018 07:00) (84 - 124)  BP: 97/72 (20 Feb 2018 07:00) (87/51 - 132/58)  BP(mean): 89 (20 Feb 2018 07:00) (65 - 89)  ABP: --  ABP(mean): --  RR: 24 (20 Feb 2018 07:00) (15 - 36)  SpO2: 99% (20 Feb 2018 07:00) (95% - 100%)      Physical Exam:  General: NAD  HEENT: NC/AT, trach in place  Pulmonary: Nonlabored breathing, no respiratory distress, ronchi b/l  Cardiovascular: Afib, MR  Abdominal: soft, NT/ND, +BS, erythema/excoriations w/warmth at previous site of tegaderms on R abdomen  Extremities: serosang-tinged pedal dressing b/l  Neuro: A/O x3, CNs II-XII grossly intact, normal motor/sensation, no focal deficits    Lines/tubes/drains: abarca, gastrostomy, retrograde duodenostomy, feeding jejunostomy    Vent settings:  Mode: standby    I&O's Summary    19 Feb 2018 07:01  -  20 Feb 2018 07:00  --------------------------------------------------------  IN: 1802 mL / OUT: 2535 mL / NET: -733 mL    20 Feb 2018 07:01  -  20 Feb 2018 07:37  --------------------------------------------------------  IN: 68 mL / OUT: 0 mL / NET: 68 mL        LABS:                        8.6    7.4   )-----------( 121      ( 20 Feb 2018 05:20 )             28.5     02-20    142  |  103  |  38<H>  ----------------------------<  159<H>  4.1   |  31  |  0.64    Ca    8.2<L>      20 Feb 2018 05:20  Phos  3.3     02-20  Mg     2.2     02-20    TPro  6.2  /  Alb  2.9<L>  /  TBili  2.4<H>  /  DBili  1.2<H>  /  AST  60<H>  /  ALT  51<H>  /  AlkPhos  200<H>  02-19        CAPILLARY BLOOD GLUCOSE      POCT Blood Glucose.: 135 mg/dL (20 Feb 2018 07:12)  POCT Blood Glucose.: 159 mg/dL (19 Feb 2018 22:25)  POCT Blood Glucose.: 134 mg/dL (19 Feb 2018 17:44)  POCT Blood Glucose.: 133 mg/dL (19 Feb 2018 10:58)    LIVER FUNCTIONS - ( 19 Feb 2018 05:48 )  Alb: 2.9 g/dL / Pro: 6.2 g/dL / ALK PHOS: 200 U/L / ALT: 51 U/L / AST: 60 U/L / GGT: x

## 2018-02-20 NOTE — PROGRESS NOTE ADULT - SUBJECTIVE AND OBJECTIVE BOX
Critical Care    INTERVAL HPI/OVERNIGHT EVENTS:    ANTIBIOTICS    MEDICATIONS  (STANDING):  ALBUTerol/ipratropium for Nebulization 3 milliLiter(s) Nebulizer every 6 hours  apixaban 5 milliGRAM(s) Oral every 12 hours  BACItracin   Ointment 1 Application(s) Topical two times a day  chlorhexidine 0.12% Liquid 15 milliLiter(s) Swish and Spit two times a day  digoxin     Tablet 0.125 milliGRAM(s) Oral daily  docusate sodium Liquid 100 milliGRAM(s) Oral two times a day  hydrogen peroxide 3% Solution 1 Application(s) Topical daily  insulin lispro (HumaLOG) corrective regimen sliding scale   SubCutaneous every 6 hours  lidocaine 2% Injectable 50 milliLiter(s) Local Injection once  mineral oil for Topical Use 1 Application(s) Topical daily  pantoprazole  Injectable 40 milliGRAM(s) IV Push every 12 hours  povidone iodine 10% Solution 1 Application(s) Topical daily  sodium chloride 0.9% lock flush 20 milliLiter(s) IV Push once  tamsulosin 0.4 milliGRAM(s) Oral at bedtime    MEDICATIONS  (PRN):  acetaminophen    Suspension 650 milliGRAM(s) Enteral Tube every 6 hours PRN For Temp greater than 38 C (100.4 F)  bisacodyl Suppository 10 milliGRAM(s) Rectal daily PRN Constipation  sodium chloride 0.9% lock flush 10 milliLiter(s) IV Push every 1 hour PRN After each medication administration      Allergies    No Known Allergies    Intolerances        REVIEW OF SYSTEMS:    patient appears comfortable    Vital Signs Last 24 Hrs  T(C): 36.3 (20 Feb 2018 14:15), Max: 37.2 (19 Feb 2018 14:47)  T(F): 97.4 (20 Feb 2018 14:15), Max: 99 (19 Feb 2018 14:47)  HR: 132 (20 Feb 2018 14:00) (84 - 132)  BP: 104/59 (20 Feb 2018 14:00) (87/51 - 132/58)  BP(mean): 75 (20 Feb 2018 14:00) (65 - 92)  RR: 20 (20 Feb 2018 14:00) (17 - 35)  SpO2: 100% (20 Feb 2018 14:00) (95% - 100%)    PHYSICAL EXAM:    General:  in no acute distress  Eyes: PERRL, EOM intact; conjunctiva and sclera clear  Head: Normocephalic; atraumatic  ENMT: No nasal discharge; airway clear  Neck: Supple; non tender; no masses  Respiratory: No wheezes, rales or rhonchi  Cardiovascular: Regular rate and rhythm. S1 and S2 Normal; No murmurs, gallops or rubs  Gastrointestinal: Soft non-tender non-distended; Normal bowel sounds; No hepatosplenomegaly  Genitourinary: No costovertebral angle tenderness  Extremities: Normal range of motion, No clubbing, cyanosis or edema  Vascular: Peripheral pulses palpable 2+ bilaterally  Neurological: Alert and oriented x3  Skin: Warm and dry. No acute rash  Lymph Nodes: No acute cervical adenopathy  Musculoskeletal: Normal gait, tone, without deformities    LABS:                        8.6    7.4   )-----------( 121      ( 20 Feb 2018 05:20 )             28.5     02-20    142  |  103  |  38<H>  ----------------------------<  159<H>  4.1   |  31  |  0.64    Ca    8.2<L>      20 Feb 2018 05:20  Phos  3.3     02-20  Mg     2.2     02-20    TPro  6.2  /  Alb  2.9<L>  /  TBili  2.4<H>  /  DBili  1.2<H>  /  AST  60<H>  /  ALT  51<H>  /  AlkPhos  200<H>  02-19            MICROBIOLOGY:  Culture Results:   Testing in progress (02-15 @ 00:31)  Culture Results:   No growth (02-14 @ 14:53)      RADIOLOGY & ADDITIONAL STUDIES:

## 2018-02-20 NOTE — PROGRESS NOTE ADULT - SUBJECTIVE AND OBJECTIVE BOX
Interventional, Pulmonary, Critical, Chest Special Procedures.    Pt was seen and fully examined by myself.     Time spent with patient in minutes:37    Patient is a 72y old  Male who presents with a chief complaint of acute cholecystitis (18 Jan 2018 14:49)The patient comfortable on TCollar today, desires to talk, pain free when seen.    HPI:  71 yo M with h/o severe mitral and aortic valve regurgitation presented to ED with 5 day h/o left lower abdominal pain, nausea and multiple episodes of vomiting as well as PO intolerance. Pt reports that the pain in his abdomen is illicited by movement. Went to [facility] two days ago and underwent CT scan which revealed acute cholecystitis as well as right lower lobe infectious pneumonia. Pt was told he would require surgery and left AMA because he was waiting too long. Denies f/c. Last echo 1/4/17 with EF 60-65%. Normal BMs and passing flatus. (18 Jan 2018 14:49)    REVIEW OF SYSTEMS: updated  Constitutional: No fever, weight loss, chills or fatigue  Eyes: No eye pain, visual disturbances, or discharge  ENMT:  No difficulty hearing, tinnitus, vertigo; No sinus or throat pain. No epistaxis, dysphagia, dysphonia, hoarseness or odynophagia  Neck: No pain, stiffness or neck swelling.  No masses or deformities  Respiratory: No cough, wheezing, chills or hemoptysis  - COPD  - ILD   - PE   - ASTHMA     - PNEUMONIA  Cardiovascular: No chest pain, dysrhythmia, palpitations, dizziness or edema   - COPD     - CAD   - CHF   - HTN  Gastrointestinal: No abdominal or epigastric pain. No nausea, vomiting or hematemesis; No diarrhea or constipation. No melena or hematochezia. No dysphagia or Icterus.          Genitourinary: No dysuria, frequency, hematuria or incontinence   - CKD/GAETANO      - ESRD  Neurological: No headaches, memory loss, loss of strength, numbness or tremors      -DEMENTIA     - STROKE    - SEIZURE  Skin: No itching, burning, rashes or lesions   Lymph Nodes: No enlarged glands  Endocrine: No heat or cold intolerance; No hair loss       - DM     - THYROID DISORDER  Musculoskeletal: No joint pain or swelling; No muscle, back or extremity pain  Psychiatric: No depression, anxiety, mood swings or difficulty sleeping  Heme/Lymph: No easy bruising or bleeding gums         - ANEMIA      - CANCER   -COAGULOPATHY  Allergy and Immunologic: No hives or eczema    PAST MEDICAL & SURGICAL HISTORY:  Mitral regurgitation  Aortic regurgitation  No significant past surgical history    FAMILY HISTORY:    SOCIAL HISTORY:      - Tobacco     - ETOH    Allergies    No Known Allergies    Intolerances      Vital Signs Last 24 Hrs  T(C): 36.6 (20 Feb 2018 10:10), Max: 37.2 (19 Feb 2018 14:47)  T(F): 97.9 (20 Feb 2018 10:10), Max: 99 (19 Feb 2018 14:47)  HR: 90 (20 Feb 2018 12:00) (84 - 109)  BP: 99/73 (20 Feb 2018 12:00) (87/51 - 132/58)  BP(mean): 81 (20 Feb 2018 12:00) (65 - 92)  RR: 17 (20 Feb 2018 12:00) (17 - 35)  SpO2: 100% (20 Feb 2018 12:00) (95% - 100%)    02-19 @ 07:01 - 02-20 @ 07:00  --------------------------------------------------------  IN: 1802 mL / OUT: 2535 mL / NET: -733 mL    02-20 @ 07:01 - 02-20 @ 12:29  --------------------------------------------------------  IN: 340 mL / OUT: 145 mL / NET: 195 mL      Mode: standby     More comfortable, no distress  Eyes: PERRL, EOM intact; conjunctiva and sclera clear  Head: Normocephalic;  No Trauma  ENMT: No nasal discharge, hoarseness, cough or hemoptysis.  Airway clear  Neck: Supple; non tender; no masses or deformities.    No JVD  Respiratory:  - WHEEZING   few bilateral   RHONCHI  - RALES  less  CRACKLES.  Diminished breath sounds  BILATERAL  RIGHT  LEFT bases   Cardiovascular: Regular rate and rhythm. S1 and S2 Normal; ++murmurs, gallops or rubs     - PPM/AICD  Gastrointestinal: Soft ,distended; Normal bowel sounds; No hepatosplenomegaly.     +PEG    + multiple surgical drains integrity checked    ANGELES  Genitourinary: No costovertebral angle tenderness. No dysuria  Extremities: Decreased AROM, + clubbing, cyanosis + edema, , toes dressings in place  Vascular: Peripheral pulses palpable 2+ bilaterally  Neurological: intubated and opening eyes to commands  Skin: Warm and dry. No obvious rash  Lymph Nodes: No acute cervical or supraclavicular adenopathy  Psychiatric: not sedated, calm affect  DEVICES:  - DENTURES   +IV R / L     - ETUBE   + 8.0 site clean Shilley cuffed TRACH -CTUBE  R / L      LABS:                          8.6    7.4   )-----------( 121      ( 20 Feb 2018 05:20 )             28.5     02-20    142  |  103  |  38<H>  ----------------------------<  159<H>  4.1   |  31  |  0.64    Ca    8.2<L>      20 Feb 2018 05:20  Phos  3.3     02-20  Mg     2.2     02-20    TPro  6.2  /  Alb  2.9<L>  /  TBili  2.4<H>  /  DBili  1.2<H>  /  AST  60<H>  /  ALT  51<H>  /  AlkPhos  200<H>  02-19      RADIOLOGY & ADDITIONAL STUDIES (The following images were personally reviewed):CXR series

## 2018-02-20 NOTE — PROGRESS NOTE ADULT - ASSESSMENT
ASSESSMENT/PLAN 73y/o male pt c Respiratory failure, perforated duodenal ulcer, POD post repair, pneumonia, RLL, LLL  atelectases, no obvious RADHA, elevated R hemidiaphragm, L pleural effusion, AFIB,  thrombocytopenia, possible endocarditis, s/p reop. now POP tracheostomy, new fever pattern, anemia    1. O2: Attempt TCollar daytime,  CPAP if pt fatigues and overnight, please have speech swallow evaluating for PassyMuir valve   2. Bronchodilators:  Atrovent/ albuterol q 4 – 6 hours as needed  3. Corticosteroids: off  4. ID/Antibiotics: as per   5. Cardiac/HTN: Monitor, optimize HR;   6. GI: Rx/ prophylaxis c PPI/Protonix  7. Heme: Rx/VT  follow H/H closely  on Eliquis   8. Aspiration precautions, feeding PEG to continue  9 D/C Oliveira, voiding trial  10. Edema: diuretic to continue  11.LMobilize, OOB, PT  Discussed c managing team. SICU

## 2018-02-20 NOTE — PROGRESS NOTE ADULT - SUBJECTIVE AND OBJECTIVE BOX
24 hr events: lasix 20, start flomax, dc TLC. trach collar; Tolerated TC overnight negative 300 therefore given 20 iv lasix @1am     SUBJECTIVE: pain controlled, denies HA, N/V, CP/SOB, chills/sweats, +BM/Flatus,     MEDICATIONS  (STANDING):  ALBUTerol/ipratropium for Nebulization 3 milliLiter(s) Nebulizer every 6 hours  apixaban 5 milliGRAM(s) Oral every 12 hours  BACItracin   Ointment 1 Application(s) Topical two times a day  chlorhexidine 0.12% Liquid 15 milliLiter(s) Swish and Spit two times a day  digoxin     Tablet 0.125 milliGRAM(s) Oral daily  docusate sodium Liquid 100 milliGRAM(s) Oral two times a day  fluconAZOLE   40 mG/mL Suspension 400 milliGRAM(s) Enteral Tube every 24 hours  hydrogen peroxide 3% Solution 1 Application(s) Topical daily  insulin lispro (HumaLOG) corrective regimen sliding scale   SubCutaneous every 6 hours  lidocaine 2% Injectable 50 milliLiter(s) Local Injection once  mineral oil for Topical Use 1 Application(s) Topical daily  pantoprazole  Injectable 40 milliGRAM(s) IV Push every 12 hours  povidone iodine 10% Solution 1 Application(s) Topical daily  sodium chloride 0.9% lock flush 20 milliLiter(s) IV Push once  tamsulosin 0.4 milliGRAM(s) Oral at bedtime    MEDICATIONS  (PRN):  acetaminophen    Suspension 650 milliGRAM(s) Enteral Tube every 6 hours PRN For Temp greater than 38 C (100.4 F)  bisacodyl Suppository 10 milliGRAM(s) Rectal daily PRN Constipation  sodium chloride 0.9% lock flush 10 milliLiter(s) IV Push every 1 hour PRN After each medication administration    ICU Vital Signs Last 24 Hrs  T(C): 36 (20 Feb 2018 06:28), Max: 37.2 (19 Feb 2018 14:47)  T(F): 96.8 (20 Feb 2018 06:28), Max: 99 (19 Feb 2018 14:47)  HR: 102 (20 Feb 2018 07:00) (84 - 124)  BP: 97/72 (20 Feb 2018 07:00) (87/51 - 132/58)  BP(mean): 89 (20 Feb 2018 07:00) (65 - 89)  ABP: --  ABP(mean): --  RR: 24 (20 Feb 2018 07:00) (15 - 36)  SpO2: 99% (20 Feb 2018 07:00) (95% - 100%)      Physical Exam:  General: NAD  HEENT: NC/AT, trach in place  Pulmonary: Nonlabored breathing, no respiratory distress, ronchi b/l  Cardiovascular: Afib, MR  Abdominal: soft, NT/ND, +BS, erythema/excoriations w/warmth at previous site of tegaderms on R abdomen  Extremities: serosang-tinged pedal dressing b/l  Neuro: A/O x3, CNs II-XII grossly intact, normal motor/sensation, no focal deficits    Lines/tubes/drains: abarca, gastrostomy, retrograde duodenostomy, feeding jejunostomy    Vent settings:  Mode: standby    I&O's Summary    19 Feb 2018 07:01  -  20 Feb 2018 07:00  --------------------------------------------------------  IN: 1802 mL / OUT: 2535 mL / NET: -733 mL    20 Feb 2018 07:01  -  20 Feb 2018 07:37  --------------------------------------------------------  IN: 68 mL / OUT: 0 mL / NET: 68 mL        LABS:                        8.6    7.4   )-----------( 121      ( 20 Feb 2018 05:20 )             28.5     02-20    142  |  103  |  38<H>  ----------------------------<  159<H>  4.1   |  31  |  0.64    Ca    8.2<L>      20 Feb 2018 05:20  Phos  3.3     02-20  Mg     2.2     02-20    TPro  6.2  /  Alb  2.9<L>  /  TBili  2.4<H>  /  DBili  1.2<H>  /  AST  60<H>  /  ALT  51<H>  /  AlkPhos  200<H>  02-19        CAPILLARY BLOOD GLUCOSE      POCT Blood Glucose.: 135 mg/dL (20 Feb 2018 07:12)  POCT Blood Glucose.: 159 mg/dL (19 Feb 2018 22:25)  POCT Blood Glucose.: 134 mg/dL (19 Feb 2018 17:44)  POCT Blood Glucose.: 133 mg/dL (19 Feb 2018 10:58)    LIVER FUNCTIONS - ( 19 Feb 2018 05:48 )  Alb: 2.9 g/dL / Pro: 6.2 g/dL / ALK PHOS: 200 U/L / ALT: 51 U/L / AST: 60 U/L / GGT: x

## 2018-02-20 NOTE — PROGRESS NOTE ADULT - ASSESSMENT
72yM with PMH severe MR/AR presented after recent dx acute cholecystitis and RLL PNA at [facility] with lower abdominal pain, new onset rapid Afib, sepsis, acute CHF exacerbation, initially admitted to cardiology on bipap, then transferred to SICU with perforated duodenum s/p Shreyas patch and jejunostomy (1/20) c/b breakdown s/p RTOR, washout, new Shreyas patch, gastrostomy, duodenostomy, IVCF (1/22)    Neuro: Tylenol/Dilaudid PRN  CV: Afib: Echo EF 50%, Severe MR, no vegetation, Digoxin 0.125,  Eliquis.  Pulm: Trach 8.0 Shiley trach collar, Duonebs ( 2/14-) Left Ctx removed 2/16  GI: NPO, Glucerna 1.2 @68 via J tube, Protonix BID. Retrograde D-tube to LIWS (red), G-tube to LIWS (green), Colace, Dulcolax suppository  : Oliveira (placed for retention on 2/10), flomax  ID: C albicans intraperitoneal infxn: fluconazole x 3 wks per ID (2/1--); // D/c: Zosyn (1/19-22; 1/27-29; 2/7), micafungin (1/22-2/1), ceftazidime (1/29-2/4), linezolid (1/22-23), meropenem (1/22-27), vancomycin (1/19-20; 2/7-2/9) Alexi/Stenotrophomonas: Levaquin (2/8-16)   Endo: ISS  HEME: Eliquis, IVCF (1/22) // 1FFP, 1PLT (1/22).   PPx: SCD, R pop DVT, s/p IVC filter   Lines: PIV, // D/c: TLC (2/8-2/16) PICC (1/29-2/8), L brachial Energy (1/20-27), L SC HD cath (1/22-27)   Wounds: Ex lap, s/p JPx2 to bulb suction, J tube for feeds, D-tube to LIWS (red), G-tube to LIWS (green); betadine/peroxide to toes  PT/OT: Early mobilization.

## 2018-02-20 NOTE — PROGRESS NOTE ADULT - ATTENDING COMMENTS
Patient seen and examined with house-staff during bedside rounds.  Resident note read, including vitals, physical findings, laboratory data, and radiological reports.   Revisions included below.  Direct personal management at bed side and extensive interpretation of the data.  Plan was outlined and discussed in details with the housestaff.  Decision making of high complexity  discussed the case with Dr Metcalf and he is clinically stable and tolerating feed.    Swallow study  PT  off antibiotics  Diuresis

## 2018-02-21 ENCOUNTER — APPOINTMENT (OUTPATIENT)
Dept: RADIOLOGY | Facility: HOSPITAL | Age: 73
End: 2018-02-21

## 2018-02-21 LAB
ANION GAP SERPL CALC-SCNC: 7 MMOL/L — SIGNIFICANT CHANGE UP (ref 5–17)
BUN SERPL-MCNC: 37 MG/DL — HIGH (ref 7–23)
CALCIUM SERPL-MCNC: 8.1 MG/DL — LOW (ref 8.4–10.5)
CHLORIDE SERPL-SCNC: 104 MMOL/L — SIGNIFICANT CHANGE UP (ref 96–108)
CO2 SERPL-SCNC: 30 MMOL/L — SIGNIFICANT CHANGE UP (ref 22–31)
CREAT SERPL-MCNC: 0.59 MG/DL — SIGNIFICANT CHANGE UP (ref 0.5–1.3)
GLUCOSE BLDC GLUCOMTR-MCNC: 112 MG/DL — HIGH (ref 70–99)
GLUCOSE BLDC GLUCOMTR-MCNC: 134 MG/DL — HIGH (ref 70–99)
GLUCOSE BLDC GLUCOMTR-MCNC: 149 MG/DL — HIGH (ref 70–99)
GLUCOSE BLDC GLUCOMTR-MCNC: 150 MG/DL — HIGH (ref 70–99)
GLUCOSE SERPL-MCNC: 151 MG/DL — HIGH (ref 70–99)
HCT VFR BLD CALC: 27.4 % — LOW (ref 39–50)
HGB BLD-MCNC: 8.4 G/DL — LOW (ref 13–17)
MAGNESIUM SERPL-MCNC: 2.5 MG/DL — SIGNIFICANT CHANGE UP (ref 1.6–2.6)
MCHC RBC-ENTMCNC: 30.7 G/DL — LOW (ref 32–36)
MCHC RBC-ENTMCNC: 31.3 PG — SIGNIFICANT CHANGE UP (ref 27–34)
MCV RBC AUTO: 102.2 FL — HIGH (ref 80–100)
PHOSPHATE SERPL-MCNC: 3.5 MG/DL — SIGNIFICANT CHANGE UP (ref 2.5–4.5)
PLATELET # BLD AUTO: 127 K/UL — LOW (ref 150–400)
POTASSIUM SERPL-MCNC: 4 MMOL/L — SIGNIFICANT CHANGE UP (ref 3.5–5.3)
POTASSIUM SERPL-SCNC: 4 MMOL/L — SIGNIFICANT CHANGE UP (ref 3.5–5.3)
RBC # BLD: 2.68 M/UL — LOW (ref 4.2–5.8)
RBC # FLD: 21.6 % — HIGH (ref 10.3–16.9)
SODIUM SERPL-SCNC: 141 MMOL/L — SIGNIFICANT CHANGE UP (ref 135–145)
WBC # BLD: 7.9 K/UL — SIGNIFICANT CHANGE UP (ref 3.8–10.5)
WBC # FLD AUTO: 7.9 K/UL — SIGNIFICANT CHANGE UP (ref 3.8–10.5)

## 2018-02-21 PROCEDURE — 74241: CPT | Mod: 26

## 2018-02-21 RX ORDER — METOPROLOL TARTRATE 50 MG
5 TABLET ORAL ONCE
Qty: 0 | Refills: 0 | Status: COMPLETED | OUTPATIENT
Start: 2018-02-21 | End: 2018-02-21

## 2018-02-21 RX ADMIN — CHLORHEXIDINE GLUCONATE 15 MILLILITER(S): 213 SOLUTION TOPICAL at 18:08

## 2018-02-21 RX ADMIN — Medication 5 MILLIGRAM(S): at 23:00

## 2018-02-21 RX ADMIN — Medication 1 APPLICATION(S): at 13:05

## 2018-02-21 RX ADMIN — Medication 1 APPLICATION(S): at 07:31

## 2018-02-21 RX ADMIN — Medication 3 MILLILITER(S): at 06:23

## 2018-02-21 RX ADMIN — Medication 0: at 07:53

## 2018-02-21 RX ADMIN — Medication 100 MILLIGRAM(S): at 07:29

## 2018-02-21 RX ADMIN — PANTOPRAZOLE SODIUM 40 MILLIGRAM(S): 20 TABLET, DELAYED RELEASE ORAL at 18:07

## 2018-02-21 RX ADMIN — TAMSULOSIN HYDROCHLORIDE 0.4 MILLIGRAM(S): 0.4 CAPSULE ORAL at 22:48

## 2018-02-21 RX ADMIN — APIXABAN 5 MILLIGRAM(S): 2.5 TABLET, FILM COATED ORAL at 07:28

## 2018-02-21 RX ADMIN — Medication 3 MILLILITER(S): at 12:00

## 2018-02-21 RX ADMIN — CHLORHEXIDINE GLUCONATE 15 MILLILITER(S): 213 SOLUTION TOPICAL at 07:28

## 2018-02-21 RX ADMIN — Medication 0.12 MILLIGRAM(S): at 07:29

## 2018-02-21 RX ADMIN — Medication 3 MILLILITER(S): at 23:44

## 2018-02-21 RX ADMIN — Medication 1 APPLICATION(S): at 18:08

## 2018-02-21 RX ADMIN — APIXABAN 5 MILLIGRAM(S): 2.5 TABLET, FILM COATED ORAL at 19:33

## 2018-02-21 RX ADMIN — Medication 3 MILLILITER(S): at 18:07

## 2018-02-21 RX ADMIN — Medication 100 MILLIGRAM(S): at 18:08

## 2018-02-21 RX ADMIN — PANTOPRAZOLE SODIUM 40 MILLIGRAM(S): 20 TABLET, DELAYED RELEASE ORAL at 07:28

## 2018-02-21 NOTE — PROGRESS NOTE ADULT - SUBJECTIVE AND OBJECTIVE BOX
24 hr events: o/n: passed TOV. Tolerated trach collar.   2/20: fluconazole dc'd per Dr. Flores.     SUBJECTIVE: NAEON. Denies any pain, SOB, CP, N/V/D/C. Reports flatus. Passed TOV.      MEDICATIONS  (STANDING):  ALBUTerol/ipratropium for Nebulization 3 milliLiter(s) Nebulizer every 6 hours  apixaban 5 milliGRAM(s) Oral every 12 hours  BACItracin   Ointment 1 Application(s) Topical two times a day  chlorhexidine 0.12% Liquid 15 milliLiter(s) Swish and Spit two times a day  digoxin     Tablet 0.125 milliGRAM(s) Oral daily  docusate sodium Liquid 100 milliGRAM(s) Oral two times a day  hydrogen peroxide 3% Solution 1 Application(s) Topical daily  insulin lispro (HumaLOG) corrective regimen sliding scale   SubCutaneous every 6 hours  lidocaine 2% Injectable 50 milliLiter(s) Local Injection once  mineral oil for Topical Use 1 Application(s) Topical daily  pantoprazole  Injectable 40 milliGRAM(s) IV Push every 12 hours  povidone iodine 10% Solution 1 Application(s) Topical daily  sodium chloride 0.9% lock flush 20 milliLiter(s) IV Push once  tamsulosin 0.4 milliGRAM(s) Oral at bedtime    MEDICATIONS  (PRN):  acetaminophen    Suspension 650 milliGRAM(s) Enteral Tube every 6 hours PRN For Temp greater than 38 C (100.4 F)  bisacodyl Suppository 10 milliGRAM(s) Rectal daily PRN Constipation  sodium chloride 0.9% lock flush 10 milliLiter(s) IV Push every 1 hour PRN After each medication administration      ICU Vital Signs Last 24 Hrs  T(C): 36.1 (21 Feb 2018 06:31), Max: 36.7 (20 Feb 2018 22:00)  T(F): 97 (21 Feb 2018 06:31), Max: 98 (20 Feb 2018 22:00)  HR: 106 (21 Feb 2018 05:58) (90 - 132)  BP: 102/73 (21 Feb 2018 05:00) (87/70 - 127/62)  BP(mean): 89 (21 Feb 2018 05:00) (67 - 95)  ABP: --  ABP(mean): --  RR: 25 (21 Feb 2018 05:58) (10 - 37)  SpO2: 95% (21 Feb 2018 05:58) (88% - 100%)      Physical Exam:  General: NAD  HEENT: NC/AT, EOMI, PERRLA, normal hearing, no oral lesions, neck supple w/o LAD  Pulmonary: tachypnic, no respiratory distress, CTA-B  Cardiovascular: irregularly irregular, no murmurs  Abdominal: soft, NT/ND, +BS, no organomegaly  Extremities: WWP, 5/5 strength x 4, no clubbing/cyanosis/edema  Neuro: A/O x3, CNs II-XII grossly intact, normal motor/sensation, no focal deficits  Pulses: palpable distal pulses    Vent settings:  Mode: standby    I&O's Summary    19 Feb 2018 07:01  -  20 Feb 2018 07:00  --------------------------------------------------------  IN: 1802 mL / OUT: 2535 mL / NET: -733 mL    20 Feb 2018 07:01  -  21 Feb 2018 06:47  --------------------------------------------------------  IN: 1465 mL / OUT: 420 mL / NET: 1045 mL        LABS:                        8.4    7.9   )-----------( 127      ( 21 Feb 2018 05:30 )             27.4     02-21    141  |  104  |  37<H>  ----------------------------<  151<H>  4.0   |  30  |  0.59    Ca    8.1<L>      21 Feb 2018 05:30  Phos  3.5     02-21  Mg     2.5     02-21          CAPILLARY BLOOD GLUCOSE      POCT Blood Glucose.: 133 mg/dL (20 Feb 2018 23:03)  POCT Blood Glucose.: 149 mg/dL (20 Feb 2018 17:01)  POCT Blood Glucose.: 149 mg/dL (20 Feb 2018 11:36)  POCT Blood Glucose.: 135 mg/dL (20 Feb 2018 07:12)

## 2018-02-21 NOTE — SPEAKING VALVE EVALUATION - RECOMMENDATIONS
Wear PMV during waking hours provided pt. is comfortable breathing and has no change in vitals. PMV should be removed overnight, cleaned and stored in provided container with open lid.

## 2018-02-21 NOTE — SPEAKING VALVE EVALUATION - SPECIFY REASON(S)
Patient is a 72 y.o male with severe MR/AR p/w acute cholecysitis and RLL pna at [facility]. Afib, sepsis, acute CHF, now s/p kaylee patch and J-tube after perforated duodenum.

## 2018-02-21 NOTE — PROGRESS NOTE ADULT - ATTENDING COMMENTS
Patient seen and examined with house-staff during bedside rounds.  Resident note read, including vitals, physical findings, laboratory data, and radiological reports.   Revisions included below.  Direct personal management at bed side and extensive interpretation of the data.  Plan was outlined and discussed in details with the housestaff.  Decision making of high complexity  - On Trach collar for 48 hours and tolerated well  -Deflate cuff and Basey Murey valve  - For swallow study  - fluid balance is acceptable  - Hb is stable.  - Cr is stable  - afebrile off antibiotic  - negative gallium scan  -PT

## 2018-02-21 NOTE — SPEAKING VALVE EVALUATION - RECOMMENDED SPEAKING VALVE GUIDELINES
Cuff fully deflated/Level of supervision/frequent checks/Placement of speaking valve as tolerated/During waking hours only

## 2018-02-21 NOTE — PROGRESS NOTE ADULT - SUBJECTIVE AND OBJECTIVE BOX
Interventional, Pulmonary, Critical, Chest Special Procedures.    Pt was seen and fully examined by myself.     Time spent with patient in minutes:37    Patient is a 72y old  Male who presents with a chief complaint of acute cholecystitis (18 Jan 2018 14:49)The patient was equipped and now tolerating PM valve, the phonation and effort are good, the patient pain free and eupneic on PM mario when seen    HPI:  73 yo M with h/o severe mitral and aortic valve regurgitation presented to ED with 5 day h/o left lower abdominal pain, nausea and multiple episodes of vomiting as well as PO intolerance. Pt reports that the pain in his abdomen is illicited by movement. Went to [facility] two days ago and underwent CT scan which revealed acute cholecystitis as well as right lower lobe infectious pneumonia. Pt was told he would require surgery and left AMA because he was waiting too long. Denies f/c. Last echo 1/4/17 with EF 60-65%. Normal BMs and passing flatus. (18 Jan 2018 14:49)    REVIEW OF SYSTEMS: updated  Constitutional: No fever, weight loss, chills or fatigue  Eyes: No eye pain, visual disturbances, or discharge  ENMT:  No difficulty hearing, tinnitus, vertigo; No sinus or throat pain. No epistaxis, dysphagia, dysphonia, hoarseness or odynophagia  Neck: No pain, stiffness or neck swelling.  No masses or deformities  Respiratory: No cough, wheezing, chills or hemoptysis  - COPD  - ILD   - PE   - ASTHMA     - PNEUMONIA  Cardiovascular: No chest pain, dysrhythmia, palpitations, dizziness or edema   - COPD     - CAD   - CHF   - HTN  Gastrointestinal: No abdominal or epigastric pain. No nausea, vomiting or hematemesis; No diarrhea or constipation. No melena or hematochezia. No dysphagia or Icterus.          Genitourinary: No dysuria, frequency, hematuria or incontinence   - CKD/GAETANO      - ESRD  Neurological: No headaches, memory loss, loss of strength, numbness or tremors      -DEMENTIA     - STROKE    - SEIZURE  Skin: No itching, burning, rashes or lesions   Lymph Nodes: No enlarged glands  Endocrine: No heat or cold intolerance; No hair loss       - DM     - THYROID DISORDER  Musculoskeletal: No joint pain or swelling; No muscle, back or extremity pain  Psychiatric: No depression, anxiety, mood swings or difficulty sleeping  Heme/Lymph: No easy bruising or bleeding gums         - ANEMIA      - CANCER   -COAGULOPATHY  Allergy and Immunologic: No hives or eczema    PAST MEDICAL & SURGICAL HISTORY:  Mitral regurgitation  Aortic regurgitation  No significant past surgical history    FAMILY HISTORY:    SOCIAL HISTORY:      - Tobacco     - ETOH    Allergies    No Known Allergies    Intolerances      Vital Signs Last 24 Hrs  T(C): 37.1 (21 Feb 2018 10:00), Max: 37.1 (21 Feb 2018 10:00)  T(F): 98.8 (21 Feb 2018 10:00), Max: 98.8 (21 Feb 2018 10:00)  HR: 112 (21 Feb 2018 15:11) (98 - 122)  BP: 102/62 (21 Feb 2018 15:11) (82/56 - 120/68)  BP(mean): 81 (21 Feb 2018 15:11) (64 - 95)  RR: 43 (21 Feb 2018 15:11) (10 - 43)  SpO2: 98% (21 Feb 2018 15:11) (82% - 100%)    02-20 @ 07:01  -  02-21 @ 07:00  --------------------------------------------------------  IN: 1722 mL / OUT: 420 mL / NET: 1302 mL    02-21 @ 07:01  -  02-21 @ 15:13  --------------------------------------------------------  IN: 544 mL / OUT: 375 mL / NET: 169 mL      Mode: standby    PHYSICAL EXAM:     More comfortable, no distress  Eyes: PERRL, EOM intact; conjunctiva and sclera clear  Head: Normocephalic;  No Trauma  ENMT: No nasal discharge, hoarseness, cough or hemoptysis.  Airway clear  Neck: Supple; non tender; no masses or deformities.    No JVD  Respiratory:  - WHEEZING   few bilateral   RHONCHI  - RALES  less  CRACKLES.  Diminished breath sounds  BILATERAL  RIGHT  LEFT bases   Cardiovascular: Regular rate and rhythm. S1 and S2 Normal; ++murmurs, gallops or rubs     - PPM/AICD  Gastrointestinal: Soft ,distended; Normal bowel sounds; No hepatosplenomegaly.     +PEG    + multiple surgical drains integrity checked    ANGELES  Genitourinary: No costovertebral angle tenderness. No dysuria  Extremities: Decreased AROM, + clubbing, cyanosis + edema, , toes dressings in place  Vascular: Peripheral pulses palpable 2+ bilaterally  Neurological: intubated and opening eyes to commands  Skin: Warm and dry. No obvious rash  Lymph Nodes: No acute cervical or supraclavicular adenopathy  Psychiatric: not sedated, calm affect  DEVICES:  - DENTURES   +IV R / L     - ETUBE   + 8.0 site clean Shilley decuffed now TRACH, [place] -CTUBE  R / L      LABS:                          8.4    7.9   )-----------( 127      ( 21 Feb 2018 05:30 )             27.4     02-21    141  |  104  |  37<H>  ----------------------------<  151<H>  4.0   |  30  |  0.59    Ca    8.1<L>      21 Feb 2018 05:30  Phos  3.5     02-21  Mg     2.5     02-21        RADIOLOGY & ADDITIONAL STUDIES (The following images were personally reviewed):CXR series

## 2018-02-21 NOTE — SPEAKING VALVE EVALUATION - OBSERVATIONS
Pt. received in bed.  Alert, but lethargic.  Pt. indicated that he is amenable to speaking valve (PMV) trials today.

## 2018-02-21 NOTE — SPEAKING VALVE EVALUATION - DIAGNOSTIC IMPRESSIONS
Pt. has good tolerance of PMV over 20- minute trial.  PMV removed as patient wanting to take a nap.  Spoke with RN, who will continue PMV trials once patient is awake from his nap.

## 2018-02-21 NOTE — PROGRESS NOTE ADULT - ASSESSMENT
72yM with PMH severe MR/AR presented after recent dx acute cholecystitis and RLL PNA at [facility] with lower abdominal pain, new onset rapid Afib, sepsis, acute CHF exacerbation, initially admitted to cardiology on bipap, then transferred to SICU with perforated duodenum s/p Shreyas patch and jejunostomy (1/20) c/b breakdown s/p RTOR, washout, new Shreyas patch, gastrostomy, duodenostomy, IVCF (1/22)    Neuro: Tylenol/Dilaudid PRN  CV: Afib: Echo EF 50%, Severe MR, no vegetation, Digoxin 0.125,  Eliquis.  Pulm: Trach 8.0 Shiley trach collar, Duonebs ( 2/14-) Left Ctx removed 2/16  GI: NPO, Glucerna 1.2 @68 via J tube, Protonix BID. Retrograde D-tube to LIWS (red), G-tube to LIWS (green), Colace, Dulcolax suppository  : Voids. flomax  ID: none // D/c: Zosyn (1/19-22; 1/27-29; 2/7), micafungin (1/22-2/1), ceftazidime (1/29-2/4), linezolid (1/22-23), meropenem (1/22-27), vancomycin (1/19-20; 2/7-2/9) Alexi/Stenotrophomonas: Levaquin (2/8-16). C albicans intraperitoneal infxn: fluconazole x 3 wks per ID (2/1-22)  Endo: ISS  HEME: Eliquis, IVCF (1/22) // 1FFP, 1PLT (1/22).   PPx: SCD, R pop DVT, s/p IVC filter   Lines: PIV, // D/c: TLC (2/8-2/16) PICC (1/29-2/8), L brachial [place] (1/20-27), L SC HD cath (1/22-27)   Wounds: Ex lap, s/p JPx2 to bulb suction, J tube for feeds, D-tube to LIWS (red), G-tube to LIWS (green); betadine/peroxide to toes  PT/OT: Early mobilization.

## 2018-02-21 NOTE — SPEAKING VALVE EVALUATION - ADDITIONAL COMMENTS
RN aware of PMV placement.  Pt. verbalized understanding of use of PMV.  Pt. unable to don/doff given weakness/decreased mobility of UEs.

## 2018-02-21 NOTE — PROGRESS NOTE ADULT - ASSESSMENT
ASSESSMENT/PLAN 73y/o male pt c Respiratory failure, perforated duodenal ulcer, POD post repair, pneumonia, RLL, LLL  atelectases, no obvious RADHA, elevated R hemidiaphragm, L pleural effusion, AFIB,  thrombocytopenia, possible endocarditis, s/p reop. now POP tracheostomy, new fever pattern, anemia    1. O2:  PassyMuir valve daytime, remove and safely store HS, use TCollar HS. Discussed PM mngmnt c SICU  2. Bronchodilators:  Atrovent/ albuterol q 4 – 6 hours as needed  3. Corticosteroids: off  4. ID/Antibiotics: off   ID  5. Cardiac/HTN: Monitor, optimize HR;   6. GI: Rx/ prophylaxis c PPI/Protonix  7. Heme: Rx/VT  follow H/H closely  on Eliquis   8. Aspiration precautions, feeding PEG to continue  9 D/C Oliveira, voiding trial  10. Edema: diuretic to continue  11.LMobilize, OOB, PT  Discussed c managing team. SICU

## 2018-02-22 LAB
CULTURE RESULTS: SIGNIFICANT CHANGE UP
GLUCOSE BLDC GLUCOMTR-MCNC: 132 MG/DL — HIGH (ref 70–99)
GLUCOSE BLDC GLUCOMTR-MCNC: 132 MG/DL — HIGH (ref 70–99)
GLUCOSE BLDC GLUCOMTR-MCNC: 151 MG/DL — HIGH (ref 70–99)
GLUCOSE BLDC GLUCOMTR-MCNC: 153 MG/DL — HIGH (ref 70–99)
GLUCOSE BLDC GLUCOMTR-MCNC: 154 MG/DL — HIGH (ref 70–99)
HCT VFR BLD CALC: 28.9 % — LOW (ref 39–50)
HGB BLD-MCNC: 8.7 G/DL — LOW (ref 13–17)
MAGNESIUM SERPL-MCNC: 2.6 MG/DL — SIGNIFICANT CHANGE UP (ref 1.6–2.6)
MCHC RBC-ENTMCNC: 30.1 G/DL — LOW (ref 32–36)
MCHC RBC-ENTMCNC: 31 PG — SIGNIFICANT CHANGE UP (ref 27–34)
MCV RBC AUTO: 102.8 FL — HIGH (ref 80–100)
PHOSPHATE SERPL-MCNC: 4 MG/DL — SIGNIFICANT CHANGE UP (ref 2.5–4.5)
PLATELET # BLD AUTO: 160 K/UL — SIGNIFICANT CHANGE UP (ref 150–400)
RBC # BLD: 2.81 M/UL — LOW (ref 4.2–5.8)
RBC # FLD: 22.1 % — HIGH (ref 10.3–16.9)
SPECIMEN SOURCE: SIGNIFICANT CHANGE UP
WBC # BLD: 9 K/UL — SIGNIFICANT CHANGE UP (ref 3.8–10.5)
WBC # FLD AUTO: 9 K/UL — SIGNIFICANT CHANGE UP (ref 3.8–10.5)

## 2018-02-22 PROCEDURE — 71045 X-RAY EXAM CHEST 1 VIEW: CPT | Mod: 26

## 2018-02-22 RX ORDER — BACITRACIN ZINC 500 UNIT/G
1 OINTMENT IN PACKET (EA) TOPICAL DAILY
Qty: 0 | Refills: 0 | Status: DISCONTINUED | OUTPATIENT
Start: 2018-02-22 | End: 2018-02-26

## 2018-02-22 RX ADMIN — CHLORHEXIDINE GLUCONATE 15 MILLILITER(S): 213 SOLUTION TOPICAL at 06:57

## 2018-02-22 RX ADMIN — CHLORHEXIDINE GLUCONATE 15 MILLILITER(S): 213 SOLUTION TOPICAL at 17:57

## 2018-02-22 RX ADMIN — Medication 0.12 MILLIGRAM(S): at 06:40

## 2018-02-22 RX ADMIN — APIXABAN 5 MILLIGRAM(S): 2.5 TABLET, FILM COATED ORAL at 17:59

## 2018-02-22 RX ADMIN — Medication 1 APPLICATION(S): at 06:39

## 2018-02-22 RX ADMIN — TAMSULOSIN HYDROCHLORIDE 0.4 MILLIGRAM(S): 0.4 CAPSULE ORAL at 23:25

## 2018-02-22 RX ADMIN — Medication 1 APPLICATION(S): at 13:16

## 2018-02-22 RX ADMIN — Medication 2: at 00:54

## 2018-02-22 RX ADMIN — Medication 100 MILLIGRAM(S): at 06:38

## 2018-02-22 RX ADMIN — Medication 3 MILLILITER(S): at 13:16

## 2018-02-22 RX ADMIN — APIXABAN 5 MILLIGRAM(S): 2.5 TABLET, FILM COATED ORAL at 06:40

## 2018-02-22 RX ADMIN — PANTOPRAZOLE SODIUM 40 MILLIGRAM(S): 20 TABLET, DELAYED RELEASE ORAL at 06:38

## 2018-02-22 RX ADMIN — Medication 100 MILLIGRAM(S): at 17:57

## 2018-02-22 RX ADMIN — Medication 2: at 17:57

## 2018-02-22 RX ADMIN — PANTOPRAZOLE SODIUM 40 MILLIGRAM(S): 20 TABLET, DELAYED RELEASE ORAL at 17:57

## 2018-02-22 RX ADMIN — Medication 3 MILLILITER(S): at 06:38

## 2018-02-22 RX ADMIN — Medication 3 MILLILITER(S): at 17:57

## 2018-02-22 NOTE — PROGRESS NOTE ADULT - SUBJECTIVE AND OBJECTIVE BOX
SUBJECTIVE: NAEON. Denies any pain, SOB, CP, N/V/D/C.       MEDICATIONS  (STANDING):  ALBUTerol/ipratropium for Nebulization 3 milliLiter(s) Nebulizer every 6 hours  apixaban 5 milliGRAM(s) Oral every 12 hours  BACItracin   Ointment 1 Application(s) Topical two times a day  chlorhexidine 0.12% Liquid 15 milliLiter(s) Swish and Spit two times a day  digoxin     Tablet 0.125 milliGRAM(s) Oral daily  docusate sodium Liquid 100 milliGRAM(s) Oral two times a day  hydrogen peroxide 3% Solution 1 Application(s) Topical daily  insulin lispro (HumaLOG) corrective regimen sliding scale   SubCutaneous every 6 hours  lidocaine 2% Injectable 50 milliLiter(s) Local Injection once  mineral oil for Topical Use 1 Application(s) Topical daily  pantoprazole  Injectable 40 milliGRAM(s) IV Push every 12 hours  povidone iodine 10% Solution 1 Application(s) Topical daily  sodium chloride 0.9% lock flush 20 milliLiter(s) IV Push once  tamsulosin 0.4 milliGRAM(s) Oral at bedtime    MEDICATIONS  (PRN):  acetaminophen    Suspension 650 milliGRAM(s) Enteral Tube every 6 hours PRN For Temp greater than 38 C (100.4 F)  bisacodyl Suppository 10 milliGRAM(s) Rectal daily PRN Constipation  sodium chloride 0.9% lock flush 10 milliLiter(s) IV Push every 1 hour PRN After each medication administration      ICU Vital Signs Last 24 Hrs  T(C): 36.1 (21 Feb 2018 06:31), Max: 36.7 (20 Feb 2018 22:00)  T(F): 97 (21 Feb 2018 06:31), Max: 98 (20 Feb 2018 22:00)  HR: 106 (21 Feb 2018 05:58) (90 - 132)  BP: 102/73 (21 Feb 2018 05:00) (87/70 - 127/62)  BP(mean): 89 (21 Feb 2018 05:00) (67 - 95)  ABP: --  ABP(mean): --  RR: 25 (21 Feb 2018 05:58) (10 - 37)  SpO2: 95% (21 Feb 2018 05:58) (88% - 100%)      Physical Exam:  General: NAD  HEENT: NC/AT, EOMI, PERRLA, normal hearing, no oral lesions, neck supple w/o LAD  Pulmonary: tachypnic, no respiratory distress, CTA-B  Cardiovascular: irregularly irregular, no murmurs  Abdominal: soft, NT/ND, +BS, no organomegaly   s/p surgery  Extremities: WWP, 5/5 strength x 4, no clubbing/cyanosis/edema  Neuro: A/O x3, CNs II-XII grossly intact, normal motor/sensation, no focal deficits  Pulses: palpable distal pulses    Vent settings:  Mode: standby    I&O's Summary    19 Feb 2018 07:01  -  20 Feb 2018 07:00  --------------------------------------------------------  IN: 1802 mL / OUT: 2535 mL / NET: -733 mL    20 Feb 2018 07:01  -  21 Feb 2018 06:47  --------------------------------------------------------  IN: 1465 mL / OUT: 420 mL / NET: 1045 mL        LABS:                        8.4    7.9   )-----------( 127      ( 21 Feb 2018 05:30 )             27.4     02-21    141  |  104  |  37<H>  ----------------------------<  151<H>  4.0   |  30  |  0.59    Ca    8.1<L>      21 Feb 2018 05:30  Phos  3.5     02-21  Mg     2.5     02-21          CAPILLARY BLOOD GLUCOSE      POCT Blood Glucose.: 133 mg/dL (20 Feb 2018 23:03)  POCT Blood Glucose.: 149 mg/dL (20 Feb 2018 17:01)  POCT Blood Glucose.: 149 mg/dL (20 Feb 2018 11:36)  POCT Blood Glucose.: 135 mg/dL (20 Feb 2018 07:12)

## 2018-02-22 NOTE — PROGRESS NOTE ADULT - ASSESSMENT
ASSESSMENT/PLAN 73y/o male pt c Respiratory failure, perforated duodenal ulcer, POD post repair, pneumonia, RLL, LLL  atelectases, no obvious RADHA, elevated R hemidiaphragm, L pleural effusion, AFIB,  thrombocytopenia, possible endocarditis, s/p reop. now POP tracheostomy, new fever pattern, anemia    1. O2:  PassyMuir valve daytime, remove and safely store HS, use TCollar HS. Discussed PM mngmnt c SICU  2. Bronchodilators:  Atrovent/ albuterol q 4 – 6 hours as needed  3. Corticosteroids: off  4. ID/Antibiotics: off   ID  5. Cardiac/HTN: Monitor, optimize HR;   6. GI: Rx/ prophylaxis c PPI/Protonix  7. Heme: Rx/VT  follow H/H closely  on Eliquis   8. Aspiration precautions, feeding PEG to continue  9 Continue [place] catheter  10. Edema: diuretic to continue  11.LMobilize, OOB, PT  Discussed c managing team.

## 2018-02-22 NOTE — PROGRESS NOTE ADULT - SUBJECTIVE AND OBJECTIVE BOX
Vascular Surgery reconsulted for bilateral LE re-evaluation.      apixaban 5  digoxin     Tablet 0.125  tamsulosin 0.4      Allergies    No Known Allergies    Intolerances        Vital Signs Last 24 Hrs  T(C): 36.5 (22 Feb 2018 14:34), Max: 37.6 (21 Feb 2018 17:42)  T(F): 97.7 (22 Feb 2018 14:34), Max: 99.7 (21 Feb 2018 17:42)  HR: 108 (22 Feb 2018 13:30) (100 - 134)  BP: 138/68 (22 Feb 2018 13:30) (109/75 - 138/68)  BP(mean): 96 (22 Feb 2018 13:30) (84 - 96)  RR: 18 (22 Feb 2018 09:48) (18 - 24)  SpO2: 100% (22 Feb 2018 13:30) (95% - 100%)  I&O's Summary    21 Feb 2018 07:01  -  22 Feb 2018 07:00  --------------------------------------------------------  IN: 1224 mL / OUT: 1375 mL / NET: -151 mL        Physical Exam:  General:  Pulmonary:  Cardiovascular:  Abdominal:  Extremities:  Pulses:   Right:                                                                          Left:  FEM [ ]2+ [ ]1+ [ ]doppler                                             FEM [ ]2+ [ ]1+ [ ]doppler    POP [ ]2+ [ ]1+ [ ]doppler                                             POP [ ]2+ [ ]1+ [ ]doppler    DP [ ]2+ [ ]1+ [ ]doppler                                                DP [ ]2+ [ ]1+ [ ]doppler  PT[ ]2+ [ ]1+ [ ]doppler                                                  PT [ ]2+ [ ]1+ [ ]doppler      LABS:                        8.7    9.0   )-----------( 160      ( 22 Feb 2018 06:16 )             28.9     02-22    146<H>  |  107  |  36<H>  ----------------------------<  153<H>  4.3   |  30  |  0.65    Ca    8.3<L>      22 Feb 2018 06:16  Phos  4.0     02-22  Mg     2.6     02-22          Radiology and Additional Studies: Vascular Surgery reconsulted for bilateral LE re-evaluation.  71 y/o M with h/o severe mitral and aortic valve regurgitation initially with septic shock from perforated duodenal ulcer s/p Ex-lap and omental kaylee patch, on levophed, new A. fib, pneumonia and CHF was noted to have acute b/l limb ischemia, possibly from use of pressor or emboli with bilateral necrotic toe tips, s/p bedside wound debridement and ischemic nails removal. Patient transferred from SICU to [facility] and slowly progressing from surgical perspective. Vascular re-consulted to asses the previously necrotic changes of the toes and heels.      apixaban 5  digoxin     Tablet 0.125  tamsulosin 0.4      Allergies  No Known Allergies        Vital Signs Last 24 Hrs  T(C): 36.5 (22 Feb 2018 14:34), Max: 37.6 (21 Feb 2018 17:42)  T(F): 97.7 (22 Feb 2018 14:34), Max: 99.7 (21 Feb 2018 17:42)  HR: 108 (22 Feb 2018 13:30) (100 - 134)  BP: 138/68 (22 Feb 2018 13:30) (109/75 - 138/68)  BP(mean): 96 (22 Feb 2018 13:30) (84 - 96)  RR: 18 (22 Feb 2018 09:48) (18 - 24)  SpO2: 100% (22 Feb 2018 13:30) (95% - 100%)  I&O's Summary    21 Feb 2018 07:01  -  22 Feb 2018 07:00  --------------------------------------------------------  IN: 1224 mL / OUT: 1375 mL / NET: -151 mL        Physical Exam:  General: alert and awake, trached  Pulmonary: no pulmonary distress  Cardiovascular: RRR  Extremities: RLE: foot warm, skin pink, + dry eschar over the 1st toe nailbed, necrotic toe tips of 3rd and 4th toe, some dry eschar over tip and plantar aspect of the base of 5th toe. No fluctuance, no foul odor, no drainage. +Dry eschar over the heel, no drainage. Calf soft, non tender, no edema. 2+DP/PT pulses.  LLE: foot warm, skin pink, + small dry gangrene over the 1st toe nailbed, 2nd, 3rd and 5th toes. No fluctuance, no foul odor, no drainage. +Dry eschar over the heel, no drainage. Calf soft, non tender, no edema. 2+DP/PT pulses.      LABS:                        8.7    9.0   )-----------( 160      ( 22 Feb 2018 06:16 )             28.9     02-22    146<H>  |  107  |  36<H>  ----------------------------<  153<H>  4.3   |  30  |  0.65    Ca    8.3<L>      22 Feb 2018 06:16  Phos  4.0     02-22  Mg     2.6     02-22

## 2018-02-22 NOTE — PROGRESS NOTE ADULT - ATTENDING COMMENTS
Surgery:    Patient was seen and examined today. He continues to slow progress. Yesterday he was transferred from the surgical intensive care unit to the step down unit. He has not had any high fevers. He continues to tolerate tube feeds. Upper gastrointestinal series with contrast through the gastrostomy tube was performed yesterday which shows no evidence of overt extravasation of contrast. Retrograde duodenostomy tube continues to put out bile. He has been able to tolerate some time sitting up in a chair and has been able to take some small steps. He remains very deconditioned. His abdomen is soft the incision in the midline is healed well the retention sutures remain in place. Drain sites remained clean and intact. White blood cell count remains reassuring. At this point in time his care will focus on his regaining strength replacing his nutrition tracheostomy care pain control and continue treatment for his perforated ulcer. Like for him to have a speech and swallow study at some point in the near future to determine whether or not it would be safe to swallow liquids D. ability to swallow liquids would be a benefit for him psychologically as well as providing some additional nutrition. At this point in time I have no plan on removing the retrograde duodenostomy or gastrostomy tube. These will remain in place for several more weeks.

## 2018-02-22 NOTE — PROGRESS NOTE ADULT - SUBJECTIVE AND OBJECTIVE BOX
Interventional, Pulmonary, Critical, Chest Special Procedures.    Pt was seen and fully examined by myself.     Time spent with patient in minutes:37    Patient is a 72y old  Male who presents with a chief complaint of acute cholecystitis (18 Jan 2018 14:49)The patient c no new complaints. Eupneic on TCollar, some chronic cough, pain free when seen.     HPI:  71 yo M with h/o severe mitral and aortic valve regurgitation presented to ED with 5 day h/o left lower abdominal pain, nausea and multiple episodes of vomiting as well as PO intolerance. Pt reports that the pain in his abdomen is illicited by movement. Went to [facility] two days ago and underwent CT scan which revealed acute cholecystitis as well as right lower lobe infectious pneumonia. Pt was told he would require surgery and left AMA because he was waiting too long. Denies f/c. Last echo 1/4/17 with EF 60-65%. Normal BMs and passing flatus. (18 Jan 2018 14:49)    REVIEW OF SYSTEMS:  Constitutional: No fever, weight loss, chills + fatigue  Eyes: No eye pain, visual disturbances, or discharge  ENMT:  No difficulty hearing, tinnitus, vertigo; No sinus or throat pain. No epistaxis, dysphagia, denies now dysphonia, no hoarseness or odynophagia  Neck: No pain, stiffness or neck swelling.  No masses or deformities  Respiratory: + cough, wheezing, chills or hemoptysis  - COPD  - ILD   - PE   - ASTHMA     +PNEUMONIA  Cardiovascular: No chest pain, dysrhythmia, palpitations, dizziness or edema   - COPD     - CAD   - CHF   + HTN  Gastrointestinal: No abdominal or epigastric pain. No nausea, vomiting or hematemesis; No diarrhea or constipation. No melena or hematochezia. No dysphagia or Icterus.          Genitourinary: No dysuria, frequency, hematuria or incontinence   - CKD/GAETANO      - ESRD  Neurological: No headaches, memory loss, loss of strength, numbness or tremors      -DEMENTIA     - STROKE    - SEIZURE  Skin: No itching, burning, rashes or lesions   Lymph Nodes: No enlarged glands  Endocrine: No heat or cold intolerance; No hair loss       - DM     - THYROID DISORDER  Musculoskeletal: No joint pain or swelling; No muscle, back or extremity pain  Psychiatric: No depression, anxiety, mood swings or difficulty sleeping  Heme/Lymph: No easy bruising or bleeding gums         - ANEMIA      - CANCER   -COAGULOPATHY  Allergy and Immunologic: No hives or eczema    PAST MEDICAL & SURGICAL HISTORY:  Mitral regurgitation  Aortic regurgitation  No significant past surgical history    FAMILY HISTORY:    SOCIAL HISTORY:      - Tobacco     - ETOH    Allergies    No Known Allergies    Intolerances      Vital Signs Last 24 Hrs  T(C): 36.6 (22 Feb 2018 10:00), Max: 37.6 (21 Feb 2018 17:42)  T(F): 97.8 (22 Feb 2018 10:00), Max: 99.7 (21 Feb 2018 17:42)  HR: 102 (22 Feb 2018 11:00) (100 - 134)  BP: 121/63 (22 Feb 2018 10:15) (69/60 - 137/65)  BP(mean): 86 (22 Feb 2018 10:15) (67 - 104)  RR: 18 (22 Feb 2018 09:48) (12 - 43)  SpO2: 97% (22 Feb 2018 11:00) (92% - 100%)    02-21 @ 07:01  -  02-22 @ 07:00  --------------------------------------------------------  IN: 1224 mL / OUT: 1375 mL / NET: -151 mL        PHYSICAL EXAM:   More comfortable, no distress  Eyes: PERRL, EOM intact; conjunctiva and sclera clear  Head: Normocephalic;  No Trauma  ENMT: No nasal discharge, hoarseness, cough or hemoptysis.  Airway clear  Neck: Supple; non tender; no masses or deformities.    No JVD  Respiratory:  - WHEEZING   few bilateral   RHONCHI  - RALES  less  CRACKLES.  Diminished breath sounds  BILATERAL  RIGHT  LEFT bases   Cardiovascular: Regular rate and rhythm. S1 and S2 Normal; ++murmurs, gallops or rubs     - PPM/AICD  Gastrointestinal: Soft ,distended; Normal bowel sounds; No hepatosplenomegaly.     +PEG    + multiple surgical drains integrity checked  off  AGNELES  Genitourinary: No costovertebral angle tenderness. No dysuria  Extremities: Decreased AROM, + clubbing, cyanosis + edema, , toes dressings in place  Vascular: Peripheral pulses palpable 2+ bilaterally  Neurological: intubated and opening eyes to commands  Skin: Warm and dry. No obvious rash  Lymph Nodes: No acute cervical or supraclavicular adenopathy  Psychiatric: not sedated, calm affect  DEVICES:  - DENTURES   +IV R / L     - ETUBE   + 8.0 site clean Shilley decuffed now TRACH, PassyMuir pt equipped.    DEVICES:  - DENTURES   +IV R / L     - ETUBE   -TRACH   -CTUBE  R / L      LABS:                          8.7    9.0   )-----------( 160      ( 22 Feb 2018 06:16 )             28.9     02-22    146<H>  |  107  |  36<H>  ----------------------------<  153<H>  4.3   |  30  |  0.65    Ca    8.3<L>      22 Feb 2018 06:16  Phos  4.0     02-22  Mg     2.6     02-22        < from: Xray Upper GI Series (02.21.18 @ 09:33) >    EXAM:  XR [id number]                          PROCEDURE DATE:  02/21/2018            Patient Fluoro Time in Minutes: 2.9        INTERPRETATION:  Upper GI:    Indication: 72 year old male patient with duodenal perforation status   post kaylee patch, gastrostomy, duodenostomy, and jejunostomy. Evaluate   for perforation or leakage through gastrostomy site.     Findings: An upper GI series was done through the gastrostomy site using   gastrografin. Reflux was noted during fluoroscopy. Stomach and duodenal   sweep are opacified with contrast. There is no extravasation of contrast.   There is no evidence of gastric outlet obstruction.    Impression: No evidence of perforation or leak.    < end of copied text >  RADIOLOGY & ADDITIONAL STUDIES (The following images were personally reviewed):

## 2018-02-22 NOTE — PROGRESS NOTE ADULT - ASSESSMENT
72yM with PMH severe MR/AR presented after recent dx acute cholecystitis and RLL PNA at [facility] with lower abdominal pain, new onset rapid Afib, sepsis, acute CHF exacerbation, initially admitted to cardiology on bipap, then transferred to SICU with perforated duodenum s/p Shreyas patch and jejunostomy (1/20) c/b breakdown s/p RTOR, washout, new Shreyas patch, gastrostomy, duodenostomy, IVCF (1/22)    Neuro: Tylenol PRN  CV: Afib: Echo EF 50%, Severe MR, no vegetation, Digoxin 0.125,  Eliquis.  Pulm: Trach 8.0 Shiley trach collar with PMV (speaking valve), Duonebs ( 2/14-) Left Chest tube removed 2/16  GI: NPO, Glucerna 1.2 @68 via J tube, Protonix BID. Retrograde D-tube to LIWS (red), G-tube to LIWS (green), Colace, Dulcolax suppository  : Voids. flomax  ID: none // D/c: Zosyn (1/19-22; 1/27-29; 2/7), micafungin (1/22-2/1), ceftazidime (1/29-2/4), linezolid (1/22-23), meropenem (1/22-27), vancomycin (1/19-20; 2/7-2/9) Alexi/Stenotrophomonas: Levaquin (2/8-16). C albicans intraperitoneal infxn: fluconazole x 3 wks per ID (2/1-22)  Endo: ISS  HEME: Eliquis, IVCF (1/22) // 1FFP, 1PLT (1/22).   PPx: SCD, R pop DVT, s/p IVC filter   Lines: PIV, // D/c: TLC (2/8-2/16) PICC (1/29-2/8), L brachial Kendall (1/20-27), L SC HD cath (1/22-27)   Wounds: Ex lap, J tube for feeds, D-tube to MIWS (red), G-tube to LIWS (green); betadine to toes  PT/OT: Early mobilization.

## 2018-02-22 NOTE — PROGRESS NOTE ADULT - SUBJECTIVE AND OBJECTIVE BOX
ON: Stepped down. Tachy to 120, asymptomatic, given metorolol 5mg IVF   2/21: upper GI performed through G tube, trach balloon deflated, s/sw eval for pessy miur  o/n: passed TOV. Tolerated trach collar.         72yM with PMH severe MR/AR presented after recent dx acute cholecystitis and RLL PNA at [facility] with lower abdominal pain, new onset rapid Afib, sepsis, acute CHF exacerbation, initially admitted to cardiology on bipap, then transferred to SICU with perforated duodenum s/p Shreysa patch and jejunostomy (1/20) c/b breakdown s/p RTOR, washout, new Shreyas patch, gastrostomy, duodenostomy, IVCF (1/22)    Neuro: Tylenol PRN  CV: Afib: Echo EF 50%, Severe MR, no vegetation, Digoxin 0.125,  Eliquis.  Pulm: Trach 8.0 Shiley trach collar with PMV (speaking valve), Duonebs ( 2/14-) Left Chest tube removed 2/16  GI: NPO, Glucerna 1.2 @68 via J tube, Protonix BID. Retrograde D-tube to LIWS (red), G-tube to LIWS (green), Colace, Dulcolax suppository  : Voids. flomax  ID: none // D/c: Zosyn (1/19-22; 1/27-29; 2/7), micafungin (1/22-2/1), ceftazidime (1/29-2/4), linezolid (1/22-23), meropenem (1/22-27), vancomycin (1/19-20; 2/7-2/9) Alexi/Stenotrophomonas: Levaquin (2/8-16). C albicans intraperitoneal infxn: fluconazole x 3 wks per ID (2/1-22)  Endo: ISS  HEME: Eliquis, IVCF (1/22) // 1FFP, 1PLT (1/22).   PPx: SCD, R pop DVT, s/p IVC filter   Lines: PIV, // D/c: TLC (2/8-2/16) PICC (1/29-2/8), L brachial Zenia (1/20-27), L SC HD cath (1/22-27)   Wounds: Ex lap, J tube for feeds, D-tube to MIWS (red), G-tube to LIWS (green); betadine to toes  PT/OT: Early mobilization. SUBJECTIVE: Patient seen and examined bedside by chief resident. ON: Stepped down. Tachy to 120, asymptomatic, given metorolol 5mg IVF     Vital Signs Last 24 Hrs  T(C): 37.2 (22 Feb 2018 05:00), Max: 37.6 (21 Feb 2018 17:42)  T(F): 98.9 (22 Feb 2018 05:00), Max: 99.7 (21 Feb 2018 17:42)  HR: 104 (22 Feb 2018 06:57) (102 - 134)  BP: 137/65 (22 Feb 2018 06:57) (69/60 - 137/65)  BP(mean): 84 (22 Feb 2018 04:30) (64 - 104)  RR: 24 (22 Feb 2018 06:57) (12 - 43)  SpO2: 97% (22 Feb 2018 06:57) (92% - 100%)  I&O's Detail    21 Feb 2018 07:01  -  22 Feb 2018 07:00  --------------------------------------------------------  IN:    Glucerna: 1020 mL  Total IN: 1020 mL    OUT:    Incontinent per Condom Catheter: 1075 mL  Total OUT: 1075 mL    Total NET: -55 mL          General: NAD, resting comfortably in bed  Pulm: Nonlabored breathing, no respiratory distress  Abd: soft, NT/ND, no organomegaly        LABS:                        8.7    9.0   )-----------( 160      ( 22 Feb 2018 06:16 )             28.9     02-22    146<H>  |  107  |  36<H>  ----------------------------<  153<H>  4.3   |  30  |  0.65    Ca    8.3<L>      22 Feb 2018 06:16  Phos  4.0     02-22  Mg     2.6     02-22            RADIOLOGY & ADDITIONAL STUDIES:

## 2018-02-22 NOTE — PROGRESS NOTE ADULT - ASSESSMENT
73 yo M with necrotic toe changes from previous pressors use. Currently no signs of infection, no drainage, some dry eschar is still sloughing off, no need for surgical debridement or amputation at this time. Good pulses, no signs of critical limb ischemia.  -Will continue betadine paint to toes and dry kerlix dressing daily  -Will monitor for improvement vs need for intervention.      Case d/w Chief resident on call.

## 2018-02-22 NOTE — ADVANCED PRACTICE NURSE CONSULT - ASSESSMENT
Request for re-eval of patient's wounds for documentation purposes. Bilat feet covered with kerlix, vascular ordered Betadine to wounds on toes of bilat feet. Cut hole in dressing to assess DTIs to bilat heels. Both sites dry and resolving. Z-shayy boots obtained and feet placed in them for offloading. Site to forehead with dry scab, is uncovered.

## 2018-02-22 NOTE — PROGRESS NOTE ADULT - SUBJECTIVE AND OBJECTIVE BOX
INTERVAL HPI/OVERNIGHT EVENTS:    ANTIBIOTICS    MEDICATIONS  (STANDING):  ALBUTerol/ipratropium for Nebulization 3 milliLiter(s) Nebulizer every 6 hours  apixaban 5 milliGRAM(s) Oral every 12 hours  BACItracin   Ointment 1 Application(s) Topical daily  chlorhexidine 0.12% Liquid 15 milliLiter(s) Swish and Spit two times a day  digoxin     Tablet 0.125 milliGRAM(s) Oral daily  docusate sodium Liquid 100 milliGRAM(s) Oral two times a day  insulin lispro (HumaLOG) corrective regimen sliding scale   SubCutaneous every 6 hours  lidocaine 2% Injectable 50 milliLiter(s) Local Injection once  mineral oil for Topical Use 1 Application(s) Topical daily  pantoprazole  Injectable 40 milliGRAM(s) IV Push every 12 hours  povidone iodine 10% Solution 1 Application(s) Topical daily  sodium chloride 0.9% lock flush 20 milliLiter(s) IV Push once  tamsulosin 0.4 milliGRAM(s) Oral at bedtime    MEDICATIONS  (PRN):  acetaminophen    Suspension 650 milliGRAM(s) Enteral Tube every 6 hours PRN For Temp greater than 38 C (100.4 F)  bisacodyl Suppository 10 milliGRAM(s) Rectal daily PRN Constipation  sodium chloride 0.9% lock flush 10 milliLiter(s) IV Push every 1 hour PRN After each medication administration      Allergies    No Known Allergies    Intolerances        REVIEW OF SYSTEMS:    Constitutional: No fever, weight loss or fatigue  Eyes: No eye pain, visual disturbances, or discharge  ENMT:  No difficulty hearing, tinnitus, vertigo; No sinus or throat pain  Neck: No pain or stiffness  Respiratory: No cough, wheezing, chills or hemoptysis  Cardiovascular: No chest pain, palpitations, shortness of breath, dizziness or leg swelling  Gastrointestinal: No abdominal or epigastric pain. No nausea, vomiting or hematemesis; No diarrhea or constipation. No melena or hematochezia.  Genitourinary: No dysuria, frequency, hematuria or incontinence  Vital Signs Last 24 Hrs  T(C): 36.5 (22 Feb 2018 14:34), Max: 37.6 (21 Feb 2018 17:42)  T(F): 97.7 (22 Feb 2018 14:34), Max: 99.7 (21 Feb 2018 17:42)  HR: 108 (22 Feb 2018 13:30) (100 - 134)  BP: 138/68 (22 Feb 2018 13:30) (102/62 - 138/68)  BP(mean): 96 (22 Feb 2018 13:30) (81 - 96)  RR: 18 (22 Feb 2018 09:48) (18 - 43)  SpO2: 100% (22 Feb 2018 13:30) (95% - 100%)    PHYSICAL EXAM:    General: Well developed; well nourished; in no acute distress  Eyes: PERRL, EOM intact; conjunctiva and sclera clear  Head: Normocephalic; atraumatic  ENMT: No nasal discharge; airway clear  Neck: Supple; non tender; no masses  Respiratory: No wheezes, rales or rhonchi  Cardiovascular: Regular rate and rhythm. S1 and S2 Normal; No murmurs, gallops or rubs  Gastrointestinal: Soft non-tender non-distended; Normal bowel sounds; No hepatosplenomegaly  Genitourinary: No costovertebral angle tenderness    LABS:                        8.7    9.0   )-----------( 160      ( 22 Feb 2018 06:16 )             28.9     02-22    146<H>  |  107  |  36<H>  ----------------------------<  153<H>  4.3   |  30  |  0.65    Ca    8.3<L>      22 Feb 2018 06:16  Phos  4.0     02-22  Mg     2.6     02-22              MICROBIOLOGY:      RADIOLOGY & ADDITIONAL STUDIES:

## 2018-02-22 NOTE — ADVANCED PRACTICE NURSE CONSULT - RECOMMEDATIONS
Spoke with BRUCE Berry and house staff, recommend Bacitracin to forehead and any skin tears, continue to use Z-shayy boots bilat. Pt on AirTap with wedges for repositioning.
Bacitracin to open blisters and right antecubital, cover with dry dressing and secure with spandage. Spoke with house staff and RN Carin

## 2018-02-23 LAB
ALBUMIN SERPL ELPH-MCNC: 2.7 G/DL — LOW (ref 3.3–5)
ALP SERPL-CCNC: 191 U/L — HIGH (ref 40–120)
ALT FLD-CCNC: 56 U/L — HIGH (ref 10–45)
ANION GAP SERPL CALC-SCNC: 7 MMOL/L — SIGNIFICANT CHANGE UP (ref 5–17)
AST SERPL-CCNC: 59 U/L — HIGH (ref 10–40)
BILIRUB DIRECT SERPL-MCNC: 1.3 MG/DL — HIGH (ref 0–0.2)
BILIRUB INDIRECT FLD-MCNC: 1 MG/DL — SIGNIFICANT CHANGE UP (ref 0.2–1)
BILIRUB SERPL-MCNC: 2.3 MG/DL — HIGH (ref 0.2–1.2)
BUN SERPL-MCNC: 34 MG/DL — HIGH (ref 7–23)
CALCIUM SERPL-MCNC: 8.1 MG/DL — LOW (ref 8.4–10.5)
CHLORIDE SERPL-SCNC: 108 MMOL/L — SIGNIFICANT CHANGE UP (ref 96–108)
CO2 SERPL-SCNC: 31 MMOL/L — SIGNIFICANT CHANGE UP (ref 22–31)
CREAT SERPL-MCNC: 0.59 MG/DL — SIGNIFICANT CHANGE UP (ref 0.5–1.3)
GLUCOSE BLDC GLUCOMTR-MCNC: 140 MG/DL — HIGH (ref 70–99)
GLUCOSE BLDC GLUCOMTR-MCNC: 144 MG/DL — HIGH (ref 70–99)
GLUCOSE BLDC GLUCOMTR-MCNC: 145 MG/DL — HIGH (ref 70–99)
GLUCOSE BLDC GLUCOMTR-MCNC: 160 MG/DL — HIGH (ref 70–99)
GLUCOSE SERPL-MCNC: 160 MG/DL — HIGH (ref 70–99)
HCT VFR BLD CALC: 27.8 % — LOW (ref 39–50)
HGB BLD-MCNC: 8.3 G/DL — LOW (ref 13–17)
MAGNESIUM SERPL-MCNC: 2.6 MG/DL — SIGNIFICANT CHANGE UP (ref 1.6–2.6)
MCHC RBC-ENTMCNC: 29.9 G/DL — LOW (ref 32–36)
MCHC RBC-ENTMCNC: 31.7 PG — SIGNIFICANT CHANGE UP (ref 27–34)
MCV RBC AUTO: 106.1 FL — HIGH (ref 80–100)
PHOSPHATE SERPL-MCNC: 3.4 MG/DL — SIGNIFICANT CHANGE UP (ref 2.5–4.5)
PLATELET # BLD AUTO: 149 K/UL — LOW (ref 150–400)
POTASSIUM SERPL-MCNC: 4.1 MMOL/L — SIGNIFICANT CHANGE UP (ref 3.5–5.3)
POTASSIUM SERPL-SCNC: 4.1 MMOL/L — SIGNIFICANT CHANGE UP (ref 3.5–5.3)
PREALB SERPL-MCNC: 17 MG/DL — LOW (ref 20–40)
PROT SERPL-MCNC: 6.6 G/DL — SIGNIFICANT CHANGE UP (ref 6–8.3)
RBC # BLD: 2.62 M/UL — LOW (ref 4.2–5.8)
RBC # FLD: 21.7 % — HIGH (ref 10.3–16.9)
SODIUM SERPL-SCNC: 146 MMOL/L — HIGH (ref 135–145)
WBC # BLD: 10.8 K/UL — HIGH (ref 3.8–10.5)
WBC # FLD AUTO: 10.8 K/UL — HIGH (ref 3.8–10.5)

## 2018-02-23 RX ADMIN — Medication 3 MILLILITER(S): at 05:48

## 2018-02-23 RX ADMIN — Medication 100 MILLIGRAM(S): at 18:54

## 2018-02-23 RX ADMIN — Medication 1 APPLICATION(S): at 12:08

## 2018-02-23 RX ADMIN — APIXABAN 5 MILLIGRAM(S): 2.5 TABLET, FILM COATED ORAL at 18:56

## 2018-02-23 RX ADMIN — CHLORHEXIDINE GLUCONATE 15 MILLILITER(S): 213 SOLUTION TOPICAL at 05:48

## 2018-02-23 RX ADMIN — Medication 3 MILLILITER(S): at 18:54

## 2018-02-23 RX ADMIN — APIXABAN 5 MILLIGRAM(S): 2.5 TABLET, FILM COATED ORAL at 05:48

## 2018-02-23 RX ADMIN — Medication 2: at 00:22

## 2018-02-23 RX ADMIN — TAMSULOSIN HYDROCHLORIDE 0.4 MILLIGRAM(S): 0.4 CAPSULE ORAL at 22:43

## 2018-02-23 RX ADMIN — PANTOPRAZOLE SODIUM 40 MILLIGRAM(S): 20 TABLET, DELAYED RELEASE ORAL at 05:52

## 2018-02-23 RX ADMIN — Medication 100 MILLIGRAM(S): at 05:52

## 2018-02-23 RX ADMIN — Medication 3 MILLILITER(S): at 00:24

## 2018-02-23 RX ADMIN — PANTOPRAZOLE SODIUM 40 MILLIGRAM(S): 20 TABLET, DELAYED RELEASE ORAL at 18:54

## 2018-02-23 RX ADMIN — Medication 3 MILLILITER(S): at 12:08

## 2018-02-23 RX ADMIN — Medication 0.12 MILLIGRAM(S): at 05:48

## 2018-02-23 NOTE — PROGRESS NOTE ADULT - SUBJECTIVE AND OBJECTIVE BOX
INTERVAL HPI/OVERNIGHT EVENTS:    ANTIBIOTICS    MEDICATIONS  (STANDING):  ALBUTerol/ipratropium for Nebulization 3 milliLiter(s) Nebulizer every 6 hours  apixaban 5 milliGRAM(s) Oral every 12 hours  BACItracin   Ointment 1 Application(s) Topical daily  chlorhexidine 0.12% Liquid 15 milliLiter(s) Swish and Spit two times a day  digoxin     Tablet 0.125 milliGRAM(s) Oral daily  docusate sodium Liquid 100 milliGRAM(s) Oral two times a day  insulin lispro (HumaLOG) corrective regimen sliding scale   SubCutaneous every 6 hours  lidocaine 2% Injectable 50 milliLiter(s) Local Injection once  mineral oil for Topical Use 1 Application(s) Topical daily  pantoprazole  Injectable 40 milliGRAM(s) IV Push every 12 hours  povidone iodine 10% Solution 1 Application(s) Topical daily  sodium chloride 0.9% lock flush 20 milliLiter(s) IV Push once  tamsulosin 0.4 milliGRAM(s) Oral at bedtime    MEDICATIONS  (PRN):  acetaminophen    Suspension 650 milliGRAM(s) Enteral Tube every 6 hours PRN For Temp greater than 38 C (100.4 F)  bisacodyl Suppository 10 milliGRAM(s) Rectal daily PRN Constipation  sodium chloride 0.9% lock flush 10 milliLiter(s) IV Push every 1 hour PRN After each medication administration      Allergies    No Known Allergies    Intolerances        REVIEW OF SYSTEMS:    Constitutional: No fever, weight loss or fatigue  Eyes: No eye pain, visual disturbances, or discharge  ENMT:  No difficulty hearing, tinnitus, vertigo; No sinus or throat pain  Neck: No pain or stiffness  Respiratory: No cough, wheezing, chills or hemoptysis  Cardiovascular: No chest pain, palpitations, shortness of breath, dizziness or leg swelling  Gastrointestinal: No abdominal or epigastric pain. No nausea, vomiting or hematemesis; No diarrhea or constipation. No melena or hematochezia.    Vital Signs Last 24 Hrs  T(C): 36.5 (23 Feb 2018 14:00), Max: 37.4 (23 Feb 2018 01:05)  T(F): 97.7 (23 Feb 2018 14:00), Max: 99.4 (23 Feb 2018 01:05)  HR: 108 (23 Feb 2018 09:19) (96 - 112)  BP: 121/58 (23 Feb 2018 09:19) (111/59 - 132/73)  BP(mean): 81 (23 Feb 2018 09:19) (80 - 92)  RR: 19 (23 Feb 2018 09:19) (18 - 19)  SpO2: 100% (23 Feb 2018 09:19) (99% - 100%)    PHYSICAL EXAM:    General: Well developed; well nourished; in no acute distress  Eyes: PERRL, EOM intact; conjunctiva and sclera clear  Head: Normocephalic; atraumatic  ENMT: No nasal discharge; airway clear  Neck: Supple; non tender; no masses  Respiratory: No wheezes, rales or rhonchi  Cardiovascular: Regular rate and rhythm. S1 and S2 Normal; No murmurs, gallops or rubs  Gastrointestinal: Soft non-tender non-distended; Normal bowel sounds; No hepatosplenomegaly  Genitourinary: No costovertebral angle tenderness  Extremities: Normal range of motion, No clubbing, cyanosis or edema  Vascular: Peripheral pulses palpable 2+ bilaterally  Neurological: Alert and oriented x3  Skin: Warm and dry. No acute rash  Lymph Nodes: No acute cervical adenopathy  Musculoskeletal: Normal gait, tone, without deformities    LABS:                        8.3    10.8  )-----------( 149      ( 23 Feb 2018 06:56 )             27.8     02-23    146<H>  |  108  |  34<H>  ----------------------------<  160<H>  4.1   |  31  |  0.59    Ca    8.1<L>      23 Feb 2018 06:56  Phos  3.4     02-23  Mg     2.6     02-23    TPro  6.6  /  Alb  2.7<L>  /  TBili  2.3<H>  /  DBili  1.3<H>  /  AST  59<H>  /  ALT  56<H>  /  AlkPhos  191<H>  02-23            MICROBIOLOGY:      RADIOLOGY & ADDITIONAL STUDIES:

## 2018-02-23 NOTE — PROGRESS NOTE ADULT - SUBJECTIVE AND OBJECTIVE BOX
Interventional, Pulmonary, Critical, Chest Special Procedures.    Pt was seen and fully examined by myself.     Time spent with patient in minutes:37    Patient is a 72y old  Male who presents with a chief complaint of acute cholecystitis (18 Jan 2018 14:49)Today the patient eupneic on TCollar, engaging, pain free. I discussed c him attempts to restart PO and steps taken.     HPI:  71 yo M with h/o severe mitral and aortic valve regurgitation presented to ED with 5 day h/o left lower abdominal pain, nausea and multiple episodes of vomiting as well as PO intolerance. Pt reports that the pain in his abdomen is illicited by movement. Went to [facility] two days ago and underwent CT scan which revealed acute cholecystitis as well as right lower lobe infectious pneumonia. Pt was told he would require surgery and left AMA because he was waiting too long. Denies f/c. Last echo 1/4/17 with EF 60-65%. Normal BMs and passing flatus. (18 Jan 2018 14:49)    REVIEW OF SYSTEMS:  Constitutional: No fever, weight loss, chills + fatigue  Eyes: No eye pain, visual disturbances, or discharge  ENMT:  No difficulty hearing, tinnitus, vertigo; No sinus or throat pain. No epistaxis, +dysphagia, dysphonia, hoarseness   Neck: No pain, stiffness or neck swelling.  No masses or deformities  Respiratory: +cough, no wheezing, chills or hemoptysis  - COPD  - ILD   - PE   - ASTHMA     + PNEUMONIA  Cardiovascular: No chest pain, +dysrhythmia, palpitations, dizziness or edema   - COPD     - CAD   - CHF   - HTN      PAST MEDICAL & SURGICAL HISTORY:  Mitral regurgitation  Aortic regurgitation  No significant past surgical history    FAMILY HISTORY:    SOCIAL HISTORY:      - Tobacco     - ETOH    Allergies    No Known Allergies    Intolerances      Vital Signs Last 24 Hrs  T(C): 37.2 (23 Feb 2018 09:00), Max: 37.4 (23 Feb 2018 01:05)  T(F): 99 (23 Feb 2018 09:00), Max: 99.4 (23 Feb 2018 01:05)  HR: 108 (23 Feb 2018 09:19) (96 - 112)  BP: 121/58 (23 Feb 2018 09:19) (111/59 - 138/68)  BP(mean): 81 (23 Feb 2018 09:19) (80 - 96)  RR: 19 (23 Feb 2018 09:19) (18 - 19)  SpO2: 100% (23 Feb 2018 09:19) (99% - 100%)    02-22 @ 07:01  -  02-23 @ 07:00  --------------------------------------------------------  IN: 1496 mL / OUT: 1100 mL / NET: 396 mL        PHYSICAL EXAM:     More comfortable, no distress  Eyes: PERRL, EOM intact; conjunctiva and sclera clear  Head: Normocephalic;  No Trauma  ENMT: No nasal discharge, hoarseness, cough or hemoptysis.  Airway clear  Neck: Supple; non tender; no masses or deformities.    No JVD  Respiratory:  - WHEEZING   few bilateral   RHONCHI  - RALES  less  CRACKLES.  Diminished breath sounds  BILATERAL  RIGHT  LEFT bases   Cardiovascular: Regular rate and rhythm. S1 and S2 Normal; ++murmurs, gallops or rubs     - PPM/AICD  Gastrointestinal: Soft ,distended; Normal bowel sounds; No hepatosplenomegaly.     +PEG    + multiple surgical drains integrity checked    ANGELES  Genitourinary: No costovertebral angle tenderness. No dysuria  Extremities: Decreased AROM, + clubbing, cyanosis + edema, , toes dressings in place  Vascular: Peripheral pulses palpable 2+ bilaterally  Neurological: intubated and opening eyes to commands  Skin: Warm and dry. No obvious rash  Lymph Nodes: No acute cervical or supraclavicular adenopathy  Psychiatric: not sedated, calm affect  DEVICES:  - DENTURES   +IV R / L     - ETUBE   + 8.0 site clean Shilley decuffed now TRACH,      LABS:                          8.3    10.8  )-----------( 149      ( 23 Feb 2018 06:56 )             27.8     02-23    146<H>  |  108  |  34<H>  ----------------------------<  160<H>  4.1   |  31  |  0.59    Ca    8.1<L>      23 Feb 2018 06:56  Phos  3.4     02-23  Mg     2.6     02-23    TPro  6.6  /  Alb  2.7<L>  /  TBili  2.3<H>  /  DBili  1.3<H>  /  AST  59<H>  /  ALT  56<H>  /  AlkPhos  191<H>  02-23      < from: Xray Chest 1 View- PORTABLE-Routine (02.22.18 @ 02:04) >    EXAM:  XR CHEST PORTABLE ROUTINE 1V                          PROCEDURE DATE:  02/22/2018                     INTERPRETATION:  Portable Chest X-Ray dated 2/22/2018 2:04 AM    Indication: sicu monitoring    An AP portable view of the chest is compared to 2/20/2018. There is   moderate pulmonary venous congestion, increased since the prior study.   Bibasilar infiltrates and small pleural effusions remain. Tracheostomy   tube in place.    IMPRESSION:  Worsening pulmonary venous congestion. No othersignificant interval   changes.    < end of copied text >  RADIOLOGY & ADDITIONAL STUDIES (The following images were personally reviewed):

## 2018-02-23 NOTE — PROGRESS NOTE ADULT - SUBJECTIVE AND OBJECTIVE BOX
SUBJECTIVE: NAEON. Denies any pain, SOB, CP, N/V/D/C.   On TF--swallow eval re PO      MEDICATIONS  (STANDING):  ALBUTerol/ipratropium for Nebulization 3 milliLiter(s) Nebulizer every 6 hours  apixaban 5 milliGRAM(s) Oral every 12 hours  BACItracin   Ointment 1 Application(s) Topical two times a day  chlorhexidine 0.12% Liquid 15 milliLiter(s) Swish and Spit two times a day  digoxin     Tablet 0.125 milliGRAM(s) Oral daily  docusate sodium Liquid 100 milliGRAM(s) Oral two times a day  hydrogen peroxide 3% Solution 1 Application(s) Topical daily  insulin lispro (HumaLOG) corrective regimen sliding scale   SubCutaneous every 6 hours  lidocaine 2% Injectable 50 milliLiter(s) Local Injection once  mineral oil for Topical Use 1 Application(s) Topical daily  pantoprazole  Injectable 40 milliGRAM(s) IV Push every 12 hours  povidone iodine 10% Solution 1 Application(s) Topical daily  sodium chloride 0.9% lock flush 20 milliLiter(s) IV Push once  tamsulosin 0.4 milliGRAM(s) Oral at bedtime    MEDICATIONS  (PRN):  acetaminophen    Suspension 650 milliGRAM(s) Enteral Tube every 6 hours PRN For Temp greater than 38 C (100.4 F)  bisacodyl Suppository 10 milliGRAM(s) Rectal daily PRN Constipation  sodium chloride 0.9% lock flush 10 milliLiter(s) IV Push every 1 hour PRN After each medication administration        T(C): 36.1 (21 Feb 2018 06:31), Max: 36.7 (20 Feb 2018 22:00)  T(F): 97 (21 Feb 2018 06:31), Max: 98 (20 Feb 2018 22:00)  HR: 106 (21 Feb 2018 05:58) (90 - 132)  BP: 102/73 (21 Feb 2018 05:00) (87/70 - 127/62)  BP(mean): 89 (21 Feb 2018 05:00) (67 - 95)  ABP: --  ABP(mean): --  RR: 25 (21 Feb 2018 05:58) (10 - 37)  SpO2: 95% (21 Feb 2018 05:58) (88% - 100%)      Physical Exam:  General: NAD  HEENT: NC/AT, EOMI, PERRLA, normal hearing, no oral lesions, neck supple w/o LAD  Pulmonary: tachypnic, no respiratory distress, CTA-B  Cardiovascular: irregularly irregular, no murmurs  Abdominal: soft, NT/ND, +BS, no organomegaly   s/p surgery  Extremities: WWP, 5/5 strength x 4, no clubbing/cyanosis/edema  Neuro: A/O x3, CNs II-XII grossly intact, normal motor/sensation, no focal deficits  Pulses: palpable distal pulses    Vent settings:  Mode: standby    I&O's Summary    19 Feb 2018 07:01  -  20 Feb 2018 07:00  --------------------------------------------------------  IN: 1802 mL / OUT: 2535 mL / NET: -733 mL    20 Feb 2018 07:01  -  21 Feb 2018 06:47  --------------------------------------------------------  IN: 1465 mL / OUT: 420 mL / NET: 1045 mL        LABS:                        8.4    7.9   )-----------( 127      ( 21 Feb 2018 05:30 )             27.4     02-21    141  |  104  |  37<H>  ----------------------------<  151<H>  4.0   |  30  |  0.59    Ca    8.1<L>      21 Feb 2018 05:30  Phos  3.5     02-21  Mg     2.5     02-21          CAPILLARY BLOOD GLUCOSE      POCT Blood Glucose.: 133 mg/dL (20 Feb 2018 23:03)  POCT Blood Glucose.: 149 mg/dL (20 Feb 2018 17:01)  POCT Blood Glucose.: 149 mg/dL (20 Feb 2018 11:36)  POCT Blood Glucose.: 135 mg/dL (20 Feb 2018 07:12)

## 2018-02-23 NOTE — PROGRESS NOTE ADULT - ASSESSMENT
72yM with PMH severe MR/AR presented after recent dx acute cholecystitis and RLL PNA at [facility] with lower abdominal pain, new onset rapid Afib, sepsis, acute CHF exacerbation, initially admitted to cardiology on bipap, then transferred to SICU with perforated duodenum s/p Shreyas patch and jejunostomy (1/20) c/b breakdown s/p RTOR, washout, new Shreyas patch, gastrostomy, duodenostomy, IVCF (1/22)    Neuro: Tylenol PRN  CV: Afib: Echo EF 50%, Severe MR, no vegetation, Digoxin 0.125,  Eliquis.  Pulm: Trach 8.0 Shiley trach collar with PMV (speaking valve), Duonebs ( 2/14-) Left Chest tube removed 2/16  GI: NPO, Glucerna 1.2 @68 via J tube, Protonix BID. Retrograde D-tube to LIWS (red), G-tube to LIWS (green), Colace, Dulcolax suppository  : Voids. flomax  ID: none // D/c: Zosyn (1/19-22; 1/27-29; 2/7), micafungin (1/22-2/1), ceftazidime (1/29-2/4), linezolid (1/22-23), meropenem (1/22-27), vancomycin (1/19-20; 2/7-2/9) Alexi/Stenotrophomonas: Levaquin (2/8-16). C albicans intraperitoneal infxn: fluconazole x 3 wks per ID (2/1-22)  Endo: ISS  HEME: Eliquis, IVCF (1/22) // 1FFP, 1PLT (1/22).   PPx: SCD, R pop DVT, s/p IVC filter   Lines: PIV, // D/c: TLC (2/8-2/16) PICC (1/29-2/8), L brachial Prentiss (1/20-27), L SC HD cath (1/22-27)   Wounds: Ex lap, J tube for feeds, D-tube to MIWS (red), G-tube to LIWS (green); betadine to toes  PT/OT: Early mobilization.

## 2018-02-23 NOTE — PROGRESS NOTE ADULT - ASSESSMENT
71 yo M with necrotic toe changes from previous pressors use. Currently no signs of infection, no drainage, some dry eschar is still sloughing off, no need for surgical debridement or amputation at this time. Good pulses, no signs of critical limb ischemia.    - Will continue betadine paint to toes and dry kerlix dressing daily  - Will monitor for improvement vs need for interventions  - Case d/w Chief resident on call  - please call [phone] with any acute changes

## 2018-02-23 NOTE — PROGRESS NOTE ADULT - SUBJECTIVE AND OBJECTIVE BOX
Patient seen at bedside. No events overnight. [facility] of dressing change. ROS unobtainable.       PMH:  Mitral regurgitation  Aortic regurgitation      Medication:   apixaban 5  digoxin     Tablet 0.125  tamsulosin 0.4        Vital Signs Last 24 Hrs  T(C): 36.5 (23 Feb 2018 14:00), Max: 37.4 (23 Feb 2018 01:05)  T(F): 97.7 (23 Feb 2018 14:00), Max: 99.4 (23 Feb 2018 01:05)  HR: 126 (23 Feb 2018 15:40) (96 - 126)  BP: 132/73 (23 Feb 2018 15:40) (111/59 - 137/74)  BP(mean): 93 (23 Feb 2018 15:40) (80 - 97)  RR: 18 (23 Feb 2018 14:21) (18 - 19)  SpO2: 98% (23 Feb 2018 15:40) (98% - 100%)  I&O's Summary    22 Feb 2018 07:01  -  23 Feb 2018 07:00  --------------------------------------------------------  IN: 1496 mL / OUT: 1100 mL / NET: 396 mL        Physical Exam:  General: alert and awake, trached  Pulmonary: no pulmonary distress  Cardiovascular: RRR  Extremities: RLE: foot warm, skin pink, + dry eschar over the 1st toe nailbed, necrotic toe tips of 3rd and 4th toe, some dry eschar over tip and plantar aspect of the base of 5th toe. No fluctuance, no foul odor, no drainage. +Dry eschar over the heel, no drainage. Calf soft, non tender, no edema. 2+DP/PT pulses.  LLE: foot warm, skin pink, + small dry gangrene over the 1st toe nailbed, 2nd, 3rd and 5th toes. No fluctuance, no foul odor, no drainage. +Dry eschar over the heel, no drainage. Calf soft, non tender, no edema. 2+DP/PT pulses.      LABS:                        8.3    10.8  )-----------( 149      ( 23 Feb 2018 06:56 )             27.8     02-23    146<H>  |  108  |  34<H>  ----------------------------<  160<H>  4.1   |  31  |  0.59    Ca    8.1<L>      23 Feb 2018 06:56  Phos  3.4     02-23  Mg     2.6     02-23    TPro  6.6  /  Alb  2.7<L>  /  TBili  2.3<H>  /  DBili  1.3<H>  /  AST  59<H>  /  ALT  56<H>  /  AlkPhos  191<H>  02-23        Radiology and Additional Studies:

## 2018-02-23 NOTE — PROGRESS NOTE ADULT - ASSESSMENT
ASSESSMENT/PLAN 73y/o male pt c Respiratory failure, perforated duodenal ulcer, POD post repair, pneumonia, RLL, LLL  atelectases, no obvious RADHA, elevated R hemidiaphragm, L pleural effusion, AFIB,  thrombocytopenia, possible endocarditis, s/p reop. now POP tracheostomy, new fever pattern, anemia    1. O2:  PassyMuir valve daytime, remove and safely store HS, use TCollar HS. Discussed PM mngmnt c SICU  2. Bronchodilators:  Atrovent/ albuterol q 4 – 6 hours as needed  3. Corticosteroids: off  4. ID/Antibiotics: off   ID pt discussed   5. Cardiac/HTN: Monitor, optimize HR;, optimize chf mngmnt, cardiology feedback   6. GI: Rx/ prophylaxis c PPI/Protonix, speech and swallow evaluation to decide on PO feeding  7. Heme: Rx/VT  follow H/H closely  on Eliquis   8. Aspiration precautions, feeding PEG to continue  9 Continue [place] catheter  10. Edema: continue diuretic  11.LMobilize, OOB, PT  Discussed c managing team.

## 2018-02-23 NOTE — PROGRESS NOTE ADULT - SUBJECTIVE AND OBJECTIVE BOX
ON: CORETTA  2/22: vascular reconsulted for toe dressing changes, speech/swallow to do FEES tomorrow        72yM with PMH severe MR/AR presented after recent dx acute cholecystitis and RLL PNA at [facility] with lower abdominal pain, new onset rapid Afib, sepsis, acute CHF exacerbation, initially admitted to cardiology on bipap, then transferred to SICU with perforated duodenum s/p Shreyas patch and jejunostomy (1/20) c/b breakdown s/p RTOR, washout, new Shreyas patch, gastrostomy, duodenostomy, IVCF (1/22)    Neuro: Tylenol PRN  CV: Afib: Echo EF 50%, Severe MR, no vegetation, Digoxin 0.125,  Eliquis.  Pulm: Trach 8.0 Shiley trach collar with PMV (speaking valve), Duonebs ( 2/14-) Left Chest tube removed 2/16  GI: NPO, Glucerna 1.2 @68 via J tube, Protonix BID. Retrograde D-tube to LIWS (red), G-tube to LIWS (green), Colace, Dulcolax suppository  : Voids. flomax  ID: none // D/c: Zosyn (1/19-22; 1/27-29; 2/7), micafungin (1/22-2/1), ceftazidime (1/29-2/4), linezolid (1/22-23), meropenem (1/22-27), vancomycin (1/19-20; 2/7-2/9) Alexi/Stenotrophomonas: Levaquin (2/8-16). C albicans intraperitoneal infxn: fluconazole x 3 wks per ID (2/1-22)  Endo: ISS  HEME: Eliquis, IVCF (1/22) // 1FFP, 1PLT (1/22).   PPx: SCD, R pop DVT, s/p IVC filter   Lines: PIV, // D/c: TLC (2/8-2/16) PICC (1/29-2/8), L brachial Jeane (1/20-27), L SC HD cath (1/22-27)   Wounds: Ex lap, J tube for feeds, D-tube to MIWS (red), G-tube to LIWS (green); betadine to toes  PT/OT: Early mobilization. SUBJECTIVE: Patient seen and examined bedside by chief resident. ON: OCRETTA    Vital Signs Last 24 Hrs  T(C): 36.3 (23 Feb 2018 16:00), Max: 37.4 (23 Feb 2018 01:05)  T(F): 97.3 (23 Feb 2018 16:00), Max: 99.4 (23 Feb 2018 01:05)  HR: 100 (23 Feb 2018 19:19) (96 - 126)  BP: 131/60 (23 Feb 2018 19:19) (111/59 - 137/74)  BP(mean): 84 (23 Feb 2018 19:19) (80 - 97)  RR: 18 (23 Feb 2018 14:21) (18 - 19)  SpO2: 100% (23 Feb 2018 19:19) (98% - 100%)  I&O's Detail    22 Feb 2018 07:01  -  23 Feb 2018 07:00  --------------------------------------------------------  IN:    Glucerna: 1496 mL  Total IN: 1496 mL    OUT:    Drain: 100 mL    Incontinent per Condom Catheter: 1000 mL  Total OUT: 1100 mL    Total NET: 396 mL      23 Feb 2018 07:01  -  23 Feb 2018 21:43  --------------------------------------------------------  IN:    Glucerna: 816 mL  Total IN: 816 mL    OUT:    Incontinent per Condom Catheter: 400 mL  Total OUT: 400 mL    Total NET: 416 mL          General: NAD, resting comfortably in bed  Pulm: Nonlabored breathing, no respiratory distress  Abd: soft, NT/ND.        LABS:                        8.3    10.8  )-----------( 149      ( 23 Feb 2018 06:56 )             27.8     02-23    146<H>  |  108  |  34<H>  ----------------------------<  160<H>  4.1   |  31  |  0.59    Ca    8.1<L>      23 Feb 2018 06:56  Phos  3.4     02-23  Mg     2.6     02-23    TPro  6.6  /  Alb  2.7<L>  /  TBili  2.3<H>  /  DBili  1.3<H>  /  AST  59<H>  /  ALT  56<H>  /  AlkPhos  191<H>  02-23          RADIOLOGY & ADDITIONAL STUDIES:

## 2018-02-23 NOTE — SWALLOW FEES ASSESSMENT ADULT - ADDITIONAL RECOMMENDATIONS
1) Allow 1-2 ice-chips per hour to stimulate therapeutic swallows and loosen secretions if per medical team- pt can tolerate at least a small degree of aspiration.   2) This C will continue to follow.

## 2018-02-23 NOTE — SWALLOW FEES ASSESSMENT ADULT - COMMENTS
Based on clinical presentation, a FEES was recommended and an order was provided by the medical team. The risks, benefits and alternatives of this study were discussed with the pt. He expressed understanding and agreed to proceed. The pt tolerated the passing and presence of the scope without difficulty. This study was completed in conjunction w SLP, Leonela Garcia.    Pt wa spositioned upright in bed. #8 Shiley, cuff deflated. Trials were compelted w and w/o PMV in place. RN provided tracheal suctioning prior to this study. Thick secretions were noted to spill from the trach hub after PO trials. Pooled secretions at baseline.

## 2018-02-23 NOTE — SWALLOW FEES ASSESSMENT ADULT - DIAGNOSTIC IMPRESSIONS
Pt p/w severe pharyngeal dysphagia, characterized by delayed swallow initiation,  poor airway protection resulting in at least gross penetration. Weak swallow resulted in severe residue after the swallow. Pt was not sensate to this. W cued coughs, he was able to clear the penetration, and although he felt as though he had cleared it, more secretions and residue were noted to spill over the epiglottis. Swallow worsened without the valve in place resulting in more residue, and inability to cough any of the residue/secretions. Given these observations, pt is at high risk of prandial aspiration and a PO diet is premature.

## 2018-02-24 LAB
ALBUMIN SERPL ELPH-MCNC: 2.5 G/DL — LOW (ref 3.3–5)
ALP SERPL-CCNC: 185 U/L — HIGH (ref 40–120)
ALT FLD-CCNC: 56 U/L — HIGH (ref 10–45)
ANION GAP SERPL CALC-SCNC: 8 MMOL/L — SIGNIFICANT CHANGE UP (ref 5–17)
AST SERPL-CCNC: 51 U/L — HIGH (ref 10–40)
BILIRUB SERPL-MCNC: 1.7 MG/DL — HIGH (ref 0.2–1.2)
BUN SERPL-MCNC: 35 MG/DL — HIGH (ref 7–23)
CALCIUM SERPL-MCNC: 7.8 MG/DL — LOW (ref 8.4–10.5)
CHLORIDE SERPL-SCNC: 108 MMOL/L — SIGNIFICANT CHANGE UP (ref 96–108)
CO2 SERPL-SCNC: 29 MMOL/L — SIGNIFICANT CHANGE UP (ref 22–31)
CREAT SERPL-MCNC: 0.58 MG/DL — SIGNIFICANT CHANGE UP (ref 0.5–1.3)
GLUCOSE BLDC GLUCOMTR-MCNC: 139 MG/DL — HIGH (ref 70–99)
GLUCOSE BLDC GLUCOMTR-MCNC: 143 MG/DL — HIGH (ref 70–99)
GLUCOSE BLDC GLUCOMTR-MCNC: 145 MG/DL — HIGH (ref 70–99)
GLUCOSE BLDC GLUCOMTR-MCNC: 154 MG/DL — HIGH (ref 70–99)
GLUCOSE BLDC GLUCOMTR-MCNC: 155 MG/DL — HIGH (ref 70–99)
GLUCOSE BLDC GLUCOMTR-MCNC: 159 MG/DL — HIGH (ref 70–99)
GLUCOSE SERPL-MCNC: 141 MG/DL — HIGH (ref 70–99)
HCT VFR BLD CALC: 26.5 % — LOW (ref 39–50)
HGB BLD-MCNC: 8 G/DL — LOW (ref 13–17)
MAGNESIUM SERPL-MCNC: 2.4 MG/DL — SIGNIFICANT CHANGE UP (ref 1.6–2.6)
MCHC RBC-ENTMCNC: 30.2 G/DL — LOW (ref 32–36)
MCHC RBC-ENTMCNC: 31.3 PG — SIGNIFICANT CHANGE UP (ref 27–34)
MCV RBC AUTO: 103.5 FL — HIGH (ref 80–100)
PHOSPHATE SERPL-MCNC: 2.9 MG/DL — SIGNIFICANT CHANGE UP (ref 2.5–4.5)
PLATELET # BLD AUTO: 154 K/UL — SIGNIFICANT CHANGE UP (ref 150–400)
POTASSIUM SERPL-MCNC: 3.9 MMOL/L — SIGNIFICANT CHANGE UP (ref 3.5–5.3)
POTASSIUM SERPL-SCNC: 3.9 MMOL/L — SIGNIFICANT CHANGE UP (ref 3.5–5.3)
PROT SERPL-MCNC: 5.9 G/DL — LOW (ref 6–8.3)
RBC # BLD: 2.56 M/UL — LOW (ref 4.2–5.8)
RBC # FLD: 21.4 % — HIGH (ref 10.3–16.9)
SODIUM SERPL-SCNC: 145 MMOL/L — SIGNIFICANT CHANGE UP (ref 135–145)
WBC # BLD: 8.2 K/UL — SIGNIFICANT CHANGE UP (ref 3.8–10.5)
WBC # FLD AUTO: 8.2 K/UL — SIGNIFICANT CHANGE UP (ref 3.8–10.5)

## 2018-02-24 RX ORDER — METOPROLOL TARTRATE 50 MG
5 TABLET ORAL ONCE
Qty: 0 | Refills: 0 | Status: COMPLETED | OUTPATIENT
Start: 2018-02-24 | End: 2018-02-24

## 2018-02-24 RX ADMIN — Medication 2: at 01:41

## 2018-02-24 RX ADMIN — CHLORHEXIDINE GLUCONATE 15 MILLILITER(S): 213 SOLUTION TOPICAL at 17:44

## 2018-02-24 RX ADMIN — Medication 3 MILLILITER(S): at 12:55

## 2018-02-24 RX ADMIN — APIXABAN 5 MILLIGRAM(S): 2.5 TABLET, FILM COATED ORAL at 07:09

## 2018-02-24 RX ADMIN — Medication 1 APPLICATION(S): at 12:46

## 2018-02-24 RX ADMIN — Medication 5 MILLIGRAM(S): at 19:36

## 2018-02-24 RX ADMIN — Medication 3 MILLILITER(S): at 07:06

## 2018-02-24 RX ADMIN — PANTOPRAZOLE SODIUM 40 MILLIGRAM(S): 20 TABLET, DELAYED RELEASE ORAL at 07:05

## 2018-02-24 RX ADMIN — Medication 0.12 MILLIGRAM(S): at 07:06

## 2018-02-24 RX ADMIN — TAMSULOSIN HYDROCHLORIDE 0.4 MILLIGRAM(S): 0.4 CAPSULE ORAL at 21:51

## 2018-02-24 RX ADMIN — PANTOPRAZOLE SODIUM 40 MILLIGRAM(S): 20 TABLET, DELAYED RELEASE ORAL at 17:45

## 2018-02-24 RX ADMIN — Medication 3 MILLILITER(S): at 17:45

## 2018-02-24 RX ADMIN — CHLORHEXIDINE GLUCONATE 15 MILLILITER(S): 213 SOLUTION TOPICAL at 07:05

## 2018-02-24 RX ADMIN — Medication 100 MILLIGRAM(S): at 07:06

## 2018-02-24 RX ADMIN — Medication 3 MILLILITER(S): at 01:37

## 2018-02-24 RX ADMIN — APIXABAN 5 MILLIGRAM(S): 2.5 TABLET, FILM COATED ORAL at 17:46

## 2018-02-24 NOTE — PROGRESS NOTE ADULT - ASSESSMENT
ASSESSMENT/PLAN 71y/o male pt c Respiratory failure, perforated duodenal ulcer, POD post repair, pneumonia, RLL, LLL  atelectases, no obvious RADHA, elevated R hemidiaphragm, L pleural effusion, AFIB,  thrombocytopenia, possible endocarditis, s/p reop. now POP tracheostomy, new fever pattern, anemia, high aspiration risk.    1. O2:  PassyMuir valve daytime, remove and safely store HS, use TCollar HS. Discussed PM mngmnt c SICU  2. Bronchodilators:  Atrovent/ albuterol q 4 – 6 hours as needed  3. Corticosteroids: off  4. ID/Antibiotics: off   ID pt discussed   5. Cardiac/HTN: Monitor, optimize HR;, optimize chf mngmnt, cardiology feedback   6. GI: Rx/ prophylaxis c PPI/Protonix,   7. Heme: Rx/VT  follow H/H closely  on Eliquis   8. Aspiration precautions, feeding PEG to continue, continue NPO at this time, speech and swallow feedback, swallowing exercise  9 Continue [place] catheter  10. Edema: continue diuretic  11.LMobilize, OOB, PT  Discussed c managing team.

## 2018-02-24 NOTE — PROVIDER CONTACT NOTE (OTHER) - ASSESSMENT
Pt. hemodynamically stable at this time
Thick oral secretions. Tachypneic. O2 sat >95%. Pt verbalized, "he can breathe. Leave him alone"
Denies chest pain/palpitations/SOB.

## 2018-02-24 NOTE — PROVIDER CONTACT NOTE (OTHER) - ACTION/TREATMENT ORDERED:
Reintubated to protect airway. Restrained.
CVVHD held at this time. Team will discuss in the AM. Renal aware.
Metoprolol 5mg IVP given as per orders. Dr to come assess patient shortly.

## 2018-02-24 NOTE — PROGRESS NOTE ADULT - SUBJECTIVE AND OBJECTIVE BOX
Interventional, Pulmonary, Critical, Chest Special Procedures.    Pt was seen and fully examined by myself.     Time spent with patient in minutes:37    Patient is a 72y old  Male who presents with a chief complaint of acute cholecystitis (18 Jan 2018 14:49)The patient c no new symptoms. results of swallow study reviewed. The patient is now pain free, some cough today.    HPI:  71 yo M with h/o severe mitral and aortic valve regurgitation presented to ED with 5 day h/o left lower abdominal pain, nausea and multiple episodes of vomiting as well as PO intolerance. Pt reports that the pain in his abdomen is illicited by movement. Went to [facility] two days ago and underwent CT scan which revealed acute cholecystitis as well as right lower lobe infectious pneumonia. Pt was told he would require surgery and left AMA because he was waiting too long. Denies f/c. Last echo 1/4/17 with EF 60-65%. Normal BMs and passing flatus. (18 Jan 2018 14:49)    REVIEW OF SYSTEMS:  as above  PAST MEDICAL & SURGICAL HISTORY:  Mitral regurgitation  Aortic regurgitation  No significant past surgical history    FAMILY HISTORY:    SOCIAL HISTORY:      - Tobacco     - ETOH    Allergies    No Known Allergies    Intolerances      Vital Signs Last 24 Hrs  T(C): 36.8 (24 Feb 2018 09:00), Max: 37.4 (23 Feb 2018 21:34)  T(F): 98.2 (24 Feb 2018 09:00), Max: 99.3 (23 Feb 2018 21:34)  HR: 100 (24 Feb 2018 08:40) (92 - 126)  BP: 110/67 (24 Feb 2018 08:40) (106/59 - 137/74)  BP(mean): 84 (24 Feb 2018 08:40) (80 - 97)  RR: 18 (24 Feb 2018 08:40) (16 - 18)  SpO2: 98% (24 Feb 2018 08:40) (98% - 100%)    02-23 @ 07:01  -  02-24 @ 07:00  --------------------------------------------------------  IN: 816 mL / OUT: 950 mL / NET: -134 mL        PHYSICAL EXAM:   Comfortable, no distress  Eyes: PERRL, EOM intact; conjunctiva and sclera clear  Head: Normocephalic;  No Trauma  ENMT: No nasal discharge, hoarseness, cough or hemoptysis.  Airway clear  Neck: Supple; non tender; no masses or deformities.    No JVD  Respiratory:  - WHEEZING   few bilateral   RHONCHI  and  RALES today  less  CRACKLES.  Diminished breath sounds  BILATERAL  RIGHT  LEFT bases   Cardiovascular: Regular rate and rhythm. S1 and S2 Normal; ++murmurs, gallops or rubs     - PPM/AICD  Gastrointestinal: Soft ,distended; Normal bowel sounds; No hepatosplenomegaly.     +PEG    + multiple surgical drains integrity checked    Texas cath in place  Genitourinary: No costovertebral angle tenderness. No dysuria  Extremities: Decreased AROM, + clubbing, cyanosis + edema, , toes dressings in place, vascular feedback reviewed.  Vascular: Peripheral pulses palpable 2+ bilaterally  Neurological: intubated and opening eyes to commands  Skin: Warm and dry. No obvious rash  Lymph Nodes: No acute cervical or supraclavicular adenopathy  Psychiatric: not sedated, calm affect  DEVICES:  - DENTURES   +IV R / L     - ETUBE   + 8.0 site clean Shilley decuffed now TRACH,  DEVICES:  - DENTURES   +IV R / L     - ETUBE   -TRACH   -CTUBE  R / L      LABS:                          8.0    8.2   )-----------( 154      ( 24 Feb 2018 06:49 )             26.5     02-24    145  |  108  |  35<H>  ----------------------------<  141<H>  3.9   |  29  |  0.58    Ca    7.8<L>      24 Feb 2018 06:42  Phos  2.9     02-24  Mg     2.4     02-24    TPro  5.9<L>  /  Alb  2.5<L>  /  TBili  1.7<H>  /  DBili  x   /  AST  51<H>  /  ALT  56<H>  /  AlkPhos  185<H>  02-24      RADIOLOGY & ADDITIONAL STUDIES (The following images were personally reviewed):CXR series

## 2018-02-24 NOTE — PROGRESS NOTE ADULT - SUBJECTIVE AND OBJECTIVE BOX
ON S/S 1-2 ice chips if small aspiration is an acceptable risk  2/23: pending FEES        72yM with PMH severe MR/AR presented after recent dx acute cholecystitis and RLL PNA at [facility] with lower abdominal pain, new onset rapid Afib, sepsis, acute CHF exacerbation, initially admitted to cardiology on bipap, then transferred to SICU with perforated duodenum s/p Shreyas patch and jejunostomy (1/20) c/b breakdown s/p RTOR, washout, new Shreyas patch, gastrostomy, duodenostomy, IVCF (1/22)    Neuro: Tylenol PRN  CV: Afib: Echo EF 50%, Severe MR, no vegetation, Digoxin 0.125,  Eliquis.  Pulm: Trach 8.0 Shiley trach collar with PMV (speaking valve), Duonebs ( 2/14-) Left Chest tube removed 2/16  GI: NPO, Glucerna 1.2 @68 via J tube, Protonix BID. Retrograde D-tube to LIWS (red), G-tube to LIWS (green), Colace, Dulcolax suppository  : Voids. flomax  ID: none // D/c: Zosyn (1/19-22; 1/27-29; 2/7), micafungin (1/22-2/1), ceftazidime (1/29-2/4), linezolid (1/22-23), meropenem (1/22-27), vancomycin (1/19-20; 2/7-2/9) Alexi/Stenotrophomonas: Levaquin (2/8-16). C albicans intraperitoneal infxn: fluconazole x 3 wks per ID (2/1-22)  Endo: ISS  HEME: Eliquis, IVCF (1/22) // 1FFP, 1PLT (1/22).   PPx: SCD, R pop DVT, s/p IVC filter ON S/S 1-2 ice chips if small aspiration is an acceptable risk  2/23: pending FEES    Vital Signs Last 24 Hrs  T(C): 36.6 (24 Feb 2018 05:28), Max: 37.4 (23 Feb 2018 21:34)  T(F): 97.8 (24 Feb 2018 05:28), Max: 99.3 (23 Feb 2018 21:34)  HR: 92 (24 Feb 2018 05:00) (92 - 126)  BP: 106/59 (24 Feb 2018 05:00) (106/59 - 137/74)  BP(mean): 80 (24 Feb 2018 05:00) (80 - 97)  RR: 16 (24 Feb 2018 05:00) (16 - 19)  SpO2: 100% (24 Feb 2018 05:00) (98% - 100%)    Gen: NAD   Abd: soft, nt / nd     72yM with PMH severe MR/AR presented after recent dx acute cholecystitis and RLL PNA at [facility] with lower abdominal pain, new onset rapid Afib, sepsis, acute CHF exacerbation, initially admitted to cardiology on bipap, then transferred to SICU with perforated duodenum s/p Shreyas patch and jejunostomy (1/20) c/b breakdown s/p RTOR, washout, new Shreyas patch, gastrostomy, duodenostomy, IVCF (1/22)    Neuro: Tylenol PRN  CV: Afib: Echo EF 50%, Severe MR, no vegetation, Digoxin 0.125,  Eliquis.  Pulm: Trach 8.0 Shiley trach collar with PMV (speaking valve), Duonebs ( 2/14-) Left Chest tube removed 2/16  GI: NPO, Glucerna 1.2 @68 via J tube, Protonix BID. Retrograde D-tube to LIWS (red), G-tube to LIWS (green), Colace, Dulcolax suppository  : Voids. flomax  ID: none // D/c: Zosyn (1/19-22; 1/27-29; 2/7), micafungin (1/22-2/1), ceftazidime (1/29-2/4), linezolid (1/22-23), meropenem (1/22-27), vancomycin (1/19-20; 2/7-2/9) Alexi/Stenotrophomonas: Levaquin (2/8-16). C albicans intraperitoneal infxn: fluconazole x 3 wks per ID (2/1-22)  Endo: ISS  HEME: Eliquis, IVCF (1/22) // 1FFP, 1PLT (1/22).   PPx: SCD, R pop DVT, s/p IVC filter

## 2018-02-24 NOTE — PROGRESS NOTE ADULT - ATTENDING COMMENTS
Patient seen and examined at bedside.  Tolerating tube feeds.  Abdomen soft NT ND.  Continue tube feeds  Speech and swallow evaluation to determine if patient may take PO  PT/OOB as tolerated  Trach collar

## 2018-02-25 LAB
ANION GAP SERPL CALC-SCNC: 11 MMOL/L — SIGNIFICANT CHANGE UP (ref 5–17)
BUN SERPL-MCNC: 35 MG/DL — HIGH (ref 7–23)
CALCIUM SERPL-MCNC: 7.8 MG/DL — LOW (ref 8.4–10.5)
CHLORIDE SERPL-SCNC: 106 MMOL/L — SIGNIFICANT CHANGE UP (ref 96–108)
CO2 SERPL-SCNC: 26 MMOL/L — SIGNIFICANT CHANGE UP (ref 22–31)
CREAT SERPL-MCNC: 0.51 MG/DL — SIGNIFICANT CHANGE UP (ref 0.5–1.3)
GLUCOSE BLDC GLUCOMTR-MCNC: 124 MG/DL — HIGH (ref 70–99)
GLUCOSE BLDC GLUCOMTR-MCNC: 132 MG/DL — HIGH (ref 70–99)
GLUCOSE BLDC GLUCOMTR-MCNC: 145 MG/DL — HIGH (ref 70–99)
GLUCOSE BLDC GLUCOMTR-MCNC: 154 MG/DL — HIGH (ref 70–99)
GLUCOSE BLDC GLUCOMTR-MCNC: 156 MG/DL — HIGH (ref 70–99)
GLUCOSE SERPL-MCNC: 152 MG/DL — HIGH (ref 70–99)
HCT VFR BLD CALC: 26.2 % — LOW (ref 39–50)
HGB BLD-MCNC: 7.9 G/DL — LOW (ref 13–17)
MAGNESIUM SERPL-MCNC: 2.4 MG/DL — SIGNIFICANT CHANGE UP (ref 1.6–2.6)
MCHC RBC-ENTMCNC: 30.2 G/DL — LOW (ref 32–36)
MCHC RBC-ENTMCNC: 31.1 PG — SIGNIFICANT CHANGE UP (ref 27–34)
MCV RBC AUTO: 103.1 FL — HIGH (ref 80–100)
PHOSPHATE SERPL-MCNC: 3 MG/DL — SIGNIFICANT CHANGE UP (ref 2.5–4.5)
PLATELET # BLD AUTO: 166 K/UL — SIGNIFICANT CHANGE UP (ref 150–400)
POTASSIUM SERPL-MCNC: 4 MMOL/L — SIGNIFICANT CHANGE UP (ref 3.5–5.3)
POTASSIUM SERPL-SCNC: 4 MMOL/L — SIGNIFICANT CHANGE UP (ref 3.5–5.3)
RBC # BLD: 2.54 M/UL — LOW (ref 4.2–5.8)
RBC # FLD: 21.3 % — HIGH (ref 10.3–16.9)
SODIUM SERPL-SCNC: 143 MMOL/L — SIGNIFICANT CHANGE UP (ref 135–145)
WBC # BLD: 7.9 K/UL — SIGNIFICANT CHANGE UP (ref 3.8–10.5)
WBC # FLD AUTO: 7.9 K/UL — SIGNIFICANT CHANGE UP (ref 3.8–10.5)

## 2018-02-25 RX ADMIN — PANTOPRAZOLE SODIUM 40 MILLIGRAM(S): 20 TABLET, DELAYED RELEASE ORAL at 17:20

## 2018-02-25 RX ADMIN — APIXABAN 5 MILLIGRAM(S): 2.5 TABLET, FILM COATED ORAL at 17:20

## 2018-02-25 RX ADMIN — CHLORHEXIDINE GLUCONATE 15 MILLILITER(S): 213 SOLUTION TOPICAL at 07:11

## 2018-02-25 RX ADMIN — Medication 100 MILLIGRAM(S): at 17:20

## 2018-02-25 RX ADMIN — Medication 1 APPLICATION(S): at 12:09

## 2018-02-25 RX ADMIN — Medication 2: at 00:19

## 2018-02-25 RX ADMIN — Medication 3 MILLILITER(S): at 00:19

## 2018-02-25 RX ADMIN — Medication 3 MILLILITER(S): at 12:08

## 2018-02-25 RX ADMIN — Medication 3 MILLILITER(S): at 07:11

## 2018-02-25 RX ADMIN — Medication 3 MILLILITER(S): at 17:20

## 2018-02-25 RX ADMIN — Medication 0.12 MILLIGRAM(S): at 07:11

## 2018-02-25 RX ADMIN — PANTOPRAZOLE SODIUM 40 MILLIGRAM(S): 20 TABLET, DELAYED RELEASE ORAL at 07:11

## 2018-02-25 RX ADMIN — APIXABAN 5 MILLIGRAM(S): 2.5 TABLET, FILM COATED ORAL at 07:11

## 2018-02-25 RX ADMIN — TAMSULOSIN HYDROCHLORIDE 0.4 MILLIGRAM(S): 0.4 CAPSULE ORAL at 21:58

## 2018-02-25 RX ADMIN — Medication 100 MILLIGRAM(S): at 07:11

## 2018-02-25 RX ADMIN — CHLORHEXIDINE GLUCONATE 15 MILLILITER(S): 213 SOLUTION TOPICAL at 17:21

## 2018-02-25 NOTE — PROGRESS NOTE ADULT - SUBJECTIVE AND OBJECTIVE BOX
2/24 / O/N: CORETTA AVSS               72yM with PMH severe MR/AR presented after recent dx acute cholecystitis and RLL PNA at [facility] with lower abdominal pain, new onset rapid Afib, sepsis, acute CHF exacerbation, initially admitted to cardiology on bipap, then transferred to SICU with perforated duodenum s/p Shreyas patch and jejunostomy (1/20) c/b breakdown s/p RTOR, washout, new Shreyas patch, gastrostomy, duodenostomy, IVCF (1/22)  pain control   dvt ppx   eliquis   sips and ice chips   npo / tf   oob / IS 2/24 / O/N: CORETTA AVSS      SUBJECTIVE: Patient seen and examined bedside by chief resident. Tolerating tube feeds.    apixaban 5 milliGRAM(s) Oral every 12 hours  digoxin     Tablet 0.125 milliGRAM(s) Oral daily  tamsulosin 0.4 milliGRAM(s) Oral at bedtime      Vital Signs Last 24 Hrs  T(C): 36.9 (25 Feb 2018 09:00), Max: 36.9 (25 Feb 2018 09:00)  T(F): 98.4 (25 Feb 2018 09:00), Max: 98.4 (25 Feb 2018 09:00)  HR: 98 (25 Feb 2018 05:00) (77 - 102)  BP: 113/68 (25 Feb 2018 05:00) (104/68 - 124/77)  BP(mean): 86 (25 Feb 2018 05:00) (83 - 94)  RR: 18 (25 Feb 2018 05:00) (16 - 18)  SpO2: 99% (25 Feb 2018 05:00) (97% - 100%)  I&O's Detail    24 Feb 2018 07:01  -  25 Feb 2018 07:00  --------------------------------------------------------  IN:    Glucerna: 816 mL  Total IN: 816 mL    OUT:    Incontinent per Condom Catheter: 750 mL  Total OUT: 750 mL    Total NET: 66 mL          General: NAD, resting comfortably in bed  C/V: NSR  Pulm: Nonlabored breathing, no respiratory distress  Abd: soft, nontender, nondistended.  Extrem: WWP, no edema, SCDs in place        LABS:                        7.9    7.9   )-----------( 166      ( 25 Feb 2018 06:06 )             26.2     02-25    143  |  106  |  35<H>  ----------------------------<  152<H>  4.0   |  26  |  0.51    Ca    7.8<L>      25 Feb 2018 06:06  Phos  3.0     02-25  Mg     2.4     02-25    TPro  5.9<L>  /  Alb  2.5<L>  /  TBili  1.7<H>  /  DBili  x   /  AST  51<H>  /  ALT  56<H>  /  AlkPhos  185<H>  02-24          RADIOLOGY & ADDITIONAL STUDIES:        72yM with PMH severe MR/AR presented after recent dx acute cholecystitis and RLL PNA at [facility] with lower abdominal pain, new onset rapid Afib, sepsis, acute CHF exacerbation, initially admitted to cardiology on bipap, then transferred to SICU with perforated duodenum s/p Shreyas patch and jejunostomy (1/20) c/b breakdown s/p RTOR, washout, new Shreyas patch, gastrostomy, duodenostomy, IVCF (1/22)  pain control   dvt ppx   eliquis   sips and ice chips   npo / tf   oob / IS

## 2018-02-25 NOTE — PROGRESS NOTE ADULT - ATTENDING COMMENTS
Patient seen and examined at bedside.  Tolerating tube feeds.  Abdomen soft NT ND.  Continue tube feeds  PT/OOB as tolerated  Trach collar

## 2018-02-25 NOTE — PROGRESS NOTE ADULT - SUBJECTIVE AND OBJECTIVE BOX
Interventional, Pulmonary, Critical, Chest Special Procedures.    Pt was seen and fully examined by myself.     Time spent with patient in minutes:37    Patient is a 72y old  Male who presents with a chief complaint of acute cholecystitis (18 Jan 2018 14:49)The patient comfortable and eupneic off TCollar, less cough, pain free when seen.    HPI:  73 yo M with h/o severe mitral and aortic valve regurgitation presented to ED with 5 day h/o left lower abdominal pain, nausea and multiple episodes of vomiting as well as PO intolerance. Pt reports that the pain in his abdomen is illicited by movement. Went to [facility] two days ago and underwent CT scan which revealed acute cholecystitis as well as right lower lobe infectious pneumonia. Pt was told he would require surgery and left AMA because he was waiting too long. Denies f/c. Last echo 1/4/17 with EF 60-65%. Normal BMs and passing flatus. (18 Jan 2018 14:49)    REVIEW OF SYSTEMS:  updated    PAST MEDICAL & SURGICAL HISTORY:  Mitral regurgitation  Aortic regurgitation  No significant past surgical history    FAMILY HISTORY:    SOCIAL HISTORY:      - Tobacco     - ETOH    Allergies    No Known Allergies    Intolerances      Vital Signs Last 24 Hrs  T(C): 36.9 (25 Feb 2018 09:00), Max: 36.9 (25 Feb 2018 09:00)  T(F): 98.4 (25 Feb 2018 09:00), Max: 98.4 (25 Feb 2018 09:00)  HR: 109 (25 Feb 2018 09:37) (77 - 109)  BP: 120/79 (25 Feb 2018 09:01) (104/68 - 124/77)  BP(mean): 94 (25 Feb 2018 09:01) (83 - 94)  RR: 17 (25 Feb 2018 09:37) (16 - 18)  SpO2: 100% (25 Feb 2018 09:37) (99% - 100%)    02-24 @ 07:01  -  02-25 @ 07:00  --------------------------------------------------------  IN: 816 mL / OUT: 750 mL / NET: 66 mL        PHYSICAL EXAM:   Comfortable, no distress  Eyes: PERRL, EOM intact; conjunctiva and sclera clear  Head: Normocephalic;  No Trauma  ENMT: No nasal discharge, hoarseness, cough or hemoptysis.  Airway clear  Neck: Supple; non tender; no masses or deformities.    No JVD  Respiratory:  - WHEEZING   few bilateral   RHONCHI  and  RALES today  less  CRACKLES.  Diminished breath sounds  BILATERAL  RIGHT  LEFT bases   Cardiovascular: Regular rate and rhythm. S1 and S2 Normal; ++murmurs, gallops or rubs     - PPM/AICD  Gastrointestinal: Soft ,distended; Normal bowel sounds; No hepatosplenomegaly.     +PEG    + multiple surgical drains integrity checked    [place] cath in place  Genitourinary: No costovertebral angle tenderness. No dysuria  Extremities: Decreased AROM, + clubbing, cyanosis + edema, , toes dressings in place, vascular feedback reviewed.  Vascular: Peripheral pulses palpable 2+ bilaterally  Neurological: intubated and opening eyes to commands  Skin: Warm and dry. No obvious rash  Lymph Nodes: No acute cervical or supraclavicular adenopathy  Psychiatric: not sedated, calm affect  DEVICES:  - DENTURES   +IV R / L     - ETUBE   + 8.0 site clean Shilley decuffed now TRACH,    -CTUBE  R / L      LABS:                          7.9    7.9   )-----------( 166      ( 25 Feb 2018 06:06 )             26.2     02-25    143  |  106  |  35<H>  ----------------------------<  152<H>  4.0   |  26  |  0.51    Ca    7.8<L>      25 Feb 2018 06:06  Phos  3.0     02-25  Mg     2.4     02-25    TPro  5.9<L>  /  Alb  2.5<L>  /  TBili  1.7<H>  /  DBili  x   /  AST  51<H>  /  ALT  56<H>  /  AlkPhos  185<H>  02-24      RADIOLOGY & ADDITIONAL STUDIES (The following images were personally reviewed):

## 2018-02-25 NOTE — PROGRESS NOTE ADULT - SUBJECTIVE AND OBJECTIVE BOX
Patient is a 72y old  Male who presents with a chief complaint of acute cholecystitis (18 Jan 2018 14:49)      HPI:  73 yo M with h/o severe mitral and aortic valve regurgitation presented to ED with 5 day h/o left lower abdominal pain, nausea and multiple episodes of vomiting as well as PO intolerance. Pt reports that the pain in his abdomen is illicited by movement. Went to [facility] two days ago and underwent CT scan which revealed acute cholecystitis as well as right lower lobe infectious pneumonia. Pt was told he would require surgery and left AMA because he was waiting too long. Denies f/c. Last echo 1/4/17 with EF 60-65%. Normal BMs and passing flatus. (18 Jan 2018 14:49)    INTERVAL HPI/OVERNIGHT EVENTS:::comfortable    HEALTH ISSUES - PROBLEM Dx:  Candidiasis, unspecified: Candidiasis, unspecified  Acute congestive heart failure, unspecified congestive heart failure type: Acute congestive heart failure, unspecified congestive heart failure type  Fever, unspecified fever cause: Fever, unspecified fever cause  Fever and chills: Fever and chills  Mitral valve insufficiency, unspecified etiology: Mitral valve insufficiency, unspecified etiology  Anasarca: Anasarca  Sepsis due to Candida: Sepsis due to Candida  Candidiasis, disseminated: Candidiasis, disseminated  Metabolic acidosis: Metabolic acidosis  Acute kidney injury: Acute kidney injury  Sepsis, due to unspecified organism: Sepsis, due to unspecified organism  Atrial fibrillation, unspecified type: Atrial fibrillation, unspecified type  Coagulopathy: Coagulopathy  Mitral regurgitation: Mitral regurgitation  Chronic kidney insufficiency, stage 3 (moderate): Chronic kidney insufficiency, stage 3 (moderate)  Thrombocytopenia: Thrombocytopenia  Transaminitis: Transaminitis  Acute CHF: Acute CHF  Afib: Afib  Sepsis: Sepsis  Abdominal pain, unspecified abdominal location: Abdominal pain, unspecified abdominal location  Aortic valve insufficiency, etiology of cardiac valve disease unspecified: Aortic valve insufficiency, etiology of cardiac valve disease unspecified  Hepatitis: Hepatitis  Pneumonia, bacterial: Pneumonia, bacterial          PAST MEDICAL & SURGICAL HISTORY:  Mitral regurgitation  Aortic regurgitation  No significant past surgical history          Consultant NOTE  REVIEWED  (  ++ )    REVIEW OF SYSTEMS:  [x] As per HPI  CONSTITUTIONAL: weakness  RESPIRATORY: trach  CARDIOVASCULAR: No chest pain, palpitations, dizziness, or leg swelling  GASTROINTESTINAL: see HPI  MUSCULOSKELETAL: weakness  PSYCH    awake, alert       [x] All others negative	  [ ] Unable to obtain    BP(mean): 94 (25 Feb 2018 09:01) (83 - 94)  RR: 17 (25 Feb 2018 09:37) (16 - 18)  SpO2: 100% (25 Feb 2018 09:37) (99% - 100%)          Vital Signs Last 24 Hrs  T(C): 36.8 (24 Feb 2018 09:00), Max: 37.4 (23 Feb 2018 21:34)  T(F): 98.2 (24 Feb 2018 09:00), Max: 99.3 (23 Feb 2018 21:34)  HR: 102 (24 Feb 2018 13:30) (92 - 106)  BP: 119/78 (24 Feb 2018 13:30) (106/59 - 132/78)  BP(mean): 94 (24 Feb 2018 13:30) (80 - 97)  RR: 16 (24 Feb 2018 13:30) (16 - 18)  SpO2: 100% (24 Feb 2018 13:30) (97% - 100%)        02-23 @ 07:01  -  02-24 @ 07:00  --------------------------------------------------------  IN: 816 mL / OUT: 950 mL / NET: -134 mL    02-24 @ 07:01  -  02-24 @ 17:24  --------------------------------------------------------  IN: 612 mL / OUT: 350 mL / NET: 262 mL      PHYSICAL EXAMINATION:                                    (    )  NO CHANGE  Appearance: Normal	  HEENT:   Normal oral mucosa, PERRL, EOMI	  Neck: Supple trach  Cardiovascular: Normal S1 S2,   Respiratory: Lungs clear to auscultation/Decreased Breath Sounds/No Rales, Rhonchi, Wheezing	  Gastrointestinal:  scar/ feeding tube  Skin: see RN note  Extremities: decr ROM  Vascular: Peripheral pulses decreased  Neurologic: Non-focal  Psychiatry: A & O x 3, Mood & affect appropriate    acetaminophen    Suspension 650 milliGRAM(s) Enteral Tube every 6 hours PRN  ALBUTerol/ipratropium for Nebulization 3 milliLiter(s) Nebulizer every 6 hours  apixaban 5 milliGRAM(s) Oral every 12 hours  BACItracin   Ointment 1 Application(s) Topical daily  bisacodyl Suppository 10 milliGRAM(s) Rectal daily PRN  chlorhexidine 0.12% Liquid 15 milliLiter(s) Swish and Spit two times a day  digoxin     Tablet 0.125 milliGRAM(s) Oral daily  docusate sodium Liquid 100 milliGRAM(s) Oral two times a day  insulin lispro (HumaLOG) corrective regimen sliding scale   SubCutaneous every 6 hours  lidocaine 2% Injectable 50 milliLiter(s) Local Injection once  mineral oil for Topical Use 1 Application(s) Topical daily  pantoprazole  Injectable 40 milliGRAM(s) IV Push every 12 hours  povidone iodine 10% Solution 1 Application(s) Topical daily  sodium chloride 0.9% lock flush 10 milliLiter(s) IV Push every 1 hour PRN  sodium chloride 0.9% lock flush 20 milliLiter(s) IV Push once  tamsulosin 0.4 milliGRAM(s) Oral at bedtime                                      8.0    8.2   )-----------( 154      ( 24 Feb 2018 06:49 )             26.5     02-24    145  |  108  |  35<H>  ----------------------------<  141<H>  3.9   |  29  |  0.58    Ca    7.8<L>      24 Feb 2018 06:42  Phos  2.9     02-24  Mg     2.4     02-24    TPro  5.9<L>  /  Alb  2.5<L>  /  TBili  1.7<H>  /  DBili  x   /  AST  51<H>  /  ALT  56<H>  /  AlkPhos  185<H>  02-24      CAPILLARY BLOOD GLUCOSE      POCT Blood Glucose.: 145 mg/dL (24 Feb 2018 17:07)  POCT Blood Glucose.: 143 mg/dL (24 Feb 2018 11:12)  POCT Blood Glucose.: 139 mg/dL (24 Feb 2018 06:54)  POCT Blood Glucose.: 155 mg/dL (24 Feb 2018 01:40)  POCT Blood Glucose.: 159 mg/dL (24 Feb 2018 00:21)

## 2018-02-26 LAB
ALBUMIN SERPL ELPH-MCNC: 2.6 G/DL — LOW (ref 3.3–5)
ALP SERPL-CCNC: 202 U/L — HIGH (ref 40–120)
ALT FLD-CCNC: 50 U/L — HIGH (ref 10–45)
ANION GAP SERPL CALC-SCNC: 9 MMOL/L — SIGNIFICANT CHANGE UP (ref 5–17)
AST SERPL-CCNC: 44 U/L — HIGH (ref 10–40)
BILIRUB SERPL-MCNC: 1.4 MG/DL — HIGH (ref 0.2–1.2)
BUN SERPL-MCNC: 32 MG/DL — HIGH (ref 7–23)
CALCIUM SERPL-MCNC: 8 MG/DL — LOW (ref 8.4–10.5)
CHLORIDE SERPL-SCNC: 107 MMOL/L — SIGNIFICANT CHANGE UP (ref 96–108)
CO2 SERPL-SCNC: 27 MMOL/L — SIGNIFICANT CHANGE UP (ref 22–31)
CREAT SERPL-MCNC: 0.49 MG/DL — LOW (ref 0.5–1.3)
GLUCOSE BLDC GLUCOMTR-MCNC: 134 MG/DL — HIGH (ref 70–99)
GLUCOSE BLDC GLUCOMTR-MCNC: 145 MG/DL — HIGH (ref 70–99)
GLUCOSE BLDC GLUCOMTR-MCNC: 146 MG/DL — HIGH (ref 70–99)
GLUCOSE BLDC GLUCOMTR-MCNC: 169 MG/DL — HIGH (ref 70–99)
GLUCOSE SERPL-MCNC: 140 MG/DL — HIGH (ref 70–99)
HCT VFR BLD CALC: 27.2 % — LOW (ref 39–50)
HGB BLD-MCNC: 8 G/DL — LOW (ref 13–17)
MAGNESIUM SERPL-MCNC: 2.3 MG/DL — SIGNIFICANT CHANGE UP (ref 1.6–2.6)
MCHC RBC-ENTMCNC: 29.4 G/DL — LOW (ref 32–36)
MCHC RBC-ENTMCNC: 31.1 PG — SIGNIFICANT CHANGE UP (ref 27–34)
MCV RBC AUTO: 105.8 FL — HIGH (ref 80–100)
PHOSPHATE SERPL-MCNC: 2.9 MG/DL — SIGNIFICANT CHANGE UP (ref 2.5–4.5)
PLATELET # BLD AUTO: 175 K/UL — SIGNIFICANT CHANGE UP (ref 150–400)
POTASSIUM SERPL-MCNC: 4.1 MMOL/L — SIGNIFICANT CHANGE UP (ref 3.5–5.3)
POTASSIUM SERPL-SCNC: 4.1 MMOL/L — SIGNIFICANT CHANGE UP (ref 3.5–5.3)
PROT SERPL-MCNC: 6.4 G/DL — SIGNIFICANT CHANGE UP (ref 6–8.3)
RBC # BLD: 2.57 M/UL — LOW (ref 4.2–5.8)
RBC # FLD: 21 % — HIGH (ref 10.3–16.9)
SODIUM SERPL-SCNC: 143 MMOL/L — SIGNIFICANT CHANGE UP (ref 135–145)
WBC # BLD: 8 K/UL — SIGNIFICANT CHANGE UP (ref 3.8–10.5)
WBC # FLD AUTO: 8 K/UL — SIGNIFICANT CHANGE UP (ref 3.8–10.5)

## 2018-02-26 RX ADMIN — Medication 3 MILLILITER(S): at 23:42

## 2018-02-26 RX ADMIN — Medication 3 MILLILITER(S): at 00:54

## 2018-02-26 RX ADMIN — Medication 3 MILLILITER(S): at 18:45

## 2018-02-26 RX ADMIN — Medication 2: at 00:54

## 2018-02-26 RX ADMIN — Medication 0.12 MILLIGRAM(S): at 06:16

## 2018-02-26 RX ADMIN — CHLORHEXIDINE GLUCONATE 15 MILLILITER(S): 213 SOLUTION TOPICAL at 06:16

## 2018-02-26 RX ADMIN — PANTOPRAZOLE SODIUM 40 MILLIGRAM(S): 20 TABLET, DELAYED RELEASE ORAL at 06:45

## 2018-02-26 RX ADMIN — CHLORHEXIDINE GLUCONATE 15 MILLILITER(S): 213 SOLUTION TOPICAL at 18:45

## 2018-02-26 RX ADMIN — PANTOPRAZOLE SODIUM 40 MILLIGRAM(S): 20 TABLET, DELAYED RELEASE ORAL at 18:45

## 2018-02-26 RX ADMIN — Medication 3 MILLILITER(S): at 06:16

## 2018-02-26 RX ADMIN — TAMSULOSIN HYDROCHLORIDE 0.4 MILLIGRAM(S): 0.4 CAPSULE ORAL at 21:54

## 2018-02-26 RX ADMIN — APIXABAN 5 MILLIGRAM(S): 2.5 TABLET, FILM COATED ORAL at 06:16

## 2018-02-26 RX ADMIN — Medication 100 MILLIGRAM(S): at 06:16

## 2018-02-26 RX ADMIN — Medication 3 MILLILITER(S): at 12:22

## 2018-02-26 RX ADMIN — APIXABAN 5 MILLIGRAM(S): 2.5 TABLET, FILM COATED ORAL at 18:46

## 2018-02-26 NOTE — PROGRESS NOTE ADULT - SUBJECTIVE AND OBJECTIVE BOX
ON CORETTA  2/25: tolerating tube feeds, vitals stable          72yM with PMH severe MR/AR presented after recent dx acute cholecystitis and RLL PNA at [facility] with lower abdominal pain, new onset rapid Afib, sepsis, acute CHF exacerbation, initially admitted to cardiology on bipap, then transferred to SICU with perforated duodenum s/p Shreyas patch and jejunostomy (1/20) c/b breakdown s/p RTOR, washout, new Shreyas patch, gastrostomy, duodenostomy, IVCF (1/22)    Neuro: Tylenol PRN  CV: Afib: Echo EF 50%, Severe MR, no vegetation, Digoxin 0.125,  Eliquis.  Pulm: Trach 8.0 Shiley trach collar with PMV (speaking valve), Duonebs ( 2/14-) Left Chest tube removed 2/16  GI: NPO, Glucerna 1.2 @68 via J tube, Protonix BID. Retrograde D-tube to LIWS (red), G-tube to LIWS (green), Colace, Dulcolax suppository  : Voids. flomax  ID: none // D/c: Zosyn (1/19-22; 1/27-29; 2/7), micafungin (1/22-2/1), ceftazidime (1/29-2/4), linezolid (1/22-23), meropenem (1/22-27), vancomycin (1/19-20; 2/7-2/9) Alexi/Stenotrophomonas: Levaquin (2/8-16). C albicans intraperitoneal infxn: fluconazole x 3 wks per ID (2/1-22)  Endo: ISS  HEME: Eliquis, IVCF (1/22) // 1FFP, 1PLT (1/22).   PPx: SCD, R pop DVT, s/p IVC filter   Lines: PIV, // D/c: TLC (2/8-2/16) PICC (1/29-2/8), L brachial [place] (1/20-27), L SC HD cath (1/22-27)   Wounds: Ex lap, J tube for feeds, D-tube to MIWS (red), G-tube to LIWS (green); betadine to toes  PT/OT: Early mobilization. ON CORETTA  2/25: tolerating tube feeds, vitals stable      SUBJECTIVE: Denies any complaints, tolerating TF.  Flatus: [X ] YES [ ] NO             Bowel Movement: [X ] YES [ ] NO  Pain (0-10):            Pain Control Adequate: [ X] YES [ ] NO  Nausea: [ ] YES [X ] NO            Vomiting: [ ] YES [X ] NO  Diarrhea: [ ] YES [ X] NO         Constipation: [ ] YES [X ] NO     Chest Pain: [ ] YES [X ] NO    SOB:  [ ] YES [ X] NO    MEDICATIONS  (STANDING):  ALBUTerol/ipratropium for Nebulization 3 milliLiter(s) Nebulizer every 6 hours  apixaban 5 milliGRAM(s) Oral every 12 hours  BACItracin   Ointment 1 Application(s) Topical daily  chlorhexidine 0.12% Liquid 15 milliLiter(s) Swish and Spit two times a day  digoxin     Tablet 0.125 milliGRAM(s) Oral daily  docusate sodium Liquid 100 milliGRAM(s) Oral two times a day  insulin lispro (HumaLOG) corrective regimen sliding scale   SubCutaneous every 6 hours  lidocaine 2% Injectable 50 milliLiter(s) Local Injection once  mineral oil for Topical Use 1 Application(s) Topical daily  pantoprazole  Injectable 40 milliGRAM(s) IV Push every 12 hours  povidone iodine 10% Solution 1 Application(s) Topical daily  sodium chloride 0.9% lock flush 20 milliLiter(s) IV Push once  tamsulosin 0.4 milliGRAM(s) Oral at bedtime    MEDICATIONS  (PRN):  acetaminophen    Suspension 650 milliGRAM(s) Enteral Tube every 6 hours PRN For Temp greater than 38 C (100.4 F)  bisacodyl Suppository 10 milliGRAM(s) Rectal daily PRN Constipation  sodium chloride 0.9% lock flush 10 milliLiter(s) IV Push every 1 hour PRN After each medication administration      Vital Signs Last 24 Hrs  T(C): 36.5 (26 Feb 2018 04:53), Max: 37.2 (25 Feb 2018 14:00)  T(F): 97.7 (26 Feb 2018 04:53), Max: 98.9 (25 Feb 2018 14:00)  HR: 94 (26 Feb 2018 05:20) (94 - 109)  BP: 120/69 (26 Feb 2018 05:20) (117/69 - 142/78)  BP(mean): 87 (26 Feb 2018 05:20) (84 - 100)  RR: 17 (26 Feb 2018 05:20) (16 - 20)  SpO2: 100% (26 Feb 2018 05:20) (98% - 100%)    PHYSICAL EXAM:      Constitutional: A&Ox3      Respiratory: non labored breathing, no respiratory distress    Cardiovascular: NSR, RRR     Gastrointestinal: Soft, ND,NT                 Incision: CDI with retention sutures.    Genitourinary: Voiding    Extremities: (-) edema                  I&O's Detail    25 Feb 2018 07:01  -  26 Feb 2018 07:00  --------------------------------------------------------  IN:    Glucerna: 1360 mL  Total IN: 1360 mL    OUT:    Incontinent per Condom Catheter: 500 mL    Voided: 675 mL  Total OUT: 1175 mL    Total NET: 185 mL          LABS:                        8.0    8.0   )-----------( 175      ( 26 Feb 2018 05:45 )             27.2     02-26    143  |  107  |  32<H>  ----------------------------<  140<H>  4.1   |  27  |  0.49<L>    Ca    8.0<L>      26 Feb 2018 05:45  Phos  2.9     02-26  Mg     2.3     02-26    TPro  6.4  /  Alb  2.6<L>  /  TBili  1.4<H>  /  DBili  x   /  AST  44<H>  /  ALT  50<H>  /  AlkPhos  202<H>  02-26          RADIOLOGY & ADDITIONAL STUDIES:

## 2018-02-26 NOTE — PROGRESS NOTE ADULT - ASSESSMENT
72 M with necrotic toes bilaterally from previous pressors use. Currently no signs of infection, no drainage, no need for surgical debridement or amputation at this time. Palpable pulses, no signs of critical limb ischemia.    - Vascular Surgery to sign off  - daily dressing changes (betadine, Kerlix) to be done by RNs  - please call [phone] with any new questions

## 2018-02-26 NOTE — PROGRESS NOTE ADULT - SUBJECTIVE AND OBJECTIVE BOX
Interventional, Pulmonary, Critical, Chest Special Procedures.    Pt was seen and fully examined by myself.     Time spent with patient in minutes:37    Patient is a 72y old  Male who presents with a chief complaint of acute cholecystitis (18 Jan 2018 14:49)The patient c good phonation on [place] today. Tolerating ice chips, exercising glutition muscles.    HPI:  71 yo M with h/o severe mitral and aortic valve regurgitation presented to ED with 5 day h/o left lower abdominal pain, nausea and multiple episodes of vomiting as well as PO intolerance. Pt reports that the pain in his abdomen is illicited by movement. Went to [facility] two days ago and underwent CT scan which revealed acute cholecystitis as well as right lower lobe infectious pneumonia. Pt was told he would require surgery and left AMA because he was waiting too long. Denies f/c. Last echo 1/4/17 with EF 60-65%. Normal BMs and passing flatus. (18 Jan 2018 14:49)    REVIEW OF SYSTEMS:updated  Constitutional: No fever, weight loss, chills or fatigue  Eyes: No eye pain, visual disturbances, or discharge  ENMT:  No difficulty hearing, tinnitus, vertigo; No sinus or throat pain. No epistaxis, dysphagia, dysphonia, hoarseness or odynophagia  Neck: No pain, stiffness or neck swelling.  No masses or deformities  Respiratory: No cough, wheezing, chills or hemoptysis  - COPD  - ILD   - PE   - ASTHMA     - PNEUMONIA  Cardiovascular: No chest pain, dysrhythmia, palpitations, dizziness or edema   - COPD     - CAD   - CHF   - HTN  Gastrointestinal: No abdominal or epigastric pain. No nausea, vomiting or hematemesis; No diarrhea or constipation. No melena or hematochezia. No dysphagia or Icterus.          Genitourinary: No dysuria, frequency, hematuria or incontinence   - CKD/GAETANO      - ESRD  Neurological: No headaches, memory loss, loss of strength, numbness or tremors      -DEMENTIA     - STROKE    - SEIZURE  Skin: No itching, burning, rashes or lesions   Lymph Nodes: No enlarged glands  Endocrine: No heat or cold intolerance; No hair loss       - DM     - THYROID DISORDER  Musculoskeletal: No joint pain or swelling; No muscle, back or extremity pain  Psychiatric: No depression, anxiety, mood swings or difficulty sleeping  Heme/Lymph: No easy bruising or bleeding gums         - ANEMIA      - CANCER   -COAGULOPATHY  Allergy and Immunologic: No hives or eczema    PAST MEDICAL & SURGICAL HISTORY:  Mitral regurgitation  Aortic regurgitation  No significant past surgical history    FAMILY HISTORY:    SOCIAL HISTORY:      - Tobacco     - ETOH    Allergies    No Known Allergies    Intolerances      Vital Signs Last 24 Hrs  T(C): 37.4 (26 Feb 2018 09:09), Max: 37.4 (26 Feb 2018 09:09)  T(F): 99.4 (26 Feb 2018 09:09), Max: 99.4 (26 Feb 2018 09:09)  HR: 115 (26 Feb 2018 09:09) (94 - 115)  BP: 137/75 (26 Feb 2018 08:57) (117/69 - 142/78)  BP(mean): 98 (26 Feb 2018 08:57) (84 - 100)  RR: 21 (26 Feb 2018 09:09) (16 - 21)  SpO2: 100% (26 Feb 2018 09:09) (98% - 100%)    02-25 @ 07:01  -  02-26 @ 07:00  --------------------------------------------------------  IN: 1360 mL / OUT: 1175 mL / NET: 185 mL        PHYSICAL EXAM:   Comfortable, no distress  Eyes: PERRL, EOM intact; conjunctiva and sclera clear  Head: Normocephalic;  No Trauma  ENMT: No nasal discharge, hoarseness, cough or hemoptysis.  Airway clear  Neck: Supple; non tender; no masses or deformities.    No JVD  Respiratory:  - WHEEZING   few bilateral   RHONCHI  and  RALES today  less  CRACKLES.  Diminished breath sounds  BILATERAL  RIGHT  LEFT bases   Cardiovascular: Regular rate and rhythm. S1 and S2 Normal; ++murmurs, gallops or rubs     - PPM/AICD  Gastrointestinal: Soft ,distended; Normal bowel sounds; No hepatosplenomegaly.     +PEG    + multiple surgical drains integrity checked    Texas cath in place  Genitourinary: No costovertebral angle tenderness. No dysuria  Extremities: Decreased AROM, + clubbing, cyanosis + edema, , toes dressings in place, vascular feedback reviewed.  Vascular: Peripheral pulses palpable 2+ bilaterally  Neurological: intubated and opening eyes to commands  Skin: Warm and dry. No obvious rash  Lymph Nodes: No acute cervical or supraclavicular adenopathy  Psychiatric: not sedated, calm affect  DEVICES:  - DENTURES   +IV R / L     - ETUBE   + 8.0 site clean Shilley decuffed now TRACH, -CTUBE  R / L      LABS:                          8.0    8.0   )-----------( 175      ( 26 Feb 2018 05:45 )             27.2     02-26    143  |  107  |  32<H>  ----------------------------<  140<H>  4.1   |  27  |  0.49<L>    Ca    8.0<L>      26 Feb 2018 05:45  Phos  2.9     02-26  Mg     2.3     02-26    TPro  6.4  /  Alb  2.6<L>  /  TBili  1.4<H>  /  DBili  x   /  AST  44<H>  /  ALT  50<H>  /  AlkPhos  202<H>  02-26      RADIOLOGY & ADDITIONAL STUDIES (The following images were personally reviewed):

## 2018-02-26 NOTE — PROGRESS NOTE ADULT - ASSESSMENT
72yM with PMH severe MR/AR presented after recent dx acute cholecystitis and RLL PNA at [facility] with lower abdominal pain, new onset rapid Afib, sepsis, acute CHF exacerbation, initially admitted to cardiology on bipap, then transferred to SICU with perforated duodenum s/p Shreyas patch and jejunostomy (1/20) c/b breakdown s/p RTOR, washout, new Shreyas patch, gastrostomy, duodenostomy, IVCF (1/22)    Neuro: Tylenol PRN  CV: Afib: Echo EF 50%, Severe MR, no vegetation, Digoxin 0.125,  Eliquis.  Pulm: Trach 8.0 Shiley trach collar with PMV (speaking valve), Duonebs ( 2/14-) Left Chest tube removed 2/16  GI: NPO, Glucerna 1.2 @68 via J tube, Protonix BID. Retrograde D-tube to LIWS (red), G-tube to LIWS (green), Colace, Dulcolax suppository  : Voids. flomax  ID: none // D/c: Zosyn (1/19-22; 1/27-29; 2/7), micafungin (1/22-2/1), ceftazidime (1/29-2/4), linezolid (1/22-23), meropenem (1/22-27), vancomycin (1/19-20; 2/7-2/9) Alexi/Stenotrophomonas: Levaquin (2/8-16). C albicans intraperitoneal infxn: fluconazole x 3 wks per ID (2/1-22)  Endo: ISS  HEME: Eliquis, IVCF (1/22) // 1FFP, 1PLT (1/22).   PPx: SCD, R pop DVT, s/p IVC filter   Lines: PIV, // D/c: TLC (2/8-2/16) PICC (1/29-2/8), L brachial [place] (1/20-27), L SC HD cath (1/22-27)   Wounds: Ex lap, J tube for feeds, D-tube to MIWS (red), G-tube to LIWS (green); betadine to toes  PT/OT: Early mobilization.

## 2018-02-26 NOTE — PROGRESS NOTE ADULT - SUBJECTIVE AND OBJECTIVE BOX
HPI:  71 yo M with h/o severe mitral and aortic valve regurgitation presented to ED with 5 day h/o left lower abdominal pain, nausea and multiple episodes of vomiting as well as PO intolerance. Pt reports that the pain in his abdomen is illicited by movement. Went to [facility] two days ago and underwent CT scan which revealed acute cholecystitis as well as right lower lobe infectious pneumonia. Pt was told he would require surgery and left AMA because he was waiting too long. Denies f/c. Last echo 1/4/17 with EF 60-65%. Normal BMs and passing flatus. (18 Jan 2018 14:49)  s/p expl lap for perf ulcer.    INTERVAL HPI/OVERNIGHT EVENTS:::multi organ failure, slow improvement    HEALTH ISSUES - PROBLEM Dx:  Candidiasis, unspecified: Candidiasis, unspecified  Acute congestive heart failure, unspecified congestive heart failure type: Acute congestive heart failure, unspecified congestive heart failure type  Fever, unspecified fever cause: Fever, unspecified fever cause  Fever and chills: Fever and chills  Mitral valve insufficiency, unspecified etiology: Mitral valve insufficiency, unspecified etiology  Anasarca: Anasarca  Sepsis due to Candida: Sepsis due to Candida  Candidiasis, disseminated: Candidiasis, disseminated  Metabolic acidosis: Metabolic acidosis  Acute kidney injury: Acute kidney injury  Sepsis, due to unspecified organism: Sepsis, due to unspecified organism  Atrial fibrillation, unspecified type: Atrial fibrillation, unspecified type  Coagulopathy: Coagulopathy  Mitral regurgitation: Mitral regurgitation  Chronic kidney insufficiency, stage 3 (moderate): Chronic kidney insufficiency, stage 3 (moderate)  Thrombocytopenia: Thrombocytopenia  Transaminitis: Transaminitis  Acute CHF: Acute CHF  Afib: Afib  Sepsis: Sepsis  Abdominal pain, unspecified abdominal location: Abdominal pain, unspecified abdominal location  Aortic valve insufficiency, etiology of cardiac valve disease unspecified: Aortic valve insufficiency, etiology of cardiac valve disease unspecified  Hepatitis: Hepatitis  Pneumonia, bacterial: Pneumonia, bacterial          PAST MEDICAL & SURGICAL HISTORY:  Mitral regurgitation  Aortic regurgitation  No significant past surgical history          Consultant NOTE  REVIEWED  (  ++ )    REVIEW OF SYSTEMS:  [x] As per HPI  CONSTITUTIONAL: weakness  RESPIRATORY: trach  CARDIOVASCULAR: No chest pain, palpitations, dizziness, or leg swelling  GASTROINTESTINAL: see HPI  MUSCULOSKELETAL: weakness  PSYCH    awake, alert       [x] All others negative	  [ ] Unable to obtain    BP(mean): 94 (25 Feb 2018 09:01) (83 - 94)  RR: 17 (25 Feb 2018 09:37) (16 - 18)  SpO2: 100% (25 Feb 2018 09:37) (99% - 100%)        143  |  107  |  32<H>  ----------------------------<  140<H>  4.1   |  27  |  0.49<L>    Ca    8.0<L>      26 Feb 2018 05:45  Phos  2.9     02-26  Mg     2.3     02-26          Vital Signs Last 24 Hrs  T(C): 36.8 (24 Feb 2018 09:00), Max: 37.4 (23 Feb 2018 21:34)  T(F): 98.2 (24 Feb 2018 09:00), Max: 99.3 (23 Feb 2018 21:34)  HR: 102 (24 Feb 2018 13:30) (92 - 106)  BP: 119/78 (24 Feb 2018 13:30) (106/59 - 132/78)  BP(mean): 94 (24 Feb 2018 13:30) (80 - 97)  RR: 16 (24 Feb 2018 13:30) (16 - 18)  SpO2: 100% (24 Feb 2018 13:30) (97% - 100%)        02-23 @ 07:01 - 02-24 @ 07:00  --------------------------------------------------------  IN: 816 mL / OUT: 950 mL / NET: -134 mL    02-24 @ 07:01  -  02-24 @ 17:24  --------------------------------------------------------  IN: 612 mL / OUT: 350 mL / NET: 262 mL      PHYSICAL EXAMINATION:                                    (    )  NO CHANGE  Appearance: Normal	  HEENT:   Normal oral mucosa, PERRL, EOMI	  Neck: Supple trach  Cardiovascular: Normal S1 S2,   Respiratory: Lungs clear to auscultation/Decreased Breath Sounds/No Rales, Rhonchi, Wheezing	  Gastrointestinal:  scar/ feeding tube  Skin: see RN note  Extremities: decr ROM  Vascular: Peripheral pulses decreased  Neurologic: Non-focal  Psychiatry: A & O x 3, Mood & affect appropriate    acetaminophen    Suspension 650 milliGRAM(s) Enteral Tube every 6 hours PRN  ALBUTerol/ipratropium for Nebulization 3 milliLiter(s) Nebulizer every 6 hours  apixaban 5 milliGRAM(s) Oral every 12 hours  BACItracin   Ointment 1 Application(s) Topical daily  bisacodyl Suppository 10 milliGRAM(s) Rectal daily PRN  chlorhexidine 0.12% Liquid 15 milliLiter(s) Swish and Spit two times a day  digoxin     Tablet 0.125 milliGRAM(s) Oral daily  docusate sodium Liquid 100 milliGRAM(s) Oral two times a day  insulin lispro (HumaLOG) corrective regimen sliding scale   SubCutaneous every 6 hours  lidocaine 2% Injectable 50 milliLiter(s) Local Injection once  mineral oil for Topical Use 1 Application(s) Topical daily  pantoprazole  Injectable 40 milliGRAM(s) IV Push every 12 hours  povidone iodine 10% Solution 1 Application(s) Topical daily  sodium chloride 0.9% lock flush 10 milliLiter(s) IV Push every 1 hour PRN  sodium chloride 0.9% lock flush 20 milliLiter(s) IV Push once  tamsulosin 0.4 milliGRAM(s) Oral at bedtime                                      8.0    8.2   )-----------( 154      ( 24 Feb 2018 06:49 )             26.5     02-24    145  |  108  |  35<H>  ----------------------------<  141<H>  3.9   |  29  |  0.58    Ca    7.8<L>      24 Feb 2018 06:42  Phos  2.9     02-24  Mg     2.4     02-24    TPro  5.9<L>  /  Alb  2.5<L>  /  TBili  1.7<H>  /  DBili  x   /  AST  51<H>  /  ALT  56<H>  /  AlkPhos  185<H>  02-24      CAPILLARY BLOOD GLUCOSE      POCT Blood Glucose.: 145 mg/dL (24 Feb 2018 17:07)  POCT Blood Glucose.: 143 mg/dL (24 Feb 2018 11:12)  POCT Blood Glucose.: 139 mg/dL (24 Feb 2018 06:54)  POCT Blood Glucose.: 155 mg/dL (24 Feb 2018 01:40)  POCT Blood Glucose.: 159 mg/dL (24 Feb 2018 00:21)

## 2018-02-26 NOTE — PROGRESS NOTE ADULT - SUBJECTIVE AND OBJECTIVE BOX
No acute events. Pt is afebrile, hemodynamically stable. He is without new complaints.    Vital Signs Last 24 Hrs  T(C): 36.6 (26 Feb 2018 14:04), Max: 37.4 (26 Feb 2018 09:09)  T(F): 97.8 (26 Feb 2018 14:04), Max: 99.4 (26 Feb 2018 09:09)  HR: 102 (26 Feb 2018 13:18) (94 - 128)  BP: 119/71 (26 Feb 2018 13:18) (117/69 - 168/81)  BP(mean): 90 (26 Feb 2018 13:18) (80 - 104)  RR: 21 (26 Feb 2018 13:18) (16 - 21)  SpO2: 100% (26 Feb 2018 13:18) (98% - 100%)  I&O's Summary    25 Feb 2018 07:01  -  26 Feb 2018 07:00  --------------------------------------------------------  IN: 1360 mL / OUT: 1175 mL / NET: 185 mL    26 Feb 2018 07:01  -  26 Feb 2018 17:48  --------------------------------------------------------  IN: 612 mL / OUT: 350 mL / NET: 262 mL        Physical Exam:  General: NAD, alert, interactive   Pulmonary: no pulmonary distress  Extremities: RLE: foot warm, skin pink, + dry eschar over the 1st toe nailbed, necrotic toe tips of 3rd and 4th toe, some dry eschar over tip and plantar aspect of the base of 5th toe. No fluctuance, no foul odor, no drainage. +Dry eschar over the heel, no drainage. Calf soft, non tender, no edema. 2+DP/PT pulses.  LLE: foot warm, skin pink, + small dry gangrene over the 1st toe nailbed, 2nd, 3rd and 5th toes. No fluctuance, no foul odor, no drainage. +Dry eschar over the heel, no drainage. Calf soft, non tender, no edema. 2+DP/PT pulses.      LABS:                        8.0    8.0   )-----------( 175      ( 26 Feb 2018 05:45 )             27.2     02-26    143  |  107  |  32<H>  ----------------------------<  140<H>  4.1   |  27  |  0.49<L>    Ca    8.0<L>      26 Feb 2018 05:45  Phos  2.9     02-26  Mg     2.3     02-26    TPro  6.4  /  Alb  2.6<L>  /  TBili  1.4<H>  /  DBili  x   /  AST  44<H>  /  ALT  50<H>  /  AlkPhos  202<H>  02-26        Radiology and Additional Studies: n/a

## 2018-02-26 NOTE — PROGRESS NOTE ADULT - ASSESSMENT
ASSESSMENT/PLAN 73y/o male pt c Respiratory failure, perforated duodenal ulcer, POD post repair, pneumonia, RLL, LLL  atelectases, no obvious RADHA, elevated R hemidiaphragm, L pleural effusion, AFIB,  thrombocytopenia, possible endocarditis, s/p reop. now POP tracheostomy, new fever pattern, anemia, high aspiration risk.    1. O2:  PassyMuir valve daytime, remove and safely store HS, use TCollar HS. Discussed PM mngmnt c SICU  2. Bronchodilators:  Atrovent/ albuterol q 4 – 6 hours as needed  3. Corticosteroids: off  4. ID/Antibiotics: off   ID pt discussed   5. Cardiac/HTN: Monitor, optimize HR;, optimize chf mngmnt, cardiology feedback   6. GI: Rx/ prophylaxis c PPI/Protonix,   7. Heme: Rx/VT  follow H/H closely  on Eliquis   8. Aspiration precautions, feeding PEG to continue, continue NPO at this time, speech and swallow feedback, swallowing exercise  9 Continue [place] catheter  10. Edema: continue diuretic  11.LMobilize, OOB, PT  Discussed c managing team.

## 2018-02-27 VITALS
DIASTOLIC BLOOD PRESSURE: 66 MMHG | SYSTOLIC BLOOD PRESSURE: 113 MMHG | RESPIRATION RATE: 22 BRPM | OXYGEN SATURATION: 100 % | HEART RATE: 100 BPM

## 2018-02-27 DIAGNOSIS — K26.9 DUODENAL ULCER, UNSPECIFIED AS ACUTE OR CHRONIC, WITHOUT HEMORRHAGE OR PERFORATION: ICD-10-CM

## 2018-02-27 LAB
ALBUMIN SERPL ELPH-MCNC: 2.5 G/DL — LOW (ref 3.3–5)
ALP SERPL-CCNC: 178 U/L — HIGH (ref 40–120)
ALT FLD-CCNC: 45 U/L — SIGNIFICANT CHANGE UP (ref 10–45)
ANION GAP SERPL CALC-SCNC: 8 MMOL/L — SIGNIFICANT CHANGE UP (ref 5–17)
AST SERPL-CCNC: 38 U/L — SIGNIFICANT CHANGE UP (ref 10–40)
BILIRUB SERPL-MCNC: 1.3 MG/DL — HIGH (ref 0.2–1.2)
BUN SERPL-MCNC: 30 MG/DL — HIGH (ref 7–23)
CALCIUM SERPL-MCNC: 8 MG/DL — LOW (ref 8.4–10.5)
CHLORIDE SERPL-SCNC: 108 MMOL/L — SIGNIFICANT CHANGE UP (ref 96–108)
CO2 SERPL-SCNC: 26 MMOL/L — SIGNIFICANT CHANGE UP (ref 22–31)
CREAT SERPL-MCNC: 0.5 MG/DL — SIGNIFICANT CHANGE UP (ref 0.5–1.3)
GLUCOSE BLDC GLUCOMTR-MCNC: 128 MG/DL — HIGH (ref 70–99)
GLUCOSE BLDC GLUCOMTR-MCNC: 144 MG/DL — HIGH (ref 70–99)
GLUCOSE BLDC GLUCOMTR-MCNC: 148 MG/DL — HIGH (ref 70–99)
GLUCOSE BLDC GLUCOMTR-MCNC: 157 MG/DL — HIGH (ref 70–99)
GLUCOSE SERPL-MCNC: 149 MG/DL — HIGH (ref 70–99)
HCT VFR BLD CALC: 27 % — LOW (ref 39–50)
HGB BLD-MCNC: 8.1 G/DL — LOW (ref 13–17)
MAGNESIUM SERPL-MCNC: 2.3 MG/DL — SIGNIFICANT CHANGE UP (ref 1.6–2.6)
MCHC RBC-ENTMCNC: 30 G/DL — LOW (ref 32–36)
MCHC RBC-ENTMCNC: 31.3 PG — SIGNIFICANT CHANGE UP (ref 27–34)
MCV RBC AUTO: 104.2 FL — HIGH (ref 80–100)
PHOSPHATE SERPL-MCNC: 3 MG/DL — SIGNIFICANT CHANGE UP (ref 2.5–4.5)
PLATELET # BLD AUTO: 182 K/UL — SIGNIFICANT CHANGE UP (ref 150–400)
POTASSIUM SERPL-MCNC: 4.3 MMOL/L — SIGNIFICANT CHANGE UP (ref 3.5–5.3)
POTASSIUM SERPL-SCNC: 4.3 MMOL/L — SIGNIFICANT CHANGE UP (ref 3.5–5.3)
PROT SERPL-MCNC: 6.4 G/DL — SIGNIFICANT CHANGE UP (ref 6–8.3)
RBC # BLD: 2.59 M/UL — LOW (ref 4.2–5.8)
RBC # FLD: 20.7 % — HIGH (ref 10.3–16.9)
SODIUM SERPL-SCNC: 142 MMOL/L — SIGNIFICANT CHANGE UP (ref 135–145)
WBC # BLD: 9.8 K/UL — SIGNIFICANT CHANGE UP (ref 3.8–10.5)
WBC # FLD AUTO: 9.8 K/UL — SIGNIFICANT CHANGE UP (ref 3.8–10.5)

## 2018-02-27 PROCEDURE — 86803 HEPATITIS C AB TEST: CPT

## 2018-02-27 PROCEDURE — 70486 CT MAXILLOFACIAL W/O DYE: CPT

## 2018-02-27 PROCEDURE — 83605 ASSAY OF LACTIC ACID: CPT

## 2018-02-27 PROCEDURE — 82945 GLUCOSE OTHER FLUID: CPT

## 2018-02-27 PROCEDURE — 86923 COMPATIBILITY TEST ELECTRIC: CPT

## 2018-02-27 PROCEDURE — 87070 CULTURE OTHR SPECIMN AEROBIC: CPT

## 2018-02-27 PROCEDURE — 87633 RESP VIRUS 12-25 TARGETS: CPT

## 2018-02-27 PROCEDURE — 87205 SMEAR GRAM STAIN: CPT

## 2018-02-27 PROCEDURE — 73620 X-RAY EXAM OF FOOT: CPT

## 2018-02-27 PROCEDURE — 85027 COMPLETE CBC AUTOMATED: CPT

## 2018-02-27 PROCEDURE — 93306 TTE W/DOPPLER COMPLETE: CPT

## 2018-02-27 PROCEDURE — C1894: CPT

## 2018-02-27 PROCEDURE — 80202 ASSAY OF VANCOMYCIN: CPT

## 2018-02-27 PROCEDURE — 71260 CT THORAX DX C+: CPT

## 2018-02-27 PROCEDURE — 87106 FUNGI IDENTIFICATION YEAST: CPT

## 2018-02-27 PROCEDURE — 80048 BASIC METABOLIC PNL TOTAL CA: CPT

## 2018-02-27 PROCEDURE — 87581 M.PNEUMON DNA AMP PROBE: CPT

## 2018-02-27 PROCEDURE — 86850 RBC ANTIBODY SCREEN: CPT

## 2018-02-27 PROCEDURE — 85730 THROMBOPLASTIN TIME PARTIAL: CPT

## 2018-02-27 PROCEDURE — 87102 FUNGUS ISOLATION CULTURE: CPT

## 2018-02-27 PROCEDURE — 86900 BLOOD TYPING SEROLOGIC ABO: CPT

## 2018-02-27 PROCEDURE — 86901 BLOOD TYPING SEROLOGIC RH(D): CPT

## 2018-02-27 PROCEDURE — P9045: CPT

## 2018-02-27 PROCEDURE — 93312 ECHO TRANSESOPHAGEAL: CPT

## 2018-02-27 PROCEDURE — 81001 URINALYSIS AUTO W/SCOPE: CPT

## 2018-02-27 PROCEDURE — 84134 ASSAY OF PREALBUMIN: CPT

## 2018-02-27 PROCEDURE — 87252 VIRUS INOCULATION TISSUE: CPT

## 2018-02-27 PROCEDURE — 96374 THER/PROPH/DIAG INJ IV PUSH: CPT | Mod: XU

## 2018-02-27 PROCEDURE — 85384 FIBRINOGEN ACTIVITY: CPT

## 2018-02-27 PROCEDURE — 71045 X-RAY EXAM CHEST 1 VIEW: CPT

## 2018-02-27 PROCEDURE — 87798 DETECT AGENT NOS DNA AMP: CPT

## 2018-02-27 PROCEDURE — 82553 CREATINE MB FRACTION: CPT

## 2018-02-27 PROCEDURE — 32557 INSERT CATH PLEURA W/ IMAGE: CPT

## 2018-02-27 PROCEDURE — 85018 HEMOGLOBIN: CPT

## 2018-02-27 PROCEDURE — 97530 THERAPEUTIC ACTIVITIES: CPT

## 2018-02-27 PROCEDURE — 78226 HEPATOBILIARY SYSTEM IMAGING: CPT

## 2018-02-27 PROCEDURE — C1769: CPT

## 2018-02-27 PROCEDURE — 87186 SC STD MICRODIL/AGAR DIL: CPT

## 2018-02-27 PROCEDURE — 85025 COMPLETE CBC W/AUTO DIFF WBC: CPT

## 2018-02-27 PROCEDURE — 93970 EXTREMITY STUDY: CPT

## 2018-02-27 PROCEDURE — 82550 ASSAY OF CK (CPK): CPT

## 2018-02-27 PROCEDURE — P9047: CPT

## 2018-02-27 PROCEDURE — 97110 THERAPEUTIC EXERCISES: CPT

## 2018-02-27 PROCEDURE — 96375 TX/PRO/DX INJ NEW DRUG ADDON: CPT | Mod: XU

## 2018-02-27 PROCEDURE — 83735 ASSAY OF MAGNESIUM: CPT

## 2018-02-27 PROCEDURE — 82962 GLUCOSE BLOOD TEST: CPT

## 2018-02-27 PROCEDURE — 82248 BILIRUBIN DIRECT: CPT

## 2018-02-27 PROCEDURE — 88112 CYTOPATH CELL ENHANCE TECH: CPT

## 2018-02-27 PROCEDURE — 93975 VASCULAR STUDY: CPT

## 2018-02-27 PROCEDURE — 84157 ASSAY OF PROTEIN OTHER: CPT

## 2018-02-27 PROCEDURE — 86706 HEP B SURFACE ANTIBODY: CPT

## 2018-02-27 PROCEDURE — 88305 TISSUE EXAM BY PATHOLOGIST: CPT

## 2018-02-27 PROCEDURE — 71250 CT THORAX DX C-: CPT

## 2018-02-27 PROCEDURE — 83986 ASSAY PH BODY FLUID NOS: CPT

## 2018-02-27 PROCEDURE — P9016: CPT

## 2018-02-27 PROCEDURE — 76000 FLUOROSCOPY <1 HR PHYS/QHP: CPT

## 2018-02-27 PROCEDURE — C1880: CPT

## 2018-02-27 PROCEDURE — 93005 ELECTROCARDIOGRAM TRACING: CPT | Mod: 76,XU

## 2018-02-27 PROCEDURE — 74176 CT ABD & PELVIS W/O CONTRAST: CPT

## 2018-02-27 PROCEDURE — 76705 ECHO EXAM OF ABDOMEN: CPT

## 2018-02-27 PROCEDURE — 87086 URINE CULTURE/COLONY COUNT: CPT

## 2018-02-27 PROCEDURE — 82803 BLOOD GASES ANY COMBINATION: CPT

## 2018-02-27 PROCEDURE — 92597 ORAL SPEECH DEVICE EVAL: CPT | Mod: GN

## 2018-02-27 PROCEDURE — 87075 CULTR BACTERIA EXCEPT BLOOD: CPT

## 2018-02-27 PROCEDURE — 83690 ASSAY OF LIPASE: CPT

## 2018-02-27 PROCEDURE — 82150 ASSAY OF AMYLASE: CPT

## 2018-02-27 PROCEDURE — 36415 COLL VENOUS BLD VENIPUNCTURE: CPT

## 2018-02-27 PROCEDURE — 94002 VENT MGMT INPAT INIT DAY: CPT

## 2018-02-27 PROCEDURE — 87449 NOS EACH ORGANISM AG IA: CPT

## 2018-02-27 PROCEDURE — 80076 HEPATIC FUNCTION PANEL: CPT

## 2018-02-27 PROCEDURE — C1887: CPT

## 2018-02-27 PROCEDURE — A9537: CPT

## 2018-02-27 PROCEDURE — P9035: CPT

## 2018-02-27 PROCEDURE — 84484 ASSAY OF TROPONIN QUANT: CPT

## 2018-02-27 PROCEDURE — 73701 CT LOWER EXTREMITY W/DYE: CPT

## 2018-02-27 PROCEDURE — A9556: CPT

## 2018-02-27 PROCEDURE — P9017: CPT

## 2018-02-27 PROCEDURE — 87486 CHLMYD PNEUM DNA AMP PROBE: CPT

## 2018-02-27 PROCEDURE — 86022 PLATELET ANTIBODIES: CPT

## 2018-02-27 PROCEDURE — 83036 HEMOGLOBIN GLYCOSYLATED A1C: CPT

## 2018-02-27 PROCEDURE — 84100 ASSAY OF PHOSPHORUS: CPT

## 2018-02-27 PROCEDURE — 80162 ASSAY OF DIGOXIN TOTAL: CPT

## 2018-02-27 PROCEDURE — 94003 VENT MGMT INPAT SUBQ DAY: CPT

## 2018-02-27 PROCEDURE — 92526 ORAL FUNCTION THERAPY: CPT | Mod: GN

## 2018-02-27 PROCEDURE — 87340 HEPATITIS B SURFACE AG IA: CPT

## 2018-02-27 PROCEDURE — 97163 PT EVAL HIGH COMPLEX 45 MIN: CPT

## 2018-02-27 PROCEDURE — 92507 TX SP LANG VOICE COMM INDIV: CPT | Mod: GN

## 2018-02-27 PROCEDURE — 94640 AIRWAY INHALATION TREATMENT: CPT

## 2018-02-27 PROCEDURE — 87181 SC STD AGAR DILUTION PER AGT: CPT

## 2018-02-27 PROCEDURE — 36430 TRANSFUSION BLD/BLD COMPNT: CPT

## 2018-02-27 PROCEDURE — 70450 CT HEAD/BRAIN W/O DYE: CPT

## 2018-02-27 PROCEDURE — 92612 ENDOSCOPY SWALLOW (FEES) VID: CPT | Mod: GN

## 2018-02-27 PROCEDURE — 78802 RP LOCLZJ TUM WHBDY 1 D IMG: CPT

## 2018-02-27 PROCEDURE — 85610 PROTHROMBIN TIME: CPT

## 2018-02-27 PROCEDURE — C1889: CPT

## 2018-02-27 PROCEDURE — 99292 CRITICAL CARE ADDL 30 MIN: CPT | Mod: 25

## 2018-02-27 PROCEDURE — 84295 ASSAY OF SERUM SODIUM: CPT

## 2018-02-27 PROCEDURE — 83615 LACTATE (LD) (LDH) ENZYME: CPT

## 2018-02-27 PROCEDURE — 74177 CT ABD & PELVIS W/CONTRAST: CPT

## 2018-02-27 PROCEDURE — 83880 ASSAY OF NATRIURETIC PEPTIDE: CPT

## 2018-02-27 PROCEDURE — 82330 ASSAY OF CALCIUM: CPT

## 2018-02-27 PROCEDURE — 84315 BODY FLUID SPECIFIC GRAVITY: CPT

## 2018-02-27 PROCEDURE — 86708 HEPATITIS A ANTIBODY: CPT

## 2018-02-27 PROCEDURE — 97116 GAIT TRAINING THERAPY: CPT

## 2018-02-27 PROCEDURE — 99291 CRITICAL CARE FIRST HOUR: CPT | Mod: 25

## 2018-02-27 PROCEDURE — 80053 COMPREHEN METABOLIC PANEL: CPT

## 2018-02-27 PROCEDURE — C1729: CPT

## 2018-02-27 PROCEDURE — 84540 ASSAY OF URINE/UREA-N: CPT

## 2018-02-27 PROCEDURE — 74241: CPT

## 2018-02-27 PROCEDURE — 51702 INSERT TEMP BLADDER CATH: CPT

## 2018-02-27 PROCEDURE — 84132 ASSAY OF SERUM POTASSIUM: CPT

## 2018-02-27 PROCEDURE — 87040 BLOOD CULTURE FOR BACTERIA: CPT

## 2018-02-27 PROCEDURE — 74174 CTA ABD&PLVS W/CONTRAST: CPT

## 2018-02-27 PROCEDURE — 74018 RADEX ABDOMEN 1 VIEW: CPT

## 2018-02-27 PROCEDURE — 86631 CHLAMYDIA ANTIBODY: CPT

## 2018-02-27 RX ORDER — PANTOPRAZOLE SODIUM 20 MG/1
40 TABLET, DELAYED RELEASE ORAL
Qty: 0 | Refills: 0 | COMMUNITY
Start: 2018-02-27

## 2018-02-27 RX ORDER — DIGOXIN 250 MCG
1 TABLET ORAL
Qty: 0 | Refills: 0 | COMMUNITY
Start: 2018-02-27

## 2018-02-27 RX ORDER — TAMSULOSIN HYDROCHLORIDE 0.4 MG/1
1 CAPSULE ORAL
Qty: 0 | Refills: 0 | COMMUNITY
Start: 2018-02-27

## 2018-02-27 RX ORDER — DOCUSATE SODIUM 100 MG
10 CAPSULE ORAL
Qty: 0 | Refills: 0 | COMMUNITY
Start: 2018-02-27

## 2018-02-27 RX ORDER — POVIDONE-IODINE 5 %
1 AEROSOL (ML) TOPICAL
Qty: 0 | Refills: 0 | COMMUNITY
Start: 2018-02-27

## 2018-02-27 RX ORDER — IPRATROPIUM/ALBUTEROL SULFATE 18-103MCG
3 AEROSOL WITH ADAPTER (GRAM) INHALATION
Qty: 0 | Refills: 0 | COMMUNITY
Start: 2018-02-27

## 2018-02-27 RX ORDER — APIXABAN 2.5 MG/1
1 TABLET, FILM COATED ORAL
Qty: 0 | Refills: 0 | COMMUNITY
Start: 2018-02-27

## 2018-02-27 RX ADMIN — APIXABAN 5 MILLIGRAM(S): 2.5 TABLET, FILM COATED ORAL at 18:06

## 2018-02-27 RX ADMIN — PANTOPRAZOLE SODIUM 40 MILLIGRAM(S): 20 TABLET, DELAYED RELEASE ORAL at 18:02

## 2018-02-27 RX ADMIN — Medication 0.12 MILLIGRAM(S): at 05:57

## 2018-02-27 RX ADMIN — APIXABAN 5 MILLIGRAM(S): 2.5 TABLET, FILM COATED ORAL at 05:57

## 2018-02-27 RX ADMIN — CHLORHEXIDINE GLUCONATE 15 MILLILITER(S): 213 SOLUTION TOPICAL at 05:57

## 2018-02-27 RX ADMIN — Medication 1 APPLICATION(S): at 06:18

## 2018-02-27 RX ADMIN — Medication 100 MILLIGRAM(S): at 05:56

## 2018-02-27 RX ADMIN — Medication 3 MILLILITER(S): at 18:02

## 2018-02-27 RX ADMIN — Medication 3 MILLILITER(S): at 05:57

## 2018-02-27 RX ADMIN — Medication 100 MILLIGRAM(S): at 18:02

## 2018-02-27 RX ADMIN — Medication 2: at 00:05

## 2018-02-27 RX ADMIN — PANTOPRAZOLE SODIUM 40 MILLIGRAM(S): 20 TABLET, DELAYED RELEASE ORAL at 05:57

## 2018-02-27 RX ADMIN — Medication 3 MILLILITER(S): at 12:18

## 2018-02-27 RX ADMIN — CHLORHEXIDINE GLUCONATE 15 MILLILITER(S): 213 SOLUTION TOPICAL at 18:02

## 2018-02-27 NOTE — PROGRESS NOTE ADULT - PROBLEM SELECTOR PROBLEM 4
Anasarca
Anasarca
Candidiasis, unspecified
Abdominal pain, unspecified abdominal location
Afib
Atrial fibrillation, unspecified type
Atrial fibrillation, unspecified type
Chronic kidney insufficiency, stage 3 (moderate)
Atrial fibrillation, unspecified type
Acute CHF
Anasarca

## 2018-02-27 NOTE — PROGRESS NOTE ADULT - PROBLEM SELECTOR PROBLEM 6
Candidiasis, disseminated
Abdominal pain, unspecified abdominal location
Acute congestive heart failure, unspecified congestive heart failure type
Hepatitis
Mitral regurgitation
Transaminitis
Acute congestive heart failure, unspecified congestive heart failure type
Thrombocytopenia

## 2018-02-27 NOTE — PROGRESS NOTE ADULT - PROBLEM SELECTOR PROBLEM 3
Metabolic acidosis
Duodenal ulcer
Acute CHF
Acute kidney injury
Atrial fibrillation, unspecified type
Candidiasis, disseminated
Mitral valve insufficiency, unspecified etiology
Metabolic acidosis
Metabolic acidosis
Afib
Metabolic acidosis

## 2018-02-27 NOTE — DISCHARGE NOTE ADULT - MEDICATION SUMMARY - MEDICATIONS TO TAKE
I will START or STAY ON the medications listed below when I get home from the hospital:    tamsulosin 0.4 mg oral capsule  -- 1 cap(s) by mouth once a day (at bedtime)  -- Indication: For urinary retention    digoxin 125 mcg (0.125 mg) oral tablet  -- 1 tab(s) by mouth once a day  -- Indication: For A.fib    apixaban 5 mg oral tablet  -- 1 tab(s) by mouth every 12 hours  -- Indication: For A.fib    povidone iodine 10% topical solution  -- 1 application on skin once a day  -- Indication: For Toe ulcer    ipratropium-albuterol 0.5 mg-2.5 mg/3 mLinhalation solution  -- 3 milliliter(s) inhaled every 6 hours  -- Indication: For Asthma    docusate sodium 10 mg/mL oral liquid  -- 10 milliliter(s) by mouth 2 times a day  -- Indication: For Constipation    pantoprazole 40 mg intravenous injection  -- 40 milligram(s) intravenous every 12 hours  -- Indication: For GERD

## 2018-02-27 NOTE — DISCHARGE NOTE ADULT - PLAN OF CARE
recovery Follow up with Dr. Metcalf in 1-2 weeks. Call the office at the number below to schedule your appointment. You may shower; soap and water over incision sites. Do not scrub. Pat dry when done. No tub bathing or swimming until cleared. Keep incision sites out of the sun as scars will darken. Ambulate as tolerated, but no heavy lifting (>10lbs) or strenuous exercise. You may resume regular diet. You should be urinating at least 3-4x per day. Call the office if you experience increasing abdominal pain, nausea, vomiting, or temperature >101 F. Follow up with Dr. Metcalf in 1-2 weeks. Call the office at the number below to schedule your appointment. You may shower; soap and water over incision sites. Do not scrub. Pat dry when done. No tub bathing or swimming until cleared. Keep incision sites out of the sun as scars will darken. Ambulate as tolerated, but no heavy lifting (>10lbs) or strenuous exercise. Continue Tube feeds. You should be urinating at least 3-4x per day. Call the office if you experience increasing abdominal pain, nausea, vomiting, or temperature >101 F.

## 2018-02-27 NOTE — PROGRESS NOTE ADULT - PROBLEM SELECTOR PROBLEM 2
Thrombocytopenia
Acute congestive heart failure, unspecified congestive heart failure type
Acute kidney injury
Candidiasis, disseminated
Chronic kidney insufficiency, stage 3 (moderate)
Sepsis due to Candida
Sepsis, due to unspecified organism
Thrombocytopenia
Candidiasis, disseminated
Mitral valve insufficiency, unspecified etiology
R/O Cholecystitis
Candidiasis, disseminated
Candidiasis, disseminated
R/O Cholecystitis
R/O Cholecystitis
Sepsis due to Candida
Sepsis due to Candida
Sepsis, due to unspecified organism
Thrombocytopenia
Sepsis, due to unspecified organism

## 2018-02-27 NOTE — PROGRESS NOTE ADULT - PROBLEM SELECTOR PROBLEM 7
Sepsis, due to unspecified organism
Coagulopathy
Sepsis, due to unspecified organism
Sepsis, due to unspecified organism
Transaminitis
Transaminitis
Chronic kidney insufficiency, stage 3 (moderate)

## 2018-02-27 NOTE — PROGRESS NOTE ADULT - PROBLEM SELECTOR PROBLEM 5
Mitral valve insufficiency, unspecified etiology
Chronic kidney insufficiency, stage 3 (moderate)
Chronic kidney insufficiency, stage 3 (moderate)
Pneumonia, bacterial
Pneumonia, bacterial
Transaminitis
Chronic kidney insufficiency, stage 3 (moderate)
Transaminitis

## 2018-02-27 NOTE — DISCHARGE NOTE ADULT - CARE PLAN
Goal:	recovery  Assessment and plan of treatment:	Follow up with Dr. Metcalf in 1-2 weeks. Call the office at the number below to schedule your appointment. You may shower; soap and water over incision sites. Do not scrub. Pat dry when done. No tub bathing or swimming until cleared. Keep incision sites out of the sun as scars will darken. Ambulate as tolerated, but no heavy lifting (>10lbs) or strenuous exercise. You may resume regular diet. You should be urinating at least 3-4x per day. Call the office if you experience increasing abdominal pain, nausea, vomiting, or temperature >101 F. Principal Discharge DX:	Duodenal ulcer  Goal:	recovery  Assessment and plan of treatment:	Follow up with Dr. Metcalf in 1-2 weeks. Call the office at the number below to schedule your appointment. You may shower; soap and water over incision sites. Do not scrub. Pat dry when done. No tub bathing or swimming until cleared. Keep incision sites out of the sun as scars will darken. Ambulate as tolerated, but no heavy lifting (>10lbs) or strenuous exercise. Continue Tube feeds. You should be urinating at least 3-4x per day. Call the office if you experience increasing abdominal pain, nausea, vomiting, or temperature >101 F.  Secondary Diagnosis:	Sepsis  Secondary Diagnosis:	Acute CHF  Secondary Diagnosis:	Afib  Secondary Diagnosis:	Anasarca  Secondary Diagnosis:	Candidiasis, unspecified  Secondary Diagnosis:	Thrombocytopenia

## 2018-02-27 NOTE — PROGRESS NOTE ADULT - MINUTES
20
25
15
22
25
45
45
15
15
20
25
30
30
35
25
30
30
35
45
60
60
30
35
35
45
55
60
20
35
55
60
35
55
55
60
35
55
35
25
30
25
25
15

## 2018-02-27 NOTE — PROGRESS NOTE ADULT - SUBJECTIVE AND OBJECTIVE BOX
Patient is a 72y old  Male who presents with a chief complaint of abdominal pain (27 Feb 2018 15:15)      HPI:  73 yo M with h/o severe mitral and aortic valve regurgitation presented to ED with 5 day h/o left lower abdominal pain, nausea and multiple episodes of vomiting as well as PO intolerance. Pt reports that the pain in his abdomen is illicited by movement. Went to [facility] two days ago and underwent CT scan which revealed acute cholecystitis as well as right lower lobe infectious pneumonia. Pt was told he would require surgery and left AMA because he was waiting too long. Denies f/c. Last echo 1/4/17 with EF 60-65%. Normal BMs and passing flatus. (18 Jan 2018 14:49)  s/p expl lap times 2--perf ULCER  on TF  TRACH    INTERVAL HPI/OVERNIGHT EVENTS:::    HEALTH ISSUES - PROBLEM Dx:  Candidiasis, unspecified: Candidiasis, unspecified  Acute congestive heart failure, unspecified congestive heart failure type: Acute congestive heart failure, unspecified congestive heart failure type  Fever, unspecified fever cause: Fever, unspecified fever cause  Fever and chills: Fever and chills  Mitral valve insufficiency, unspecified etiology: Mitral valve insufficiency, unspecified etiology  Anasarca: Anasarca  Sepsis due to Candida: Sepsis due to Candida  Candidiasis, disseminated: Candidiasis, disseminated  Metabolic acidosis: Metabolic acidosis  Acute kidney injury: Acute kidney injury  Sepsis, due to unspecified organism: Sepsis, due to unspecified organism  Atrial fibrillation, unspecified type: Atrial fibrillation, unspecified type  Coagulopathy: Coagulopathy  Mitral regurgitation: Mitral regurgitation  Chronic kidney insufficiency, stage 3 (moderate): Chronic kidney insufficiency, stage 3 (moderate)  Thrombocytopenia: Thrombocytopenia  Transaminitis: Transaminitis  Acute CHF: Acute CHF  Afib: Afib  Sepsis: Sepsis  Abdominal pain, unspecified abdominal location: Abdominal pain, unspecified abdominal location  Aortic valve insufficiency, etiology of cardiac valve disease unspecified: Aortic valve insufficiency, etiology of cardiac valve disease unspecified  Hepatitis: Hepatitis  Pneumonia, bacterial: Pneumonia, bacterial          PAST MEDICAL & SURGICAL HISTORY:  Mitral regurgitation  Aortic regurgitation  No significant past surgical history          Consultant NOTE  REVIEWED  ( ++ )    REVIEW OF SYSTEMS:  [x] As per HPI  CONSTITUTIONAL: weakness  RESPIRATORY: No cough, wheezing, chills or hemoptysis; No Shortness of Breath  TRACH  CARDIOVASCULAR: No chest pain, palpitations, dizziness, or leg swelling  GASTROINTESTINAL: see HPI  MUSCULOSKELETAL: weakness  PSYCH    awake, alert       [x] All others negative	  [ ] Unable to obtain          Vital Signs Last 24 Hrs  T(C): 36.9 (27 Feb 2018 17:58), Max: 37.7 (26 Feb 2018 22:38)  T(F): 98.4 (27 Feb 2018 17:58), Max: 99.8 (26 Feb 2018 22:38)  HR: 100 (27 Feb 2018 19:28) (87 - 100)  BP: 113/66 (27 Feb 2018 19:28) (110/71 - 129/72)  BP(mean): 80 (27 Feb 2018 19:28) (79 - 94)  RR: 22 (27 Feb 2018 19:28) (16 - 22)  SpO2: 100% (27 Feb 2018 19:28) (98% - 100%)        02-26 @ 07:01 - 02-27 @ 07:00  --------------------------------------------------------  IN: 1752 mL / OUT: 1050 mL / NET: 702 mL    02-27 @ 07:01 - 02-27 @ 21:35  --------------------------------------------------------  IN: 272 mL / OUT: 425 mL / NET: -153 mL      PHYSICAL EXAMINATION:                                    (    )  NO CHANGE  Appearance: Normal	  HEENT:   neg	  Neck: Supple, + JVD/ - JVD; Carotid Bruit  TRACH  Cardiovascular: Normal S1 S2,   Respiratory: occ rhonchi  Gastrointestinal:  Soft, Non-tender, + BS	feeding tube  Skin: No rashes, No ecchymoses, No cyanosis  Extremities: tr edema  Vascular: Peripheral pulses   Neurologic: Non-focal  Psychiatry: A & O x 3,     acetaminophen    Suspension 650 milliGRAM(s) Enteral Tube every 6 hours PRN  ALBUTerol/ipratropium for Nebulization 3 milliLiter(s) Nebulizer every 6 hours  apixaban 5 milliGRAM(s) Oral every 12 hours  bisacodyl Suppository 10 milliGRAM(s) Rectal daily PRN  chlorhexidine 0.12% Liquid 15 milliLiter(s) Swish and Spit two times a day  digoxin     Tablet 0.125 milliGRAM(s) Oral daily  docusate sodium Liquid 100 milliGRAM(s) Oral two times a day  insulin lispro (HumaLOG) corrective regimen sliding scale   SubCutaneous every 6 hours  lidocaine 2% Injectable 50 milliLiter(s) Local Injection once  pantoprazole  Injectable 40 milliGRAM(s) IV Push every 12 hours  povidone iodine 10% Solution 1 Application(s) Topical daily  sodium chloride 0.9% lock flush 10 milliLiter(s) IV Push every 1 hour PRN  sodium chloride 0.9% lock flush 20 milliLiter(s) IV Push once  tamsulosin 0.4 milliGRAM(s) Oral at bedtime                                      8.1    9.8   )-----------( 182      ( 27 Feb 2018 06:36 )             27.0     02-27    142  |  108  |  30<H>  ----------------------------<  149<H>  4.3   |  26  |  0.50    Ca    8.0<L>      27 Feb 2018 06:36  Phos  3.0     02-27  Mg     2.3     02-27    TPro  6.4  /  Alb  2.5<L>  /  TBili  1.3<H>  /  DBili  x   /  AST  38  /  ALT  45  /  AlkPhos  178<H>  02-27      CAPILLARY BLOOD GLUCOSE      POCT Blood Glucose.: 144 mg/dL (27 Feb 2018 17:12)  POCT Blood Glucose.: 128 mg/dL (27 Feb 2018 11:08)  POCT Blood Glucose.: 148 mg/dL (27 Feb 2018 05:35)  POCT Blood Glucose.: 157 mg/dL (26 Feb 2018 23:59)

## 2018-02-27 NOTE — DISCHARGE NOTE ADULT - PATIENT PORTAL LINK FT
You can access the [facility], offered by [facility], by registering with the following website: [email or web address]

## 2018-02-27 NOTE — PROGRESS NOTE ADULT - PROBLEM SELECTOR PROBLEM 8
Atrial fibrillation, unspecified type
Aortic valve insufficiency, etiology of cardiac valve disease unspecified

## 2018-02-27 NOTE — DISCHARGE NOTE ADULT - CARE PROVIDER_API CALL
Iván Metcalf (MD), Surgery  [address] [place] [address]  Phone: [phone]  Fax: [phone]

## 2018-02-27 NOTE — PROGRESS NOTE ADULT - SUBJECTIVE AND OBJECTIVE BOX
ON CORETTA  2/26: westley TF, pending DANILO           72yM with PMH severe MR/AR presented after recent dx acute cholecystitis and RLL PNA at [facility] with lower abdominal pain, new onset rapid Afib, sepsis, acute CHF exacerbation, initially admitted to cardiology on bipap, then transferred to SICU with perforated duodenum s/p Shreyas patch and jejunostomy (1/20) c/b breakdown s/p RTOR, washout, new Shreyas patch, gastrostomy, duodenostomy, IVCF (1/22)    Neuro: Tylenol PRN  CV: Afib: Echo EF 50%, Severe MR, no vegetation, Digoxin 0.125,  Eliquis.  Pulm: Trach 8.0 Shiley trach collar with PMV (speaking valve), Duonebs ( 2/14-) Left Chest tube removed 2/16  GI: NPO, Glucerna 1.2 @68 via J tube, Protonix BID. Retrograde D-tube to LIWS (red), G-tube to LIWS (green), Colace, Dulcolax suppository  : Voids. flomax  ID: none // D/c: Zosyn (1/19-22; 1/27-29; 2/7), micafungin (1/22-2/1), ceftazidime (1/29-2/4), linezolid (1/22-23), meropenem (1/22-27), vancomycin (1/19-20; 2/7-2/9) Alexi/Stenotrophomonas: Levaquin (2/8-16). C albicans intraperitoneal infxn: fluconazole x 3 wks per ID (2/1-22)  Endo: ISS  HEME: Eliquis, IVCF (1/22) // 1FFP, 1PLT (1/22).   PPx: SCD, R pop DVT, s/p IVC filter   Lines: PIV, // D/c: TLC (2/8-2/16) PICC (1/29-2/8), L brachial [place] (1/20-27), L SC HD cath (1/22-27)   Wounds: Ex lap, J tube for feeds, D-tube to MIWS (red), G-tube to LIWS (green); betadine to toes  PT/OT: Early mobilization. ON CORETTA  2/26: westley TF, pending DANILO       SUBJECTIVE: Denies any complaints  Flatus: [ ] YES [ ] NO             Bowel Movement: [ ] YES [ ] NO  Pain (0-10):            Pain Control Adequate: [x ] YES [ ] NO  Nausea: [ ] YES [x ] NO            Vomiting: [ ] YES [x ] NO  Diarrhea: [ ] YES [x ] NO         Constipation: [ ] YES [ x] NO     Chest Pain: [ ] YES [x ] NO    SOB:  [ ] YES [x ] NO    MEDICATIONS  (STANDING):  ALBUTerol/ipratropium for Nebulization 3 milliLiter(s) Nebulizer every 6 hours  apixaban 5 milliGRAM(s) Oral every 12 hours  chlorhexidine 0.12% Liquid 15 milliLiter(s) Swish and Spit two times a day  digoxin     Tablet 0.125 milliGRAM(s) Oral daily  docusate sodium Liquid 100 milliGRAM(s) Oral two times a day  insulin lispro (HumaLOG) corrective regimen sliding scale   SubCutaneous every 6 hours  lidocaine 2% Injectable 50 milliLiter(s) Local Injection once  pantoprazole  Injectable 40 milliGRAM(s) IV Push every 12 hours  povidone iodine 10% Solution 1 Application(s) Topical daily  sodium chloride 0.9% lock flush 20 milliLiter(s) IV Push once  tamsulosin 0.4 milliGRAM(s) Oral at bedtime    MEDICATIONS  (PRN):  acetaminophen    Suspension 650 milliGRAM(s) Enteral Tube every 6 hours PRN For Temp greater than 38 C (100.4 F)  bisacodyl Suppository 10 milliGRAM(s) Rectal daily PRN Constipation  sodium chloride 0.9% lock flush 10 milliLiter(s) IV Push every 1 hour PRN After each medication administration      Vital Signs Last 24 Hrs  T(C): 36.4 (27 Feb 2018 04:52), Max: 37.7 (26 Feb 2018 22:38)  T(F): 97.6 (27 Feb 2018 04:52), Max: 99.8 (26 Feb 2018 22:38)  HR: 92 (27 Feb 2018 04:37) (91 - 128)  BP: 127/73 (27 Feb 2018 04:37) (110/71 - 168/81)  BP(mean): 94 (27 Feb 2018 04:37) (80 - 104)  RR: 18 (27 Feb 2018 04:37) (16 - 22)  SpO2: 100% (27 Feb 2018 04:37) (98% - 100%)    PHYSICAL EXAM:      Constitutional: A&Ox3    Respiratory: non labored breathing, no respiratory distress    Cardiovascular: NSR, RRR    Gastrointestinal: Soft ND,NT, J-tube, D-tube and G-tube intact                 Incision: CDI w/ retention sutures    Genitourinary: voiding    Extremities: (-) edema                  I&O's Detail    26 Feb 2018 07:01  -  27 Feb 2018 07:00  --------------------------------------------------------  IN:    Enteral Tube Flush: 120 mL    Glucerna: 1632 mL  Total IN: 1752 mL    OUT:    Drain: 100 mL    Incontinent per Condom Catheter: 950 mL  Total OUT: 1050 mL    Total NET: 702 mL          LABS:                        8.1    9.8   )-----------( 182      ( 27 Feb 2018 06:36 )             27.0     02-27    142  |  108  |  30<H>  ----------------------------<  149<H>  4.3   |  26  |  0.50    Ca    8.0<L>      27 Feb 2018 06:36  Phos  3.0     02-27  Mg     2.3     02-27    TPro  6.4  /  Alb  2.5<L>  /  TBili  1.3<H>  /  DBili  x   /  AST  38  /  ALT  45  /  AlkPhos  178<H>  02-27          RADIOLOGY & ADDITIONAL STUDIES:

## 2018-02-27 NOTE — PROGRESS NOTE ADULT - NSHPATTENDINGPLANDISCUSS_GEN_ALL_CORE
pulm
ID note reviewed
ID/SICU
Surgical note reviewed
Surgical note reviewed
observe off antibiotics
surgical staff
CCM
CCM
CT surg note rev
SICU
SICU
as above
pulm rev
surgery
surgery note rev
SICU
as above
surgical staff
surgical staff
Discussed with house staff
Surgical staff
as above
patient and surgical staff
surgical staff
surgical staff  and lab
ID
surgical staff
surgical staff
nursing and surgery
surgical staf
surgical staff
surgical staff and lab
patient and surgical staff
SICU team
SICU team
SICU
SICU
resident

## 2018-02-27 NOTE — PROGRESS NOTE ADULT - PROBLEM SELECTOR PROBLEM 1
Fever, unspecified fever cause
Acute kidney injury
Acute kidney injury
Anasarca
Chronic kidney insufficiency, stage 3 (moderate)
Coagulopathy
Fever and chills
Fever and chills
Fever, unspecified fever cause
Fever, unspecified fever cause
Sepsis
Sepsis
Sepsis due to Candida
Sepsis due to Candida
Acute kidney injury
Acute kidney injury
Candidiasis, disseminated
Candidiasis, disseminated
Candidiasis, unspecified
Fever and chills
Fever, unspecified fever cause
Mitral valve insufficiency, unspecified etiology
Pneumonia, bacterial
Sepsis
Sepsis due to Candida
Sepsis, due to unspecified organism
Acute kidney injury

## 2018-02-27 NOTE — PROGRESS NOTE ADULT - SUBJECTIVE AND OBJECTIVE BOX
Interventional, Pulmonary, Critical, Chest Special Procedures.    Pt was seen and fully examined by myself.     Time spent with patient in minutes:37    Patient is a 72y old  Male who presents with a chief complaint of acute cholecystitis (18 Jan 2018 14:49)The patient appears comfortable, no new complaints, eupneic when dozing off on RA.     HPI:  71 yo M with h/o severe mitral and aortic valve regurgitation presented to ED with 5 day h/o left lower abdominal pain, nausea and multiple episodes of vomiting as well as PO intolerance. Pt reports that the pain in his abdomen is illicited by movement. Went to [facility] two days ago and underwent CT scan which revealed acute cholecystitis as well as right lower lobe infectious pneumonia. Pt was told he would require surgery and left AMA because he was waiting too long. Denies f/c. Last echo 1/4/17 with EF 60-65%. Normal BMs and passing flatus. (18 Jan 2018 14:49)    REVIEW OF SYSTEMS:  updated    PAST MEDICAL & SURGICAL HISTORY:  Mitral regurgitation  Aortic regurgitation  No significant past surgical history    FAMILY HISTORY:    SOCIAL HISTORY:      - Tobacco     - ETOH    Allergies    No Known Allergies    Intolerances      Vital Signs Last 24 Hrs  T(C): 36.8 (27 Feb 2018 09:04), Max: 37.7 (26 Feb 2018 22:38)  T(F): 98.2 (27 Feb 2018 09:04), Max: 99.8 (26 Feb 2018 22:38)  HR: 94 (27 Feb 2018 12:40) (87 - 102)  BP: 129/72 (27 Feb 2018 12:40) (110/71 - 146/79)  BP(mean): 89 (27 Feb 2018 12:40) (79 - 104)  RR: 18 (27 Feb 2018 12:40) (16 - 22)  SpO2: 98% (27 Feb 2018 12:40) (98% - 100%)    02-26 @ 07:01  -  02-27 @ 07:00  --------------------------------------------------------  IN: 1752 mL / OUT: 1050 mL / NET: 702 mL    02-27 @ 07:01  -  02-27 @ 13:15  --------------------------------------------------------  IN: 272 mL / OUT: 0 mL / NET: 272 mL        PHYSICAL EXAM:    Comfortable, no distress  Eyes: PERRL, EOM intact; conjunctiva and sclera clear  Head: Normocephalic;  No Trauma  ENMT: No nasal discharge, hoarseness, cough or hemoptysis.  Airway clear  Neck: Supple; non tender; no masses or deformities.    No JVD  Respiratory:  - WHEEZING   few bilateral   RHONCHI  and  RALES today  less  CRACKLES.  Diminished breath sounds  BILATERAL  RIGHT  LEFT bases   Cardiovascular: Regular rate and rhythm. S1 and S2 Normal; ++murmurs, gallops or rubs     - PPM/AICD  Gastrointestinal: Soft ,distended; Normal bowel sounds; No hepatosplenomegaly.     +PEG    + multiple surgical drains integrity checked    [place] cath in place  Genitourinary: No costovertebral angle tenderness. No dysuria  Extremities: Decreased AROM, + clubbing, cyanosis + edema, , toes dressings in place, vascular feedback reviewed.  Vascular: Peripheral pulses palpable 2+ bilaterally  Neurological: intubated and opening eyes to commands  Skin: Warm and dry. No obvious rash  Lymph Nodes: No acute cervical or supraclavicular adenopathy  Psychiatric: not sedated, calm affect  DEVICES:  - DENTURES   +IV R / L     - ETUBE   + 8.0 site clean Shilley decuffed now TRACH,-CTUBE  R / L      LABS:                          8.1    9.8   )-----------( 182      ( 27 Feb 2018 06:36 )             27.0     02-27    142  |  108  |  30<H>  ----------------------------<  149<H>  4.3   |  26  |  0.50    Ca    8.0<L>      27 Feb 2018 06:36  Phos  3.0     02-27  Mg     2.3     02-27    TPro  6.4  /  Alb  2.5<L>  /  TBili  1.3<H>  /  DBili  x   /  AST  38  /  ALT  45  /  AlkPhos  178<H>  02-27      RADIOLOGY & ADDITIONAL STUDIES (The following images were personally reviewed):

## 2018-02-27 NOTE — DISCHARGE NOTE ADULT - HOSPITAL COURSE
72y Male with a Hx of aortic regurgitation and mitral regurgitation, who presents with shortness of breath and abdominal pain for two weeks. He was seen at an outside hospital and was diagnosed with acute cholecystitis. It is unclear what treatment he received at that facility, but he was unhappy with his care there so he left AMA and came to [facility]. At present he denies any acute complaints. Abdomen is non-painful and he denies shortness of breath on NIPPV. He denies fever, chills, nausea/vomiting, diarrhea, dysuria, flank pain, suprapubic pain, sore throat, rhinorrhea, cough. He denies pain after eating. He was admitted to the surgical service for acute cholecystitis, but on their further review of the case it did not appear that this diagnosis was correct. ICU was consulted for tachypnea and new onset Afib. Patient was found to have pneumoperitoneum,  and was take to the OR 2/20, finding were perforated duodenal ulcer, diffuse spillage of enteric contents, with most significant spillage in the nenita-hepapic area. Ulcer was repair with kaylee patch, J tube was placed. on 2/22 leak confirmed with methelene blue along with occusive DVT in R popliteal- patient was taken back to the OR with findings-kaylee patch necrosis with leakage of perforated duodenal ulcer, Jon gastrostomy placed, retrograde duodenostomy placed with witzel tunnel, and IVC filter was placed. TF started on 2/24. Patient had a trach on 2/1 72y Male with a Hx of aortic regurgitation and mitral regurgitation, who presents with shortness of breath and abdominal pain for two weeks. He was seen at an outside hospital and was diagnosed with acute cholecystitis. It is unclear what treatment he received at that facility, but he was unhappy with his care there so he left AMA and came to [facility]. At present he denies any acute complaints. Abdomen is non-painful and he denies shortness of breath on NIPPV. He denies fever, chills, nausea/vomiting, diarrhea, dysuria, flank pain, suprapubic pain, sore throat, rhinorrhea, cough. He denies pain after eating. He was admitted to the surgical service for acute cholecystitis, but on their further review of the case it did not appear that this diagnosis was correct. ICU was consulted for tachypnea and new onset Afib. Patient was found to have pneumoperitoneum,  and was take to the OR 2/20, finding were perforated duodenal ulcer, diffuse spillage of enteric contents, with most significant spillage in the nenita-hepapic area. Ulcer was repair with kaylee patch, J tube was placed. on 2/22 leak confirmed with methelene blue along with occusive DVT in R popliteal- patient was taken back to the OR with findings-kaylee patch necrosis with leakage of perforated duodenal ulcer, Jon gastrostomy placed, retrograde duodenostomy placed with witzel tunnel, and IVC filter was placed. TF started on 2/24. Patient had a trach on 2/1. 2/2 Oliveira removed- passed TOV. 2/4- CT abdomen done no abdomen collection seen. 2/7 Vascular had removed bl great toe nails and I&D on left toe and had been applying betadine since.  Neuro: Tylenol PRN  CV: Afib: Echo EF 50%, Severe MR, no vegetation, Digoxin 0.125,  Eliquis.  Pulm: Trach 8.0 Shiley trach collar with PMV (speaking valve), Duonebs ( 2/14-) Left Chest tube removed 2/16  GI: NPO, Glucerna 1.2 @68 via J tube, Protonix BID. Retrograde D-tube to LIWS (red), G-tube to LIWS (green), Colace, Dulcolax suppository  : Voids. flomax  ID: none // D/c: Zosyn (1/19-22; 1/27-29; 2/7), micafungin (1/22-2/1), ceftazidime (1/29-2/4), linezolid (1/22-23), meropenem (1/22-27), vancomycin (1/19-20; 2/7-2/9) Alexi/Stenotrophomonas: Levaquin (2/8-16). C albicans intraperitoneal infxn: fluconazole x 3 wks per ID (2/1-22)  Endo: ISS  HEME: Eliquis, IVCF (1/22) // 1FFP, 1PLT (1/22).   PPx: SCD, R pop DVT, s/p IVC filter   Lines: PIV, // D/c: TLC (2/8-2/16) PICC (1/29-2/8), L brachial [place] (1/20-27), L SC HD cath (1/22-27)   Wounds: Ex lap, J tube for feeds, D-tube to MIWS (red), G-tube to LIWS (green); betadine to toes
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
## 2018-03-02 DIAGNOSIS — K65.9 PERITONITIS, UNSPECIFIED: ICD-10-CM

## 2018-03-02 DIAGNOSIS — K91.89 OTHER POSTPROCEDURAL COMPLICATIONS AND DISORDERS OF DIGESTIVE SYSTEM: ICD-10-CM

## 2018-03-02 DIAGNOSIS — E87.2 ACIDOSIS: ICD-10-CM

## 2018-03-02 DIAGNOSIS — I50.9 HEART FAILURE, UNSPECIFIED: ICD-10-CM

## 2018-03-02 DIAGNOSIS — J18.9 PNEUMONIA, UNSPECIFIED ORGANISM: ICD-10-CM

## 2018-03-02 DIAGNOSIS — B37.7 CANDIDAL SEPSIS: ICD-10-CM

## 2018-03-02 DIAGNOSIS — R18.8 OTHER ASCITES: ICD-10-CM

## 2018-03-02 DIAGNOSIS — K65.4 SCLEROSING MESENTERITIS: ICD-10-CM

## 2018-03-02 DIAGNOSIS — K26.1 ACUTE DUODENAL ULCER WITH PERFORATION: ICD-10-CM

## 2018-03-02 DIAGNOSIS — I82.4Z1 ACUTE EMBOLISM AND THROMBOSIS OF UNSPECIFIED DEEP VEINS OF RIGHT DISTAL LOWER EXTREMITY: ICD-10-CM

## 2018-03-02 DIAGNOSIS — I38 ENDOCARDITIS, VALVE UNSPECIFIED: ICD-10-CM
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
## 2018-03-02 DIAGNOSIS — R65.21 SEVERE SEPSIS WITH SEPTIC SHOCK: ICD-10-CM

## 2018-03-02 DIAGNOSIS — I08.0 RHEUMATIC DISORDERS OF BOTH MITRAL AND AORTIC VALVES: ICD-10-CM

## 2018-03-02 DIAGNOSIS — A41.9 SEPSIS, UNSPECIFIED ORGANISM: ICD-10-CM

## 2018-03-02 DIAGNOSIS — N17.0 ACUTE KIDNEY FAILURE WITH TUBULAR NECROSIS: ICD-10-CM

## 2018-03-02 DIAGNOSIS — N18.3 CHRONIC KIDNEY DISEASE, STAGE 3 (MODERATE): ICD-10-CM

## 2018-03-02 DIAGNOSIS — J96.00 ACUTE RESPIRATORY FAILURE, UNSPECIFIED WHETHER WITH HYPOXIA OR HYPERCAPNIA: ICD-10-CM

## 2018-03-02 DIAGNOSIS — J90 PLEURAL EFFUSION, NOT ELSEWHERE CLASSIFIED: ICD-10-CM

## 2018-03-02 DIAGNOSIS — I82.431 ACUTE EMBOLISM AND THROMBOSIS OF RIGHT POPLITEAL VEIN: ICD-10-CM

## 2018-03-02 DIAGNOSIS — D75.82 HEPARIN INDUCED THROMBOCYTOPENIA (HIT): ICD-10-CM

## 2018-03-02 DIAGNOSIS — T85.638A LEAKAGE OF OTHER SPECIFIED INTERNAL PROSTHETIC DEVICES, IMPLANTS AND GRAFTS, INITIAL ENCOUNTER: ICD-10-CM

## 2018-03-02 DIAGNOSIS — I48.91 UNSPECIFIED ATRIAL FIBRILLATION: ICD-10-CM

## 2018-03-02 DIAGNOSIS — I96 GANGRENE, NOT ELSEWHERE CLASSIFIED: ICD-10-CM

## 2018-03-02 DIAGNOSIS — Z53.31 LAPAROSCOPIC SURGICAL PROCEDURE CONVERTED TO OPEN PROCEDURE: ICD-10-CM

## 2018-03-12 ENCOUNTER — INPATIENT (INPATIENT)
Facility: HOSPITAL | Age: 73
LOS: 13 days | Discharge: NURSING, REHAB OR LONG-TERM CARE | DRG: 871 | End: 2018-03-26
Attending: INTERNAL MEDICINE | Admitting: INTERNAL MEDICINE
Payer: COMMERCIAL

## 2018-03-12 VITALS
OXYGEN SATURATION: 100 % | DIASTOLIC BLOOD PRESSURE: 91 MMHG | RESPIRATION RATE: 38 BRPM | SYSTOLIC BLOOD PRESSURE: 127 MMHG | HEART RATE: 111 BPM

## 2018-03-12 DIAGNOSIS — K26.1 ACUTE DUODENAL ULCER WITH PERFORATION: Chronic | ICD-10-CM

## 2018-03-12 DIAGNOSIS — Z98.890 OTHER SPECIFIED POSTPROCEDURAL STATES: Chronic | ICD-10-CM

## 2018-03-12 DIAGNOSIS — Z93.1 GASTROSTOMY STATUS: Chronic | ICD-10-CM

## 2018-03-12 DIAGNOSIS — K26.5 CHRONIC OR UNSPECIFIED DUODENAL ULCER WITH PERFORATION: Chronic | ICD-10-CM

## 2018-03-12 LAB
ALBUMIN SERPL ELPH-MCNC: 2.9 G/DL — LOW (ref 3.3–5)
ALBUMIN SERPL ELPH-MCNC: 2.9 G/DL — LOW (ref 3.3–5)
ALP SERPL-CCNC: 203 U/L — HIGH (ref 40–120)
ALP SERPL-CCNC: 203 U/L — HIGH (ref 40–120)
ALT FLD-CCNC: 51 U/L — HIGH (ref 10–45)
ALT FLD-CCNC: 52 U/L — HIGH (ref 10–45)
ANION GAP SERPL CALC-SCNC: 11 MMOL/L — SIGNIFICANT CHANGE UP (ref 5–17)
ANION GAP SERPL CALC-SCNC: 15 MMOL/L — SIGNIFICANT CHANGE UP (ref 5–17)
APPEARANCE UR: CLEAR — SIGNIFICANT CHANGE UP
APTT BLD: 32.7 SEC — SIGNIFICANT CHANGE UP (ref 27.5–37.4)
AST SERPL-CCNC: 44 U/L — HIGH (ref 10–40)
AST SERPL-CCNC: 45 U/L — HIGH (ref 10–40)
BASE EXCESS BLDV CALC-SCNC: 0.9 MMOL/L — SIGNIFICANT CHANGE UP
BASOPHILS NFR BLD AUTO: 0.2 % — SIGNIFICANT CHANGE UP (ref 0–2)
BILIRUB SERPL-MCNC: 1.6 MG/DL — HIGH (ref 0.2–1.2)
BILIRUB SERPL-MCNC: 1.6 MG/DL — HIGH (ref 0.2–1.2)
BILIRUB UR-MCNC: NEGATIVE — SIGNIFICANT CHANGE UP
BUN SERPL-MCNC: 44 MG/DL — HIGH (ref 7–23)
BUN SERPL-MCNC: 51 MG/DL — HIGH (ref 7–23)
CALCIUM SERPL-MCNC: 8.2 MG/DL — LOW (ref 8.4–10.5)
CALCIUM SERPL-MCNC: 8.6 MG/DL — SIGNIFICANT CHANGE UP (ref 8.4–10.5)
CHLORIDE SERPL-SCNC: 93 MMOL/L — LOW (ref 96–108)
CHLORIDE SERPL-SCNC: 94 MMOL/L — LOW (ref 96–108)
CHOLEST SERPL-MCNC: 93 MG/DL — SIGNIFICANT CHANGE UP (ref 10–199)
CK MB CFR SERPL CALC: 2.8 NG/ML — SIGNIFICANT CHANGE UP (ref 0–6.7)
CK SERPL-CCNC: 27 U/L — LOW (ref 30–200)
CO2 SERPL-SCNC: 23 MMOL/L — SIGNIFICANT CHANGE UP (ref 22–31)
CO2 SERPL-SCNC: 25 MMOL/L — SIGNIFICANT CHANGE UP (ref 22–31)
COLOR SPEC: YELLOW — SIGNIFICANT CHANGE UP
CREAT SERPL-MCNC: 0.66 MG/DL — SIGNIFICANT CHANGE UP (ref 0.5–1.3)
CREAT SERPL-MCNC: 0.67 MG/DL — SIGNIFICANT CHANGE UP (ref 0.5–1.3)
DIFF PNL FLD: NEGATIVE — SIGNIFICANT CHANGE UP
DIGOXIN SERPL-MCNC: 1 NG/ML — SIGNIFICANT CHANGE UP (ref 0.8–2)
EOSINOPHIL NFR BLD AUTO: 0 % — SIGNIFICANT CHANGE UP (ref 0–6)
GAS PNL BLDV: SIGNIFICANT CHANGE UP
GLUCOSE SERPL-MCNC: 130 MG/DL — HIGH (ref 70–99)
GLUCOSE SERPL-MCNC: 138 MG/DL — HIGH (ref 70–99)
GLUCOSE UR QL: NEGATIVE — SIGNIFICANT CHANGE UP
HCO3 BLDV-SCNC: 24 MMOL/L — SIGNIFICANT CHANGE UP (ref 20–27)
HCT VFR BLD CALC: 23.3 % — LOW (ref 39–50)
HCT VFR BLD CALC: 24.3 % — LOW (ref 39–50)
HDLC SERPL-MCNC: 34 MG/DL — LOW (ref 40–125)
HGB BLD-MCNC: 7.2 G/DL — LOW (ref 13–17)
HGB BLD-MCNC: 7.5 G/DL — LOW (ref 13–17)
INR BLD: 2.12 — HIGH (ref 0.88–1.16)
KETONES UR-MCNC: NEGATIVE — SIGNIFICANT CHANGE UP
LACTATE SERPL-SCNC: 2.3 MMOL/L — HIGH (ref 0.5–2)
LACTATE SERPL-SCNC: 2.8 MMOL/L — HIGH (ref 0.5–2)
LACTATE SERPL-SCNC: 4.1 MMOL/L — CRITICAL HIGH (ref 0.5–2)
LEUKOCYTE ESTERASE UR-ACNC: NEGATIVE — SIGNIFICANT CHANGE UP
LIPID PNL WITH DIRECT LDL SERPL: 48 MG/DL — SIGNIFICANT CHANGE UP
LYMPHOCYTES # BLD AUTO: 12.7 % — LOW (ref 13–44)
MAGNESIUM SERPL-MCNC: 2.7 MG/DL — HIGH (ref 1.6–2.6)
MAGNESIUM SERPL-MCNC: 2.8 MG/DL — HIGH (ref 1.6–2.6)
MCHC RBC-ENTMCNC: 29 PG — SIGNIFICANT CHANGE UP (ref 27–34)
MCHC RBC-ENTMCNC: 29.5 PG — SIGNIFICANT CHANGE UP (ref 27–34)
MCHC RBC-ENTMCNC: 30.9 G/DL — LOW (ref 32–36)
MCHC RBC-ENTMCNC: 30.9 G/DL — LOW (ref 32–36)
MCV RBC AUTO: 94 FL — SIGNIFICANT CHANGE UP (ref 80–100)
MCV RBC AUTO: 95.7 FL — SIGNIFICANT CHANGE UP (ref 80–100)
MONOCYTES NFR BLD AUTO: 8.2 % — SIGNIFICANT CHANGE UP (ref 2–14)
NEUTROPHILS NFR BLD AUTO: 78.9 % — HIGH (ref 43–77)
NITRITE UR-MCNC: NEGATIVE — SIGNIFICANT CHANGE UP
NT-PROBNP SERPL-SCNC: HIGH PG/ML (ref 0–300)
PCO2 BLDV: 34 MMHG — LOW (ref 41–51)
PH BLDV: 7.47 — HIGH (ref 7.32–7.43)
PH UR: 6 — SIGNIFICANT CHANGE UP (ref 5–8)
PLATELET # BLD AUTO: 171 K/UL — SIGNIFICANT CHANGE UP (ref 150–400)
PLATELET # BLD AUTO: 251 K/UL — SIGNIFICANT CHANGE UP (ref 150–400)
PO2 BLDV: 48 MMHG — SIGNIFICANT CHANGE UP
POTASSIUM SERPL-MCNC: 5 MMOL/L — SIGNIFICANT CHANGE UP (ref 3.5–5.3)
POTASSIUM SERPL-MCNC: 5.6 MMOL/L — HIGH (ref 3.5–5.3)
POTASSIUM SERPL-SCNC: 5 MMOL/L — SIGNIFICANT CHANGE UP (ref 3.5–5.3)
POTASSIUM SERPL-SCNC: 5.6 MMOL/L — HIGH (ref 3.5–5.3)
PROT SERPL-MCNC: 6.8 G/DL — SIGNIFICANT CHANGE UP (ref 6–8.3)
PROT SERPL-MCNC: 7.4 G/DL — SIGNIFICANT CHANGE UP (ref 6–8.3)
PROT UR-MCNC: (no result) MG/DL
PROTHROM AB SERPL-ACNC: 23.9 SEC — HIGH (ref 9.8–12.7)
RAPID RVP RESULT: SIGNIFICANT CHANGE UP
RBC # BLD: 2.48 M/UL — LOW (ref 4.2–5.8)
RBC # BLD: 2.54 M/UL — LOW (ref 4.2–5.8)
RBC # FLD: 16.9 % — SIGNIFICANT CHANGE UP (ref 10.3–16.9)
RBC # FLD: 17.5 % — HIGH (ref 10.3–16.9)
SAO2 % BLDV: 80 % — SIGNIFICANT CHANGE UP
SODIUM SERPL-SCNC: 129 MMOL/L — LOW (ref 135–145)
SODIUM SERPL-SCNC: 132 MMOL/L — LOW (ref 135–145)
SP GR SPEC: 1.01 — SIGNIFICANT CHANGE UP (ref 1–1.03)
TOTAL CHOLESTEROL/HDL RATIO MEASUREMENT: 2.7 RATIO — LOW (ref 3.4–9.6)
TRIGL SERPL-MCNC: 56 MG/DL — SIGNIFICANT CHANGE UP (ref 10–149)
TROPONIN T SERPL-MCNC: 0.05 NG/ML — CRITICAL HIGH (ref 0–0.01)
TROPONIN T SERPL-MCNC: 0.06 NG/ML — CRITICAL HIGH (ref 0–0.01)
TSH SERPL-MCNC: 1.45 UIU/ML — SIGNIFICANT CHANGE UP (ref 0.35–4.94)
UROBILINOGEN FLD QL: 1 E.U./DL — SIGNIFICANT CHANGE UP
WBC # BLD: 10.8 K/UL — HIGH (ref 3.8–10.5)
WBC # BLD: 11.5 K/UL — HIGH (ref 3.8–10.5)
WBC # FLD AUTO: 10.8 K/UL — HIGH (ref 3.8–10.5)
WBC # FLD AUTO: 11.5 K/UL — HIGH (ref 3.8–10.5)

## 2018-03-12 PROCEDURE — 71045 X-RAY EXAM CHEST 1 VIEW: CPT | Mod: 26

## 2018-03-12 PROCEDURE — 74177 CT ABD & PELVIS W/CONTRAST: CPT | Mod: 26

## 2018-03-12 PROCEDURE — 93010 ELECTROCARDIOGRAM REPORT: CPT | Mod: 77

## 2018-03-12 PROCEDURE — 93010 ELECTROCARDIOGRAM REPORT: CPT

## 2018-03-12 PROCEDURE — 71275 CT ANGIOGRAPHY CHEST: CPT | Mod: 26

## 2018-03-12 PROCEDURE — 99291 CRITICAL CARE FIRST HOUR: CPT

## 2018-03-12 RX ORDER — DOCUSATE SODIUM 100 MG
100 CAPSULE ORAL
Qty: 0 | Refills: 0 | Status: DISCONTINUED | OUTPATIENT
Start: 2018-03-12 | End: 2018-03-12

## 2018-03-12 RX ORDER — SODIUM CHLORIDE 9 MG/ML
1000 INJECTION INTRAMUSCULAR; INTRAVENOUS; SUBCUTANEOUS ONCE
Qty: 0 | Refills: 0 | Status: COMPLETED | OUTPATIENT
Start: 2018-03-12 | End: 2018-03-12

## 2018-03-12 RX ORDER — TAMSULOSIN HYDROCHLORIDE 0.4 MG/1
0.4 CAPSULE ORAL AT BEDTIME
Qty: 0 | Refills: 0 | Status: DISCONTINUED | OUTPATIENT
Start: 2018-03-12 | End: 2018-03-13

## 2018-03-12 RX ORDER — PANTOPRAZOLE SODIUM 20 MG/1
40 TABLET, DELAYED RELEASE ORAL
Qty: 0 | Refills: 0 | Status: DISCONTINUED | OUTPATIENT
Start: 2018-03-12 | End: 2018-03-13

## 2018-03-12 RX ORDER — APIXABAN 2.5 MG/1
5 TABLET, FILM COATED ORAL EVERY 12 HOURS
Qty: 0 | Refills: 0 | Status: DISCONTINUED | OUTPATIENT
Start: 2018-03-12 | End: 2018-03-26

## 2018-03-12 RX ORDER — VANCOMYCIN HCL 1 G
1250 VIAL (EA) INTRAVENOUS ONCE
Qty: 0 | Refills: 0 | Status: COMPLETED | OUTPATIENT
Start: 2018-03-12 | End: 2018-03-12

## 2018-03-12 RX ORDER — PIPERACILLIN AND TAZOBACTAM 4; .5 G/20ML; G/20ML
4.5 INJECTION, POWDER, LYOPHILIZED, FOR SOLUTION INTRAVENOUS ONCE
Qty: 0 | Refills: 0 | Status: COMPLETED | OUTPATIENT
Start: 2018-03-12 | End: 2018-03-12

## 2018-03-12 RX ORDER — VANCOMYCIN HCL 1 G
VIAL (EA) INTRAVENOUS
Qty: 0 | Refills: 0 | Status: DISCONTINUED | OUTPATIENT
Start: 2018-03-12 | End: 2018-03-12

## 2018-03-12 RX ORDER — VANCOMYCIN HCL 1 G
1250 VIAL (EA) INTRAVENOUS EVERY 12 HOURS
Qty: 0 | Refills: 0 | Status: DISCONTINUED | OUTPATIENT
Start: 2018-03-13 | End: 2018-03-14

## 2018-03-12 RX ORDER — IPRATROPIUM/ALBUTEROL SULFATE 18-103MCG
3 AEROSOL WITH ADAPTER (GRAM) INHALATION EVERY 4 HOURS
Qty: 0 | Refills: 0 | Status: DISCONTINUED | OUTPATIENT
Start: 2018-03-12 | End: 2018-03-18

## 2018-03-12 RX ORDER — PIPERACILLIN AND TAZOBACTAM 4; .5 G/20ML; G/20ML
4.5 INJECTION, POWDER, LYOPHILIZED, FOR SOLUTION INTRAVENOUS EVERY 6 HOURS
Qty: 0 | Refills: 0 | Status: DISCONTINUED | OUTPATIENT
Start: 2018-03-12 | End: 2018-03-15

## 2018-03-12 RX ORDER — POLYETHYLENE GLYCOL 3350 17 G/17G
17 POWDER, FOR SOLUTION ORAL
Qty: 0 | Refills: 0 | Status: DISCONTINUED | OUTPATIENT
Start: 2018-03-12 | End: 2018-03-14

## 2018-03-12 RX ORDER — IPRATROPIUM/ALBUTEROL SULFATE 18-103MCG
3 AEROSOL WITH ADAPTER (GRAM) INHALATION
Qty: 0 | Refills: 0 | Status: COMPLETED | OUTPATIENT
Start: 2018-03-12 | End: 2018-03-12

## 2018-03-12 RX ORDER — HEPARIN SODIUM 5000 [USP'U]/ML
5000 INJECTION INTRAVENOUS; SUBCUTANEOUS EVERY 8 HOURS
Qty: 0 | Refills: 0 | Status: DISCONTINUED | OUTPATIENT
Start: 2018-03-12 | End: 2018-03-12

## 2018-03-12 RX ADMIN — Medication 3 MILLILITER(S): at 14:37

## 2018-03-12 RX ADMIN — POLYETHYLENE GLYCOL 3350 17 GRAM(S): 17 POWDER, FOR SOLUTION ORAL at 23:15

## 2018-03-12 RX ADMIN — Medication 166.67 MILLIGRAM(S): at 14:43

## 2018-03-12 RX ADMIN — Medication 3 MILLILITER(S): at 15:00

## 2018-03-12 RX ADMIN — PIPERACILLIN AND TAZOBACTAM 200 GRAM(S): 4; .5 INJECTION, POWDER, LYOPHILIZED, FOR SOLUTION INTRAVENOUS at 23:15

## 2018-03-12 RX ADMIN — Medication 3 MILLILITER(S): at 15:08

## 2018-03-12 RX ADMIN — PIPERACILLIN AND TAZOBACTAM 200 GRAM(S): 4; .5 INJECTION, POWDER, LYOPHILIZED, FOR SOLUTION INTRAVENOUS at 14:35

## 2018-03-12 RX ADMIN — SODIUM CHLORIDE 2000 MILLILITER(S): 9 INJECTION INTRAMUSCULAR; INTRAVENOUS; SUBCUTANEOUS at 14:35

## 2018-03-12 RX ADMIN — SODIUM CHLORIDE 2000 MILLILITER(S): 9 INJECTION INTRAMUSCULAR; INTRAVENOUS; SUBCUTANEOUS at 14:36

## 2018-03-12 RX ADMIN — TAMSULOSIN HYDROCHLORIDE 0.4 MILLIGRAM(S): 0.4 CAPSULE ORAL at 23:15

## 2018-03-12 NOTE — PATIENT PROFILE ADULT. - LOCATION
JENNIFER gilliam TOSHIA gilliam Inferior aspect of 3 & 4th right toes Plantar surface of 3rd & 4th right toes (distal phalanges)

## 2018-03-12 NOTE — ED ADULT NURSE NOTE - OBJECTIVE STATEMENT
73 y/o male BIBEMS  from NH, c/o SON and difficulty breathing 73 y/o male BIBEMS  from NH, c/o SOB and difficulty breathing and sat 90 %. as per report pt was d/c hospital 1 week ago. as per family member pt was admitted for pneumonia and also had an abdominal surgery for a duodenal ulcer. upon arrival pt noted tachypneic  RR 36 and ventilation assisted with trach tube sat 100%. Ronchi head bilaterally on auscultation.  Abdomen soft and non tender, bowel sounds present in all quadrants, multiple clean and dry surgical incisions noted, pt also had a peg tube, and urostomy in place. pt noted to had bilateral foot contracture and ulcerations to the toes. 73 y/o male BIBEMS  from NH, c/o SOB and difficulty breathing and sat 90 %. as per report pt was d/c hospital 1 week ago. as per family member pt was admitted for pneumonia and also had an abdominal surgery for a duodenal ulcer. upon arrival pt noted tachypneic  RR 36 and ventilation assisted with trach tube sat 100%. Ronchi head bilaterally on auscultation.  Abdomen soft and non tender, bowel sounds present in all quadrants, multiple clean and dry surgical incisions noted, pt also had a peg tube, and gastrostomy and duodenostomy tube in place. pt noted to had bilateral foot contracture and ulcerations to the toes.

## 2018-03-12 NOTE — ED PROVIDER NOTE - PHYSICAL EXAMINATION
General -  NAD. Making eye contact. speaking clearly in complete sentences, no air hunger.  HEENT -  Oral mucosa dry. PERRl. Head atraumatic.   Neck - Do not appreciate JVD given soft tissues of neck.   CV - Irregularly irregular. 3/6 holosystolic murmur at the mitral region with radiation to the axilla.   Resp - Decreased breath sounds diffusely. Do not appreciate w/r/r.   Abdomen -  Soft, nontender, nondistended. Multiple laparoscopic scars noted. 3 percutaneous tubes -- g tube; j tube; and d tube. No signs of infection at site of insertion.    Extremities -  1+ LE edema b/l - equal. LE cool. Dry gangrene noted on toes.  Unable to palpate DP pulses b/l.   Skin -  as above   Neuro - Awake and following commands. Moving all extremities.

## 2018-03-12 NOTE — ED PROVIDER NOTE - CRITICAL CARE PROVIDED
additional history taking/documentation/interpretation of diagnostic studies/consultation with other physicians/consult w/ pt's family directly relating to pts condition/direct patient care (not related to procedure)

## 2018-03-12 NOTE — PATIENT PROFILE ADULT. - CAREGIVER ADDRESS
[address], [facility], [address] [address], [address] , [place] [address]

## 2018-03-12 NOTE — ED ADULT TRIAGE NOTE - CHIEF COMPLAINT QUOTE
Pt BIBA from nursing home c/o shortness of breath. Pt was discharged from [facility] on Thursday for pneumonia, has trach in place. As per nursing home SPO2% was in the 80s% on 30% oxygen.

## 2018-03-12 NOTE — H&P ADULT - PSH
Acute duodenal ulcer with perforation    H/O gastrostomy, has currently    H/O tracheostomy    Perforated duodenal ulcer

## 2018-03-12 NOTE — CONSULT NOTE ADULT - ASSESSMENT
72y M with PMHx of severe MR, severe pHTN, afib (on Eliquis), R popliteal DVT s/p IVC filter, respiratory failure secondary to PNA s/p tracheostomy (baseline on trach collar), and perforated duodenal ulcer s/p repair who presents with respiratory failure requiring mechanical ventilation. 72y M with PMHx of severe MR, severe pHTN, afib (on Eliquis), R popliteal DVT s/p IVC filter, respiratory failure secondary to PNA s/p tracheostomy (baseline on trach collar), and perforated duodenal ulcer s/p repair who presents with respiratory failure requiring mechanical ventilation.    #Hypoxic Respiratory failure -- Likely from PNA, acute decompensated HF, and/or compressive atelectasis PNA possible given fact that patient meeting SIRS criteria with RR and HR. Also has low grade rectal temp of 100.2 However RVP is negative. Patient also has severe MR on prior ECHO and BNP 22K which is significantly higher than prior BNP of 7K on last admission. CTA chest showing worsening pleural effusions and compressive atelectasis vs consolidation. Of note, no PE seen on CTA.  -Continue AC ventilation for now.   -Trend troponin to peak.    #Severe Sepsis -- likely from PNA. Must consider UTI as well. Another consideration is ischemic limb given the dry gangrene of the toes. CT abd does show possible cholecystitis however LFTs only mildly abnormal.   -S/p 2L NS and vanco/zosyn in ED  -Trend lactate q4 hours to normalization   -F/u Bcx  -Send UA  -Would check DP pulses for degree of flow given history.     #DISPO --> 72y M with PMHx of severe MR, severe pHTN, afib (on Eliquis), R popliteal DVT s/p IVC filter, respiratory failure secondary to PNA s/p tracheostomy (baseline on trach collar), and perforated duodenal ulcer s/p repair who presents with respiratory failure requiring mechanical ventilation.    #Hypoxic Respiratory failure -- Likely from PNA, acute decompensated HF, and/or compressive atelectasis PNA possible given fact that patient meeting SIRS criteria with RR and HR. Also has low grade rectal temp of 100.2 However RVP is negative. Patient also has severe MR on prior ECHO and BNP 22K which is significantly higher than prior BNP of 7K on last admission. CTA chest showing worsening pleural effusions and compressive atelectasis vs consolidation. Of note, no PE seen on CTA.  -Continue AC ventilation for now.   -Trend troponin to peak.    #Severe Sepsis -- likely from PNA. Must consider UTI as well. Another consideration is ischemic limb given the dry gangrene of the toes. CT abd does show possible cholecystitis however LFTs only mildly abnormal.   -S/p 2L NS and vanco/zosyn in ED  -Repeat lactate now, then Trend lactate q4 hours to normalization if needed.  -F/u Bcx  -Send UA  -Would check DP pulses for degree of flow given history.     #DISPO --> 72y M with PMHx of severe MR, severe pHTN, afib (on Eliquis), R popliteal DVT s/p IVC filter, respiratory failure secondary to PNA s/p tracheostomy (baseline on trach collar), and perforated duodenal ulcer s/p repair who presents with respiratory failure requiring mechanical ventilation.    #Hypoxic Respiratory failure -- Likely from PNA, acute decompensated HF, and/or compressive atelectasis PNA possible given fact that patient meeting SIRS criteria with RR and HR. Also has low grade rectal temp of 100.2 However RVP is negative. Patient also has severe MR on prior ECHO and BNP 22K which is significantly higher than prior BNP of 7K on last admission. CTA chest showing worsening pleural effusions and compressive atelectasis vs consolidation. Of note, no PE seen on CTA.  -Continue AC ventilation for now.   -Trend troponin to peak.    #Severe Sepsis -- likely from PNA. Must consider UTI as well. Another consideration is ischemic limb given the dry gangrene of the toes. CT abd does show possible cholecystitis however LFTs only mildly abnormal.   -S/p 2L NS and vanco/zosyn in ED  -Repeat lactate now, then Trend lactate q4 hours to normalization if needed.  -F/u Bcx  -Send UA  -Would check DP pulses for degree of flow given history.     #Hyperkalemia -- patient with elevated uremia, but Cr stable. No peaked t waves on EKG.  -Repeat BMP now to ensure not worsening.   -Serial EKGs.     #DISPO --> 72y M with PMHx of severe MR, severe pHTN, afib (on Eliquis), R popliteal DVT s/p IVC filter, respiratory failure secondary to PNA s/p tracheostomy (baseline on trach collar), and perforated duodenal ulcer s/p repair who presents with respiratory failure requiring mechanical ventilation.    #Hypoxic Respiratory failure -- Likely from PNA, acute decompensated HF, and/or compressive atelectasis PNA possible given fact that patient meeting SIRS criteria with RR and HR. Also has low grade rectal temp of 100.2 However RVP is negative. Patient also has severe MR on prior ECHO and BNP 22K which is significantly higher than prior BNP of 7K on last admission. CTA chest showing worsening pleural effusions and compressive atelectasis vs consolidation. Of note, no PE seen on CTA.  -Continue AC ventilation for now.   -Trend troponin to peak.    #SIRS -- possibly from PNA or HF as above. Must consider UTI as well. Another consideration is ischemic limb given the dry gangrene of the toes. CT abd does show possible cholecystitis however LFTs only mildly abnormal.   -S/p 2L NS and vanco/zosyn in ED  -Repeat lactate now, then Trend lactate q4 hours to normalization if needed.  -F/u Bcx  -Send UA  -Would check DP pulses for degree of flow given history.     #Lactic acidosis  -As above.     #Hyperkalemia -- patient with elevated uremia, but Cr stable. No peaked t waves on EKG.  -Repeat BMP now to ensure not worsening.   -Serial EKGs.     #DISPO --> 72y M with PMHx of severe MR, severe pHTN, afib (on Eliquis), R popliteal DVT s/p IVC filter, respiratory failure secondary to PNA s/p tracheostomy (baseline on trach collar; FiO2 35%), and perforated duodenal ulcer s/p repair who presents with respiratory failure requiring mechanical ventilation.    #Hypoxic Respiratory failure -- Likely from PNA, acute decompensated HF, and/or compressive atelectasis PNA possible given fact that patient meeting SIRS criteria with RR and HR. Also has low grade rectal temp of 100.2 However RVP is negative. Patient also has severe MR on prior ECHO and BNP 22K which is significantly higher than prior BNP of 7K on last admission. CTA chest showing worsening pleural effusions and compressive atelectasis vs consolidation. Of note, no PE seen on CTA.  -Continue AC ventilation for now.   -Trend troponin to peak.    #SIRS -- possibly from PNA or HF as above. Must consider UTI as well. Another consideration is ischemic limb given the dry gangrene of the toes. CT abd does show possible cholecystitis however LFTs only mildly abnormal.   -S/p 2L NS and vanco/zosyn in ED  -Repeat lactate now, then Trend lactate q4 hours to normalization if needed.  -F/u Bcx  -Send UA  -Would check DP pulses for degree of flow given history.     #Lactic acidosis  -As above.     #Hyperkalemia -- patient with elevated uremia, but Cr stable. No peaked t waves on EKG.  -Repeat BMP now to ensure not worsening.   -Serial EKGs.     #DISPO --> 72y M with PMHx of severe MR, severe pHTN, afib (on Eliquis), R popliteal DVT s/p IVC filter, respiratory failure secondary to PNA s/p tracheostomy (baseline on trach collar; FiO2 35%), and perforated duodenal ulcer s/p repair who presents with respiratory failure requiring mechanical ventilation.    #Hypoxic Respiratory failure -- Likely from acute decompensated HF, PNA and/or compressive atelectasis. Patient also has severe MR on prior ECHO and BNP 22K which is significantly higher than prior BNP of 7K on last admission. CTA chest showing worsening pleural effusions and compressive atelectasis vs consolidation. Of note, no PE seen on CTA. PNA less likely but possible given fact that patient meeting SIRS criteria with RR and HR. But only has low grade rectal temp of 100.2. RVP is negative.   -Continue AC ventilation for now.   -Trend troponin to peak.    #SIRS -- possibly from PNA or HF as above. Must consider UTI as well. Another consideration is ischemic limb given the dry gangrene of the toes. CT abd does show possible cholecystitis however LFTs only mildly abnormal.   -S/p 2L NS and vanco/zosyn in ED  -Repeat lactate now, then Trend lactate q4 hours to normalization if needed.  -F/u Bcx  -Send UA  -Would check DP pulses for degree of flow given history.     #Lactic acidosis  -As above.     #Hyperkalemia -- patient with elevated uremia, but Cr stable. No peaked t waves on EKG.  -Repeat BMP now to ensure not worsening.   -Serial EKGs.     #DISPO --> 72y M with PMHx of severe MR, severe pHTN, afib (on Eliquis), R popliteal DVT s/p IVC filter, respiratory failure secondary to PNA s/p tracheostomy (baseline on trach collar; FiO2 35%), and perforated duodenal ulcer s/p repair who presents with respiratory failure requiring mechanical ventilation.    #Hypoxic Respiratory failure -- Likely from acute decompensated HF, PNA and/or compressive atelectasis. Patient also has severe MR on prior ECHO and BNP 22K which is significantly higher than prior BNP of 7K on last admission. CTA chest showing worsening pleural effusions and compressive atelectasis vs consolidation. Of note, no PE seen on CTA. PNA less likely but possible given fact that patient meeting SIRS criteria with RR and HR. But only has low grade rectal temp of 100.2. RVP is negative.   -Continue AC ventilation for now.   -Trend troponin to peak.  -Repeat echocardiogram.   -Cards consult.     #SIRS -- possibly from PNA or HF as above. Must consider UTI as well. Another consideration is ischemic limb given the dry gangrene of the toes. CT abd does show possible cholecystitis however LFTs only mildly abnormal.   -S/p 2L NS and vanco/zosyn in ED  -Would continue vanco/zosyn.   -Repeat lactate now, then Trend lactate q4 hours to normalization if needed.  -F/u Bcx  -Send UA  -Would check DP pulses for degree of flow given history.   -Will need central access.   -Avoid additional IVF given severe MR. If MAP <65, start vasopressors instead.     #Lactic acidosis  -As above.     #Hyperkalemia -- patient with elevated uremia, but Cr stable. No peaked t waves on EKG.  -Repeat BMP now to ensure not worsening.   -Serial EKGs.     #DISPO --> MICU

## 2018-03-12 NOTE — H&P ADULT - PMH
Aortic regurgitation    Atrial fibrillation    CHF (congestive heart failure)    Cholecystitis    Duodenal ulcer    Mitral regurgitation

## 2018-03-12 NOTE — ED ADULT NURSE REASSESSMENT NOTE - NS ED NURSE REASSESS COMMENT FT1
Pt evaluated by RT and ENT, trach tube replaced, pt tolerated procedure well. Pt evaluated by RT and ENT, trach tube replaced 6.0 shiley cuffed tube used pt tolerated procedure well.

## 2018-03-12 NOTE — H&P ADULT - HISTORY OF PRESENT ILLNESS
72YM with PMHx of severe MR, severe pHTN (83), afib (on Eliquis), R popliteal DVT s/p IVC filter (feb 2018), respiratory failure secondary to PNA s/p tracheostomy (baseline on trach collar; FiO2 35%), and with recent discharge from [facility] 2 wks ago after extended hospitalization for perforated duodenal ulcer. Patient BIBEM from [facility] for increased work of breathing and worsening hypoxia, to low 90s on baseline 35% FiO2, RR in 40s and tachycardic to 120s.    In ED, patient noted to be tachycardic to 117 with RR of 38. Rectal temp was 100.2. O2 and BP stable. Patient placed on AC ventilation with improvement in respiratory status. ED gave:  vanco/zosyn, 2L NS, and duoneb. Patient denies chest pain, abdominal pain, back pain, or foot pain. He reports improvement in his respiratory status. Denies fever, chills, or nausea. Labs notable for mild WBCs of 11.5, downtrending lactate of 4.1-->2.3, elev K of 5.6 no EKG changes, trops of 0.05 and BNP of 22krespiratory alkalosis Ph of 7.47.. RVP negative and CXR w/ ebvidence of pulmonary congestion and bilat pleural effusions.     During hospitalization 2 weeks ago at [facility], patient had repair of duodenal perforation with Shreyas Patch and placement of J tube. Course complicated by leakage from shreyas patch requiring emergent repair with placement of gastrostomy and duodenostomy. Further, patient had PNA complicated by respiratory failure requiring tracheostomy. Lastly, patient with dry gangrene of toes from vasopressor use. Vascular surgery evaluated patient stating no intervention needed. Patient was discharged on trach collar 35% FiO2.

## 2018-03-12 NOTE — ED ADULT NURSE REASSESSMENT NOTE - NS ED NURSE REASSESS COMMENT FT1
Oliveira cath order has been received from MD Bradley. Education regarding procedure has given to pt and pt's health care proxy, health care proxy verbalized understanding.  Oliveira 16Fr has been inserted with sterile technique, pt tolerated procedure well. 200CC dark yellow urine has been noted to drainage bag. Urine has been sent to lab.

## 2018-03-12 NOTE — H&P ADULT - NSHPLABSRESULTS_GEN_ALL_CORE
.  LABS:                         7.2    10.8  )-----------( 171      ( 12 Mar 2018 22:31 )             23.3         132<L>  |  94<L>  |  51<H>  ----------------------------<  138<H>  5.6<H>   |  23  |  0.66    Ca    8.6      12 Mar 2018 14:31  Mg     2.8         TPro  7.4  /  Alb  2.9<L>  /  TBili  1.6<H>  /  DBili  x   /  AST  45<H>  /  ALT  52<H>  /  AlkPhos  203<H>      PT/INR - ( 12 Mar 2018 14:31 )   PT: 23.9 sec;   INR: 2.12          PTT - ( 12 Mar 2018 14:31 )  PTT:32.7 sec  Urinalysis Basic - ( 12 Mar 2018 19:03 )    Color: Yellow / Appearance: Clear / S.010 / pH: x  Gluc: x / Ketone: NEGATIVE  / Bili: Negative / Urobili: 1.0 E.U./dL   Blood: x / Protein: Trace mg/dL / Nitrite: NEGATIVE   Leuk Esterase: NEGATIVE / RBC: < 5 /HPF / WBC < 5 /HPF   Sq Epi: x / Non Sq Epi: 0-5 /HPF / Bacteria: Present /HPF      CARDIAC MARKERS ( 12 Mar 2018 14:31 )  x     / 0.05 ng/mL / x     / x     / x          Serum Pro-Brain Natriuretic Peptide: 35590 pg/mL ( @ 14:31)    Lactate, Blood: 2.3 mmoL/L ( @ 22:30)  Lactate, Blood: 2.8 mmoL/L ( @ 18:31)  Lactate, Blood: 4.1 mmoL/L ( @ 14:31)    < from: CT Abdomen and Pelvis w/ IV Cont (18 @ 16:33) >  Cholelithiasis. No gallbladder distention. Thickened gallbladder wall and   infiltration of the fat in the right upper quadrant. Similar findings   were seen on the prior studies and could be partly due to congestive   heart failure and partly due to chronic cholecystitis but difficultto   exclude a component of acute cholecystitis.    < from: CT Angio Chest PE Protocol w/ IV Cont (18 @ 16:33) >  No pulmonary embolism.  Moderate bilateral pleural effusions, increased on the right since the   prior study, with bilateral compressive atelectasis.  Enlarged pulmonary arteries may represent pulmonary hypertension.

## 2018-03-12 NOTE — H&P ADULT - NSHPPHYSICALEXAM_GEN_ALL_CORE
General -  NAD. Making eye contact.   HEENT -  Oral mucosa dry. PERRl. Head atraumatic.   Neck - Do not appreciate JVD given soft tissues of neck.   CV - Irregularly irregular. 3/6 holosystolic murmur at the mitral region with radiation to the axilla.   Resp - Decreased breath sounds diffusely. Do not appreciate w/r/r.   Abdomen -  Soft, nontender, nondistended. Multiple laparoscopic scars noted. 3 percutaneous tubes -- g tube; j tube; and d tube. No signs of infection at site of insertion.    Extremities -  1+ LE edema b/l - equal. LE cool. Dry gangrene noted on toes.  Unable to palpate DP pulses b/l.   Skin -  as above   Neuro - Awake and following commands. Moving all extremities.

## 2018-03-12 NOTE — ED ADULT NURSE NOTE - PMH
Aortic regurgitation    Mitral regurgitation Aortic regurgitation    Atrial fibrillation    CHF (congestive heart failure)    Cholecystitis    Duodenal ulcer    Mitral regurgitation

## 2018-03-12 NOTE — CONSULT NOTE ADULT - SUBJECTIVE AND OBJECTIVE BOX
[facility] CONSULT NOTE    HPI: 72yoM w/severe Aortic and Mitral regurgitation, EF 60%, discharged from [facility] 2wks ago after pt had extended hospitalization for perforated duodenal ulcer s/p Shreyas Patch and gastrostom and duodenostomy, IVC placed after DVT R popliteal discovered, PNA as inpt with respiratory failure s/p trach on 2/1/18 by general surgery Dr. Ramirez. Patient was discharged on trach collar 35% FiO2 and now BIBEMS from Rehab for increased work of breathing and worsening hypoxia, to low 90s on baseline 35% FiO2, RR in 40s and tachycardic to 120s. No documented fever. Denies chest pain and abd pain at this time. Patient has 7.0 portex uncuffed trach and ENT consulted for trach change to cuffed in anticipation of future mechanical ventilation.     Allergies  No Known Allergies  Intolerances        PAST MEDICAL & SURGICAL HISTORY:  Mitral regurgitation  Aortic regurgitation  No significant past surgical history          REVIEW OF SYSTEMS: as per HPI      MEDICATIONS:  Antiinfectives:   piperacillin/tazobactam IVPB. 4.5 Gram(s) IV Intermittent once  vancomycin  IVPB 1250 milliGRAM(s) IV Intermittent once      Hematologic/Anticoagulation:      Pain medications/Neuro:      IV fluids:  sodium chloride 0.9% Bolus 1000 milliLiter(s) IV Bolus once  sodium chloride 0.9% Bolus 1000 milliLiter(s) IV Bolus once      Endocrine Medications:       All other standing medications:   ALBUTerol/ipratropium for Nebulization.. 3 milliLiter(s) Nebulizer every 20 minutes      All other PRN medications:      Vital Signs Last 24 Hrs  T(C): --  T(F): --  HR: 111 (12 Mar 2018 13:10) (111 - 111)  BP: 127/91 (12 Mar 2018 13:10) (127/91 - 127/91)  BP(mean): --  RR: 38 (12 Mar 2018 13:10) (38 - 38)  SpO2: 100% (12 Mar 2018 13:10) (100% - 100%)    LABS:  CBC-          Coagulation Studies-    Endocrine Panel-        PHYSICAL EXAM:  tachypneic and appears uncomfortable  no retractions  7.0 uncuffed portex trach in place, sating 93% on TC  trach changed to 6.0 cuffed shiley and secured with velcro alessandra collar with only 2 fingerbreadths space beneath  stoma and tract well formed   red rubber suction catheter passes easily without resistance, moderate thick secretions suctioned  patient placed on vent with good tidal volumes, now sating 100%          Assessment/Plan:  72yoM with recent hospitalization for perforated duodenal ulcer c/b IVC placed after DVT R popliteal and PNA with respiratory failure s/p trach on 2/1/18 by general surgery Dr. Ramirez. Patient discharged to rehab and presented to ED today with worsening respiratory distress. Trach changed to 6.0 cuffed shiley and patient placed on mechanical ventilation.     -No acute ENT intervention  -Routine trach care - suction with red rubber catheters only, HOB@30, obturator @HOB at all times, spare trach at bedside  -clean inner cannula BID and prn  -Ensure surrounding skin protected with drain sponge or duoderm to prevent ulceration  -Ensure velcro trach collar snug around neck with only 2 fingerbreadths space beneath\  -Minor peristomal bleeding expected s/p trach change  -Continue work-up and care per primary team  -Wean to trach collar as tolerated  -ENT will sign off at this time, page with questions or concerns      d/w chief and attending

## 2018-03-12 NOTE — H&P ADULT - ASSESSMENT
72y M with PMHx of severe MR, severe pHTN, afib (on Eliquis), R popliteal DVT s/p IVC filter, respiratory failure secondary to PNA s/p tracheostomy (baseline on trach collar; FiO2 35%), and perforated duodenal ulcer s/p repair who presents with respiratory failure requiring mechanical ventilation.    CARDIO-RESP: Respiratory failure now improved on mech vent. Possibly evolving CHF (EF 50% w/ R-sided dysf) vs. pneumonia. Elev BNP 22k >7k from prev admission. Pneumonia not read on CT scan but cannot be excluded. RVP negative. Not frankly overloaded on exam and imaging. 2/4 SIRS.   - ECHO in AM  - Trend trops to peak   - Vanc/zosyn   - trend lactate to <2   - f/u Bcx and UA   - careful use of fluid balance w/ possible CHF, possibly R-sided as prev. echo shows r-sided mod. dysfunction w/ normal LV function and dilated LA.     #Afib - not acute issue at this time, controlled.   - c/w home eliquis     VASC: Gangrene of toes. Prev vasc consult no acute event. Could be possible infectious source.     GI: CT abd shows possible evidence of cholecystitis, LFTs mildly elevated.   - f/u bili     #duodenal ulcer - no current evidence of bleed, hgb at baseline. Asx and vss.     HEME: Normocytic anemia.   - f/u iron studies     MET: HyperK+, no ekg changes  - serial BMPs    FEN:  F: No IVF   E: replete PRN  N: NPO   Lines: J/g/d tube. 2 peripherals.   ppx w/ eliquis and protonix 72y M with PMHx of severe MR, severe pHTN, afib (on Eliquis), R popliteal DVT s/p IVC filter, respiratory failure secondary to PNA s/p tracheostomy (baseline on trach collar; FiO2 35%), and perforated duodenal ulcer s/p repair who presents with respiratory failure requiring mechanical ventilation.    NEURO: follows commands, not sedated. At baseline.     CARDIO: EF 50%, single ECHO mentioning R-sided dysf though unclear. Elev BNP 22k vs 7k on last admission. Overload on exam (JVP, 1+ edema, crackles) c/w CHF. New respiratory req also consistent with CHF. Severe MR --> pHTN type II is likely. NYHA ~II.   - ECHO in AM  - Trend trops   - No lasix at this time 2/2 possible sepsis. s/p 2L NS. Though some fluid overload patient improving.     RESP: Respiratory failure now improved on mech vent (prev. on trach collar).     ID: Possible pneumonia, though no ena evidence on CT scan. RVP negative. UA negative. 2/4 SIRS w/ lactate - meets criteria for severe sepsis.   - Vanc/zosyn - patients meets criteria for HAP (from nursing home)    - trend lactate to <2   - f/u Bcx    #Afib - not acute issue at this time, controlled.   - c/w home eliquis     VASC: Gangrene of toes. Prev vasc consult no acute event. Could be possible infectious source.     GI: CT abd shows possible evidence of cholecystitis, LFTs mildly elevated - though could be 2/2 hepatic congestion.     #duodenal ulcer - no acute evidence of bleed, hgb at baseline. Asx and vss.     HEME: Normocytic anemia.   - f/u iron studies   - serial CBCs    #DVT - on AC w/ IVC filter.     MET: HyperK+, no ekg changes  - serial BMPs    FEN:  F: No IVF   E: replete PRN  N: NPO   Lines: J/g/d tube. 2 peripherals.   ppx w/ eliquis and protonix 72y M with PMHx of severe MR, severe pHTN, afib (on Eliquis), R popliteal DVT s/p IVC filter, respiratory failure secondary to PNA s/p tracheostomy (baseline on trach collar; FiO2 35%), and perforated duodenal ulcer s/p repair who presents with respiratory failure requiring mechanical ventilation.    NEURO: follows commands, not sedated. At baseline.     CARDIO: EF 50%, single ECHO mentioning R-sided dysf though unclear. Elev BNP 22k vs 7k on last admission. Overload on exam (JVP, 1+ edema, crackles) c/w CHF. New respiratory req also consistent with CHF. Severe MR --> pHTN type II is likely. NYHA ~II. CXR c/w bilateral pulm congestion and effusion.   - ECHO in AM  - Trend trops   - No lasix at this time 2/2 possible sepsis. s/p 2L NS. Though some fluid overload patient improving.     RESP: Respiratory failure now improved on mech vent (prev. on trach collar).     ID: Possible pneumonia, though no ena evidence on CT scan. RVP negative. UA negative. 2/4 SIRS w/ lactate - meets criteria for severe sepsis.   - Vanc/zosyn - patients meets criteria for HAP (from nursing home)    - trend lactate to <2   - f/u Bcx    #Afib - not acute issue at this time, controlled.   - c/w home eliquis     VASC: Gangrene of toes. Prev vasc consult no acute event. Could be possible infectious source.     GI: CT abd shows possible evidence of cholecystitis, LFTs mildly elevated - though could be 2/2 hepatic congestion.     #duodenal ulcer - no acute evidence of bleed, hgb at baseline. Asx and vss.     HEME: Normocytic anemia.   - f/u iron studies   - serial CBCs    #DVT - on AC w/ IVC filter.     MET: HyperK+, no ekg changes  - serial BMPs    FEN:  F: No IVF   E: replete PRN  N: NPO   Lines: J/g/d tube. 2 peripherals.   ppx w/ eliquis and protonix

## 2018-03-12 NOTE — ED PROVIDER NOTE - OBJECTIVE STATEMENT
73 y/o M w/severe Aortic and Mitral Regurge, EF 60%, discharged from [facility] 2wks ago after pt had extended hospitalization for perforated duodenal ulcer s/p Shreyas Patch and gastrostom and duodenostomy, IVC placed after DVT R popliteal discovered, PNA as inpt with resp failure requiring trach, discharged on trach collar 35% FiO2, BIBEMS from Rehab for increased work of breathing and worsening hypoxia, to low 90s on baseline 35% FiO2, RR in 40s and tachycardic to 120s. No documented fever. Denies chest pain and abd pain at this time.

## 2018-03-12 NOTE — ED ADULT NURSE NOTE - CHIEF COMPLAINT QUOTE
Pt BIBA from nursing home c/o shortness of breath. Pt was discharged from [facility] on Thursday for pneumonia, has trach in place. As per nursing home SPO2% was in the 80s% on 30% oxygen.

## 2018-03-12 NOTE — PATIENT PROFILE ADULT. - --DESCRIBE SURGICAL SITE
healed abdominal midline incision with 6 retention sutures ( 2 B/L UE, 4 B/L LE); gastrostomy tube (clamped);  duodenostomy tube tube (clamped) healed abdominal midline incision with 6 retention sutures ( 2 B/L UQ, 4 B/L LQ); gastrostomy tube (clamped);  duodenostomy tube tube (clamped)

## 2018-03-12 NOTE — ED PROVIDER NOTE - MEDICAL DECISION MAKING DETAILS
+ respiratory failure w/mixed picture of infectious etiology/sepsis and CHF, improving with frequent suctioning, exchange of a cuffed trach and BiPAP. lactate trending down after 2L fluid bolus, IV abx. cultures sent. Pt now comfortable, improved work of breathing. Evaluated by [facility] and accepted to their service.

## 2018-03-13 DIAGNOSIS — I34.0 NONRHEUMATIC MITRAL (VALVE) INSUFFICIENCY: ICD-10-CM

## 2018-03-13 LAB
ANION GAP SERPL CALC-SCNC: 10 MMOL/L — SIGNIFICANT CHANGE UP (ref 5–17)
BILIRUB DIRECT SERPL-MCNC: 0.8 MG/DL — HIGH (ref 0–0.2)
BILIRUB INDIRECT FLD-MCNC: 0.9 MG/DL — SIGNIFICANT CHANGE UP (ref 0.2–1)
BILIRUB SERPL-MCNC: 1.7 MG/DL — HIGH (ref 0.2–1.2)
BLD GP AB SCN SERPL QL: NEGATIVE — SIGNIFICANT CHANGE UP
BUN SERPL-MCNC: 44 MG/DL — HIGH (ref 7–23)
CALCIUM SERPL-MCNC: 8.4 MG/DL — SIGNIFICANT CHANGE UP (ref 8.4–10.5)
CHLORIDE SERPL-SCNC: 97 MMOL/L — SIGNIFICANT CHANGE UP (ref 96–108)
CO2 SERPL-SCNC: 26 MMOL/L — SIGNIFICANT CHANGE UP (ref 22–31)
CREAT SERPL-MCNC: 0.72 MG/DL — SIGNIFICANT CHANGE UP (ref 0.5–1.3)
CULTURE RESULTS: SIGNIFICANT CHANGE UP
FERRITIN SERPL-MCNC: 89 NG/ML — SIGNIFICANT CHANGE UP (ref 30–400)
GLUCOSE SERPL-MCNC: 121 MG/DL — HIGH (ref 70–99)
GRAM STN FLD: SIGNIFICANT CHANGE UP
HCT VFR BLD CALC: 23.6 % — LOW (ref 39–50)
HGB BLD-MCNC: 7.2 G/DL — LOW (ref 13–17)
IRON SATN MFR SERPL: 15 UG/DL — LOW (ref 45–165)
IRON SATN MFR SERPL: 5 % — LOW (ref 16–55)
LACTATE SERPL-SCNC: 1.9 MMOL/L — SIGNIFICANT CHANGE UP (ref 0.5–2)
LACTATE SERPL-SCNC: 2.2 MMOL/L — HIGH (ref 0.5–2)
MAGNESIUM SERPL-MCNC: 2.8 MG/DL — HIGH (ref 1.6–2.6)
MCHC RBC-ENTMCNC: 29 PG — SIGNIFICANT CHANGE UP (ref 27–34)
MCHC RBC-ENTMCNC: 30.5 G/DL — LOW (ref 32–36)
MCV RBC AUTO: 95.2 FL — SIGNIFICANT CHANGE UP (ref 80–100)
METHOD TYPE: SIGNIFICANT CHANGE UP
MSSA DNA SPEC QL NAA+PROBE: SIGNIFICANT CHANGE UP
PHOSPHATE SERPL-MCNC: 4 MG/DL — SIGNIFICANT CHANGE UP (ref 2.5–4.5)
PLATELET # BLD AUTO: 179 K/UL — SIGNIFICANT CHANGE UP (ref 150–400)
POTASSIUM SERPL-MCNC: 5 MMOL/L — SIGNIFICANT CHANGE UP (ref 3.5–5.3)
POTASSIUM SERPL-SCNC: 5 MMOL/L — SIGNIFICANT CHANGE UP (ref 3.5–5.3)
RBC # BLD: 2.48 M/UL — LOW (ref 4.2–5.8)
RBC # FLD: 17.3 % — HIGH (ref 10.3–16.9)
RH IG SCN BLD-IMP: POSITIVE — SIGNIFICANT CHANGE UP
SODIUM SERPL-SCNC: 133 MMOL/L — LOW (ref 135–145)
SPECIMEN SOURCE: SIGNIFICANT CHANGE UP
SPECIMEN SOURCE: SIGNIFICANT CHANGE UP
TIBC SERPL-MCNC: 294 UG/DL — SIGNIFICANT CHANGE UP (ref 220–430)
TRANSFERRIN SERPL-MCNC: 255 MG/DL — SIGNIFICANT CHANGE UP (ref 200–360)
TROPONIN T SERPL-MCNC: 0.06 NG/ML — CRITICAL HIGH (ref 0–0.01)
UIBC SERPL-MCNC: 279 UG/DL — SIGNIFICANT CHANGE UP (ref 110–370)
WBC # BLD: 9.6 K/UL — SIGNIFICANT CHANGE UP (ref 3.8–10.5)
WBC # FLD AUTO: 9.6 K/UL — SIGNIFICANT CHANGE UP (ref 3.8–10.5)

## 2018-03-13 PROCEDURE — 99233 SBSQ HOSP IP/OBS HIGH 50: CPT | Mod: GC

## 2018-03-13 PROCEDURE — 93306 TTE W/DOPPLER COMPLETE: CPT | Mod: 26

## 2018-03-13 PROCEDURE — 99222 1ST HOSP IP/OBS MODERATE 55: CPT

## 2018-03-13 PROCEDURE — 99233 SBSQ HOSP IP/OBS HIGH 50: CPT

## 2018-03-13 RX ORDER — CHLORHEXIDINE GLUCONATE 213 G/1000ML
15 SOLUTION TOPICAL
Qty: 0 | Refills: 0 | Status: DISCONTINUED | OUTPATIENT
Start: 2018-03-13 | End: 2018-03-26

## 2018-03-13 RX ORDER — FUROSEMIDE 40 MG
20 TABLET ORAL ONCE
Qty: 0 | Refills: 0 | Status: COMPLETED | OUTPATIENT
Start: 2018-03-13 | End: 2018-03-13

## 2018-03-13 RX ORDER — PANTOPRAZOLE SODIUM 20 MG/1
40 TABLET, DELAYED RELEASE ORAL EVERY 12 HOURS
Qty: 0 | Refills: 0 | Status: DISCONTINUED | OUTPATIENT
Start: 2018-03-13 | End: 2018-03-19

## 2018-03-13 RX ORDER — PANTOPRAZOLE SODIUM 20 MG/1
40 TABLET, DELAYED RELEASE ORAL DAILY
Qty: 0 | Refills: 0 | Status: DISCONTINUED | OUTPATIENT
Start: 2018-03-13 | End: 2018-03-13

## 2018-03-13 RX ORDER — DIGOXIN 250 MCG
0.12 TABLET ORAL DAILY
Qty: 0 | Refills: 0 | Status: DISCONTINUED | OUTPATIENT
Start: 2018-03-14 | End: 2018-03-15

## 2018-03-13 RX ORDER — DIGOXIN 250 MCG
0.12 TABLET ORAL DAILY
Qty: 0 | Refills: 0 | Status: DISCONTINUED | OUTPATIENT
Start: 2018-03-13 | End: 2018-03-13

## 2018-03-13 RX ORDER — FUROSEMIDE 40 MG
40 TABLET ORAL ONCE
Qty: 0 | Refills: 0 | Status: COMPLETED | OUTPATIENT
Start: 2018-03-13 | End: 2018-03-13

## 2018-03-13 RX ADMIN — PANTOPRAZOLE SODIUM 40 MILLIGRAM(S): 20 TABLET, DELAYED RELEASE ORAL at 17:51

## 2018-03-13 RX ADMIN — PIPERACILLIN AND TAZOBACTAM 200 GRAM(S): 4; .5 INJECTION, POWDER, LYOPHILIZED, FOR SOLUTION INTRAVENOUS at 17:51

## 2018-03-13 RX ADMIN — Medication 166.67 MILLIGRAM(S): at 16:41

## 2018-03-13 RX ADMIN — POLYETHYLENE GLYCOL 3350 17 GRAM(S): 17 POWDER, FOR SOLUTION ORAL at 23:53

## 2018-03-13 RX ADMIN — PANTOPRAZOLE SODIUM 40 MILLIGRAM(S): 20 TABLET, DELAYED RELEASE ORAL at 06:56

## 2018-03-13 RX ADMIN — POLYETHYLENE GLYCOL 3350 17 GRAM(S): 17 POWDER, FOR SOLUTION ORAL at 11:14

## 2018-03-13 RX ADMIN — Medication 20 MILLIGRAM(S): at 14:59

## 2018-03-13 RX ADMIN — CHLORHEXIDINE GLUCONATE 15 MILLILITER(S): 213 SOLUTION TOPICAL at 17:51

## 2018-03-13 RX ADMIN — PIPERACILLIN AND TAZOBACTAM 200 GRAM(S): 4; .5 INJECTION, POWDER, LYOPHILIZED, FOR SOLUTION INTRAVENOUS at 11:14

## 2018-03-13 RX ADMIN — PIPERACILLIN AND TAZOBACTAM 200 GRAM(S): 4; .5 INJECTION, POWDER, LYOPHILIZED, FOR SOLUTION INTRAVENOUS at 23:53

## 2018-03-13 RX ADMIN — Medication 166.67 MILLIGRAM(S): at 01:49

## 2018-03-13 RX ADMIN — Medication 20 MILLIGRAM(S): at 09:24

## 2018-03-13 RX ADMIN — APIXABAN 5 MILLIGRAM(S): 2.5 TABLET, FILM COATED ORAL at 08:00

## 2018-03-13 RX ADMIN — Medication 40 MILLIGRAM(S): at 17:51

## 2018-03-13 RX ADMIN — APIXABAN 5 MILLIGRAM(S): 2.5 TABLET, FILM COATED ORAL at 17:51

## 2018-03-13 RX ADMIN — PIPERACILLIN AND TAZOBACTAM 200 GRAM(S): 4; .5 INJECTION, POWDER, LYOPHILIZED, FOR SOLUTION INTRAVENOUS at 06:56

## 2018-03-13 NOTE — PROGRESS NOTE ADULT - SUBJECTIVE AND OBJECTIVE BOX
INTERVAL HPI/OVERNIGHT EVENTS: Started on vanc/zosyn    SUBJECTIVE: Patient seen and examined at bedside. No acute complaints, denies chest pain, abdominal pain, fevers, chills    OBJECTIVE:    VITAL SIGNS:  ICU Vital Signs Last 24 Hrs  T(C): 37.3 (13 Mar 2018 14:00), Max: 37.6 (12 Mar 2018 21:00)  T(F): 99.1 (13 Mar 2018 14:00), Max: 99.6 (12 Mar 2018 21:00)  HR: 76 (13 Mar 2018 17:00) (76 - 106)  BP: 84/56 (13 Mar 2018 17:00) (84/56 - 115/73)  BP(mean): 69 (13 Mar 2018 17:00) (69 - 95)  ABP: --  ABP(mean): --  RR: 25 (13 Mar 2018 17:00) (13 - 98)  SpO2: 100% (13 Mar 2018 17:00) (99% - 100%)    Mode: AC/ CMV (Assist Control/ Continuous Mandatory Ventilation), RR (machine): 14, TV (machine): 550, FiO2: 40, PEEP: 5, ITime: 1, MAP: 12, PIP: 20    12 @ 07: @ 07:00  --------------------------------------------------------  IN: 750 mL / OUT: 684 mL / NET: 66 mL     @ 07: @ 17:45  --------------------------------------------------------  IN: 250 mL / OUT: 560 mL / NET: -310 mL      CAPILLARY BLOOD GLUCOSE          PHYSICAL EXAM:    General: WDWN M in NAD resting comfortably on bed, intubated   HEENT: Atraumatic, PERRL, moist mucus membranes   Neck: No JVD appreciated  Pulm: Lungs with decreased bibasilar breath sounds, no crackles, wheeze, or rhonchi appreciated  CV - Irregularly irregular. 3/6 holosystolic murmur at the mitral region with radiation to the axilla  Abdomen -  Soft, nontender, nondistended. Multiple laparoscopic scars noted. 3 percutaneous tubes -- g tube; j tube; and d tube. No signs of infection at site of insertion.    Extremities -  1+ LE edema b/l - equal. LE cool. Dry gangrene noted on toes.  Unable to palpate DP pulses b/l.   Skin -  as above   Neuro - Awake and following commands. Moving all extremities.    MEDICATIONS:  MEDICATIONS  (STANDING):  apixaban 5 milliGRAM(s) Oral every 12 hours  chlorhexidine 0.12% Liquid 15 milliLiter(s) Swish and Spit two times a day  furosemide   Injectable 40 milliGRAM(s) IV Push once  pantoprazole  Injectable 40 milliGRAM(s) IV Push every 12 hours  piperacillin/tazobactam IVPB. 4.5 Gram(s) IV Intermittent every 6 hours  polyethylene glycol 3350 17 Gram(s) Oral two times a day  vancomycin  IVPB 1250 milliGRAM(s) IV Intermittent every 12 hours    MEDICATIONS  (PRN):  ALBUTerol/ipratropium for Nebulization 3 milliLiter(s) Nebulizer every 4 hours PRN Shortness of Breath and/or Wheezing      ALLERGIES:  Allergies    No Known Allergies    Intolerances        LABS:                        7.2    9.6   )-----------( 179      ( 13 Mar 2018 06:30 )             23.6     03-13    133<L>  |  97  |  44<H>  ----------------------------<  121<H>  5.0   |  26  |  0.72    Ca    8.4      13 Mar 2018 06:30  Phos  4.0     03-13  Mg     2.8     03-13    TPro  x   /  Alb  x   /  TBili  1.7<H>  /  DBili  0.8<H>  /  AST  x   /  ALT  x   /  AlkPhos  x   -13    LIVER FUNCTIONS - ( 12 Mar 2018 22:31 )  Alb: 2.9 g/dL / Pro: 6.8 g/dL / ALK PHOS: 203 U/L / ALT: 51 U/L / AST: 44 U/L / GGT: x           PT/INR - ( 12 Mar 2018 14:31 )   PT: 23.9 sec;   INR: 2.12          PTT - ( 12 Mar 2018 14:31 )  PTT:32.7 sec  Urinalysis Basic - ( 12 Mar 2018 19:03 )    Color: Yellow / Appearance: Clear / S.010 / pH: x  Gluc: x / Ketone: NEGATIVE  / Bili: Negative / Urobili: 1.0 E.U./dL   Blood: x / Protein: Trace mg/dL / Nitrite: NEGATIVE   Leuk Esterase: NEGATIVE / RBC: < 5 /HPF / WBC < 5 /HPF   Sq Epi: x / Non Sq Epi: 0-5 /HPF / Bacteria: Present /HPF      CARDIAC MARKERS ( 13 Mar 2018 06:30 )  x     / 0.06 ng/mL / x     / x     / x      CARDIAC MARKERS ( 12 Mar 2018 22:31 )  x     / 0.06 ng/mL / 27 U/L / x     / 2.8 ng/mL  CARDIAC MARKERS ( 12 Mar 2018 14:31 )  x     / 0.05 ng/mL / x     / x     / x            RADIOLOGY & ADDITIONAL TESTS: Reviewed.    ASSESSMENT:    PLAN:     CARDIO    PULM    RENAL    NEURO    GI/FEN: no IVF; replete lytes prn; NPO    PPx: HSQ.    CODE: FULL.    Dispo: ICU INTERVAL HPI/OVERNIGHT EVENTS: Started on vanc/zosyn    SUBJECTIVE: Patient seen and examined at bedside. No acute complaints, denies chest pain, abdominal pain, fevers, chills    OBJECTIVE:    VITAL SIGNS:  ICU Vital Signs Last 24 Hrs  T(C): 37.3 (13 Mar 2018 14:00), Max: 37.6 (12 Mar 2018 21:00)  T(F): 99.1 (13 Mar 2018 14:00), Max: 99.6 (12 Mar 2018 21:00)  HR: 76 (13 Mar 2018 17:00) (76 - 106)  BP: 84/56 (13 Mar 2018 17:00) (84/56 - 115/73)  BP(mean): 69 (13 Mar 2018 17:00) (69 - 95)  ABP: --  ABP(mean): --  RR: 25 (13 Mar 2018 17:00) (13 - 98)  SpO2: 100% (13 Mar 2018 17:00) (99% - 100%)    Mode: AC/ CMV (Assist Control/ Continuous Mandatory Ventilation), RR (machine): 14, TV (machine): 550, FiO2: 40, PEEP: 5, ITime: 1, MAP: 12, PIP: 20    0312 @ 07: @ 07:00  --------------------------------------------------------  IN: 750 mL / OUT: 684 mL / NET: 66 mL     @ 07: @ 17:45  --------------------------------------------------------  IN: 250 mL / OUT: 560 mL / NET: -310 mL      CAPILLARY BLOOD GLUCOSE          PHYSICAL EXAM:    General: WDWN M in NAD resting comfortably on bed, intubated   HEENT: Atraumatic, PERRL, moist mucus membranes   Neck: No JVD appreciated  Pulm: Lungs with decreased bibasilar breath sounds, no crackles, wheeze, or rhonchi appreciated  Cardiac: +S1/S2, irregular rate and irregular rhythm, 3/6 systolic murmur auscultated  GI: Soft, NT, ND. Surgical scars present with + G/J/D tube and no signs of infection or purulence    Extremities: Lower extremities with 1+ edema b/l. + Dry gangrene of toes. No DP pulses by palpation b/l LE   Neuro Awake and following commands. Moving all extremities.    MEDICATIONS:  MEDICATIONS  (STANDING):  apixaban 5 milliGRAM(s) Oral every 12 hours  chlorhexidine 0.12% Liquid 15 milliLiter(s) Swish and Spit two times a day  furosemide   Injectable 40 milliGRAM(s) IV Push once  pantoprazole  Injectable 40 milliGRAM(s) IV Push every 12 hours  piperacillin/tazobactam IVPB. 4.5 Gram(s) IV Intermittent every 6 hours  polyethylene glycol 3350 17 Gram(s) Oral two times a day  vancomycin  IVPB 1250 milliGRAM(s) IV Intermittent every 12 hours    MEDICATIONS  (PRN):  ALBUTerol/ipratropium for Nebulization 3 milliLiter(s) Nebulizer every 4 hours PRN Shortness of Breath and/or Wheezing      ALLERGIES:  Allergies    No Known Allergies    Intolerances        LABS:                        7.2    9.6   )-----------( 179      ( 13 Mar 2018 06:30 )             23.6     03-13    133<L>  |  97  |  44<H>  ----------------------------<  121<H>  5.0   |  26  |  0.72    Ca    8.4      13 Mar 2018 06:30  Phos  4.0     03-13  Mg     2.8     -13    TPro  x   /  Alb  x   /  TBili  1.7<H>  /  DBili  0.8<H>  /  AST  x   /  ALT  x   /  AlkPhos  x   -13    LIVER FUNCTIONS - ( 12 Mar 2018 22:31 )  Alb: 2.9 g/dL / Pro: 6.8 g/dL / ALK PHOS: 203 U/L / ALT: 51 U/L / AST: 44 U/L / GGT: x           PT/INR - ( 12 Mar 2018 14:31 )   PT: 23.9 sec;   INR: 2.12          PTT - ( 12 Mar 2018 14:31 )  PTT:32.7 sec  Urinalysis Basic - ( 12 Mar 2018 19:03 )    Color: Yellow / Appearance: Clear / S.010 / pH: x  Gluc: x / Ketone: NEGATIVE  / Bili: Negative / Urobili: 1.0 E.U./dL   Blood: x / Protein: Trace mg/dL / Nitrite: NEGATIVE   Leuk Esterase: NEGATIVE / RBC: < 5 /HPF / WBC < 5 /HPF   Sq Epi: x / Non Sq Epi: 0-5 /HPF / Bacteria: Present /HPF      CARDIAC MARKERS ( 13 Mar 2018 06:30 )  x     / 0.06 ng/mL / x     / x     / x      CARDIAC MARKERS ( 12 Mar 2018 22:31 )  x     / 0.06 ng/mL / 27 U/L / x     / 2.8 ng/mL  CARDIAC MARKERS ( 12 Mar 2018 14:31 )  x     / 0.05 ng/mL / x     / x     / x            RADIOLOGY & ADDITIONAL TESTS: Reviewed.    ASSESSMENT:    PLAN:     CARDIO    PULM    RENAL    NEURO    GI/FEN: no IVF; replete lytes prn; NPO    PPx: HSQ.    CODE: FULL.    Dispo: ICU INTERVAL HPI/OVERNIGHT EVENTS: Started on vanc/zosyn    SUBJECTIVE: Patient seen and examined at bedside. No acute complaints, denies chest pain, abdominal pain, fevers, chills    OBJECTIVE:    VITAL SIGNS:  ICU Vital Signs Last 24 Hrs  T(C): 37.3 (13 Mar 2018 14:00), Max: 37.6 (12 Mar 2018 21:00)  T(F): 99.1 (13 Mar 2018 14:00), Max: 99.6 (12 Mar 2018 21:00)  HR: 76 (13 Mar 2018 17:00) (76 - 106)  BP: 84/56 (13 Mar 2018 17:00) (84/56 - 115/73)  BP(mean): 69 (13 Mar 2018 17:00) (69 - 95)  ABP: --  ABP(mean): --  RR: 25 (13 Mar 2018 17:00) (13 - 98)  SpO2: 100% (13 Mar 2018 17:00) (99% - 100%)    Mode: AC/ CMV (Assist Control/ Continuous Mandatory Ventilation), RR (machine): 14, TV (machine): 550, FiO2: 40, PEEP: 5, ITime: 1, MAP: 12, PIP: 20    0312 @ 07: @ 07:00  --------------------------------------------------------  IN: 750 mL / OUT: 684 mL / NET: 66 mL     @ 07: @ 17:45  --------------------------------------------------------  IN: 250 mL / OUT: 560 mL / NET: -310 mL      CAPILLARY BLOOD GLUCOSE          PHYSICAL EXAM:    General: WDWN M in NAD resting comfortably on bed, intubated   HEENT: Atraumatic, PERRL, moist mucus membranes   Neck: No JVD appreciated  Pulm: Lungs with decreased bibasilar breath sounds, no crackles, wheeze, or rhonchi appreciated  Cardiac: +S1/S2, irregular rate and irregular rhythm, 3/6 systolic murmur auscultated  GI: Soft, NT, ND. Surgical scars present with + G/J/D tube and no signs of infection or purulence    Extremities: Lower extremities with 1+ edema b/l. + Dry gangrene of toes. No DP pulses by palpation b/l LE   Neurologic: Awake and following commands. Moves all extremities.    MEDICATIONS:  MEDICATIONS  (STANDING):  apixaban 5 milliGRAM(s) Oral every 12 hours  chlorhexidine 0.12% Liquid 15 milliLiter(s) Swish and Spit two times a day  furosemide   Injectable 40 milliGRAM(s) IV Push once  pantoprazole  Injectable 40 milliGRAM(s) IV Push every 12 hours  piperacillin/tazobactam IVPB. 4.5 Gram(s) IV Intermittent every 6 hours  polyethylene glycol 3350 17 Gram(s) Oral two times a day  vancomycin  IVPB 1250 milliGRAM(s) IV Intermittent every 12 hours    MEDICATIONS  (PRN):  ALBUTerol/ipratropium for Nebulization 3 milliLiter(s) Nebulizer every 4 hours PRN Shortness of Breath and/or Wheezing      ALLERGIES:  Allergies    No Known Allergies    Intolerances        LABS:                        7.2    9.6   )-----------( 179      ( 13 Mar 2018 06:30 )             23.6     03-13    133<L>  |  97  |  44<H>  ----------------------------<  121<H>  5.0   |  26  |  0.72    Ca    8.4      13 Mar 2018 06:30  Phos  4.0     -  Mg     2.8     -    TPro  x   /  Alb  x   /  TBili  1.7<H>  /  DBili  0.8<H>  /  AST  x   /  ALT  x   /  AlkPhos  x   -13    LIVER FUNCTIONS - ( 12 Mar 2018 22:31 )  Alb: 2.9 g/dL / Pro: 6.8 g/dL / ALK PHOS: 203 U/L / ALT: 51 U/L / AST: 44 U/L / GGT: x           PT/INR - ( 12 Mar 2018 14:31 )   PT: 23.9 sec;   INR: 2.12          PTT - ( 12 Mar 2018 14:31 )  PTT:32.7 sec  Urinalysis Basic - ( 12 Mar 2018 19:03 )    Color: Yellow / Appearance: Clear / S.010 / pH: x  Gluc: x / Ketone: NEGATIVE  / Bili: Negative / Urobili: 1.0 E.U./dL   Blood: x / Protein: Trace mg/dL / Nitrite: NEGATIVE   Leuk Esterase: NEGATIVE / RBC: < 5 /HPF / WBC < 5 /HPF   Sq Epi: x / Non Sq Epi: 0-5 /HPF / Bacteria: Present /HPF      CARDIAC MARKERS ( 13 Mar 2018 06:30 )  x     / 0.06 ng/mL / x     / x     / x      CARDIAC MARKERS ( 12 Mar 2018 22:31 )  x     / 0.06 ng/mL / 27 U/L / x     / 2.8 ng/mL  CARDIAC MARKERS ( 12 Mar 2018 14:31 )  x     / 0.05 ng/mL / x     / x     / x            RADIOLOGY & ADDITIONAL TESTS: Reviewed.    ASSESSMENT: 72M PMH severe MR, severe pHTN, afib (on Eliquis), R popliteal DVT s/p IVC filter, respiratory failure secondary to PNA s/p tracheostomy (baseline on trach collar; FiO2 35%), and perforated duodenal ulcer s/p repair who presents with respiratory failure that required intubation    CARDIAC:     #CHF  - EF 50%, single ECHO mentioning R-sided dysfunction though unclear. Elev BNP 22k vs 7k on last admission. Overload on exam (JVP, 1+ edema, crackles) c/w CHF. New respiratory req also consistent with CHF. Severe MR --> pHTN type II is likely. NYHA ~II. CXR c/w bilateral pulm congestion and effusion.   - Echo 3/13 demonstrates EF 45-50%, severe mitral prolapse with possible flail posterior mitral leaflet, moderate tricuspid regurgitation, mild-mod pulm HTN 45mmHg,   - Troponins peaked 0.06  - Patient likely with some fluid overload, gave 20mg IV lasix this morning and will continue to diurese with 20-40mg IV lasix for goal net negative     RESP:   - Respiratory failure now improved on mech vent (prev. on trach collar)  - Continue to monitor respiratory status, ABG as necessary to adjust    ID: Possible pneumonia, though no ena evidence on CT scan. RVP negative. UA negative. 2/4 SIRS w/ lactate - meets criteria for severe sepsis.   - Continue with vancomycin, zosyn - patients meets criteria for HAP (from nursing home). Vanc trough 3/14 at 1am    - Lactate resolved  - f/u Bcx, growing gram positive cocci in clusters  - Will draw surveillance cultures    #Afib  - c/w home eliquis   - Continue with telemetry monitoring   - 0.125mg qd Digoxin    VASCULAR  #Gangrene of toes  - Prev vasc consult no acute event. Could be possible infectious source  - Continue to monitor site    GI:   - CT abd shows possible evidence of cholecystitis, LFTs mildly elevated - though could be 2/2 hepatic congestion  - Surgery consulted, appreciate recs  - Will obtain CT abdomen/pelvis when more stable    #duodenal ulcer - no acute evidence of bleed, hgb at baseline  - Trend CBC, monitor VS with telemetry    HEME: Normocytic anemia.   - f/u iron studies   - Trend CBC    #DVT   - on AC w/ IVC filter  - Continue with home Eliquis    MET: HyperK+, no ekg changes  - Potassium currently 5.0    FEN:  F: No IVF   E: Currently hyperkalemic, replete Mg<2  N: NPO, glucerna 1.5 at 63cc/hr  Lines: J/g/d tube. 2 peripherals.   ppx w/ eliquis and protonix INTERVAL HPI/OVERNIGHT EVENTS: Started on vanc/zosyn    SUBJECTIVE: Patient seen and examined at bedside. No acute complaints, denies chest pain, abdominal pain, fevers, chills    OBJECTIVE:    VITAL SIGNS:  ICU Vital Signs Last 24 Hrs  T(C): 37.3 (13 Mar 2018 14:00), Max: 37.6 (12 Mar 2018 21:00)  T(F): 99.1 (13 Mar 2018 14:00), Max: 99.6 (12 Mar 2018 21:00)  HR: 76 (13 Mar 2018 17:00) (76 - 106)  BP: 84/56 (13 Mar 2018 17:00) (84/56 - 115/73)  BP(mean): 69 (13 Mar 2018 17:00) (69 - 95)  ABP: --  ABP(mean): --  RR: 25 (13 Mar 2018 17:00) (13 - 98)  SpO2: 100% (13 Mar 2018 17:00) (99% - 100%)    Mode: AC/ CMV (Assist Control/ Continuous Mandatory Ventilation), RR (machine): 14, TV (machine): 550, FiO2: 40, PEEP: 5, ITime: 1, MAP: 12, PIP: 20    0312 @ 07: @ 07:00  --------------------------------------------------------  IN: 750 mL / OUT: 684 mL / NET: 66 mL     @ 07: @ 17:45  --------------------------------------------------------  IN: 250 mL / OUT: 560 mL / NET: -310 mL      CAPILLARY BLOOD GLUCOSE          PHYSICAL EXAM:    General: WDWN M in NAD resting comfortably on bed, intubated   HEENT: Atraumatic, PERRL, moist mucus membranes   Neck: No JVD appreciated  Pulm: Lungs with decreased bibasilar breath sounds, no crackles, wheeze, or rhonchi appreciated  Cardiac: +S1/S2, irregular rate and irregular rhythm, 3/6 systolic murmur auscultated  GI: Soft, NT, ND. Surgical scars present with + G/J/D tube and no signs of infection or purulence    Extremities: Lower extremities with 1+ edema b/l. + Dry gangrene of toes. No DP pulses by palpation b/l LE   Neurologic: Awake and following commands. Moves all extremities.    MEDICATIONS:  MEDICATIONS  (STANDING):  apixaban 5 milliGRAM(s) Oral every 12 hours  chlorhexidine 0.12% Liquid 15 milliLiter(s) Swish and Spit two times a day  furosemide   Injectable 40 milliGRAM(s) IV Push once  pantoprazole  Injectable 40 milliGRAM(s) IV Push every 12 hours  piperacillin/tazobactam IVPB. 4.5 Gram(s) IV Intermittent every 6 hours  polyethylene glycol 3350 17 Gram(s) Oral two times a day  vancomycin  IVPB 1250 milliGRAM(s) IV Intermittent every 12 hours    MEDICATIONS  (PRN):  ALBUTerol/ipratropium for Nebulization 3 milliLiter(s) Nebulizer every 4 hours PRN Shortness of Breath and/or Wheezing      ALLERGIES:  Allergies    No Known Allergies    Intolerances        LABS:                        7.2    9.6   )-----------( 179      ( 13 Mar 2018 06:30 )             23.6     03-13    133<L>  |  97  |  44<H>  ----------------------------<  121<H>  5.0   |  26  |  0.72    Ca    8.4      13 Mar 2018 06:30  Phos  4.0     -  Mg     2.8     -    TPro  x   /  Alb  x   /  TBili  1.7<H>  /  DBili  0.8<H>  /  AST  x   /  ALT  x   /  AlkPhos  x   -13    LIVER FUNCTIONS - ( 12 Mar 2018 22:31 )  Alb: 2.9 g/dL / Pro: 6.8 g/dL / ALK PHOS: 203 U/L / ALT: 51 U/L / AST: 44 U/L / GGT: x           PT/INR - ( 12 Mar 2018 14:31 )   PT: 23.9 sec;   INR: 2.12          PTT - ( 12 Mar 2018 14:31 )  PTT:32.7 sec  Urinalysis Basic - ( 12 Mar 2018 19:03 )    Color: Yellow / Appearance: Clear / S.010 / pH: x  Gluc: x / Ketone: NEGATIVE  / Bili: Negative / Urobili: 1.0 E.U./dL   Blood: x / Protein: Trace mg/dL / Nitrite: NEGATIVE   Leuk Esterase: NEGATIVE / RBC: < 5 /HPF / WBC < 5 /HPF   Sq Epi: x / Non Sq Epi: 0-5 /HPF / Bacteria: Present /HPF      CARDIAC MARKERS ( 13 Mar 2018 06:30 )  x     / 0.06 ng/mL / x     / x     / x      CARDIAC MARKERS ( 12 Mar 2018 22:31 )  x     / 0.06 ng/mL / 27 U/L / x     / 2.8 ng/mL  CARDIAC MARKERS ( 12 Mar 2018 14:31 )  x     / 0.05 ng/mL / x     / x     / x            RADIOLOGY & ADDITIONAL TESTS: Reviewed.    ASSESSMENT: 72M PMH severe MR, severe pHTN, afib (on Eliquis), R popliteal DVT s/p IVC filter, respiratory failure secondary to PNA s/p tracheostomy (baseline on trach collar; FiO2 35%), and perforated duodenal ulcer s/p repair who presents with respiratory failure that required intubation    CARDIAC:     #CHF  - EF 50%, single ECHO mentioning R-sided dysfunction though unclear. Elev BNP 22k vs 7k on last admission. Overload on exam (JVP, 1+ edema, crackles) c/w CHF. New respiratory req also consistent with CHF. Severe MR --> pHTN type II is likely. NYHA ~II. CXR c/w bilateral pulm congestion and effusion.   - Echo 3/13 demonstrates EF 45-50%, severe mitral prolapse with possible flail posterior mitral leaflet, moderate tricuspid regurgitation, mild-mod pulm HTN 45mmHg, cardiology consulted regarding valve recommendations, appreciate recs  - Troponins peaked 0.06  - Patient likely with some fluid overload, gave 20mg IV lasix this morning and will continue to diurese with 20-40mg IV lasix for goal net negative     RESP:   - Respiratory failure now improved on mech vent (prev. on trach collar)  - Continue to monitor respiratory status, ABG as necessary to adjust    ID: Possible pneumonia, though no ena evidence on CT scan. RVP negative. UA negative. 2/4 SIRS w/ lactate - meets criteria for severe sepsis.   - Continue with vancomycin, zosyn - patients meets criteria for HAP (from nursing home). Vanc trough 3/14 at 1am    - Lactate resolved  - f/u Bcx, growing gram positive cocci in clusters  - Will draw surveillance cultures    #Afib  - c/w home eliquis   - Continue with telemetry monitoring   - 0.125mg qd Digoxin    VASCULAR  #Gangrene of toes  - Prev vasc consult no acute event. Could be possible infectious source  - Continue to monitor site    GI:   - CT abd shows possible evidence of cholecystitis, LFTs mildly elevated - though could be 2/2 hepatic congestion  - Surgery consulted, appreciate recs  - Will obtain CT abdomen/pelvis when more stable    #duodenal ulcer - no acute evidence of bleed, hgb at baseline  - Trend CBC, monitor VS with telemetry    HEME: Normocytic anemia.   - f/u iron studies   - Trend CBC    #DVT   - on AC w/ IVC filter  - Continue with home Eliquis    MET: HyperK+, no ekg changes  - Potassium currently 5.0    FEN:  F: No IVF   E: Currently hyperkalemic, replete Mg<2  N: NPO, glucerna 1.5 at 63cc/hr  Lines: J/g/d tube. 2 peripherals.   ppx w/ eliquis and protonix

## 2018-03-13 NOTE — CONSULT NOTE ADULT - SUBJECTIVE AND OBJECTIVE BOX
HPI:  72YM with PMHx of severe MR, severe pHTN (83), afib (on Eliquis), R popliteal DVT s/p IVC filter (feb 2018), respiratory failure secondary to PNA s/p tracheostomy (baseline on trach collar; FiO2 35%), and with recent discharge from [facility] 2 wks ago after extended hospitalization for perforated duodenal ulcer. Patient BIBEM from [facility] for increased work of breathing and worsening hypoxia, to low 90s on baseline 35% FiO2, RR in 40s and tachycardic to 120s.    In ED, patient noted to be tachycardic to 117 with RR of 38. Rectal temp was 100.2. O2 and BP stable. Patient placed on AC ventilation with improvement in respiratory status. ED gave:  vanco/zosyn, 2L NS, and duoneb. Patient denies chest pain, abdominal pain, back pain, or foot pain. He reports improvement in his respiratory status. Denies fever, chills, or nausea. Labs notable for mild WBCs of 11.5, downtrending lactate of 4.1-->2.3, elev K of 5.6 no EKG changes, trops of 0.05 and BNP of 22krespiratory alkalosis Ph of 7.47.. RVP negative and CXR w/ ebvidence of pulmonary congestion and bilat pleural effusions.     During hospitalization 2 weeks ago at [facility], patient had repair of duodenal perforation with Shreyas Patch and placement of J tube. Course complicated by leakage from shreyas patch requiring emergent repair with placement of gastrostomy and duodenostomy. Further, patient had PNA complicated by respiratory failure requiring tracheostomy. Lastly, patient with dry gangrene of toes from vasopressor use. Vascular surgery evaluated patient stating no intervention needed. Patient was discharged on trach collar 35% FiO2. (12 Mar 2018 22:48)      ROS: A 10-point review of systems was otherwise negative.    PAST MEDICAL & SURGICAL HISTORY:  CHF (congestive heart failure)  Cholecystitis  Duodenal ulcer  Atrial fibrillation  Mitral regurgitation  Aortic regurgitation  H/O tracheostomy  Acute duodenal ulcer with perforation  H/O gastrostomy, has currently  Perforated duodenal ulcer      SOCIAL HISTORY:  FAMILY HISTORY:      ALLERGIES: 	  Allergies    No Known Allergies    Intolerances        MEDICATIONS:  ALBUTerol/ipratropium for Nebulization 3 milliLiter(s) Nebulizer every 4 hours PRN  apixaban 5 milliGRAM(s) Oral every 12 hours  chlorhexidine 0.12% Liquid 15 milliLiter(s) Swish and Spit two times a day  furosemide   Injectable 40 milliGRAM(s) IV Push once  pantoprazole  Injectable 40 milliGRAM(s) IV Push every 12 hours  piperacillin/tazobactam IVPB. 4.5 Gram(s) IV Intermittent every 6 hours  polyethylene glycol 3350 17 Gram(s) Oral two times a day  vancomycin  IVPB 1250 milliGRAM(s) IV Intermittent every 12 hours      PHYSICAL EXAM:  T(C): 37.3 (03-13-18 @ 14:00), Max: 37.6 (03-12-18 @ 21:00)  HR: 76 (03-13-18 @ 17:00) (76 - 106)  BP: 84/56 (03-13-18 @ 17:00) (84/56 - 115/73)  RR: 25 (03-13-18 @ 17:00) (13 - 98)  SpO2: 100% (03-13-18 @ 17:00) (99% - 100%)  Wt(kg): --    GEN: Awake, comfortable. NAD.   HEENT: NCAT, PERRL, EOMI. Mucosa moist. No JVD.   RESP: CTA b/l  CV: RRR, normal s1/s2. No m/r/g.  ABD: Soft, NTND. BS+  EXT: Warm. No edema, clubbing, or cyanosis.   NEURO: AAOx3. No focal deficits.    I&O's Summary    12 Mar 2018 07:01  -  13 Mar 2018 07:00  --------------------------------------------------------  IN: 750 mL / OUT: 684 mL / NET: 66 mL    13 Mar 2018 07:01  -  13 Mar 2018 17:20  --------------------------------------------------------  IN: 250 mL / OUT: 560 mL / NET: -310 mL      Height (cm): 177.8 (03-13 @ 07:33)  Weight (kg): 91.4 (03-12 @ 21:29)  BMI (kg/m2): 28.9 (03-13 @ 07:33)  BSA (m2): 2.09 (03-13 @ 07:33)  	  LABS:	 	    CARDIAC MARKERS:                                  7.2    9.6   )-----------( 179      ( 13 Mar 2018 06:30 )             23.6     03-13    133<L>  |  97  |  44<H>  ----------------------------<  121<H>  5.0   |  26  |  0.72    Ca    8.4      13 Mar 2018 06:30  Phos  4.0     03-13  Mg     2.8     03-13    TPro  x   /  Alb  x   /  TBili  1.7<H>  /  DBili  0.8<H>  /  AST  x   /  ALT  x   /  AlkPhos  x   03-13    proBNP:   Lipid Profile:   HgA1c:   TSH: Thyroid Stimulating Hormone, Serum: 1.445 uIU/mL (03-12 @ 22:31)      TELEMETRY: 	    ECG:  	  RADIOLOGY:   ECHO:  STRESS:  CATH: HPI:  72YM with PMHx of severe MR, severe pHTN (on echo pASP 83), afib (on Eliquis), R popliteal DVT s/p IVC filter (feb 2018), respiratory failure secondary to PNA s/p tracheostomy (baseline on trach collar; FiO2 35%), and with recent discharge from [facility] 2 wks ago after extended hospitalization for perforated duodenal ulcer. Patient BIBEM from [facility] for increased work of breathing and worsening hypoxia, to low 90s on baseline 35% FiO2, RR in 40s and tachycardic to 120s.    In ED, patient noted to be tachycardic to 117 with RR of 38. Rectal temp was 100.2. O2 and BP stable. Patient placed on AC ventilation with improvement in respiratory status. ED gave:  vanco/zosyn, 2L NS, and duoneb. Patient denies chest pain, abdominal pain, back pain, or foot pain. He reports improvement in his respiratory status. Denies fever, chills, or nausea. Labs notable for mild WBCs of 11.5, downtrending lactate of 4.1-->2.3, elev K of 5.6 no EKG changes, trops of 0.05 and BNP of 22krespiratory alkalosis Ph of 7.47.. RVP negative and CXR w/ ebvidence of pulmonary congestion and bilat pleural effusions.     During hospitalization 2 weeks ago at [facility], patient had repair of duodenal perforation with Shreyas Patch and placement of J tube. Course complicated by leakage from shreyas patch requiring emergent repair with placement of gastrostomy and duodenostomy. Further, patient had PNA complicated by respiratory failure requiring tracheostomy. Lastly, patient with dry gangrene of toes from vasopressor use. Vascular surgery evaluated patient stating no intervention needed. Patient was discharged on trach collar 35% FiO2. (12 Mar 2018 22:48)      ROS: A 10-point review of systems was otherwise negative.    PAST MEDICAL & SURGICAL HISTORY:  CHF (congestive heart failure)  Cholecystitis  Duodenal ulcer  Atrial fibrillation  Mitral regurgitation  Aortic regurgitation  H/O tracheostomy  Acute duodenal ulcer with perforation  H/O gastrostomy, has currently  Perforated duodenal ulcer      SOCIAL HISTORY:  FAMILY HISTORY:      ALLERGIES: 	  Allergies    No Known Allergies    Intolerances        MEDICATIONS:  ALBUTerol/ipratropium for Nebulization 3 milliLiter(s) Nebulizer every 4 hours PRN  apixaban 5 milliGRAM(s) Oral every 12 hours  chlorhexidine 0.12% Liquid 15 milliLiter(s) Swish and Spit two times a day  furosemide   Injectable 40 milliGRAM(s) IV Push once  pantoprazole  Injectable 40 milliGRAM(s) IV Push every 12 hours  piperacillin/tazobactam IVPB. 4.5 Gram(s) IV Intermittent every 6 hours  polyethylene glycol 3350 17 Gram(s) Oral two times a day  vancomycin  IVPB 1250 milliGRAM(s) IV Intermittent every 12 hours      PHYSICAL EXAM:  T(C): 37.3 (03-13-18 @ 14:00), Max: 37.6 (03-12-18 @ 21:00)  HR: 76 (03-13-18 @ 17:00) (76 - 106)  BP: 84/56 (03-13-18 @ 17:00) (84/56 - 115/73)  RR: 25 (03-13-18 @ 17:00) (13 - 98)  SpO2: 100% (03-13-18 @ 17:00) (99% - 100%)  Wt(kg): --    GEN: Awake, comfortable. NAD.   HEENT: NCAT, PERRL, EOMI. Mucosa moist. +JVD  RESP: CTA b/l  CV: RRR, normal s1/s2. +holosytolic murmur radiating to the axilla  ABD: Soft, NTND. BS+  EXT: Warm. No edema, clubbing, or cyanosis.   NEURO: AAOx3. No focal deficits.    I&O's Summary    12 Mar 2018 07:01  -  13 Mar 2018 07:00  --------------------------------------------------------  IN: 750 mL / OUT: 684 mL / NET: 66 mL    13 Mar 2018 07:01  -  13 Mar 2018 17:20  --------------------------------------------------------  IN: 250 mL / OUT: 560 mL / NET: -310 mL      Height (cm): 177.8 (03-13 @ 07:33)  Weight (kg): 91.4 (03-12 @ 21:29)  BMI (kg/m2): 28.9 (03-13 @ 07:33)  BSA (m2): 2.09 (03-13 @ 07:33)  	  LABS:	 	    CARDIAC MARKERS:                              7.2    9.6   )-----------( 179      ( 13 Mar 2018 06:30 )             23.6     03-13    133<L>  |  97  |  44<H>  ----------------------------<  121<H>  5.0   |  26  |  0.72    Ca    8.4      13 Mar 2018 06:30  Phos  4.0     03-13  Mg     2.8     03-13    TPro  x   /  Alb  x   /  TBili  1.7<H>  /  DBili  0.8<H>  /  AST  x   /  ALT  x   /  AlkPhos  x   03-13    TSH: Thyroid Stimulating Hormone, Serum: 1.445 uIU/mL (03-12 @ 22:31)

## 2018-03-13 NOTE — DIETITIAN INITIAL EVALUATION ADULT. - ORAL INTAKE PTA
n/a/Per nursing home documents pt receiving EN: Glucerna 1.5 1250 mL + prostat qday via Jtube providing 1875 kcal, 103 g protein, & 2042 ml fluid.

## 2018-03-13 NOTE — CONSULT NOTE ADULT - ASSESSMENT
Severe MR, pHTN, atrial fibrillation presents with respiratory failure and bacteremia  -diuresis with lasix IV (goal for net -1-2L)  -afterload reduction if necessary  -discussion with CTS/structural heart for MVR vs mitraclip when clinical status improves  -agree with BS antibiotics, valve thickening from prior AMRIK/TTE will be tough to exclude IE, so would hold off on AMRIK for now.    Case discussed with Dr Khan

## 2018-03-13 NOTE — CONSULT NOTE ADULT - SUBJECTIVE AND OBJECTIVE BOX
Surgeon: Gibson Tan    Requesting Physician: Dr. Waters    HISTORY OF PRESENT ILLNESS:  This is a 73 y/o male with a recent prolonged hospital admission at [facility] (discharged about 2 weeks ago) where he initially presented in 2018 with abdominal symptoms and was found to have acute cholecystitis, PNA and a perforated duodenum 2/2 ulcer.  He underwent surgical repair and had a prolonged hospital stay with respiratory failure requiring intubation and ultimately tracheostomy.  He also had a right popliteal DVT s/p IVC filter in 2018 and was very deconditioned.  Other PMHx includes severe MR, severe pHTN (diagnosed last admission), atrial fibrillation (on Eliquis).  He was discharged to a nursing home/rehab.      He was then BIBA yesterday to [facility] for increased work of breathing and noted hypoxia (O2 sat 90% with RR 40's and tachycardic to 120's per documented notes on the chart).  On presentation to [facility] he appeared septic and was given empiric abx.  Blood cx from yesterday are positive for S. Aureus.  C&S pending.  Ucx pending.  RVP negative and CXR showed pulmonary congestion with B/L pleural effusions.  He is currently very drowsy and it is difficult to illicit a history from him.  Additionally he remains on the vent via trach.  When speaking very loudly, he will nod "yes or no" to simple questions.  He indicated that he does not have pain.      PAST MEDICAL & SURGICAL HISTORY:  CHF (congestive heart failure)  Cholecystitis  Duodenal ulcer  Atrial fibrillation  Mitral regurgitation  Aortic regurgitation  H/O tracheostomy  Acute duodenal ulcer with perforation  H/O gastrostomy, has currently  Perforated duodenal ulcer      MEDICATIONS  (STANDING):  apixaban 5 milliGRAM(s) Oral every 12 hours  chlorhexidine 0.12% Liquid 15 milliLiter(s) Swish and Spit two times a day  pantoprazole  Injectable 40 milliGRAM(s) IV Push every 12 hours  piperacillin/tazobactam IVPB. 4.5 Gram(s) IV Intermittent every 6 hours  polyethylene glycol 3350 17 Gram(s) Oral two times a day  vancomycin  IVPB 1250 milliGRAM(s) IV Intermittent every 12 hours    MEDICATIONS  (PRN):  ALBUTerol/ipratropium for Nebulization 3 milliLiter(s) Nebulizer every 4 hours PRN Shortness of Breath and/or Wheezing      Allergies    No Known Allergies          SOCIAL HISTORY: **Unable to assess at this time**  Smoker:  YES / NO        PACK YEARS:                         WHEN QUIT?  ETOH use:  YES / NO               FREQUENCY / QUANTITY:  Ilicit Drug use:  YES / NO  Occupation:  Assisted device use (Cane / Walker):  Live with:    FAMILY HISTORY: Unable to assess      Review of Systems    CONSTITUTIONAL:  Fevers / chills, sweats, fatigue, weight loss, weight gain                                    NEGATIVE  NEURO:  parathesias, seizures, syncope, confusion                                                                               NEGATIVE  EYES:  Blurry vision, discharge, pain, loss of vision                                                                                  NEGATIVE  ENMT:  Difficulty hearing, vertigo, dysphagia, epistaxis, recent dental work                                     NEGATIVE  CV:  Chest pain, palpitations, MAGALLANES, orthopnea                                                                                         NEGATIVE  RESPIRATORY:  +SOB, tachypnic, increased work of breathing. POSITIVE                                                              GI:  Nausea, vommiting, diarrhea, constipation, melena                                                                        NEGATIVE  : Hematuria, dysuria, urgency, incontinence                                                                                       NEGATIVE  MUSKULOSKELETAL:  arthritis, joint swelling, muscle weakness                                                           NEGATIVE  SKIN/BREAST:  rash, itching, tanya loss, masses                                                                                            NEGATIVE  PSYCH:  depresion, anxiety, suicidal ideation                                                                                             NEGATIVE  HEME/LYMPH:  bruises easily, enlarged lymph nodes, tender lymph nodes                                        NEGATIVE  ENDOCRINE:  cold intolerance, heat intolerance, polydipsia                                                                   NEGATIVE    PHYSICAL EXAM  Vital Signs Last 24 Hrs  T(C): 37.5 (13 Mar 2018 10:00), Max: 37.6 (12 Mar 2018 21:00)  T(F): 99.5 (13 Mar 2018 10:00), Max: 99.6 (12 Mar 2018 21:00)  HR: 92 (13 Mar 2018 12:00) (78 - 108)  BP: 101/62 (13 Mar 2018 12:00) (94/65 - 133/81)  BP(mean): 85 (13 Mar 2018 12:00) (75 - 95)  RR: 24 (13 Mar 2018 12:00) (15 - 98)  SpO2: 100% (13 Mar 2018 12:00) (98% - 100%)    CONSTITUTIONAL:                                                                     On vent via trach.  Trach site looks clean.  When woken up, he follows commands by moving upper/lower extremities but very weak overall.  Can wiggle toes/fingers but no weight bearing at this time.  Smiles and makes eye contact very briefly but appears very drowsy.  (Did not receive pain meds or sedation since admission).    NEURO/EYES:                                                                                            See above.  Right pupil reactive to light; Left pupil more sluggish but reactive.                                                                                                                      ENMT:                                                                                                Trach in place.  CV:                                                                                                     S1S2, grade IV/VI murmur LSB and apex.  [place] a thrill  RESPIRATORY:                                                                                 B/L rhonchi throughout.    GI: Soft, non-distended.  Left side drain in place and gastrostomy tube.  +retention sutures in abdomen from previous surgery.                                                                                                         : ANGELES + / -                                                                                 Yes  MUSKULOSKELETAL:                                                                       WNL  SKIN / BREAST:                                                                                 WNL  Ext: Warm wtih good PT/DP pulses.  3+ pitting edema Bilaterally from knees to feet.                                                            LABS:                        7.2    9.6   )-----------( 179      ( 13 Mar 2018 06:30 )             23.6     03-13    133<L>  |  97  |  44<H>  ----------------------------<  121<H>  5.0   |  26  |  0.72    Ca    8.4      13 Mar 2018 06:30  Phos  4.0     03-13  Mg     2.8     03-13    TPro  x   /  Alb  x   /  TBili  1.7<H>  /  DBili  0.8<H>  /  AST  x   /  ALT  x   /  AlkPhos  x   03-13    PT/INR - ( 12 Mar 2018 14:31 )   PT: 23.9 sec;   INR: 2.12          PTT - ( 12 Mar 2018 14:31 )  PTT:32.7 sec  Urinalysis Basic - ( 12 Mar 2018 19:03 )    Color: Yellow / Appearance: Clear / S.010 / pH: x  Gluc: x / Ketone: NEGATIVE  / Bili: Negative / Urobili: 1.0 E.U./dL   Blood: x / Protein: Trace mg/dL / Nitrite: NEGATIVE   Leuk Esterase: NEGATIVE / RBC: < 5 /HPF / WBC < 5 /HPF   Sq Epi: x / Non Sq Epi: 0-5 /HPF / Bacteria: Present /HPF      CARDIAC MARKERS ( 13 Mar 2018 06:30 )  x     / 0.06 ng/mL / x     / x     / x      CARDIAC MARKERS ( 12 Mar 2018 22:31 )  x     / 0.06 ng/mL / 27 U/L / x     / 2.8 ng/mL  CARDIAC MARKERS ( 12 Mar 2018 14:31 )  x     / 0.05 ng/mL / x     / x     / x              RADIOLOGY & ADDITIONAL STUDIES:  < from: Xray Chest 1 View- PORTABLE-Routine (18 @ 13:50) >  IMPRESSION:   Findings consistent with moderate to severe bilateral pulmonary   congestive changes with small bilateral pleural effusions.    < end of copied text >  < from: Echocardiogram (18 @ 13:00) >  Normal left ventricular size and wall thickness.There is mild global   hypokinesis of the left ventricle.The left ventricular ejection fraction   is   estimated to be 45-50%The left atrium is dilated.Right atrial size is   normal.The right ventricle is normal in size and function.Structurally   normal   aortic valve.There is mild to moderate aortic regurgitation.There is   severe   mitral valve prolapse.Possible flail posteriior mitral   leaflet.Structurally   normal tricuspid valve.There is moderate tricuspid regurgitation.There   is mild   to moderate pulmonary hypertension.The pulmonary artery systolic   pressure is   estimated to be 45 mmHg.Structurally normal pulmonic valve.Mild aortic   root   dilatation.The aortic root measures 2.2 cm/m2 at sinuses (normal less   than 2.1   cm/m2 for men, less than 2.2 cm/m2 for women).  The IVC is dilated (>2.1   cm)   with an abnormal inspiratory collapse (<50%) consistent with elevated   right   atrial pressure.  There is no pericardial effusion.The echocardiogram is   essentially normal--> ??.        < end of copied text >

## 2018-03-13 NOTE — DIETITIAN INITIAL EVALUATION ADULT. - OTHER INFO
72y M with PMHx of severe MR, severe pHTN, afib (on Eliquis), R popliteal DVT s/p IVC filter, respiratory failure secondary to PNA s/p tracheostomy (baseline on trach collar; FiO2 35%), and perforated duodenal ulcer s/p repair who presents with respiratory failure requiring mechanical ventilation. Currently NPO & intubated.

## 2018-03-13 NOTE — DIETITIAN INITIAL EVALUATION ADULT. - NS AS NUTRI INTERV ENTERAL NUTRITION
Glucerna 1.5 at 63 mL x 24 hr (route per MD) providing 1512 mL TV, 2268 kcal, 125 g protein, & 1148 mL fluid. Start at 30 mL, increase by 10 mL q4 to goal rate. Monitor for signs of intolerance & maintain aspiration precautions.

## 2018-03-13 NOTE — CONSULT NOTE ADULT - ASSESSMENT
71 y/o male with a recent prolonged hospital admission at [facility] (discharged about 2 weeks ago) where he initially presented in January 2018 with abdominal symptoms and was found to have acute cholecystitis, PNA and a perforated duodenum 2/2 ulcer.  He underwent surgical repair and had a prolonged hospital stay with respiratory failure requiring intubation and ultimately tracheostomy.  He also had a right popliteal DVT s/p IVC filter in 2/2018 and was very deconditioned.  Other PMHx includes severe MR, severe pHTN (diagnosed last admission), atrial fibrillation (on Eliquis).  He was discharged to a nursing home/rehab.  Now back with ?PNA and fluid overload/respiratory distress requiring higher vent support than previously.  Admitted to MICU.

## 2018-03-13 NOTE — CONSULT NOTE ADULT - SUBJECTIVE AND OBJECTIVE BOX
HPI:  72YM with PMHx of severe MR, severe pHTN (83), afib (on Eliquis), R popliteal DVT s/p IVC filter (2018), respiratory failure secondary to PNA s/p tracheostomy (baseline on trach collar; FiO2 35%), and with recent discharge from [facility] 2 wks ago after extended hospitalization for perforated duodenal ulcer. Patient BIBEM from [facility] for increased work of breathing and worsening hypoxia, to low 90s on baseline 35% FiO2, RR in 40s and tachycardic to 120s.    In ED, patient noted to be tachycardic to 117 with RR of 38. Rectal temp was 100.2. O2 and BP stable. Patient placed on AC ventilation with improvement in respiratory status. ED gave:  vanco/zosyn, 2L NS, and duoneb. Patient denies chest pain, abdominal pain, back pain, or foot pain. He reports improvement in his respiratory status. Denies fever, chills, or nausea. Labs notable for mild WBCs of 11.5, downtrending lactate of 4.1-->2.3, elev K of 5.6 no EKG changes, trops of 0.05 and BNP of 22krespiratory alkalosis Ph of 7.47.. RVP negative and CXR w/ ebvidence of pulmonary congestion and bilat pleural effusions.     During hospitalization 2 weeks ago at [facility], patient had repair of duodenal perforation with Shreyas Patch and placement of J tube. Course complicated by leakage from shreyas patch requiring emergent repair with placement of gastrostomy and duodenostomy. Further, patient had PNA complicated by respiratory failure requiring tracheostomy. Lastly, patient with dry gangrene of toes from vasopressor use. Vascular surgery evaluated patient stating no intervention needed. Patient was discharged on trach collar 35% FiO2. (12 Mar 2018 22:48)      PAST MEDICAL & SURGICAL HISTORY:  CHF (congestive heart failure)  Cholecystitis  Duodenal ulcer  Atrial fibrillation  Mitral regurgitation  Aortic regurgitation  H/O tracheostomy  Acute duodenal ulcer with perforation  H/O gastrostomy, has currently  Perforated duodenal ulcer      ROS: See HPI    MEDICATIONS:  ALLERGIES:    SOCIAL HISTORY:  Smoke: Never Smoker  EtOH: occasional    Vital Signs Last 24 Hrs  T(C): 37.3 (13 Mar 2018 14:00), Max: 37.6 (12 Mar 2018 21:00)  T(F): 99.1 (13 Mar 2018 14:00), Max: 99.6 (12 Mar 2018 21:00)  HR: 86 (13 Mar 2018 14:00) (78 - 106)  BP: 110/66 (13 Mar 2018 14:00) (94/65 - 133/81)  BP(mean): 82 (13 Mar 2018 14:00) (75 - 95)  RR: 17 (13 Mar 2018 14:00) (15 - 98)  SpO2: 100% (13 Mar 2018 14:00) (99% - 100%)    PHYSICAL EXAM  Gen: sleeping comfortably  CV: intermittently tachycardic, normotensive  Pulm: trach. on AC   Abd: Soft, Non-distended, non-tender throughout, no rebound or guarding, 3 drains: gastrostomy tube and duodenostomy tube clamped, feeding J-tube, no surrounding erythema or edema    LABS:                        7.2    9.6   )-----------( 179      ( 13 Mar 2018 06:30 )             23.6     03-13    133<L>  |  97  |  44<H>  ----------------------------<  121<H>  5.0   |  26  |  0.72    Ca    8.4      13 Mar 2018 06:30  Phos  4.0     -13  Mg     2.8     03-13    TPro  x   /  Alb  x   /  TBili  1.7<H>  /  DBili  0.8<H>  /  AST  x   /  ALT  x   /  AlkPhos  x   03-13    PT/INR - ( 12 Mar 2018 14:31 )   PT: 23.9 sec;   INR: 2.12          PTT - ( 12 Mar 2018 14:31 )  PTT:32.7 sec  Urinalysis Basic - ( 12 Mar 2018 19:03 )    Color: Yellow / Appearance: Clear / S.010 / pH: x  Gluc: x / Ketone: NEGATIVE  / Bili: Negative / Urobili: 1.0 E.U./dL   Blood: x / Protein: Trace mg/dL / Nitrite: NEGATIVE   Leuk Esterase: NEGATIVE / RBC: < 5 /HPF / WBC < 5 /HPF   Sq Epi: x / Non Sq Epi: 0-5 /HPF / Bacteria: Present /HPF      RADIOLOGY & ADDITIONAL STUDIES:  < from: CT Abdomen and Pelvis w/ IV Cont (18 @ 16:33) >  EXAM:  CT ABDOMEN AND PELVIS IC                          PROCEDURE DATE:  2018    Quantity of Contrast in Vial in ml: 100 Contrast Used: Optiray 350  Quantity of Contrast Wasted in ml: 10           INTERPRETATION:  CT of the abdomen and pelvis with contrast dated   3/12/2018 4:33 PM    INDICATION: Sepsis. Recent abdominal surgery.    TECHNIQUE: CT of abdomen and pelvis was performed using oral and   intravenous contrast. Axial, sagittal and coronal images were produced   and reviewed.    PRIOR STUDIES: 2018 and other studies dating back to 2018.    FINDINGS: Images of the lower chest demonstrate small bilateral pleural   effusions. There are persistent dependent consolidations in both lower   lobes. Cardiomegaly is again noted.    The liver is normal in appearance.  A large gallstone is again noted   within the gallbladder lumen. There is no gallbladder distention.   Gallbladder wall is again thickened. There is mild infiltration of the   pericholecystic fat. Small amount of ascites is seen in the right upper   quadrant. 1.1 cm hyperattenuating nodule is again seen in the body of the   pancreas. There is again a 1.8 cm cystic lesion in the pancreatic tail.   No splenic abnormalities are seen.    The right adrenal glands is unremarkable.   There is a 2.4 cm nodule in the lateral limb of the left adrenal gland.   There is a 1.7 cm nodule in the medial limb of the left adrenal gland.   Multiple bilateral renal cysts are again seen measuring up to 6.7 cm on   the right and 11.2 cm on the left side. There is no hydronephrosis.    No abdominal aortic aneurysm is seen. IVC filter is in place. No   lymphadenopathy is seen.     Postoperative changes are again seen in the anterior abdominal wall.   Right upper quadrantabdominal drain has been removed. Percutaneous   gastrostomy tube remains in the gastric body. There is a percutaneous   duodenostomy tube with the tip within the duodenal bulb. There is a small   caliber jejunostomy tube in the left mid abdomen. There is rectal stool   impaction. No bowel obstruction or free intraperitoneal air is seen.   There is trace ascites.    Images of the pelvis demonstrate no bladder abnormality. Prostate is not   significantly enlarged.    Evaluation of the osseous structures demonstrates unchanged sclerotic   lesion in the right greater trochanter measuring 1.2 cm. Mild   degenerative changes of spine are seen. There are old healed right lower   rib fractures.      IMPRESSION:    Cholelithiasis. No gallbladder distention. Thickened gallbladder wall and   infiltration of the fat in the right upper quadrant. Similar findings   were seen on the prior studies and could be partly due to congestive   heart failure and partly due to chronic cholecystitis but difficultto   exclude a component of acute cholecystitis.    Cardiomegaly. Persistent small pleural effusions and bilateral lower lobe   consolidations which could be due to atelectasis and/or pneumonia.    Gastrostomy tube, duodenal ostomy tube and jejunostomy tube remain in   place. No bowel obstruction or free air.     Rectal stool impaction. No bowel obstruction.        ASSESSMENT AND PLAN:  71 yo male recently discharged from [facility] (on 18) after prolonged hospital course due to perforated duodenal ulcer (s/p exploratory laparotomy  2018  washout, primary closure of duodenal ulcer with overlying omental patch, retrograde duodenostomy, Jon Gastrostomy, revision of feeding jejunostomy, retention suture placement// s/p diagnostic laparoscopy  2018  with repair of perforated duodenal ulcer with shreyas patch, wide washout, placement of feeding J-tube ), hospital stay complicated by respiratory failure, prolonged intubation and tracheostomy.     Currently patient admitted to MICU with respiratory failure due to possible pneumonia and CHF exacerbation     - continue excellent care per MICU team   - no surgical intervention at this time   - once patient hemodynamically stable, will plan for CT abd/pelvis with contrast via gastrostomy tube   - surgery team 4C following, please call if any issues   - discussed with chief resident and Dr. Evans HPI:  72YM with PMHx of severe MR, severe pHTN (83), afib (on Eliquis), R popliteal DVT s/p IVC filter (2018), respiratory failure secondary to PNA s/p tracheostomy (baseline on trach collar; FiO2 35%), and with recent discharge from [facility] 2 wks ago after extended hospitalization for perforated duodenal ulcer. Patient BIBEM from [facility] for increased work of breathing and worsening hypoxia, to low 90s on baseline 35% FiO2, RR in 40s and tachycardic to 120s.    In ED, patient noted to be tachycardic to 117 with RR of 38. Rectal temp was 100.2. O2 and BP stable. Patient placed on AC ventilation with improvement in respiratory status. ED gave:  vanco/zosyn, 2L NS, and duoneb. Patient denies chest pain, abdominal pain, back pain, or foot pain. He reports improvement in his respiratory status. Denies fever, chills, or nausea. Labs notable for mild WBCs of 11.5, downtrending lactate of 4.1-->2.3, elev K of 5.6 no EKG changes, trops of 0.05 and BNP of 22krespiratory alkalosis Ph of 7.47.. RVP negative and CXR w/ ebvidence of pulmonary congestion and bilat pleural effusions.     During hospitalization 2 weeks ago at [facility], patient had repair of duodenal perforation with Shreyas Patch and placement of J tube. Course complicated by leakage from shreyas patch requiring emergent repair with placement of gastrostomy and duodenostomy. Further, patient had PNA complicated by respiratory failure requiring tracheostomy. Lastly, patient with dry gangrene of toes from vasopressor use. Vascular surgery evaluated patient stating no intervention needed. Patient was discharged on trach collar 35% FiO2. (12 Mar 2018 22:48)    Surgery addendum:  Agree with above HPI. Patient seen and examined at the bedside. Remains on Vent, although awake and following commands. Has no abdominal complaints.       PAST MEDICAL & SURGICAL HISTORY:  CHF (congestive heart failure)  Cholecystitis  Duodenal ulcer  Atrial fibrillation  Mitral regurgitation  Aortic regurgitation  H/O tracheostomy  Acute duodenal ulcer with perforation  H/O gastrostomy, has currently  Perforated duodenal ulcer      ROS: See HPI    MEDICATIONS:  ALLERGIES:    SOCIAL HISTORY:  Smoke: Never Smoker  EtOH: occasional    Vital Signs Last 24 Hrs  T(C): 37.3 (13 Mar 2018 14:00), Max: 37.6 (12 Mar 2018 21:00)  T(F): 99.1 (13 Mar 2018 14:00), Max: 99.6 (12 Mar 2018 21:00)  HR: 86 (13 Mar 2018 14:00) (78 - 106)  BP: 110/66 (13 Mar 2018 14:00) (94/65 - 133/81)  BP(mean): 82 (13 Mar 2018 14:00) (75 - 95)  RR: 17 (13 Mar 2018 14:00) (15 - 98)  SpO2: 100% (13 Mar 2018 14:00) (99% - 100%)    PHYSICAL EXAM  Gen: sleeping comfortably  CV: intermittently tachycardic, normotensive  Pulm: trach. on AC   Abd: Soft, Non-distended, non-tender throughout, no rebound or guarding, 3 drains: gastrostomy tube and duodenostomy tube clamped, feeding J-tube, no surrounding erythema or edema    LABS:                        7.2    9.6   )-----------( 179      ( 13 Mar 2018 06:30 )             23.6     03-13    133<L>  |  97  |  44<H>  ----------------------------<  121<H>  5.0   |  26  |  0.72    Ca    8.4      13 Mar 2018 06:30  Phos  4.0     03-13  Mg     2.8     03-13    TPro  x   /  Alb  x   /  TBili  1.7<H>  /  DBili  0.8<H>  /  AST  x   /  ALT  x   /  AlkPhos  x   03-13    PT/INR - ( 12 Mar 2018 14:31 )   PT: 23.9 sec;   INR: 2.12          PTT - ( 12 Mar 2018 14:31 )  PTT:32.7 sec  Urinalysis Basic - ( 12 Mar 2018 19:03 )    Color: Yellow / Appearance: Clear / S.010 / pH: x  Gluc: x / Ketone: NEGATIVE  / Bili: Negative / Urobili: 1.0 E.U./dL   Blood: x / Protein: Trace mg/dL / Nitrite: NEGATIVE   Leuk Esterase: NEGATIVE / RBC: < 5 /HPF / WBC < 5 /HPF   Sq Epi: x / Non Sq Epi: 0-5 /HPF / Bacteria: Present /HPF      RADIOLOGY & ADDITIONAL STUDIES:  < from: CT Abdomen and Pelvis w/ IV Cont (18 @ 16:33) >  EXAM:  CT ABDOMEN AND PELVIS IC                          PROCEDURE DATE:  2018    Quantity of Contrast in Vial in ml: 100 Contrast Used: Optiray 350  Quantity of Contrast Wasted in ml: 10           INTERPRETATION:  CT of the abdomen and pelvis with contrast dated   3/12/2018 4:33 PM    INDICATION: Sepsis. Recent abdominal surgery.    TECHNIQUE: CT of abdomen and pelvis was performed using oral and   intravenous contrast. Axial, sagittal and coronal images were produced   and reviewed.    PRIOR STUDIES: 2018 and other studies dating back to 2018.    FINDINGS: Images of the lower chest demonstrate small bilateral pleural   effusions. There are persistent dependent consolidations in both lower   lobes. Cardiomegaly is again noted.    The liver is normal in appearance.  A large gallstone is again noted   within the gallbladder lumen. There is no gallbladder distention.   Gallbladder wall is again thickened. There is mild infiltration of the   pericholecystic fat. Small amount of ascites is seen in the right upper   quadrant. 1.1 cm hyperattenuating nodule is again seen in the body of the   pancreas. There is again a 1.8 cm cystic lesion in the pancreatic tail.   No splenic abnormalities are seen.    The right adrenal glands is unremarkable.   There is a 2.4 cm nodule in the lateral limb of the left adrenal gland.   There is a 1.7 cm nodule in the medial limb of the left adrenal gland.   Multiple bilateral renal cysts are again seen measuring up to 6.7 cm on   the right and 11.2 cm on the left side. There is no hydronephrosis.    No abdominal aortic aneurysm is seen. IVC filter is in place. No   lymphadenopathy is seen.     Postoperative changes are again seen in the anterior abdominal wall.   Right upper quadrantabdominal drain has been removed. Percutaneous   gastrostomy tube remains in the gastric body. There is a percutaneous   duodenostomy tube with the tip within the duodenal bulb. There is a small   caliber jejunostomy tube in the left mid abdomen. There is rectal stool   impaction. No bowel obstruction or free intraperitoneal air is seen.   There is trace ascites.    Images of the pelvis demonstrate no bladder abnormality. Prostate is not   significantly enlarged.    Evaluation of the osseous structures demonstrates unchanged sclerotic   lesion in the right greater trochanter measuring 1.2 cm. Mild   degenerative changes of spine are seen. There are old healed right lower   rib fractures.      IMPRESSION:    Cholelithiasis. No gallbladder distention. Thickened gallbladder wall and   infiltration of the fat in the right upper quadrant. Similar findings   were seen on the prior studies and could be partly due to congestive   heart failure and partly due to chronic cholecystitis but difficultto   exclude a component of acute cholecystitis.    Cardiomegaly. Persistent small pleural effusions and bilateral lower lobe   consolidations which could be due to atelectasis and/or pneumonia.    Gastrostomy tube, duodenal ostomy tube and jejunostomy tube remain in   place. No bowel obstruction or free air.     Rectal stool impaction. No bowel obstruction.        ASSESSMENT AND PLAN:  73 yo male recently discharged from [facility] (on 18) after prolonged hospital course due to perforated duodenal ulcer (s/p exploratory laparotomy  2018  washout, primary closure of duodenal ulcer with overlying omental patch, retrograde duodenostomy, Jon Gastrostomy, revision of feeding jejunostomy, retention suture placement// s/p diagnostic laparoscopy  2018  with repair of perforated duodenal ulcer with shreyas patch, wide washout, placement of feeding J-tube ), hospital stay complicated by respiratory failure, prolonged intubation and tracheostomy.     Currently patient admitted to MICU with respiratory failure due to possible pneumonia and CHF exacerbation     - continue excellent care per MICU team   - no surgical intervention at this time   - once patient hemodynamically stable, will plan for CT abd/pelvis with contrast via gastrostomy tube   - surgery team 4C following, please call if any issues   - discussed with chief resident and Dr. Evans HPI:  72YM with PMHx of severe MR, severe pHTN (83), afib (on Eliquis), R popliteal DVT s/p IVC filter (2018), respiratory failure secondary to PNA s/p tracheostomy (baseline on trach collar; FiO2 35%), and with recent discharge from [facility] 2 wks ago after extended hospitalization for perforated duodenal ulcer. Patient BIBEM from [facility] for increased work of breathing and worsening hypoxia, to low 90s on baseline 35% FiO2, RR in 40s and tachycardic to 120s.    In ED, patient noted to be tachycardic to 117 with RR of 38. Rectal temp was 100.2. O2 and BP stable. Patient placed on AC ventilation with improvement in respiratory status. ED gave:  vanco/zosyn, 2L NS, and duoneb. Patient denies chest pain, abdominal pain, back pain, or foot pain. He reports improvement in his respiratory status. Denies fever, chills, or nausea. Labs notable for mild WBCs of 11.5, downtrending lactate of 4.1-->2.3, elev K of 5.6 no EKG changes, trops of 0.05 and BNP of 22krespiratory alkalosis Ph of 7.47.. RVP negative and CXR w/ ebvidence of pulmonary congestion and bilat pleural effusions.     During hospitalization 2 weeks ago at [facility], patient had repair of duodenal perforation with Shreyas Patch and placement of J tube. Course complicated by leakage from shreyas patch requiring emergent repair with placement of gastrostomy and duodenostomy. Further, patient had PNA complicated by respiratory failure requiring tracheostomy. Lastly, patient with dry gangrene of toes from vasopressor use. Vascular surgery evaluated patient stating no intervention needed. Patient was discharged on trach collar 35% FiO2. (12 Mar 2018 22:48)    Surgery addendum:  Agree with above HPI. Patient seen and examined at the bedside. Remains on Vent, although awake and following commands. Has no abdominal complaints.       PAST MEDICAL & SURGICAL HISTORY:  CHF (congestive heart failure)  Cholecystitis  Duodenal ulcer  Atrial fibrillation  Mitral regurgitation  Aortic regurgitation  H/O tracheostomy  Acute duodenal ulcer with perforation  H/O gastrostomy, has currently  Perforated duodenal ulcer      ROS: See HPI    MEDICATIONS:  ALLERGIES:    SOCIAL HISTORY:  Smoke: Never Smoker  EtOH: occasional    Vital Signs Last 24 Hrs  T(C): 37.3 (13 Mar 2018 14:00), Max: 37.6 (12 Mar 2018 21:00)  T(F): 99.1 (13 Mar 2018 14:00), Max: 99.6 (12 Mar 2018 21:00)  HR: 86 (13 Mar 2018 14:00) (78 - 106)  BP: 110/66 (13 Mar 2018 14:00) (94/65 - 133/81)  BP(mean): 82 (13 Mar 2018 14:00) (75 - 95)  RR: 17 (13 Mar 2018 14:00) (15 - 98)  SpO2: 100% (13 Mar 2018 14:00) (99% - 100%)    PHYSICAL EXAM  Gen: sleeping comfortably  CV: intermittently tachycardic, normotensive  Pulm: trach. on AC   Abd: Soft, Non-distended, non-tender throughout, no rebound or guarding, 3 drains: gastrostomy tube and duodenostomy tube clamped, feeding J-tube, no surrounding erythema or edema    LABS:                        7.2    9.6   )-----------( 179      ( 13 Mar 2018 06:30 )             23.6     03-13    133<L>  |  97  |  44<H>  ----------------------------<  121<H>  5.0   |  26  |  0.72    Ca    8.4      13 Mar 2018 06:30  Phos  4.0     03-13  Mg     2.8     03-13    TPro  x   /  Alb  x   /  TBili  1.7<H>  /  DBili  0.8<H>  /  AST  x   /  ALT  x   /  AlkPhos  x   03-13    PT/INR - ( 12 Mar 2018 14:31 )   PT: 23.9 sec;   INR: 2.12          PTT - ( 12 Mar 2018 14:31 )  PTT:32.7 sec  Urinalysis Basic - ( 12 Mar 2018 19:03 )    Color: Yellow / Appearance: Clear / S.010 / pH: x  Gluc: x / Ketone: NEGATIVE  / Bili: Negative / Urobili: 1.0 E.U./dL   Blood: x / Protein: Trace mg/dL / Nitrite: NEGATIVE   Leuk Esterase: NEGATIVE / RBC: < 5 /HPF / WBC < 5 /HPF   Sq Epi: x / Non Sq Epi: 0-5 /HPF / Bacteria: Present /HPF      RADIOLOGY & ADDITIONAL STUDIES:  < from: CT Abdomen and Pelvis w/ IV Cont (18 @ 16:33) >  EXAM:  CT ABDOMEN AND PELVIS IC                          PROCEDURE DATE:  2018    Quantity of Contrast in Vial in ml: 100 Contrast Used: Optiray 350  Quantity of Contrast Wasted in ml: 10           INTERPRETATION:  CT of the abdomen and pelvis with contrast dated   3/12/2018 4:33 PM    INDICATION: Sepsis. Recent abdominal surgery.    TECHNIQUE: CT of abdomen and pelvis was performed using oral and   intravenous contrast. Axial, sagittal and coronal images were produced   and reviewed.    PRIOR STUDIES: 2018 and other studies dating back to 2018.    FINDINGS: Images of the lower chest demonstrate small bilateral pleural   effusions. There are persistent dependent consolidations in both lower   lobes. Cardiomegaly is again noted.    The liver is normal in appearance.  A large gallstone is again noted   within the gallbladder lumen. There is no gallbladder distention.   Gallbladder wall is again thickened. There is mild infiltration of the   pericholecystic fat. Small amount of ascites is seen in the right upper   quadrant. 1.1 cm hyperattenuating nodule is again seen in the body of the   pancreas. There is again a 1.8 cm cystic lesion in the pancreatic tail.   No splenic abnormalities are seen.    The right adrenal glands is unremarkable.   There is a 2.4 cm nodule in the lateral limb of the left adrenal gland.   There is a 1.7 cm nodule in the medial limb of the left adrenal gland.   Multiple bilateral renal cysts are again seen measuring up to 6.7 cm on   the right and 11.2 cm on the left side. There is no hydronephrosis.    No abdominal aortic aneurysm is seen. IVC filter is in place. No   lymphadenopathy is seen.     Postoperative changes are again seen in the anterior abdominal wall.   Right upper quadrantabdominal drain has been removed. Percutaneous   gastrostomy tube remains in the gastric body. There is a percutaneous   duodenostomy tube with the tip within the duodenal bulb. There is a small   caliber jejunostomy tube in the left mid abdomen. There is rectal stool   impaction. No bowel obstruction or free intraperitoneal air is seen.   There is trace ascites.    Images of the pelvis demonstrate no bladder abnormality. Prostate is not   significantly enlarged.    Evaluation of the osseous structures demonstrates unchanged sclerotic   lesion in the right greater trochanter measuring 1.2 cm. Mild   degenerative changes of spine are seen. There are old healed right lower   rib fractures.      IMPRESSION:    Cholelithiasis. No gallbladder distention. Thickened gallbladder wall and   infiltration of the fat in the right upper quadrant. Similar findings   were seen on the prior studies and could be partly due to congestive   heart failure and partly due to chronic cholecystitis but difficultto   exclude a component of acute cholecystitis.    Cardiomegaly. Persistent small pleural effusions and bilateral lower lobe   consolidations which could be due to atelectasis and/or pneumonia.    Gastrostomy tube, duodenal ostomy tube and jejunostomy tube remain in   place. No bowel obstruction or free air.     Rectal stool impaction. No bowel obstruction.        ASSESSMENT AND PLAN:  71 yo male recently discharged from [facility] (on 18) after prolonged hospital course due to perforated duodenal ulcer (s/p exploratory laparotomy  2018  washout, primary closure of duodenal ulcer with overlying omental patch, retrograde duodenostomy, Jon Gastrostomy, revision of feeding jejunostomy, retention suture placement// s/p diagnostic laparoscopy  2018  with repair of perforated duodenal ulcer with shreyas patch, wide washout, placement of feeding J-tube ), hospital stay complicated by respiratory failure, prolonged intubation and tracheostomy.     Currently patient admitted to MICU with respiratory failure due to possible pneumonia and CHF exacerbation     - continue excellent care per MICU team   - no surgical intervention at this time   - once patient hemodynamically stable, will plan for CT abd/pelvis with contrast via gastrostomy tube   - surgery team 4C following, please call if any issues   - discussed with chief resident and Dr. Metcalf

## 2018-03-13 NOTE — CONSULT NOTE ADULT - PROBLEM SELECTOR RECOMMENDATION 9
Echo results reviewed.  Similar to results from [facility] last admission.  Will discuss case with Dr. Lin.  Patient is very deconditioned and being treated empirically for PNA.  Feeds are currently on hold.  Unclear if he is a surgical candidate for his MR.  Will review full case with the attending and provide recommendations thereafter.  Thank you.

## 2018-03-14 LAB
ALBUMIN SERPL ELPH-MCNC: 2.5 G/DL — LOW (ref 3.3–5)
ALP SERPL-CCNC: 192 U/L — HIGH (ref 40–120)
ALT FLD-CCNC: 47 U/L — HIGH (ref 10–45)
ANION GAP SERPL CALC-SCNC: 11 MMOL/L — SIGNIFICANT CHANGE UP (ref 5–17)
ANION GAP SERPL CALC-SCNC: 14 MMOL/L — SIGNIFICANT CHANGE UP (ref 5–17)
APTT BLD: 31.2 SEC — SIGNIFICANT CHANGE UP (ref 27.5–37.4)
AST SERPL-CCNC: 32 U/L — SIGNIFICANT CHANGE UP (ref 10–40)
BILIRUB SERPL-MCNC: 1.4 MG/DL — HIGH (ref 0.2–1.2)
BLD GP AB SCN SERPL QL: NEGATIVE — SIGNIFICANT CHANGE UP
BUN SERPL-MCNC: 40 MG/DL — HIGH (ref 7–23)
BUN SERPL-MCNC: 45 MG/DL — HIGH (ref 7–23)
CALCIUM SERPL-MCNC: 8 MG/DL — LOW (ref 8.4–10.5)
CALCIUM SERPL-MCNC: 8.2 MG/DL — LOW (ref 8.4–10.5)
CHLORIDE SERPL-SCNC: 97 MMOL/L — SIGNIFICANT CHANGE UP (ref 96–108)
CHLORIDE SERPL-SCNC: 99 MMOL/L — SIGNIFICANT CHANGE UP (ref 96–108)
CO2 SERPL-SCNC: 25 MMOL/L — SIGNIFICANT CHANGE UP (ref 22–31)
CO2 SERPL-SCNC: 28 MMOL/L — SIGNIFICANT CHANGE UP (ref 22–31)
CREAT SERPL-MCNC: 0.8 MG/DL — SIGNIFICANT CHANGE UP (ref 0.5–1.3)
CREAT SERPL-MCNC: 0.89 MG/DL — SIGNIFICANT CHANGE UP (ref 0.5–1.3)
CULTURE RESULTS: NO GROWTH — SIGNIFICANT CHANGE UP
GLUCOSE SERPL-MCNC: 147 MG/DL — HIGH (ref 70–99)
GLUCOSE SERPL-MCNC: 155 MG/DL — HIGH (ref 70–99)
HCT VFR BLD CALC: 23.6 % — LOW (ref 39–50)
HGB BLD-MCNC: 7.1 G/DL — LOW (ref 13–17)
INR BLD: 1.68 — HIGH (ref 0.88–1.16)
MAGNESIUM SERPL-MCNC: 2.3 MG/DL — SIGNIFICANT CHANGE UP (ref 1.6–2.6)
MAGNESIUM SERPL-MCNC: 2.5 MG/DL — SIGNIFICANT CHANGE UP (ref 1.6–2.6)
MCHC RBC-ENTMCNC: 28.5 PG — SIGNIFICANT CHANGE UP (ref 27–34)
MCHC RBC-ENTMCNC: 30.1 G/DL — LOW (ref 32–36)
MCV RBC AUTO: 94.8 FL — SIGNIFICANT CHANGE UP (ref 80–100)
PHOSPHATE SERPL-MCNC: 4.5 MG/DL — SIGNIFICANT CHANGE UP (ref 2.5–4.5)
PLATELET # BLD AUTO: 163 K/UL — SIGNIFICANT CHANGE UP (ref 150–400)
POTASSIUM SERPL-MCNC: 3.4 MMOL/L — LOW (ref 3.5–5.3)
POTASSIUM SERPL-MCNC: 3.6 MMOL/L — SIGNIFICANT CHANGE UP (ref 3.5–5.3)
POTASSIUM SERPL-SCNC: 3.4 MMOL/L — LOW (ref 3.5–5.3)
POTASSIUM SERPL-SCNC: 3.6 MMOL/L — SIGNIFICANT CHANGE UP (ref 3.5–5.3)
PROT SERPL-MCNC: 6.4 G/DL — SIGNIFICANT CHANGE UP (ref 6–8.3)
PROTHROM AB SERPL-ACNC: 18.8 SEC — HIGH (ref 9.8–12.7)
RBC # BLD: 2.49 M/UL — LOW (ref 4.2–5.8)
RBC # FLD: 17 % — HIGH (ref 10.3–16.9)
RH IG SCN BLD-IMP: POSITIVE — SIGNIFICANT CHANGE UP
SODIUM SERPL-SCNC: 136 MMOL/L — SIGNIFICANT CHANGE UP (ref 135–145)
SODIUM SERPL-SCNC: 138 MMOL/L — SIGNIFICANT CHANGE UP (ref 135–145)
SPECIMEN SOURCE: SIGNIFICANT CHANGE UP
VANCOMYCIN TROUGH SERPL-MCNC: 14 UG/ML — SIGNIFICANT CHANGE UP (ref 10–20)
VANCOMYCIN TROUGH SERPL-MCNC: 23 UG/ML — HIGH (ref 10–20)
VIT B12 SERPL-MCNC: 1206 PG/ML — SIGNIFICANT CHANGE UP (ref 232–1245)
WBC # BLD: 6.6 K/UL — SIGNIFICANT CHANGE UP (ref 3.8–10.5)
WBC # FLD AUTO: 6.6 K/UL — SIGNIFICANT CHANGE UP (ref 3.8–10.5)

## 2018-03-14 PROCEDURE — 71045 X-RAY EXAM CHEST 1 VIEW: CPT | Mod: 26

## 2018-03-14 PROCEDURE — 99233 SBSQ HOSP IP/OBS HIGH 50: CPT | Mod: GC

## 2018-03-14 PROCEDURE — 99232 SBSQ HOSP IP/OBS MODERATE 35: CPT

## 2018-03-14 RX ORDER — POTASSIUM CHLORIDE 20 MEQ
40 PACKET (EA) ORAL EVERY 4 HOURS
Qty: 0 | Refills: 0 | Status: DISCONTINUED | OUTPATIENT
Start: 2018-03-14 | End: 2018-03-14

## 2018-03-14 RX ORDER — FUROSEMIDE 40 MG
40 TABLET ORAL EVERY 12 HOURS
Qty: 0 | Refills: 0 | Status: DISCONTINUED | OUTPATIENT
Start: 2018-03-14 | End: 2018-03-22

## 2018-03-14 RX ORDER — LISINOPRIL 2.5 MG/1
2.5 TABLET ORAL DAILY
Qty: 0 | Refills: 0 | Status: DISCONTINUED | OUTPATIENT
Start: 2018-03-14 | End: 2018-03-23

## 2018-03-14 RX ORDER — VANCOMYCIN HCL 1 G
1000 VIAL (EA) INTRAVENOUS EVERY 12 HOURS
Qty: 0 | Refills: 0 | Status: DISCONTINUED | OUTPATIENT
Start: 2018-03-15 | End: 2018-03-15

## 2018-03-14 RX ORDER — VANCOMYCIN HCL 1 G
1000 VIAL (EA) INTRAVENOUS ONCE
Qty: 0 | Refills: 0 | Status: COMPLETED | OUTPATIENT
Start: 2018-03-14 | End: 2018-03-14

## 2018-03-14 RX ORDER — POTASSIUM CHLORIDE 20 MEQ
40 PACKET (EA) ORAL ONCE
Qty: 0 | Refills: 0 | Status: COMPLETED | OUTPATIENT
Start: 2018-03-14 | End: 2018-03-14

## 2018-03-14 RX ORDER — FERROUS SULFATE 325(65) MG
300 TABLET ORAL DAILY
Qty: 0 | Refills: 0 | Status: DISCONTINUED | OUTPATIENT
Start: 2018-03-14 | End: 2018-03-18

## 2018-03-14 RX ORDER — POTASSIUM CHLORIDE 20 MEQ
40 PACKET (EA) ORAL EVERY 4 HOURS
Qty: 0 | Refills: 0 | Status: DISCONTINUED | OUTPATIENT
Start: 2018-03-14 | End: 2018-03-15

## 2018-03-14 RX ORDER — VANCOMYCIN HCL 1 G
VIAL (EA) INTRAVENOUS
Qty: 0 | Refills: 0 | Status: DISCONTINUED | OUTPATIENT
Start: 2018-03-14 | End: 2018-03-15

## 2018-03-14 RX ADMIN — APIXABAN 5 MILLIGRAM(S): 2.5 TABLET, FILM COATED ORAL at 17:22

## 2018-03-14 RX ADMIN — PIPERACILLIN AND TAZOBACTAM 200 GRAM(S): 4; .5 INJECTION, POWDER, LYOPHILIZED, FOR SOLUTION INTRAVENOUS at 12:46

## 2018-03-14 RX ADMIN — CHLORHEXIDINE GLUCONATE 15 MILLILITER(S): 213 SOLUTION TOPICAL at 05:49

## 2018-03-14 RX ADMIN — Medication 40 MILLIGRAM(S): at 11:13

## 2018-03-14 RX ADMIN — Medication 40 MILLIEQUIVALENT(S): at 08:50

## 2018-03-14 RX ADMIN — PIPERACILLIN AND TAZOBACTAM 200 GRAM(S): 4; .5 INJECTION, POWDER, LYOPHILIZED, FOR SOLUTION INTRAVENOUS at 05:49

## 2018-03-14 RX ADMIN — Medication 300 MILLIGRAM(S): at 17:22

## 2018-03-14 RX ADMIN — LISINOPRIL 2.5 MILLIGRAM(S): 2.5 TABLET ORAL at 17:23

## 2018-03-14 RX ADMIN — PANTOPRAZOLE SODIUM 40 MILLIGRAM(S): 20 TABLET, DELAYED RELEASE ORAL at 17:23

## 2018-03-14 RX ADMIN — PIPERACILLIN AND TAZOBACTAM 200 GRAM(S): 4; .5 INJECTION, POWDER, LYOPHILIZED, FOR SOLUTION INTRAVENOUS at 17:23

## 2018-03-14 RX ADMIN — Medication 40 MILLIEQUIVALENT(S): at 22:15

## 2018-03-14 RX ADMIN — Medication 40 MILLIGRAM(S): at 22:15

## 2018-03-14 RX ADMIN — APIXABAN 5 MILLIGRAM(S): 2.5 TABLET, FILM COATED ORAL at 05:49

## 2018-03-14 RX ADMIN — PANTOPRAZOLE SODIUM 40 MILLIGRAM(S): 20 TABLET, DELAYED RELEASE ORAL at 05:49

## 2018-03-14 RX ADMIN — PIPERACILLIN AND TAZOBACTAM 200 GRAM(S): 4; .5 INJECTION, POWDER, LYOPHILIZED, FOR SOLUTION INTRAVENOUS at 23:23

## 2018-03-14 RX ADMIN — Medication 0.12 MILLIGRAM(S): at 05:49

## 2018-03-14 RX ADMIN — CHLORHEXIDINE GLUCONATE 15 MILLILITER(S): 213 SOLUTION TOPICAL at 17:23

## 2018-03-14 RX ADMIN — Medication 250 MILLIGRAM(S): at 17:11

## 2018-03-14 NOTE — PROGRESS NOTE ADULT - SUBJECTIVE AND OBJECTIVE BOX
INTERVAL HPI/OVERNIGHT EVENTS: No acute events overnight    SUBJECTIVE: Patient seen and examined at bedside. No acute complaints. Denies nausea, emesis, chest pain, palpitations, shortness of breath, abdominal pain    OBJECTIVE:    VITAL SIGNS:  ICU Vital Signs Last 24 Hrs  T(C): 37.2 (14 Mar 2018 18:13), Max: 37.3 (14 Mar 2018 07:15)  T(F): 99 (14 Mar 2018 18:13), Max: 99.2 (14 Mar 2018 07:15)  HR: 72 (14 Mar 2018 20:00) (64 - 100)  BP: 108/64 (14 Mar 2018 20:00) (95/61 - 150/92)  BP(mean): 81 (14 Mar 2018 20:00) (71 - 105)  ABP: --  ABP(mean): --  RR: 25 (14 Mar 2018 20:00) (0 - 42)  SpO2: 100% (14 Mar 2018 20:00) (95% - 100%)    Mode: CPAP with PS, FiO2: 40, PEEP: 5, PS: 10, MAP: 9, PIP: 15    03-13 @ 07:01 - 03-14 @ 07:00  --------------------------------------------------------  IN: 1470 mL / OUT: 2100 mL / NET: -630 mL    03-14 @ 07:01 - 03-14 @ 20:25  --------------------------------------------------------  IN: 1409 mL / OUT: 1450 mL / NET: -41 mL      CAPILLARY BLOOD GLUCOSE          PHYSICAL EXAM:    General: WDWN M resting comfortably on bed, +trach. Opens eyes to voice  HEENT: AT/NC, PERRL  Neck: No JVD   Pulm: Diminished bibasilar breath sounds, no crackles, wheeze, or rhonchi   Cardiac: +S1/S2, irregular rate and irregular rhythm, 3/6 systolic murmur auscultated  GI: Soft, NT, ND. Surgical scars with + G/J/D tube and lap sites, no signs of infection or purulence    Extremities: Lower extremities with 1+ edema b/l. + Dry gangrene of toes. No DP pulses by palpation b/l LE   Neurologic: Awake and alert, following commands. Moves all extremities.    MEDICATIONS:  MEDICATIONS  (STANDING):  apixaban 5 milliGRAM(s) Oral every 12 hours  chlorhexidine 0.12% Liquid 15 milliLiter(s) Swish and Spit two times a day  digoxin     Tablet 0.125 milliGRAM(s) Oral daily  ferrous    sulfate Liquid 300 milliGRAM(s) Enteral Tube daily  furosemide   Injectable 40 milliGRAM(s) IV Push every 12 hours  lisinopril 2.5 milliGRAM(s) Oral daily  pantoprazole  Injectable 40 milliGRAM(s) IV Push every 12 hours  piperacillin/tazobactam IVPB. 4.5 Gram(s) IV Intermittent every 6 hours  vancomycin  IVPB        MEDICATIONS  (PRN):  ALBUTerol/ipratropium for Nebulization 3 milliLiter(s) Nebulizer every 4 hours PRN Shortness of Breath and/or Wheezing      ALLERGIES:  Allergies    No Known Allergies    Intolerances        LABS:                        7.1    6.6   )-----------( 163      ( 14 Mar 2018 05:52 )             23.6     03-14    136  |  97  |  45<H>  ----------------------------<  155<H>  3.6   |  25  |  0.89    Ca    8.2<L>      14 Mar 2018 05:52  Phos  4.5     03-14  Mg     2.5     03-14    TPro  6.4  /  Alb  2.5<L>  /  TBili  1.4<H>  /  DBili  x   /  AST  32  /  ALT  47<H>  /  AlkPhos  192<H>  03-14    LIVER FUNCTIONS - ( 14 Mar 2018 05:52 )  Alb: 2.5 g/dL / Pro: 6.4 g/dL / ALK PHOS: 192 U/L / ALT: 47 U/L / AST: 32 U/L / GGT: x           PT/INR - ( 14 Mar 2018 05:52 )   PT: 18.8 sec;   INR: 1.68          PTT - ( 14 Mar 2018 05:52 )  PTT:31.2 sec    CARDIAC MARKERS ( 13 Mar 2018 06:30 )  x     / 0.06 ng/mL / x     / x     / x      CARDIAC MARKERS ( 12 Mar 2018 22:31 )  x     / 0.06 ng/mL / 27 U/L / x     / 2.8 ng/mL        RADIOLOGY & ADDITIONAL TESTS: Reviewed.    ASSESSMENT: 72M PMH severe MR, severe pHTN, afib (on Eliquis), R popliteal DVT s/p IVC filter, respiratory failure secondary to PNA s/p tracheostomy (baseline on trach collar; FiO2 35%), and perforated duodenal ulcer s/p repair who presents with respiratory failure that required intubation    CARDIAC:     #CHF  - Elevated BNP, 22k (vs 7k on last admission). Overload on exam at admission (JVP, 1+ edema, crackles) concern for CHF. New respiratory requirement also consistent with CHF. Severe MR --> pHTN type II is likely. NYHA ~II. CXR c/w bilateral pulm congestion and effusion.   - Echo 3/13 demonstrates EF 45-50%, severe mitral prolapse with possible flail posterior mitral leaflet, moderate tricuspid regurgitation, mild-mod pulm HTN 45mmHg, cardiology consulted regarding valve recommendations, appreciate recs and will continue to evaluate as medically stabilizes  - Troponins peaked 0.06  - Diurese with Lasix 40mg IV BID  - Added Lisinopril 2.5mg qd    RESP:   - Respiratory failure now improved on mech vent (prev. on trach collar)  - Continue to monitor respiratory status, ABG as necessary to adjust    ID: Possible pneumonia, though no ena evidence on CT scan/cxr. RVP negative. UA negative. 2/4 SIRS w/ lactate - meets criteria for severe sepsis.   - Continue with vancomycin, zosyn - patients meets criteria for HAP (from nursing home) though unclear if patient with respiratory requirement from sepsis vs CHF  - Lactate resolved  - f/u Bcx, growing gram positive cocci in clusters matches sputum  - F/u surveillance cultures    #Afib  - c/w home eliquis   - Continue with telemetry monitoring   - 0.125mg qd Digoxin    VASCULAR  #Gangrene of toes  - Prev vasc consult no acute event. Could be possible infectious source  - Continue to monitor site    GI:   - CT abd shows possible evidence of cholecystitis, LFTs mildly elevated - though could be 2/2 hepatic congestion  - Surgery consulted, appreciate recs  - Will obtain CT abdomen/pelvis when more stable    #duodenal ulcer - no acute evidence of bleed, hgb at baseline  - Trend CBC, monitor VS with telemetry    HEME: Normocytic anemia.   - Started iron repletement  - Trend CBC    #DVT   - on AC w/ IVC filter  - Continue with home Eliquis    MET: HyperK+, no ekg changes  - Continue to trend BMP    FEN:  F: No IVF   E: replete Mg<2, K<4  N: NPO, glucerna 1.5 at 63cc/hr  Lines: J/g/d tube. 2 peripherals.   ppx w/ eliquis and protonix

## 2018-03-14 NOTE — PHYSICAL THERAPY INITIAL EVALUATION ADULT - CRITERIA FOR SKILLED THERAPEUTIC INTERVENTIONS
functional limitations in following categories/anticipated equipment needs at discharge/anticipated discharge recommendation/risk reduction/prevention/rehab potential/impairments found

## 2018-03-14 NOTE — PROGRESS NOTE ADULT - ASSESSMENT
72M w/severe MR, pulm HTN, afib p/w respiratory failure and bacteremia.    - would c/w diuresis w/IV lasix; goal fluid balance net negative 1-2L  - if BP becomes elevated, can consider afterload reduction with hydralazine or ACE-I/ARB  - awaiting CT surgery/structural heart decision on mitral valve repair vs clip    Discussed w/Dr. Khan 72M w/severe MR, pulm HTN, afib p/w respiratory failure and bacteremia.    - would c/w diuresis w/IV lasix; goal fluid balance net negative 1-2L  - if BP becomes elevated, can consider afterload reduction with hydralazine or ACE-I/ARB  - awaiting CT surgery/structural heart decision on mitral valve repair vs clip  - will follow blood cultures    Discussed w/Dr. Khan

## 2018-03-14 NOTE — PHYSICAL THERAPY INITIAL EVALUATION ADULT - DIAGNOSIS, PT EVAL
5A: Primary Prevention/Risk Reduction for Loss of Balance and Falling; 6E: Impaired Ventilation and Respiration/Gas Exchange Associated with Ventilatory Pump Dysfunction or Failure

## 2018-03-14 NOTE — PROGRESS NOTE ADULT - SUBJECTIVE AND OBJECTIVE BOX
Subjective:  -    PAST MEDICAL & SURGICAL HISTORY:  CHF (congestive heart failure)  Cholecystitis  Duodenal ulcer  Atrial fibrillation  Mitral regurgitation  Aortic regurgitation  H/O tracheostomy  Acute duodenal ulcer with perforation  H/O gastrostomy, has currently  Perforated duodenal ulcer      Vital Signs Last 24 Hrs  T(C): 37.1 (14 Mar 2018 09:32), Max: 37.3 (13 Mar 2018 14:00)  T(F): 98.8 (14 Mar 2018 09:32), Max: 99.2 (14 Mar 2018 07:15)  HR: 74 (14 Mar 2018 09:00) (66 - 92)  BP: 106/60 (14 Mar 2018 09:00) (84/56 - 113/59)  BP(mean): 80 (14 Mar 2018 09:00) (69 - 85)  RR: 23 (14 Mar 2018 09:00) (12 - 28)  SpO2: 100% (14 Mar 2018 09:00) (100% - 100%)   I&O's Detail    13 Mar 2018 07:01  -  14 Mar 2018 07:00  --------------------------------------------------------  IN:    Enteral Tube Flush: 540 mL    Glucerna: 380 mL    IV PiggyBack: 550 mL  Total IN: 1470 mL    OUT:    Indwelling Catheter - Urethral: 2100 mL  Total OUT: 2100 mL    Total NET: -630 mL      14 Mar 2018 07:01  -  14 Mar 2018 11:04  --------------------------------------------------------  IN:    Enteral Tube Flush: 120 mL    Glucerna: 126 mL  Total IN: 246 mL    OUT:    Indwelling Catheter - Urethral: 50 mL  Total OUT: 50 mL    Total NET: 196 mL        Daily     Daily Weight in k (13 Mar 2018 14:23)    Physical Exam:   GEN: NAD, AAOx3  HEENT: MMM, no icterus  CV: S1 S2 RRR, no MRG  Lung: CTAB  Abd: soft NT ND +BS  Ext: no c/c/e  Neuro: no focal neuro deficit    MEDICATIONS  (STANDING):  apixaban 5 milliGRAM(s) Oral every 12 hours  chlorhexidine 0.12% Liquid 15 milliLiter(s) Swish and Spit two times a day  digoxin     Tablet 0.125 milliGRAM(s) Oral daily  furosemide   Injectable 40 milliGRAM(s) IV Push every 12 hours  pantoprazole  Injectable 40 milliGRAM(s) IV Push every 12 hours  piperacillin/tazobactam IVPB. 4.5 Gram(s) IV Intermittent every 6 hours      LABS:                        7.1    6.6   )-----------( 163      ( 14 Mar 2018 05:52 )             23.6     03-14    136  |  97  |  45<H>  ----------------------------<  155<H>  3.6   |  25  |  0.89    Ca    8.2<L>      14 Mar 2018 05:52  Phos  4.5     -  Mg     2.5     -    TPro  6.4  /  Alb  2.5<L>  /  TBili  1.4<H>  /  DBili  x   /  AST  32  /  ALT  47<H>  /  AlkPhos  192<H>  03-14    CARDIAC MARKERS ( 13 Mar 2018 06:30 )  x     / 0.06 ng/mL / x     / x     / x      CARDIAC MARKERS ( 12 Mar 2018 22:31 )  x     / 0.06 ng/mL / 27 U/L / x     / 2.8 ng/mL  CARDIAC MARKERS ( 12 Mar 2018 14:31 )  x     / 0.05 ng/mL / x     / x     / x          PT/INR - ( 14 Mar 2018 05:52 )   PT: 18.8 sec;   INR: 1.68          PTT - ( 14 Mar 2018 05:52 )  PTT:31.2 sec      RADIOLOGY & ADDITIONAL STUDIES: Subjective: Patient seen at bedside. Denied any CP, SOB, palpitations.   -    PAST MEDICAL & SURGICAL HISTORY:  CHF (congestive heart failure)  Cholecystitis  Duodenal ulcer  Atrial fibrillation  Mitral regurgitation  Aortic regurgitation  H/O tracheostomy  Acute duodenal ulcer with perforation  H/O gastrostomy, has currently  Perforated duodenal ulcer      Vital Signs Last 24 Hrs  T(C): 37.1 (14 Mar 2018 09:32), Max: 37.3 (13 Mar 2018 14:00)  T(F): 98.8 (14 Mar 2018 09:32), Max: 99.2 (14 Mar 2018 07:15)  HR: 74 (14 Mar 2018 09:00) (66 - 92)  BP: 106/60 (14 Mar 2018 09:00) (84/56 - 113/59)  BP(mean): 80 (14 Mar 2018 09:00) (69 - 85)  RR: 23 (14 Mar 2018 09:00) (12 - 28)  SpO2: 100% (14 Mar 2018 09:00) (100% - 100%)   I&O's Detail    13 Mar 2018 07:01  -  14 Mar 2018 07:00  --------------------------------------------------------  IN:    Enteral Tube Flush: 540 mL    Glucerna: 380 mL    IV PiggyBack: 550 mL  Total IN: 1470 mL    OUT:    Indwelling Catheter - Urethral: 2100 mL  Total OUT: 2100 mL    Total NET: -630 mL      14 Mar 2018 07:01  -  14 Mar 2018 11:04  --------------------------------------------------------  IN:    Enteral Tube Flush: 120 mL    Glucerna: 126 mL  Total IN: 246 mL    OUT:    Indwelling Catheter - Urethral: 50 mL  Total OUT: 50 mL    Total NET: 196 mL        Daily     Daily Weight in k (13 Mar 2018 14:23)    Physical Exam:   GEN: NAD, AAOx3, trached, on mechanical ventilation  HEENT: MMM, no icterus  CV: S1 RRR,, III/VI blowing pansystolic murmur best heard at apex with radiation to axilla.  Lung: CTAB  Abd: soft NT ND +BS  Ext: no c/c/e  Neuro: no focal neuro deficit    MEDICATIONS  (STANDING):  apixaban 5 milliGRAM(s) Oral every 12 hours  chlorhexidine 0.12% Liquid 15 milliLiter(s) Swish and Spit two times a day  digoxin     Tablet 0.125 milliGRAM(s) Oral daily  furosemide   Injectable 40 milliGRAM(s) IV Push every 12 hours  pantoprazole  Injectable 40 milliGRAM(s) IV Push every 12 hours  piperacillin/tazobactam IVPB. 4.5 Gram(s) IV Intermittent every 6 hours      LABS:                        7.1    6.6   )-----------( 163      ( 14 Mar 2018 05:52 )             23.6     03-14    136  |  97  |  45<H>  ----------------------------<  155<H>  3.6   |  25  |  0.89    Ca    8.2<L>      14 Mar 2018 05:52  Phos  4.5     03-14  Mg     2.5     03-14    TPro  6.4  /  Alb  2.5<L>  /  TBili  1.4<H>  /  DBili  x   /  AST  32  /  ALT  47<H>  /  AlkPhos  192<H>  03-14    CARDIAC MARKERS ( 13 Mar 2018 06:30 )  x     / 0.06 ng/mL / x     / x     / x      CARDIAC MARKERS ( 12 Mar 2018 22:31 )  x     / 0.06 ng/mL / 27 U/L / x     / 2.8 ng/mL  CARDIAC MARKERS ( 12 Mar 2018 14:31 )  x     / 0.05 ng/mL / x     / x     / x          PT/INR - ( 14 Mar 2018 05:52 )   PT: 18.8 sec;   INR: 1.68          PTT - ( 14 Mar 2018 05:52 )  PTT:31.2 sec      RADIOLOGY & ADDITIONAL STUDIES:

## 2018-03-14 NOTE — PHYSICAL THERAPY INITIAL EVALUATION ADULT - ADDITIONAL COMMENTS
Pt arrives from [facility]. Prior to leaving hospital on last admission he was able to dangle on EOB, stand w/ 2 person assist, and take 2 side steps along EOB w/ 2 person assist. Pt states that while he was in [facility] he did not do any standing

## 2018-03-14 NOTE — PHYSICAL THERAPY INITIAL EVALUATION ADULT - PERTINENT HX OF CURRENT PROBLEM, REHAB EVAL
72y M with PMHx of severe MR, severe pHTN, afib (on Eliquis), R popliteal DVT s/p IVC filter, respiratory failure secondary to PNA s/p tracheostomy (baseline on trach collar; FiO2 35%), and perforated duodenal ulcer s/p repair who presents with respiratory failure requiring mechanical ventilation.

## 2018-03-15 DIAGNOSIS — A49.01 METHICILLIN SUSCEPTIBLE STAPHYLOCOCCUS AUREUS INFECTION, UNSPECIFIED SITE: ICD-10-CM

## 2018-03-15 DIAGNOSIS — J96.21 ACUTE AND CHRONIC RESPIRATORY FAILURE WITH HYPOXIA: ICD-10-CM

## 2018-03-15 DIAGNOSIS — I34.0 NONRHEUMATIC MITRAL (VALVE) INSUFFICIENCY: ICD-10-CM

## 2018-03-15 DIAGNOSIS — Z29.9 ENCOUNTER FOR PROPHYLACTIC MEASURES, UNSPECIFIED: ICD-10-CM

## 2018-03-15 DIAGNOSIS — R63.8 OTHER SYMPTOMS AND SIGNS CONCERNING FOOD AND FLUID INTAKE: ICD-10-CM

## 2018-03-15 DIAGNOSIS — K63.1 PERFORATION OF INTESTINE (NONTRAUMATIC): ICD-10-CM

## 2018-03-15 DIAGNOSIS — I50.9 HEART FAILURE, UNSPECIFIED: ICD-10-CM

## 2018-03-15 DIAGNOSIS — I96 GANGRENE, NOT ELSEWHERE CLASSIFIED: ICD-10-CM

## 2018-03-15 DIAGNOSIS — I27.20 PULMONARY HYPERTENSION, UNSPECIFIED: ICD-10-CM

## 2018-03-15 DIAGNOSIS — I48.91 UNSPECIFIED ATRIAL FIBRILLATION: ICD-10-CM

## 2018-03-15 DIAGNOSIS — J18.9 PNEUMONIA, UNSPECIFIED ORGANISM: ICD-10-CM

## 2018-03-15 LAB
-  CEFAZOLIN: SIGNIFICANT CHANGE UP
-  CEFAZOLIN: SIGNIFICANT CHANGE UP
-  CLINDAMYCIN: SIGNIFICANT CHANGE UP
-  CLINDAMYCIN: SIGNIFICANT CHANGE UP
-  ERYTHROMYCIN: SIGNIFICANT CHANGE UP
-  ERYTHROMYCIN: SIGNIFICANT CHANGE UP
-  LINEZOLID: SIGNIFICANT CHANGE UP
-  LINEZOLID: SIGNIFICANT CHANGE UP
-  OXACILLIN: SIGNIFICANT CHANGE UP
-  OXACILLIN: SIGNIFICANT CHANGE UP
-  PENICILLIN: SIGNIFICANT CHANGE UP
-  PENICILLIN: SIGNIFICANT CHANGE UP
-  RIFAMPIN: SIGNIFICANT CHANGE UP
-  RIFAMPIN: SIGNIFICANT CHANGE UP
-  TRIMETHOPRIM/SULFAMETHOXAZOLE: SIGNIFICANT CHANGE UP
-  TRIMETHOPRIM/SULFAMETHOXAZOLE: SIGNIFICANT CHANGE UP
-  VANCOMYCIN: SIGNIFICANT CHANGE UP
-  VANCOMYCIN: SIGNIFICANT CHANGE UP
ALBUMIN SERPL ELPH-MCNC: 2.5 G/DL — LOW (ref 3.3–5)
ALP SERPL-CCNC: 174 U/L — HIGH (ref 40–120)
ALT FLD-CCNC: 37 U/L — SIGNIFICANT CHANGE UP (ref 10–45)
ANION GAP SERPL CALC-SCNC: 12 MMOL/L — SIGNIFICANT CHANGE UP (ref 5–17)
APTT BLD: 32 SEC — SIGNIFICANT CHANGE UP (ref 27.5–37.4)
AST SERPL-CCNC: 22 U/L — SIGNIFICANT CHANGE UP (ref 10–40)
BILIRUB SERPL-MCNC: 1.1 MG/DL — SIGNIFICANT CHANGE UP (ref 0.2–1.2)
BUN SERPL-MCNC: 37 MG/DL — HIGH (ref 7–23)
CALCIUM SERPL-MCNC: 8 MG/DL — LOW (ref 8.4–10.5)
CHLORIDE SERPL-SCNC: 98 MMOL/L — SIGNIFICANT CHANGE UP (ref 96–108)
CO2 SERPL-SCNC: 28 MMOL/L — SIGNIFICANT CHANGE UP (ref 22–31)
CREAT SERPL-MCNC: 0.76 MG/DL — SIGNIFICANT CHANGE UP (ref 0.5–1.3)
CULTURE RESULTS: SIGNIFICANT CHANGE UP
DIGOXIN SERPL-MCNC: 0.5 NG/ML — LOW (ref 0.8–2)
GLUCOSE BLDC GLUCOMTR-MCNC: 139 MG/DL — HIGH (ref 70–99)
GLUCOSE SERPL-MCNC: 164 MG/DL — HIGH (ref 70–99)
HCT VFR BLD CALC: 24.1 % — LOW (ref 39–50)
HGB BLD-MCNC: 7.3 G/DL — LOW (ref 13–17)
INR BLD: 1.54 — HIGH (ref 0.88–1.16)
MAGNESIUM SERPL-MCNC: 2.3 MG/DL — SIGNIFICANT CHANGE UP (ref 1.6–2.6)
MCHC RBC-ENTMCNC: 28.7 PG — SIGNIFICANT CHANGE UP (ref 27–34)
MCHC RBC-ENTMCNC: 30.3 G/DL — LOW (ref 32–36)
MCV RBC AUTO: 94.9 FL — SIGNIFICANT CHANGE UP (ref 80–100)
METHOD TYPE: SIGNIFICANT CHANGE UP
METHOD TYPE: SIGNIFICANT CHANGE UP
ORGANISM # SPEC MICROSCOPIC CNT: SIGNIFICANT CHANGE UP
ORGANISM # SPEC MICROSCOPIC CNT: SIGNIFICANT CHANGE UP
PHOSPHATE SERPL-MCNC: 3.4 MG/DL — SIGNIFICANT CHANGE UP (ref 2.5–4.5)
PLATELET # BLD AUTO: 156 K/UL — SIGNIFICANT CHANGE UP (ref 150–400)
POTASSIUM SERPL-MCNC: 3.5 MMOL/L — SIGNIFICANT CHANGE UP (ref 3.5–5.3)
POTASSIUM SERPL-SCNC: 3.5 MMOL/L — SIGNIFICANT CHANGE UP (ref 3.5–5.3)
PROT SERPL-MCNC: 6.4 G/DL — SIGNIFICANT CHANGE UP (ref 6–8.3)
PROTHROM AB SERPL-ACNC: 17.3 SEC — HIGH (ref 9.8–12.7)
RBC # BLD: 2.54 M/UL — LOW (ref 4.2–5.8)
RBC # FLD: 17 % — HIGH (ref 10.3–16.9)
SODIUM SERPL-SCNC: 138 MMOL/L — SIGNIFICANT CHANGE UP (ref 135–145)
SPECIMEN SOURCE: SIGNIFICANT CHANGE UP
WBC # BLD: 6.4 K/UL — SIGNIFICANT CHANGE UP (ref 3.8–10.5)
WBC # FLD AUTO: 6.4 K/UL — SIGNIFICANT CHANGE UP (ref 3.8–10.5)

## 2018-03-15 PROCEDURE — 99232 SBSQ HOSP IP/OBS MODERATE 35: CPT

## 2018-03-15 PROCEDURE — 99233 SBSQ HOSP IP/OBS HIGH 50: CPT | Mod: GC

## 2018-03-15 PROCEDURE — 71045 X-RAY EXAM CHEST 1 VIEW: CPT | Mod: 26

## 2018-03-15 RX ORDER — CEFAZOLIN SODIUM 1 G
2000 VIAL (EA) INJECTION EVERY 8 HOURS
Qty: 0 | Refills: 0 | Status: DISCONTINUED | OUTPATIENT
Start: 2018-03-15 | End: 2018-03-15

## 2018-03-15 RX ORDER — POTASSIUM CHLORIDE 20 MEQ
40 PACKET (EA) ORAL EVERY 4 HOURS
Qty: 0 | Refills: 0 | Status: COMPLETED | OUTPATIENT
Start: 2018-03-15 | End: 2018-03-15

## 2018-03-15 RX ORDER — CEFAZOLIN SODIUM 1 G
2000 VIAL (EA) INJECTION EVERY 8 HOURS
Qty: 0 | Refills: 0 | Status: DISCONTINUED | OUTPATIENT
Start: 2018-03-15 | End: 2018-03-19

## 2018-03-15 RX ADMIN — APIXABAN 5 MILLIGRAM(S): 2.5 TABLET, FILM COATED ORAL at 05:13

## 2018-03-15 RX ADMIN — PANTOPRAZOLE SODIUM 40 MILLIGRAM(S): 20 TABLET, DELAYED RELEASE ORAL at 18:28

## 2018-03-15 RX ADMIN — CHLORHEXIDINE GLUCONATE 15 MILLILITER(S): 213 SOLUTION TOPICAL at 18:29

## 2018-03-15 RX ADMIN — PIPERACILLIN AND TAZOBACTAM 200 GRAM(S): 4; .5 INJECTION, POWDER, LYOPHILIZED, FOR SOLUTION INTRAVENOUS at 05:12

## 2018-03-15 RX ADMIN — Medication 40 MILLIGRAM(S): at 11:16

## 2018-03-15 RX ADMIN — LISINOPRIL 2.5 MILLIGRAM(S): 2.5 TABLET ORAL at 05:13

## 2018-03-15 RX ADMIN — Medication 40 MILLIEQUIVALENT(S): at 07:47

## 2018-03-15 RX ADMIN — Medication 250 MILLIGRAM(S): at 05:55

## 2018-03-15 RX ADMIN — Medication 2000 MILLIGRAM(S): at 11:30

## 2018-03-15 RX ADMIN — Medication 300 MILLIGRAM(S): at 11:30

## 2018-03-15 RX ADMIN — Medication 0.12 MILLIGRAM(S): at 05:13

## 2018-03-15 RX ADMIN — Medication 40 MILLIEQUIVALENT(S): at 11:16

## 2018-03-15 RX ADMIN — APIXABAN 5 MILLIGRAM(S): 2.5 TABLET, FILM COATED ORAL at 18:29

## 2018-03-15 RX ADMIN — PANTOPRAZOLE SODIUM 40 MILLIGRAM(S): 20 TABLET, DELAYED RELEASE ORAL at 05:12

## 2018-03-15 RX ADMIN — Medication 1000 MILLIGRAM(S): at 19:26

## 2018-03-15 RX ADMIN — CHLORHEXIDINE GLUCONATE 15 MILLILITER(S): 213 SOLUTION TOPICAL at 05:13

## 2018-03-15 NOTE — PROGRESS NOTE ADULT - SUBJECTIVE AND OBJECTIVE BOX
Subjective:  - doing better, now on trache collar    PAST MEDICAL & SURGICAL HISTORY:  CHF (congestive heart failure)  Cholecystitis  Duodenal ulcer  Atrial fibrillation  Mitral regurgitation  Aortic regurgitation  H/O tracheostomy  Acute duodenal ulcer with perforation  H/O gastrostomy, has currently  Perforated duodenal ulcer      Vital Signs Last 24 Hrs  T(C): 36.4 (15 Mar 2018 13:19), Max: 37.3 (15 Mar 2018 09:17)  T(F): 97.6 (15 Mar 2018 13:19), Max: 99.1 (15 Mar 2018 09:17)  HR: 72 (15 Mar 2018 12:00) (57 - 96)  BP: 103/67 (15 Mar 2018 12:00) (95/61 - 150/92)  BP(mean): 82 (15 Mar 2018 12:00) (73 - 105)  RR: 10 (15 Mar 2018 12:00) (0 - 42)  SpO2: 100% (15 Mar 2018 12:00) (95% - 100%)   I&O's Detail    14 Mar 2018 07:01  -  15 Mar 2018 07:00  --------------------------------------------------------  IN:    Enteral Tube Flush: 980 mL    Glucerna: 1512 mL    IV PiggyBack: 800 mL  Total IN: 3292 mL    OUT:    Indwelling Catheter - Urethral: 3428 mL  Total OUT: 3428 mL    Total NET: -136 mL      15 Mar 2018 07:01  -  15 Mar 2018 13:26  --------------------------------------------------------  IN:    Enteral Tube Flush: 120 mL    Glucerna: 315 mL  Total IN: 435 mL    OUT:    Indwelling Catheter - Urethral: 295 mL  Total OUT: 295 mL    Total NET: 140 mL        Daily     Daily     Physical Exam:   GEN: NAD, AAOx3, + trache  HEENT: MMM, no icterus  CV: irregular, +holosytolic murmur  Lung: crackles at bases b/l  Abd: soft NT ND +BS  Ext: no c/c/e  Neuro: no focal neuro deficit    MEDICATIONS  (STANDING):  apixaban 5 milliGRAM(s) Oral every 12 hours  ceFAZolin  Injectable. 2000 milliGRAM(s) IV Push every 8 hours  chlorhexidine 0.12% Liquid 15 milliLiter(s) Swish and Spit two times a day  ferrous    sulfate Liquid 300 milliGRAM(s) Enteral Tube daily  furosemide   Injectable 40 milliGRAM(s) IV Push every 12 hours  lisinopril 2.5 milliGRAM(s) Oral daily  pantoprazole  Injectable 40 milliGRAM(s) IV Push every 12 hours      LABS:                        7.3    6.4   )-----------( 156      ( 15 Mar 2018 05:25 )             24.1     03-15    138  |  98  |  37<H>  ----------------------------<  164<H>  3.5   |  28  |  0.76    Ca    8.0<L>      15 Mar 2018 05:25  Phos  3.4     03-15  Mg     2.3     03-15    TPro  6.4  /  Alb  2.5<L>  /  TBili  1.1  /  DBili  x   /  AST  22  /  ALT  37  /  AlkPhos  174<H>  03-15        PT/INR - ( 15 Mar 2018 05:25 )   PT: 17.3 sec;   INR: 1.54          PTT - ( 15 Mar 2018 05:25 )  PTT:32.0 sec      RADIOLOGY & ADDITIONAL STUDIES:

## 2018-03-15 NOTE — PROGRESS NOTE ADULT - PROBLEM SELECTOR PLAN 7
Pt w/ hx of duodenal perforation on 1/20/2018 s/p repair with kaylee patch and J tube placement complicated by a leack on 2/22 for which pt was taken to the OR and noted to have a necrotic kaylee patch with leakage of perforated duodenal ulcer. Jon gastrostomy was placed, retrograde duodenostomy placed with witzel tunnel.  -currently stable  -Tube feeds started on 2/24--continue w/ Glucerna w/ goal rate of 63cc/hr

## 2018-03-15 NOTE — PROGRESS NOTE ADULT - ASSESSMENT
Plan:  -agree with continued diuresis  -afterload reduction as needed (for severe MR)  -pt clinically improving

## 2018-03-15 NOTE — PROGRESS NOTE ADULT - PROBLEM SELECTOR PLAN 3
CT and chest xray w/ low evidence of pneumonia but given pt w/ sputum and blood culture (1/4) growing Staph aureus will continue to treat patient.   -pt initially on Vanc/Zosyn now discontinued as cultures are not MRSA  -c/w Cefazolin 2mg q8hrs (3/15--  -Surveillance BCx drawn on 3/14--NGTD During previous admission pt failed extubation requiring trach on 2/1/18. Pt was discharged tolerating trach collar on FiO2 35% at home who presented w/ worsening hypoxia that resolved by placing pt on mechanical vent.   -Continue w/ trach collar as pt is able to tolerate

## 2018-03-15 NOTE — PROGRESS NOTE ADULT - SUBJECTIVE AND OBJECTIVE BOX
TRANSFER FROM MICU TO [facility]:     HOSPITAL COURSE:       SUBJECTIVE: Patient seen and examined at bedside.    VITAL SIGNS:  ICU Vital Signs Last 24 Hrs  T(C): 37.3 (15 Mar 2018 09:17), Max: 37.3 (15 Mar 2018 09:17)  T(F): 99.1 (15 Mar 2018 09:17), Max: 99.1 (15 Mar 2018 09:17)  HR: 72 (15 Mar 2018 12:00) (57 - 96)  BP: 103/67 (15 Mar 2018 12:00) (95/61 - 150/92)  BP(mean): 82 (15 Mar 2018 12:00) (73 - 105)  RR: 10 (15 Mar 2018 12:00) (0 - 42)  SpO2: 100% (15 Mar 2018 12:00) (95% - 100%)    Mode: CPAP with PS, FiO2: 40, PEEP: 5, PS: 10, PIP: 33    03-14 @ 07:01  -  03-15 @ 07:00  --------------------------------------------------------  IN: 3292 mL / OUT: 3428 mL / NET: -136 mL    03-15 @ 07:01 - 03-15 @ 13:07  --------------------------------------------------------  IN: 435 mL / OUT: 295 mL / NET: 140 mL        PHYSICAL EXAM:  Constitutional: NAD  HEENT: NC/AT; PERRL, anicteric sclera; MMM  Neck: supple, no JVD  Cardiovascular: +S1/S2, RRR  Respiratory: CTA B/L, no W/R/R  Gastrointestinal: abdomen soft, NT/ND; no rebound or guarding; +BSx4  Genitourinary: no suprapubic tenderness or fullness  Extremities: WWP; no LE edema; no clubbing or cyanosis  Vascular: 2+ radial, DP/PT and femoral pulses B/L  Dermatologic: normal color and turgor; no visible rashes  Neurological:     MEDICATIONS:  MEDICATIONS  (STANDING):  apixaban 5 milliGRAM(s) Oral every 12 hours  ceFAZolin  Injectable. 2000 milliGRAM(s) IV Push every 8 hours  chlorhexidine 0.12% Liquid 15 milliLiter(s) Swish and Spit two times a day  ferrous    sulfate Liquid 300 milliGRAM(s) Enteral Tube daily  furosemide   Injectable 40 milliGRAM(s) IV Push every 12 hours  lisinopril 2.5 milliGRAM(s) Oral daily  pantoprazole  Injectable 40 milliGRAM(s) IV Push every 12 hours    MEDICATIONS  (PRN):  ALBUTerol/ipratropium for Nebulization 3 milliLiter(s) Nebulizer every 4 hours PRN Shortness of Breath and/or Wheezing      ALLERGIES:  Allergies    No Known Allergies    Intolerances        LABS:                        7.3    6.4   )-----------( 156      ( 15 Mar 2018 05:25 )             24.1     03-15    138  |  98  |  37<H>  ----------------------------<  164<H>  3.5   |  28  |  0.76    Ca    8.0<L>      15 Mar 2018 05:25  Phos  3.4     03-15  Mg     2.3     03-15    TPro  6.4  /  Alb  2.5<L>  /  TBili  1.1  /  DBili  x   /  AST  22  /  ALT  37  /  AlkPhos  174<H>  03-15    PT/INR - ( 15 Mar 2018 05:25 )   PT: 17.3 sec;   INR: 1.54          PTT - ( 15 Mar 2018 05:25 )  PTT:32.0 sec      RADIOLOGY & ADDITIONAL TESTS: Reviewed. TRANSFER FROM MICU TO [facility]:     HOSPITAL COURSE:   72M PMH severe MR, severe pHTN, afib (on Eliquis), R popliteal DVT s/p IVC filter (2/22), respiratory failure secondary to PNA s/p tracheostomy (2/1)(baseline on trach collar; FiO2 35%), with recent admission to [facility] Jan-Feb 2018 for perforated duodenal ulcer. Patient BIBEMS from [facility] for increased work of breathing and worsening hypoxia. In ED, patient noted to be tachycardic, afebrile, VSS, placed on AC ventilation for hypoxia with improvement in respiratory status. On admission CTA PE protocol negative for PE. CT abdomen performed given pt w/ recent abdominal surgery and concerns for possible cholecystitis (similar to previous admission), surgery consulted and recommended no surgical intervention at that time. Pt was initially started on Vanc/Zosyn but ultimately changed to Cefazolin given sputum cultures growing Staph aureus. Patient was given intermittent IV Lasix (now standing) for likely CHF exacerbation as CXR w/ b/l pulmonary effusions. Echo on 3/13 demonstrated EF 45-50%, severe mitral prolapse with possible flail posterior mitral leaflet, and moderate tricuspid regurgitation. CT surgery seeing patient to determine surgical candidate status. Cardiology consulted as well. Patient HD stable for stepdown to telemetry [facility].     SUBJECTIVE: Patient seen and examined at bedside.    VITAL SIGNS:  ICU Vital Signs Last 24 Hrs  T(C): 37.3 (15 Mar 2018 09:17), Max: 37.3 (15 Mar 2018 09:17)  T(F): 99.1 (15 Mar 2018 09:17), Max: 99.1 (15 Mar 2018 09:17)  HR: 72 (15 Mar 2018 12:00) (57 - 96)  BP: 103/67 (15 Mar 2018 12:00) (95/61 - 150/92)  BP(mean): 82 (15 Mar 2018 12:00) (73 - 105)  RR: 10 (15 Mar 2018 12:00) (0 - 42)  SpO2: 100% (15 Mar 2018 12:00) (95% - 100%)    Mode: CPAP with PS, FiO2: 40, PEEP: 5, PS: 10, PIP: 33    03-14 @ 07:01  -  03-15 @ 07:00  --------------------------------------------------------  IN: 3292 mL / OUT: 3428 mL / NET: -136 mL    03-15 @ 07:01  -  03-15 @ 13:07  --------------------------------------------------------  IN: 435 mL / OUT: 295 mL / NET: 140 mL        PHYSICAL EXAM:  General: WDWN M in NAD, resting comfortably. Opens eyes to voice  HEENT: AT/NC, PERRL, moist mucus membranes  Neck: Supple, no JVD, trach site clean  Pulm: Diffuse rhonchi, L>R, no wheeze or crackles appreciated   Cardiac: +S1/S2, bradycardic rate and irregular rhythm, 3/6 systolic murmur auscultated  GI: Soft, NT, ND. Surgical scars with + G/J/D tube and healing lap sites, no signs of infection or purulence around sites  Extremities: Lower extremities with 1+ edema b/l. + Dry gangrene of toes. No DP pulses by palpation b/l LE   Neurologic: Awake and alert, following commands. Moves all extremities.      MEDICATIONS:  MEDICATIONS  (STANDING):  apixaban 5 milliGRAM(s) Oral every 12 hours  ceFAZolin  Injectable. 2000 milliGRAM(s) IV Push every 8 hours  chlorhexidine 0.12% Liquid 15 milliLiter(s) Swish and Spit two times a day  ferrous    sulfate Liquid 300 milliGRAM(s) Enteral Tube daily  furosemide   Injectable 40 milliGRAM(s) IV Push every 12 hours  lisinopril 2.5 milliGRAM(s) Oral daily  pantoprazole  Injectable 40 milliGRAM(s) IV Push every 12 hours    MEDICATIONS  (PRN):  ALBUTerol/ipratropium for Nebulization 3 milliLiter(s) Nebulizer every 4 hours PRN Shortness of Breath and/or Wheezing      ALLERGIES:  Allergies    No Known Allergies    Intolerances        LABS:                        7.3    6.4   )-----------( 156      ( 15 Mar 2018 05:25 )             24.1     03-15    138  |  98  |  37<H>  ----------------------------<  164<H>  3.5   |  28  |  0.76    Ca    8.0<L>      15 Mar 2018 05:25  Phos  3.4     03-15  Mg     2.3     03-15    TPro  6.4  /  Alb  2.5<L>  /  TBili  1.1  /  DBili  x   /  AST  22  /  ALT  37  /  AlkPhos  174<H>  03-15    PT/INR - ( 15 Mar 2018 05:25 )   PT: 17.3 sec;   INR: 1.54          PTT - ( 15 Mar 2018 05:25 )  PTT:32.0 sec      RADIOLOGY & ADDITIONAL TESTS: Reviewed. TRANSFER FROM MICU TO [facility]:     HOSPITAL COURSE:   72M PMH severe MR, severe pHTN, afib (on Eliquis), R popliteal DVT s/p IVC filter (2/22), respiratory failure secondary to PNA s/p tracheostomy (2/1)(baseline on trach collar; FiO2 35%), with recent admission to [facility] Jan-Feb 2018 for perforated duodenal ulcer. Patient BIBEMS from [facility] for increased work of breathing and worsening hypoxia. In ED, patient noted to be tachycardic, afebrile, VSS, placed on AC ventilation for hypoxia with improvement in respiratory status. On admission CTA PE protocol negative for PE. CT abdomen performed given pt w/ recent abdominal surgery and concerns for possible cholecystitis (similar to previous admission), surgery consulted and recommended no surgical intervention at that time. Pt was initially started on Vanc/Zosyn but ultimately changed to Cefazolin given sputum cultures growing Staph aureus. Patient was given intermittent IV Lasix (now standing) for likely CHF exacerbation as CXR w/ b/l pulmonary effusions. Echo on 3/13 demonstrated EF 45-50%, severe mitral prolapse with possible flail posterior mitral leaflet, and moderate tricuspid regurgitation. CT surgery seeing patient to determine surgical candidate status. Cardiology consulted as well. Patient HD stable for stepdown to telemetry [facility].     SUBJECTIVE: Patient seen and examined at bedside.    VITAL SIGNS:  ICU Vital Signs Last 24 Hrs  T(C): 37.3 (15 Mar 2018 09:17), Max: 37.3 (15 Mar 2018 09:17)  T(F): 99.1 (15 Mar 2018 09:17), Max: 99.1 (15 Mar 2018 09:17)  HR: 72 (15 Mar 2018 12:00) (57 - 96)  BP: 103/67 (15 Mar 2018 12:00) (95/61 - 150/92)  BP(mean): 82 (15 Mar 2018 12:00) (73 - 105)  RR: 10 (15 Mar 2018 12:00) (0 - 42)  SpO2: 100% (15 Mar 2018 12:00) (95% - 100%)    Mode: CPAP with PS, FiO2: 40, PEEP: 5, PS: 10, PIP: 33    03-14 @ 07:01  -  03-15 @ 07:00  --------------------------------------------------------  IN: 3292 mL / OUT: 3428 mL / NET: -136 mL    03-15 @ 07:01  -  03-15 @ 13:07  --------------------------------------------------------  IN: 435 mL / OUT: 295 mL / NET: 140 mL        PHYSICAL EXAM:  General: WDWN M in NAD, resting comfortably. Opens eyes to voice is able to mouth words   HEENT: AT/NC, PERRL, moist mucus membranes  Neck: Supple, no JVD, trach site clean  Pulmonary: Diffuse rhonchi, L>R, no wheeze or crackles appreciated, breathing well on trach collar   Cardiac: Non-tachycardic, irregular, 4/6 holosystolic murmur with radiation to the left axilla. Laterally displaced PMI.  GI: Soft, NT, ND. Midline well healed surgical scar with several small well healing laparoscopy scars with no signs of infection or purulence. +J-tube, G-tube, D-tube site covered w/ clean dressing.   Extremities: B/L lower extremities in Z-boot. +1 b/l LE edema starting from ankles up to thighs. + Dry gangrene of toes, no fluctuance, no foul odor, no drainage. 2+ DP pulses.   Neurologic: Awake and alert, following commands. Moves all extremities.      MEDICATIONS:  MEDICATIONS  (STANDING):  apixaban 5 milliGRAM(s) Oral every 12 hours  ceFAZolin  Injectable. 2000 milliGRAM(s) IV Push every 8 hours  chlorhexidine 0.12% Liquid 15 milliLiter(s) Swish and Spit two times a day  ferrous    sulfate Liquid 300 milliGRAM(s) Enteral Tube daily  furosemide   Injectable 40 milliGRAM(s) IV Push every 12 hours  lisinopril 2.5 milliGRAM(s) Oral daily  pantoprazole  Injectable 40 milliGRAM(s) IV Push every 12 hours    MEDICATIONS  (PRN):  ALBUTerol/ipratropium for Nebulization 3 milliLiter(s) Nebulizer every 4 hours PRN Shortness of Breath and/or Wheezing      ALLERGIES:  Allergies    No Known Allergies    Intolerances        LABS:                        7.3    6.4   )-----------( 156      ( 15 Mar 2018 05:25 )             24.1     03-15    138  |  98  |  37<H>  ----------------------------<  164<H>  3.5   |  28  |  0.76    Ca    8.0<L>      15 Mar 2018 05:25  Phos  3.4     03-15  Mg     2.3     03-15    TPro  6.4  /  Alb  2.5<L>  /  TBili  1.1  /  DBili  x   /  AST  22  /  ALT  37  /  AlkPhos  174<H>  03-15    PT/INR - ( 15 Mar 2018 05:25 )   PT: 17.3 sec;   INR: 1.54          PTT - ( 15 Mar 2018 05:25 )  PTT:32.0 sec      RADIOLOGY & ADDITIONAL TESTS: Reviewed. TRANSFER FROM MICU TO [facility]:     HOSPITAL COURSE:   72M PMH severe MR, severe pHTN, afib (on Eliquis), R popliteal DVT s/p IVC filter (2/22), respiratory failure secondary to PNA s/p tracheostomy (2/1)(baseline on trach collar; FiO2 35%), with recent admission to [facility] Jan-Feb 2018 for perforated duodenal ulcer. Patient BIBEMS from [facility] for increased work of breathing and worsening hypoxia. In ED, patient noted to be tachycardic, afebrile, VSS, placed on AC ventilation for hypoxia with improvement in respiratory status. On admission CTA PE protocol negative for PE. CT abdomen performed given pt w/ recent abdominal surgery and concerns for possible cholecystitis (similar to previous admission), surgery consulted and recommended no surgical intervention at that time. Pt was initially started on Vanc/Zosyn but ultimately changed to Cefazolin given sputum cultures growing Staph aureus. Patient was given intermittent IV Lasix (now standing) for likely CHF exacerbation as CXR w/ b/l pulmonary effusions. Echo on 3/13 demonstrated EF 45-50%, severe mitral prolapse with possible flail posterior mitral leaflet, and moderate tricuspid regurgitation. CT surgery seeing patient to determine surgical candidate status. Cardiology consulted as well. Patient HD stable for stepdown to telemetry [facility].     SUBJECTIVE: Patient seen and examined at bedside. No acute distress on trach collar. Patient denies chest pain, shortness of breath, abdominal pain, nausea, vomiting, fever/chills, headache/dizziness.     VITAL SIGNS:  ICU Vital Signs Last 24 Hrs  T(C): 37.3 (15 Mar 2018 09:17), Max: 37.3 (15 Mar 2018 09:17)  T(F): 99.1 (15 Mar 2018 09:17), Max: 99.1 (15 Mar 2018 09:17)  HR: 72 (15 Mar 2018 12:00) (57 - 96)  BP: 103/67 (15 Mar 2018 12:00) (95/61 - 150/92)  BP(mean): 82 (15 Mar 2018 12:00) (73 - 105)  RR: 10 (15 Mar 2018 12:00) (0 - 42)  SpO2: 100% (15 Mar 2018 12:00) (95% - 100%)    Mode: CPAP with PS, FiO2: 40, PEEP: 5, PS: 10, PIP: 33    03-14 @ 07:01  -  03-15 @ 07:00  --------------------------------------------------------  IN: 3292 mL / OUT: 3428 mL / NET: -136 mL    03-15 @ 07:01  -  03-15 @ 13:07  --------------------------------------------------------  IN: 435 mL / OUT: 295 mL / NET: 140 mL        PHYSICAL EXAM:  General: WDWN M in NAD, resting comfortably. Opens eyes to voice is able to mouth words   HEENT: AT/NC, PERRL, moist mucus membranes  Neck: Supple, no JVD, trach site clean  Pulmonary: Diffuse rhonchi, L>R, no wheeze or crackles appreciated, breathing well on trach collar   Cardiac: Non-tachycardic, irregular, 4/6 holosystolic murmur with radiation to the left axilla. Laterally displaced PMI.  GI: Soft, NT, ND. Midline well healed surgical scar with several small well healing laparoscopy scars with no signs of infection or purulence. +J-tube, G-tube, D-tube site covered w/ clean dressing.   Extremities: B/L lower extremities in Z-boot. +1 b/l LE edema starting from ankles up to thighs. + Dry gangrene of toes, no fluctuance, no foul odor, no drainage. 2+ DP pulses.   Neurologic: Awake and alert, following commands. Moves all extremities.      MEDICATIONS:  MEDICATIONS  (STANDING):  apixaban 5 milliGRAM(s) Oral every 12 hours  ceFAZolin  Injectable. 2000 milliGRAM(s) IV Push every 8 hours  chlorhexidine 0.12% Liquid 15 milliLiter(s) Swish and Spit two times a day  ferrous    sulfate Liquid 300 milliGRAM(s) Enteral Tube daily  furosemide   Injectable 40 milliGRAM(s) IV Push every 12 hours  lisinopril 2.5 milliGRAM(s) Oral daily  pantoprazole  Injectable 40 milliGRAM(s) IV Push every 12 hours    MEDICATIONS  (PRN):  ALBUTerol/ipratropium for Nebulization 3 milliLiter(s) Nebulizer every 4 hours PRN Shortness of Breath and/or Wheezing      ALLERGIES:  Allergies    No Known Allergies    Intolerances        LABS:                        7.3    6.4   )-----------( 156      ( 15 Mar 2018 05:25 )             24.1     03-15    138  |  98  |  37<H>  ----------------------------<  164<H>  3.5   |  28  |  0.76    Ca    8.0<L>      15 Mar 2018 05:25  Phos  3.4     03-15  Mg     2.3     03-15    TPro  6.4  /  Alb  2.5<L>  /  TBili  1.1  /  DBili  x   /  AST  22  /  ALT  37  /  AlkPhos  174<H>  03-15    PT/INR - ( 15 Mar 2018 05:25 )   PT: 17.3 sec;   INR: 1.54          PTT - ( 15 Mar 2018 05:25 )  PTT:32.0 sec      RADIOLOGY & ADDITIONAL TESTS: Reviewed.

## 2018-03-15 NOTE — PROGRESS NOTE ADULT - PROBLEM SELECTOR PLAN 4
CHADSVASC of 3. Pt previously on 0.125mg of Digoxin but discontinued on 3/15 given pt w/ episode of bradycardia overnight. Pt w/ subtherapeutic digoxin level.   -c/w Eliquis 5mg BID   -Pts heart rate currently well controlled--will consider starting a Bblocker if heart rate is poorly controlled   -continue with telemetry monitoring

## 2018-03-15 NOTE — PROGRESS NOTE ADULT - PROBLEM SELECTOR PROBLEM 2
Acute on chronic respiratory failure with hypoxia MSSA (methicillin susceptible Staphylococcus aureus) infection

## 2018-03-15 NOTE — PROGRESS NOTE ADULT - PROBLEM SELECTOR PLAN 2
Pt previously tolerating trach collar on FiO2 35% at home who presented w/ worsening hypoxia that resolved by placing pt on mechanical vent.   -Continue w/ trach collar as pt is able to tolerate During previous admission pt failed extubation requiring trach on 2/1/18. Pt was discharged tolerating trach collar on FiO2 35% at home who presented w/ worsening hypoxia that resolved by placing pt on mechanical vent.   -Continue w/ trach collar as pt is able to tolerate pt w/ sputum and blood culture growing Staph aureus.   -pt initially on Vanc/Zosyn now discontinued as cultures are not MRSA  -c/w Cefazolin 2mg q8hrs (3/15--  -Surveillance BCx drawn on 3/14--NGTD  -ID consulted regarding abx duration

## 2018-03-15 NOTE — CHART NOTE - NSCHARTNOTEFT_GEN_A_CORE
Admitting Diagnosis:   72M PMH severe MR, severe pHTN, afib (on Eliquis), R popliteal DVT s/p IVC filter, respiratory failure secondary to PNA s/p tracheostomy (baseline on trach collar; FiO2 35%), and perforated duodenal ulcer s/p repair who presents with respiratory failure that required intubation    PAST MEDICAL & SURGICAL HISTORY:  CHF (congestive heart failure)  Cholecystitis  Duodenal ulcer  Atrial fibrillation  Mitral regurgitation  Aortic regurgitation  H/O tracheostomy  Acute duodenal ulcer with perforation  H/O gastrostomy, has currently  Perforated duodenal ulcer    Current Nutrition Order:  Glucerna 1.5 at 63 mL x 24 hr via Jtube providing 1512 mL TV, 2268 kcal, 125 g protein, & 1148 mL fluid. Running at goal rate.     GI Issues: Last BM 3/14 - per RN pt had diarrhea yesterday. Discontinued stool softeners     Pain: None reported    Skin Integrity: Stage 2- R/L heel; Unstageable - 3rd toe, 4th toe, 2nd toe.     Labs:   03-15    138  |  98  |  37<H>  ----------------------------<  164<H>  3.5   |  28  |  0.76    Ca    8.0<L>      15 Mar 2018 05:25  Phos  3.4     03-15  Mg     2.3     03-15    TPro  6.4  /  Alb  2.5<L>  /  TBili  1.1  /  DBili  x   /  AST  22  /  ALT  37  /  AlkPhos  174<H>  03-15    Medications:  MEDICATIONS  (STANDING):  apixaban 5 milliGRAM(s) Oral every 12 hours  ceFAZolin  Injectable. 2000 milliGRAM(s) IV Push every 8 hours  chlorhexidine 0.12% Liquid 15 milliLiter(s) Swish and Spit two times a day  ferrous    sulfate Liquid 300 milliGRAM(s) Enteral Tube daily  furosemide   Injectable 40 milliGRAM(s) IV Push every 12 hours  lisinopril 2.5 milliGRAM(s) Oral daily  pantoprazole  Injectable 40 milliGRAM(s) IV Push every 12 hours    MEDICATIONS  (PRN):  ALBUTerol/ipratropium for Nebulization 3 milliLiter(s) Nebulizer every 4 hours PRN Shortness of Breath and/or Wheezing    Weight:  No new weights to assess     Estimated energy needs using 76 kg IBW:  EER 2* vent  25-30 kcal/kg (0432-5067 kcal).   1.4-1.6 g/kg (122-137 g protein).   30-35 mL/kg (9854-0386 mL fluid).     Subjective: Pt intubated at present. Per RN, pt tolerating EN at goal rate. Pt had diarrhea yesterday & stool softeners were discontinued.     Previous Nutrition Diagnosis: Inadequate energy intake RT inability to take PO AEB NPO/intubated    Active [   ]  Resolved [X   ]    If resolved, new PES: Increased nutrient needs RT increased demand for kcal/protein AEB intubation    Goal: Meet % of nutrition needs     Recommendations:  1. Continue current rate: Glucerna 1.5 at 63 mL x 24 hr via Jtube. Monitor for signs of intolerance.   2. Trend daily weights.     Education: N/A-vent    Risk Level: High [  X ] Moderate [   ] Low [   ]

## 2018-03-15 NOTE — PROGRESS NOTE ADULT - ASSESSMENT
72M PMH severe MR, severe pHTN, afib (on Eliquis), R popliteal DVT s/p IVC filter, respiratory failure secondary to PNA s/p tracheostomy (baseline on trach collar; FiO2 35%), and perforated duodenal ulcer s/p repair who presents with respiratory failure that required ventilation

## 2018-03-15 NOTE — PROGRESS NOTE ADULT - SUBJECTIVE AND OBJECTIVE BOX
PGY 1 transfer note MICU to [facility] HOSPITAL COURSE:      INTERVAL HPI/OVERNIGHT EVENTS: No acute events overnight    SUBJECTIVE: Patient seen and examined at bedside. No acute complaints. Denies nausea, emesis, chest pain, palpitations, shortness of breath, abdominal pain    OBJECTIVE:    VITAL SIGNS:  ICU Vital Signs Last 24 Hrs  T(C): 37.3 (15 Mar 2018 09:17), Max: 37.3 (15 Mar 2018 09:17)  T(F): 99.1 (15 Mar 2018 09:17), Max: 99.1 (15 Mar 2018 09:17)  HR: 74 (15 Mar 2018 10:00) (57 - 100)  BP: 103/67 (15 Mar 2018 10:00) (95/61 - 150/92)  BP(mean): 75 (15 Mar 2018 10:00) (73 - 105)  ABP: --  ABP(mean): --  RR: 24 (15 Mar 2018 10:00) (0 - 42)  SpO2: 100% (15 Mar 2018 10:00) (95% - 100%)    Mode: CPAP with PS, FiO2: 40, PEEP: 5, PS: 10, PIP: 33    03-14 @ 07:01  -  03-15 @ 07:00  --------------------------------------------------------  IN: 3292 mL / OUT: 3428 mL / NET: -136 mL    03-15 @ 07:01  -  03-15 @ 11:46  --------------------------------------------------------  IN: 126 mL / OUT: 135 mL / NET: -9 mL      CAPILLARY BLOOD GLUCOSE          PHYSICAL EXAM:    General: WDWN M in NAD, resting comfortably. Opens eyes to voice  HEENT: AT/NC, PERRL, moist mucus membranes  Neck: Supple, no JVD  Pulm: Diffuse rhonchi, L>R, no wheeze or crackles appreciated   Cardiac: +S1/S2, bradycardic rate and irregular rhythm, 3/6 systolic murmur auscultated  GI: Soft, NT, ND. Surgical scars with + G/J/D tube and healing lap sites, no signs of infection or purulence around sites, + rectal tube (for removal) and abarca  Extremities: Lower extremities with 1+ edema b/l. + Dry gangrene of toes. No DP pulses by palpation b/l LE   Neurologic: Awake and alert, following commands. Moves all extremities.    MEDICATIONS:  MEDICATIONS  (STANDING):  apixaban 5 milliGRAM(s) Oral every 12 hours  ceFAZolin  Injectable. 2000 milliGRAM(s) IV Push every 8 hours  chlorhexidine 0.12% Liquid 15 milliLiter(s) Swish and Spit two times a day  ferrous    sulfate Liquid 300 milliGRAM(s) Enteral Tube daily  furosemide   Injectable 40 milliGRAM(s) IV Push every 12 hours  lisinopril 2.5 milliGRAM(s) Oral daily  pantoprazole  Injectable 40 milliGRAM(s) IV Push every 12 hours    MEDICATIONS  (PRN):  ALBUTerol/ipratropium for Nebulization 3 milliLiter(s) Nebulizer every 4 hours PRN Shortness of Breath and/or Wheezing      ALLERGIES:  Allergies    No Known Allergies    Intolerances        LABS:                        7.3    6.4   )-----------( 156      ( 15 Mar 2018 05:25 )             24.1     03-15    138  |  98  |  37<H>  ----------------------------<  164<H>  3.5   |  28  |  0.76    Ca    8.0<L>      15 Mar 2018 05:25  Phos  3.4     03-15  Mg     2.3     03-15    TPro  6.4  /  Alb  2.5<L>  /  TBili  1.1  /  DBili  x   /  AST  22  /  ALT  37  /  AlkPhos  174<H>  03-15    LIVER FUNCTIONS - ( 15 Mar 2018 05:25 )  Alb: 2.5 g/dL / Pro: 6.4 g/dL / ALK PHOS: 174 U/L / ALT: 37 U/L / AST: 22 U/L / GGT: x           PT/INR - ( 15 Mar 2018 05:25 )   PT: 17.3 sec;   INR: 1.54          PTT - ( 15 Mar 2018 05:25 )  PTT:32.0 sec          RADIOLOGY & ADDITIONAL TESTS: Reviewed.    ASSESSMENT: 72M PMH severe MR, severe pHTN, afib (on Eliquis), R popliteal DVT s/p IVC filter, respiratory failure secondary to PNA s/p tracheostomy (baseline on trach collar; FiO2 35%), and perforated duodenal ulcer s/p repair who presents with respiratory failure that required intubation    CARDIAC:     #CHF  - Elevated BNP, 22k (vs 7k on last admission) and clinically overloaded on admission (JVP, 1+ edema, crackles), likely CHF  - New respiratory requirement also consistent with CHF. Severe MR --> pHTN type II is likely. NYHA ~II. CXR c/w bilateral pulm congestion and effusion.   - Echo 3/13 with EF 45-50%, severe mitral prolapse with possible flail posterior mitral leaflet, moderate tricuspid regurgitation, mild-mod pulm HTN 45mmHg, cardiology consulted regarding valve recommendations, appreciate recs and will continue to evaluate as medically stabilizes  - Troponins peaked 0.06  - Continue with Lasix 40mg IV BID  - Added Lisinopril 2.5mg qd    RESP:   - Respiratory failure now improved on mech vent (prev. on trach collar)  - Continue to monitor respiratory status, ABG as necessary to adjust    ID: Possible pneumonia, though no ena evidence on CT scan/cxr. RVP negative. UA negative. 2/4 SIRS w/ lactate - meets criteria for severe sepsis.   - Continue with vancomycin, zosyn - patients meets criteria for HAP (from nursing home) though unclear if patient with respiratory requirement from sepsis vs CHF  - Lactate resolved  - f/u Bcx, growing gram positive cocci in clusters matches sputum  - F/u surveillance cultures    #Afib  - c/w home eliquis   - Continue with telemetry monitoring   - 0.125mg qd Digoxin    VASCULAR  #Gangrene of toes  - Prev vasc consult no acute event. Could be possible infectious source  - Continue to monitor site    GI:   - CT abd shows possible evidence of cholecystitis, LFTs mildly elevated - though could be 2/2 hepatic congestion  - Surgery consulted, appreciate recs  - Will obtain CT abdomen/pelvis when more stable    #duodenal ulcer - no acute evidence of bleed, hgb at baseline  - Trend CBC, monitor VS with telemetry    HEME: Normocytic anemia.   - Started iron repletement  - Trend CBC    #DVT   - on AC w/ IVC filter  - Continue with home Eliquis    MET: HyperK+, no ekg changes  - Continue to trend BMP    FEN:  F: No IVF   E: replete Mg<2, K<4  N: NPO, glucerna 1.5 at 63cc/hr  Lines: J/g/d tube. 2 peripherals.   ppx w/ eliquis and protonix PGY 1 transfer note MICU to [facility] H[facility]PITAL COURSE:       INTERVAL HPI/OVERNIGHT EVENTS: No acute events overnight    SUBJECTIVE: Patient seen and examined at bedside. No acute complaints. Denies nausea, emesis, chest pain, palpitations, shortness of breath, abdominal pain    OBJECTIVE:    VITAL SIGNS:  ICU Vital Signs Last 24 Hrs  T(C): 37.3 (15 Mar 2018 09:17), Max: 37.3 (15 Mar 2018 09:17)  T(F): 99.1 (15 Mar 2018 09:17), Max: 99.1 (15 Mar 2018 09:17)  HR: 74 (15 Mar 2018 10:00) (57 - 100)  BP: 103/67 (15 Mar 2018 10:00) (95/61 - 150/92)  BP(mean): 75 (15 Mar 2018 10:00) (73 - 105)  ABP: --  ABP(mean): --  RR: 24 (15 Mar 2018 10:00) (0 - 42)  SpO2: 100% (15 Mar 2018 10:00) (95% - 100%)    Mode: CPAP with PS, FiO2: 40, PEEP: 5, PS: 10, PIP: 33    03-14 @ 07:01  -  03-15 @ 07:00  --------------------------------------------------------  IN: 3292 mL / OUT: 3428 mL / NET: -136 mL    03-15 @ 07:01  -  03-15 @ 11:46  --------------------------------------------------------  IN: 126 mL / OUT: 135 mL / NET: -9 mL      CAPILLARY BLOOD GLUCOSE          PHYSICAL EXAM:    General: WDWN M in NAD, resting comfortably. Opens eyes to voice  HEENT: AT/NC, PERRL, moist mucus membranes  Neck: Supple, no JVD  Pulm: Diffuse rhonchi, L>R, no wheeze or crackles appreciated   Cardiac: +S1/S2, bradycardic rate and irregular rhythm, 3/6 systolic murmur auscultated  GI: Soft, NT, ND. Surgical scars with + G/J/D tube and healing lap sites, no signs of infection or purulence around sites, + rectal tube (for removal) and abarca  Extremities: Lower extremities with 1+ edema b/l. + Dry gangrene of toes. No DP pulses by palpation b/l LE   Neurologic: Awake and alert, following commands. Moves all extremities.    MEDICATIONS:  MEDICATIONS  (STANDING):  apixaban 5 milliGRAM(s) Oral every 12 hours  ceFAZolin  Injectable. 2000 milliGRAM(s) IV Push every 8 hours  chlorhexidine 0.12% Liquid 15 milliLiter(s) Swish and Spit two times a day  ferrous    sulfate Liquid 300 milliGRAM(s) Enteral Tube daily  furosemide   Injectable 40 milliGRAM(s) IV Push every 12 hours  lisinopril 2.5 milliGRAM(s) Oral daily  pantoprazole  Injectable 40 milliGRAM(s) IV Push every 12 hours    MEDICATIONS  (PRN):  ALBUTerol/ipratropium for Nebulization 3 milliLiter(s) Nebulizer every 4 hours PRN Shortness of Breath and/or Wheezing      ALLERGIES:  Allergies    No Known Allergies    Intolerances        LABS:                        7.3    6.4   )-----------( 156      ( 15 Mar 2018 05:25 )             24.1     03-15    138  |  98  |  37<H>  ----------------------------<  164<H>  3.5   |  28  |  0.76    Ca    8.0<L>      15 Mar 2018 05:25  Phos  3.4     03-15  Mg     2.3     03-15    TPro  6.4  /  Alb  2.5<L>  /  TBili  1.1  /  DBili  x   /  AST  22  /  ALT  37  /  AlkPhos  174<H>  03-15    LIVER FUNCTIONS - ( 15 Mar 2018 05:25 )  Alb: 2.5 g/dL / Pro: 6.4 g/dL / ALK PHOS: 174 U/L / ALT: 37 U/L / AST: 22 U/L / GGT: x           PT/INR - ( 15 Mar 2018 05:25 )   PT: 17.3 sec;   INR: 1.54          PTT - ( 15 Mar 2018 05:25 )  PTT:32.0 sec          RADIOLOGY & ADDITIONAL TESTS: Reviewed.    ASSESSMENT: 72M PMH severe MR, severe pHTN, afib (on Eliquis), R popliteal DVT s/p IVC filter, respiratory failure secondary to PNA s/p tracheostomy (baseline on trach collar; FiO2 35%), and perforated duodenal ulcer s/p repair who presents with respiratory failure that required intubation    CARDIAC:     #CHF  - Elevated BNP, 22k (vs 7k on last admission) and clinically overloaded on admission (JVP, 1+ edema, crackles), likely CHF  - New respiratory requirement also consistent with CHF. Severe MR --> pHTN type II is likely. NYHA ~II. CXR c/w bilateral pulm congestion and effusion.   - Echo 3/13 with EF 45-50%, severe mitral prolapse with possible flail posterior mitral leaflet, moderate tricuspid regurgitation, mild-mod pulm HTN 45mmHg, cardiology consulted regarding valve recommendations, appreciate recs and will continue to evaluate as medically stabilizes  - Troponins peaked 0.06  - Continue with Lasix 40mg IV BID  - Continue with Lisinopril 2.5mg qd    RESP:   - Respiratory failure now improved on mech vent (prev. on trach collar, trial of trach collar this morning)  - Continue to monitor respiratory status, ABG if necessary to adjust    ID: Less likely pneumonia, as no ena evidence on CT scan/cxr. RVP negative. UA negative. 2/4 SIRS w/ lactate - meets criteria for severe sepsis.   - Discontinue vanc/zosyn, start Ancef as not MRSA by culture sensitivities  - patients meets criteria for HAP (from nursing home) though unclear if patient with respiratory requirement from sepsis vs CHF  - Lactate resolved  - f/u Bcx, growing gram positive cocci in clusters matches sputum  - F/u surveillance cultures    #Afib  - c/w home eliquis   - Continue with telemetry monitoring   - 0.125mg qd Digoxin    VASCULAR  #Gangrene of toes  - Prev vasc consult no acute event. Could be possible infectious source  - Continue to monitor site    GI:   - CT abd shows possible evidence of cholecystitis, LFTs mildly elevated - though could be 2/2 hepatic congestion  - Surgery consulted, appreciate recs  - Will obtain CT abdomen/pelvis when more stable    #duodenal ulcer - no acute evidence of bleed, hgb at baseline  - Trend CBC, monitor VS with telemetry    HEME: Normocytic anemia.   - Started iron repletement  - Trend CBC    #DVT   - on AC w/ IVC filter  - Continue with home Eliquis    MET: HyperK+, no ekg changes  - Continue to trend BMP    FEN:  F: No IVF   E: replete Mg<2, K<4  N: NPO, glucerna 1.5 at 63cc/hr  Lines: J/g/d tube. 2 peripherals.   ppx w/ eliquis and protonix PGY 1 transfer note MICU to [facility]ITAL COURSE:  72M PMH severe MR, severe pHTN, afib (on Eliquis), R popliteal DVT s/p IVC filter, respiratory failure secondary to PNA s/p tracheostomy (baseline on trach collar; FiO2 35%), with recent admission to [facility] Jan-Feb 2018 for perforated duodenal ulcer. [facility] increased work of breathing and worsening hypoxia. In ED, patient noted to be tachycardic, afebrile, VSS, placed AC ventilation for hypoxia with improvement in respiratory status. CT abdomen with possible cholecystitis, surgery consulted and recommended no surgical intervention at that time. Started vanco/zosyn, was given Lasix IV intermittently (now standing) for likely fluid overload by CXR, blood cultures notable for gram positive cocci (staph aureus). Patient received echo which demonstrated EF 45-50%, severe mitral prolapse with possible flail posterior mitral leaflet, and moderate tricuspid regurgitation. At time of transfer, CT surgery seeing patient to determine surgical candidate status. Cardiology consulted as well. Sputum cultures demonstrated staph aureus. Antibiotics de-escalated on day of transfer to [facility], surveillance cultures drawn and NGTD. Patient HD stable for stepdown to telemetry [facility].       INTERVAL HPI/OVERNIGHT EVENTS: No acute events overnight    SUBJECTIVE: Patient seen and examined at bedside. No acute complaints. Denies nausea, emesis, chest pain, palpitations, shortness of breath, abdominal pain    OBJECTIVE:    VITAL SIGNS:  ICU Vital Signs Last 24 Hrs  T(C): 37.3 (15 Mar 2018 09:17), Max: 37.3 (15 Mar 2018 09:17)  T(F): 99.1 (15 Mar 2018 09:17), Max: 99.1 (15 Mar 2018 09:17)  HR: 74 (15 Mar 2018 10:00) (57 - 100)  BP: 103/67 (15 Mar 2018 10:00) (95/61 - 150/92)  BP(mean): 75 (15 Mar 2018 10:00) (73 - 105)  ABP: --  ABP(mean): --  RR: 24 (15 Mar 2018 10:00) (0 - 42)  SpO2: 100% (15 Mar 2018 10:00) (95% - 100%)    Mode: CPAP with PS, FiO2: 40, PEEP: 5, PS: 10, PIP: 33    03-14 @ 07:01  -  03-15 @ 07:00  --------------------------------------------------------  IN: 3292 mL / OUT: 3428 mL / NET: -136 mL    03-15 @ 07:01  -  03-15 @ 11:46  --------------------------------------------------------  IN: 126 mL / OUT: 135 mL / NET: -9 mL      CAPILLARY BLOOD GLUCOSE          PHYSICAL EXAM:    General: WDWN M in NAD, resting comfortably. Opens eyes to voice  HEENT: AT/NC, PERRL, moist mucus membranes  Neck: Supple, no JVD  Pulm: Diffuse rhonchi, L>R, no wheeze or crackles appreciated   Cardiac: +S1/S2, bradycardic rate and irregular rhythm, 3/6 systolic murmur auscultated  GI: Soft, NT, ND. Surgical scars with + G/J/D tube and healing lap sites, no signs of infection or purulence around sites, + rectal tube (for removal) and abarca  Extremities: Lower extremities with 1+ edema b/l. + Dry gangrene of toes. No DP pulses by palpation b/l LE   Neurologic: Awake and alert, following commands. Moves all extremities.    MEDICATIONS:  MEDICATIONS  (STANDING):  apixaban 5 milliGRAM(s) Oral every 12 hours  ceFAZolin  Injectable. 2000 milliGRAM(s) IV Push every 8 hours  chlorhexidine 0.12% Liquid 15 milliLiter(s) Swish and Spit two times a day  ferrous    sulfate Liquid 300 milliGRAM(s) Enteral Tube daily  furosemide   Injectable 40 milliGRAM(s) IV Push every 12 hours  lisinopril 2.5 milliGRAM(s) Oral daily  pantoprazole  Injectable 40 milliGRAM(s) IV Push every 12 hours    MEDICATIONS  (PRN):  ALBUTerol/ipratropium for Nebulization 3 milliLiter(s) Nebulizer every 4 hours PRN Shortness of Breath and/or Wheezing      ALLERGIES:  Allergies    No Known Allergies    Intolerances        LABS:                        7.3    6.4   )-----------( 156      ( 15 Mar 2018 05:25 )             24.1     03-15    138  |  98  |  37<H>  ----------------------------<  164<H>  3.5   |  28  |  0.76    Ca    8.0<L>      15 Mar 2018 05:25  Phos  3.4     03-15  Mg     2.3     03-15    TPro  6.4  /  Alb  2.5<L>  /  TBili  1.1  /  DBili  x   /  AST  22  /  ALT  37  /  AlkPhos  174<H>  03-15    LIVER FUNCTIONS - ( 15 Mar 2018 05:25 )  Alb: 2.5 g/dL / Pro: 6.4 g/dL / ALK PHOS: 174 U/L / ALT: 37 U/L / AST: 22 U/L / GGT: x           PT/INR - ( 15 Mar 2018 05:25 )   PT: 17.3 sec;   INR: 1.54          PTT - ( 15 Mar 2018 05:25 )  PTT:32.0 sec          RADIOLOGY & ADDITIONAL TESTS: Reviewed.    ASSESSMENT: 72M PMH severe MR, severe pHTN, afib (on Eliquis), R popliteal DVT s/p IVC filter, respiratory failure secondary to PNA s/p tracheostomy (baseline on trach collar; FiO2 35%), and perforated duodenal ulcer s/p repair who presents with respiratory failure that required intubation    CARDIAC:     #CHF  - Elevated BNP, 22k (vs 7k on last admission) and clinically overloaded on admission (JVP, 1+ edema, crackles), likely CHF  - New respiratory requirement also consistent with CHF. Severe MR --> pHTN type II is likely. NYHA ~II. CXR c/w bilateral pulm congestion and effusion.   - Echo 3/13 with EF 45-50%, severe mitral prolapse with possible flail posterior mitral leaflet, moderate tricuspid regurgitation, mild-mod pulm HTN 45mmHg, cardiology consulted regarding valve recommendations, appreciate recs and will continue to evaluate as medically stabilizes  - Troponins peaked 0.06  - Continue with Lasix 40mg IV BID  - Continue with Lisinopril 2.5mg qd    RESP:   - Respiratory failure now improved on mech vent (prev. on trach collar, trial of trach collar this morning)  - Continue to monitor respiratory status, ABG if necessary to adjust    ID: Less likely pneumonia, as no ena evidence on CT scan/cxr. RVP negative. UA negative. 2/4 SIRS w/ lactate - meets criteria for severe sepsis.   - Discontinue vanc/zosyn, start Ancef as not MRSA by culture sensitivities  - Lactate resolved  - f/u Bcx, growing gram positive cocci in clusters matches sputum. MSSA per sensitivities 3/15  - F/u surveillance cultures, NGTD (negative at 12 hours)    #Afib  - c/w home eliquis   - Continue with telemetry monitoring   - 0.125mg qd Digoxin, discontinued given some bradycardia overnight. Patient with subtherapeutic digoxin level    VASCULAR  #Gangrene of toes  - Prev vasc consult no acute event  - Continue to monitor site    GI:   - CT abd shows possible evidence of cholecystitis, LFTs mildly elevated - though could be 2/2 hepatic congestion  - Surgery consulted, appreciate recs  - Will obtain CT abdomen/pelvis when more stable    #duodenal ulcer - no acute evidence of bleed, hgb at baseline  - Trend CBC, monitor VS with telemetry    HEME: Normocytic anemia.   - Started iron repletement  - Trend CBC    #DVT   - on AC w/ IVC filter  - Continue with home Eliquis    MET: HyperK+, no ekg changes  - Continue to trend BMP    FEN:  F: No IVF   E: replete Mg<2, K<4  N: NPO, glucerna 1.5 at 63cc/hr  Lines: J/g/d tube. 2 peripherals.   ppx w/ eliquis and protonix

## 2018-03-15 NOTE — PROGRESS NOTE ADULT - PROBLEM SELECTOR PLAN 1
Pt w/ increased work of breathing and shortness of breath on admission. Etiology likely CHF exacerbation given pt w/ BNP of 22k on admission (7K on last admission) and clinically overloaded on admission (JVP, 1+ edema). Cxray c/w bilateral pulm congestion and effusion. Echo on 3/13 with EF 45-50%.   -continue w/ lasix 40mg IV BID   -continue w/ lisinopril 2.5mg qd  -daily chest xray   -I&Os

## 2018-03-16 LAB
ANION GAP SERPL CALC-SCNC: 11 MMOL/L — SIGNIFICANT CHANGE UP (ref 5–17)
ANION GAP SERPL CALC-SCNC: 9 MMOL/L — SIGNIFICANT CHANGE UP (ref 5–17)
BUN SERPL-MCNC: 29 MG/DL — HIGH (ref 7–23)
BUN SERPL-MCNC: 31 MG/DL — HIGH (ref 7–23)
CALCIUM SERPL-MCNC: 8 MG/DL — LOW (ref 8.4–10.5)
CALCIUM SERPL-MCNC: 8.2 MG/DL — LOW (ref 8.4–10.5)
CHLORIDE SERPL-SCNC: 96 MMOL/L — SIGNIFICANT CHANGE UP (ref 96–108)
CHLORIDE SERPL-SCNC: 99 MMOL/L — SIGNIFICANT CHANGE UP (ref 96–108)
CO2 SERPL-SCNC: 29 MMOL/L — SIGNIFICANT CHANGE UP (ref 22–31)
CO2 SERPL-SCNC: 31 MMOL/L — SIGNIFICANT CHANGE UP (ref 22–31)
CREAT SERPL-MCNC: 0.55 MG/DL — SIGNIFICANT CHANGE UP (ref 0.5–1.3)
CREAT SERPL-MCNC: 0.55 MG/DL — SIGNIFICANT CHANGE UP (ref 0.5–1.3)
CRP SERPL-MCNC: 1.59 MG/DL — HIGH (ref 0–0.4)
ERYTHROCYTE [SEDIMENTATION RATE] IN BLOOD: 68 MM/HR — HIGH
GLUCOSE SERPL-MCNC: 121 MG/DL — HIGH (ref 70–99)
GLUCOSE SERPL-MCNC: 173 MG/DL — HIGH (ref 70–99)
HCT VFR BLD CALC: 26.6 % — LOW (ref 39–50)
HGB BLD-MCNC: 7.9 G/DL — LOW (ref 13–17)
MAGNESIUM SERPL-MCNC: 2.2 MG/DL — SIGNIFICANT CHANGE UP (ref 1.6–2.6)
MAGNESIUM SERPL-MCNC: 2.2 MG/DL — SIGNIFICANT CHANGE UP (ref 1.6–2.6)
MCHC RBC-ENTMCNC: 28.3 PG — SIGNIFICANT CHANGE UP (ref 27–34)
MCHC RBC-ENTMCNC: 29.7 G/DL — LOW (ref 32–36)
MCV RBC AUTO: 95.3 FL — SIGNIFICANT CHANGE UP (ref 80–100)
PHOSPHATE SERPL-MCNC: 3.1 MG/DL — SIGNIFICANT CHANGE UP (ref 2.5–4.5)
PLATELET # BLD AUTO: 186 K/UL — SIGNIFICANT CHANGE UP (ref 150–400)
POTASSIUM SERPL-MCNC: 3.6 MMOL/L — SIGNIFICANT CHANGE UP (ref 3.5–5.3)
POTASSIUM SERPL-MCNC: 4 MMOL/L — SIGNIFICANT CHANGE UP (ref 3.5–5.3)
POTASSIUM SERPL-SCNC: 3.6 MMOL/L — SIGNIFICANT CHANGE UP (ref 3.5–5.3)
POTASSIUM SERPL-SCNC: 4 MMOL/L — SIGNIFICANT CHANGE UP (ref 3.5–5.3)
RBC # BLD: 2.79 M/UL — LOW (ref 4.2–5.8)
RBC # FLD: 17 % — HIGH (ref 10.3–16.9)
SODIUM SERPL-SCNC: 136 MMOL/L — SIGNIFICANT CHANGE UP (ref 135–145)
SODIUM SERPL-SCNC: 139 MMOL/L — SIGNIFICANT CHANGE UP (ref 135–145)
WBC # BLD: 8 K/UL — SIGNIFICANT CHANGE UP (ref 3.8–10.5)
WBC # FLD AUTO: 8 K/UL — SIGNIFICANT CHANGE UP (ref 3.8–10.5)

## 2018-03-16 PROCEDURE — 99233 SBSQ HOSP IP/OBS HIGH 50: CPT | Mod: GC

## 2018-03-16 PROCEDURE — 71045 X-RAY EXAM CHEST 1 VIEW: CPT | Mod: 26

## 2018-03-16 RX ORDER — POTASSIUM CHLORIDE 20 MEQ
40 PACKET (EA) ORAL ONCE
Qty: 0 | Refills: 0 | Status: COMPLETED | OUTPATIENT
Start: 2018-03-16 | End: 2018-03-16

## 2018-03-16 RX ADMIN — Medication 40 MILLIEQUIVALENT(S): at 01:14

## 2018-03-16 RX ADMIN — CHLORHEXIDINE GLUCONATE 15 MILLILITER(S): 213 SOLUTION TOPICAL at 17:42

## 2018-03-16 RX ADMIN — LISINOPRIL 2.5 MILLIGRAM(S): 2.5 TABLET ORAL at 06:09

## 2018-03-16 RX ADMIN — Medication 2000 MILLIGRAM(S): at 03:57

## 2018-03-16 RX ADMIN — APIXABAN 5 MILLIGRAM(S): 2.5 TABLET, FILM COATED ORAL at 06:09

## 2018-03-16 RX ADMIN — Medication 300 MILLIGRAM(S): at 11:40

## 2018-03-16 RX ADMIN — Medication 2000 MILLIGRAM(S): at 18:25

## 2018-03-16 RX ADMIN — Medication 2000 MILLIGRAM(S): at 11:40

## 2018-03-16 RX ADMIN — CHLORHEXIDINE GLUCONATE 15 MILLILITER(S): 213 SOLUTION TOPICAL at 06:08

## 2018-03-16 RX ADMIN — PANTOPRAZOLE SODIUM 40 MILLIGRAM(S): 20 TABLET, DELAYED RELEASE ORAL at 17:42

## 2018-03-16 RX ADMIN — Medication 40 MILLIGRAM(S): at 22:08

## 2018-03-16 RX ADMIN — PANTOPRAZOLE SODIUM 40 MILLIGRAM(S): 20 TABLET, DELAYED RELEASE ORAL at 06:09

## 2018-03-16 RX ADMIN — Medication 40 MILLIGRAM(S): at 23:30

## 2018-03-16 RX ADMIN — Medication 40 MILLIGRAM(S): at 11:40

## 2018-03-16 RX ADMIN — APIXABAN 5 MILLIGRAM(S): 2.5 TABLET, FILM COATED ORAL at 17:42

## 2018-03-16 NOTE — CONSULT NOTE ADULT - CONSULT REASON
MSSA bacteremia
Mitral regurgitation
Respiratory failure
Severe MR
tracheostomy change
CHF exacerbation, recent surgery for perforated duodenal ulcer with complicated hospital course

## 2018-03-16 NOTE — PROGRESS NOTE ADULT - PROBLEM SELECTOR PLAN 3
During previous admission pt failed extubation requiring trach on 2/1/18. Pt was discharged tolerating trach collar on FiO2 35% at home who presented w/ worsening hypoxia that resolved by placing pt on mechanical vent.   -Continue w/ trach collar as pt is able to tolerate

## 2018-03-16 NOTE — CONSULT NOTE ADULT - SUBJECTIVE AND OBJECTIVE BOX
INFECTIOUS DISEASE SERVICE INITIAL CONSULT NOTE    HPI:  72YM with PMHx of severe MR, severe pHTN (83), afib (on Eliquis), R popliteal DVT s/p IVC filter (feb 2018), respiratory failure secondary to PNA s/p tracheostomy (baseline on trach collar; FiO2 35%), and with recent discharge from [facility] 2 wks ago after extended hospitalization for perforated duodenal ulcer. Patient BIBEM from [facility] for increased work of breathing and worsening hypoxia, to low 90s on baseline 35% FiO2, RR in 40s and tachycardic to 120s.    In ED, patient noted to be tachycardic to 117 with RR of 38. Rectal temp was 100.2. O2 and BP stable. Patient placed on AC ventilation with improvement in respiratory status. ED gave:  vanco/zosyn, 2L NS, and duoneb. Patient denies chest pain, abdominal pain, back pain, or foot pain. He reports improvement in his respiratory status. Denies fever, chills, or nausea. Labs notable for mild WBCs of 11.5, downtrending lactate of 4.1-->2.3, elev K of 5.6 no EKG changes, trops of 0.05 and BNP of 22krespiratory alkalosis Ph of 7.47.. RVP negative and CXR w/ ebvidence of pulmonary congestion and bilat pleural effusions.     During hospitalization 2 weeks ago at [facility], patient had repair of duodenal perforation with Shreyas Patch and placement of J tube. Course complicated by leakage from shreyas patch requiring emergent repair with placement of gastrostomy and duodenostomy. Further, patient had PNA complicated by respiratory failure requiring tracheostomy. Lastly, patient with dry gangrene of toes from vasopressor use. Vascular surgery evaluated patient stating no intervention needed. Patient was discharged on trach collar 35% FiO2. (12 Mar 2018 22:48)      ADDITIONAL ID HPI: Patient denies recent fevers, exposures, or focal symptoms other than above.     PAST MEDICAL & SURGICAL HISTORY:  CHF (congestive heart failure)  Cholecystitis  Duodenal ulcer  Atrial fibrillation  Mitral regurgitation  Aortic regurgitation  H/O tracheostomy  Acute duodenal ulcer with perforation  H/O gastrostomy, has currently  Perforated duodenal ulcer      REVIEW OF SYSTEMS:  Otherwise negative other than what is stated in the HPI.    ACTIVE ANTIMICROBIAL/ANTIBIOTIC MEDICATIONS:  ceFAZolin  Injectable. 2000 milliGRAM(s) IV Push every 8 hours      PRIOR ANTIMICROBIAL HISTORY ON THIS ADMISSION:  ceFAZolin  Injectable.   2000 milliGRAM(s) IV Push (03-15-18 @ 11:30)    ceFAZolin  Injectable.   2000 milliGRAM(s) IV Push (03-16-18 @ 11:40)   2000 milliGRAM(s) IV Push (03-16-18 @ 03:57)   1000 milliGRAM(s) IV Push (03-15-18 @ 19:26)    piperacillin/tazobactam IVPB.   200 mL/Hr IV Intermittent (03-12-18 @ 14:35)    piperacillin/tazobactam IVPB.   200 mL/Hr IV Intermittent (03-15-18 @ 05:12)   200 mL/Hr IV Intermittent (03-14-18 @ 23:23)   200 mL/Hr IV Intermittent (03-14-18 @ 17:23)   200 mL/Hr IV Intermittent (03-14-18 @ 12:46)   200 mL/Hr IV Intermittent (03-14-18 @ 05:49)   200 mL/Hr IV Intermittent (03-13-18 @ 23:53)   200 mL/Hr IV Intermittent (03-13-18 @ 17:51)   200 mL/Hr IV Intermittent (03-13-18 @ 11:14)   200 mL/Hr IV Intermittent (03-13-18 @ 06:56)   200 mL/Hr IV Intermittent (03-12-18 @ 23:15)    vancomycin  IVPB   166.67 mL/Hr IV Intermittent (03-12-18 @ 14:43)    vancomycin  IVPB   166.67 mL/Hr IV Intermittent (03-13-18 @ 16:41)   166.67 mL/Hr IV Intermittent (03-13-18 @ 01:49)    vancomycin  IVPB   250 mL/Hr IV Intermittent (03-14-18 @ 17:11)    vancomycin  IVPB   250 mL/Hr IV Intermittent (03-15-18 @ 05:55)        OTHER MEDICATIONS:  ALBUTerol/ipratropium for Nebulization 3 milliLiter(s) Nebulizer every 4 hours PRN  apixaban 5 milliGRAM(s) Oral every 12 hours  chlorhexidine 0.12% Liquid 15 milliLiter(s) Swish and Spit two times a day  ferrous    sulfate Liquid 300 milliGRAM(s) Enteral Tube daily  furosemide   Injectable 40 milliGRAM(s) IV Push every 12 hours  lisinopril 2.5 milliGRAM(s) Oral daily  pantoprazole  Injectable 40 milliGRAM(s) IV Push every 12 hours      ALLERGIES:  Allergies    No Known Allergies    Intolerances        SOCIAL HISTORY: NA    FAMILY HISTORY: NA      VITAL SIGNS:  ICU Vital Signs Last 24 Hrs  T(C): 36.6 (16 Mar 2018 09:18), Max: 36.7 (16 Mar 2018 02:00)  T(F): 97.9 (16 Mar 2018 09:18), Max: 98 (16 Mar 2018 02:00)  HR: 86 (16 Mar 2018 10:15) (72 - 92)  BP: 107/74 (16 Mar 2018 08:09) (103/67 - 110/75)  BP(mean): 84 (16 Mar 2018 08:09) (68 - 84)  ABP: --  ABP(mean): --  RR: 18 (16 Mar 2018 10:15) (10 - 36)  SpO2: 99% (16 Mar 2018 10:15) (95% - 100%)      PHYSICAL EXAM:  Constitutional: WDWN, elderly male  Head: NC/AT  Eyes: PERRL; anicteric sclera  ENMT: no rhinorrhea; no sinus tenderness on palpation; no oropharyngeal lesions, erythema, or exudates	  Neck: supple; no JVD or LAD, Trach in place clean with collar patient speaking   Respiratory: Right sided dullness to percussion, diminished breath sounds in base, otherwise good air entry without wheezes or crackles  Cardiovascular: +S1/S2, RRR; 3/6 murmur loudest at apex, PMI displaced to left to mid axilla  Gastrointestinal: soft, NT/ND; intact BS; no HSM; multiple tubes correlating with history of jejunal, gastric, and duodenal tube sites are clean  Extremities: Upper extremities warm well perfused; bilateral lower extremities with dark discoloration of toes with evidence of prior blisters consistent with history of vasopressor use. Bilateral heels with pink pressure pad on, removed only mild erythema noted beneath on right and small amount of blood and erythema on left, no purulence noted or pain on palpation, not warm to touch.   Vascular: 2+ radial, DP/PT pulses B/L  Neurologic: AAOx3; no focal deficits    LABS:                        7.9    8.0   )-----------( 186      ( 16 Mar 2018 06:44 )             26.6     03-16    139  |  99  |  29<H>  ----------------------------<  121<H>  4.0   |  31  |  0.55    Ca    8.2<L>      16 Mar 2018 06:44  Phos  3.1     03-16  Mg     2.2     03-16    TPro  6.4  /  Alb  2.5<L>  /  TBili  1.1  /  DBili  x   /  AST  22  /  ALT  37  /  AlkPhos  174<H>  03-15    PT/INR - ( 15 Mar 2018 05:25 )   PT: 17.3 sec;   INR: 1.54          PTT - ( 15 Mar 2018 05:25 )  PTT:32.0 sec      MICROBIOLOGY:    Culture - Blood (collected 03-14-18 @ 14:00)  Source: .Blood Blood-Arterial  Preliminary Report (03-15-18 @ 14:02):    No growth at 1 day.    Culture - Sputum (collected 03-13-18 @ 19:51)  Source: .Sputum Sputum  Gram Stain (03-13-18 @ 21:30):    Rare epithelial cells    Numerous White blood cells    Few Gram positive cocci in pairs, chains and clusters  Final Report (03-15-18 @ 11:47):    Moderate Staphylococcus aureus    Rare Routine respiratory geeta present  Organism: Staphylococcus aureus (03-15-18 @ 11:47)  Organism: Staphylococcus aureus (03-15-18 @ 11:47)      -  Cefazolin: S <=4      -  Clindamycin: S <=0.5      -  Erythromycin: S <=0.5      -  Linezolid: S 2      -  Oxacillin: S <=0.25      -  Penicillin: R 8      -  RIF- Rifampin: S <=1      -  Trimethoprim/Sulfamethoxazole: S <=0.5/9.5      -  Vancomycin: S 2      Method Type: CHA    Culture - Sputum (collected 03-13-18 @ 11:23)  Source: .Sputum Sputum  Gram Stain (03-13-18 @ 17:28):    Numerous epithelial cells    Moderate White blood cells    Few Gram positive cocci in pairs, chains and clusters  Final Report (03-13-18 @ 17:28):    Sputum specimen rejected.  Microscopic examination indicates    oropharyngeal contamination.  Please repeat.    Culture - Urine (collected 03-12-18 @ 20:55)  Source: .Urine Clean Catch (Midstream)  Final Report (03-14-18 @ 09:07):    No growth    Culture - Blood (collected 03-12-18 @ 15:07)  Source: .Blood Blood-Venous  Preliminary Report (03-15-18 @ 16:02):    No growth at 3 days.    Culture - Blood (collected 03-12-18 @ 15:07)  Source: .Blood Blood-Venous  Gram Stain (03-13-18 @ 09:14):    Anaerobic Bottle: Gram Positive Cocci in Clusters    Result called to and read back byDARY Anna RN  03/13/2018 09:14:30    ***Blood Panel PCR results on this specimen are available    approximately 3 hours after the Gram stain result.***    Gram stain, PCR, and/or culture results may not always    correspond due to difference in methodologies.    ************************************************************    This PCR assay was performed using [employer].    The following targets are tested for: Enterococcus,    vancomycin resistant enterococci, Listeria monocytogenes,    coagulase negative staphylococci, S. aureus,    methicillin resistant S. aureus, Streptococcus agalactiae    (Group B), S. pneumoniae, S. pyogenes (Group A),    Acinetobacter baumannii, Enterobacter cloacae, E. coli,    Klebsiella oxytoca, K. pneumoniae, Proteus sp.,    Serratia marcescens, Haemophilus influenzae,    Neisseria meningitidis, Pseudomonas aeruginosa, Candida    albicans, C. glabrata, C krusei, C parapsilosis,    C. tropicalis and the KPC resistance gene.  Preliminary Report (03-15-18 @ 11:21):    Growth in anaerobic bottle: Staphylococcus aureus  Organism: Blood Culture PCR  Staphylococcus aureus (03-15-18 @ 11:19)  Organism: Staphylococcus aureus (03-15-18 @ 11:19)      -  Cefazolin: S <=4      -  Clindamycin: S <=0.5      -  Erythromycin: S <=0.5      -  Linezolid: S 2      -  Oxacillin: S <=0.25      -  Penicillin: R 8      -  RIF- Rifampin: S <=1      -  Trimethoprim/Sulfamethoxazole: S <=0.5/9.5      -  Vancomycin: S 2      Method Type: CHA  Organism: Blood Culture PCR (03-13-18 @ 10:26)      -  Staphylococcus aureus: Detec Any isolate of Staphylococcus aureus from a blood culture is NOT considered a contaminant.      Method Type: PCR        RADIOLOGY & ADDITIONAL STUDIES  < from: AMRIK w/Doppler (01.29.18 @ 12:51) >  Severe bileaflet mitral valve prolapse, more   pronounced in the posteromedial commissure and the entire posterior leaflet. The A3   scallop is flail with likely torn chordae tendinae attached to it. Although   given the severety of mitral valve disease with mobile flail components,   it is hard to rule out a vegetation, the latter appears to be less likely.     < end of copied text >

## 2018-03-16 NOTE — CONSULT NOTE ADULT - ATTENDING COMMENTS
ID Attending    Mr. Matthews has a single positive blood culture (of 2) for MSSA; the same isolate was previously obtained from a surface bulla culture.  His history is notable inasmuch as he has not had fever, chills, etc while at the [facility], and also that he had an abnormal mitral valve on AMRIK done at the end of January.    Physical Exam is notable for a loud mitral regurgitation murmur; there are no conjunctival or oral petechiae, no Navarrete spots, not Osler, Janeway or splinters.    Impression:    Transient (?) bacteremia, now stable    Recommend:  1. continue cefazolin at present  2. repeat AMRIK  3. await gallium scan.
Patient seen and examined. Well known to me. Underwent multiple laparotomy for severe sepsis and multiorgan failure secondary to perforated duodenal ulcer. Ultimately repaired with omental patch and triple drainage.     Retention sutures removed at bedside.    Recommend CT of the abdomen with PO and IV contrast when stable. Would give PO contrast VIA THE GREEN GASTROSTOMY TUBE. Clamp all other drainage tubes for scan.     Speech and swallow to evaluate ability for oral feeds.
Thank you.  Patient known to me.  Long-standing MR in the setting of degenerative flail P2.  Recent prolonged hospital admission with abdominal sepsis requiring multiple laparotomies, tracheostomy and gastrostomy.  Now readmitted with bacteremia/respiratory tract infection/fluid overload.    Patient is extremely high risk for mitral valve surgery.  Would only seriously entertain surgery if endocarditis is the presumed diagnosis with large risk of embolization, although echocardiography to date not particularly suggestive of this.  Would suggest continue medical management at this stage.  Consider repeat AMRIK, consider gallium scan to try and discern whether or not the mitral valve is truly infected or not.    Discussed with Dr Billy as a second opinion - agrees with above; would also only intervene surgically as an absolute last resort.    Will continue to follow.

## 2018-03-16 NOTE — CONSULT NOTE ADULT - ASSESSMENT
#MSSA bacteremia  - Prior AMRIK showing flail of A3 on mitral valve with 'likely torn chordae tendinae attached' but 'hard to rule out a vegetation' and so repeat AMRIK would likely show the same  - Considering likely recovery of patient, it may be prudent to be aggressive and pursue nuclear medicine scan to rule in endocarditis. However, it may be more forward thinking still to treat as if endocarditis rather than subject patient to further scans and delay   - Blood cx NGTD from 3/14  - Continue ancef as currently dosed, may de-escalate given blood cultures pending discussion with attending    further recs to follow this afternoon #MSSA bacteremia  - Prior AMRIK showing flail of A3 on mitral valve with 'likely torn chordae tendinae attached' but 'hard to rule out a vegetation'  - Considering likely recovery of patient, it may be prudent to be aggressive and pursue nuclear medicine scan to rule in endocarditis.  - Blood cx NGTD from 3/14  - Continue ancef as currently dosed  - Repeat AMRIK  - Gallium scan    Patient seen and examined with attending, case discussed with primary team

## 2018-03-16 NOTE — PROGRESS NOTE ADULT - PROBLEM SELECTOR PLAN 2
pt w/ sputum and blood culture growing Staph aureus.   -pt initially on Vanc/Zosyn, now discontinued as cultures are not MRSA  -c/w Cefazolin 2mg q8hrs (3/15--  -Surveillance BCx drawn on 3/14--NGTD  -ID consulted regarding abx duration pt w/ sputum and blood culture growing Staph aureus.   -pt initially on Vanc/Zosyn, now discontinued as cultures are not MRSA  -c/w Cefazolin 2mg q8hrs (3/15--  -Surveillance BCx drawn on 3/14--NGTD  -ID consulted regarding abx duration  -Pending Gallium scan pt w/ sputum and blood culture growing Staph aureus.   -pt initially on Vanc/Zosyn, now discontinued as cultures are not MRSA  -c/w Cefazolin 2mg q8hrs (3/15--  -Surveillance BCx drawn on 3/14--NGTD  -ID consulted regarding abx duration  -Pending Gallium scan and likely repeat AMRIK next week

## 2018-03-16 NOTE — CONSULT NOTE ADULT - CONSULT REQUESTED DATE/TIME
12-Mar-2018
12-Mar-2018 17:26
13-Mar-2018 13:50
13-Mar-2018 17:20
16-Mar-2018 11:57
13-Mar-2018 14:55

## 2018-03-16 NOTE — PROGRESS NOTE ADULT - ASSESSMENT
72M PMH severe MR, severe pHTN, afib (on Eliquis), R popliteal DVT s/p IVC filter, respiratory failure secondary to PNA s/p tracheostomy (baseline on trach collar; FiO2 35%), and perforated duodenal ulcer s/p repair who presents with respiratory failure that required ventilation 72M PMH severe MR, severe pHTN, afib (on Eliquis), R popliteal DVT s/p IVC filter, respiratory failure secondary to PNA s/p tracheostomy (baseline on trach collar; FiO2 35%), and perforated duodenal ulcer s/p repair who presents with respiratory failure that required ventilation secondary to MSSA pneumonia, bacteremia and pulmonary edema with congestive heart failure systolic.

## 2018-03-16 NOTE — PROGRESS NOTE ADULT - ASSESSMENT
72M PMH severe MR, severe pHTN, afib (on Eliquis), R popliteal DVT s/p IVC filter, respiratory failure secondary to PNA s/p tracheostomy (baseline on trach collar; FiO2 35%), and perforated duodenal ulcer s/p repair who presents with respiratory failure that required ventilation secondary to MSSA pneumonia, bacteremia and pulmonary edema with congestive heart failure systolic. Vascular consulted previously for necrotic toes 2/2 to pressor administration.    Currently afebrile and no signs of infection. No increase in pain, drainage, erythema. Eschar over distal tips appear improved.     Recommendations:     - will cont to follow and change dressing for next few days  - daily dressing changes (betadine, Kerlix) to be done by RNs  - please call [phone] with any new questions 72M PMH severe MR, severe pHTN, afib (on Eliquis), R popliteal DVT s/p IVC filter, respiratory failure secondary to PNA s/p tracheostomy (baseline on trach collar; FiO2 35%), and perforated duodenal ulcer s/p repair who presents with respiratory failure that required ventilation secondary to MSSA pneumonia, bacteremia and pulmonary edema with congestive heart failure systolic. Vascular consulted previously for necrotic toes 2/2 to pressor administration.    Currently afebrile and no signs of infection. No increase in pain, drainage, erythema. Eschar over distal tips appear improved.     Recommendations:   - will cont to follow and change dressing for next few days  - recommend Z-boots for pressure ulcers on heel  - daily dressing changes (betadine, Kerlix)  - please call [phone] with any new questions 72M PMH severe MR, severe pHTN, afib (on Eliquis), R popliteal DVT s/p IVC filter, respiratory failure secondary to PNA s/p tracheostomy (baseline on trach collar; FiO2 35%), and perforated duodenal ulcer s/p repair who presents with respiratory failure that required ventilation secondary to MSSA pneumonia, bacteremia and pulmonary edema with congestive heart failure systolic. Vascular consulted previously for necrotic toes 2/2 to pressor administration.    Currently afebrile and no signs of infection. No increase in pain, drainage, erythema. Eschar over distal tips appear improved.     Recommendations:   - will cont to follow and change dressing for next few days  - recommend Z-boots for pressure ulcers on heel  - daily dressing changes (betadine, Kerlix)  - instructions for nurses going forward: paint tips of toes with betadine; weave kerlix in between toes, anchor around heel, and then wrap kerlix around foot  - please call [phone] with any new questions

## 2018-03-16 NOTE — PROGRESS NOTE ADULT - SUBJECTIVE AND OBJECTIVE BOX
PMH:  CHF (congestive heart failure)  Cholecystitis  Duodenal ulcer  Atrial fibrillation  Mitral regurgitation  Aortic regurgitation      Medication:   ceFAZolin  Injectable. 2000  apixaban 5  ceFAZolin  Injectable. 2000  furosemide   Injectable 40  lisinopril 2.5        Vital Signs Last 24 Hrs  T(C): 36.6 (16 Mar 2018 12:51), Max: 36.7 (16 Mar 2018 02:00)  T(F): 97.9 (16 Mar 2018 12:51), Max: 98 (16 Mar 2018 02:00)  HR: 86 (16 Mar 2018 10:15) (74 - 92)  BP: 107/74 (16 Mar 2018 08:09) (105/58 - 110/75)  BP(mean): 84 (16 Mar 2018 08:09) (68 - 84)  RR: 18 (16 Mar 2018 10:15) (16 - 36)  SpO2: 99% (16 Mar 2018 10:15) (95% - 100%)  I&O's Summary    15 Mar 2018 07:01  -  16 Mar 2018 07:00  --------------------------------------------------------  IN: 435 mL / OUT: 2795 mL / NET: -2360 mL    16 Mar 2018 07:01  -  16 Mar 2018 16:34  --------------------------------------------------------  IN: 0 mL / OUT: 1500 mL / NET: -1500 mL        Physical Exam:  General: NAD, resting comfortably in bed  Pulmonary: Nonlabored breathing, no respiratory distress  Cardiovascular:  Abdominal:  Extremities:  Pulses:   Right:                                                                          Left:  FEM [ ]2+ [ ]1+ [ ]doppler                                             FEM [ ]2+ [ ]1+ [ ]doppler    POP [ ]2+ [ ]1+ [ ]doppler                                             POP [ ]2+ [ ]1+ [ ]doppler    DP [ ]2+ [ ]1+ [ ]doppler                                                DP [ ]2+ [ ]1+ [ ]doppler  PT[ ]2+ [ ]1+ [ ]doppler                                                  PT [ ]2+ [ ]1+ [ ]doppler      LABS:                        7.9    8.0   )-----------( 186      ( 16 Mar 2018 06:44 )             26.6     03-16    139  |  99  |  29<H>  ----------------------------<  121<H>  4.0   |  31  |  0.55    Ca    8.2<L>      16 Mar 2018 06:44  Phos  3.1     03-16  Mg     2.2     03-16    TPro  6.4  /  Alb  2.5<L>  /  TBili  1.1  /  DBili  x   /  AST  22  /  ALT  37  /  AlkPhos  174<H>  03-15    PT/INR - ( 15 Mar 2018 05:25 )   PT: 17.3 sec;   INR: 1.54          PTT - ( 15 Mar 2018 05:25 )  PTT:32.0 sec    Radiology and Additional Studies: Patient seen and examined by bedside. Complains that dressing for his toes has not been changed in 3 days. Denies pain in his toes. No purulence or drainage.  Pain well-controlled. Denies fevers, chills, chest pain, shortness of breath.        PMH:  CHF (congestive heart failure)  Cholecystitis  Duodenal ulcer  Atrial fibrillation  Mitral regurgitation  Aortic regurgitation      Medication:   ceFAZolin  Injectable. 2000  apixaban 5  ceFAZolin  Injectable. 2000  furosemide   Injectable 40  lisinopril 2.5        Vital Signs Last 24 Hrs  T(C): 36.6 (16 Mar 2018 12:51), Max: 36.7 (16 Mar 2018 02:00)  T(F): 97.9 (16 Mar 2018 12:51), Max: 98 (16 Mar 2018 02:00)  HR: 86 (16 Mar 2018 10:15) (74 - 92)  BP: 107/74 (16 Mar 2018 08:09) (105/58 - 110/75)  BP(mean): 84 (16 Mar 2018 08:09) (68 - 84)  RR: 18 (16 Mar 2018 10:15) (16 - 36)  SpO2: 99% (16 Mar 2018 10:15) (95% - 100%)  I&O's Summary    15 Mar 2018 07:01  -  16 Mar 2018 07:00  --------------------------------------------------------  IN: 435 mL / OUT: 2795 mL / NET: -2360 mL    16 Mar 2018 07:01  -  16 Mar 2018 16:34  --------------------------------------------------------  IN: 0 mL / OUT: 1500 mL / NET: -1500 mL      PHYSICAL EXAM:  Gen:  NAD, resting comfortably  Neurologic: Awake and alert, following commands. Moves all extremities.  Pulm:  no respiratory distress, nonlabored breathing  Extremities: B/L lower extremities, not in Z-boot; 1+ bilateral LE edema from ankles to thighs; dry gangrene of tips of toes, no fluctuance, non malodorous  - eschar on distal tip of 1st toe on LLE from last visit appears improved;   Pulse:   - 2+ DP/PT pulses bilaterally            LABS:                        7.9    8.0   )-----------( 186      ( 16 Mar 2018 06:44 )             26.6     03-16    139  |  99  |  29<H>  ----------------------------<  121<H>  4.0   |  31  |  0.55    Ca    8.2<L>      16 Mar 2018 06:44  Phos  3.1     03-16  Mg     2.2     03-16    TPro  6.4  /  Alb  2.5<L>  /  TBili  1.1  /  DBili  x   /  AST  22  /  ALT  37  /  AlkPhos  174<H>  03-15    PT/INR - ( 15 Mar 2018 05:25 )   PT: 17.3 sec;   INR: 1.54          PTT - ( 15 Mar 2018 05:25 )  PTT:32.0 sec    Radiology and Additional Studies:

## 2018-03-16 NOTE — PROGRESS NOTE ADULT - SUBJECTIVE AND OBJECTIVE BOX
INTERVAL HPI/OVERNIGHT EVENTS: No acute events overnight. Pt tolerating trach collar with FiO2 40%    SUBJECTIVE: Patient seen and examined at bedside. No acute distress. Patient reports that he is feeling fine. Denies chest pain, shortness of breath, abdominal pain, nausea, vomiting, fever/chills.     VITAL SIGNS:  ICU Vital Signs Last 24 Hrs  T(C): 36.4 (16 Mar 2018 05:00), Max: 37.3 (15 Mar 2018 09:17)  T(F): 97.5 (16 Mar 2018 05:00), Max: 99.1 (15 Mar 2018 09:17)  HR: 82 (16 Mar 2018 05:02) (57 - 82)  BP: 110/75 (16 Mar 2018 05:02) (96/60 - 111/61)  BP(mean): 83 (16 Mar 2018 05:02) (68 - 84)  RR: 22 (16 Mar 2018 05:02) (10 - 36)  SpO2: 95% (16 Mar 2018 05:02) (95% - 100%)    Mode: standby    03-14 @ 07:01  -  03-15 @ 07:00  --------------------------------------------------------  IN: 3292 mL / OUT: 3428 mL / NET: -136 mL    03-15 @ 07:01 - 03-16 @ 06:55  --------------------------------------------------------  IN: 435 mL / OUT: 2795 mL / NET: -2360 mL      POCT Blood Glucose.: 139 mg/dL (15 Mar 2018 21:05)      PHYSICAL EXAM:  Constitutional: Middle aged main laying in bed comfortably with towel on forehead  HEENT: NC/AT; PERRL, anicteric sclera; MMM  Neck: supple, no JVD, trach collar site clean   Pulmonary: Diffuse rhonchi, L>R, no wheeze or crackles appreciated, breathing well on trach collar   Cardiac: Non-tachycardic, irregular, 4/6 holosystolic murmur with radiation to the left axilla. Laterally displaced PMI.  GI: Soft, NT, ND. Midline well healed surgical scar with several small well healing laparoscopy scars with no signs of infection or purulence. +J-tube, G-tube, D-tube site covered w/ clean dressing.   Extremities: B/L lower extremities in Z-boot. +1 b/l LE edema starting from ankles up to thighs. + Dry gangrene of toes, no fluctuance, no foul odor, no drainage. 2+ DP pulses.   Neurologic: Awake and alert, following commands. Moves all extremities.    MEDICATIONS:  MEDICATIONS  (STANDING):  apixaban 5 milliGRAM(s) Oral every 12 hours  ceFAZolin  Injectable. 2000 milliGRAM(s) IV Push every 8 hours  chlorhexidine 0.12% Liquid 15 milliLiter(s) Swish and Spit two times a day  ferrous    sulfate Liquid 300 milliGRAM(s) Enteral Tube daily  furosemide   Injectable 40 milliGRAM(s) IV Push every 12 hours  lisinopril 2.5 milliGRAM(s) Oral daily  pantoprazole  Injectable 40 milliGRAM(s) IV Push every 12 hours    MEDICATIONS  (PRN):  ALBUTerol/ipratropium for Nebulization 3 milliLiter(s) Nebulizer every 4 hours PRN Shortness of Breath and/or Wheezing      ALLERGIES:  Allergies    No Known Allergies    Intolerances        LABS:                        7.3    6.4   )-----------( 156      ( 15 Mar 2018 05:25 )             24.1     03-16    136  |  96  |  31<H>  ----------------------------<  173<H>  3.6   |  29  |  0.55    Ca    8.0<L>      16 Mar 2018 00:28  Phos  3.4     03-15  Mg     2.2     03-16    TPro  6.4  /  Alb  2.5<L>  /  TBili  1.1  /  DBili  x   /  AST  22  /  ALT  37  /  AlkPhos  174<H>  03-15    PT/INR - ( 15 Mar 2018 05:25 )   PT: 17.3 sec;   INR: 1.54          PTT - ( 15 Mar 2018 05:25 )  PTT:32.0 sec      RADIOLOGY & ADDITIONAL TESTS: Reviewed. INTERVAL HPI/OVERNIGHT EVENTS: Overnight pt had 2 episodes of 5 beats of NSVT, all other vital signs stable. Pt tolerating trach collar with FiO2 40%    SUBJECTIVE: Patient seen and examined at bedside. No acute distress. Patient reports that he is feeling fine. Denies chest pain, shortness of breath, abdominal pain, nausea, vomiting, fever/chills.     VITAL SIGNS:  ICU Vital Signs Last 24 Hrs  T(C): 36.4 (16 Mar 2018 05:00), Max: 37.3 (15 Mar 2018 09:17)  T(F): 97.5 (16 Mar 2018 05:00), Max: 99.1 (15 Mar 2018 09:17)  HR: 82 (16 Mar 2018 05:02) (57 - 82)  BP: 110/75 (16 Mar 2018 05:02) (96/60 - 111/61)  BP(mean): 83 (16 Mar 2018 05:02) (68 - 84)  RR: 22 (16 Mar 2018 05:02) (10 - 36)  SpO2: 95% (16 Mar 2018 05:02) (95% - 100%)    Mode: standby    03-14 @ 07:01  -  03-15 @ 07:00  --------------------------------------------------------  IN: 3292 mL / OUT: 3428 mL / NET: -136 mL    03-15 @ 07:01 - 03-16 @ 06:55  --------------------------------------------------------  IN: 435 mL / OUT: 2795 mL / NET: -2360 mL      POCT Blood Glucose.: 139 mg/dL (15 Mar 2018 21:05)      PHYSICAL EXAM:  Constitutional: Middle aged main laying in bed comfortably with towel on forehead  HEENT: NC/AT; PERRL, anicteric sclera; MMM  Neck: supple, no JVD, trach collar site clean   Pulmonary: Diffuse rhonchi, L>R, no wheeze or crackles appreciated, breathing well on trach collar   Cardiac: Non-tachycardic, irregular, 4/6 holosystolic murmur with radiation to the left axilla. Laterally displaced PMI.  GI: Soft, NT, ND. Midline well healed surgical scar with several small well healing laparoscopy scars with no signs of infection or purulence. +J-tube, G-tube, D-tube site covered w/ clean dressing.   Extremities: B/L lower extremities in Z-boot. +1 b/l LE edema starting from ankles up to thighs. + Dry gangrene of toes, no fluctuance, no foul odor, no drainage. 2+ DP pulses.   Neurologic: Awake and alert, following commands. Moves all extremities.    MEDICATIONS:  MEDICATIONS  (STANDING):  apixaban 5 milliGRAM(s) Oral every 12 hours  ceFAZolin  Injectable. 2000 milliGRAM(s) IV Push every 8 hours  chlorhexidine 0.12% Liquid 15 milliLiter(s) Swish and Spit two times a day  ferrous    sulfate Liquid 300 milliGRAM(s) Enteral Tube daily  furosemide   Injectable 40 milliGRAM(s) IV Push every 12 hours  lisinopril 2.5 milliGRAM(s) Oral daily  pantoprazole  Injectable 40 milliGRAM(s) IV Push every 12 hours    MEDICATIONS  (PRN):  ALBUTerol/ipratropium for Nebulization 3 milliLiter(s) Nebulizer every 4 hours PRN Shortness of Breath and/or Wheezing      ALLERGIES:  Allergies    No Known Allergies    Intolerances        LABS:                        7.3    6.4   )-----------( 156      ( 15 Mar 2018 05:25 )             24.1     03-16    136  |  96  |  31<H>  ----------------------------<  173<H>  3.6   |  29  |  0.55    Ca    8.0<L>      16 Mar 2018 00:28  Phos  3.4     03-15  Mg     2.2     03-16    TPro  6.4  /  Alb  2.5<L>  /  TBili  1.1  /  DBili  x   /  AST  22  /  ALT  37  /  AlkPhos  174<H>  03-15    PT/INR - ( 15 Mar 2018 05:25 )   PT: 17.3 sec;   INR: 1.54          PTT - ( 15 Mar 2018 05:25 )  PTT:32.0 sec      RADIOLOGY & ADDITIONAL TESTS: Reviewed. INTERVAL HPI/OVERNIGHT EVENTS: Overnight pt had 2 episodes of 5 beats of NSVT, all other vital signs stable. Pt tolerating trach collar with FiO2 40%    SUBJECTIVE: Patient seen and examined at bedside. No acute distress. Patient reports that he is feeling fine. Denies chest pain, shortness of breath, abdominal pain, nausea, vomiting, fever/chills.     VITAL SIGNS:  ICU Vital Signs Last 24 Hrs  T(C): 36.4 (16 Mar 2018 05:00), Max: 37.3 (15 Mar 2018 09:17)  T(F): 97.5 (16 Mar 2018 05:00), Max: 99.1 (15 Mar 2018 09:17)  HR: 82 (16 Mar 2018 05:02) (57 - 82)  BP: 110/75 (16 Mar 2018 05:02) (96/60 - 111/61)  BP(mean): 83 (16 Mar 2018 05:02) (68 - 84)  RR: 22 (16 Mar 2018 05:02) (10 - 36)  SpO2: 95% (16 Mar 2018 05:02) (95% - 100%)    Mode: standby    03-14 @ 07:01  -  03-15 @ 07:00  --------------------------------------------------------  IN: 3292 mL / OUT: 3428 mL / NET: -136 mL    03-15 @ 07:01 - 03-16 @ 06:55  --------------------------------------------------------  IN: 435 mL / OUT: 2795 mL / NET: -2360 mL      POCT Blood Glucose.: 139 mg/dL (15 Mar 2018 21:05)      PHYSICAL EXAM:  Constitutional: Middle aged main laying in bed comfortably with towel on forehead  HEENT: NC/AT; PERRL, anicteric sclera; MMM  Neck: supple, no JVD, trach collar site clean   Pulmonary: Diffuse rhonchi, L>R, no wheeze or crackles appreciated, breathing well on trach collar   Cardiac: Non-tachycardic, irregular, 4/6 holosystolic murmur with radiation to the left axilla. Laterally displaced PMI.  GI: Soft, NT, ND. Midline well healed surgical scar with several small well healing laparoscopy scars with no signs of infection or purulence. +J-tube, G-tube, D-tube site covered w/ clean dressing.   : [place] catheter in place  Extremities: B/L lower extremities in Z-boot. +1 b/l LE edema starting from ankles up to thighs. + Dry gangrene of toes, no fluctuance, no foul odor, no drainage. 2+ DP pulses.   Neurologic: Awake and alert, following commands. Moves all extremities.    MEDICATIONS:  MEDICATIONS  (STANDING):  apixaban 5 milliGRAM(s) Oral every 12 hours  ceFAZolin  Injectable. 2000 milliGRAM(s) IV Push every 8 hours  chlorhexidine 0.12% Liquid 15 milliLiter(s) Swish and Spit two times a day  ferrous    sulfate Liquid 300 milliGRAM(s) Enteral Tube daily  furosemide   Injectable 40 milliGRAM(s) IV Push every 12 hours  lisinopril 2.5 milliGRAM(s) Oral daily  pantoprazole  Injectable 40 milliGRAM(s) IV Push every 12 hours    MEDICATIONS  (PRN):  ALBUTerol/ipratropium for Nebulization 3 milliLiter(s) Nebulizer every 4 hours PRN Shortness of Breath and/or Wheezing      ALLERGIES:  Allergies    No Known Allergies    Intolerances        LABS:                        7.3    6.4   )-----------( 156      ( 15 Mar 2018 05:25 )             24.1     03-16    136  |  96  |  31<H>  ----------------------------<  173<H>  3.6   |  29  |  0.55    Ca    8.0<L>      16 Mar 2018 00:28  Phos  3.4     03-15  Mg     2.2     03-16    TPro  6.4  /  Alb  2.5<L>  /  TBili  1.1  /  DBili  x   /  AST  22  /  ALT  37  /  AlkPhos  174<H>  03-15    PT/INR - ( 15 Mar 2018 05:25 )   PT: 17.3 sec;   INR: 1.54          PTT - ( 15 Mar 2018 05:25 )  PTT:32.0 sec      RADIOLOGY & ADDITIONAL TESTS: Reviewed. CXR improved as R hemidiaphragm now visualized

## 2018-03-17 LAB
ANION GAP SERPL CALC-SCNC: 10 MMOL/L — SIGNIFICANT CHANGE UP (ref 5–17)
BUN SERPL-MCNC: 27 MG/DL — HIGH (ref 7–23)
CALCIUM SERPL-MCNC: 8.5 MG/DL — SIGNIFICANT CHANGE UP (ref 8.4–10.5)
CHLORIDE SERPL-SCNC: 98 MMOL/L — SIGNIFICANT CHANGE UP (ref 96–108)
CO2 SERPL-SCNC: 30 MMOL/L — SIGNIFICANT CHANGE UP (ref 22–31)
CREAT SERPL-MCNC: 0.45 MG/DL — LOW (ref 0.5–1.3)
CULTURE RESULTS: SIGNIFICANT CHANGE UP
CULTURE RESULTS: SIGNIFICANT CHANGE UP
GLUCOSE SERPL-MCNC: 143 MG/DL — HIGH (ref 70–99)
HCT VFR BLD CALC: 29.4 % — LOW (ref 39–50)
HGB BLD-MCNC: 8.5 G/DL — LOW (ref 13–17)
MAGNESIUM SERPL-MCNC: 2.4 MG/DL — SIGNIFICANT CHANGE UP (ref 1.6–2.6)
MCHC RBC-ENTMCNC: 27.8 PG — SIGNIFICANT CHANGE UP (ref 27–34)
MCHC RBC-ENTMCNC: 28.9 G/DL — LOW (ref 32–36)
MCV RBC AUTO: 96.1 FL — SIGNIFICANT CHANGE UP (ref 80–100)
PHOSPHATE SERPL-MCNC: 2.5 MG/DL — SIGNIFICANT CHANGE UP (ref 2.5–4.5)
PLATELET # BLD AUTO: 191 K/UL — SIGNIFICANT CHANGE UP (ref 150–400)
POTASSIUM SERPL-MCNC: 4.1 MMOL/L — SIGNIFICANT CHANGE UP (ref 3.5–5.3)
POTASSIUM SERPL-SCNC: 4.1 MMOL/L — SIGNIFICANT CHANGE UP (ref 3.5–5.3)
RBC # BLD: 3.06 M/UL — LOW (ref 4.2–5.8)
RBC # FLD: 17.2 % — HIGH (ref 10.3–16.9)
SODIUM SERPL-SCNC: 138 MMOL/L — SIGNIFICANT CHANGE UP (ref 135–145)
SPECIMEN SOURCE: SIGNIFICANT CHANGE UP
SPECIMEN SOURCE: SIGNIFICANT CHANGE UP
WBC # BLD: 7.5 K/UL — SIGNIFICANT CHANGE UP (ref 3.8–10.5)
WBC # FLD AUTO: 7.5 K/UL — SIGNIFICANT CHANGE UP (ref 3.8–10.5)

## 2018-03-17 PROCEDURE — 99233 SBSQ HOSP IP/OBS HIGH 50: CPT | Mod: GC

## 2018-03-17 PROCEDURE — 71045 X-RAY EXAM CHEST 1 VIEW: CPT | Mod: 26

## 2018-03-17 RX ORDER — POVIDONE-IODINE 5 %
1 AEROSOL (ML) TOPICAL DAILY
Qty: 0 | Refills: 0 | Status: DISCONTINUED | OUTPATIENT
Start: 2018-03-17 | End: 2018-03-26

## 2018-03-17 RX ADMIN — Medication 2000 MILLIGRAM(S): at 06:18

## 2018-03-17 RX ADMIN — APIXABAN 5 MILLIGRAM(S): 2.5 TABLET, FILM COATED ORAL at 06:41

## 2018-03-17 RX ADMIN — Medication 2000 MILLIGRAM(S): at 18:02

## 2018-03-17 RX ADMIN — Medication 300 MILLIGRAM(S): at 12:03

## 2018-03-17 RX ADMIN — PANTOPRAZOLE SODIUM 40 MILLIGRAM(S): 20 TABLET, DELAYED RELEASE ORAL at 06:18

## 2018-03-17 RX ADMIN — Medication 2000 MILLIGRAM(S): at 11:58

## 2018-03-17 RX ADMIN — APIXABAN 5 MILLIGRAM(S): 2.5 TABLET, FILM COATED ORAL at 18:05

## 2018-03-17 RX ADMIN — PANTOPRAZOLE SODIUM 40 MILLIGRAM(S): 20 TABLET, DELAYED RELEASE ORAL at 18:01

## 2018-03-17 RX ADMIN — Medication 40 MILLIGRAM(S): at 22:00

## 2018-03-17 RX ADMIN — Medication 40 MILLIGRAM(S): at 12:01

## 2018-03-17 RX ADMIN — LISINOPRIL 2.5 MILLIGRAM(S): 2.5 TABLET ORAL at 06:18

## 2018-03-17 RX ADMIN — CHLORHEXIDINE GLUCONATE 15 MILLILITER(S): 213 SOLUTION TOPICAL at 18:08

## 2018-03-17 NOTE — PROGRESS NOTE ADULT - SUBJECTIVE AND OBJECTIVE BOX
Patient seen and examined by bedside this afternoon. Patient amenable to nurses taking care of dressings. Denies pain in his toes. Denies purulence or drainage.  Pain well-controlled. Denies fevers, chills, chest pain, shortness of breath. No other complaints.    PMH:  CHF (congestive heart failure)  Cholecystitis  Duodenal ulcer  Atrial fibrillation  Mitral regurgitation  Aortic regurgitation      Medication:   ceFAZolin  Injectable. 2000  apixaban 5  ceFAZolin  Injectable. 2000  furosemide   Injectable 40  lisinopril 2.5        Vital Signs Last 24 Hrs  T(C): 36.6 (18 Mar 2018 01:00), Max: 36.7 (17 Mar 2018 10:37)  T(F): 97.8 (18 Mar 2018 01:00), Max: 98 (17 Mar 2018 10:37)  HR: 82 (18 Mar 2018 00:39) (80 - 96)  BP: 105/61 (18 Mar 2018 00:39) (96/61 - 125/70)  BP(mean): 78 (18 Mar 2018 00:39) (73 - 88)  RR: 18 (18 Mar 2018 00:39) (17 - 19)  SpO2: 98% (18 Mar 2018 00:39) (72% - 100%)  I&O's Summary    16 Mar 2018 07:01  -  17 Mar 2018 07:00  --------------------------------------------------------  IN: 0 mL / OUT: 1950 mL / NET: -1950 mL    17 Mar 2018 07:01  -  18 Mar 2018 03:00  --------------------------------------------------------  IN: 0 mL / OUT: 1350 mL / NET: -1350 mL        PHYSICAL EXAM:  Gen:  NAD, resting comfortably  Neurologic: Awake and alert, following commands. Moves all extremities.  Pulm:  no respiratory distress, nonlabored breathing  Extremities: B/L lower extremities; 1+ bilateral LE edema from ankles to thighs; dry gangrene of tips of toes, no fluctuance, non malodorous  - eschar on distal tip of 1st toe on LLE from last visit appears improved;   Pulse:   - 2+ DP/PT pulses bilaterally        LABS:                        8.5    7.5   )-----------( 191      ( 17 Mar 2018 06:10 )             29.4     03-17    138  |  98  |  27<H>  ----------------------------<  143<H>  4.1   |  30  |  0.45<L>    Ca    8.5      17 Mar 2018 06:10  Phos  2.5     03-17  Mg     2.4     03-17          Radiology and Additional Studies:

## 2018-03-17 NOTE — PROGRESS NOTE ADULT - PROBLEM SELECTOR PLAN 2
pt w/ sputum and blood culture growing Staph aureus. Surveillance BCx on 3/14 with NGTD and pt w/ no fevers and no leukocytosis.   -pt initially on Vanc/Zosyn, now discontinued as cultures are not MRSA  -c/w Cefazolin 2mg q8hrs (3/15--  -ID consulted regarding abx duration  -Pending Gallium scan and likely repeat AMRIK next week to rule out endocarditis

## 2018-03-17 NOTE — PROGRESS NOTE ADULT - ASSESSMENT
72M PMH severe MR, severe pHTN, afib (on Eliquis), R popliteal DVT s/p IVC filter, respiratory failure secondary to PNA s/p tracheostomy (baseline on trach collar; FiO2 35%), and perforated duodenal ulcer s/p repair who presents with respiratory failure that required ventilation secondary to MSSA pneumonia, bacteremia and pulmonary edema with congestive heart failure systolic. Vascular consulted previously for necrotic toes 2/2 to pressor administration.    Currently afebrile and no signs of infection. No increase in pain, drainage, erythema. Eschar over distal tips appear improved.   - changed dressing today; but will be signing off today  - instructions betadine dressing left for floor nurses in order set    Recommendations:   - please cont. betadine dressing changes daily   - instructions for nurses going forward: paint tips of toes with betadine; weave kerlix in between toes, anchor around heel, and then wrap kerlix around foot  (instructions also in order set)  - recommend cont. Z-boots for pressure ulcers on heel  - signing off  - discussed with attending and chief on call  - please call [phone] with any new questions

## 2018-03-17 NOTE — PROGRESS NOTE ADULT - SUBJECTIVE AND OBJECTIVE BOX
INTERVAL HPI/OVERNIGHT EVENTS: Overnight pt 6 beats of NSVT at 3am. All other vital signs stable.     SUBJECTIVE: Patient seen and examined at bedside. No acute distress. Patient denies chest pain, shortness of breath, abdominal pain, nausea, vomiting, fever/chills.     VITAL SIGNS:  ICU Vital Signs Last 24 Hrs  T(C): 36.5 (17 Mar 2018 05:00), Max: 36.7 (16 Mar 2018 17:00)  T(F): 97.7 (17 Mar 2018 05:00), Max: 98 (16 Mar 2018 17:00)  HR: 88 (17 Mar 2018 08:18) (69 - 88)  BP: 114/65 (17 Mar 2018 08:18) (102/63 - 114/65)  BP(mean): 77 (17 Mar 2018 08:18) (76 - 83)  RR: 17 (17 Mar 2018 04:45) (16 - 18)  SpO2: 100% (17 Mar 2018 08:36) (72% - 100%)    Mode: standby    03-16 @ 07:01  -  03-17 @ 07:00  --------------------------------------------------------  IN: 0 mL / OUT: 1950 mL / NET: -1950 mL      CAPILLARY BLOOD GLUCOSE      POCT Blood Glucose.: 139 mg/dL (15 Mar 2018 21:05)      PHYSICAL EXAM:  Constitutional: Middle aged main laying in bed comfortably with towel on forehead  HEENT: NC/AT; PERRL, anicteric sclera; MMM  Neck: supple, no JVD, trach collar site clean   Pulmonary: Diffuse rhonchi, L>R, no wheeze or crackles appreciated, breathing well on trach collar   Cardiac: Non-tachycardic, irregular, 4/6 holosystolic murmur with radiation to the left axilla. Laterally displaced PMI.  GI: Soft, NT, ND. Midline well healed surgical scar with several small well healing laparoscopy scars with no signs of infection or purulence. +J-tube, G-tube, D-tube site covered w/ clean dressing.   : [place] catheter in place  Extremities: B/L lower extremities in Z-boot. +1 b/l LE edema starting from ankles up to thighs. + Dry gangrene of toes, no fluctuance, no foul odor, no drainage. 2+ DP pulses.   Neurologic: Awake and alert, following commands. Moves all extremities.    MEDICATIONS:  MEDICATIONS  (STANDING):  apixaban 5 milliGRAM(s) Oral every 12 hours  ceFAZolin  Injectable. 2000 milliGRAM(s) IV Push every 8 hours  chlorhexidine 0.12% Liquid 15 milliLiter(s) Swish and Spit two times a day  ferrous    sulfate Liquid 300 milliGRAM(s) Enteral Tube daily  furosemide   Injectable 40 milliGRAM(s) IV Push every 12 hours  lisinopril 2.5 milliGRAM(s) Oral daily  pantoprazole  Injectable 40 milliGRAM(s) IV Push every 12 hours    MEDICATIONS  (PRN):  ALBUTerol/ipratropium for Nebulization 3 milliLiter(s) Nebulizer every 4 hours PRN Shortness of Breath and/or Wheezing      ALLERGIES:  Allergies    No Known Allergies    Intolerances        LABS:                        8.5    7.5   )-----------( 191      ( 17 Mar 2018 06:10 )             29.4     03-17    138  |  98  |  27<H>  ----------------------------<  143<H>  4.1   |  30  |  0.45<L>    Ca    8.5      17 Mar 2018 06:10  Phos  2.5     03-17  Mg     2.4     03-17            RADIOLOGY & ADDITIONAL TESTS: Reviewed.

## 2018-03-17 NOTE — PROGRESS NOTE ADULT - SUBJECTIVE AND OBJECTIVE BOX
INTERVAL HPI/OVERNIGHT EVENTS:    Sputum culture (+) for MSSA    Stable overnight    Records from [facility] confirm no fevers, no antibiotics while there    ANTIBIOTICS/RELEVANT:    MEDICATIONS  (STANDING):  ceFAZolin  Injectable. 2000 milliGRAM(s) IV Push every 8 hours    Allergies    PHYSICAL EXAMINATION:    T(F): 97.7 (17 Mar 2018 05:00), Max: 98 (16 Mar 2018 17:00)  HR: 88 (17 Mar 2018 08:18) (69 - 88)  BP: 114/65 (17 Mar 2018 08:18) (102/63 - 114/65)  BP(mean): 77 (17 Mar 2018 08:18) (76 - 83)  RR: 17 (17 Mar 2018 04:45) (16 - 18)  SpO2: 100% (17 Mar 2018 08:36) (72% - 100%)    Looks well  Clinically stable    LABS:                        8.5    7.5   )-----------( 191      ( 17 Mar 2018 06:10 )             29.4     03-17    138  |  98  |  27<H>  ----------------------------<  143<H>  4.1   |  30  |  0.45<L>    Ca    8.5      17 Mar 2018 06:10  Phos  2.5     03-17  Mg     2.4     03-17    MICROBIOLOGY: as noted above

## 2018-03-17 NOTE — PROGRESS NOTE ADULT - ASSESSMENT
72M PMH severe MR, severe pHTN, afib (on Eliquis), R popliteal DVT s/p IVC filter, respiratory failure secondary to PNA s/p tracheostomy (baseline on trach collar; FiO2 35%), and perforated duodenal ulcer s/p repair who presents with respiratory failure that required ventilation secondary to MSSA pneumonia, bacteremia and pulmonary edema with congestive heart failure systolic.

## 2018-03-17 NOTE — PROGRESS NOTE ADULT - PROBLEM SELECTOR PLAN 3
During previous admission pt failed extubation requiring trach on 2/1/18. Pt was discharged tolerating trach collar on FiO2 35% at home who presented w/ worsening hypoxia that resolved by placing pt on mechanical vent.   -Continue w/ trach collar as pt is able to tolerate  -Encourage out of bed to chair

## 2018-03-18 PROCEDURE — 78806: CPT | Mod: 26

## 2018-03-18 PROCEDURE — 71045 X-RAY EXAM CHEST 1 VIEW: CPT | Mod: 26

## 2018-03-18 PROCEDURE — 99233 SBSQ HOSP IP/OBS HIGH 50: CPT | Mod: GC

## 2018-03-18 RX ORDER — SALICYLIC ACID 0.5 %
1 CLEANSER (GRAM) TOPICAL
Qty: 0 | Refills: 0 | Status: DISCONTINUED | OUTPATIENT
Start: 2018-03-18 | End: 2018-03-26

## 2018-03-18 RX ADMIN — Medication 1 APPLICATION(S): at 18:07

## 2018-03-18 RX ADMIN — Medication 2000 MILLIGRAM(S): at 18:10

## 2018-03-18 RX ADMIN — APIXABAN 5 MILLIGRAM(S): 2.5 TABLET, FILM COATED ORAL at 18:09

## 2018-03-18 RX ADMIN — LISINOPRIL 2.5 MILLIGRAM(S): 2.5 TABLET ORAL at 07:05

## 2018-03-18 RX ADMIN — CHLORHEXIDINE GLUCONATE 15 MILLILITER(S): 213 SOLUTION TOPICAL at 18:07

## 2018-03-18 RX ADMIN — PANTOPRAZOLE SODIUM 40 MILLIGRAM(S): 20 TABLET, DELAYED RELEASE ORAL at 07:06

## 2018-03-18 RX ADMIN — Medication 2000 MILLIGRAM(S): at 11:23

## 2018-03-18 RX ADMIN — Medication 2000 MILLIGRAM(S): at 03:23

## 2018-03-18 RX ADMIN — Medication 1 APPLICATION(S): at 11:51

## 2018-03-18 RX ADMIN — PANTOPRAZOLE SODIUM 40 MILLIGRAM(S): 20 TABLET, DELAYED RELEASE ORAL at 18:08

## 2018-03-18 RX ADMIN — Medication 300 MILLIGRAM(S): at 11:23

## 2018-03-18 RX ADMIN — Medication 40 MILLIGRAM(S): at 11:23

## 2018-03-18 RX ADMIN — CHLORHEXIDINE GLUCONATE 15 MILLILITER(S): 213 SOLUTION TOPICAL at 07:06

## 2018-03-18 RX ADMIN — APIXABAN 5 MILLIGRAM(S): 2.5 TABLET, FILM COATED ORAL at 07:05

## 2018-03-18 RX ADMIN — Medication 40 MILLIGRAM(S): at 23:00

## 2018-03-18 NOTE — PROGRESS NOTE ADULT - SUBJECTIVE AND OBJECTIVE BOX
INTERVAL/OVERNIGHT EVENTS:        SUBJECTIVE:  Seen and examined at bedside.    ROS otherwise negative.    VITAL SIGNS:  T(F): 97.8 (03-18-18 @ 01:00)  HR: 84 (03-18-18 @ 05:00)  BP: 100/69 (03-18-18 @ 05:00)  RR: 17 (03-18-18 @ 05:00)  SpO2: 95% (03-18-18 @ 05:00)  Wt(kg): --    PHYSICAL EXAM:        MEDICATIONS  (STANDING):  apixaban 5 milliGRAM(s) Oral every 12 hours  ceFAZolin  Injectable. 2000 milliGRAM(s) IV Push every 8 hours  chlorhexidine 0.12% Liquid 15 milliLiter(s) Swish and Spit two times a day  ferrous    sulfate Liquid 300 milliGRAM(s) Enteral Tube daily  furosemide   Injectable 40 milliGRAM(s) IV Push every 12 hours  lisinopril 2.5 milliGRAM(s) Oral daily  pantoprazole  Injectable 40 milliGRAM(s) IV Push every 12 hours  povidone iodine 10% Swab 1 Application(s) Topical daily    MEDICATIONS  (PRN):  ALBUTerol/ipratropium for Nebulization 3 milliLiter(s) Nebulizer every 4 hours PRN Shortness of Breath and/or Wheezing      Allergies    No Known Allergies    Intolerances      ALBUTerol/ipratropium for Nebulization 3 milliLiter(s) Nebulizer every 4 hours PRN  apixaban 5 milliGRAM(s) Oral every 12 hours  ceFAZolin  Injectable. 2000 milliGRAM(s) IV Push every 8 hours  chlorhexidine 0.12% Liquid 15 milliLiter(s) Swish and Spit two times a day  ferrous    sulfate Liquid 300 milliGRAM(s) Enteral Tube daily  furosemide   Injectable 40 milliGRAM(s) IV Push every 12 hours  lisinopril 2.5 milliGRAM(s) Oral daily  pantoprazole  Injectable 40 milliGRAM(s) IV Push every 12 hours  povidone iodine 10% Swab 1 Application(s) Topical daily      LABS:                          8.5    7.5   )-----------( 191      ( 17 Mar 2018 06:10 )             29.4     03-17    138  |  98  |  27<H>  ----------------------------<  143<H>  4.1   |  30  |  0.45<L>    Ca    8.5      17 Mar 2018 06:10  Phos  2.5     03-17  Mg     2.4     03-17            RADIOLOGY & ADDITIONAL TESTS:  All imaging reviewed. INTERVAL/OVERNIGHT EVENTS:  CORETTA    SUBJECTIVE:  Seen and examined at bedside.  Reports feeling well but did not sleep much due to neighbor.  Reports no SOB, cough, CP, palpitations, F/C, abd pain, N/V/D/C, dysuria, leg pain.    ROS otherwise negative.    VITAL SIGNS:  T(F): 97.8 (03-18-18 @ 01:00)  HR: 84 (03-18-18 @ 05:00)  BP: 100/69 (03-18-18 @ 05:00)  RR: 17 (03-18-18 @ 05:00)  SpO2: 95% (03-18-18 @ 05:00)  Wt(kg): --    PHYSICAL EXAM:    Constitutional: Middle aged main laying in bed comfortably   HEENT: NC/AT; PERRL, anicteric sclera; MMM  Neck: supple, no JVD, trach collar site clean   Pulmonary: L rhonchi, no wheeze or crackles appreciated, breathing well on trach collar   Cardiac: Non-tachycardic, irregular, 4/6 holosystolic murmur with radiation to the left axilla. Laterally displaced PMI.  GI: Soft, NT, ND. Midline well healed surgical scar with several small well healing laparoscopy scars with no signs of infection or purulence. +J-tube, G-tube, D-tube site covered w/ clean dressing.   : [place] catheter in place  Extremities: B/L lower extremities in Z-boot. 2+ b/l LE edema starting from ankles up to thighs. foot dressing C/D/I, no fluctuance, no foul odor.  Neurologic: Awake and alert, following commands. Moves all extremities.    MEDICATIONS  (STANDING):  apixaban 5 milliGRAM(s) Oral every 12 hours  ceFAZolin  Injectable. 2000 milliGRAM(s) IV Push every 8 hours  chlorhexidine 0.12% Liquid 15 milliLiter(s) Swish and Spit two times a day  ferrous    sulfate Liquid 300 milliGRAM(s) Enteral Tube daily  furosemide   Injectable 40 milliGRAM(s) IV Push every 12 hours  lisinopril 2.5 milliGRAM(s) Oral daily  pantoprazole  Injectable 40 milliGRAM(s) IV Push every 12 hours  povidone iodine 10% Swab 1 Application(s) Topical daily    MEDICATIONS  (PRN):  ALBUTerol/ipratropium for Nebulization 3 milliLiter(s) Nebulizer every 4 hours PRN Shortness of Breath and/or Wheezing      Allergies    No Known Allergies    Intolerances      ALBUTerol/ipratropium for Nebulization 3 milliLiter(s) Nebulizer every 4 hours PRN  apixaban 5 milliGRAM(s) Oral every 12 hours  ceFAZolin  Injectable. 2000 milliGRAM(s) IV Push every 8 hours  chlorhexidine 0.12% Liquid 15 milliLiter(s) Swish and Spit two times a day  ferrous    sulfate Liquid 300 milliGRAM(s) Enteral Tube daily  furosemide   Injectable 40 milliGRAM(s) IV Push every 12 hours  lisinopril 2.5 milliGRAM(s) Oral daily  pantoprazole  Injectable 40 milliGRAM(s) IV Push every 12 hours  povidone iodine 10% Swab 1 Application(s) Topical daily      LABS:                          8.5    7.5   )-----------( 191      ( 17 Mar 2018 06:10 )             29.4     03-17    138  |  98  |  27<H>  ----------------------------<  143<H>  4.1   |  30  |  0.45<L>    Ca    8.5      17 Mar 2018 06:10  Phos  2.5     03-17  Mg     2.4     03-17            RADIOLOGY & ADDITIONAL TESTS:  All imaging reviewed.

## 2018-03-18 NOTE — PROGRESS NOTE ADULT - PROBLEM SELECTOR PLAN 1
Pt w/ increased work of breathing and shortness of breath on admission. Etiology likely CHF exacerbation given pt w/ BNP of 22k on admission (7K on last admission) and clinically overloaded on admission (JVP, 1+ edema). Cxray c/w bilateral pulm congestion and effusion. Echo on 3/13 with EF 45-50%.   -continue w/ lasix 40mg IV BID   -continue w/ lisinopril 2.5mg qd  -daily chest xray   -I&Os  -will not start BB yet

## 2018-03-18 NOTE — PROGRESS NOTE ADULT - PROBLEM SELECTOR PLAN 2
pt w/ sputum and blood culture growing Staph aureus. Surveillance BCx on 3/14 with NGTD and pt w/ no fevers and no leukocytosis.   -pt initially on Vanc/Zosyn, now discontinued as cultures are not MRSA  -c/w Cefazolin 2mg q8hrs (3/15--  -ID consulted regarding abx duration  -Pending Gallium scan and likely repeat AMRIK tmrw to rule out endocarditis

## 2018-03-18 NOTE — PROGRESS NOTE ADULT - PROBLEM SELECTOR PLAN 7
Pt w/ hx of duodenal perforation on 1/20/2018 s/p repair with kaylee patch and J tube placement complicated by a leak on 2/22 for which pt was taken to the OR and noted to have a necrotic kaylee patch with leakage of perforated duodenal ulcer. Jon gastrostomy was placed, retrograde duodenostomy placed with witzel tunnel.  -currently stable  -Tube feeds started on 2/24--continue w/ Glucerna w/ goal rate of 63cc/hr

## 2018-03-19 ENCOUNTER — APPOINTMENT (OUTPATIENT)
Dept: SURGERY | Facility: CLINIC | Age: 73
End: 2018-03-19

## 2018-03-19 LAB
ANION GAP SERPL CALC-SCNC: 6 MMOL/L — SIGNIFICANT CHANGE UP (ref 5–17)
BUN SERPL-MCNC: 27 MG/DL — HIGH (ref 7–23)
CALCIUM SERPL-MCNC: 8.6 MG/DL — SIGNIFICANT CHANGE UP (ref 8.4–10.5)
CHLORIDE SERPL-SCNC: 98 MMOL/L — SIGNIFICANT CHANGE UP (ref 96–108)
CO2 SERPL-SCNC: 35 MMOL/L — HIGH (ref 22–31)
CREAT SERPL-MCNC: 0.51 MG/DL — SIGNIFICANT CHANGE UP (ref 0.5–1.3)
CULTURE RESULTS: SIGNIFICANT CHANGE UP
CULTURE RESULTS: SIGNIFICANT CHANGE UP
GLUCOSE SERPL-MCNC: 145 MG/DL — HIGH (ref 70–99)
HCT VFR BLD CALC: 27.6 % — LOW (ref 39–50)
HGB BLD-MCNC: 8 G/DL — LOW (ref 13–17)
MAGNESIUM SERPL-MCNC: 2.2 MG/DL — SIGNIFICANT CHANGE UP (ref 1.6–2.6)
MCHC RBC-ENTMCNC: 27.1 PG — SIGNIFICANT CHANGE UP (ref 27–34)
MCHC RBC-ENTMCNC: 29 G/DL — LOW (ref 32–36)
MCV RBC AUTO: 93.6 FL — SIGNIFICANT CHANGE UP (ref 80–100)
ORGANISM # SPEC MICROSCOPIC CNT: SIGNIFICANT CHANGE UP
PHOSPHATE SERPL-MCNC: 2.8 MG/DL — SIGNIFICANT CHANGE UP (ref 2.5–4.5)
PLATELET # BLD AUTO: 203 K/UL — SIGNIFICANT CHANGE UP (ref 150–400)
POTASSIUM SERPL-MCNC: 3.9 MMOL/L — SIGNIFICANT CHANGE UP (ref 3.5–5.3)
POTASSIUM SERPL-SCNC: 3.9 MMOL/L — SIGNIFICANT CHANGE UP (ref 3.5–5.3)
RBC # BLD: 2.95 M/UL — LOW (ref 4.2–5.8)
RBC # FLD: 17.1 % — HIGH (ref 10.3–16.9)
SODIUM SERPL-SCNC: 139 MMOL/L — SIGNIFICANT CHANGE UP (ref 135–145)
SPECIMEN SOURCE: SIGNIFICANT CHANGE UP
SPECIMEN SOURCE: SIGNIFICANT CHANGE UP
WBC # BLD: 8.9 K/UL — SIGNIFICANT CHANGE UP (ref 3.8–10.5)
WBC # FLD AUTO: 8.9 K/UL — SIGNIFICANT CHANGE UP (ref 3.8–10.5)

## 2018-03-19 PROCEDURE — 99233 SBSQ HOSP IP/OBS HIGH 50: CPT | Mod: GC

## 2018-03-19 PROCEDURE — 71045 X-RAY EXAM CHEST 1 VIEW: CPT | Mod: 26

## 2018-03-19 RX ORDER — CEFAZOLIN SODIUM 1 G
2000 VIAL (EA) INJECTION EVERY 8 HOURS
Qty: 0 | Refills: 0 | Status: DISCONTINUED | OUTPATIENT
Start: 2018-03-19 | End: 2018-03-26

## 2018-03-19 RX ORDER — PANTOPRAZOLE SODIUM 20 MG/1
40 TABLET, DELAYED RELEASE ORAL
Qty: 0 | Refills: 0 | Status: DISCONTINUED | OUTPATIENT
Start: 2018-03-20 | End: 2018-03-23

## 2018-03-19 RX ADMIN — APIXABAN 5 MILLIGRAM(S): 2.5 TABLET, FILM COATED ORAL at 05:41

## 2018-03-19 RX ADMIN — LISINOPRIL 2.5 MILLIGRAM(S): 2.5 TABLET ORAL at 05:40

## 2018-03-19 RX ADMIN — Medication 1 APPLICATION(S): at 12:48

## 2018-03-19 RX ADMIN — PANTOPRAZOLE SODIUM 40 MILLIGRAM(S): 20 TABLET, DELAYED RELEASE ORAL at 05:41

## 2018-03-19 RX ADMIN — CHLORHEXIDINE GLUCONATE 15 MILLILITER(S): 213 SOLUTION TOPICAL at 17:12

## 2018-03-19 RX ADMIN — APIXABAN 5 MILLIGRAM(S): 2.5 TABLET, FILM COATED ORAL at 17:12

## 2018-03-19 RX ADMIN — Medication 100 MILLIGRAM(S): at 11:04

## 2018-03-19 RX ADMIN — CHLORHEXIDINE GLUCONATE 15 MILLILITER(S): 213 SOLUTION TOPICAL at 05:41

## 2018-03-19 RX ADMIN — Medication 100 MILLIGRAM(S): at 19:07

## 2018-03-19 RX ADMIN — Medication 1 APPLICATION(S): at 17:12

## 2018-03-19 RX ADMIN — Medication 40 MILLIGRAM(S): at 22:29

## 2018-03-19 RX ADMIN — Medication 100 MILLIGRAM(S): at 04:06

## 2018-03-19 RX ADMIN — Medication 1 APPLICATION(S): at 05:41

## 2018-03-19 RX ADMIN — Medication 40 MILLIGRAM(S): at 11:04

## 2018-03-19 NOTE — PROGRESS NOTE ADULT - PROBLEM SELECTOR PLAN 9
F: None  E: Replete PRN  N: NPO with Tube feeds--Glucerna @63cc/hr F: None  E: Replete PRN  N: NPO with Tube feeds--Glucerna @63cc/hr. Will obtain speech eval

## 2018-03-19 NOTE — PROGRESS NOTE ADULT - PROBLEM SELECTOR PLAN 10
PPX: Eliquis 5mg BID, Protonix 40mg IVP BID  FULL CODE  DISPO: [id number] PPX: Eliquis 5mg BID, Protonix 40mg PO daily   FULL CODE  DISPO: [id number]

## 2018-03-19 NOTE — SWALLOW BEDSIDE ASSESSMENT ADULT - SLP PERTINENT HISTORY OF CURRENT PROBLEM
73 y/o M s/p trach (2/1/18) p/w respiratory failure requiring vent 2/2 MSSA PNA, bacteremia, and pulmonary edema w/ CHF. WBC is WNL to date. CXR (3/19) shows moderate to severe pulmonary edema and moderate layering bilateral pleural effusion w/ bibasilar atelectasis and possible consolidation in LLL.

## 2018-03-19 NOTE — SWALLOW BEDSIDE ASSESSMENT ADULT - SWALLOW EVAL: DIAGNOSIS
suspect mild pharyngeal dysphagia 2/2 (+) cough following thin cup and trace, green tinged tracheal secretions

## 2018-03-19 NOTE — PROGRESS NOTE ADULT - ASSESSMENT
#MSSA bacteremia  - Prior AMRIK showing flail of A3 on mitral valve with 'likely torn chordae tendinae attached' but 'hard to rule out a vegetation'  - Considering likely recovery of patient, it may be prudent to be aggressive and pursue nuclear medicine scan to rule in endocarditis.  - Blood cx NGTD from 3/14  - Continue ancef as currently dosed  - Repeat AMRIK, patient scheduled for today  - Awaiting results of gallium scan, will readdress this afternoon    Further recs pending this afternoon #MSSA bacteremia  - Prior AMRIK showing flail of A3 on mitral valve with 'likely torn chordae tendinae attached' but 'hard to rule out a vegetation'  - Considering likely recovery of patient, it may be prudent to be aggressive and pursue nuclear medicine scan to rule in endocarditis.  - Blood cx NGTD from 3/14  - Continue ancef as currently dosed, complete two week treatment followed by Keflex 500mg PO TID for two additional weeks  - Chlorhexidine baths three times weekly  - Repeat AMRIK, patient scheduled for today, if negative as above, if positive please call  - Awaiting results of gallium scan, will readdress this afternoon    Case discussed with primary team and attending. Will sign off, please reconsult with further questions or concerns #MSSA bacteremia  - Prior AMRIK showing flail of A3 on mitral valve with 'likely torn chordae tendinae attached' but 'hard to rule out a vegetation'  - Blood cx NGTD from 3/14  - Continue ancef as currently dosed, complete two week treatment followed by Keflex 500mg PO TID for two additional weeks  - Chlorhexidine baths three times weekly  - Repeat AMRIK, patient scheduled for today, if negative as above, if positive please call  - Awaiting results of gallium scan, will readdress this afternoon    Case discussed with primary team and attending. Will sign off, please reconsult with further questions or concerns

## 2018-03-19 NOTE — PROGRESS NOTE ADULT - SUBJECTIVE AND OBJECTIVE BOX
INTERVAL HPI/OVERNIGHT EVENTS: No acute events overnight.     SUBJECTIVE: Patient seen and examined at bedside. No acute distress. Patient denies chest pain, shortness of breath, abdominal pain, nausea, vomiting, fever/chills, headache, dizziness.     VITAL SIGNS:  ICU Vital Signs Last 24 Hrs  T(C): 36.8 (19 Mar 2018 06:26), Max: 36.8 (19 Mar 2018 02:00)  T(F): 98.3 (19 Mar 2018 06:26), Max: 98.3 (19 Mar 2018 06:26)  HR: 84 (19 Mar 2018 06:34) (80 - 94)  BP: 108/67 (19 Mar 2018 06:34) (101/67 - 114/60)  BP(mean): 81 (19 Mar 2018 06:34) (78 - 85)  RR: 16 (19 Mar 2018 06:34) (16 - 18)  SpO2: 99% (19 Mar 2018 06:34) (98% - 100%)        03-18 @ 07:01  -  03-19 @ 07:00  --------------------------------------------------------  IN: 1058 mL / OUT: 1650 mL / NET: -592 mL      PHYSICAL EXAM:  Constitutional: Middle aged pleasant male laying in bed comfortably.   HEENT: NC/AT; PERRL, anicteric sclera; MMM  Neck: supple, no JVD, trach site clean  Cardiovascular: S1/S2 normal. RRR. Grade 3/6 holosystolic murmur with radiation to the axilla.   Respiratory: Inspiratory rhonchi b/l lungs with left>right  Gastrointestinal: abdomen soft, NT/ND; well healed midline incision w/ no erythema or purulence. J/G/D tube in place w/ clean dressing on top.   Genitourinary: no suprapubic tenderness or fullness, taxus catheter   Extremities: WWP; 2+LE extremity edema. B/l feet wrapped in clean cotton dressing  Vascular: 2+ radial, DP/PT pulses B/L  Dermatologic: normal color and turgor; no visible rashes  Neurological: AAOx3. Pt able to follow commands. No focal deficits     MEDICATIONS:  MEDICATIONS  (STANDING):  apixaban 5 milliGRAM(s) Oral every 12 hours  ceFAZolin   IVPB 2000 milliGRAM(s) IV Intermittent every 8 hours  chlorhexidine 0.12% Liquid 15 milliLiter(s) Swish and Spit two times a day  furosemide   Injectable 40 milliGRAM(s) IV Push every 12 hours  lisinopril 2.5 milliGRAM(s) Oral daily  pantoprazole  Injectable 40 milliGRAM(s) IV Push every 12 hours  povidone iodine 10% Swab 1 Application(s) Topical daily  vitamin A &amp; D Ointment 1 Application(s) Topical two times a day    MEDICATIONS  (PRN):      ALLERGIES:  Allergies    No Known Allergies    Intolerances        LABS:                        8.0    8.9   )-----------( 203      ( 19 Mar 2018 05:28 )             27.6     03-19    139  |  98  |  27<H>  ----------------------------<  145<H>  3.9   |  35<H>  |  0.51    Ca    8.6      19 Mar 2018 05:28  Phos  2.8     03-19  Mg     2.2     03-19            RADIOLOGY & ADDITIONAL TESTS: Reviewed.

## 2018-03-19 NOTE — SWALLOW BEDSIDE ASSESSMENT ADULT - NS SPL SWALLOW CLINIC TRIAL FT
Pt w/ cuffless, Shiley #6 and 35% 02 via trach collar. Pt suctioned by BRUCE Ferguson prior to PO trials, tracheal secretions judged scant. PMSV placed by SLP. Pt self administered ice chipsx2, thin tspx1/cupx2 and  pureex4 tinged w/ green dye.  Oral phase marked by appropriate bolus containment and timely AP transit across consistencies. no oral residue.  Pharyngeal swallow response suspected to be mildly delayed however hyolaryngeal elevation judged adequate upon palpation across PO. (+) reflexive cough/throat clear following thin cup. Pt w/ trace green-tinted tracheal secretions upon re-suctioning. PMSV not replaced per Pt request.   Given findings at bedside, objective testing (FEES) is recommended to formally assess swallow function and determine candidacy for PO diet. MD aware, will f/u.

## 2018-03-19 NOTE — SWALLOW BEDSIDE ASSESSMENT ADULT - SLP GENERAL OBSERVATIONS
Awake, alert w/ 35% O2 via trach collar. PMSV at bedside, Pt apprehensive to don/doff independently. Pt suctioned prior to PO trials, tracheal secretions are scant. PMSV placed by SLP, Pt able to phonate w/o change in Sp02 or RR. Articulation is 100% and voice appears WFL for age/gender.

## 2018-03-19 NOTE — PROGRESS NOTE ADULT - SUBJECTIVE AND OBJECTIVE BOX
INTERVAL HPI/OVERNIGHT EVENTS:  Patient was seen and examined at bedside. As per nurse and patient, no o/n events, patient resting comfortably. Feeling well, denies any localizing symptoms, is curious for results of gallium scan and is awaiting AMRIK. No other events. Denies fever, chills, HA, changes in vision, CP, SOB, abdominal pain, N/V/D. ROS otherwise negative.     VITAL SIGNS:  T(F): 98.3 (03-19-18 @ 06:26)  HR: 90 (03-19-18 @ 08:30)  BP: 111/73 (03-19-18 @ 08:30)  RR: 18 (03-19-18 @ 08:30)  SpO2: 100% (03-19-18 @ 08:30)  Wt(kg): --    PHYSICAL EXAM:    Constitutional: WDWN, NAD, Pleasant  HEENT: PERRL, EOMI, sclera non-icteric, neck supple, trachea midline with tracheostomy site clean, no masses, no JVD, MMM, good dentition  Respiratory: Breathing comfortably on trach collar, CTA b/l, good air entry b/l, no wheezing, no rhonchi, no rales, without accessory muscle use and no intercostal retractions  Cardiovascular: irregular S1S2, 3/6 murmur loudest in apex, displaced pmi to mid axilla, no radiation in murmur  Gastrointestinal: soft, NTND, no masses palpable, BS normal  Extremities: Warm, well perfused, pulses equal bilateral upper and lower extremities, no edema, no clubbing  Neurological: AAOx3, CN Grossly intact  Skin: Normal temperature, warm, dry    ACTIVE ANTIMICROBIALS/ANTIBIOTICS:  ceFAZolin   IVPB 2000 milliGRAM(s) IV Intermittent every 8 hours        Allergies    No Known Allergies    Intolerances        LABS:                        8.0    8.9   )-----------( 203      ( 19 Mar 2018 05:28 )             27.6     03-19    139  |  98  |  27<H>  ----------------------------<  145<H>  3.9   |  35<H>  |  0.51    Ca    8.6      19 Mar 2018 05:28  Phos  2.8     03-19  Mg     2.2     03-19              CURRENT CULTURE RESULTS:    Culture - Blood (collected 03-14-18 @ 14:00)  Source: .Blood Blood-Arterial  Preliminary Report (03-18-18 @ 14:01):    No growth at 4 days.    Culture - Sputum (collected 03-13-18 @ 19:51)  Source: .Sputum Sputum  Gram Stain (03-13-18 @ 21:30):    Rare epithelial cells    Numerous White blood cells    Few Gram positive cocci in pairs, chains and clusters  Final Report (03-15-18 @ 11:47):    Moderate Staphylococcus aureus    Rare Routine respiratory geeta present  Organism: Staphylococcus aureus (03-15-18 @ 11:47)  Organism: Staphylococcus aureus (03-15-18 @ 11:47)      -  Cefazolin: S <=4      -  Clindamycin: S <=0.5      -  Erythromycin: S <=0.5      -  Linezolid: S 2      -  Oxacillin: S <=0.25      -  Penicillin: R 8      -  RIF- Rifampin: S <=1      -  Trimethoprim/Sulfamethoxazole: S <=0.5/9.5      -  Vancomycin: S 2      Method Type: CHA    Culture - Sputum (collected 03-13-18 @ 11:23)  Source: .Sputum Sputum  Gram Stain (03-13-18 @ 17:28):    Numerous epithelial cells    Moderate White blood cells    Few Gram positive cocci in pairs, chains and clusters  Final Report (03-13-18 @ 17:28):    Sputum specimen rejected.  Microscopic examination indicates    oropharyngeal contamination.  Please repeat.    Culture - Urine (collected 03-12-18 @ 20:55)  Source: .Urine Clean Catch (Midstream)  Final Report (03-14-18 @ 09:07):    No growth    Culture - Blood (collected 03-12-18 @ 15:07)  Source: .Blood Blood-Venous  Final Report (03-17-18 @ 16:01):    No growth at 5 days.    Culture - Blood (collected 03-12-18 @ 15:07)  Source: .Blood Blood-Venous  Gram Stain (03-13-18 @ 09:14):    Anaerobic Bottle: Gram Positive Cocci in Clusters    Result called to and read back byDARY Anna RN  03/13/2018 09:14:30    ***Blood Panel PCR results on this specimen are available    approximately 3 hours after the Gram stain result.***    Gram stain, PCR, and/or culture results may not always    correspond due to difference in methodologies.    ************************************************************    This PCR assay was performed using [employer].    The following targets are tested for: Enterococcus,    vancomycin resistant enterococci, Listeria monocytogenes,    coagulase negative staphylococci, S. aureus,    methicillin resistant S. aureus, Streptococcus agalactiae    (Group B), S. pneumoniae, S. pyogenes (Group A),    Acinetobacter baumannii, Enterobacter cloacae, E. coli,    Klebsiella oxytoca, K. pneumoniae, Proteus sp.,    Serratia marcescens, Haemophilus influenzae,    Neisseria meningitidis, Pseudomonas aeruginosa, Candida    albicans, C. glabrata, C krusei, C parapsilosis,    C. tropicalis and the KPC resistance gene.  Preliminary Report (03-15-18 @ 11:21):    Growth in anaerobic bottle: Staphylococcus aureus  Organism: Blood Culture PCR  Staphylococcus aureus (03-15-18 @ 11:19)  Organism: Staphylococcus aureus (03-15-18 @ 11:19)      -  Cefazolin: S <=4      -  Clindamycin: S <=0.5      -  Erythromycin: S <=0.5      -  Linezolid: S 2      -  Oxacillin: S <=0.25      -  Penicillin: R 8      -  RIF- Rifampin: S <=1      -  Trimethoprim/Sulfamethoxazole: S <=0.5/9.5      -  Vancomycin: S 2      Method Type: CHA  Organism: Blood Culture PCR (03-13-18 @ 10:26)      -  Staphylococcus aureus: Detec Any isolate of Staphylococcus aureus from a blood culture is NOT considered a contaminant.      Method Type: PCR        RADIOLOGY & ADDITIONAL TESTS:  Reviewed

## 2018-03-19 NOTE — PROGRESS NOTE ADULT - PROBLEM SELECTOR PLAN 2
pt w/ sputum and blood culture growing Staph aureus. Surveillance BCx on 3/14 with NGTD and pt w/ no fevers and no leukocytosis.   -pt initially on Vanc/Zosyn, now discontinued as cultures are not MRSA  -c/w Cefazolin 2mg q8hrs (3/15--  -ID consulted regarding abx duration  -Gallium scan--pending read   -Plan for AMRIK today to evaluate for endocarditis

## 2018-03-19 NOTE — PROGRESS NOTE ADULT - PROBLEM SELECTOR PLAN 1
Improving. Pt w/ increased work of breathing and shortness of breath on admission. Etiology likely CHF exacerbation given pt w/ BNP of 22k on admission (7K on last admission) and clinically overloaded on admission (JVP, 2+ edema). Cxray c/w bilateral pulm congestion and effusion. Echo on 3/13 with EF 45-50%.   -continue w/ lasix 40mg IV BID   -continue w/ lisinopril 2.5mg qd  -daily chest xray   -I&Os  -will not start BB yet

## 2018-03-20 LAB
ANION GAP SERPL CALC-SCNC: 13 MMOL/L — SIGNIFICANT CHANGE UP (ref 5–17)
BUN SERPL-MCNC: 27 MG/DL — HIGH (ref 7–23)
CALCIUM SERPL-MCNC: 8.5 MG/DL — SIGNIFICANT CHANGE UP (ref 8.4–10.5)
CHLORIDE SERPL-SCNC: 94 MMOL/L — LOW (ref 96–108)
CO2 SERPL-SCNC: 27 MMOL/L — SIGNIFICANT CHANGE UP (ref 22–31)
CREAT SERPL-MCNC: 0.43 MG/DL — LOW (ref 0.5–1.3)
GLUCOSE BLDC GLUCOMTR-MCNC: 125 MG/DL — HIGH (ref 70–99)
GLUCOSE SERPL-MCNC: 127 MG/DL — HIGH (ref 70–99)
HCT VFR BLD CALC: 28.9 % — LOW (ref 39–50)
HGB BLD-MCNC: 8 G/DL — LOW (ref 13–17)
MAGNESIUM SERPL-MCNC: 2.3 MG/DL — SIGNIFICANT CHANGE UP (ref 1.6–2.6)
MCHC RBC-ENTMCNC: 25.6 PG — LOW (ref 27–34)
MCHC RBC-ENTMCNC: 27.7 G/DL — LOW (ref 32–36)
MCV RBC AUTO: 92.6 FL — SIGNIFICANT CHANGE UP (ref 80–100)
PHOSPHATE SERPL-MCNC: 3.2 MG/DL — SIGNIFICANT CHANGE UP (ref 2.5–4.5)
PLATELET # BLD AUTO: 175 K/UL — SIGNIFICANT CHANGE UP (ref 150–400)
POTASSIUM SERPL-MCNC: 4.4 MMOL/L — SIGNIFICANT CHANGE UP (ref 3.5–5.3)
POTASSIUM SERPL-SCNC: 4.4 MMOL/L — SIGNIFICANT CHANGE UP (ref 3.5–5.3)
RBC # BLD: 3.12 M/UL — LOW (ref 4.2–5.8)
RBC # FLD: 17.1 % — HIGH (ref 10.3–16.9)
SODIUM SERPL-SCNC: 134 MMOL/L — LOW (ref 135–145)
WBC # BLD: 10.9 K/UL — HIGH (ref 3.8–10.5)
WBC # FLD AUTO: 10.9 K/UL — HIGH (ref 3.8–10.5)

## 2018-03-20 PROCEDURE — 99233 SBSQ HOSP IP/OBS HIGH 50: CPT | Mod: GC

## 2018-03-20 PROCEDURE — 71045 X-RAY EXAM CHEST 1 VIEW: CPT | Mod: 26

## 2018-03-20 RX ORDER — FENTANYL CITRATE 50 UG/ML
12.5 INJECTION INTRAVENOUS ONCE
Qty: 0 | Refills: 0 | Status: DISCONTINUED | OUTPATIENT
Start: 2018-03-20 | End: 2018-03-20

## 2018-03-20 RX ORDER — SODIUM CHLORIDE 9 MG/ML
250 INJECTION INTRAMUSCULAR; INTRAVENOUS; SUBCUTANEOUS ONCE
Qty: 0 | Refills: 0 | Status: COMPLETED | OUTPATIENT
Start: 2018-03-20 | End: 2018-03-20

## 2018-03-20 RX ORDER — MIDAZOLAM HYDROCHLORIDE 1 MG/ML
1 INJECTION, SOLUTION INTRAMUSCULAR; INTRAVENOUS ONCE
Qty: 0 | Refills: 0 | Status: DISCONTINUED | OUTPATIENT
Start: 2018-03-20 | End: 2018-03-20

## 2018-03-20 RX ORDER — FENTANYL CITRATE 50 UG/ML
25 INJECTION INTRAVENOUS ONCE
Qty: 0 | Refills: 0 | Status: DISCONTINUED | OUTPATIENT
Start: 2018-03-20 | End: 2018-03-20

## 2018-03-20 RX ORDER — MIDAZOLAM HYDROCHLORIDE 1 MG/ML
2 INJECTION, SOLUTION INTRAMUSCULAR; INTRAVENOUS ONCE
Qty: 0 | Refills: 0 | Status: DISCONTINUED | OUTPATIENT
Start: 2018-03-20 | End: 2018-03-20

## 2018-03-20 RX ADMIN — Medication 40 MILLIGRAM(S): at 23:43

## 2018-03-20 RX ADMIN — Medication 40 MILLIGRAM(S): at 12:00

## 2018-03-20 RX ADMIN — MIDAZOLAM HYDROCHLORIDE 1 MILLIGRAM(S): 1 INJECTION, SOLUTION INTRAMUSCULAR; INTRAVENOUS at 17:36

## 2018-03-20 RX ADMIN — Medication 100 MILLIGRAM(S): at 03:52

## 2018-03-20 RX ADMIN — Medication 1 APPLICATION(S): at 12:00

## 2018-03-20 RX ADMIN — Medication 100 MILLIGRAM(S): at 19:40

## 2018-03-20 RX ADMIN — APIXABAN 5 MILLIGRAM(S): 2.5 TABLET, FILM COATED ORAL at 06:08

## 2018-03-20 RX ADMIN — LISINOPRIL 2.5 MILLIGRAM(S): 2.5 TABLET ORAL at 06:08

## 2018-03-20 RX ADMIN — MIDAZOLAM HYDROCHLORIDE 2 MILLIGRAM(S): 1 INJECTION, SOLUTION INTRAMUSCULAR; INTRAVENOUS at 17:36

## 2018-03-20 RX ADMIN — APIXABAN 5 MILLIGRAM(S): 2.5 TABLET, FILM COATED ORAL at 18:40

## 2018-03-20 RX ADMIN — SODIUM CHLORIDE 1000 MILLILITER(S): 9 INJECTION INTRAMUSCULAR; INTRAVENOUS; SUBCUTANEOUS at 16:28

## 2018-03-20 RX ADMIN — Medication 1 APPLICATION(S): at 06:08

## 2018-03-20 RX ADMIN — FENTANYL CITRATE 25 MICROGRAM(S): 50 INJECTION INTRAVENOUS at 18:41

## 2018-03-20 RX ADMIN — Medication 100 MILLIGRAM(S): at 12:00

## 2018-03-20 RX ADMIN — FENTANYL CITRATE 12.5 MICROGRAM(S): 50 INJECTION INTRAVENOUS at 18:41

## 2018-03-20 RX ADMIN — Medication 1 APPLICATION(S): at 17:55

## 2018-03-20 RX ADMIN — PANTOPRAZOLE SODIUM 40 MILLIGRAM(S): 20 TABLET, DELAYED RELEASE ORAL at 06:09

## 2018-03-20 RX ADMIN — FENTANYL CITRATE 25 MICROGRAM(S): 50 INJECTION INTRAVENOUS at 17:38

## 2018-03-20 RX ADMIN — FENTANYL CITRATE 12.5 MICROGRAM(S): 50 INJECTION INTRAVENOUS at 17:38

## 2018-03-20 NOTE — PROGRESS NOTE ADULT - SUBJECTIVE AND OBJECTIVE BOX
INTERVAL HPI/OVERNIGHT EVENTS: No acute events overnight.     SUBJECTIVE: Patient seen and examined at bedside. No acute distress. Patient reports that he is feeling fine. Denies chest pain, shortness of breath, abdominal pain, nausea, vomiting. Pt leaking green fluid from D tube.     VITAL SIGNS:  ICU Vital Signs Last 24 Hrs  T(C): 36.4 (20 Mar 2018 06:00), Max: 37 (19 Mar 2018 17:27)  T(F): 97.6 (20 Mar 2018 06:00), Max: 98.6 (19 Mar 2018 17:27)  HR: 76 (20 Mar 2018 04:00) (76 - 92)  BP: 105/65 (20 Mar 2018 04:00) (97/55 - 122/66)  BP(mean): 80 (20 Mar 2018 04:00) (72 - 90)  RR: 18 (20 Mar 2018 04:00) (18 - 20)  SpO2: 100% (20 Mar 2018 04:00) (92% - 100%)        03-18 @ 07:01  -  03-19 @ 07:00  --------------------------------------------------------  IN: 1058 mL / OUT: 1650 mL / NET: -592 mL    03-19 @ 07:01  -  03-20 @ 06:57  --------------------------------------------------------  IN: 428 mL / OUT: 800 mL / NET: -372 mL      CAPILLARY BLOOD GLUCOSE      POCT Blood Glucose.: 125 mg/dL (20 Mar 2018 06:30)      PHYSICAL EXAM:  Constitutional: Middle aged pleasant male laying in bed comfortably.   HEENT: NC/AT; PERRL, anicteric sclera; MMM  Neck: supple, no JVD, trach site clean  Cardiovascular: S1/S2 normal. RRR. Grade 3/6 holosystolic murmur with radiation to the axilla.   Respiratory: Inspiratory rhonchi b/l lungs with left>right  Gastrointestinal: abdomen soft, NT/ND; well healed midline incision w/ no erythema or purulence. J/G/D tube in place w/ clean dressing on top.   Genitourinary: no suprapubic tenderness or fullness, taxus catheter   Extremities: WWP; 2+LE extremity edema. B/l feet wrapped in clean cotton dressing  Vascular: 2+ radial, DP/PT pulses B/L  Dermatologic: normal color and turgor; no visible rashes  Neurological: AAOx3. Pt able to follow commands. No focal deficits     MEDICATIONS:  MEDICATIONS  (STANDING):  apixaban 5 milliGRAM(s) Oral every 12 hours  ceFAZolin   IVPB 2000 milliGRAM(s) IV Intermittent every 8 hours  chlorhexidine 0.12% Liquid 15 milliLiter(s) Swish and Spit two times a day  furosemide   Injectable 40 milliGRAM(s) IV Push every 12 hours  lisinopril 2.5 milliGRAM(s) Oral daily  pantoprazole    Tablet 40 milliGRAM(s) Oral before breakfast  povidone iodine 10% Swab 1 Application(s) Topical daily  vitamin A &amp; D Ointment 1 Application(s) Topical two times a day    MEDICATIONS  (PRN):      ALLERGIES:  Allergies    No Known Allergies    Intolerances        LABS:                        8.0    8.9   )-----------( 203      ( 19 Mar 2018 05:28 )             27.6     03-19    139  |  98  |  27<H>  ----------------------------<  145<H>  3.9   |  35<H>  |  0.51    Ca    8.6      19 Mar 2018 05:28  Phos  2.8     03-19  Mg     2.2     03-19            RADIOLOGY & ADDITIONAL TESTS: Reviewed. INTERVAL HPI/OVERNIGHT EVENTS: No acute events overnight.     SUBJECTIVE: Patient seen and examined at bedside. No acute distress. Patient reports that he is feeling fine. Denies chest pain, shortness of breath, abdominal pain, nausea, vomiting. Pt leaking green fluid from D tube.     VITAL SIGNS:  ICU Vital Signs Last 24 Hrs  T(C): 36.4 (20 Mar 2018 06:00), Max: 37 (19 Mar 2018 17:27)  T(F): 97.6 (20 Mar 2018 06:00), Max: 98.6 (19 Mar 2018 17:27)  HR: 76 (20 Mar 2018 04:00) (76 - 92)  BP: 105/65 (20 Mar 2018 04:00) (97/55 - 122/66)  BP(mean): 80 (20 Mar 2018 04:00) (72 - 90)  RR: 18 (20 Mar 2018 04:00) (18 - 20)  SpO2: 100% (20 Mar 2018 04:00) (92% - 100%)        03-18 @ 07:01  -  03-19 @ 07:00  --------------------------------------------------------  IN: 1058 mL / OUT: 1650 mL / NET: -592 mL    03-19 @ 07:01  -  03-20 @ 06:57  --------------------------------------------------------  IN: 428 mL / OUT: 800 mL / NET: -372 mL      CAPILLARY BLOOD GLUCOSE      POCT Blood Glucose.: 125 mg/dL (20 Mar 2018 06:30)      PHYSICAL EXAM:  Constitutional: Middle aged pleasant male laying in bed comfortably.   HEENT: NC/AT; PERRL, anicteric sclera; MMM  Neck: supple, no JVD, trach site clean  Cardiovascular: S1/S2 normal. RRR. Grade 3/6 holosystolic murmur with radiation to the axilla.   Respiratory: Inspiratory rhonchi b/l lungs with left>right  Gastrointestinal: abdomen soft, NT/ND; well healed midline inc  ision w/ no erythema or purulence. J/G/D tube in place w/ clean dressing on top.   Genitourinary: no suprapubic tenderness or fullness, taxus catheter   Extremities: WWP; 2+LE extremity edema. B/l feet wrapped in clean cotton dressing  Vascular: 2+ radial, DP/PT pulses B/L  Dermatologic: normal color and turgor; no visible rashes  Neurological: AAOx3. Pt able to follow commands. No focal deficits     MEDICATIONS:  MEDICATIONS  (STANDING):  apixaban 5 milliGRAM(s) Oral every 12 hours  ceFAZolin   IVPB 2000 milliGRAM(s) IV Intermittent every 8 hours  chlorhexidine 0.12% Liquid 15 milliLiter(s) Swish and Spit two times a day  furosemide   Injectable 40 milliGRAM(s) IV Push every 12 hours  lisinopril 2.5 milliGRAM(s) Oral daily  pantoprazole    Tablet 40 milliGRAM(s) Oral before breakfast  povidone iodine 10% Swab 1 Application(s) Topical daily  vitamin A &amp; D Ointment 1 Application(s) Topical two times a day    MEDICATIONS  (PRN):      ALLERGIES:  Allergies    No Known Allergies    Intolerances        LABS:                        8.0    8.9   )-----------( 203      ( 19 Mar 2018 05:28 )             27.6     03-19    139  |  98  |  27<H>  ----------------------------<  145<H>  3.9   |  35<H>  |  0.51    Ca    8.6      19 Mar 2018 05:28  Phos  2.8     03-19  Mg     2.2     03-19            RADIOLOGY & ADDITIONAL TESTS: Reviewed.

## 2018-03-20 NOTE — PROGRESS NOTE ADULT - SUBJECTIVE AND OBJECTIVE BOX
Patient seen and examined at bedside - currently in the MICU in preparation for AMRIK. Feeling ok this afternoon with no acute complaints.   Currently gastrostomy, retrograde duodenostomy (bilious/blood-tinged), feeding jejunostomy in place without leakage or obstruction of tubing, no surrounding erythema or collections.     Spoke with Dr. Metcalf the plan for tomorrow is as follows:   - hold tube feeds tomorrow   - CT A/P with IV and PO contrast. PO contrast administer down GREEN G-tube - it is clamped now, you can unclamp it to administer contrast.   - Keep all other drains clamped for procedure.   - Discussions about drain discontinuation will be had following results of the CT scan.   - Please call Team 4c with any additional questions or concerns.

## 2018-03-20 NOTE — PROGRESS NOTE ADULT - PROBLEM SELECTOR PLAN 2
pt w/ sputum and blood culture growing Staph aureus. Surveillance BCx on 3/14 with NGTD and pt w/ no fevers and no leukocytosis.   -pt initially on Vanc/Zosyn, now discontinued as cultures are not MRSA  -c/w Cefazolin 2mg q8hrs (3/15--  -ID consulted regarding abx duration  -Gallium scan negative   -Plan for AMRIK today to evaluate for endocarditis

## 2018-03-20 NOTE — CHART NOTE - NSCHARTNOTEFT_GEN_A_CORE
Admitting Diagnosis:   72M PMH severe MR, severe pHTN, afib (on Eliquis), R popliteal DVT s/p IVC filter, respiratory failure secondary to PNA s/p tracheostomy (baseline on trach collar; FiO2 35%), and perforated duodenal ulcer s/p repair who presents with respiratory failure that required intubation      PAST MEDICAL & SURGICAL HISTORY:  CHF (congestive heart failure)  Cholecystitis  Duodenal ulcer  Atrial fibrillation  Mitral regurgitation  Aortic regurgitation  H/O tracheostomy  Acute duodenal ulcer with perforation  H/O gastrostomy, has currently  Perforated duodenal ulcer      Current Nutrition Order:  NPO after MN for AMRIK    TF order for:  Glucerna 1.5 at 63 mL x 24 hr via Jtube providing 1512 mL TV, 2268 kcal, 125 g protein, & 1148 mL fluid    PO Intake: Good (%) [   ]  Fair (50-75%) [   ] Poor (<25%) [   ]- N/A TF dependent    GI Issues: No N/V/C/D reported at this time. Last BM 3/20.    Pain: No pain endorsed; abdominal itch reported    Skin Integrity: Stage 2- R/L heel; Unstageable - 3rd toe, 4th toe, 2nd toe.       Labs:   03-20    134<L>  |  94<L>  |  27<H>  ----------------------------<  127<H>  4.4   |  27  |  0.43<L>    Ca    8.5      20 Mar 2018 06:49  Phos  3.2     03-20  Mg     2.3     03-20      CAPILLARY BLOOD GLUCOSE      POCT Blood Glucose.: 125 mg/dL (20 Mar 2018 06:30)      Medications:  MEDICATIONS  (STANDING):  apixaban 5 milliGRAM(s) Oral every 12 hours  ceFAZolin   IVPB 2000 milliGRAM(s) IV Intermittent every 8 hours  chlorhexidine 0.12% Liquid 15 milliLiter(s) Swish and Spit two times a day  furosemide   Injectable 40 milliGRAM(s) IV Push every 12 hours  lisinopril 2.5 milliGRAM(s) Oral daily  pantoprazole    Tablet 40 milliGRAM(s) Oral before breakfast  povidone iodine 10% Swab 1 Application(s) Topical daily  vitamin A &amp; D Ointment 1 Application(s) Topical two times a day    MEDICATIONS  (PRN):      Weight: 91.4kg  Daily     Daily     Weight Change: No new weights recorded since admission     Estimated energy needs using 76 kg IBW:  EER 2* vent  25-30 kcal/kg (1660-9320 kcal).   1.4-1.6 g/kg (122-137 g protein).   30-35 mL/kg (3211-0791 mL fluid).     Subjective: S/p gallium scan on 3/18, results negative. Pt seen by SLP on 3/19, recommend FEES study. Pt breathing on trach collar at this time. TF currently held- plan for move to MICU for AMRIK (r/o endocarditis). Pt seen in room, awake, alert, very pleasant; excited at the prospect of PO intake eventually. No N/V/C/D reported at this time. No pain. Plan also for CT A/P on 3/21. Will continue to monitor and f/u results of all tests.     Previous Nutrition Diagnosis: Increased nutrient needs RT increased demand for kcal/protein AEB intubation    Active [   ]  Resolved [X   ]    If resolved, new PES: Increased nutrient needs RT increased demand for kcal/protein AEB post-op    Goal: Meet % of nutrition needs via tolerated route     Recommendations:  1. As medically feasible, re-start Glucerna 1.5 at 63 mL x 24 hr via Jtube. Monitor for signs of intolerance; maintain aspiration precautions  2. Trend daily weights  3. F/u results from FEES study    Education: Discussed aspiration risk, purpose of continuing TF    Risk Level: High [  X ] Moderate [   ] Low [   ]

## 2018-03-21 LAB
ANION GAP SERPL CALC-SCNC: 12 MMOL/L — SIGNIFICANT CHANGE UP (ref 5–17)
BUN SERPL-MCNC: 26 MG/DL — HIGH (ref 7–23)
CALCIUM SERPL-MCNC: 8.4 MG/DL — SIGNIFICANT CHANGE UP (ref 8.4–10.5)
CHLORIDE SERPL-SCNC: 96 MMOL/L — SIGNIFICANT CHANGE UP (ref 96–108)
CO2 SERPL-SCNC: 29 MMOL/L — SIGNIFICANT CHANGE UP (ref 22–31)
CREAT SERPL-MCNC: 0.43 MG/DL — LOW (ref 0.5–1.3)
GLUCOSE SERPL-MCNC: 125 MG/DL — HIGH (ref 70–99)
HCT VFR BLD CALC: 26.5 % — LOW (ref 39–50)
HGB BLD-MCNC: 8 G/DL — LOW (ref 13–17)
MAGNESIUM SERPL-MCNC: 2.2 MG/DL — SIGNIFICANT CHANGE UP (ref 1.6–2.6)
MCHC RBC-ENTMCNC: 27.7 PG — SIGNIFICANT CHANGE UP (ref 27–34)
MCHC RBC-ENTMCNC: 30.2 G/DL — LOW (ref 32–36)
MCV RBC AUTO: 91.7 FL — SIGNIFICANT CHANGE UP (ref 80–100)
PHOSPHATE SERPL-MCNC: 3.1 MG/DL — SIGNIFICANT CHANGE UP (ref 2.5–4.5)
PLATELET # BLD AUTO: 229 K/UL — SIGNIFICANT CHANGE UP (ref 150–400)
POTASSIUM SERPL-MCNC: 3.3 MMOL/L — LOW (ref 3.5–5.3)
POTASSIUM SERPL-SCNC: 3.3 MMOL/L — LOW (ref 3.5–5.3)
RBC # BLD: 2.89 M/UL — LOW (ref 4.2–5.8)
RBC # FLD: 17.1 % — HIGH (ref 10.3–16.9)
SODIUM SERPL-SCNC: 137 MMOL/L — SIGNIFICANT CHANGE UP (ref 135–145)
WBC # BLD: 6.8 K/UL — SIGNIFICANT CHANGE UP (ref 3.8–10.5)
WBC # FLD AUTO: 6.8 K/UL — SIGNIFICANT CHANGE UP (ref 3.8–10.5)

## 2018-03-21 PROCEDURE — 99233 SBSQ HOSP IP/OBS HIGH 50: CPT | Mod: GC

## 2018-03-21 PROCEDURE — 74177 CT ABD & PELVIS W/CONTRAST: CPT | Mod: 26

## 2018-03-21 RX ORDER — DIATRIZOATE MEGLUMINE 180 MG/ML
30 INJECTION, SOLUTION INTRAVESICAL ONCE
Qty: 0 | Refills: 0 | Status: COMPLETED | OUTPATIENT
Start: 2018-03-21 | End: 2018-03-21

## 2018-03-21 RX ORDER — POTASSIUM CHLORIDE 20 MEQ
10 PACKET (EA) ORAL
Qty: 0 | Refills: 0 | Status: COMPLETED | OUTPATIENT
Start: 2018-03-21 | End: 2018-03-21

## 2018-03-21 RX ORDER — POTASSIUM CHLORIDE 20 MEQ
40 PACKET (EA) ORAL ONCE
Qty: 0 | Refills: 0 | Status: COMPLETED | OUTPATIENT
Start: 2018-03-21 | End: 2018-03-21

## 2018-03-21 RX ADMIN — Medication 100 MILLIEQUIVALENT(S): at 10:12

## 2018-03-21 RX ADMIN — Medication 40 MILLIGRAM(S): at 22:38

## 2018-03-21 RX ADMIN — APIXABAN 5 MILLIGRAM(S): 2.5 TABLET, FILM COATED ORAL at 06:01

## 2018-03-21 RX ADMIN — Medication 100 MILLIEQUIVALENT(S): at 08:31

## 2018-03-21 RX ADMIN — Medication 100 MILLIGRAM(S): at 12:07

## 2018-03-21 RX ADMIN — Medication 100 MILLIGRAM(S): at 04:18

## 2018-03-21 RX ADMIN — LISINOPRIL 2.5 MILLIGRAM(S): 2.5 TABLET ORAL at 10:23

## 2018-03-21 RX ADMIN — Medication 1 APPLICATION(S): at 10:13

## 2018-03-21 RX ADMIN — Medication 40 MILLIGRAM(S): at 10:23

## 2018-03-21 RX ADMIN — CHLORHEXIDINE GLUCONATE 15 MILLILITER(S): 213 SOLUTION TOPICAL at 06:02

## 2018-03-21 RX ADMIN — Medication 100 MILLIGRAM(S): at 20:20

## 2018-03-21 RX ADMIN — DIATRIZOATE MEGLUMINE 30 MILLILITER(S): 180 INJECTION, SOLUTION INTRAVESICAL at 10:22

## 2018-03-21 RX ADMIN — CHLORHEXIDINE GLUCONATE 15 MILLILITER(S): 213 SOLUTION TOPICAL at 17:55

## 2018-03-21 RX ADMIN — Medication 1 APPLICATION(S): at 17:56

## 2018-03-21 RX ADMIN — Medication 1 APPLICATION(S): at 17:55

## 2018-03-21 RX ADMIN — Medication 40 MILLIEQUIVALENT(S): at 20:20

## 2018-03-21 RX ADMIN — APIXABAN 5 MILLIGRAM(S): 2.5 TABLET, FILM COATED ORAL at 17:54

## 2018-03-21 RX ADMIN — PANTOPRAZOLE SODIUM 40 MILLIGRAM(S): 20 TABLET, DELAYED RELEASE ORAL at 10:22

## 2018-03-21 NOTE — CHART NOTE - NSCHARTNOTEFT_GEN_A_CORE
Dr. Billy to evaluate patient for 2nd surgeon opinion.  Patient has trach/PEJ and is still very deconditioned.  He states he hasn't walked in "weeks".  Additionally, he is recovering from recent GIULIA PNA and respiratory compromise.  He appears to be clinically improving gradually.  However, he still appears to be too high risk for surgical intervention at this time for his mitral regurgitation.  He will likely need close follow-up out-patient, and continued surgical evaluation when he recovers more from his acute issues.  Dr. Billy to see him and finalize the above recommendation.

## 2018-03-21 NOTE — PROGRESS NOTE ADULT - PROBLEM SELECTOR PLAN 7
Pt w/ hx of duodenal perforation on 1/20/2018 s/p repair with kaylee patch and J tube placement complicated by a leak on 2/22 for which pt was taken to the OR and noted to have a necrotic kaylee patch with leakage of perforated duodenal ulcer. Jon gastrostomy was placed, retrograde duodenostomy placed with witzel tunnel.  -currently stable  -Tube feeds started on 2/24--continue w/ Glucerna w/ goal rate of 63cc/hr Pt w/ hx of duodenal perforation on 1/20/2018 s/p repair with kaylee patch and J tube placement complicated by a leak on 2/22 for which pt was taken to the OR and noted to have a necrotic kaylee patch with leakage of perforated duodenal ulcer. Jon gastrostomy was placed, retrograde duodenostomy placed with witzel tunnel.  -currently stable  -Tube feeds started on 2/24--continue w/ Glucerna w/ goal rate of 63cc/hr  - CT abdomen to evaluate drains, surgery saw imaging plan to eliminate J-tube to discuss when tomorrow

## 2018-03-21 NOTE — PROGRESS NOTE ADULT - PROBLEM SELECTOR PLAN 2
pt w/ sputum and blood culture growing Staph aureus. Surveillance BCx on 3/14 with NGTD and pt w/ no fevers and no leukocytosis.   -pt initially on Vanc/Zosyn, now discontinued as cultures are not MRSA  -c/w Cefazolin 2mg q8hrs (3/15--  -ID consulted regarding abx duration  -Gallium scan negative   -Plan for AMRIK today to evaluate for endocarditis pt w/ sputum and blood culture growing Staph aureus. Surveillance BCx on 3/14 with NGTD and pt w/ no fevers and no leukocytosis.   -pt initially on Vanc/Zosyn, now discontinued as cultures are not MRSA  -c/w Cefazolin 2mg q8hrs (3/15--  -ID consulted regarding abx duration, patient will require 2 weeks cefazolin followed by 2 weeks Keflexm, for PICC placement tomorrow  -Gallium scan negative   -Plan for AMRIK however scope did not pass yesterday no further attempts

## 2018-03-21 NOTE — PROGRESS NOTE ADULT - SUBJECTIVE AND OBJECTIVE BOX
Seen and examined at bedside. No overnight events. Complains of itchyness around tube sites. Admits to flatus. Denies f/c/s/cp/sob/palp/lh.     Vital Signs Last 24 Hrs  T(C): 36.8 (21 Mar 2018 09:16), Max: 36.8 (20 Mar 2018 13:21)  T(F): 98.3 (21 Mar 2018 09:16), Max: 98.3 (20 Mar 2018 13:21)  HR: 88 (21 Mar 2018 12:30) (77 - 116)  BP: 97/57 (21 Mar 2018 12:30) (77/52 - 128/74)  BP(mean): 69 (21 Mar 2018 12:30) (59 - 93)  RR: 16 (21 Mar 2018 12:30) (9 - 88)  SpO2: 100% (21 Mar 2018 12:30) (93% - 100%)  CAPILLARY BLOOD GLUCOSE          03-20 @ 07:01 - 03-21 @ 07:00  --------------------------------------------------------  IN: 63 mL / OUT: 1800 mL / NET: -1737 mL    03-21 @ 07:01 - 03-21 @ 12:50  --------------------------------------------------------  IN: 350 mL / OUT: 0 mL / NET: 350 mL        apixaban 5 milliGRAM(s) Oral every 12 hours  ceFAZolin   IVPB 2000 milliGRAM(s) IV Intermittent every 8 hours  chlorhexidine 0.12% Liquid 15 milliLiter(s) Swish and Spit two times a day  furosemide   Injectable 40 milliGRAM(s) IV Push every 12 hours  lisinopril 2.5 milliGRAM(s) Oral daily  pantoprazole    Tablet 40 milliGRAM(s) Oral before breakfast  povidone iodine 10% Swab 1 Application(s) Topical daily  vitamin A &amp; D Ointment 1 Application(s) Topical two times a day    LABS:      CBC Full  -  ( 21 Mar 2018 06:32 )  WBC Count : 6.8 K/uL  Hemoglobin : 8.0 g/dL  Hematocrit : 26.5 %  Platelet Count - Automated : 229 K/uL  Mean Cell Volume : 91.7 fL  Mean Cell Hemoglobin : 27.7 pg  Mean Cell Hemoglobin Concentration : 30.2 g/dL  Auto Neutrophil # : x  Auto Lymphocyte # : x  Auto Monocyte # : x  Auto Eosinophil # : x  Auto Basophil # : x  Auto Neutrophil % : x  Auto Lymphocyte % : x  Auto Monocyte % : x  Auto Eosinophil % : x  Auto Basophil % : x    03-21    137  |  96  |  26<H>  ----------------------------<  125<H>  3.3<L>   |  29  |  0.43<L>    Ca    8.4      21 Mar 2018 06:32  Phos  3.1     03-21  Mg     2.2     03-21

## 2018-03-21 NOTE — PROGRESS NOTE ADULT - ASSESSMENT
71 yo male recently discharged from [facility] after prolonged hospital course due to perforated duodenal ulcer s/p exploratory laparotomy  retrograde duodenostomy, Jon Gastrostomy, feeding jejunostomy, admitted with respiratory distress     - Pending CT ab/Pelv with Contrast via gastrostomy tube while all other tubes are clamped. This will guide our decision regarding removal of such tubes  -  Continue care as per primary team   - surgery team 4C following, please call if any issues   - discussed with chief resident and Dr. Metcalf

## 2018-03-21 NOTE — PROGRESS NOTE ADULT - SUBJECTIVE AND OBJECTIVE BOX
INCOMPLETE  INTERVAL HPI/OVERNIGHT EVENTS: No acute events overnight.     SUBJECTIVE: Patient seen and examined at bedside. No acute distress. Patient reports that he is feeling fine. Denies chest pain, shortness of breath, abdominal pain, nausea, vomiting. Pt leaking green fluid from D tube.     VITAL SIGNS:  ICU Vital Signs Last 24 Hrs  T(C): 36.3 (21 Mar 2018 05:00), Max: 36.8 (20 Mar 2018 13:21)  T(F): 97.4 (21 Mar 2018 05:00), Max: 98.3 (20 Mar 2018 13:21)  HR: 77 (21 Mar 2018 04:42) (77 - 116)  BP: 99/64 (21 Mar 2018 04:42) (77/52 - 128/74)  BP(mean): 74 (21 Mar 2018 04:42) (59 - 93)  ABP: --  ABP(mean): --  RR: 20 (21 Mar 2018 04:42) (9 - 88)  SpO2: 96% (21 Mar 2018 04:42) (93% - 100%)        CAPILLARY BLOOD GLUCOSE      POCT Blood Glucose.: 125 mg/dL (20 Mar 2018 06:30)      PHYSICAL EXAM:  Constitutional: Middle aged pleasant male laying in bed comfortably.   HEENT: NC/AT; PERRL, anicteric sclera; MMM  Neck: supple, no JVD, trach site clean  Cardiovascular: S1/S2 normal. RRR. Grade 3/6 holosystolic murmur with radiation to the axilla.   Respiratory: Inspiratory rhonchi b/l lungs with left>right  Gastrointestinal: abdomen soft, NT/ND; well healed midline inc  ision w/ no erythema or purulence. J/G/D tube in place w/ clean dressing on top.   Genitourinary: no suprapubic tenderness or fullness, taxus catheter   Extremities: WWP; 2+LE extremity edema. B/l feet wrapped in clean cotton dressing  Vascular: 2+ radial, DP/PT pulses B/L  Dermatologic: normal color and turgor; no visible rashes  Neurological: AAOx3. Pt able to follow commands. No focal deficits     MEDICATIONS:  MEDICATIONS  (STANDING):  apixaban 5 milliGRAM(s) Oral every 12 hours  ceFAZolin   IVPB 2000 milliGRAM(s) IV Intermittent every 8 hours  chlorhexidine 0.12% Liquid 15 milliLiter(s) Swish and Spit two times a day  furosemide   Injectable 40 milliGRAM(s) IV Push every 12 hours  lisinopril 2.5 milliGRAM(s) Oral daily  pantoprazole    Tablet 40 milliGRAM(s) Oral before breakfast  povidone iodine 10% Swab 1 Application(s) Topical daily  vitamin A &amp; D Ointment 1 Application(s) Topical two times a day    MEDICATIONS  (PRN):      ALLERGIES:  Allergies    No Known Allergies    Intolerances        LABS:                        8.0    8.9   )-----------( 203      ( 19 Mar 2018 05:28 )             27.6     03-19    139  |  98  |  27<H>  ----------------------------<  145<H>  3.9   |  35<H>  |  0.51    Ca    8.6      19 Mar 2018 05:28  Phos  2.8     03-19  Mg     2.2     03-19            RADIOLOGY & ADDITIONAL TESTS: Reviewed. INCOMPLETE  INTERVAL HPI/OVERNIGHT EVENTS: Patient feeds held overnight for CT abdomen.  Patient with 6 beats of Vtach asymptomatic.  Lisinopril held in setting of BP 90's.    SUBJECTIVE: Patient seen and examined at bedside. No acute distress.  ROS negative.    VITAL SIGNS:  ICU Vital Signs Last 24 Hrs  T(C): 36.7 (21 Mar 2018 18:00), Max: 36.8 (21 Mar 2018 09:16)  T(F): 98 (21 Mar 2018 18:00), Max: 98.3 (21 Mar 2018 09:16)  HR: 86 (21 Mar 2018 16:40) (77 - 90)  BP: 109/61 (21 Mar 2018 16:40) (97/57 - 109/61)  BP(mean): 78 (21 Mar 2018 16:40) (69 - 82)  ABP: --  ABP(mean): --  RR: 18 (21 Mar 2018 16:40) (16 - 88)  SpO2: 95% (21 Mar 2018 16:40) (93% - 100%)    PHYSICAL EXAM:  Constitutional: Middle aged pleasant male laying in bed comfortably.   HEENT: NC/AT; PERRL, anicteric sclera; MMM  Neck: supple, no JVD, trach site clean  Cardiovascular: S1/S2 normal. RRR. Grade 3/6 holosystolic murmur with radiation to the axilla.   Respiratory: Inspiratory rhonchi b/l lungs with left>right  Gastrointestinal: abdomen soft, NT/ND; well healed midline inc  ision w/ no erythema or purulence. J/G/D tube in place w/ clean dressing on top.   Genitourinary: no suprapubic tenderness or fullness, taxus catheter   Extremities: WWP; 2+LE extremity edema. B/l feet wrapped in clean cotton dressing  Vascular: 2+ radial, DP/PT pulses B/L  Dermatologic: normal color and turgor; no visible rashes  Neurological: AAOx3. Pt able to follow commands. No focal deficits     MEDICATIONS  (STANDING):  apixaban 5 milliGRAM(s) Oral every 12 hours  ceFAZolin   IVPB 2000 milliGRAM(s) IV Intermittent every 8 hours  chlorhexidine 0.12% Liquid 15 milliLiter(s) Swish and Spit two times a day  furosemide   Injectable 40 milliGRAM(s) IV Push every 12 hours  lisinopril 2.5 milliGRAM(s) Oral daily  pantoprazole    Tablet 40 milliGRAM(s) Oral before breakfast  potassium chloride   Powder 40 milliEquivalent(s) Oral once  povidone iodine 10% Swab 1 Application(s) Topical daily  vitamin A &amp; D Ointment 1 Application(s) Topical two times a day    MEDICATIONS  (PRN):        ALLERGIES:  Allergies    No Known Allergies    Intolerances      .  LABS:                         8.0    6.8   )-----------( 229      ( 21 Mar 2018 06:32 )             26.5     03-21    137  |  96  |  26<H>  ----------------------------<  125<H>  3.3<L>   |  29  |  0.43<L>    Ca    8.4      21 Mar 2018 06:32  Phos  3.1     03-21  Mg     2.2     03-21                    RADIOLOGY, EKG & ADDITIONAL TESTS: Reviewed. INTERVAL HPI/OVERNIGHT EVENTS: Patient feeds held overnight for CT abdomen.  Patient with 6 beats of Vtach asymptomatic.  Lisinopril held in setting of BP 90's.    SUBJECTIVE: Patient seen and examined at bedside. No acute distress.  ROS negative.    VITAL SIGNS:  ICU Vital Signs Last 24 Hrs  T(C): 36.7 (21 Mar 2018 18:00), Max: 36.8 (21 Mar 2018 09:16)  T(F): 98 (21 Mar 2018 18:00), Max: 98.3 (21 Mar 2018 09:16)  HR: 86 (21 Mar 2018 16:40) (77 - 90)  BP: 109/61 (21 Mar 2018 16:40) (97/57 - 109/61)  BP(mean): 78 (21 Mar 2018 16:40) (69 - 82)  ABP: --  ABP(mean): --  RR: 18 (21 Mar 2018 16:40) (16 - 88)  SpO2: 95% (21 Mar 2018 16:40) (93% - 100%)    PHYSICAL EXAM:  Constitutional: Middle aged pleasant male laying in bed comfortably.   HEENT: NC/AT; PERRL, anicteric sclera; MMM  Neck: supple, no JVD, trach site clean  Cardiovascular: S1/S2 normal. RRR. Grade 3/6 holosystolic murmur with radiation to the axilla.   Respiratory: Inspiratory rhonchi b/l lungs with left>right  Gastrointestinal: abdomen soft, NT/ND; well healed midline inc  ision w/ no erythema or purulence. J/G/D tube in place w/ clean dressing on top.   Genitourinary: no suprapubic tenderness or fullness, taxus catheter   Extremities: WWP; 2+LE extremity edema. B/l feet wrapped in clean cotton dressing  Vascular: 2+ radial, DP/PT pulses B/L  Dermatologic: normal color and turgor; no visible rashes  Neurological: AAOx3. Pt able to follow commands. No focal deficits     MEDICATIONS  (STANDING):  apixaban 5 milliGRAM(s) Oral every 12 hours  ceFAZolin   IVPB 2000 milliGRAM(s) IV Intermittent every 8 hours  chlorhexidine 0.12% Liquid 15 milliLiter(s) Swish and Spit two times a day  furosemide   Injectable 40 milliGRAM(s) IV Push every 12 hours  lisinopril 2.5 milliGRAM(s) Oral daily  pantoprazole    Tablet 40 milliGRAM(s) Oral before breakfast  potassium chloride   Powder 40 milliEquivalent(s) Oral once  povidone iodine 10% Swab 1 Application(s) Topical daily  vitamin A &amp; D Ointment 1 Application(s) Topical two times a day    MEDICATIONS  (PRN):        ALLERGIES:  Allergies    No Known Allergies    Intolerances      .  LABS:                         8.0    6.8   )-----------( 229      ( 21 Mar 2018 06:32 )             26.5     03-21    137  |  96  |  26<H>  ----------------------------<  125<H>  3.3<L>   |  29  |  0.43<L>    Ca    8.4      21 Mar 2018 06:32  Phos  3.1     03-21  Mg     2.2     03-21                    RADIOLOGY, EKG & ADDITIONAL TESTS: Reviewed.

## 2018-03-22 LAB
ANION GAP SERPL CALC-SCNC: 11 MMOL/L — SIGNIFICANT CHANGE UP (ref 5–17)
BASOPHILS NFR BLD AUTO: 0.6 % — SIGNIFICANT CHANGE UP (ref 0–2)
BLD GP AB SCN SERPL QL: NEGATIVE — SIGNIFICANT CHANGE UP
BUN SERPL-MCNC: 24 MG/DL — HIGH (ref 7–23)
CALCIUM SERPL-MCNC: 8.4 MG/DL — SIGNIFICANT CHANGE UP (ref 8.4–10.5)
CHLORIDE SERPL-SCNC: 93 MMOL/L — LOW (ref 96–108)
CO2 SERPL-SCNC: 28 MMOL/L — SIGNIFICANT CHANGE UP (ref 22–31)
CREAT SERPL-MCNC: 0.5 MG/DL — SIGNIFICANT CHANGE UP (ref 0.5–1.3)
EOSINOPHIL NFR BLD AUTO: 1.1 % — SIGNIFICANT CHANGE UP (ref 0–6)
GLUCOSE SERPL-MCNC: 118 MG/DL — HIGH (ref 70–99)
HCT VFR BLD CALC: 26.5 % — LOW (ref 39–50)
HGB BLD-MCNC: 8 G/DL — LOW (ref 13–17)
LYMPHOCYTES # BLD AUTO: 34.1 % — SIGNIFICANT CHANGE UP (ref 13–44)
MAGNESIUM SERPL-MCNC: 2.1 MG/DL — SIGNIFICANT CHANGE UP (ref 1.6–2.6)
MCHC RBC-ENTMCNC: 27.6 PG — SIGNIFICANT CHANGE UP (ref 27–34)
MCHC RBC-ENTMCNC: 30.2 G/DL — LOW (ref 32–36)
MCV RBC AUTO: 91.4 FL — SIGNIFICANT CHANGE UP (ref 80–100)
MONOCYTES NFR BLD AUTO: 10 % — SIGNIFICANT CHANGE UP (ref 2–14)
NEUTROPHILS NFR BLD AUTO: 54.2 % — SIGNIFICANT CHANGE UP (ref 43–77)
PLATELET # BLD AUTO: 218 K/UL — SIGNIFICANT CHANGE UP (ref 150–400)
POTASSIUM SERPL-MCNC: 3.8 MMOL/L — SIGNIFICANT CHANGE UP (ref 3.5–5.3)
POTASSIUM SERPL-SCNC: 3.8 MMOL/L — SIGNIFICANT CHANGE UP (ref 3.5–5.3)
RBC # BLD: 2.9 M/UL — LOW (ref 4.2–5.8)
RBC # FLD: 16.8 % — SIGNIFICANT CHANGE UP (ref 10.3–16.9)
RH IG SCN BLD-IMP: POSITIVE — SIGNIFICANT CHANGE UP
SODIUM SERPL-SCNC: 132 MMOL/L — LOW (ref 135–145)
WBC # BLD: 7.2 K/UL — SIGNIFICANT CHANGE UP (ref 3.8–10.5)
WBC # FLD AUTO: 7.2 K/UL — SIGNIFICANT CHANGE UP (ref 3.8–10.5)

## 2018-03-22 PROCEDURE — 99233 SBSQ HOSP IP/OBS HIGH 50: CPT | Mod: GC

## 2018-03-22 RX ORDER — FUROSEMIDE 40 MG
40 TABLET ORAL DAILY
Qty: 0 | Refills: 0 | Status: DISCONTINUED | OUTPATIENT
Start: 2018-03-22 | End: 2018-03-23

## 2018-03-22 RX ADMIN — PANTOPRAZOLE SODIUM 40 MILLIGRAM(S): 20 TABLET, DELAYED RELEASE ORAL at 06:54

## 2018-03-22 RX ADMIN — CHLORHEXIDINE GLUCONATE 15 MILLILITER(S): 213 SOLUTION TOPICAL at 05:26

## 2018-03-22 RX ADMIN — Medication 1 APPLICATION(S): at 17:29

## 2018-03-22 RX ADMIN — Medication 100 MILLIGRAM(S): at 19:17

## 2018-03-22 RX ADMIN — APIXABAN 5 MILLIGRAM(S): 2.5 TABLET, FILM COATED ORAL at 17:28

## 2018-03-22 RX ADMIN — Medication 40 MILLIGRAM(S): at 12:08

## 2018-03-22 RX ADMIN — Medication 100 MILLIGRAM(S): at 05:26

## 2018-03-22 RX ADMIN — APIXABAN 5 MILLIGRAM(S): 2.5 TABLET, FILM COATED ORAL at 05:25

## 2018-03-22 RX ADMIN — LISINOPRIL 2.5 MILLIGRAM(S): 2.5 TABLET ORAL at 05:25

## 2018-03-22 RX ADMIN — Medication 1 APPLICATION(S): at 05:27

## 2018-03-22 RX ADMIN — Medication 100 MILLIGRAM(S): at 12:08

## 2018-03-22 RX ADMIN — Medication 1 APPLICATION(S): at 13:05

## 2018-03-22 NOTE — SWALLOW FEES ASSESSMENT ADULT - SLP GENERAL OBSERVATIONS
Pt was seated upright in chair at bedside for this study. #6 Shiley; cuff deflated w PMV in place. RN present t/o this study.

## 2018-03-22 NOTE — PROGRESS NOTE ADULT - PROBLEM SELECTOR PLAN 4
CHADSVASC of 3. Pt previously on 0.125mg of Digoxin but discontinued on 3/15 given pt w/ episode of bradycardia overnight. Pt w/ subtherapeutic digoxin level.   -c/w Eliquis 5mg BID   -Pts heart rate currently well controlled--will consider starting a Bblocker if heart rate is poorly controlled   -continue with telemetry monitoring CHADSVASC of 3. Pt previously on 0.125mg of Digoxin but discontinued on 3/15 given pt w/ episode of bradycardia overnight. Pt w/ subtherapeutic digoxin level.   -c/w Eliquis 5mg BID   -Pts heart rate currently well controlled  -continue with telemetry monitoring

## 2018-03-22 NOTE — PROGRESS NOTE ADULT - PROBLEM SELECTOR PLAN 1
Improving. Pt w/ increased work of breathing and shortness of breath on admission. Etiology likely CHF exacerbation given pt w/ BNP of 22k on admission (7K on last admission) and clinically overloaded on admission (JVP, 2+ edema). Cxray c/w bilateral pulm congestion and effusion. Echo on 3/13 with EF 45-50%.   -continue w/ lasix 40mg IV BID   -continue w/ lisinopril 2.5mg qd  -daily chest xray   -I&Os  -will not start BB yet Improving. Pt w/ increased work of breathing and shortness of breath on admission. Etiology likely CHF exacerbation given pt w/ BNP of 22k on admission (7K on last admission) and clinically overloaded on admission (JVP, 2+ edema). Cxray c/w bilateral pulm congestion and effusion. Echo on 3/13 with EF 45-50%.   -decrease lasix 40mg IV once a day  -continue w/ lisinopril 2.5mg qd   -I&Os

## 2018-03-22 NOTE — PROGRESS NOTE ADULT - SUBJECTIVE AND OBJECTIVE BOX
Seen and examined at bedside. No overnight events. Pain well controlled. CT scan reviewed. Denies f/c/s/cp/sob/palp/lh.     Vital Signs Last 24 Hrs  T(C): 37.1 (22 Mar 2018 13:41), Max: 37.1 (22 Mar 2018 13:41)  T(F): 98.7 (22 Mar 2018 13:41), Max: 98.7 (22 Mar 2018 13:41)  HR: 94 (22 Mar 2018 12:10) (86 - 100)  BP: 106/63 (22 Mar 2018 12:10) (104/63 - 125/56)  BP(mean): 76 (22 Mar 2018 12:10) (76 - 80)  RR: 16 (22 Mar 2018 12:10) (15 - 21)  SpO2: 94% (22 Mar 2018 12:10) (94% - 100%)  CAPILLARY BLOOD GLUCOSE          03-21 @ 07:01  -  03-22 @ 07:00  --------------------------------------------------------  IN: 350 mL / OUT: 1950 mL / NET: -1600 mL    03-22 @ 07:01  -  03-22 @ 15:05  --------------------------------------------------------  IN: 0 mL / OUT: 500 mL / NET: -500 mL        apixaban 5 milliGRAM(s) Oral every 12 hours  ceFAZolin   IVPB 2000 milliGRAM(s) IV Intermittent every 8 hours  chlorhexidine 0.12% Liquid 15 milliLiter(s) Swish and Spit two times a day  furosemide   Injectable 40 milliGRAM(s) IV Push daily  lisinopril 2.5 milliGRAM(s) Oral daily  pantoprazole    Tablet 40 milliGRAM(s) Oral before breakfast  povidone iodine 10% Swab 1 Application(s) Topical daily  vitamin A &amp; D Ointment 1 Application(s) Topical two times a day    LABS:  CBC Full  -  ( 22 Mar 2018 07:16 )  WBC Count : 7.2 K/uL  Hemoglobin : 8.0 g/dL  Hematocrit : 26.5 %  Platelet Count - Automated : 218 K/uL  Mean Cell Volume : 91.4 fL  Mean Cell Hemoglobin : 27.6 pg  Mean Cell Hemoglobin Concentration : 30.2 g/dL  Auto Neutrophil # : x  Auto Lymphocyte # : x  Auto Monocyte # : x  Auto Eosinophil # : x  Auto Basophil # : x  Auto Neutrophil % : 54.2 %  Auto Lymphocyte % : 34.1 %  Auto Monocyte % : 10.0 %  Auto Eosinophil % : 1.1 %  Auto Basophil % : 0.6 %    03-22    132<L>  |  93<L>  |  24<H>  ----------------------------<  118<H>  3.8   |  28  |  0.50    Ca    8.4      22 Mar 2018 07:16  Phos  3.1     03-21  Mg     2.1     03-22

## 2018-03-22 NOTE — PROGRESS NOTE ADULT - PROBLEM SELECTOR PLAN 9
F: None  E: Replete PRN  N: NPO with Tube feeds--Glucerna @63cc/hr. Pending FEES study. F: None  E: Replete PRN  N: C/w Feeds

## 2018-03-22 NOTE — SWALLOW FEES ASSESSMENT ADULT - CONSISTENCIES ADMINISTERED
3 ice chips, 2 tsp of thin liq by spoon, 1 tsp of NTL by spoon, 1/4 tsp of slick puree by spoon 3 ice chips, 2 tsp of thin liq by spoon, 1 tsp of NTL by spoon, 1/4 tsp of slick puree (i.e., applesauce) by spoon

## 2018-03-22 NOTE — SWALLOW FEES ASSESSMENT ADULT - COMMENTS
Based on clinical presentation, a FEES was recommended and an order was provided by the medical team. The risks, benefits and alternatives of this study were discussed with the pt. He expressed understanding and agreed to proceed. The pt tolerated the passing and presence of the scope without difficulty.    Pt reported swallowing secretions t/o the day, but still primarily relying on suctioning to clear pooled secretions. He also stated that he has not had ice-chips consistently since prior FEES and did not undergo regular dysphagia therapy at rehab after prior D/C from [facility].

## 2018-03-22 NOTE — PROGRESS NOTE ADULT - PROBLEM SELECTOR PLAN 2
pt w/ sputum and blood culture growing Staph aureus. Surveillance BCx on 3/14 with NGTD and pt w/ no fevers and no leukocytosis.   -pt initially on Vanc/Zosyn, now discontinued as cultures are not MRSA  -c/w Cefazolin 2mg q8hrs (3/15--  -ID consulted regarding abx duration, patient will require 2 weeks cefazolin followed by 2 weeks Keflexm, for PICC placement tomorrow  -Gallium scan negative   -Plan for AMRIK however scope did not pass yesterday no further attempts pt w/ sputum and blood culture growing Staph aureus. Surveillance BCx on 3/14 with NGTD and pt w/ no fevers and no leukocytosis.   -pt initially on Vanc/Zosyn, now discontinued as cultures are not MRSA  -c/w Cefazolin 2mg q8hrs (3/15--  -ID consulted regarding abx duration, patient will require 2 weeks cefazolin total followed by 2 weeks Keflex,   -Gallium scan negative   -Plan initially for AMRIK however scope did not pass no further attempts

## 2018-03-22 NOTE — SWALLOW FEES ASSESSMENT ADULT - PHARYNGEAL PHASE COMMENTS
Reduced airway protection & reduced sensation, resulted in penetration during the swallow. Reduced BOT retraction and pharyngeal squeeze resulted in post-deglutitive residue which further spilled into laryngeal vestibule after the swallow over the epiglottis and interarytenoid space.

## 2018-03-22 NOTE — PROGRESS NOTE ADULT - SUBJECTIVE AND OBJECTIVE BOX
INCOMPLETE  INTERVAL HPI/OVERNIGHT EVENTS: Patient feeds held overnight for CT abdomen.  Patient with 6 beats of Vtach asymptomatic.  Lisinopril held in setting of BP 90's.    SUBJECTIVE: Patient seen and examined at bedside. No acute distress.  ROS negative.    VITAL SIGNS:  ICU Vital Signs Last 24 Hrs  T(C): 36.7 (21 Mar 2018 18:00), Max: 36.8 (21 Mar 2018 09:16)  T(F): 98 (21 Mar 2018 18:00), Max: 98.3 (21 Mar 2018 09:16)  HR: 86 (21 Mar 2018 16:40) (77 - 90)  BP: 109/61 (21 Mar 2018 16:40) (97/57 - 109/61)  BP(mean): 78 (21 Mar 2018 16:40) (69 - 82)  ABP: --  ABP(mean): --  RR: 18 (21 Mar 2018 16:40) (16 - 88)  SpO2: 95% (21 Mar 2018 16:40) (93% - 100%)    PHYSICAL EXAM:  Constitutional: Middle aged pleasant male laying in bed comfortably.   HEENT: NC/AT; PERRL, anicteric sclera; MMM  Neck: supple, no JVD, trach site clean  Cardiovascular: S1/S2 normal. RRR. Grade 3/6 holosystolic murmur with radiation to the axilla.   Respiratory: Inspiratory rhonchi b/l lungs with left>right  Gastrointestinal: abdomen soft, NT/ND; well healed midline inc  ision w/ no erythema or purulence. J/G/D tube in place w/ clean dressing on top.   Genitourinary: no suprapubic tenderness or fullness, taxus catheter   Extremities: WWP; 2+LE extremity edema. B/l feet wrapped in clean cotton dressing  Vascular: 2+ radial, DP/PT pulses B/L  Dermatologic: normal color and turgor; no visible rashes  Neurological: AAOx3. Pt able to follow commands. No focal deficits     MEDICATIONS  (STANDING):  apixaban 5 milliGRAM(s) Oral every 12 hours  ceFAZolin   IVPB 2000 milliGRAM(s) IV Intermittent every 8 hours  chlorhexidine 0.12% Liquid 15 milliLiter(s) Swish and Spit two times a day  furosemide   Injectable 40 milliGRAM(s) IV Push every 12 hours  lisinopril 2.5 milliGRAM(s) Oral daily  pantoprazole    Tablet 40 milliGRAM(s) Oral before breakfast  potassium chloride   Powder 40 milliEquivalent(s) Oral once  povidone iodine 10% Swab 1 Application(s) Topical daily  vitamin A &amp; D Ointment 1 Application(s) Topical two times a day    MEDICATIONS  (PRN):        ALLERGIES:  Allergies    No Known Allergies    Intolerances      .  LABS:                         8.0    6.8   )-----------( 229      ( 21 Mar 2018 06:32 )             26.5     03-21    137  |  96  |  26<H>  ----------------------------<  125<H>  3.3<L>   |  29  |  0.43<L>    Ca    8.4      21 Mar 2018 06:32  Phos  3.1     03-21  Mg     2.2     03-21                    RADIOLOGY, EKG & ADDITIONAL TESTS: Reviewed. INTERVAL HPI/OVERNIGHT EVENTS: Patient feeds held given CT scan read as perf.  Surgery team 4 informed nothing urgent.  CORETTA.     SUBJECTIVE: Patient seen and examined at bedside. No acute distress.  ROS negative.    VITAL SIGNS:  ICU Vital Signs Last 24 Hrs  T(C): 37.1 (22 Mar 2018 13:41), Max: 37.1 (22 Mar 2018 13:41)  T(F): 98.7 (22 Mar 2018 13:41), Max: 98.7 (22 Mar 2018 13:41)  HR: 118 (22 Mar 2018 14:35) (86 - 118)  BP: 115/55 (22 Mar 2018 14:35) (104/63 - 125/56)  BP(mean): 79 (22 Mar 2018 14:35) (69 - 80)  ABP: --  ABP(mean): --  RR: 16 (22 Mar 2018 12:10) (15 - 21)  SpO2: 96% (22 Mar 2018 14:35) (94% - 100%)      PHYSICAL EXAM:  Constitutional: Middle aged pleasant male laying in bed comfortably.   HEENT: NC/AT; PERRL, anicteric sclera; MMM  Neck: supple, no JVD, trach site clean  Cardiovascular: S1/S2 normal. RRR. Grade 3/6 holosystolic murmur with radiation to the axilla.   Respiratory: Inspiratory rhonchi b/l lungs with left>right  Gastrointestinal: abdomen soft, NT/ND; well healed midline inc  ision w/ no erythema or purulence. J/G/D tube in place w/ clean dressing on top.   Genitourinary: no suprapubic tenderness or fullness, taxus catheter   Extremities: WWP; 2+LE extremity edema. B/l feet wrapped in clean cotton dressing  Vascular: 2+ radial, DP/PT pulses B/L  Dermatologic: normal color and turgor; no visible rashes  Neurological: AAOx3. Pt able to follow commands. No focal deficits     MEDICATIONS  (STANDING):  apixaban 5 milliGRAM(s) Oral every 12 hours  ceFAZolin   IVPB 2000 milliGRAM(s) IV Intermittent every 8 hours  chlorhexidine 0.12% Liquid 15 milliLiter(s) Swish and Spit two times a day  furosemide   Injectable 40 milliGRAM(s) IV Push daily  lisinopril 2.5 milliGRAM(s) Oral daily  pantoprazole    Tablet 40 milliGRAM(s) Oral before breakfast  povidone iodine 10% Swab 1 Application(s) Topical daily  vitamin A &amp; D Ointment 1 Application(s) Topical two times a day    MEDICATIONS  (PRN):        ALLERGIES:  Allergies    No Known Allergies    Intolerances    .  LABS:                         8.0    7.2   )-----------( 218      ( 22 Mar 2018 07:16 )             26.5     03-22    132<L>  |  93<L>  |  24<H>  ----------------------------<  118<H>  3.8   |  28  |  0.50    Ca    8.4      22 Mar 2018 07:16  Phos  3.1     03-21  Mg     2.1     03-22                    RADIOLOGY, EKG & ADDITIONAL TESTS: Reviewed.

## 2018-03-22 NOTE — PROGRESS NOTE ADULT - PROBLEM SELECTOR PLAN 7
Pt w/ hx of duodenal perforation on 1/20/2018 s/p repair with kaylee patch and J tube placement complicated by a leak on 2/22 for which pt was taken to the OR and noted to have a necrotic kaylee patch with leakage of perforated duodenal ulcer. Jon gastrostomy was placed, retrograde duodenostomy placed with witzel tunnel.  -currently stable  -Tube feeds started on 2/24--continue w/ Glucerna w/ goal rate of 63cc/hr  - CT abdomen to evaluate drains, surgery saw imaging plan to eliminate J-tube to discuss when tomorrow

## 2018-03-22 NOTE — SWALLOW FEES ASSESSMENT ADULT - DIAGNOSTIC IMPRESSIONS
Pt p/w severe pharyngeal dysphagia characterized by reduced airway closure, reduced sensation and weak swallow resulting in silent penetration during the swallow. A cued cough was not strong enough to clear the penetrated material. This improved w visual feedback- pt was able to inconsistently clear laryngeal vestibule at times. However primary swallow deficits appeared to be a weak swallow, reduced sensation, reduced BOT retraction and reduced pharyngeal squeeze resulting in severe diffuse post-deglutitive residue (worst w slick puree), which pt was not able to clear w multiple cued secondary swallows. This residue then spilled over the laryngeal vestibule side of the epiglottis and over the interarytenoid space, resulting in likely aspiration of all consistencies. Cued cough and secondary swallows did not clear the materials in the laryngeal vestibule nor the residue. RN provided oral and tracheal suctioning of residue at completion of study.

## 2018-03-22 NOTE — PROGRESS NOTE ADULT - ASSESSMENT
71 yo male recently discharged from [facility] after prolonged hospital course due to perforated duodenal ulcer s/p exploratory laparotomy  retrograde duodenostomy, Jon Gastrostomy, feeding jejunostomy, admitted with respiratory distress     - Plan for removal of tube by Dr. Metcalf tomorrow  - Continue feeds  -  Continue care as per primary team   - surgery team 4C following, please call if any issues   - discussed with Dr. Metcalf

## 2018-03-23 ENCOUNTER — TRANSCRIPTION ENCOUNTER (OUTPATIENT)
Age: 73
End: 2018-03-23

## 2018-03-23 LAB
ANION GAP SERPL CALC-SCNC: 8 MMOL/L — SIGNIFICANT CHANGE UP (ref 5–17)
BASOPHILS NFR BLD AUTO: 0.5 % — SIGNIFICANT CHANGE UP (ref 0–2)
BUN SERPL-MCNC: 25 MG/DL — HIGH (ref 7–23)
CALCIUM SERPL-MCNC: 8.4 MG/DL — SIGNIFICANT CHANGE UP (ref 8.4–10.5)
CHLORIDE SERPL-SCNC: 96 MMOL/L — SIGNIFICANT CHANGE UP (ref 96–108)
CO2 SERPL-SCNC: 31 MMOL/L — SIGNIFICANT CHANGE UP (ref 22–31)
CREAT SERPL-MCNC: 0.45 MG/DL — LOW (ref 0.5–1.3)
EOSINOPHIL NFR BLD AUTO: 1.4 % — SIGNIFICANT CHANGE UP (ref 0–6)
GLUCOSE SERPL-MCNC: 140 MG/DL — HIGH (ref 70–99)
HCT VFR BLD CALC: 26.2 % — LOW (ref 39–50)
HGB BLD-MCNC: 8 G/DL — LOW (ref 13–17)
LYMPHOCYTES # BLD AUTO: 30.5 % — SIGNIFICANT CHANGE UP (ref 13–44)
MAGNESIUM SERPL-MCNC: 2 MG/DL — SIGNIFICANT CHANGE UP (ref 1.6–2.6)
MCHC RBC-ENTMCNC: 27.8 PG — SIGNIFICANT CHANGE UP (ref 27–34)
MCHC RBC-ENTMCNC: 30.5 G/DL — LOW (ref 32–36)
MCV RBC AUTO: 91 FL — SIGNIFICANT CHANGE UP (ref 80–100)
MONOCYTES NFR BLD AUTO: 12 % — SIGNIFICANT CHANGE UP (ref 2–14)
NEUTROPHILS NFR BLD AUTO: 55.6 % — SIGNIFICANT CHANGE UP (ref 43–77)
PHOSPHATE SERPL-MCNC: 3 MG/DL — SIGNIFICANT CHANGE UP (ref 2.5–4.5)
PLATELET # BLD AUTO: 222 K/UL — SIGNIFICANT CHANGE UP (ref 150–400)
POTASSIUM SERPL-MCNC: 3.6 MMOL/L — SIGNIFICANT CHANGE UP (ref 3.5–5.3)
POTASSIUM SERPL-SCNC: 3.6 MMOL/L — SIGNIFICANT CHANGE UP (ref 3.5–5.3)
RBC # BLD: 2.88 M/UL — LOW (ref 4.2–5.8)
RBC # FLD: 16.9 % — SIGNIFICANT CHANGE UP (ref 10.3–16.9)
SODIUM SERPL-SCNC: 135 MMOL/L — SIGNIFICANT CHANGE UP (ref 135–145)
WBC # BLD: 6.6 K/UL — SIGNIFICANT CHANGE UP (ref 3.8–10.5)
WBC # FLD AUTO: 6.6 K/UL — SIGNIFICANT CHANGE UP (ref 3.8–10.5)

## 2018-03-23 RX ORDER — PANTOPRAZOLE SODIUM 20 MG/1
40 TABLET, DELAYED RELEASE ORAL DAILY
Qty: 0 | Refills: 0 | Status: DISCONTINUED | OUTPATIENT
Start: 2018-03-24 | End: 2018-03-26

## 2018-03-23 RX ORDER — CHLORHEXIDINE GLUCONATE 213 G/1000ML
0 SOLUTION TOPICAL
Qty: 0 | Refills: 0 | COMMUNITY
Start: 2018-03-23

## 2018-03-23 RX ORDER — LISINOPRIL 2.5 MG/1
2.5 TABLET ORAL DAILY
Qty: 0 | Refills: 0 | Status: DISCONTINUED | OUTPATIENT
Start: 2018-03-23 | End: 2018-03-26

## 2018-03-23 RX ORDER — LISINOPRIL 2.5 MG/1
2.5 TABLET ORAL DAILY
Qty: 0 | Refills: 0 | Status: DISCONTINUED | OUTPATIENT
Start: 2018-03-23 | End: 2018-03-23

## 2018-03-23 RX ORDER — FUROSEMIDE 40 MG
60 TABLET ORAL DAILY
Qty: 0 | Refills: 0 | Status: DISCONTINUED | OUTPATIENT
Start: 2018-03-24 | End: 2018-03-26

## 2018-03-23 RX ORDER — POTASSIUM CHLORIDE 20 MEQ
40 PACKET (EA) ORAL ONCE
Qty: 0 | Refills: 0 | Status: COMPLETED | OUTPATIENT
Start: 2018-03-23 | End: 2018-03-23

## 2018-03-23 RX ORDER — FUROSEMIDE 40 MG
7.5 TABLET ORAL
Qty: 0 | Refills: 0 | COMMUNITY
Start: 2018-03-23

## 2018-03-23 RX ORDER — CEFAZOLIN SODIUM 1 G
2 VIAL (EA) INJECTION
Qty: 0 | Refills: 0 | COMMUNITY
Start: 2018-03-23

## 2018-03-23 RX ORDER — LISINOPRIL 2.5 MG/1
1 TABLET ORAL
Qty: 0 | Refills: 0 | COMMUNITY
Start: 2018-03-23

## 2018-03-23 RX ORDER — PANTOPRAZOLE SODIUM 20 MG/1
0 TABLET, DELAYED RELEASE ORAL
Qty: 0 | Refills: 0 | COMMUNITY
Start: 2018-03-23

## 2018-03-23 RX ORDER — POVIDONE-IODINE 5 %
1 AEROSOL (ML) TOPICAL
Qty: 0 | Refills: 0 | COMMUNITY
Start: 2018-03-23

## 2018-03-23 RX ADMIN — Medication 1 APPLICATION(S): at 06:50

## 2018-03-23 RX ADMIN — Medication 100 MILLIGRAM(S): at 04:48

## 2018-03-23 RX ADMIN — APIXABAN 5 MILLIGRAM(S): 2.5 TABLET, FILM COATED ORAL at 18:40

## 2018-03-23 RX ADMIN — Medication 40 MILLIEQUIVALENT(S): at 10:42

## 2018-03-23 RX ADMIN — Medication 1 APPLICATION(S): at 18:40

## 2018-03-23 RX ADMIN — Medication 100 MILLIGRAM(S): at 19:10

## 2018-03-23 RX ADMIN — LISINOPRIL 2.5 MILLIGRAM(S): 2.5 TABLET ORAL at 06:48

## 2018-03-23 RX ADMIN — PANTOPRAZOLE SODIUM 40 MILLIGRAM(S): 20 TABLET, DELAYED RELEASE ORAL at 06:48

## 2018-03-23 RX ADMIN — Medication 1 APPLICATION(S): at 13:30

## 2018-03-23 RX ADMIN — Medication 100 MILLIGRAM(S): at 11:34

## 2018-03-23 RX ADMIN — Medication 40 MILLIGRAM(S): at 06:47

## 2018-03-23 RX ADMIN — APIXABAN 5 MILLIGRAM(S): 2.5 TABLET, FILM COATED ORAL at 06:48

## 2018-03-23 RX ADMIN — CHLORHEXIDINE GLUCONATE 15 MILLILITER(S): 213 SOLUTION TOPICAL at 18:40

## 2018-03-23 NOTE — PROGRESS NOTE ADULT - SUBJECTIVE AND OBJECTIVE BOX
INTERVAL HPI/OVERNIGHT EVENTS:     SUBJECTIVE: Patient seen and examined at bedside. No acute distress.  ROS negative.    VITAL SIGNS:  ICU Vital Signs Last 24 Hrs  T(C): 36.7 (23 Mar 2018 05:00), Max: 37.1 (22 Mar 2018 13:41)  T(F): 98 (23 Mar 2018 05:00), Max: 98.7 (22 Mar 2018 13:41)  HR: 90 (23 Mar 2018 05:12) (86 - 118)  BP: 91/63 (23 Mar 2018 05:12) (91/63 - 118/57)  BP(mean): 73 (23 Mar 2018 05:12) (69 - 85)  ABP: --  ABP(mean): --  RR: 16 (23 Mar 2018 05:12) (15 - 19)  SpO2: 91% (23 Mar 2018 05:12) (91% - 100%)        PHYSICAL EXAM:  Constitutional: Middle aged pleasant male laying in bed comfortably.   HEENT: NC/AT; PERRL, anicteric sclera; MMM  Neck: supple, no JVD, trach site clean  Cardiovascular: S1/S2 normal. RRR. Grade 3/6 holosystolic murmur with radiation to the axilla.   Respiratory: Inspiratory rhonchi b/l lungs with left>right  Gastrointestinal: abdomen soft, NT/ND; well healed midline inc  ision w/ no erythema or purulence. J/G/D tube in place w/ clean dressing on top.   Genitourinary: no suprapubic tenderness or fullness, taxus catheter   Extremities: WWP; 2+LE extremity edema. B/l feet wrapped in clean cotton dressing  Vascular: 2+ radial, DP/PT pulses B/L  Dermatologic: normal color and turgor; no visible rashes  Neurological: AAOx3. Pt able to follow commands. No focal deficits     MEDICATIONS  (STANDING):  apixaban 5 milliGRAM(s) Oral every 12 hours  ceFAZolin   IVPB 2000 milliGRAM(s) IV Intermittent every 8 hours  chlorhexidine 0.12% Liquid 15 milliLiter(s) Swish and Spit two times a day  furosemide   Injectable 40 milliGRAM(s) IV Push daily  lisinopril 2.5 milliGRAM(s) Oral daily  pantoprazole    Tablet 40 milliGRAM(s) Oral before breakfast  povidone iodine 10% Swab 1 Application(s) Topical daily  vitamin A &amp; D Ointment 1 Application(s) Topical two times a day    MEDICATIONS  (PRN):          ALLERGIES:  Allergies    No Known Allergies    Intolerances    .  LABS:                         8.0    7.2   )-----------( 218      ( 22 Mar 2018 07:16 )             26.5     03-22    132<L>  |  93<L>  |  24<H>  ----------------------------<  118<H>  3.8   |  28  |  0.50    Ca    8.4      22 Mar 2018 07:16  Phos  3.1     03-21  Mg     2.1     03-22                    RADIOLOGY, EKG & ADDITIONAL TESTS: Reviewed. INTERVAL HPI/OVERNIGHT EVENTS:     SUBJECTIVE: Patient seen and examined at bedside. No acute distress.  ROS negative.    VITAL SIGNS:  ICU Vital Signs Last 24 Hrs  T(C): 36.7 (23 Mar 2018 05:00), Max: 37.1 (22 Mar 2018 13:41)  T(F): 98 (23 Mar 2018 05:00), Max: 98.7 (22 Mar 2018 13:41)  HR: 90 (23 Mar 2018 05:12) (86 - 118)  BP: 91/63 (23 Mar 2018 05:12) (91/63 - 118/57)  BP(mean): 73 (23 Mar 2018 05:12) (69 - 85)  ABP: --  ABP(mean): --  RR: 16 (23 Mar 2018 05:12) (15 - 19)  SpO2: 91% (23 Mar 2018 05:12) (91% - 100%)        PHYSICAL EXAM:  Constitutional: Middle aged pleasant male laying in bed comfortably.   HEENT: NC/AT; PERRL, anicteric sclera; MMM  Neck: supple, no JVD, trach site clean  Cardiovascular: S1/S2 normal. RRR. Grade 3/6 holosystolic murmur with radiation to the axilla.   Respiratory: Inspiratory rhonchi b/l lungs with left>right  Gastrointestinal: abdomen soft, NT/ND; well healed midline inc  ision w/ no erythema or purulence. J/G/D tube in place w/ clean dressing on top.   Genitourinary: no suprapubic tenderness or fullness, taxus catheter   Extremities: WWP; 2+LE extremity edema. B/l feet wrapped in clean cotton dressing  Vascular: 2+ radial, DP/PT pulses B/L  Dermatologic: normal color and turgor; no visible rashes  Neurological: AAOx3. Pt able to follow commands. No focal deficits     MEDICATIONS  (STANDING):  apixaban 5 milliGRAM(s) Oral every 12 hours  ceFAZolin   IVPB 2000 milliGRAM(s) IV Intermittent every 8 hours  chlorhexidine 0.12% Liquid 15 milliLiter(s) Swish and Spit two times a day  furosemide   Injectable 40 milliGRAM(s) IV Push daily  lisinopril 2.5 milliGRAM(s) Oral daily  pantoprazole    Tablet 40 milliGRAM(s) Oral before breakfast  povidone iodine 10% Swab 1 Application(s) Topical daily  vitamin A &amp; D Ointment 1 Application(s) Topical two times a day    MEDICATIONS  (PRN):          ALLERGIES:  Allergies    No Known Allergies    Intolerances    .  .  LABS:                         8.0    7.2   )-----------( 218      ( 22 Mar 2018 07:16 )             26.5     03-22    132<L>  |  93<L>  |  24<H>  ----------------------------<  118<H>  3.8   |  28  |  0.50    Ca    8.4      22 Mar 2018 07:16  Phos  3.1     03-21  Mg     2.1     03-22                    RADIOLOGY, EKG & ADDITIONAL TESTS: Reviewed. INTERVAL HPI/OVERNIGHT EVENTS:   Patient hypotensive to 91/53 in the AM, went to bedside and suctioned patient. After pressure was in 110s/60s.    SUBJECTIVE: Patient seen and examined at bedside. No acute distress.  ROS negative.    VITAL SIGNS:  ICU Vital Signs Last 24 Hrs  T(C): 36.7 (23 Mar 2018 05:00), Max: 37.1 (22 Mar 2018 13:41)  T(F): 98 (23 Mar 2018 05:00), Max: 98.7 (22 Mar 2018 13:41)  HR: 90 (23 Mar 2018 05:12) (86 - 118)  BP: 91/63 (23 Mar 2018 05:12) (91/63 - 118/57)  BP(mean): 73 (23 Mar 2018 05:12) (69 - 85)  ABP: --  ABP(mean): --  RR: 16 (23 Mar 2018 05:12) (15 - 19)  SpO2: 91% (23 Mar 2018 05:12) (91% - 100%)    PHYSICAL EXAM:  Constitutional: Middle aged pleasant male laying in bed comfortably.   HEENT: NC/AT; PERRL, anicteric sclera; MMM  Neck: supple, no JVD, trach site clean  Cardiovascular: S1/S2 normal. RRR. Grade 3/6 holosystolic murmur with radiation to the axilla.   Respiratory: CTAB bilaterally.   Gastrointestinal: abdomen soft, NT/ND; well healed midline incision w/ no erythema or purulence. J/G/D tube in place w/ clean dressing on top.   Genitourinary: no suprapubic tenderness or fullness, taxus catheter   Extremities: WWP; 2+LE extremity edema. B/l feet wrapped in clean cotton dressing  Vascular: 2+ radial, DP/PT pulses B/L  Dermatologic: normal color and turgor; no visible rashes  Neurological: AAOx3. Pt able to follow commands. No focal deficits     MEDICATIONS  (STANDING):  apixaban 5 milliGRAM(s) Oral every 12 hours  ceFAZolin   IVPB 2000 milliGRAM(s) IV Intermittent every 8 hours  chlorhexidine 0.12% Liquid 15 milliLiter(s) Swish and Spit two times a day  furosemide   Injectable 40 milliGRAM(s) IV Push daily  lisinopril 2.5 milliGRAM(s) Oral daily  pantoprazole    Tablet 40 milliGRAM(s) Oral before breakfast  povidone iodine 10% Swab 1 Application(s) Topical daily  vitamin A &amp; D Ointment 1 Application(s) Topical two times a day    MEDICATIONS  (PRN):          ALLERGIES:  Allergies    No Known Allergies    Intolerances    .  LABS:                         8.0    6.6   )-----------( 222      ( 23 Mar 2018 07:13 )             26.2     03-23    135  |  96  |  25<H>  ----------------------------<  140<H>  3.6   |  31  |  0.45<L>    Ca    8.4      23 Mar 2018 07:13  Phos  3.0     03-23  Mg     2.0     03-23                    RADIOLOGY, EKG & ADDITIONAL TESTS: Reviewed.

## 2018-03-23 NOTE — DISCHARGE NOTE ADULT - HOSPITAL COURSE
72M PMH severe MR, severe pHTN, afib (on Eliquis), R popliteal DVT s/p IVC filter (2/22), respiratory failure secondary to PNA s/p tracheostomy (2/1)(baseline on trach collar; FiO2 35%), with recent admission to [facility] Jan-Feb 2018 for perforated duodenal ulcer. Patient BIBEMS from [facility] for increased work of breathing and worsening hypoxia. In ED, patient noted to be tachycardic, afebrile, VSS, placed on AC ventilation for hypoxia with improvement in respiratory status. On admission CTA PE protocol negative for PE. CT abdomen performed given pt w/ recent abdominal surgery and concerns for possible cholecystitis (similar to previous admission), surgery consulted and recommended no surgical intervention at that time. Pt was initially started on Vanc/Zosyn but ultimately changed to Cefazolin given sputum cultures growing Staph aureus. Patient was given intermittent IV Lasix (now standing) for likely CHF exacerbation as CXR w/ b/l pulmonary effusions. Echo on 3/13 demonstrated EF 45-50%, severe mitral prolapse with possible flail posterior mitral leaflet, and moderate tricuspid regurgitation. CT surgery seeing patient to determine surgical candidate status. Cardiology consulted as well. Upon step down to [facility] pt tolerating trach collar w/ FiO2 35%. Of note given pt w/ severe MR there was a concern for endocarditis in the setting of MSSA. Gallium scan negative. AMRIK performed on 3/20 however failed to advance the probe. Surgery consulted regarding need for J and D tube.  J tube removed. Patient antibiotic duration by ID determined to be 2 weeks cefazolin, + 2 weeks Keflex. Patient has three more days of IV antibiotics to be followed by 2 weeks of Keflex.

## 2018-03-23 NOTE — DISCHARGE NOTE ADULT - CARE PROVIDER_API CALL
Leobardo Houston), Infectious Disease; Internal Medicine  [address] [place] [address]  Phone: [phone]  Fax: [phone]    Iván Metcalf), Surgery  [address] [place] [id number]  Phone: [phone]  Fax: [phone]

## 2018-03-23 NOTE — PROGRESS NOTE ADULT - SUBJECTIVE AND OBJECTIVE BOX
Seen and examined at bedside. No overnight events. Pain well controlled. Having flatus and bowel movements. Denies f/c/s/cp/sob/palp/lh.     Vital Signs Last 24 Hrs  T(C): 36.5 (23 Mar 2018 14:00), Max: 36.9 (22 Mar 2018 17:00)  T(F): 97.7 (23 Mar 2018 14:00), Max: 98.5 (22 Mar 2018 17:00)  HR: 102 (23 Mar 2018 13:10) (90 - 107)  BP: 111/69 (23 Mar 2018 13:10) (91/63 - 117/66)  BP(mean): 87 (23 Mar 2018 13:10) (73 - 87)  RR: 18 (23 Mar 2018 13:10) (16 - 19)  SpO2: 100% (23 Mar 2018 13:10) (91% - 100%)  CAPILLARY BLOOD GLUCOSE          03-22 @ 07:01  -  03-23 @ 07:00  --------------------------------------------------------  IN: 630 mL / OUT: 950 mL / NET: -320 mL    03-23 @ 07:01 - 03-23 @ 15:59  --------------------------------------------------------  IN: 567 mL / OUT: 850 mL / NET: -283 mL        apixaban 5 milliGRAM(s) Oral every 12 hours  ceFAZolin   IVPB 2000 milliGRAM(s) IV Intermittent every 8 hours  chlorhexidine 0.12% Liquid 15 milliLiter(s) Swish and Spit two times a day  lisinopril 2.5 milliGRAM(s) Oral daily  povidone iodine 10% Swab 1 Application(s) Topical daily  vitamin A &amp; D Ointment 1 Application(s) Topical two times a day    LABS:      CBC Full  -  ( 23 Mar 2018 07:13 )  WBC Count : 6.6 K/uL  Hemoglobin : 8.0 g/dL  Hematocrit : 26.2 %  Platelet Count - Automated : 222 K/uL  Mean Cell Volume : 91.0 fL  Mean Cell Hemoglobin : 27.8 pg  Mean Cell Hemoglobin Concentration : 30.5 g/dL  Auto Neutrophil # : x  Auto Lymphocyte # : x  Auto Monocyte # : x  Auto Eosinophil # : x  Auto Basophil # : x  Auto Neutrophil % : 55.6 %  Auto Lymphocyte % : 30.5 %  Auto Monocyte % : 12.0 %  Auto Eosinophil % : 1.4 %  Auto Basophil % : 0.5 %    03-23    135  |  96  |  25<H>  ----------------------------<  140<H>  3.6   |  31  |  0.45<L>    Ca    8.4      23 Mar 2018 07:13  Phos  3.0     03-23  Mg     2.0     03-23

## 2018-03-23 NOTE — PROGRESS NOTE ADULT - PROBLEM SELECTOR PLAN 7
Pt w/ hx of duodenal perforation on 1/20/2018 s/p repair with kaylee patch and J tube placement complicated by a leak on 2/22 for which pt was taken to the OR and noted to have a necrotic kaylee patch with leakage of perforated duodenal ulcer. Jon gastrostomy was placed, retrograde duodenostomy placed with witzel tunnel.  -currently stable  -Tube feeds started on 2/24--continue w/ Glucerna w/ goal rate of 63cc/hr  - CT abdomen to evaluate drains, surgery saw imaging plan to eliminate J-tube to discuss when tomorrow Pt w/ hx of duodenal perforation on 1/20/2018 s/p repair with kaylee patch and J tube placement complicated by a leak on 2/22 for which pt was taken to the OR and noted to have a necrotic kaylee patch with leakage of perforated duodenal ulcer. Jon gastrostomy was placed, retrograde duodenostomy placed with witzel tunnel.  -currently stable  -Tube feeds started on 2/24--continue w/ Glucerna w/ goal rate of 63cc/hr  - CT abdomen to evaluate drains seen by surgery plan for removal of one of his three abdominal tubes today Pt w/ hx of duodenal perforation on 1/20/2018 s/p repair with kaylee patch and J tube placement complicated by a leak on 2/22 for which pt was taken to the OR and noted to have a necrotic kaylee patch with leakage of perforated duodenal ulcer. Jon gastrostomy was placed, retrograde duodenostomy placed with witzel tunnel.  -currently stable  -Tube feeds started on 2/24--continue w/ Glucerna w/ goal rate of 63cc/hr  - CT abdomen to evaluate drains seen by surgery plan for removal of one of his three abdominal tubes today.  With possible removal of second tube over the weekend.

## 2018-03-23 NOTE — DISCHARGE NOTE ADULT - PLAN OF CARE
You were treated with antibiotics for your MSSA sputum and blood culture. resolution of infection You were treated with antibiotics for your MSSA pneumonia and bacteremia (bacteria in your blood). You are to continue your IV antibiotics for three more days including today, you are subsequently then to start taking Keflex as prescribed two times a day for two weeks. Please continue with eliquis. Please continue with lasix, and lisinopril Please continue with your cardiac medications as prescribed Please follow up with Dr. Metcalf [phone] Continue with daily dressing changes. You were treated with antibiotics for your MSSA pneumonia and bacteremia (bacteria in your blood). You are to continue your IV antibiotics for three more days including today, you are to continue with nafcillin for 14 doses, you are subsequently then to start taking Keflex on 3/29 as prescribed two times a day for two weeks.

## 2018-03-23 NOTE — DISCHARGE NOTE ADULT - SECONDARY DIAGNOSIS.
Atrial fibrillation CHF (congestive heart failure) Severe mitral regurgitation Duodenal perforation Gangrene of toe

## 2018-03-23 NOTE — DISCHARGE NOTE ADULT - MEDICATION SUMMARY - MEDICATIONS TO TAKE
I will START or STAY ON the medications listed below when I get home from the hospital:    lisinopril 2.5 mg oral tablet  -- 1 tab(s) by mouth once a day  -- Indication: For HTN    apixaban 5 mg oral tablet  -- 1 tab(s) by mouth every 12 hours  -- Indication: For AFIB    chlorhexidine 0.12% mucous membrane liquid  --  mucous membrane   -- Indication: For Prophylactic measure    povidone iodine 10% topical swab  -- 1 application on skin once a day  -- Indication: For Prophylactic measure    ceFAZolin 2 g intravenous injection  -- 2 gram(s) intravenous every 8 hours  -- Indication: For MSSA pneumonia    Keflex 500 mg oral capsule  -- 1 cap(s) by mouth every 12 hours  -- Indication: For MSSA pneumonia    vitamin A & D topical ointment  -- 1 application on skin 2 times a day  -- Indication: For Prophylactic measure    furosemide 40 mg/5 mL oral solution  -- 7.5 milliliter(s) by mouth once a day  -- Indication: For CHF (congestive heart failure)    pantoprazole 40 mg oral granule, delayed release  --  by mouth   -- Indication: For Duodenal perforation I will START or STAY ON the medications listed below when I get home from the hospital:    lisinopril 2.5 mg oral tablet  -- 1 tab(s) by mouth once a day  -- Indication: For HTN    apixaban 5 mg oral tablet  -- 1 tab(s) by mouth every 12 hours  -- Indication: For AFIB    chlorhexidine 0.12% mucous membrane liquid  --  mucous membrane   -- Indication: For Prophylactic measure    povidone iodine 10% topical swab  -- 1 application on skin once a day  -- Indication: For Prophylactic measure    Keflex 500 mg oral capsule  -- 1 cap(s) by mouth every 12 hours  -- Indication: For MSSA pneumonia    vitamin A & D topical ointment  -- 1 application on skin 2 times a day  -- Indication: For Prophylactic measure    furosemide 40 mg/5 mL oral solution  -- 7.5 milliliter(s) by mouth once a day  -- Indication: For CHF (congestive heart failure)    nafcillin 2 g/100 mL intravenous solution  -- 2 gram(s) intravenous every 4 hours  -- Indication: For MSSA (methicillin susceptible Staphylococcus aureus) infection    pantoprazole 40 mg oral granule, delayed release  --  by mouth   -- Indication: For Duodenal perforation

## 2018-03-23 NOTE — CHART NOTE - NSCHARTNOTEFT_GEN_A_CORE
Admitting Diagnosis:   72M PMH severe MR, severe pHTN, afib (on Eliquis), R popliteal DVT s/p IVC filter, respiratory failure secondary to PNA s/p tracheostomy (baseline on trach collar; FiO2 35%), and perforated duodenal ulcer s/p repair who presents with respiratory failure that required ventilation secondary to MSSA pneumonia, bacteremia and pulmonary edema with congestive heart failure systolic.    PAST MEDICAL & SURGICAL HISTORY:  CHF (congestive heart failure)  Cholecystitis  Duodenal ulcer  Atrial fibrillation  Mitral regurgitation  Aortic regurgitation  H/O tracheostomy  Acute duodenal ulcer with perforation  H/O gastrostomy, has currently  Perforated duodenal ulcer    Current Nutrition Order: Glucerna 1.5 at 63 mL x 24 hr via Jtube providing 1512 mL TV, 2268 kcal, 125 g protein, & 1148 mL fluid; Running at goal rate.     GI Issues: Last BM 3/22    Pain: None reported     Skin Integrity: Stage 2- R/L heel; Unstageable- L 3rd, 2nd, 4th toes    Labs:   03-23    135  |  96  |  25<H>  ----------------------------<  140<H>  3.6   |  31  |  0.45<L>    Ca    8.4      23 Mar 2018 07:13  Phos  3.0     03-23  Mg     2.0     03-23    Medications:  MEDICATIONS  (STANDING):  apixaban 5 milliGRAM(s) Oral every 12 hours  ceFAZolin   IVPB 2000 milliGRAM(s) IV Intermittent every 8 hours  chlorhexidine 0.12% Liquid 15 milliLiter(s) Swish and Spit two times a day  lisinopril 2.5 milliGRAM(s) Oral daily  pantoprazole    Tablet 40 milliGRAM(s) Oral before breakfast  povidone iodine 10% Swab 1 Application(s) Topical daily  vitamin A &amp; D Ointment 1 Application(s) Topical two times a day    Weight:  No new weights to assess    Estimated energy needs using 76 kg IBW:  EER 2* now pt s/p extubation  25-30 kcal/kg (0574-0009 kcal).   1.2-1.3 g/kg (91-99 g protein).   30-35 mL/kg (9752-3589 mL fluid).     Subjective: Pt continues to have EN running at time of visit. SLP completed FEEs 3/22 & recommends to continue NPO at this time.     Previous Nutrition Diagnosis: Increased nutrient needs RT increased demand for kcal/protein AEB post-op    Active [  X ]  Resolved [   ]    Goal: Meet % of nutrition needs via tolerated route.     Recommendations:  1. Continue EN at current rate until able to pass FEES. Glucerna 1.5 at 63 mL x 24 hr via Jtube. Monitor for signs of intolerance; maintain aspiration precautions  2. Trend daily weights    Education: N/A this visit, RD educated pt previous visit    Risk Level: High [  X ] Moderate [   ] Low [   ]

## 2018-03-23 NOTE — DISCHARGE NOTE ADULT - PATIENT PORTAL LINK FT
You can access the [facility] Portal, offered by [facility], by registering with the following website: [email or web address]

## 2018-03-23 NOTE — DISCHARGE NOTE ADULT - CARE PLAN
Principal Discharge DX:	MSSA (methicillin susceptible Staphylococcus aureus) infection  Secondary Diagnosis:	Atrial fibrillation Principal Discharge DX:	MSSA (methicillin susceptible Staphylococcus aureus) infection  Assessment and plan of treatment:	You were treated with antibiotics for your MSSA sputum and blood culture.  Secondary Diagnosis:	Atrial fibrillation Principal Discharge DX:	MSSA (methicillin susceptible Staphylococcus aureus) infection  Goal:	resolution of infection  Assessment and plan of treatment:	You were treated with antibiotics for your MSSA pneumonia and bacteremia (bacteria in your blood). You are to continue your IV antibiotics for three more days including today, you are subsequently then to start taking Keflex as prescribed two times a day for two weeks.  Secondary Diagnosis:	Atrial fibrillation  Assessment and plan of treatment:	Please continue with eliquis.  Secondary Diagnosis:	CHF (congestive heart failure)  Assessment and plan of treatment:	Please continue with lasix, and lisinopril  Secondary Diagnosis:	Severe mitral regurgitation  Assessment and plan of treatment:	Please continue with your cardiac medications as prescribed  Secondary Diagnosis:	Duodenal perforation  Assessment and plan of treatment:	Please follow up with Dr. Metcalf [phone]  Secondary Diagnosis:	Gangrene of toe  Assessment and plan of treatment:	Continue with daily dressing changes. Principal Discharge DX:	MSSA (methicillin susceptible Staphylococcus aureus) infection  Goal:	resolution of infection  Assessment and plan of treatment:	You were treated with antibiotics for your MSSA pneumonia and bacteremia (bacteria in your blood). You are to continue your IV antibiotics for three more days including today, you are to continue with nafcillin for 14 doses, you are subsequently then to start taking Keflex on 3/29 as prescribed two times a day for two weeks.  Secondary Diagnosis:	Atrial fibrillation  Assessment and plan of treatment:	Please continue with eliquis.  Secondary Diagnosis:	CHF (congestive heart failure)  Assessment and plan of treatment:	Please continue with lasix, and lisinopril  Secondary Diagnosis:	Severe mitral regurgitation  Assessment and plan of treatment:	Please continue with your cardiac medications as prescribed  Secondary Diagnosis:	Duodenal perforation  Assessment and plan of treatment:	Please follow up with Dr. Metcalf [phone]  Secondary Diagnosis:	Gangrene of toe  Assessment and plan of treatment:	Continue with daily dressing changes.

## 2018-03-23 NOTE — PROGRESS NOTE ADULT - ASSESSMENT
73 yo male recently discharged from [facility] after prolonged hospital course due to perforated duodenal ulcer s/p exploratory laparotomy  retrograde duodenostomy, Jon Gastrostomy, feeding jejunostomy, admitted with respiratory distress    - S/p Removal of gastrostomy tube  - Continue feeds  - Continue care as per primary team  - surgery team 4C following, please call if any issues  - discussed with Dr. Metcalf

## 2018-03-23 NOTE — PROGRESS NOTE ADULT - PROBLEM SELECTOR PLAN 1
Improving. Pt w/ increased work of breathing and shortness of breath on admission. Etiology likely CHF exacerbation given pt w/ BNP of 22k on admission (7K on last admission) and clinically overloaded on admission (JVP, 2+ edema). Cxray c/w bilateral pulm congestion and effusion. Echo on 3/13 with EF 45-50%.   -decrease lasix 40mg IV once a day  -continue w/ lisinopril 2.5mg qd   -I&Os Improving. Pt w/ increased work of breathing and shortness of breath on admission. Etiology likely CHF exacerbation given pt w/ BNP of 22k on admission (7K on last admission) and clinically overloaded on admission (JVP, 2+ edema). Cxray c/w bilateral pulm congestion and effusion. Echo on 3/13 with EF 45-50%.   -c/w lasix 40mg IV once a day  -continue w/ lisinopril 2.5mg qd   -I&Os Improving. Pt w/ increased work of breathing and shortness of breath on admission. Etiology likely CHF exacerbation given pt w/ BNP of 22k on admission (7K on last admission) and clinically overloaded on admission (JVP, 2+ edema). Cxray c/w bilateral pulm congestion and effusion. Echo on 3/13 with EF 45-50%.   -changed lasix to 60mg po  -continue w/ lisinopril 2.5mg qd   -I&Os

## 2018-03-23 NOTE — PROGRESS NOTE ADULT - PROBLEM SELECTOR PLAN 4
CHADSVASC of 3. Pt previously on 0.125mg of Digoxin but discontinued on 3/15 given pt w/ episode of bradycardia overnight. Pt w/ subtherapeutic digoxin level.   -c/w Eliquis 5mg BID   -Pts heart rate currently well controlled  -continue with telemetry monitoring

## 2018-03-23 NOTE — PROGRESS NOTE ADULT - PROBLEM SELECTOR PLAN 9
F: None  E: Replete PRN  N: C/w Feeds F: None  E: Replete PRN  N: C/w Feeds, patient FEES study indicative of aspiration even with ICE chips will discuss risk and benefits F: None  E: Replete PRN  N: C/w Feeds, patient FEES study indicative of aspiration.  Will attempt once ice cube per hour and assess for aspiration.

## 2018-03-24 PROCEDURE — 99233 SBSQ HOSP IP/OBS HIGH 50: CPT | Mod: GC

## 2018-03-24 RX ORDER — ACETAMINOPHEN 500 MG
650 TABLET ORAL ONCE
Qty: 0 | Refills: 0 | Status: COMPLETED | OUTPATIENT
Start: 2018-03-24 | End: 2018-03-24

## 2018-03-24 RX ADMIN — Medication 100 MILLIGRAM(S): at 12:56

## 2018-03-24 RX ADMIN — Medication 650 MILLIGRAM(S): at 23:37

## 2018-03-24 RX ADMIN — Medication 60 MILLIGRAM(S): at 06:00

## 2018-03-24 RX ADMIN — Medication 100 MILLIGRAM(S): at 19:21

## 2018-03-24 RX ADMIN — APIXABAN 5 MILLIGRAM(S): 2.5 TABLET, FILM COATED ORAL at 05:59

## 2018-03-24 RX ADMIN — Medication 100 MILLIGRAM(S): at 06:02

## 2018-03-24 RX ADMIN — CHLORHEXIDINE GLUCONATE 15 MILLILITER(S): 213 SOLUTION TOPICAL at 09:05

## 2018-03-24 RX ADMIN — Medication 650 MILLIGRAM(S): at 18:00

## 2018-03-24 RX ADMIN — Medication 1 APPLICATION(S): at 05:59

## 2018-03-24 RX ADMIN — Medication 1 APPLICATION(S): at 13:13

## 2018-03-24 RX ADMIN — Medication 650 MILLIGRAM(S): at 16:43

## 2018-03-24 RX ADMIN — LISINOPRIL 2.5 MILLIGRAM(S): 2.5 TABLET ORAL at 05:59

## 2018-03-24 RX ADMIN — CHLORHEXIDINE GLUCONATE 15 MILLILITER(S): 213 SOLUTION TOPICAL at 18:05

## 2018-03-24 RX ADMIN — PANTOPRAZOLE SODIUM 40 MILLIGRAM(S): 20 TABLET, DELAYED RELEASE ORAL at 12:55

## 2018-03-24 RX ADMIN — APIXABAN 5 MILLIGRAM(S): 2.5 TABLET, FILM COATED ORAL at 18:05

## 2018-03-24 RX ADMIN — Medication 650 MILLIGRAM(S): at 23:52

## 2018-03-24 RX ADMIN — Medication 1 APPLICATION(S): at 18:06

## 2018-03-24 NOTE — PROGRESS NOTE ADULT - PROBLEM SELECTOR PLAN 7
Pt w/ hx of duodenal perforation on 1/20/2018 s/p repair with kaylee patch and J tube placement complicated by a leak on 2/22 for which pt was taken to the OR and noted to have a necrotic kaylee patch with leakage of perforated duodenal ulcer. Jon gastrostomy was placed, retrograde duodenostomy placed with witzel tunnel.  -currently stable  -Tube feeds started on 2/24--continue w/ Glucerna w/ goal rate of 63cc/hr  - CT abdomen to evaluate drains seen by surgery, removed one tube yesterday.  possible removal of second tube over the weekend.

## 2018-03-24 NOTE — PROGRESS NOTE ADULT - PROBLEM SELECTOR PLAN 1
Improving. Pt w/ increased work of breathing and shortness of breath on admission. Etiology likely CHF exacerbation given pt w/ BNP of 22k on admission (7K on last admission) and clinically overloaded on admission (JVP, 2+ edema). Cxray c/w bilateral pulm congestion and effusion. Echo on 3/13 with EF 45-50%.   -changed lasix to 60mg po  -continue w/ lisinopril 2.5mg qd   -I&Os.  net neg.

## 2018-03-24 NOTE — PROGRESS NOTE ADULT - SUBJECTIVE AND OBJECTIVE BOX
INTERVAL/OVERNIGHT EVENTS:  No acute overnight events.     SUBJECTIVE:  Seen and examined at bedside.  Reports feeling well with no complaints aside from not getting to work with PT yesterday.  Denies SOB, cough, CP, palpitations, abd pain, N/V/D/C, dysuria, leg pain    ROS otherwise negative.    VITAL SIGNS:  T(F): 97.3 (03-24-18 @ 05:00)  HR: 90 (03-24-18 @ 05:00)  BP: 104/64 (03-24-18 @ 05:00)  RR: 16 (03-24-18 @ 05:00)  SpO2: 100% (03-24-18 @ 05:00)  Wt(kg): --    PHYSICAL EXAM:    Constitutional: Middle aged pleasant male laying in bed comfortably.   HEENT: NC/AT; PERRL, anicteric sclera; MM dry  Neck: supple, no JVD, trach site clean  Cardiovascular: S1/S2 normal. RRR. Grade 3/6 holosystolic murmur with radiation to the axilla.   Respiratory: CTAB bilaterally.   Gastrointestinal: abdomen soft, NT/ND; well healed midline incision w/ no erythema or purulence. 2 abd tubes in place w/ clean dressing on top.   Genitourinary: no suprapubic tenderness or fullness, taxus catheter   Extremities: WWP; 1+LE extremity edema. B/l feet wrapped in clean cotton dressing  Vascular: 2+ radial, DP/PT pulses B/L  Dermatologic: normal color and turgor; no visible rashes  Neurological: AAOx3. Pt able to follow commands. No focal deficits     MEDICATIONS  (STANDING):  apixaban 5 milliGRAM(s) Oral every 12 hours  ceFAZolin   IVPB 2000 milliGRAM(s) IV Intermittent every 8 hours  chlorhexidine 0.12% Liquid 15 milliLiter(s) Swish and Spit two times a day  furosemide Solution 60 milliGRAM(s) Oral daily  lisinopril 2.5 milliGRAM(s) Oral daily  pantoprazole   Suspension 40 milliGRAM(s) Oral daily  povidone iodine 10% Swab 1 Application(s) Topical daily  vitamin A &amp; D Ointment 1 Application(s) Topical two times a day    MEDICATIONS  (PRN):      Allergies    No Known Allergies    Intolerances      apixaban 5 milliGRAM(s) Oral every 12 hours  ceFAZolin   IVPB 2000 milliGRAM(s) IV Intermittent every 8 hours  chlorhexidine 0.12% Liquid 15 milliLiter(s) Swish and Spit two times a day  furosemide Solution 60 milliGRAM(s) Oral daily  lisinopril 2.5 milliGRAM(s) Oral daily  pantoprazole   Suspension 40 milliGRAM(s) Oral daily  povidone iodine 10% Swab 1 Application(s) Topical daily  vitamin A &amp; D Ointment 1 Application(s) Topical two times a day      LABS:                          8.0    6.6   )-----------( 222      ( 23 Mar 2018 07:13 )             26.2     03-23    135  |  96  |  25<H>  ----------------------------<  140<H>  3.6   |  31  |  0.45<L>    Ca    8.4      23 Mar 2018 07:13  Phos  3.0     03-23  Mg     2.0     03-23            RADIOLOGY & ADDITIONAL TESTS:  All imaging reviewed.

## 2018-03-24 NOTE — PROGRESS NOTE ADULT - PROBLEM SELECTOR PLAN 2
pt w/ sputum and blood culture growing Staph aureus. Surveillance BCx on 3/14 with NGTD and pt w/ no fevers and no leukocytosis.   -pt initially on Vanc/Zosyn, now discontinued as cultures are not MRSA  -c/w Cefazolin 2mg q8hrs (3/15--  -ID consulted regarding abx duration, patient will require 2 weeks cefazolin total followed by 2 weeks Keflex, today day 9 of 14 of cefazolin   -Gallium scan negative   -Plan initially for AMRIK however scope did not pass no further attempts

## 2018-03-24 NOTE — PROGRESS NOTE ADULT - PROBLEM SELECTOR PLAN 9
F: None  E: Replete PRN  N: C/w Feeds, patient FEES study indicative of aspiration.  Will attempt once ice cube per hour and assess for aspiration.

## 2018-03-25 LAB
ANION GAP SERPL CALC-SCNC: 11 MMOL/L — SIGNIFICANT CHANGE UP (ref 5–17)
BUN SERPL-MCNC: 30 MG/DL — HIGH (ref 7–23)
CALCIUM SERPL-MCNC: 8.8 MG/DL — SIGNIFICANT CHANGE UP (ref 8.4–10.5)
CHLORIDE SERPL-SCNC: 96 MMOL/L — SIGNIFICANT CHANGE UP (ref 96–108)
CO2 SERPL-SCNC: 28 MMOL/L — SIGNIFICANT CHANGE UP (ref 22–31)
CREAT SERPL-MCNC: 0.43 MG/DL — LOW (ref 0.5–1.3)
GLUCOSE SERPL-MCNC: 131 MG/DL — HIGH (ref 70–99)
POTASSIUM SERPL-MCNC: 4 MMOL/L — SIGNIFICANT CHANGE UP (ref 3.5–5.3)
POTASSIUM SERPL-SCNC: 4 MMOL/L — SIGNIFICANT CHANGE UP (ref 3.5–5.3)
SODIUM SERPL-SCNC: 135 MMOL/L — SIGNIFICANT CHANGE UP (ref 135–145)

## 2018-03-25 PROCEDURE — 99232 SBSQ HOSP IP/OBS MODERATE 35: CPT | Mod: GC

## 2018-03-25 RX ADMIN — APIXABAN 5 MILLIGRAM(S): 2.5 TABLET, FILM COATED ORAL at 06:45

## 2018-03-25 RX ADMIN — CHLORHEXIDINE GLUCONATE 15 MILLILITER(S): 213 SOLUTION TOPICAL at 06:44

## 2018-03-25 RX ADMIN — Medication 100 MILLIGRAM(S): at 04:00

## 2018-03-25 RX ADMIN — PANTOPRAZOLE SODIUM 40 MILLIGRAM(S): 20 TABLET, DELAYED RELEASE ORAL at 11:22

## 2018-03-25 RX ADMIN — Medication 100 MILLIGRAM(S): at 11:22

## 2018-03-25 RX ADMIN — LISINOPRIL 2.5 MILLIGRAM(S): 2.5 TABLET ORAL at 06:44

## 2018-03-25 RX ADMIN — Medication 60 MILLIGRAM(S): at 06:44

## 2018-03-25 RX ADMIN — Medication 100 MILLIGRAM(S): at 19:30

## 2018-03-25 RX ADMIN — CHLORHEXIDINE GLUCONATE 15 MILLILITER(S): 213 SOLUTION TOPICAL at 18:05

## 2018-03-25 RX ADMIN — Medication 1 APPLICATION(S): at 18:05

## 2018-03-25 RX ADMIN — APIXABAN 5 MILLIGRAM(S): 2.5 TABLET, FILM COATED ORAL at 18:05

## 2018-03-25 RX ADMIN — Medication 1 APPLICATION(S): at 06:46

## 2018-03-25 RX ADMIN — Medication 1 APPLICATION(S): at 11:23

## 2018-03-25 NOTE — PROGRESS NOTE ADULT - PROBLEM SELECTOR PLAN 2
pt w/ sputum and blood culture growing Staph aureus. Surveillance BCx on 3/14 with NGTD and pt w/ no fevers and no leukocytosis.   -pt initially on Vanc/Zosyn, now discontinued as cultures are not MRSA  -c/w Cefazolin 2mg q8hrs (3/15--  -ID consulted regarding abx duration, patient will require 2 weeks cefazolin total followed by 2 weeks Keflex, today day 9 of 14 of cefazolin   -Gallium scan negative   -Plan initially for AMRIK however scope did not pass no further attempts pt w/ sputum and blood culture growing Staph aureus. Surveillance BCx on 3/14 with NGTD and pt w/ no fevers and no leukocytosis.   -pt initially on Vanc/Zosyn, now discontinued as cultures are not MRSA  -c/w Cefazolin 2mg q8hrs (3/15--  -ID consulted regarding abx duration, patient will require 2 weeks cefazolin total followed by 2 weeks Keflex, today day 11 of 14 of cefazolin   -Gallium scan negative   -Plan initially for AMRIK however scope did not pass no further attempts

## 2018-03-25 NOTE — PROGRESS NOTE ADULT - SUBJECTIVE AND OBJECTIVE BOX
INCOMPLETE  INTERVAL/OVERNIGHT EVENTS:  No acute overnight events.     SUBJECTIVE:  Seen and examined at bedside.  Reports feeling well with no complaints aside from not getting to work with PT yesterday.  Denies SOB, cough, CP, palpitations, abd pain, N/V/D/C, dysuria, leg pain    ROS otherwise negative.    VITAL SIGNS:  T(F): 97.3 (03-24-18 @ 05:00)  HR: 90 (03-24-18 @ 05:00)  BP: 104/64 (03-24-18 @ 05:00)  RR: 16 (03-24-18 @ 05:00)  SpO2: 100% (03-24-18 @ 05:00)  Wt(kg): --    PHYSICAL EXAM:    Constitutional: Middle aged pleasant male laying in bed comfortably.   HEENT: NC/AT; PERRL, anicteric sclera; MM dry  Neck: supple, no JVD, trach site clean  Cardiovascular: S1/S2 normal. RRR. Grade 3/6 holosystolic murmur with radiation to the axilla.   Respiratory: CTAB bilaterally.   Gastrointestinal: abdomen soft, NT/ND; well healed midline incision w/ no erythema or purulence. 2 abd tubes in place w/ clean dressing on top.   Genitourinary: no suprapubic tenderness or fullness, taxus catheter   Extremities: WWP; 1+LE extremity edema. B/l feet wrapped in clean cotton dressing  Vascular: 2+ radial, DP/PT pulses B/L  Dermatologic: normal color and turgor; no visible rashes  Neurological: AAOx3. Pt able to follow commands. No focal deficits     MEDICATIONS  (STANDING):  apixaban 5 milliGRAM(s) Oral every 12 hours  ceFAZolin   IVPB 2000 milliGRAM(s) IV Intermittent every 8 hours  chlorhexidine 0.12% Liquid 15 milliLiter(s) Swish and Spit two times a day  furosemide Solution 60 milliGRAM(s) Oral daily  lisinopril 2.5 milliGRAM(s) Oral daily  pantoprazole   Suspension 40 milliGRAM(s) Oral daily  povidone iodine 10% Swab 1 Application(s) Topical daily  vitamin A &amp; D Ointment 1 Application(s) Topical two times a day    MEDICATIONS  (PRN):      Allergies    No Known Allergies    Intolerances      apixaban 5 milliGRAM(s) Oral every 12 hours  ceFAZolin   IVPB 2000 milliGRAM(s) IV Intermittent every 8 hours  chlorhexidine 0.12% Liquid 15 milliLiter(s) Swish and Spit two times a day  furosemide Solution 60 milliGRAM(s) Oral daily  lisinopril 2.5 milliGRAM(s) Oral daily  pantoprazole   Suspension 40 milliGRAM(s) Oral daily  povidone iodine 10% Swab 1 Application(s) Topical daily  vitamin A &amp; D Ointment 1 Application(s) Topical two times a day      LABS:                          8.0    6.6   )-----------( 222      ( 23 Mar 2018 07:13 )             26.2     03-23    135  |  96  |  25<H>  ----------------------------<  140<H>  3.6   |  31  |  0.45<L>    Ca    8.4      23 Mar 2018 07:13  Phos  3.0     03-23  Mg     2.0     03-23            RADIOLOGY & ADDITIONAL TESTS:  All imaging reviewed. INTERVAL/OVERNIGHT EVENTS:  Tylenol given for pain.    SUBJECTIVE:  Patient without complaints.     ROS otherwise negative.    VITAL SIGNS:  ICU Vital Signs Last 24 Hrs  T(C): 36.6 (25 Mar 2018 13:34), Max: 36.6 (25 Mar 2018 06:00)  T(F): 97.9 (25 Mar 2018 13:34), Max: 97.9 (25 Mar 2018 13:34)  HR: 92 (25 Mar 2018 13:31) (86 - 134)  BP: 104/68 (25 Mar 2018 13:31) (99/77 - 124/69)  BP(mean): 77 (25 Mar 2018 13:31) (77 - 91)  ABP: --  ABP(mean): --  RR: 16 (25 Mar 2018 13:31) (16 - 16)  SpO2: 100% (25 Mar 2018 13:31) (87% - 100%)      PHYSICAL EXAM:    Constitutional: Middle aged pleasant male laying in bed comfortably.   HEENT: NC/AT; PERRL, anicteric sclera; MM dry  Neck: supple, no JVD, trach site clean  Cardiovascular: S1/S2 normal. RRR. Grade 3/6 holosystolic murmur with radiation to the axilla.   Respiratory: CTAB bilaterally.   Gastrointestinal: abdomen soft, NT/ND; well healed midline incision w/ no erythema or purulence. 2 abd tubes in place w/ clean dressing on top.   Genitourinary: no suprapubic tenderness or fullness, taxus catheter   Extremities: WWP; 1+LE extremity edema. B/l feet wrapped in clean cotton dressing  Vascular: 2+ radial, DP/PT pulses B/L  Dermatologic: normal color and turgor; no visible rashes  Neurological: AAOx3. Pt able to follow commands. No focal deficits     MEDICATIONS  (STANDING):  apixaban 5 milliGRAM(s) Oral every 12 hours  ceFAZolin   IVPB 2000 milliGRAM(s) IV Intermittent every 8 hours  chlorhexidine 0.12% Liquid 15 milliLiter(s) Swish and Spit two times a day  furosemide Solution 60 milliGRAM(s) Oral daily  lisinopril 2.5 milliGRAM(s) Oral daily  pantoprazole   Suspension 40 milliGRAM(s) Oral daily  povidone iodine 10% Swab 1 Application(s) Topical daily  vitamin A &amp; D Ointment 1 Application(s) Topical two times a day    MEDICATIONS  (PRN):      .  LABS:     03-25    135  |  96  |  30<H>  ----------------------------<  131<H>  4.0   |  28  |  0.43<L>    Ca    8.8      25 Mar 2018 08:14                    RADIOLOGY, EKG & ADDITIONAL TESTS: Reviewed.

## 2018-03-25 NOTE — PROGRESS NOTE ADULT - PROBLEM SELECTOR PLAN 1
Improving. Pt w/ increased work of breathing and shortness of breath on admission. Etiology likely CHF exacerbation given pt w/ BNP of 22k on admission (7K on last admission) and clinically overloaded on admission (JVP, 2+ edema). Cxray c/w bilateral pulm congestion and effusion. Echo on 3/13 with EF 45-50%.   -changed lasix to 60mg po  -continue w/ lisinopril 2.5mg qd   -I&Os.  net neg. Improving. Pt w/ increased work of breathing and shortness of breath on admission. Etiology likely CHF exacerbation given pt w/ BNP of 22k on admission (7K on last admission) and clinically overloaded on admission (JVP, 2+ edema). Cxray c/w bilateral pulm congestion and effusion. Echo on 3/13 with EF 45-50%.   -c/w lasix 60mg po  -continue w/ lisinopril 2.5mg qd   -I&Os.  net neg.

## 2018-03-25 NOTE — PROGRESS NOTE ADULT - PROBLEM SELECTOR PLAN 9
F: None  E: Replete PRN  N: C/w Feeds, patient FEES study indicative of aspiration.  Will attempt once ice cube per hour and assess for aspiration. F: None  E: Replete PRN  N: C/w Feeds through PEG, patient FEES study indicative of aspiration.

## 2018-03-26 VITALS — OXYGEN SATURATION: 89 % | HEART RATE: 98 BPM

## 2018-03-26 LAB
ANION GAP SERPL CALC-SCNC: 9 MMOL/L — SIGNIFICANT CHANGE UP (ref 5–17)
BUN SERPL-MCNC: 31 MG/DL — HIGH (ref 7–23)
CALCIUM SERPL-MCNC: 8.7 MG/DL — SIGNIFICANT CHANGE UP (ref 8.4–10.5)
CHLORIDE SERPL-SCNC: 100 MMOL/L — SIGNIFICANT CHANGE UP (ref 96–108)
CO2 SERPL-SCNC: 32 MMOL/L — HIGH (ref 22–31)
CREAT SERPL-MCNC: 0.43 MG/DL — LOW (ref 0.5–1.3)
GLUCOSE SERPL-MCNC: 147 MG/DL — HIGH (ref 70–99)
HCT VFR BLD CALC: 28.5 % — LOW (ref 39–50)
HGB BLD-MCNC: 8.5 G/DL — LOW (ref 13–17)
MAGNESIUM SERPL-MCNC: 2.3 MG/DL — SIGNIFICANT CHANGE UP (ref 1.6–2.6)
MCHC RBC-ENTMCNC: 27.6 PG — SIGNIFICANT CHANGE UP (ref 27–34)
MCHC RBC-ENTMCNC: 29.8 G/DL — LOW (ref 32–36)
MCV RBC AUTO: 92.5 FL — SIGNIFICANT CHANGE UP (ref 80–100)
PLATELET # BLD AUTO: 199 K/UL — SIGNIFICANT CHANGE UP (ref 150–400)
POTASSIUM SERPL-MCNC: 4.2 MMOL/L — SIGNIFICANT CHANGE UP (ref 3.5–5.3)
POTASSIUM SERPL-SCNC: 4.2 MMOL/L — SIGNIFICANT CHANGE UP (ref 3.5–5.3)
RBC # BLD: 3.08 M/UL — LOW (ref 4.2–5.8)
RBC # FLD: 17.1 % — HIGH (ref 10.3–16.9)
SODIUM SERPL-SCNC: 141 MMOL/L — SIGNIFICANT CHANGE UP (ref 135–145)
WBC # BLD: 7.1 K/UL — SIGNIFICANT CHANGE UP (ref 3.8–10.5)
WBC # FLD AUTO: 7.1 K/UL — SIGNIFICANT CHANGE UP (ref 3.8–10.5)

## 2018-03-26 PROCEDURE — 80162 ASSAY OF DIGOXIN TOTAL: CPT

## 2018-03-26 PROCEDURE — 82607 VITAMIN B-12: CPT

## 2018-03-26 PROCEDURE — 83550 IRON BINDING TEST: CPT

## 2018-03-26 PROCEDURE — 82550 ASSAY OF CK (CPK): CPT

## 2018-03-26 PROCEDURE — 87486 CHLMYD PNEUM DNA AMP PROBE: CPT

## 2018-03-26 PROCEDURE — 94003 VENT MGMT INPAT SUBQ DAY: CPT

## 2018-03-26 PROCEDURE — 82962 GLUCOSE BLOOD TEST: CPT

## 2018-03-26 PROCEDURE — 93312 ECHO TRANSESOPHAGEAL: CPT

## 2018-03-26 PROCEDURE — 87633 RESP VIRUS 12-25 TARGETS: CPT

## 2018-03-26 PROCEDURE — 85027 COMPLETE CBC AUTOMATED: CPT

## 2018-03-26 PROCEDURE — 87150 DNA/RNA AMPLIFIED PROBE: CPT

## 2018-03-26 PROCEDURE — 85652 RBC SED RATE AUTOMATED: CPT

## 2018-03-26 PROCEDURE — 94640 AIRWAY INHALATION TREATMENT: CPT

## 2018-03-26 PROCEDURE — 71275 CT ANGIOGRAPHY CHEST: CPT

## 2018-03-26 PROCEDURE — 93306 TTE W/DOPPLER COMPLETE: CPT

## 2018-03-26 PROCEDURE — 87798 DETECT AGENT NOS DNA AMP: CPT

## 2018-03-26 PROCEDURE — 93005 ELECTROCARDIOGRAM TRACING: CPT | Mod: 77

## 2018-03-26 PROCEDURE — 82803 BLOOD GASES ANY COMBINATION: CPT

## 2018-03-26 PROCEDURE — 92610 EVALUATE SWALLOWING FUNCTION: CPT | Mod: GN

## 2018-03-26 PROCEDURE — 99291 CRITICAL CARE FIRST HOUR: CPT | Mod: 25

## 2018-03-26 PROCEDURE — 83605 ASSAY OF LACTIC ACID: CPT

## 2018-03-26 PROCEDURE — A9556: CPT

## 2018-03-26 PROCEDURE — 80048 BASIC METABOLIC PNL TOTAL CA: CPT

## 2018-03-26 PROCEDURE — 85025 COMPLETE CBC W/AUTO DIFF WBC: CPT

## 2018-03-26 PROCEDURE — 94002 VENT MGMT INPAT INIT DAY: CPT

## 2018-03-26 PROCEDURE — 82247 BILIRUBIN TOTAL: CPT

## 2018-03-26 PROCEDURE — 94799 UNLISTED PULMONARY SVC/PX: CPT

## 2018-03-26 PROCEDURE — 96374 THER/PROPH/DIAG INJ IV PUSH: CPT | Mod: XU

## 2018-03-26 PROCEDURE — 82728 ASSAY OF FERRITIN: CPT

## 2018-03-26 PROCEDURE — 97110 THERAPEUTIC EXERCISES: CPT

## 2018-03-26 PROCEDURE — 86900 BLOOD TYPING SEROLOGIC ABO: CPT

## 2018-03-26 PROCEDURE — 92526 ORAL FUNCTION THERAPY: CPT | Mod: GN

## 2018-03-26 PROCEDURE — 83880 ASSAY OF NATRIURETIC PEPTIDE: CPT

## 2018-03-26 PROCEDURE — 74177 CT ABD & PELVIS W/CONTRAST: CPT

## 2018-03-26 PROCEDURE — 86850 RBC ANTIBODY SCREEN: CPT

## 2018-03-26 PROCEDURE — 87186 SC STD MICRODIL/AGAR DIL: CPT

## 2018-03-26 PROCEDURE — 82248 BILIRUBIN DIRECT: CPT

## 2018-03-26 PROCEDURE — 86140 C-REACTIVE PROTEIN: CPT

## 2018-03-26 PROCEDURE — 71045 X-RAY EXAM CHEST 1 VIEW: CPT

## 2018-03-26 PROCEDURE — 92612 ENDOSCOPY SWALLOW (FEES) VID: CPT | Mod: GN

## 2018-03-26 PROCEDURE — 80061 LIPID PANEL: CPT

## 2018-03-26 PROCEDURE — 81001 URINALYSIS AUTO W/SCOPE: CPT

## 2018-03-26 PROCEDURE — 87070 CULTURE OTHR SPECIMN AEROBIC: CPT

## 2018-03-26 PROCEDURE — 87086 URINE CULTURE/COLONY COUNT: CPT

## 2018-03-26 PROCEDURE — 78802 RP LOCLZJ TUM WHBDY 1 D IMG: CPT

## 2018-03-26 PROCEDURE — 99233 SBSQ HOSP IP/OBS HIGH 50: CPT | Mod: GC

## 2018-03-26 PROCEDURE — 87581 M.PNEUMON DNA AMP PROBE: CPT

## 2018-03-26 PROCEDURE — 87040 BLOOD CULTURE FOR BACTERIA: CPT

## 2018-03-26 PROCEDURE — 84100 ASSAY OF PHOSPHORUS: CPT

## 2018-03-26 PROCEDURE — 80053 COMPREHEN METABOLIC PANEL: CPT

## 2018-03-26 PROCEDURE — 84466 ASSAY OF TRANSFERRIN: CPT

## 2018-03-26 PROCEDURE — 97530 THERAPEUTIC ACTIVITIES: CPT

## 2018-03-26 PROCEDURE — 80202 ASSAY OF VANCOMYCIN: CPT

## 2018-03-26 PROCEDURE — 36415 COLL VENOUS BLD VENIPUNCTURE: CPT

## 2018-03-26 PROCEDURE — 86901 BLOOD TYPING SEROLOGIC RH(D): CPT

## 2018-03-26 PROCEDURE — 85610 PROTHROMBIN TIME: CPT

## 2018-03-26 PROCEDURE — 97163 PT EVAL HIGH COMPLEX 45 MIN: CPT

## 2018-03-26 PROCEDURE — 83735 ASSAY OF MAGNESIUM: CPT

## 2018-03-26 PROCEDURE — 84484 ASSAY OF TROPONIN QUANT: CPT

## 2018-03-26 PROCEDURE — 96375 TX/PRO/DX INJ NEW DRUG ADDON: CPT | Mod: XU

## 2018-03-26 PROCEDURE — 84443 ASSAY THYROID STIM HORMONE: CPT

## 2018-03-26 PROCEDURE — 82553 CREATINE MB FRACTION: CPT

## 2018-03-26 PROCEDURE — 51702 INSERT TEMP BLADDER CATH: CPT

## 2018-03-26 PROCEDURE — 85730 THROMBOPLASTIN TIME PARTIAL: CPT

## 2018-03-26 RX ORDER — CEPHALEXIN 500 MG
1 CAPSULE ORAL
Qty: 0 | Refills: 0 | COMMUNITY

## 2018-03-26 RX ORDER — NAFCILLIN 10 G/100ML
2 INJECTION, POWDER, FOR SOLUTION INTRAVENOUS
Qty: 0 | Refills: 0 | COMMUNITY

## 2018-03-26 RX ADMIN — PANTOPRAZOLE SODIUM 40 MILLIGRAM(S): 20 TABLET, DELAYED RELEASE ORAL at 12:16

## 2018-03-26 RX ADMIN — APIXABAN 5 MILLIGRAM(S): 2.5 TABLET, FILM COATED ORAL at 06:27

## 2018-03-26 RX ADMIN — LISINOPRIL 2.5 MILLIGRAM(S): 2.5 TABLET ORAL at 06:28

## 2018-03-26 RX ADMIN — Medication 60 MILLIGRAM(S): at 06:53

## 2018-03-26 RX ADMIN — Medication 1 APPLICATION(S): at 11:38

## 2018-03-26 RX ADMIN — Medication 1 APPLICATION(S): at 06:52

## 2018-03-26 RX ADMIN — CHLORHEXIDINE GLUCONATE 15 MILLILITER(S): 213 SOLUTION TOPICAL at 06:27

## 2018-03-26 RX ADMIN — Medication 100 MILLIGRAM(S): at 12:16

## 2018-03-26 RX ADMIN — Medication 100 MILLIGRAM(S): at 03:29

## 2018-03-26 NOTE — PROGRESS NOTE ADULT - PROBLEM SELECTOR PLAN 2
pt w/ sputum and blood culture growing Staph aureus. Surveillance BCx on 3/14 with NGTD and pt w/ no fevers and no leukocytosis.   -pt initially on Vanc/Zosyn, now discontinued as cultures are not MRSA  -c/w Cefazolin 2mg q8hrs (3/15--  -ID consulted regarding abx duration, patient will require 2 weeks cefazolin total followed by 2 weeks Keflex, today day 11 of 14 of cefazolin   -Gallium scan negative   -Plan initially for AMRIK however scope did not pass no further attempts

## 2018-03-26 NOTE — PROGRESS NOTE ADULT - SUBJECTIVE AND OBJECTIVE BOX
Surgery attending:    Patient seen and examined well known to me. Patient underwent multiple prior emergent laparotomy 2nd perforated duodenal ulcer sepsis and peritonitis. Recently readmitted to the hospital with pneumonia and staph bacteremia. There is a question of whether or not he is suffering from endocarditis secondary to a grossly abnormal heart valve. From the standpoint of his abdomen he is doing well. Unfortunately he has not been able to take oral nutrition secondary to aspiration risk and tracheostomy. The patient is quite communicative. His abdominal exam has remained quite benign. Recent CT scan is once again reassuring. There are small flecks of any air within this sealed patch of his duodenum. This has been stable for several weeks. Upper GI performed at his last admission showed no evidence of leak. Given his improving clinical condition during his admission we have removed his retention sutures. We have also remove his gastrostomy tube. And we have removed his retrograde duodenostomy tube. The feeding jejunostomy tube should remain until he is able to take appropriate amounts of oral intake. We discussed that he should not receive nonsteroidal anti-inflammatory medications ever. He should remain on proton pump inhibitor therapy. At this point in time I have no plans on removing the feeding jejunostomy until he is able to take enough orally. He will likely need open heart surgery to repair or replace his valve and as such I believe it may be  a good idea to keep the J-tube in place until a decision is made on this. I would like to see him in the office as an outpatient in approximately 4-6 weeks. I have answered all questions.

## 2018-03-26 NOTE — PROGRESS NOTE ADULT - GASTROINTESTINAL DETAILS
nontender/soft/no distention
nontender/soft/no distention
soft/nontender/no distention
no distention/soft/nontender

## 2018-03-26 NOTE — PROGRESS NOTE ADULT - SUBJECTIVE AND OBJECTIVE BOX
INCOMPLETE  INTERVAL/OVERNIGHT EVENTS:  Tylenol given for pain.    SUBJECTIVE:  Patient without complaints.     ROS otherwise negative.    VITAL SIGNS:  ICU Vital Signs Last 24 Hrs  T(C): 36.6 (25 Mar 2018 13:34), Max: 36.6 (25 Mar 2018 06:00)  T(F): 97.9 (25 Mar 2018 13:34), Max: 97.9 (25 Mar 2018 13:34)  HR: 92 (25 Mar 2018 13:31) (86 - 134)  BP: 104/68 (25 Mar 2018 13:31) (99/77 - 124/69)  BP(mean): 77 (25 Mar 2018 13:31) (77 - 91)  ABP: --  ABP(mean): --  RR: 16 (25 Mar 2018 13:31) (16 - 16)  SpO2: 100% (25 Mar 2018 13:31) (87% - 100%)      PHYSICAL EXAM:    Constitutional: Middle aged pleasant male laying in bed comfortably.   HEENT: NC/AT; PERRL, anicteric sclera; MM dry  Neck: supple, no JVD, trach site clean  Cardiovascular: S1/S2 normal. RRR. Grade 3/6 holosystolic murmur with radiation to the axilla.   Respiratory: CTAB bilaterally.   Gastrointestinal: abdomen soft, NT/ND; well healed midline incision w/ no erythema or purulence. 2 abd tubes in place w/ clean dressing on top.   Genitourinary: no suprapubic tenderness or fullness, taxus catheter   Extremities: WWP; 1+LE extremity edema. B/l feet wrapped in clean cotton dressing  Vascular: 2+ radial, DP/PT pulses B/L  Dermatologic: normal color and turgor; no visible rashes  Neurological: AAOx3. Pt able to follow commands. No focal deficits     MEDICATIONS  (STANDING):  apixaban 5 milliGRAM(s) Oral every 12 hours  ceFAZolin   IVPB 2000 milliGRAM(s) IV Intermittent every 8 hours  chlorhexidine 0.12% Liquid 15 milliLiter(s) Swish and Spit two times a day  furosemide Solution 60 milliGRAM(s) Oral daily  lisinopril 2.5 milliGRAM(s) Oral daily  pantoprazole   Suspension 40 milliGRAM(s) Oral daily  povidone iodine 10% Swab 1 Application(s) Topical daily  vitamin A &amp; D Ointment 1 Application(s) Topical two times a day    MEDICATIONS  (PRN):      .  LABS:     03-25    135  |  96  |  30<H>  ----------------------------<  131<H>  4.0   |  28  |  0.43<L>    Ca    8.8      25 Mar 2018 08:14                    RADIOLOGY, EKG & ADDITIONAL TESTS: Reviewed.

## 2018-03-26 NOTE — PROGRESS NOTE ADULT - PROBLEM SELECTOR PROBLEM 5
Severe mitral regurgitation

## 2018-03-26 NOTE — PROGRESS NOTE ADULT - PROBLEM SELECTOR PLAN 1
Improving. Pt w/ increased work of breathing and shortness of breath on admission. Etiology likely CHF exacerbation given pt w/ BNP of 22k on admission (7K on last admission) and clinically overloaded on admission (JVP, 2+ edema). Cxray c/w bilateral pulm congestion and effusion. Echo on 3/13 with EF 45-50%.   -c/w lasix 60mg po  -continue w/ lisinopril 2.5mg qd   -I&Os.  net neg.

## 2018-03-26 NOTE — PROGRESS NOTE ADULT - PROBLEM SELECTOR PLAN 6
Patient w/ likely Type II pulmonary HTN given severe mitral regurgitation. Echo showing a pulmonary artery systolic pressure is estimated to be 45 mmHg.    #Right Popliteal DVT   -s/p IVC filter on previous admission   -c/w Eliquis 5mg BID

## 2018-03-26 NOTE — PROGRESS NOTE ADULT - PROBLEM SELECTOR PROBLEM 1
CHF (congestive heart failure)

## 2018-03-26 NOTE — PROGRESS NOTE ADULT - PROBLEM SELECTOR PLAN 8
Pt w/ necrotic toes bilaterally from previous pressors use on last admission. Vascular surgery had evaluated the patient at that time with no signs of infection, no drainage or need for debridement or amputation.   -continue with daily dressing change

## 2018-03-26 NOTE — PROGRESS NOTE ADULT - SUBJECTIVE AND OBJECTIVE BOX
Seen and examined at bedside. No overnight events. Pain well controlled. Attempt to feed ice chips not successful. Having flatus, last bm saturday. Denies f/c/s/cp/sob/palp/lh.     Vital Signs Last 24 Hrs  T(C): 36.3 (26 Mar 2018 09:00), Max: 36.6 (25 Mar 2018 17:10)  T(F): 97.3 (26 Mar 2018 09:00), Max: 97.9 (26 Mar 2018 01:02)  HR: 92 (26 Mar 2018 13:06) (84 - 92)  BP: 117/66 (26 Mar 2018 13:00) (95/54 - 117/66)  BP(mean): 85 (26 Mar 2018 13:00) (67 - 85)  RR: 16 (26 Mar 2018 11:00) (16 - 18)  SpO2: 100% (26 Mar 2018 13:06) (96% - 100%)  CAPILLARY BLOOD GLUCOSE          03-25 @ 07:01 - 03-26 @ 07:00  --------------------------------------------------------  IN: 614 mL / OUT: 1575 mL / NET: -961 mL    03-26 @ 07:01  - 03-26 @ 15:59  --------------------------------------------------------  IN: 0 mL / OUT: 600 mL / NET: -600 mL        apixaban 5 milliGRAM(s) Oral every 12 hours  ceFAZolin   IVPB 2000 milliGRAM(s) IV Intermittent every 8 hours  chlorhexidine 0.12% Liquid 15 milliLiter(s) Swish and Spit two times a day  furosemide Solution 60 milliGRAM(s) Oral daily  lisinopril 2.5 milliGRAM(s) Oral daily  pantoprazole   Suspension 40 milliGRAM(s) Oral daily  povidone iodine 10% Swab 1 Application(s) Topical daily  vitamin A &amp; D Ointment 1 Application(s) Topical two times a day    LABS:      CBC Full  -  ( 26 Mar 2018 06:18 )  WBC Count : 7.1 K/uL  Hemoglobin : 8.5 g/dL  Hematocrit : 28.5 %  Platelet Count - Automated : 199 K/uL  Mean Cell Volume : 92.5 fL  Mean Cell Hemoglobin : 27.6 pg  Mean Cell Hemoglobin Concentration : 29.8 g/dL  Auto Neutrophil # : x  Auto Lymphocyte # : x  Auto Monocyte # : x  Auto Eosinophil # : x  Auto Basophil # : x  Auto Neutrophil % : x  Auto Lymphocyte % : x  Auto Monocyte % : x  Auto Eosinophil % : x  Auto Basophil % : x    03-26    141  |  100  |  31<H>  ----------------------------<  147<H>  4.2   |  32<H>  |  0.43<L>    Ca    8.7      26 Mar 2018 06:18  Mg     2.3     03-26

## 2018-03-26 NOTE — PROGRESS NOTE ADULT - ASSESSMENT
71 yo male recently discharged from [facility] after prolonged hospital course due to perforated duodenal ulcer s/p exploratory laparotomy  retrograde duodenostomy, Jon Gastrostomy, feeding jejunostomy, admitted with respiratory distress    - S/p Removal of gastrostomy tube and retrograde duodenostomy tube  - Continue feeds via jejunostomy  - Continue care as per primary team  - surgery team 4C following, please call if any issues  - Patient is to schedule an appointment to see Dr. Metcalf in 6 weeks, [phone]  - discussed with Dr. Metcalf

## 2018-03-26 NOTE — PROGRESS NOTE ADULT - PROBLEM SELECTOR PROBLEM 7
Duodenal perforation

## 2018-03-26 NOTE — PROGRESS NOTE ADULT - GASTROINTESTINAL COMMENTS
G tube site c/d/i, Duodenostomy site c/d/i, Jej tube site c/d/i
all drain sites c/d/i
tube sites c/d/i
all tube sites c/d/i, incision c/d/i

## 2018-03-26 NOTE — PROGRESS NOTE ADULT - PROBLEM SELECTOR PLAN 5
Echo showing severe mitral valve prolapse and possible flail posterior mitral   leaflet.  -CT surgery consulted for possible mitral valve repair--appreciate recs

## 2018-03-26 NOTE — PROGRESS NOTE ADULT - PROVIDER SPECIALTY LIST ADULT
Cardiology
Infectious Disease
Infectious Disease
Internal Medicine
MICU
Surgery
Vascular Surgery
Vascular Surgery
Internal Medicine
Surgery
Cardiology
Internal Medicine

## 2018-03-28 PROBLEM — K81.9 CHOLECYSTITIS, UNSPECIFIED: Chronic | Status: ACTIVE | Noted: 2018-03-12

## 2018-03-28 PROBLEM — I48.91 UNSPECIFIED ATRIAL FIBRILLATION: Chronic | Status: ACTIVE | Noted: 2018-03-12

## 2018-03-28 PROBLEM — K26.9 DUODENAL ULCER, UNSPECIFIED AS ACUTE OR CHRONIC, WITHOUT HEMORRHAGE OR PERFORATION: Chronic | Status: ACTIVE | Noted: 2018-03-12

## 2018-03-28 PROBLEM — I50.9 HEART FAILURE, UNSPECIFIED: Chronic | Status: ACTIVE | Noted: 2018-03-12

## 2018-03-30 DIAGNOSIS — I50.20 UNSPECIFIED SYSTOLIC (CONGESTIVE) HEART FAILURE: ICD-10-CM

## 2018-03-30 DIAGNOSIS — Z16.11 RESISTANCE TO PENICILLINS: ICD-10-CM

## 2018-03-30 DIAGNOSIS — B95.61 METHICILLIN SUSCEPTIBLE STAPHYLOCOCCUS AUREUS INFECTION AS THE CAUSE OF DISEASES CLASSIFIED ELSEWHERE: ICD-10-CM

## 2018-03-30 DIAGNOSIS — J96.21 ACUTE AND CHRONIC RESPIRATORY FAILURE WITH HYPOXIA: ICD-10-CM

## 2018-03-30 DIAGNOSIS — D64.9 ANEMIA, UNSPECIFIED: ICD-10-CM

## 2018-03-30 DIAGNOSIS — R06.02 SHORTNESS OF BREATH: ICD-10-CM

## 2018-03-30 DIAGNOSIS — I45.10 UNSPECIFIED RIGHT BUNDLE-BRANCH BLOCK: ICD-10-CM

## 2018-03-30 DIAGNOSIS — Z93.0 TRACHEOSTOMY STATUS: ICD-10-CM

## 2018-03-30 DIAGNOSIS — J18.9 PNEUMONIA, UNSPECIFIED ORGANISM: ICD-10-CM

## 2018-03-30 DIAGNOSIS — I96 GANGRENE, NOT ELSEWHERE CLASSIFIED: ICD-10-CM

## 2018-03-30 DIAGNOSIS — Z95.828 PRESENCE OF OTHER VASCULAR IMPLANTS AND GRAFTS: ICD-10-CM

## 2018-03-30 DIAGNOSIS — L89.612 PRESSURE ULCER OF RIGHT HEEL, STAGE 2: ICD-10-CM

## 2018-03-30 DIAGNOSIS — Z93.1 GASTROSTOMY STATUS: ICD-10-CM

## 2018-03-30 DIAGNOSIS — I82.531 CHRONIC EMBOLISM AND THROMBOSIS OF RIGHT POPLITEAL VEIN: ICD-10-CM
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
## 2018-03-30 DIAGNOSIS — I27.20 PULMONARY HYPERTENSION, UNSPECIFIED: ICD-10-CM

## 2018-03-30 DIAGNOSIS — L89.622 PRESSURE ULCER OF LEFT HEEL, STAGE 2: ICD-10-CM

## 2018-03-30 DIAGNOSIS — E87.2 ACIDOSIS: ICD-10-CM

## 2018-03-30 DIAGNOSIS — I48.91 UNSPECIFIED ATRIAL FIBRILLATION: ICD-10-CM

## 2018-03-30 DIAGNOSIS — I51.1 RUPTURE OF CHORDAE TENDINEAE, NOT ELSEWHERE CLASSIFIED: ICD-10-CM

## 2018-03-30 DIAGNOSIS — I34.0 NONRHEUMATIC MITRAL (VALVE) INSUFFICIENCY: ICD-10-CM

## 2018-03-30 DIAGNOSIS — Z79.01 LONG TERM (CURRENT) USE OF ANTICOAGULANTS: ICD-10-CM

## 2018-03-30 DIAGNOSIS — L89.890 PRESSURE ULCER OF OTHER SITE, UNSTAGEABLE: ICD-10-CM

## 2018-03-30 DIAGNOSIS — A41.9 SEPSIS, UNSPECIFIED ORGANISM: ICD-10-CM

## 2018-03-30 DIAGNOSIS — K81.0 ACUTE CHOLECYSTITIS: ICD-10-CM

## 2018-03-30 DIAGNOSIS — K26.9 DUODENAL ULCER, UNSPECIFIED AS ACUTE OR CHRONIC, WITHOUT HEMORRHAGE OR PERFORATION: ICD-10-CM

## 2018-04-16 ENCOUNTER — APPOINTMENT (OUTPATIENT)
Dept: SURGERY | Facility: CLINIC | Age: 73
End: 2018-04-16
Payer: MEDICARE

## 2018-04-16 VITALS — HEART RATE: 81 BPM | OXYGEN SATURATION: 98 % | SYSTOLIC BLOOD PRESSURE: 112 MMHG | DIASTOLIC BLOOD PRESSURE: 81 MMHG

## 2018-04-16 DIAGNOSIS — I38 ENDOCARDITIS, VALVE UNSPECIFIED: ICD-10-CM

## 2018-04-16 DIAGNOSIS — K26.9 DUODENAL ULCER, UNSPECIFIED AS ACUTE OR CHRONIC, W/OUT HEMORRHAGE OR PERFORATION: ICD-10-CM

## 2018-04-16 DIAGNOSIS — I96 GANGRENE, NOT ELSEWHERE CLASSIFIED: ICD-10-CM

## 2018-04-16 DIAGNOSIS — K80.20 CALCULUS OF GALLBLADDER W/OUT CHOLECYSTITIS W/OUT OBSTRUCTION: ICD-10-CM

## 2018-04-16 DIAGNOSIS — Z86.19 PERSONAL HISTORY OF OTHER INFECTIOUS AND PARASITIC DISEASES: ICD-10-CM

## 2018-04-16 DIAGNOSIS — Z87.01 PERSONAL HISTORY OF PNEUMONIA (RECURRENT): ICD-10-CM

## 2018-04-16 DIAGNOSIS — R13.10 DYSPHAGIA, UNSPECIFIED: ICD-10-CM

## 2018-04-16 DIAGNOSIS — K26.5 CHRONIC OR UNSPECIFIED DUODENAL ULCER WITH PERFORATION: ICD-10-CM

## 2018-04-16 DIAGNOSIS — I50.9 HEART FAILURE, UNSPECIFIED: ICD-10-CM

## 2018-04-16 PROCEDURE — 99024 POSTOP FOLLOW-UP VISIT: CPT

## 2018-04-20 PROBLEM — K26.9 DUODENAL ULCER: Status: ACTIVE | Noted: 2018-04-20

## 2018-04-20 PROBLEM — Z86.19 HISTORY OF SEPTIC SHOCK: Status: ACTIVE | Noted: 2018-04-20

## 2018-04-20 PROBLEM — K26.5 DUODENAL ULCER, PERFORATED: Status: ACTIVE | Noted: 2018-04-20

## 2018-04-20 PROBLEM — Z87.01 HISTORY OF PNEUMONIA: Status: ACTIVE | Noted: 2018-04-20

## 2018-04-20 PROBLEM — Z86.19 HISTORY OF SEPSIS: Status: ACTIVE | Noted: 2018-04-20

## 2018-04-20 PROBLEM — I38 HEART VALVE DISEASE: Status: ACTIVE | Noted: 2018-04-20

## 2018-04-20 PROBLEM — K80.20 GALLSTONES: Status: ACTIVE | Noted: 2018-04-20

## 2018-04-20 PROBLEM — I96 TOE GANGRENE: Status: ACTIVE | Noted: 2018-04-20

## 2018-04-20 PROBLEM — I50.9 CONGESTIVE HEART FAILURE: Status: ACTIVE | Noted: 2018-04-20

## 2018-04-20 PROBLEM — R13.10 DYSPHAGIA: Status: ACTIVE | Noted: 2018-04-20

## 2018-04-20 RX ORDER — CEPHALEXIN 500 MG/1
500 TABLET ORAL
Refills: 0 | Status: ACTIVE | COMMUNITY
Start: 2018-04-20

## 2018-04-20 RX ORDER — FUROSEMIDE 10 MG/ML
10 SOLUTION ORAL DAILY
Refills: 0 | Status: ACTIVE | COMMUNITY
Start: 2018-04-20

## 2018-04-20 RX ORDER — APIXABAN 5 MG/1
5 TABLET, FILM COATED ORAL
Refills: 0 | Status: ACTIVE | COMMUNITY
Start: 2018-04-20

## 2018-04-20 RX ORDER — LISINOPRIL 2.5 MG/1
2.5 TABLET ORAL
Refills: 0 | Status: ACTIVE | COMMUNITY
Start: 2018-04-20

## 2018-04-20 RX ORDER — PANTOPRAZOLE 40 MG/1
40 TABLET, DELAYED RELEASE ORAL
Refills: 0 | Status: ACTIVE | COMMUNITY
Start: 2018-04-20

## 2018-07-13 NOTE — SWALLOW BEDSIDE ASSESSMENT ADULT - POSITIONING
RN NOTES:



Patient started screaming and climbing out of bed. Haldol given as ordered upright (45 degrees)

## 2018-08-24 PROBLEM — I35.1 NONRHEUMATIC AORTIC (VALVE) INSUFFICIENCY: Chronic | Status: ACTIVE | Noted: 2018-01-18

## 2018-08-24 PROBLEM — I34.0 NONRHEUMATIC MITRAL (VALVE) INSUFFICIENCY: Chronic | Status: ACTIVE | Noted: 2018-01-18

## 2018-08-28 ENCOUNTER — APPOINTMENT (OUTPATIENT)
Dept: RADIOLOGY | Facility: HOSPITAL | Age: 73
End: 2018-08-28
Payer: COMMERCIAL

## 2018-08-28 ENCOUNTER — OUTPATIENT (OUTPATIENT)
Dept: OUTPATIENT SERVICES | Facility: HOSPITAL | Age: 73
LOS: 1 days | End: 2018-08-28
Payer: COMMERCIAL

## 2018-08-28 DIAGNOSIS — K26.5 CHRONIC OR UNSPECIFIED DUODENAL ULCER WITH PERFORATION: Chronic | ICD-10-CM

## 2018-08-28 DIAGNOSIS — Z93.1 GASTROSTOMY STATUS: Chronic | ICD-10-CM

## 2018-08-28 DIAGNOSIS — K26.1 ACUTE DUODENAL ULCER WITH PERFORATION: Chronic | ICD-10-CM

## 2018-08-28 DIAGNOSIS — Z98.890 OTHER SPECIFIED POSTPROCEDURAL STATES: Chronic | ICD-10-CM

## 2018-08-28 PROCEDURE — G8998: CPT | Mod: CN

## 2018-08-28 PROCEDURE — 74230 X-RAY XM SWLNG FUNCJ C+: CPT

## 2018-08-28 PROCEDURE — 74220 X-RAY XM ESOPHAGUS 1CNTRST: CPT

## 2018-08-28 PROCEDURE — G8997: CPT | Mod: CJ

## 2018-08-28 PROCEDURE — G8996: CPT | Mod: CN

## 2018-08-28 PROCEDURE — 92611 MOTION FLUOROSCOPY/SWALLOW: CPT | Mod: GN

## 2018-08-28 PROCEDURE — 74230 X-RAY XM SWLNG FUNCJ C+: CPT | Mod: 26

## 2018-10-01 NOTE — SWALLOW BEDSIDE ASSESSMENT ADULT - SWALLOW EVAL: RECOMMENDED FEEDING/EATING TECHNIQUES
CHIEF COMPLAINT: f/u     SUBJECTIVE / OVERNIGHT EVENTS: Patient seen and examined. No acute events overnight. Pain well controlled and patient without any complaints.    MEDICATIONS  (STANDING):  aspirin enteric coated 81 milliGRAM(s) Oral daily  docusate sodium 100 milliGRAM(s) Oral three times a day  enoxaparin Injectable 40 milliGRAM(s) SubCutaneous daily  influenza   Vaccine 0.5 milliLiter(s) IntraMuscular once  pantoprazole  Injectable 40 milliGRAM(s) IV Push at bedtime  senna 2 Tablet(s) Oral at bedtime  vancomycin  IVPB 1250 milliGRAM(s) IV Intermittent every 24 hours    MEDICATIONS  (PRN):  acetaminophen   Tablet .. 650 milliGRAM(s) Oral every 6 hours PRN Temp greater or equal to 38C (100.4F), Mild Pain (1 - 3)  benzocaine 15 mG/menthol 3.6 mG Lozenge 1 Lozenge Oral three times a day PRN Sore Throat  bisacodyl Suppository 10 milliGRAM(s) Rectal daily PRN Constipation  HYDROmorphone  Injectable 0.5 milliGRAM(s) IV Push every 10 minutes PRN Moderate Pain (4 - 6)  ondansetron Injectable 4 milliGRAM(s) IV Push every 6 hours PRN Nausea and/or Vomiting  oxyCODONE    IR 5 milliGRAM(s) Oral every 4 hours PRN Moderate Pain (4 - 6)  oxyCODONE    IR 10 milliGRAM(s) Oral every 4 hours PRN Severe Pain (7 - 10)      VITALS:  T(F): 97.9 (10-01-18 @ 05:07), Max: 99.2 (09-30-18 @ 13:08)  HR: 60 (10-01-18 @ 05:07) (60 - 81)  BP: 117/62 (10-01-18 @ 05:07) (108/58 - 130/72)  RR: 17 (10-01-18 @ 05:07) (16 - 18)  SpO2: 94% (10-01-18 @ 05:07)      PHYSICAL EXAM:  GENERAL: NAD, well-developed  HEAD:  Atraumatic, Normocephalic  EYES: EOMI, PERRLA, conjunctiva and sclera clear  NECK: Supple, No JVD  CHEST/LUNG: Clear to auscultation bilaterally; No wheeze  HEART: Regular rate and rhythm; No murmurs, rubs, or gallops  ABDOMEN: Soft, Nontender, Nondistended; Bowel sounds present  EXTREMITIES:  2+ Peripheral Pulses, No clubbing, cyanosis, or edema  PSYCH: AAOx3  NEUROLOGY: non-focal  SKIN: No rashes or lesions    LABS:              8.6                  136  | 24   | 15           14.56 >-----------< 456     ------------------------< 129                   26.5                 3.7  | 102  | 0.66                                         Ca 7.2   Mg 1.8   Ph 2.1                    RADIOLOGY & ADDITIONAL TESTS:  Imaging Personally Reviewed: [x] Yes    [ ] Consultant(s) Notes Reviewed:  [x] Care Discussed with Consultants/Other Providers: Orthopedic PA - discussed CHIEF COMPLAINT: f/u     SUBJECTIVE / OVERNIGHT EVENTS:   [employer]  utilized for [employer], ID# [id number]  Patient seen and examined this morning. Patient with 4 BMs yesterday after receiving enema. Patient complaining of pain this morning. No fevers, chills, SOB or chest pain. No acute events overnight.        MEDICATIONS  (STANDING):  aspirin enteric coated 81 milliGRAM(s) Oral daily  docusate sodium 100 milliGRAM(s) Oral three times a day  enoxaparin Injectable 40 milliGRAM(s) SubCutaneous daily  influenza   Vaccine 0.5 milliLiter(s) IntraMuscular once  pantoprazole  Injectable 40 milliGRAM(s) IV Push at bedtime  senna 2 Tablet(s) Oral at bedtime  vancomycin  IVPB 1250 milliGRAM(s) IV Intermittent every 24 hours    MEDICATIONS  (PRN):  acetaminophen   Tablet .. 650 milliGRAM(s) Oral every 6 hours PRN Temp greater or equal to 38C (100.4F), Mild Pain (1 - 3)  benzocaine 15 mG/menthol 3.6 mG Lozenge 1 Lozenge Oral three times a day PRN Sore Throat  bisacodyl Suppository 10 milliGRAM(s) Rectal daily PRN Constipation  HYDROmorphone  Injectable 0.5 milliGRAM(s) IV Push every 10 minutes PRN Moderate Pain (4 - 6)  ondansetron Injectable 4 milliGRAM(s) IV Push every 6 hours PRN Nausea and/or Vomiting  oxyCODONE    IR 5 milliGRAM(s) Oral every 4 hours PRN Moderate Pain (4 - 6)  oxyCODONE    IR 10 milliGRAM(s) Oral every 4 hours PRN Severe Pain (7 - 10)      VITALS:  T(F): 97.9 (10-01-18 @ 05:07), Max: 99.2 (09-30-18 @ 13:08)  HR: 60 (10-01-18 @ 05:07) (60 - 81)  BP: 117/62 (10-01-18 @ 05:07) (108/58 - 130/72)  RR: 17 (10-01-18 @ 05:07) (16 - 18)  SpO2: 94% (10-01-18 @ 05:07)      PHYSICAL EXAM:  GENERAL: Laying in bed in NAD, chronically ill, but not toxic appearing;   CHEST/LUNG: decreased BS at lung bases b/l but otherwise clear  HEART: Regular rate and rhythm; No murmurs, rubs, or gallops  ABDOMEN/PELVIS: Surgical staples intact. Mild erythema with edema of mons pubis, edema of medial aspect of left thigh and dependent area of flanks. Viable skin flap.   EXTREMITIES: Surgical staples intact. 1 JEREMY drain in left thigh left; B/l taut lower leg edema  PSYCH: Alert & Oriented, follows commands; pleasant        LABS:              8.6                  136  | 24   | 15           14.56 >-----------< 456     ------------------------< 129                   26.5                 3.7  | 102  | 0.66                                         Ca 7.2   Mg 1.8   Ph 2.1          [ ] Consultant(s) Notes Reviewed:  [x] Care Discussed with Consultants/Other Providers: Orthopedic PA - discussed oral hygiene

## 2018-11-05 NOTE — PROGRESS NOTE ADULT - PROBLEM SELECTOR PROBLEM 3
SUBJECTIVE:   CC: Shae Pierre is an 50 year old woman who presents for preventive health visit.     Healthy Habits:    Do you get at least three servings of calcium containing foods daily (dairy, green leafy vegetables, etc.)? yes    Amount of exercise or daily activities, outside of work: 5 day(s) per week    Problems taking medications regularly No    Medication side effects: No    Have you had an eye exam in the past two years? yes    Do you see a dentist twice per year? yes    Do you have sleep apnea, excessive snoring or daytime drowsiness?no      none    Today's PHQ-2 Score:   PHQ-2 ( 1999 [employer]) 11/5/2018 6/26/2017   Q1: Little interest or pleasure in doing things 0 0   Q2: Feeling down, depressed or hopeless 0 0   PHQ-2 Score 0 0       Abuse: Current or Past(Physical, Sexual or Emotional)- No  Do you feel safe in your environment - Yes    Social History   Substance Use Topics     Smoking status: Former Smoker     Types: Cigarettes     Quit date: 4/1/2010     Smokeless tobacco: Never Used     Alcohol use Yes      Comment: occ     If you drink alcohol do you typically have >3 drinks per day or >7 drinks per week? Not Applicable                     Reviewed orders with patient.  Reviewed health maintenance and updated orders accordingly - Yes  Labs reviewed in EPIC  BP Readings from Last 3 Encounters:   11/05/18 125/82   04/18/18 118/71   04/16/18 (!) 163/97    Wt Readings from Last 3 Encounters:   11/05/18 200 lb (90.7 kg)   04/18/18 200 lb (90.7 kg)   04/16/18 196 lb (88.9 kg)                  Patient Active Problem List   Diagnosis     Dermatophytosis of nail     Obesity     CARDIOVASCULAR SCREENING; LDL GOAL LESS THAN 160     Migraine headache     Breast cyst     Urge incontinence of urine     Benign essential hypertension     Past Surgical History:   Procedure Laterality Date     COLONOSCOPY N/A 4/16/2018    Procedure: COLONOSCOPY;  Colonoscopy;  Surgeon: Chuck Donovan MD;  [facility]  MASS TRUNK  7/13/2012    Procedure: EXCISE MASS TRUNK;  Excisional Removal Chest Wall Cysts X2;  Surgeon: Dennys Henriquez MD;  Location: [facility]     HYSTERECTOMY, PAP NO LONGER INDICATED       LAPAROSCOPIC ASSISTED HYSTERECTOMY VAGINAL, BILATERAL SALPINGO-OOPHORECTOMY, COMBINED  4/5/2012    LAVH-BSO     SURGICAL HISTORY OF -       LEEP     SURGICAL HISTORY OF -       Cryotherapy      SURGICAL HISTORY OF -       Colpo     SURGICAL HISTORY OF -       left knee/patella surgery       Social History   Substance Use Topics     Smoking status: Former Smoker     Types: Cigarettes     Quit date: 4/1/2010     Smokeless tobacco: Never Used     Alcohol use Yes      Comment: occ     Family History   Problem Relation Age of Onset     Breast Cancer Maternal Grandmother      Hypertension Father      C.A.D. Father      Alzheimer Disease Father      Lipids Brother      Lipids Brother      Lipids Brother      Lipids Brother      Lipids Sister      Lipids Sister      Circulatory Mother      AAA     Chronic Obstructive Pulmonary Disease Mother      Breast Cancer Maternal Aunt            Patient over age 50, mutual decision to screen reflected in health maintenance.    Pertinent mammograms are reviewed under the imaging tab.  History of abnormal Pap smear: Status post benign hysterectomy. Health Maintenance and Surgical History updated.  PAP / HPV 1/23/2012 12/22/2008 5/21/2008   PAP OTHER-NIL, See Result NIL OTHER-NIL EM>40     Reviewed and updated as needed this visit by clinical staff  Tobacco  Allergies  Meds  Med Hx  Surg Hx  Fam Hx  Soc Hx        Reviewed and updated as needed this visit by Provider            ROS:  CONSTITUTIONAL: NEGATIVE for fever, chills, change in weight  INTEGUMENTARY/SKIN: NEGATIVE for worrisome rashes, moles or lesions  EYES: NEGATIVE for vision changes or irritation  ENT: NEGATIVE for ear, mouth and throat problems  RESP: NEGATIVE for significant cough or SOB  BREAST: NEGATIVE for masses,  "tenderness or discharge  CV: NEGATIVE for chest pain, palpitations or peripheral edema  GI: NEGATIVE for nausea, abdominal pain, heartburn, or change in bowel habits   menopausal female: positive for urinary urgency with leakage (did not tolerate Ditropan)  MUSCULOSKELETAL: NEGATIVE for significant arthralgias or myalgia  NEURO: NEGATIVE for weakness, dizziness or paresthesias  PSYCHIATRIC: NEGATIVE for changes in mood or affect  SKIN: positive for skin irritation under breasts and in groin     OBJECTIVE:   /82 (BP Location: Right arm, Patient Position: Chair, Cuff Size: Adult Large)  Pulse 92  Temp 97.3  F (36.3  C) (Tympanic)  Ht 5' 1.5\" (1.562 m)  Wt 200 lb (90.7 kg)  LMP 04/05/2012  Breastfeeding? No  BMI 37.18 kg/m2  EXAM:  GENERAL APPEARANCE: healthy, alert and no distress  EYES: Eyes grossly normal to inspection, PERRL and conjunctivae and sclerae normal  HENT: ear canals and TM's normal, nose and mouth without ulcers or lesions, oropharynx clear and oral mucous membranes moist; redness and dilated blood vessels around nose and mouth c/w rosacea  NECK: no adenopathy, no asymmetry, masses, or scars and thyroid normal to palpation  RESP: lungs clear to auscultation - no rales, rhonchi or wheezes  BREAST: normal without masses, tenderness or nipple discharge and no palpable axillary masses or adenopathy  BREAST: normal without masses, tenderness or nipple discharge, no palpable axillary masses or adenopathy and dermatitis under both breasts c/w yeast  CV: regular rate and rhythm, normal S1 S2, no S3 or S4, no murmur, click or rub, no peripheral edema and peripheral pulses strong  ABDOMEN: soft, nontender, no hepatosplenomegaly, no masses and bowel sounds normal   (female): normal female external genitalia, normal urethral meatus, vaginal mucosal atrophy noted, normal post-hysterectomy exam without masses.  and dermatitis in groin with yeast odor  MS: no musculoskeletal defects are noted and gait " "is age appropriate without ataxia  NEURO: Normal strength and tone, sensory exam grossly normal, mentation intact and speech normal  PSYCH: mentation appears normal and affect normal/bright    Diagnostic Test Results:  none     ASSESSMENT/PLAN:       ICD-10-CM    1. Encounter for gynecological examination with abnormal finding Z01.411    2. Yeast infection of the skin B37.2 fluconazole (DIFLUCAN) 150 MG tablet     DISCONTINUED: fluconazole (DIFLUCAN) 150 MG tablet   3. Rosacea L71.9 clindamycin (CLINDAMAX) 1 % topical gel   4. Urge incontinence of urine N39.41 solifenacin (VESICARE) 5 MG tablet   5. Symptomatic menopausal or female climacteric states N95.1 estradiol (ESTRACE) 1 MG tablet   6. Migraine with aura and without status migrainosus, not intractable G43.109 SUMAtriptan (IMITREX) 100 MG tablet       COUNSELING:   Reviewed preventive health counseling, as reflected in patient instructions  Special attention given to:        trial of second anticholinergic med for urge incontinence; Dilfucan for cutaneous yeast, Cleocin-T for Rosacea changes around nose and mouth       Regular exercise       Healthy diet/nutrition       Vision screening    BP Readings from Last 1 Encounters:   11/05/18 125/82     Estimated body mass index is 37.18 kg/(m^2) as calculated from the following:    Height as of this encounter: 5' 1.5\" (1.562 m).    Weight as of this encounter: 200 lb (90.7 kg).      Weight management plan: Discussed healthy diet and exercise guidelines and patient will follow up in 12 months in clinic to re-evaluate.     reports that she quit smoking about 8 years ago. Her smoking use included Cigarettes. She has never used smokeless tobacco.      Counseling Resources:  ATP IV Guidelines  Pooled Cohorts Equation Calculator  Breast Cancer Risk Calculator  FRAX Risk Assessment  ICSI Preventive Guidelines  Dietary Guidelines for Americans, 2010  USDA's MyPlate  ASA Prophylaxis  Lung CA Screening    Amy Hickman " MD [facility]   Acute on chronic respiratory failure with hypoxia

## 2019-05-04 NOTE — DISCHARGE NOTE ADULT - NS MD DC FALL RISK RISK
Potassium 6.3 hemolyzed For information on Fall & Injury Prevention, visit [email or web address]

## 2019-10-17 NOTE — ED PROVIDER NOTE - DISPOSITION TYPE
PROVIDER:[TOKEN:[[id number]],FOLLOWUP:[1 week]],PROVIDER:[TOKEN:[[id number]],FOLLOWUP:[1 week]]
ADMIT

## 2020-01-24 NOTE — DIETITIAN INITIAL EVALUATION ADULT. - ENERGY NEEDS
No 76 kg IBW; 91 kg ABW; 120% of IBW; BMI 29; 178 cm.   IBW used/needs assessed 2*  vent. Protein needs increased for multiple wounds

## 2020-02-26 NOTE — CONSULT NOTE ADULT - SUBJECTIVE AND OBJECTIVE BOX
**Incomplete note, pt not seen yet***    Surgeon: Gibson Tan    Requesting Physician: Dr. Khan    HISTORY OF PRESENT ILLNESS:  **Brief history taken from chart as pt is intubated**  73 y/o male with PMHx severe MR/AI who presented after being diagnosed with acute cholecystitis and a RLL PNA at [facility].  He was complaining of lower abdominal pain and had new onset afib with RVR, sepsis and acute CHF exacerbation.  He was admitted to cardiology and placed on Bipap but then transferred to SICU due to a perforated duodenum s/p surgery on 1/20/18 with Dr. Metcalf requiring return to the OR/washout, jejunostomy feeding tube and an IVC filter on 1/22/18.      We are being consulted on for his known MR.  He is currently intubated.     PAST MEDICAL & SURGICAL HISTORY:  Mitral regurgitation  Aortic regurgitation  No significant past surgical history      MEDICATIONS  (STANDING):  albumin human 25% IVPB 50 milliLiter(s) IV Intermittent every 8 hours  ALBUTerol/ipratropium for Nebulization 3 milliLiter(s) Nebulizer every 6 hours  cefTAZidime  IVPB 2 Gram(s) IV Intermittent every 8 hours  chlorhexidine 0.12% Liquid 15 milliLiter(s) Swish and Spit two times a day  digoxin  Injectable 0.125 milliGRAM(s) IV Push daily  fentaNYL   Infusion 0.559 MICROgram(s)/kG/Hr (5 mL/Hr) IV Continuous <Continuous>  heparin  Infusion 800 Unit(s)/Hr (8.5 mL/Hr) IV Continuous <Continuous>  insulin lispro (HumaLOG) corrective regimen sliding scale   SubCutaneous every 6 hours  metolazone 10 milliGRAM(s) Oral two times a day  micafungin IVPB 100 milliGRAM(s) IV Intermittent every 24 hours  micafungin IVPB      pantoprazole  Injectable 40 milliGRAM(s) IV Push every 12 hours  propofol Infusion 9.321 MICROgram(s)/kG/Min (5 mL/Hr) IV Continuous <Continuous>  sodium chloride 0.9% lock flush 20 milliLiter(s) IV Push once    MEDICATIONS  (PRN):  acetaminophen    Suspension 650 milliGRAM(s) Enteral Tube every 6 hours PRN For Temp greater than 38 C (100.4 F)  HYDROmorphone  Injectable 0.5 milliGRAM(s) IV Push every 3 hours PRN Severe Pain (7 - 10)  sodium chloride 0.9% lock flush 10 milliLiter(s) IV Push every 1 hour PRN After each medication administration      Allergies    No Known Allergies          SOCIAL HISTORY: **Unable to assess at this time**  Smoker:  YES / NO        PACK YEARS:                         WHEN QUIT?  ETOH use:  YES / NO               FREQUENCY / QUANTITY:  Ilicit Drug use:  YES / NO  Occupation:  Assisted device use (Cane / Walker):  Live with:    FAMILY HISTORY:      Review of Systems (upon admission)  CONSTITUTIONAL:  Fevers / chills, sweats, fatigue, weight loss, weight gain                                    NEGATIVE  NEURO:  parathesias, seizures, syncope, confusion                                                                               NEGATIVE  EYES:  Blurry vision, discharge, pain, loss of vision                                                                                  NEGATIVE  ENMT:  Difficulty hearing, vertigo, dysphagia, epistaxis, recent dental work                                     NEGATIVE  CV:  Chest pain, palpitations, MAGALLANES, orthopnea                                                                                         NEGATIVE  RESPIRATORY:  Wheezing, SOB, cough / sputum, hemoptysis                                                              NEGATIVE  GI:  +abdominal pain.  Denies Nausea, vommiting, diarrhea, constipation, melena                                                                  POSITIVE  : Hematuria, dysuria, urgency, incontinence                                                                                       NEGATIVE  MUSKULOSKELETAL:  arthritis, joint swelling, muscle weakness                                                           NEGATIVE  SKIN/BREAST:  rash, itching, tanya loss, masses                                                                                            NEGATIVE  PSYCH:  depresion, anxiety, suicidal ideation                                                                                             NEGATIVE  HEME/LYMPH:  bruises easily, enlarged lymph nodes, tender lymph nodes                                        NEGATIVE  ENDOCRINE:  cold intolerance, heat intolerance, polydipsia                                                                   NEGATIVE    PHYSICAL EXAM  Vital Signs Last 24 Hrs  T(C): 37.1 (30 Jan 2018 14:22), Max: 38.7 (29 Jan 2018 19:00)  T(F): 98.8 (30 Jan 2018 14:22), Max: 101.6 (29 Jan 2018 19:00)  HR: 60 (30 Jan 2018 14:00) (58 - 116)  BP: 99/62 (30 Jan 2018 14:00) (93/51 - 149/75)  BP(mean): 75 (30 Jan 2018 14:00) (66 - 109)  RR: 16 (30 Jan 2018 14:00) (15 - 34)  SpO2: 100% (30 Jan 2018 14:00) (95% - 100%)    CONSTITUTIONAL:                                                                          WNL  NEURO:                                                                                             WNL                      EYES:                                                                                                  WNL  ENMT:                                                                                                WNL  CV:                                                                                                      WNL  RESPIRATORY:                                                                                  WNL  GI:                                                                                                       WNL  : ANGELES + / -                                                                                 WNL  MUSKULOSKELETAL:                                                                       WNL  SKIN / BREAST:                                                                                 WNL                                                          LABS:                        9.3    13.0  )-----------( 59       ( 30 Jan 2018 04:02 )             27.9     01-30    147<H>  |  108  |  33<H>  ----------------------------<  143<H>  3.5   |  32<H>  |  1.07    Ca    7.5<L>      30 Jan 2018 04:02  Phos  2.9     01-30  Mg     2.6     01-30    TPro  4.9<L>  /  Alb  2.3<L>  /  TBili  12.4<H>  /  DBili  x   /  AST  64<H>  /  ALT  72<H>  /  AlkPhos  105  01-30    PT/INR - ( 30 Jan 2018 04:02 )   PT: 15.4 sec;   INR: 1.38          PTT - ( 30 Jan 2018 11:08 )  PTT:59.6 sec    CARDIAC MARKERS ( 30 Jan 2018 11:08 )  x     / 0.04 ng/mL / 63 U/L / x     / 2.4 ng/mL  CARDIAC MARKERS ( 30 Jan 2018 04:03 )  x     / 0.04 ng/mL / 78 U/L / x     / 2.3 ng/mL          RADIOLOGY & ADDITIONAL STUDIES:  < from: Xray Chest 1 View AP- PORTABLE-Urgent (01.29.18 @ 13:25) >  Impression: Left PICC with tip in the superior vena cava.     Unchanged left perihilar opacity and pleural effusion and/or atelectasis,   increasing/shifting since prior exam right lower lung.    < end of copied text >  < from: 12 Lead ECG (01.18.18 @ 12:57) >  Diagnosis Line Atrial fibrillation with rapid ventricular response  Nonspecific ST and T wave abnormality , probably digitalis effect  Abnormal ECG    < end of copied text >  < from: CT Angio Abdomen and Pelvis w/ IV Cont (01.20.18 @ 03:08) >    IMPRESSION:  New pneumoperitoneum and small ascites. Findings are consistent with   hollow viscus perforation. Given the duodenal wall thickening, peptic   ulcer disease must be considered.    Patent abdominal aorta and mesenteric arteries.    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    Cholelithiasis. Gallbladder is relatively contracted. Pericholecystic   fluid is likely a portion of ascites.    Cardiomegaly, small pleural effusions and atelectatic changes in the lung   bases.    Nonspecific abdominal nodules.    Atrophic pancreas with probable cystic lesions as above. Selective   follow-up with MRI must be considered.      < end of copied text >  < from: CT Chest No Cont (01.18.18 @ 18:46) >    IMPRESSION:  Cholelithiasis. Findings possibly representing acute cholecystitis.   Recommend further evaluation with HIDA.    Duodenitis.     4.1 cm aortic root aneurysm, 4.3 cm ascending thoracic aortic aneurysm.    Small right pleural effusion.Right lower lobe atelectasis/infiltrate   suspicious for pneumonia.    Pulmonary hypertension.    Cardiomegaly.    < end of copied text >  < from: AMRIK w/Doppler (01.29.18 @ 12:51) >  Interpretation Summary  The left ventricular wall motion is normal.The left ventricular ejection   fraction is normal.The right ventricle is normal in size and   function.The left   atrium is severely dilated. No clot seen in the left atrium or in the   left   atrial appendage.  Left atrial appendage emptying velocities are   normal.Right   atrial size is normal.Mildly calcified trilaeflet aortic valve.There is   mild   aortic regurgitation.Severe bileaflet mitral valve prolapse, more   pronounced   in the posteromedial commissure and the entire posterior leaflet. The A3   scallop is flail with likely torn chordae tendinae attached to it.   Although   given the severety of mitral valve disease with mobile flail components,   it is   hard to rule out a vegetation, the latter appears to be less likely.   There is   severe mitral regurgitation.Structurally normal tricuspid valve.There is   mild   to moderate tricuspid regurgitation.There is severe pulmonary   hypertension.The   pulmonary artery systolic pressure is estimated to be 95   mmHg.Structurally   normal pulmonic valve.There is trace pulmonic regurgitation.Mild,   non-mobile   atherosclerotic plaque seen in the visualized portions of the aorta /   arch.   There is no pericardial effusion.Left pleural effusion noted.Mild aortic   root   dilatation.The aortic root measures 4.3 cm at sinuses (normal less than   4 cm    < end of copied text > Surgeon: Gibson Tan    Requesting Physician: Dr. Khan    HISTORY OF PRESENT ILLNESS:  **Brief history taken from chart as pt is intubated**  71 y/o male with PMHx severe MR/AI who presented to the hospital with abdominal pain initially.  He was worked up and found to have acute cholecystitis and a RLL PNA.  He was then found to have new onset afib with RVR, sepsis and acute CHF exacerbation.  He was admitted to cardiology and placed on Bipap but then transferred to SICU due to a declining clinical picture and was found to have a perforated duodenum, s/p surgery on 1/20/18 with Dr. Metcalf requiring return to the OR/washout due to 'patch breakdown', jejunostomy feeding tube and an IVC filter on 1/22/18.  He has been gradually improving, and according to the RN he follows commands when he's awake.  His HCP is at the bedside currently.     We are being consulted on for his known MR.  He is currently intubated.     PAST MEDICAL & SURGICAL HISTORY:  Mitral regurgitation  Aortic regurgitation  No significant past surgical history      MEDICATIONS  (STANDING):  albumin human 25% IVPB 50 milliLiter(s) IV Intermittent every 8 hours  ALBUTerol/ipratropium for Nebulization 3 milliLiter(s) Nebulizer every 6 hours  cefTAZidime  IVPB 2 Gram(s) IV Intermittent every 8 hours  chlorhexidine 0.12% Liquid 15 milliLiter(s) Swish and Spit two times a day  digoxin  Injectable 0.125 milliGRAM(s) IV Push daily  fentaNYL   Infusion 0.559 MICROgram(s)/kG/Hr (5 mL/Hr) IV Continuous <Continuous>  heparin  Infusion 800 Unit(s)/Hr (8.5 mL/Hr) IV Continuous <Continuous>  insulin lispro (HumaLOG) corrective regimen sliding scale   SubCutaneous every 6 hours  metolazone 10 milliGRAM(s) Oral two times a day  micafungin IVPB 100 milliGRAM(s) IV Intermittent every 24 hours  micafungin IVPB      pantoprazole  Injectable 40 milliGRAM(s) IV Push every 12 hours  propofol Infusion 9.321 MICROgram(s)/kG/Min (5 mL/Hr) IV Continuous <Continuous>  sodium chloride 0.9% lock flush 20 milliLiter(s) IV Push once    MEDICATIONS  (PRN):  acetaminophen    Suspension 650 milliGRAM(s) Enteral Tube every 6 hours PRN For Temp greater than 38 C (100.4 F)  HYDROmorphone  Injectable 0.5 milliGRAM(s) IV Push every 3 hours PRN Severe Pain (7 - 10)  sodium chloride 0.9% lock flush 10 milliLiter(s) IV Push every 1 hour PRN After each medication administration      Allergies    No Known Allergies      SOCIAL HISTORY: **Unable to assess at this time**  Smoker:  YES / NO        PACK YEARS:                         WHEN QUIT?  ETOH use:  YES / NO               FREQUENCY / QUANTITY:  Ilicit Drug use:  YES / NO  Occupation:  Assisted device use (Cane / Walker):  Live with:    FAMILY HISTORY:      Review of Systems (upon admission)  CONSTITUTIONAL:  Fevers / chills, sweats, fatigue, weight loss, weight gain                                    NEGATIVE  NEURO:  parathesias, seizures, syncope, confusion                                                                               NEGATIVE  EYES:  Blurry vision, discharge, pain, loss of vision                                                                                  NEGATIVE  ENMT:  Difficulty hearing, vertigo, dysphagia, epistaxis, recent dental work                                     NEGATIVE  CV:  Chest pain, palpitations, MAGALLANES, orthopnea                                                                                         NEGATIVE  RESPIRATORY:  Wheezing, SOB, cough / sputum, hemoptysis                                                              NEGATIVE  GI:  +abdominal pain.  Denies Nausea, vommiting, diarrhea, constipation, melena                                                                  POSITIVE  : Hematuria, dysuria, urgency, incontinence                                                                                       NEGATIVE  MUSKULOSKELETAL:  arthritis, joint swelling, muscle weakness                                                           NEGATIVE  SKIN/BREAST:  rash, itching, tanya loss, masses                                                                                            NEGATIVE  PSYCH:  depresion, anxiety, suicidal ideation                                                                                             NEGATIVE  HEME/LYMPH:  bruises easily, enlarged lymph nodes, tender lymph nodes                                        NEGATIVE  ENDOCRINE:  cold intolerance, heat intolerance, polydipsia                                                                   NEGATIVE    PHYSICAL EXAM  Vital Signs Last 24 Hrs  T(C): 37.1 (30 Jan 2018 14:22), Max: 38.7 (29 Jan 2018 19:00)  T(F): 98.8 (30 Jan 2018 14:22), Max: 101.6 (29 Jan 2018 19:00)  HR: 60 (30 Jan 2018 14:00) (58 - 116)  BP: 99/62 (30 Jan 2018 14:00) (93/51 - 149/75)  BP(mean): 75 (30 Jan 2018 14:00) (66 - 109)  RR: 16 (30 Jan 2018 14:00) (15 - 34)  SpO2: 100% (30 Jan 2018 14:00) (95% - 100%)    CONSTITUTIONAL:                                                                       Lying in the bed, sedated (propofol 5, Fentanyl).  Not breathing over the vent, not tachypnic  NEURO:                                                                                          Not assessed at this time.   EYES:                                                                                                  Closed.   ENMT:                                                                                               Orally intubated.    CV:                                                                                                     S1S2 +loud systolic murmur heard best over apex (V/VI), also heard over LSB (IV/VI)  RESPIRATORY:                                                                                Anteriorly no wheezing heard.   GI:                                                                                                      +midline incision, no drainage or obvious signs of infection  : ANGELES + / -                                                                                Yes  MUSKULOSKELETAL:                                                                       WNL  SKIN / BREAST:                                                                                 WNL  Ext: +edema of arms and lower legs.  +large blisters on right toes.  2+DP B/L palpable pulses.                                                           LABS:                        9.3    13.0  )-----------( 59       ( 30 Jan 2018 04:02 )             27.9     01-30    147<H>  |  108  |  33<H>  ----------------------------<  143<H>  3.5   |  32<H>  |  1.07    Ca    7.5<L>      30 Jan 2018 04:02  Phos  2.9     01-30  Mg     2.6     01-30    TPro  4.9<L>  /  Alb  2.3<L>  /  TBili  12.4<H>  /  DBili  x   /  AST  64<H>  /  ALT  72<H>  /  AlkPhos  105  01-30    PT/INR - ( 30 Jan 2018 04:02 )   PT: 15.4 sec;   INR: 1.38          PTT - ( 30 Jan 2018 11:08 )  PTT:59.6 sec    CARDIAC MARKERS ( 30 Jan 2018 11:08 )  x     / 0.04 ng/mL / 63 U/L / x     / 2.4 ng/mL  CARDIAC MARKERS ( 30 Jan 2018 04:03 )  x     / 0.04 ng/mL / 78 U/L / x     / 2.3 ng/mL          RADIOLOGY & ADDITIONAL STUDIES:  < from: Xray Chest 1 View AP- PORTABLE-Urgent (01.29.18 @ 13:25) >  Impression: Left PICC with tip in the superior vena cava.     Unchanged left perihilar opacity and pleural effusion and/or atelectasis,   increasing/shifting since prior exam right lower lung.    < end of copied text >  < from: 12 Lead ECG (01.18.18 @ 12:57) >  Diagnosis Line Atrial fibrillation with rapid ventricular response  Nonspecific ST and T wave abnormality , probably digitalis effect  Abnormal ECG    < end of copied text >  < from: CT Angio Abdomen and Pelvis w/ IV Cont (01.20.18 @ 03:08) >    IMPRESSION:  New pneumoperitoneum and small ascites. Findings are consistent with   hollow viscus perforation. Given the duodenal wall thickening, peptic   ulcer disease must be considered.    Patent abdominal aorta and mesenteric arteries.    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    Cholelithiasis. Gallbladder is relatively contracted. Pericholecystic   fluid is likely a portion of ascites.    Cardiomegaly, small pleural effusions and atelectatic changes in the lung   bases.    Nonspecific abdominal nodules.    Atrophic pancreas with probable cystic lesions as above. Selective   follow-up with MRI must be considered.      < end of copied text >  < from: CT Chest No Cont (01.18.18 @ 18:46) >    IMPRESSION:  Cholelithiasis. Findings possibly representing acute cholecystitis.   Recommend further evaluation with HIDA.    Duodenitis.     4.1 cm aortic root aneurysm, 4.3 cm ascending thoracic aortic aneurysm.    Small right pleural effusion.Right lower lobe atelectasis/infiltrate   suspicious for pneumonia.    Pulmonary hypertension.    Cardiomegaly.    < end of copied text >  < from: AMRIK w/Doppler (01.29.18 @ 12:51) >  Interpretation Summary  The left ventricular wall motion is normal.The left ventricular ejection   fraction is normal.The right ventricle is normal in size and   function.The left   atrium is severely dilated. No clot seen in the left atrium or in the   left   atrial appendage.  Left atrial appendage emptying velocities are   normal.Right   atrial size is normal.Mildly calcified trilaeflet aortic valve.There is   mild   aortic regurgitation.Severe bileaflet mitral valve prolapse, more   pronounced   in the posteromedial commissure and the entire posterior leaflet. The A3   scallop is flail with likely torn chordae tendinae attached to it.   Although   given the severety of mitral valve disease with mobile flail components,   it is   hard to rule out a vegetation, the latter appears to be less likely.   There is   severe mitral regurgitation.Structurally normal tricuspid valve.There is   mild   to moderate tricuspid regurgitation.There is severe pulmonary   hypertension.The   pulmonary artery systolic pressure is estimated to be 95   mmHg.Structurally   normal pulmonic valve.There is trace pulmonic regurgitation.Mild,   non-mobile   atherosclerotic plaque seen in the visualized portions of the aorta /   arch.   There is no pericardial effusion.Left pleural effusion noted.Mild aortic   root   dilatation.The aortic root measures 4.3 cm at sinuses (normal less than   4 cm    < end of copied text > normal...

## 2020-09-05 NOTE — ED PROVIDER NOTE - CRITERIA ADMIT PEDS PT
"Speech Language Pathology   Initial Assessment     Patient Name: Jimenez Bains  AGE:  75 y.o., SEX:  male  Medical Record #: [id number]  Today's Date: 9/5/2020     Subjective    Pt pleasant and cooperative during evaluation.  Pt became emotionally labile when discussing the amputation of his leg.       Objective       09/05/20 1301   Prior Living Situation   Prior Services Skilled Home Health Services;Intermittent Physical Support for ADL Per Family   Housing / Facility Mobile Home   Lives with - Patient's Self Care Capacity Adult Children  (grand-daughter)   Prior Level Of Function   Communication Within Functional Limits   Swallow Within Functional Limits   Dentition Edentulous   Dentures Lowers  (missing uppers)   Hearing Within Functional Limits for Evaluation   Hearing Aid None   Vision Within Functional Limits for Evaluation   Patient's Primary Language English   Education Some High School   Occupation (Pre-Hospital Vocational) Retired Due To Age   Comments previously worked as a     Receptive Language / Auditory Comprehension   Receptive Language / Auditory Comprehension WDL   Expressive Language   Naming Within Functional Limits (6-7)   Word Finding Deficits Minimal (4)   Written Language Expression   Dominant Hand Right   Cognition   Moderate Attention Minimal (4)   Orientation  Minimal (4)   Verbal Short Term Memory 5 Minutes   Clock Drawing Within Functional Limits   Cognitive Pattern Assessment   Cognitive Pattern Assessment Used BIMS   Brief Interview for Mental Status (BIMS)   Repetition of Three Words (First Attempt) 3   Temporal Orientation: Year Correct   Temporal Orientation: Month Accurate within 5 days   Temporal Orientation: Day Correct   Recall: \"Sock\" Yes, no cue required   Recall: \"Blue\" Yes, no cue required   Recall: \"Bed\" Yes, no cue required   BIMS Summary Score 15   Social / Pragmatic Communication   Social / Pragmatic Communication WDL   Functional Level of Assist "   Comprehension Independent   Expression Supervision   Expression Description Verbal cueing   Social Interaction Independent   Problem Solving Minimal Assist   Problem Solving Description Verbal cueing;Therapy schedule;Increased time   Memory Moderate Assist   Memory Description Therapy schedule;Verbal cueing;Increased time   Outcome Measures   Outcome Measures Utilized MoCA   MoCA (Kurtis Cognitive Assessment)   Visuospatial/Executive 5   Naming 3   Attention - Digits 2   Attention - Letters 1   Attention - Serial 7 Subtraction 3   Language - Repeat 2   Language - Fluency 0   Language - Abstraction 1   Delayed Recall 3   Orientation 3   Level of Education 1   Total 24   Level of Severity Mild cognitive impairment   Problem List   Problem List Cognitive-Linguistic Deficits   Current Discharge Plan   Current Discharge Plan Return to Prior Living Situation   Benefit   Therapy Benefit Patient would benefit from Inpatient Rehab Speech-Language Pathology to address above identified deficits.   Interdisciplinary Plan of Care Collaboration   IDT Collaboration with  Occupational Therapist   Collaboration Comments regarding cognitioin during his evaluation.    Strengths & Barriers   Strengths Able to follow instructions;Motivated for self care and independence;Pleasant and cooperative;Willingly participates in therapeutic activities   Barriers Impaired functional cognition   Speech Language Pathologist Assigned   Assigned SLP / Extension CL/MP 60 (SPLIT OK)   SLP Total Time Spent   SLP Individual Total Time Spent (Mins) 60   Evaluation Charges   SLP Speech Language Evaluation Speech Sound Language Comprehension       Assessment    Patient is 75 y.o. male with a diagnosis of acute encephalopathy, L AKA.  Additional factors influencing patient status/progress (ie: cognitive factors, co-morbidities, social support, etc): Pt scored 24 on the MoCA, which is characteristic of mild cognitive deficits.  Pt presented with mild  deficits in attn, short term memory, and problem solving.  Recommend skilled speech therapy services to target aforementioned deficits.      Plan  Recommend Speech Therapy 30-60 minutes per day 5-6 days per week for 4 weeks for the following treatments:  SLP Aphasia Evaluation, SLP Cognitive Skill Development and SLP Group Treatment.    Goals:  Long term and short term goals have been discussed with patient and they are in agreement.    Speech Therapy Problems     Problem: Memory STGs     Dates: Start: 09/05/20       Goal: STG-Within one week, patient will     Dates: Start: 09/05/20       Description: 1) Individualized goal:  recall daily events and safety strategies given min verbal cues 80% with use of memory book.  2) Interventions:  SLP Cognitive Skill Development and SLP Group Treatment                    Problem: Problem Solving STGs     Dates: Start: 09/05/20       Goal: STG-Within one week, patient will     Dates: Start: 09/05/20       Description: 1) Individualized goal:  answer basic problem solving questions with 80% accuracy given min verbal cues.    2) Interventions:  SLP Cognitive Skill Development and SLP Group Treatment              Goal: STG-Within one week, patient will     Dates: Start: 09/05/20       Description: 1) Individualized goal:  complete alternating attn tasks with 80% accuracy given min verbal cues.  2) Interventions:  SLP Cognitive Skill Development and SLP Group Treatment                    Problem: Speech/Swallowing LTGs     Dates: Start: 09/05/20       Goal: LTG-By discharge, patient will solve basic problems     Dates: Start: 09/05/20       Description: 1) Individualized goal:  Mert for safe discharge home.   2) Interventions:  SLP Aphasia Evaluation, SLP Cognitive Skill Development, and SLP Group Treatment                          No

## 2021-02-01 NOTE — PROVIDER CONTACT NOTE (CRITICAL VALUE NOTIFICATION) - ASSESSMENT
Initial rate of Heparin gtt at 14 mL/hr, PTT was >200, gtt was held for 1 hour and rate changed to 11 mL/hr. Anesthesia Volume In Cc: 2.6

## 2021-05-06 NOTE — PROGRESS NOTE ADULT - ASSESSMENT
Patient needs 90 day supply to [facility] so insurance covers ASSESSMENT/PLAN 73y/o male pt c Respiratory failure, perforated duodenal ulcer, POD post repair, pneumonia, RLL, LLL  atelectases, no obvious RADHA, elevated R hemidiaphragm, L pleural effusion, AFIB,  thrombocytopenia, possible endocarditis, s/p reop. now POP tracheostomy, new fever pattern, anemia, high aspiration risk.    1. O2:  [place] valve daytime, remove and safely store HS, use TCollar HS. Discussed PM mngmnt c SICU  2. Bronchodilators:  Atrovent/ albuterol q 4 – 6 hours as needed  3. Corticosteroids: off  4. ID/Antibiotics: off   ID pt discussed   5. Cardiac/HTN: Monitor, optimize HR;, optimize chf mngmnt, cardiology feedback   6. GI: Rx/ prophylaxis c PPI/Protonix,   7. Heme: Rx/VT  follow H/H closely  on Eliquis   8. Aspiration precautions, feeding PEG to continue, continue NPO at this time, speech and swallow feedback, swallowing exercise  9 Continue [place] catheter  10. Edema: continue diuretic  11.Mobilize, OOB, PT  Discussed c managing team.

## 2021-07-29 NOTE — DISCHARGE NOTE ADULT - MEDICATION SUMMARY - MEDICATIONS TO CHANGE
I will SWITCH the dose or number of times a day I take the medications listed below when I get home from the hospital:  None
no

## 2021-12-01 NOTE — ED ADULT NURSE NOTE - PLAN OF CARE
Detail Level: Detailed Quality 226: Preventive Care And Screening: Tobacco Use: Screening And Cessation Intervention: Patient screened for tobacco use and is an ex/non-smoker Quality 265: Biopsy Follow-Up: Biopsy results reviewed, communicated, tracked, and documented Quality 130: Documentation Of Current Medications In The Medical Record: Current Medications Documented Quality 431: Preventive Care And Screening: Unhealthy Alcohol Use - Screening: Documentation of medical reason(s) for not screening for unhealthy alcohol use (e.g., limited life expectancy, other medical reasons) Quality 110: Preventive Care And Screening: Influenza Immunization: Influenza immunization was not ordered or administered, reason not given Fall precautions/Explanation of exam/test/NPO/I and O/Bedside visitors/Position of comfort

## 2022-12-08 NOTE — PATIENT PROFILE ADULT. - MEDICATIONS BROUGHT TO HOSPITAL, PROFILE
Patient c/o pain at this time. Surgical PA informed and made aware, pain med to be ordered as per Surgical PA. no

## 2023-01-19 NOTE — DISCHARGE NOTE ADULT - PROVIDER TOKENS
Alert and oriented, no focal deficits, no motor or sensory deficits. TOKEN:'[id number]',TOKEN:'[id number]'

## 2023-01-24 NOTE — PROGRESS NOTE ADULT - PROBLEM SELECTOR PLAN 2
pt w/ sputum and blood culture growing Staph aureus. Surveillance BCx on 3/14 with NGTD and pt w/ no fevers and no leukocytosis.   -pt initially on Vanc/Zosyn, now discontinued as cultures are not MRSA  -c/w Cefazolin 2mg q8hrs (3/15--  -ID consulted regarding abx duration, patient will require 2 weeks cefazolin total followed by 2 weeks Keflex,   -Gallium scan negative   -Plan initially for AMRIK however scope did not pass no further attempts pt w/ sputum and blood culture growing Staph aureus. Surveillance BCx on 3/14 with NGTD and pt w/ no fevers and no leukocytosis.   -pt initially on Vanc/Zosyn, now discontinued as cultures are not MRSA  -c/w Cefazolin 2mg q8hrs (3/15--  -ID consulted regarding abx duration, patient will require 2 weeks cefazolin total followed by 2 weeks Keflex, today day 9 of 14 of cefazolin   -Gallium scan negative   -Plan initially for AMRIK however scope did not pass no further attempts 24-Jan-2023 11:58

## 2023-06-01 NOTE — PROGRESS NOTE ADULT - SUBJECTIVE AND OBJECTIVE BOX
INTERVAL HPI/OVERNIGHT EVENTS:    ANTIBIOTICS    MEDICATIONS  (STANDING):  ALBUTerol/ipratropium for Nebulization 3 milliLiter(s) Nebulizer every 6 hours  apixaban 5 milliGRAM(s) Oral every 12 hours  chlorhexidine 0.12% Liquid 15 milliLiter(s) Swish and Spit two times a day  digoxin     Tablet 0.125 milliGRAM(s) Oral daily  docusate sodium Liquid 100 milliGRAM(s) Oral two times a day  insulin lispro (HumaLOG) corrective regimen sliding scale   SubCutaneous every 6 hours  lidocaine 2% Injectable 50 milliLiter(s) Local Injection once  pantoprazole  Injectable 40 milliGRAM(s) IV Push every 12 hours  povidone iodine 10% Solution 1 Application(s) Topical daily  sodium chloride 0.9% lock flush 20 milliLiter(s) IV Push once  tamsulosin 0.4 milliGRAM(s) Oral at bedtime    MEDICATIONS  (PRN):  acetaminophen    Suspension 650 milliGRAM(s) Enteral Tube every 6 hours PRN For Temp greater than 38 C (100.4 F)  bisacodyl Suppository 10 milliGRAM(s) Rectal daily PRN Constipation  sodium chloride 0.9% lock flush 10 milliLiter(s) IV Push every 1 hour PRN After each medication administration      Allergies    No Known Allergies    Intolerances        REVIEW OF SYSTEMS:    Constitutional: No fever, weight loss or fatigue  Eyes: No eye pain, visual disturbances, or discharge  ENMT:  No difficulty hearing, tinnitus, vertigo; No sinus or throat pain  Neck: No pain or stiffness  Respiratory: No cough, wheezing, chills or hemoptysis  Cardiovascular: No chest pain, palpitations, shortness of breath, dizziness or leg swelling  Gastrointestinal: No abdominal or epigastric pain. No nausea, vomiting or hematemesis; No diarrhea or constipation. No melena or hematochezia.  Vital Signs Last 24 Hrs  T(C): 36.6 (26 Feb 2018 14:04), Max: 37.4 (26 Feb 2018 09:09)  T(F): 97.8 (26 Feb 2018 14:04), Max: 99.4 (26 Feb 2018 09:09)  HR: 102 (26 Feb 2018 13:18) (94 - 128)  BP: 119/71 (26 Feb 2018 13:18) (117/69 - 168/81)  BP(mean): 90 (26 Feb 2018 13:18) (80 - 104)  RR: 21 (26 Feb 2018 13:18) (16 - 21)  SpO2: 100% (26 Feb 2018 13:18) (98% - 100%)    PHYSICAL EXAM:    General: ; in no acute distress  Eyes: PERRL, EOM intact; conjunctiva and sclera clear  Head: Normocephalic; atraumatic  ENMT: No nasal discharge; airway clear  Neck: Supple; non tender; no masses  Respiratory: No wheezes, rales or rhonchi  Cardiovascular: Regular rate and rhythm. S1 and S2 Normal; No murmurs, gallops or rubs  Gastrointestinal: Soft non-tender non-distended; Normal bowel sounds; No hepatosplenomegaly  Genitourinary: No costovertebral angle tenderness  Extremities: Normal range of motion, No clubbing, cyanosis or edema  Vascular: Peripheral pulses palpable 2+ bilaterally  Neurological: Alert and oriented x3  Skin: Warm and dry. No acute rash  Lymph Nodes: No acute cervical adenopathy  Musculoskeletal: Normal gait, tone, without deformities    LABS:                        8.0    8.0   )-----------( 175      ( 26 Feb 2018 05:45 )             27.2     02-26    143  |  107  |  32<H>  ----------------------------<  140<H>  4.1   |  27  |  0.49<L>    Ca    8.0<L>      26 Feb 2018 05:45  Phos  2.9     02-26  Mg     2.3     02-26    TPro  6.4  /  Alb  2.6<L>  /  TBili  1.4<H>  /  DBili  x   /  AST  44<H>  /  ALT  50<H>  /  AlkPhos  202<H>  02-26            MICROBIOLOGY:      RADIOLOGY & ADDITIONAL STUDIES: Benzoyl Peroxide Counseling: Patient counseled that medicine may cause skin irritation and bleach clothing.  In the event of skin irritation, the patient was advised to reduce the amount of the drug applied or use it less frequently.   The patient verbalized understanding of the proper use and possible adverse effects of benzoyl peroxide.  All of the patient's questions and concerns were addressed.

## 2024-09-05 NOTE — PROGRESS NOTE ADULT - ATTENDING COMMENTS
Patient seen and examined with house-staff during bedside rounds.  Resident note read, including vitals, physical findings, laboratory data, and radiological reports.   Revisions included below.  Direct personal management at bed side and extensive interpretation of the data.  Plan was outlined and discussed in details with the housestaff.  Decision making of high complexity  Action taken for acute disease activity to reflect the level of care provided:  - medication reconciliation  - review laboratory data  - tolerating PSV and will try T collar  - Continue diuresis and start after load reducing agent  - continue antibiotics and repeat blood culture  - resuming anticoagulation  - MS improved  - evaluated hemodynamics  - discussed with partner
Patient seen and examined with house-staff during bedside rounds.  Resident note read, including vitals, physical findings, laboratory data, and radiological reports.   Revisions included below.  Direct personal management at bed side and extensive interpretation of the data.  Plan was outlined and discussed in details with the housestaff.  Decision making of high complexity  Action taken for acute disease activity to reflect the level of care provided:  - medication reconciliation  - review laboratory data  -reviewed CXR  - discussed weaning trials and changed to T collar  - Evaluated the fluid status and maintain negative fluid balance  - evaluation and management of sepsis and continue on antibiotics  - started tube feeding  - ordered echo  - cardiology called  - evaluated clearing the LA
torn chordae mitral valve - can repeat AMRIK but will not r/o endocardisits, only utility would be to r/o abcess which is of low probability  given Staph aureus growth on BCx x 1, recommend Endocarditis treatment  Appreciate ID recs and Gallium scan
ID Attending:    The Gallium scan is negative.    If his AMRIK is negative, I would complete a 2 week course of iv antibiotics, followed by 2 weeks of cephalexin 500 qid.    He should have t.i.w. chlorhexidine baths/showers or chlorhexidene wipes to decontaminate the skin surface.    Please call the ID service if we may be of further assistance.
Patient seen and examined with house-staff during bedside rounds.  Resident note read, including vitals, physical findings, laboratory data, and radiological reports.   Revisions included below.  Direct personal management at bed side and extensive interpretation of the data.  Plan was outlined and discussed in details with the housestaff.  Decision making of high complexity  Action taken for acute disease activity to reflect the level of care provided:  - medication reconciliation  - review laboratory data  - Evaluated vent management  - Weaning off ventilator and T collar trials  - hemodynamic stability  - Fluid and diuretics management  - CHF picture improved  - Continue treatment for pneumonia and bacteremia  - Evaluated stability for transfer to [facility]
Adequate: hears normal conversation without difficulty
CT results noted and await recs from surgery regarding drain removal and clamping. Pt will complete 4 weeks of Ancef and then 2 weeks po Keflex per ID so PICC today. Decrease Lasix to 40mg  daily with decreasing edema and decreased Na today. Continue PT and speech and swallow to f/u since cleared for feeds by surgery. discharge planning.
Patient seen and examined with house-staff during bedside rounds  Resident note read, including vitals, physical findings, laboratory data, and radiological reports.   Revisions included below.  Case discussed with House staff  Direct personal management at bedside  and extensive interpretation of data. Decision making of high complexity.
Pt comfortable on trach collar with Passey heidi valve and communicating. CT Abdomen with contrast to be administered in green drain per surgery. Will resume feeds after Ct. Continue antibiotics and clarify duration with ID in view of unsuccessful AMRIK. Speech and swallow to follow up as well post CT.
Pt tolerating Passy [place] valve. AMRIK today. Will remove valve for procedure and inflate cuff. continue abx and f/u Cx negative. speech and swallow plan notyed. surgery to f/u grain evaluation and plan. Currently clamped.
Patient seen and examined with house-staff during bedside rounds  Resident note read, including vitals, physical findings, laboratory data, and radiological reports.   Revisions included below.  Case discussed with House staff  Direct personal management at bedside  and extensive interpretation of data. Decision making of high complexity.
Pt doing well and on day 9/14 of IV abx to be followed by 2 weeks po Keflex. discussed drains with Dr. Metcalf and he is to remove G tube today. Pt failed speech and swallow and to continue on J tube feeds. Continue an ice chip per hour if not coughing. continue PT. Plan is to continue with Passey heiid valve for now and will need likely intervention for severe MR when completed treatment for MSSa bacteremia. will have to consider this with regards to decannulation. continue afterload reduction and diuresis.

## 2025-04-23 NOTE — DISCHARGE NOTE ADULT - %
O-Z Plasty Text: The defect edges were debeveled with a #15 scalpel blade.  Given the location of the defect, shape of the defect and the proximity to free margins an O-Z plasty (double transposition flap) was deemed most appropriate.  Using a sterile surgical marker, the appropriate transposition flaps were drawn incorporating the defect and placing the expected incisions within the relaxed skin tension lines where possible.    The area thus outlined was incised deep to adipose tissue with a #15 scalpel blade.  The skin margins were undermined to an appropriate distance in all directions utilizing iris scissors.  Hemostasis was achieved with electrocautery.  The flaps were then transposed into place, one clockwise and the other counterclockwise, and anchored with interrupted buried subcutaneous sutures. 45
